# Patient Record
Sex: FEMALE | Race: WHITE | NOT HISPANIC OR LATINO | Employment: OTHER | URBAN - METROPOLITAN AREA
[De-identification: names, ages, dates, MRNs, and addresses within clinical notes are randomized per-mention and may not be internally consistent; named-entity substitution may affect disease eponyms.]

---

## 2017-01-23 ENCOUNTER — OFFICE VISIT (OUTPATIENT)
Dept: PSYCHOLOGY | Facility: CLINIC | Age: 67
End: 2017-01-23
Payer: COMMERCIAL

## 2017-01-23 ENCOUNTER — HOSPITAL ENCOUNTER (OUTPATIENT)
Dept: MAMMOGRAPHY | Facility: CLINIC | Age: 67
Discharge: HOME | End: 2017-01-23
Attending: FAMILY MEDICINE | Admitting: FAMILY MEDICINE
Payer: MEDICARE

## 2017-01-23 DIAGNOSIS — F32.5 MAJOR DEPRESSION IN COMPLETE REMISSION (H): Primary | Chronic | ICD-10-CM

## 2017-01-23 DIAGNOSIS — Z12.31 ENCOUNTER FOR SCREENING MAMMOGRAM FOR BREAST CANCER: ICD-10-CM

## 2017-01-23 PROCEDURE — 77063 BREAST TOMOSYNTHESIS BI: CPT

## 2017-01-23 PROCEDURE — 90834 PSYTX W PT 45 MINUTES: CPT | Performed by: PSYCHOLOGIST

## 2017-01-23 PROCEDURE — G0202 SCR MAMMO BI INCL CAD: HCPCS

## 2017-01-23 NOTE — Clinical Note
Her complaints are principally having to do with cognitive functioning and forgetting things. I see she had a neuropsych testing in 2013. I am wondering if it might be time to repeat that?

## 2017-01-23 NOTE — Clinical Note
Saw Albina yesterday. It appears her principle concern is these increasing memory lapses. She is not reporting significant amounts of depression or anxiety. I will go more after our next session. I am wondering if a neuropsych evaluation might be in order?

## 2017-01-24 NOTE — PROGRESS NOTES
Progress Note - Initial Session  Disclaimer: This note consists of symbols derived from keyboarding, dictation and/or voice recognition software. As a result, there may be errors in the script that have gone undetected. Please consider this when interpreting information found in this chart.    Client Name:  Albina Pedro Date: 1/23/2017         Service Type: Individual      Session Start Time: 2:00 PM  Session End Time: 2:45 PM      Session Length: 38 - 52      Session #: 1     Attendees: Client attended alone         Diagnostic Assessment in progress.  Unable to complete documentation at the conclusion of the first session due to needing more information. Client presents with extended history of depression and anxiety.  Currently her principal complaint is memory and comprehension particularly reading comprehension. He is also reporting increasing memory lapses beginning approximately 5 years ago. She finds herself forgetting tasks frequently; this has caused considerable strain in her relationship with her .      Mental Status Assessment:  Appearance:   Appropriate   Eye Contact:   Good   Psychomotor Behavior: Normal   Attitude:   Cooperative   Orientation:   All  Speech   Rate / Production: Normal    Volume:  Normal   Mood:    Anxious  Normal  Affect:    Appropriate   Thought Content:  Clear   Thought Form:  Coherent  Logical  Tangential   Insight:    Fair       Safety Issues and Plan for Safety and Risk Management:  Client denies current fears or concerns for personal safety.  Client denies current or recent suicidal ideation or behaviors.  Client denies current or recent homicidal ideation or behaviors.  Client denies current or recent self injurious behavior or ideation.  Client denies other safety concerns.  A safety and risk management plan has not been developed at this time, however client was given the after-hours number / 911 should there be a change in any of these risk  factors.  Client reports there are firearms in the house. The firearms are secured in a locked space.      Diagnostic Criteria:  Major Depressive Disorder: A) Recurrent episode(s) - symptoms have been present during the same 2-week period and represent a change from previous functioning 5 or more symptoms (required for diagnosis)   - Depressed mood. Note: In children and adolescents, can be irritable mood.     - Diminished interest or pleasure in all, or almost all, activities.    - Fatigue or loss of energy.    - Feelings of worthlessness or inappropriate and excessive guilt.    - Diminished ability to think or concentrate, or indecisiveness.   B) The symptoms cause clinically significant distress or impairment in social, occupational, or other important areas of functioning  C) The episode is not attributable to the physiological effects of a substance or to another medical condition  D) The occurence of major depressive episode is not better explained by other thought / psychotic disorders  E) There has never been a manic episode or hypomanic episode       DSM5 Diagnoses: (Sustained by DSM5 Criteria Listed Above)  Diagnoses: 296.31 Major Depressive Disorder, Recurrent Episode, Mild _  Psychosocial & Contextual Factors: memory problems  WHODAS 2.0  World Health Organization  Disability Assessment Schedule 2.0                  12-Item version, self-administered             This questionnaire asks about difficulties due to health conditions. Health conditions include disease or                illnesses, other health problems that may be short or long lasting, injuries, mental health or emotional                   problems, and problems with alcohol or drugs.                     Think back over the past 30 days and answer these questions, thinking about how much difficulty you had doing the following activities. For each question, please [place] only one response.    S1 Standing for long periods such as 30 minutes?  None =         1   S2 Taking care of household responsibilities? Mild =           2   S3 Learning a new task, for example, learning how to get to a new place? Severe =       4   S4 How much of a problem do you have joining community activities (for example, festivals, Scientologist or other activities) in the same way as anyone else can? Extreme / or cannot do = 5   S5 How much have you been emotionally affected by your health problems? Extreme / or cannot do = 5   In the past 30 days, how much difficulty did you have in:   S6 Concentrating on doing something for ten minutes? Severe =       4   S7 Walking a long distance such as a kilometer (or equivalent)? None =         1   S8 Washing your whole body? None =         1   S9 Getting dressed? None =         1   S10 Dealing with people you do not know? Severe =       4   S11 Maintaining a friendship? Severe =       4   S12 Your day to day work? Moderate =   3   Simple Scoring Total: 34     H1 Overall, in the past 30 days, how many days were these difficulties present? Record number of days 22   H2 In the past 30 days, for how many days were you totally unable to carry out your usual activities or work because of any health condition? Record number of days  18   H3 In the past 30 days, not counting the days that you were totally unable, for how many days did you cut back or reduce your usual activities or work because of any health condition? Record number of days 15           Collateral Reports Completed:  Not Applicable      PLAN: (Homework, other):  Client stated that she may follow up for ongoing services with [facility].        Rolo Luke

## 2017-01-31 ENCOUNTER — OFFICE VISIT (OUTPATIENT)
Dept: PSYCHOLOGY | Facility: CLINIC | Age: 67
End: 2017-01-31
Payer: COMMERCIAL

## 2017-01-31 DIAGNOSIS — F33.0 MAJOR DEPRESSIVE DISORDER, RECURRENT, MILD (H): Primary | ICD-10-CM

## 2017-01-31 PROCEDURE — 90791 PSYCH DIAGNOSTIC EVALUATION: CPT | Performed by: PSYCHOLOGIST

## 2017-01-31 NOTE — Clinical Note
Looks like she is doing fairly well on her antidepressants, though I think she is reporting less depression and she is actually experiencing.

## 2017-02-01 PROBLEM — F33.0 MAJOR DEPRESSIVE DISORDER, RECURRENT, MILD (H): Status: ACTIVE | Noted: 2017-02-01
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
## 2017-02-13 ENCOUNTER — TELEPHONE (OUTPATIENT)
Dept: CARDIOLOGY | Facility: CLINIC | Age: 67
End: 2017-02-13

## 2017-02-13 NOTE — TELEPHONE ENCOUNTER
[facility] Call Center    Phone Message    Name of Caller: Albina    Phone Number: Home number on file [phone] (home) or Cell number on file:    Telephone Information:   Mobile [phone]       Best time to return call: Any    May a detailed message be left on voicemail: yes    Relation to patient: Self    Reason for Call: Medication Question or concern regarding medication   Prescription Clarification:   Name of Medication: ranolazine (RANEXA) 500 MG 12 hr tablet [[id number]] (Order [id number])     Prescribing Provider: Dr. Carbone        Is patient symptomatic? No. Describe question or concern: Patient has questions regarding Rx would like to verify if this is the the ER or sustained release. Patient would like to discuss with nurse. Please advise       Action Taken: Message routed to:  Adult Clinics: Cardiology p [id number]

## 2017-02-13 NOTE — TELEPHONE ENCOUNTER
Spoke with Kristina. She states her bottle says Ranexa ER on it. She is looking at purchasing the medication through a [place] pharmacy. What they have is Ranexa SR. She is wondering if that is the same thing? Matilde Goins CMA ([facility])

## 2017-02-14 NOTE — TELEPHONE ENCOUNTER
Left message for patient with abbreviation definitions for the Ranexa. ER= extended release, SR = sustained release. These would be the same.  Asked her to call if she needed anything else.

## 2017-03-02 DIAGNOSIS — F43.0 ACUTE REACTION TO STRESS: ICD-10-CM

## 2017-03-02 NOTE — TELEPHONE ENCOUNTER
LORazepam (ATIVAN) 0.5 MG tablet      Last Written Prescription Date:  11/14/16  Last Fill Quantity: 20,   # refills: 0  Last Office Visit with [facility], [facility] or [facility] prescribing provider: 12/21/16  Future Office visit:    Next 5 appointments (look out 90 days)     Mar 15, 2017  2:00 PM CDT   Return Visit with Rolo TAM [facility] ([facility])    [address] [place] [id number]   [phone]            May 12, 2017  3:00 PM CDT   Return Visit with Driss Carbone MD   [facility]    [address] [place] [id number] [phone]                   Routing refill request to provider for review/approval because:  Drug not on the [facility], [facility] or [facility] refill protocol or controlled substance

## 2017-03-03 RX ORDER — LORAZEPAM 0.5 MG/1
0.5 TABLET ORAL EVERY 8 HOURS PRN
Qty: 20 TABLET | Refills: 0 | Status: SHIPPED | OUTPATIENT
Start: 2017-03-03 | End: 2017-03-22

## 2017-03-22 ENCOUNTER — OFFICE VISIT (OUTPATIENT)
Dept: FAMILY MEDICINE | Facility: CLINIC | Age: 67
End: 2017-03-22
Payer: COMMERCIAL

## 2017-03-22 VITALS
DIASTOLIC BLOOD PRESSURE: 86 MMHG | TEMPERATURE: 98.6 F | WEIGHT: 179.6 LBS | BODY MASS INDEX: 30.66 KG/M2 | HEIGHT: 64 IN | SYSTOLIC BLOOD PRESSURE: 126 MMHG | HEART RATE: 82 BPM

## 2017-03-22 DIAGNOSIS — F32.5 MAJOR DEPRESSION IN COMPLETE REMISSION (H): ICD-10-CM

## 2017-03-22 DIAGNOSIS — F43.0 ACUTE REACTION TO STRESS: ICD-10-CM

## 2017-03-22 DIAGNOSIS — Z23 NEED FOR PNEUMOCOCCAL VACCINATION: Primary | ICD-10-CM

## 2017-03-22 DIAGNOSIS — R06.2 WHEEZING: ICD-10-CM

## 2017-03-22 DIAGNOSIS — R41.3 MEMORY LOSS: ICD-10-CM

## 2017-03-22 LAB
CRP SERPL-MCNC: <2.9 MG/L (ref 0–8)
ERYTHROCYTE [SEDIMENTATION RATE] IN BLOOD BY WESTERGREN METHOD: 11 MM/H (ref 0–30)
TSH SERPL DL<=0.005 MIU/L-ACNC: 2.41 MU/L (ref 0.4–4)
VIT B12 SERPL-MCNC: 339 PG/ML (ref 193–986)

## 2017-03-22 PROCEDURE — 84207 ASSAY OF VITAMIN B-6: CPT | Mod: 90 | Performed by: FAMILY MEDICINE

## 2017-03-22 PROCEDURE — 90670 PCV13 VACCINE IM: CPT | Performed by: FAMILY MEDICINE

## 2017-03-22 PROCEDURE — 85652 RBC SED RATE AUTOMATED: CPT | Performed by: FAMILY MEDICINE

## 2017-03-22 PROCEDURE — 82607 VITAMIN B-12: CPT | Performed by: FAMILY MEDICINE

## 2017-03-22 PROCEDURE — 99000 SPECIMEN HANDLING OFFICE-LAB: CPT | Performed by: FAMILY MEDICINE

## 2017-03-22 PROCEDURE — 86140 C-REACTIVE PROTEIN: CPT | Performed by: FAMILY MEDICINE

## 2017-03-22 PROCEDURE — 84443 ASSAY THYROID STIM HORMONE: CPT | Performed by: FAMILY MEDICINE

## 2017-03-22 PROCEDURE — G0009 ADMIN PNEUMOCOCCAL VACCINE: HCPCS | Performed by: FAMILY MEDICINE

## 2017-03-22 PROCEDURE — 36415 COLL VENOUS BLD VENIPUNCTURE: CPT | Performed by: FAMILY MEDICINE

## 2017-03-22 PROCEDURE — 99214 OFFICE O/P EST MOD 30 MIN: CPT | Mod: 25 | Performed by: FAMILY MEDICINE

## 2017-03-22 RX ORDER — VENLAFAXINE 37.5 MG/1
TABLET ORAL
Qty: 60 TABLET | Refills: 1 | Status: SHIPPED | OUTPATIENT
Start: 2017-03-22 | End: 2017-09-01 | Stop reason: DRUGHIGH

## 2017-03-22 RX ORDER — ALBUTEROL SULFATE 90 UG/1
2 AEROSOL, METERED RESPIRATORY (INHALATION) EVERY 4 HOURS PRN
Qty: 3 INHALER | Refills: 6 | Status: SHIPPED | OUTPATIENT
Start: 2017-03-22 | End: 2019-09-05

## 2017-03-22 RX ORDER — BUPROPION HYDROCHLORIDE 300 MG/1
300 TABLET ORAL EVERY MORNING
Qty: 90 TABLET | Refills: 1 | Status: SHIPPED | OUTPATIENT
Start: 2017-03-22 | End: 2017-10-20

## 2017-03-22 RX ORDER — LORAZEPAM 0.5 MG/1
0.5 TABLET ORAL EVERY 8 HOURS PRN
Qty: 20 TABLET | Refills: 0 | Status: SHIPPED | OUTPATIENT
Start: 2017-03-22 | End: 2017-06-29

## 2017-03-22 ASSESSMENT — ANXIETY QUESTIONNAIRES
5. BEING SO RESTLESS THAT IT IS HARD TO SIT STILL: NOT AT ALL
3. WORRYING TOO MUCH ABOUT DIFFERENT THINGS: MORE THAN HALF THE DAYS
GAD7 TOTAL SCORE: 8
2. NOT BEING ABLE TO STOP OR CONTROL WORRYING: SEVERAL DAYS
6. BECOMING EASILY ANNOYED OR IRRITABLE: MORE THAN HALF THE DAYS
1. FEELING NERVOUS, ANXIOUS, OR ON EDGE: MORE THAN HALF THE DAYS
7. FEELING AFRAID AS IF SOMETHING AWFUL MIGHT HAPPEN: NOT AT ALL

## 2017-03-22 ASSESSMENT — PATIENT HEALTH QUESTIONNAIRE - PHQ9: 5. POOR APPETITE OR OVEREATING: SEVERAL DAYS

## 2017-03-22 NOTE — PROGRESS NOTES
SUBJECTIVE:                                                    Albina Pedro is a 66 year old female who presents to clinic today for the following health issues:    Depression Followup Remeron & Bupropion    Status since last visit: Worsened   See PHQ-9 for current symptoms.  Other associated symptoms:   Family thinks the medication is not helping.   Patient thinks her mind is just not working like it used too, memory concerns.     PHQ-9 SCORE 1/23/2017 1/31/2017 3/22/2017   Total Score 2 1 -   Total Score - - 12     SEBASTIAN-7 SCORE 2/11/2016 12/21/2016 3/22/2017   Total Score - - -   Total Score 5 3 8     PHQ-9 ([employer]) 3/22/2017   1.  Little interest or pleasure in doing things 1   2.  Feeling down, depressed, or hopeless 2   3.  Trouble falling or staying asleep, or sleeping too much 0   4.  Feeling tired or having little energy 1   5.  Poor appetite or overeating 0   6.  Feeling bad about yourself 2   7.  Trouble concentrating 2   8.  Moving slowly or restless 1   9.  Suicidal or self-harm thoughts 0   PHQ-9 Total Score 9     SEBASTIAN-7   [employer], 2002; Used with Permission) 3/22/2017   1. Feeling nervous, anxious, or on edge 2   2. Not being able to stop or control worrying 1   3. Worrying too much about different things 2   4. Trouble relaxing 1   5. Being so restless that it is hard to sit still 0   6. Becoming easily annoyed or irritable 2   7. Feeling afraid, as if something awful might happen 0   SEBASTIAN-7 Total Score 8        BP Readings from Last 6 Encounters:   03/22/17 148/76   12/21/16 138/74   12/13/16 136/71   12/09/16 150/83   09/30/16 113/70   09/20/16 126/81     Problem list and histories reviewed & adjusted, as indicated.  Additional history: things have been very stressful. She is having more fights with her    On days she can't think straight she is more tired now on the remeron  She never did follow up with neurology. When her daughter was with her she never followed up with any of the  "recommendations and she is now having the issues with her memory loss still      Patient Active Problem List   Diagnosis     DIZZINESS - LIKELY HYPOGLYCEMIA     Dermatophytosis of body     Episodic mood disorder (H)     CARDIOVASCULAR SCREENING; LDL GOAL LESS THAN 70     Health Care Home     Panniculitis     Advanced directives, counseling/discussion     CAD (coronary artery disease)     Sjogren's syndrome (H)     Adverse effect of anesthetic     Status post coronary angiogram     Acute chest pain     Major depressive disorder, recurrent, mild (H)     Past Surgical History:   Procedure Laterality Date     C/SECTION, LOW TRANSVERSE  10/13/1978    , Low Transverse     COLONOSCOPY       SURGICAL HISTORY OF -            SURGICAL HISTORY OF -   00    Colonoscopy       Social History   Substance Use Topics     Smoking status: Never Smoker     Smokeless tobacco: Never Used     Alcohol use 0.0 oz/week     0 Standard drinks or equivalent per week      Comment: 1 glass of wine every 2 weeks     Family History   Problem Relation Age of Onset     CANCER Mother      Breast and liver- age 43     HEART DISEASE Father      ? chf?h/o CVA     Alzheimer Disease Father      Hypertension Father      Neurologic Disorder Father      CVA     DIABETES Maternal Grandmother      Breast Cancer Maternal Aunt      Breast Cancer Paternal Aunt      Cancer - colorectal No family hx of      Prostate Cancer No family hx of            ROS:  Constitutional, HEENT, cardiovascular, pulmonary, gi and gu systems are negative, except as otherwise noted.    OBJECTIVE:                                                    /76 (BP Location: Right arm, Cuff Size: Adult Regular)  Pulse 82  Temp 98.6  F (37  C) (Tympanic)  Ht 5' 4\" (1.626 m)  Wt 179 lb 9.6 oz (81.5 kg)  BMI 30.83 kg/m2 Body mass index is 30.83 kg/(m^2).   GENERAL APPEARANCE: healthy, alert and no distress  HENT: ear canals and TM's normal and nose and mouth " without ulcers or lesions  NECK: no adenopathy, no asymmetry, masses, or scars and thyroid normal to palpation  RESP: lungs clear to auscultation - no rales, rhonchi or wheezes  NEURO: Normal strength and tone, speech normal, oriented times 3 and   Requires reminding        ASSESSMENT/PLAN:                                                      1. Major depression in complete remission (H)    - buPROPion (WELLBUTRIN XL) 300 MG 24 hr tablet; Take 1 tablet (300 mg) by mouth every morning  Dispense: 90 tablet; Refill: 1  - venlafaxine (EFFEXOR) 37.5 MG tablet; Take 1 tablet daily for 7 days, then 1 tablet twice daily for  Dispense: 60 tablet; Refill: 1    2. Need for pneumococcal vaccination    - PNEUMOCOCCAL CONJ VACCINE 13 VALENT IM  - ADMIN 1st VACCINE    3. Memory loss  Will start the workup as she has not followed up with neurology asked her to make the appointment. Reprinted the number and she did write it down   - CT Head w/o Contrast; Future  - Vitamin B12  - Vitamin B6  - TSH with free T4 reflex  - ESR: Erythrocyte sedimentation rate  - CRP, inflammation    4. Acute reaction to stress    Use sparingly  - LORazepam (ATIVAN) 0.5 MG tablet; Take 1 tablet (0.5 mg) by mouth every 8 hours as needed for anxiety  Dispense: 20 tablet; Refill: 0    5. Wheezing  Uses for springtime   - albuterol (PROAIR HFA/PROVENTIL HFA/VENTOLIN HFA) 108 (90 BASE) MCG/ACT Inhaler; Inhale 2 puffs into the lungs every 4 hours as needed for shortness of breath / dyspnea  Dispense: 3 Inhaler; Refill: 6     reports that she has never smoked. She has never used smokeless tobacco.    Patient Instructions   Please decrease the mirtazapine to 15 mg (1/2) tablet at bedtime for the next week. You can start taking the venlafaxine the next morning   Take 1 tablet for 1 week then increase to 1 2 times per day     Make the appointment with the neurologist   Please make the appointment for the ct scan and I will send you the labs     i want to see you  back in 4 weeks       Weight management plan: Discussed healthy diet and exercise guidelines and patient will follow up in 6 months in clinic to re-evaluate.    Carolina Burrell M.D.  [facility]

## 2017-03-22 NOTE — MR AVS SNAPSHOT
After Visit Summary   3/22/2017    Albina Pedro    MRN: [id number]           Patient Information     Date Of Birth          1950        Visit Information        Provider Department      3/22/2017 2:00 PM Carolina Burrell MD [facility]        Today's Diagnoses     Need for pneumococcal vaccination    -  1    Major depression in complete remission (H)        Memory loss        Acute reaction to stress        Wheezing          Care Instructions    Please decrease the mirtazapine to 15 mg (1/2) tablet at bedtime for the next week. You can start taking the venlafaxine the next morning   Take 1 tablet for 1 week then increase to 1 2 times per day     Make the appointment with the neurologist   Please make the appointment for the ct scan and I will send you the labs     i want to see you back in 4 weeks         Follow-ups after your visit        Follow-up notes from your care team     Return in about 4 weeks (around 4/19/2017), or if symptoms worsen or fail to improve.      Your next 10 appointments already scheduled     Mar 28, 2017 12:30 PM CDT   FULL PULMONARY FUNCTION with  ESTHER DOWD   [facility] Pulmonary Function Testing ([facility])    [address] [place] [id number] [phone]            Mar 28, 2017  1:30 PM CDT   (Arrive by 1:15 PM)   Return Interstitial Lung with David Morris Perlman, MD   [facility] ([facility])    [address] [place] [id number] [phone]            Apr 24, 2017  4:00 PM CDT   Return Visit with Rolo Luke   [facility] ([facility])    [address] [place] [id number]   [phone]            May 12, 2017  3:00 PM CDT   [facility]iss [facility]bone [facility]    [id number] [place] [id number]  "  [phone]              Future tests that were ordered for you today     Open Future Orders        Priority Expected Expires Ordered    CT Head w/o Contrast Routine  3/22/2018 3/22/2017            Who to contact     Normal or non-critical lab and imaging results will be communicated to you by mobiManagehart, letter or phone within 4 business days after the clinic has received the results. If you do not hear from us within 7 days, please contact the clinic through mobiManagehart or phone. If you have a critical or abnormal lab result, we will notify you by phone as soon as possible.  Submit refill requests through Kazaana or call your pharmacy and they will forward the refill request to us. Please allow 3 business days for your refill to be completed.          If you need to speak with a  for additional information , please call: [phone]             Additional Information About Your Visit        Kazaana Information     Kazaana gives you secure access to your electronic health record. If you see a primary care provider, you can also send messages to your care team and make appointments. If you have questions, please call your primary care clinic.  If you do not have a primary care provider, please call [phone] and they will assist you.        Care EveryWhere ID     This is your Care EveryWhere ID. This could be used by other organizations to access your [place] medical records  [id number]        Your Vitals Were     Pulse Temperature Height BMI (Body Mass Index)          82 98.6  F (37  C) (Tympanic) 5' 4\" (1.626 m) 30.83 kg/m2         Blood Pressure from Last 3 Encounters:   03/22/17 148/76   12/21/16 138/74   12/13/16 136/71    Weight from Last 3 Encounters:   03/22/17 179 lb 9.6 oz (81.5 kg)   12/21/16 180 lb (81.6 kg)   12/13/16 185 lb 6.5 oz (84.1 kg)              We Performed the Following     ADMIN 1st VACCINE     CRP, inflammation     DEPRESSION ACTION PLAN (DAP)     ESR: Erythrocyte " sedimentation rate     PNEUMOCOCCAL CONJ VACCINE 13 VALENT IM     TSH with free T4 reflex     Vitamin B12     Vitamin B6          Today's Medication Changes          These changes are accurate as of: 3/22/17  3:11 PM.  If you have any questions, ask your nurse or doctor.               Start taking these medicines.        Dose/Directions    venlafaxine 37.5 MG tablet   Commonly known as:  EFFEXOR   Used for:  Major depression in complete remission (H)   Started by:  Carolina Burrell MD        Take 1 tablet daily for 7 days, then 1 tablet twice daily for   Quantity:  60 tablet   Refills:  1         These medicines have changed or have updated prescriptions.        Dose/Directions    loratadine 10 MG tablet   Commonly known as:  CLARITIN   This may have changed:    - when to take this  - reasons to take this   Used for:  Seasonal allergies        Dose:  10 mg   Take 1 tablet (10 mg) by mouth daily   Quantity:  30 tablet   Refills:  1       mirtazapine 30 MG tablet   Commonly known as:  REMERON   This may have changed:  Another medication with the same name was removed. Continue taking this medication, and follow the directions you see here.   Used for:  Recurrent major depression in partial remission (H)   Changed by:  Carolina Burrell MD        Dose:  30 mg   Take 1 tablet (30 mg) by mouth At Bedtime   Quantity:  90 tablet   Refills:  1            Where to get your medicines      These medications were sent to [id number] - [place], [place] - [address]  [address] [facility] [address]     Phone:  [phone]     albuterol 108 (90 BASE) MCG/ACT Inhaler    buPROPion 300 MG 24 hr tablet    venlafaxine 37.5 MG tablet         Some of these will need a paper prescription and others can be bought over the counter.  Ask your nurse if you have questions.     Bring a paper prescription for each of these medications     LORazepam 0.5 MG tablet                Primary Care Provider Office Phone # Fax #    Carolina WEBER  MD Ashanti [phone]       [facility] [address] [facility] [address]        Thank you!     Thank you for choosing [facility]  for your care. Our goal is always to provide you with excellent care. Hearing back from our patients is one way we can continue to improve our services. Please take a few minutes to complete the written survey that you may receive in the mail after your visit with us. Thank you!             Your Updated Medication List - Protect others around you: Learn how to safely use, store and throw away your medicines at www.disposemymeds.org.          This list is accurate as of: 3/22/17  3:11 PM.  Always use your most recent med list.                   Brand Name Dispense Instructions for use    albuterol 108 (90 BASE) MCG/ACT Inhaler    PROAIR HFA/PROVENTIL HFA/VENTOLIN HFA    3 Inhaler    Inhale 2 puffs into the lungs every 4 hours as needed for shortness of breath / dyspnea       aspirin 81 MG tablet     30 tablet    Take 81 mg by mouth every evening       buPROPion 300 MG 24 hr tablet    WELLBUTRIN XL    90 tablet    Take 1 tablet (300 mg) by mouth every morning       fluticasone 50 MCG/ACT spray    FLONASE    3 Bottle    Spray 1-2 sprays into both nostrils daily       ipratropium - albuterol 0.5 mg/2.5 mg/3 mL 0.5-2.5 (3) MG/3ML neb solution    DUONEB    30 vial    Take 1 vial (3 mLs) by nebulization every 6 hours as needed for shortness of breath / dyspnea or wheezing       loratadine 10 MG tablet    CLARITIN    30 tablet    Take 1 tablet (10 mg) by mouth daily       LORazepam 0.5 MG tablet    ATIVAN    20 tablet    Take 1 tablet (0.5 mg) by mouth every 8 hours as needed for anxiety       mineral oil-hydrophilic petrolatum     420 g    Apply  topically as needed for dry skin.       mirtazapine 30 MG tablet    REMERON    90 tablet    Take 1 tablet (30 mg) by mouth At Bedtime       MULTIVITAMIN & MINERAL PO      Take 1 tablet by mouth daily        pravastatin 20 MG tablet    PRAVACHOL    90 tablet    Take 1 tablet (20 mg) by mouth daily       Ranolazine 1000 MG Tb12     180 tablet    Take 1,000 mg by mouth 2 times daily       terbinafine 1 % cream    lamISIL AT    12 g    Apply topically 2 times daily For fungal infection not resolved with other antifungals (e.g. Clotrimazole)       triamcinolone 0.1 % cream    KENALOG    80 g    Apply sparingly to affected area three times daily as needed       venlafaxine 37.5 MG tablet    EFFEXOR    60 tablet    Take 1 tablet daily for 7 days, then 1 tablet twice daily for       VITAMIN D3 PO      2 capsules by mouth DAILY

## 2017-03-22 NOTE — PATIENT INSTRUCTIONS
Please decrease the mirtazapine to 15 mg (1/2) tablet at bedtime for the next week. You can start taking the venlafaxine the next morning   Take 1 tablet for 1 week then increase to 1 2 times per day     Make the appointment with the neurologist   Please make the appointment for the ct scan and I will send you the labs     i want to see you back in 4 weeks

## 2017-03-22 NOTE — NURSING NOTE
"Chief Complaint   Patient presents with     Recheck Medication       Initial /76 (BP Location: Right arm, Cuff Size: Adult Regular)  Pulse 82  Temp 98.6  F (37  C) (Tympanic)  Ht 5' 4\" (1.626 m)  Wt 179 lb 9.6 oz (81.5 kg)  BMI 30.83 kg/m2 Estimated body mass index is 30.83 kg/(m^2) as calculated from the following:    Height as of this encounter: 5' 4\" (1.626 m).    Weight as of this encounter: 179 lb 9.6 oz (81.5 kg).  Medication Reconciliation: complete     Cheyenne Wesley CMA    Screening Questionnaire for Adult Immunization    Are you sick today?   No   Do you have allergies to medications, food, a vaccine component or latex?   No   Have you ever had a serious reaction after receiving a vaccination?   No   Do you have a long-term health problem with heart disease, lung disease, asthma, kidney disease, metabolic disease (e.g. diabetes), anemia, or other blood disorder?   No   Do you have cancer, leukemia, HIV/AIDS, or any other immune system problem?   No   In the past 3 months, have you taken medications that affect  your immune system, such as prednisone, other steroids, or anticancer drugs; drugs for the treatment of rheumatoid arthritis, Crohn s disease, or psoriasis; or have you had radiation treatments?   No   Have you had a seizure, or a brain or other nervous system problem?   No   During the past year, have you received a transfusion of blood or blood     products, or been given immune (gamma) globulin or antiviral drug?   No   For women: Are you pregnant or is there a chance you could become        pregnant during the next month?   No   Have you received any vaccinations in the past 4 weeks?   No     Immunization questionnaire answers were all negative.      MNVFC doesn't apply on this patient    Per orders of Dr. Burrell, injection of Prevnar 13  given by Cheyenne Wesley. Patient instructed to remain in clinic for 20 minutes afterwards, and to report any adverse reaction to me " immediately.       Screening performed by Cheyenne Wesley on 3/22/2017 at 5:06 PM.

## 2017-03-23 ASSESSMENT — PATIENT HEALTH QUESTIONNAIRE - PHQ9: SUM OF ALL RESPONSES TO PHQ QUESTIONS 1-9: 12

## 2017-03-23 ASSESSMENT — ANXIETY QUESTIONNAIRES: GAD7 TOTAL SCORE: 8

## 2017-03-24 ENCOUNTER — TELEPHONE (OUTPATIENT)
Dept: FAMILY MEDICINE | Facility: CLINIC | Age: 67
End: 2017-03-24

## 2017-03-24 LAB — VIT B6 SERPL-MCNC: 256.7 NG/ML

## 2017-03-24 NOTE — TELEPHONE ENCOUNTER
Albina has a referral for [facility] and she is wondering if there is a specific MD that you would like her to see?  Please review and advise.  Thank you..Tita Real

## 2017-03-28 ENCOUNTER — OFFICE VISIT (OUTPATIENT)
Dept: PULMONOLOGY | Facility: CLINIC | Age: 67
End: 2017-03-28
Attending: INTERNAL MEDICINE
Payer: MEDICARE

## 2017-03-28 VITALS
WEIGHT: 182.6 LBS | HEIGHT: 65 IN | DIASTOLIC BLOOD PRESSURE: 78 MMHG | BODY MASS INDEX: 30.42 KG/M2 | TEMPERATURE: 97.8 F | OXYGEN SATURATION: 94 % | HEART RATE: 80 BPM | SYSTOLIC BLOOD PRESSURE: 132 MMHG | RESPIRATION RATE: 18 BRPM

## 2017-03-28 DIAGNOSIS — J84.9 ILD (INTERSTITIAL LUNG DISEASE) (H): Primary | ICD-10-CM

## 2017-03-28 DIAGNOSIS — J84.9 ILD (INTERSTITIAL LUNG DISEASE) (H): ICD-10-CM

## 2017-03-28 PROCEDURE — 99212 OFFICE O/P EST SF 10 MIN: CPT | Mod: ZF

## 2017-03-28 ASSESSMENT — PAIN SCALES - GENERAL: PAINLEVEL: NO PAIN (0)

## 2017-03-28 NOTE — PATIENT INSTRUCTIONS
Your pulmonary function tests are stable since   CT scan is stable (last CT was )  We will continue to follow you every 6 months with breathing tests, please call if there are any questions or changes in your [phone]

## 2017-03-28 NOTE — LETTER
3/28/2017       RE: Albina Pedro  [id number] [address] [place] [address] [facility] [id number]     Dear Colleague,    Thank you for referring your patient, Albina Pedro, to the [facility] at [facility]. Please see a copy of my visit note below.    Reason for Visit  Albina Pedro is a 66 year old year old female who is being seen for Interstitial Lung Disease (ILD) (Follow up on Albina and her ILD)    ILD HPI    Albina Pedro is a 66-year-old female with interstitial lung disease seen today for followup.  Her ILD consists of multiple thin-walled cysts that have been stable.  She has a diagnosis of Sjogren's disease and this was felt to be consistent with LIP associated with her Sjogren's.  It has been very stable.  Her pulmonary function tests have been normal and she has been asymptomatic, so we elected to just follow it.  She returns to clinic today overall stating her breathing is stable.  She does not have any significant dyspnea on exertion.  She has been having some heart issues, although feels that this is resolved and is looking forward to being more active this summer.  Also been having some issues with depression for which she is following up with her primary care physician and psychologist about.  Otherwise, generally doing well with no other new complaints today.           Current Outpatient Prescriptions   Medication     albuterol (PROAIR HFA/PROVENTIL HFA/VENTOLIN HFA) 108 (90 BASE) MCG/ACT Inhaler     buPROPion (WELLBUTRIN XL) 300 MG 24 hr tablet     LORazepam (ATIVAN) 0.5 MG tablet     venlafaxine (EFFEXOR) 37.5 MG tablet     mirtazapine (REMERON) 30 MG tablet     ranolazine 1000 MG TB12     ipratropium - albuterol 0.5 mg/2.5 mg/3 mL (DUONEB) 0.5-2.5 (3) MG/3ML nebulization     fluticasone (FLONASE) 50 MCG/ACT nasal spray     pravastatin (PRAVACHOL) 20 MG tablet     triamcinolone (KENALOG) 0.1 % cream     Multiple Vitamins-Minerals  (MULTIVITAMIN & MINERAL PO)     terbinafine (LAMISIL AT) 1 % cream     loratadine (CLARITIN) 10 MG tablet     aspirin 81 MG tablet     mineral oil-hydrophilic petrolatum (AQUAPHOR) ointment     VITAMIN D3 OR     No current facility-administered medications for this visit.      Allergies   Allergen Reactions     Daypro [Nsaids] Rash            Lidocaine Other (See Comments)     Rapid heart rate     Penicillins Unknown     Occurred as child.     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       Past Surgical History:   Procedure Laterality Date     C/SECTION, LOW TRANSVERSE  10/13/1978    , Low Transverse     COLONOSCOPY       SURGICAL HISTORY OF 1978         SURGICAL HISTORY OF -   00    Colonoscopy       Social History     Social History     Marital status:      Spouse name: N/A     Number of children: N/A     Years of education: N/A     Occupational History     Not on file.     Social History Main Topics     Smoking status: Never Smoker     Smokeless tobacco: Never Used     Alcohol use 0.0 oz/week     0 Standard drinks or equivalent per week      Comment: 1 glass of wine every 2 weeks     Drug use: No     Sexual activity: Yes     Partners: Male     Other Topics Concern     Parent/Sibling W/ Cabg, Mi Or Angioplasty Before 65f 55m? No     Social History Narrative       Family History   Problem Relation Age of Onset     CANCER Mother      Breast and liver- age 43     HEART DISEASE Father      ? chf?h/o CVA     Alzheimer Disease Father      Hypertension Father      Neurologic Disorder Father      CVA     DIABETES Maternal Grandmother      Breast Cancer Maternal Aunt      Breast Cancer Paternal Aunt       "Cancer - colorectal No family hx of      Prostate Cancer No family hx of        ROS Pulmonary    A complete ROS was otherwise negative except as noted in the HPI.  Vitals:    03/28/17 1315   BP: 132/78   Pulse: 80   Resp: 18   Temp: 97.8  F (36.6  C)   TempSrc: Oral   SpO2: 94%   Weight: 82.8 kg (182 lb 9.6 oz)   Height: 1.651 m (5' 5\")     Exam:   GENERAL APPEARANCE: Well developed, well nourished, alert, and in no apparent distress.  NECK: supple, no masses, no thyromegaly.  LYMPHATICS: No significant axillary, cervical, or supraclavicular nodes.  RESP: normal percussion, good air flow throughout, - no crackles, rhonchi or wheezes.  CV: Normal S1, S2, regular rhythm, normal rate, no rub, no murmur,  no gallop, no LE edema.   ABDOMEN:  Bowel sounds normal, soft, nontender, no HSM or masses.   MS: extremities normal- no clubbing, no cyanosis.  SKIN: no rash on limited exam  NEURO: Mentation intact, speech normal, normal strength and tone, normal gait and stance  PSYCH: mentation appears normal. and affect normal/bright  Results: I have reviewed all imaging, PFTs and other relavent tests, please see below for details, PFT and imaging results were reviewed with the patient.  PFTs: normal spirometry, DLCO and lung volumes.  Assessment and plan:     A 66-year-old female with ILD consisting of thin walled cysts, likely LIP associated with her Sjogren's.  Overall is stable.  She has not been imaged in about 3 years; however, given the stability in her function tests I do not think we need to reimage her in the near future and will plan on maybe for a 5-year followup; however, I think we should continue to follow her PFTs every 6 months.  For the time being, I will see her back in 6 months.  She will call sooner with any changes in her breathing.           CBC   Recent Labs   Lab Test  12/12/16   2240  02/18/16   0923   RBC  4.26  4.28   HGB  13.1  13.4   HCT  38.6  39.7   PLT  216  216       Basic Metabolic Panel  Recent " Labs   Lab Test  12/12/16   2240  02/18/16   0923   NA  135  140   POTASSIUM  4.0  4.2   CHLORIDE  101  107   CO2  22  26   BUN  23  24   GLC  100*  86   SARI  8.6  8.6       INR  No results for input(s): INR in the last [id number] hours.    PFT  PFT Latest Ref Rng & Units 3/28/2017   FVC L 2.90   FEV1 L 2.43   FVC% % 96   FEV1% % 104       Again, thank you for allowing me to participate in the care of your patient.      Sincerely,    David Morris Perlman, MD

## 2017-03-28 NOTE — PROGRESS NOTES
Reason for Visit  Albina Pedro is a 66 year old year old female who is being seen for Interstitial Lung Disease (ILD) (Follow up on Albina and her ILD)    ILD HPI    Albina Pedro is a 66-year-old female with interstitial lung disease seen today for followup.  Her ILD consists of multiple thin-walled cysts that have been stable.  She has a diagnosis of Sjogren's disease and this was felt to be consistent with LIP associated with her Sjogren's.  It has been very stable.  Her pulmonary function tests have been normal and she has been asymptomatic, so we elected to just follow it.  She returns to clinic today overall stating her breathing is stable.  She does not have any significant dyspnea on exertion.  She has been having some heart issues, although feels that this is resolved and is looking forward to being more active this summer.  Also been having some issues with depression for which she is following up with her primary care physician and psychologist about.  Otherwise, generally doing well with no other new complaints today.           Current Outpatient Prescriptions   Medication     albuterol (PROAIR HFA/PROVENTIL HFA/VENTOLIN HFA) 108 (90 BASE) MCG/ACT Inhaler     buPROPion (WELLBUTRIN XL) 300 MG 24 hr tablet     LORazepam (ATIVAN) 0.5 MG tablet     venlafaxine (EFFEXOR) 37.5 MG tablet     mirtazapine (REMERON) 30 MG tablet     ranolazine 1000 MG TB12     ipratropium - albuterol 0.5 mg/2.5 mg/3 mL (DUONEB) 0.5-2.5 (3) MG/3ML nebulization     fluticasone (FLONASE) 50 MCG/ACT nasal spray     pravastatin (PRAVACHOL) 20 MG tablet     triamcinolone (KENALOG) 0.1 % cream     Multiple Vitamins-Minerals (MULTIVITAMIN & MINERAL PO)     terbinafine (LAMISIL AT) 1 % cream     loratadine (CLARITIN) 10 MG tablet     aspirin 81 MG tablet     mineral oil-hydrophilic petrolatum (AQUAPHOR) ointment     VITAMIN D3 OR     No current facility-administered medications for this visit.      Allergies   Allergen Reactions      Daypro [Nsaids] Rash            Lidocaine Other (See Comments)     Rapid heart rate     Penicillins Unknown     Occurred as child.     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       Past Surgical History:   Procedure Laterality Date     C/SECTION, LOW TRANSVERSE  10/13/1978    , Low Transverse     COLONOSCOPY       SURGICAL HISTORY OF -            SURGICAL HISTORY OF -   00    Colonoscopy       Social History     Social History     Marital status:      Spouse name: N/A     Number of children: N/A     Years of education: N/A     Occupational History     Not on file.     Social History Main Topics     Smoking status: Never Smoker     Smokeless tobacco: Never Used     Alcohol use 0.0 oz/week     0 Standard drinks or equivalent per week      Comment: 1 glass of wine every 2 weeks     Drug use: No     Sexual activity: Yes     Partners: Male     Other Topics Concern     Parent/Sibling W/ Cabg, Mi Or Angioplasty Before 65f 55m? No     Social History Narrative       Family History   Problem Relation Age of Onset     CANCER Mother      Breast and liver- age 43     HEART DISEASE Father      ? chf?h/o CVA     Alzheimer Disease Father      Hypertension Father      Neurologic Disorder Father      CVA     DIABETES Maternal Grandmother      Breast Cancer Maternal Aunt      Breast Cancer Paternal Aunt      Cancer - colorectal No family hx of      Prostate Cancer No family hx of        ROS Pulmonary    A complete ROS was otherwise negative except as noted in the HPI.  Vitals:    17 1315   BP: 132/78   Pulse: 80   Resp: 18   Temp: 97.8  F (36.6  C)   TempSrc: Oral   SpO2: 94%   Weight: 82.8 kg (182 lb 9.6 oz)  "  Height: 1.651 m (5' 5\")     Exam:   GENERAL APPEARANCE: Well developed, well nourished, alert, and in no apparent distress.  NECK: supple, no masses, no thyromegaly.  LYMPHATICS: No significant axillary, cervical, or supraclavicular nodes.  RESP: normal percussion, good air flow throughout, - no crackles, rhonchi or wheezes.  CV: Normal S1, S2, regular rhythm, normal rate, no rub, no murmur,  no gallop, no LE edema.   ABDOMEN:  Bowel sounds normal, soft, nontender, no HSM or masses.   MS: extremities normal- no clubbing, no cyanosis.  SKIN: no rash on limited exam  NEURO: Mentation intact, speech normal, normal strength and tone, normal gait and stance  PSYCH: mentation appears normal. and affect normal/bright  Results: I have reviewed all imaging, PFTs and other relavent tests, please see below for details, PFT and imaging results were reviewed with the patient.  PFTs: normal spirometry, DLCO and lung volumes.  Assessment and plan:     A 66-year-old female with ILD consisting of thin walled cysts, likely LIP associated with her Sjogren's.  Overall is stable.  She has not been imaged in about 3 years; however, given the stability in her function tests I do not think we need to reimage her in the near future and will plan on maybe for a 5-year followup; however, I think we should continue to follow her PFTs every 6 months.  For the time being, I will see her back in 6 months.  She will call sooner with any changes in her breathing.           CBC   Recent Labs   Lab Test  12/12/16 2240  02/18/16   0923   RBC  4.26  4.28   HGB  13.1  13.4   HCT  38.6  39.7   PLT  216  216       Basic Metabolic Panel  Recent Labs   Lab Test  12/12/16 2240  02/18/16   0923   NA  135  140   POTASSIUM  4.0  4.2   CHLORIDE  101  107   CO2  22  26   BUN  23  24   GLC  100*  86   SARI  8.6  8.6       INR  No results for input(s): INR in the last 92814 hours.    PFT  PFT Latest Ref Rng & Units 3/28/2017   FVC L 2.90   FEV1 L 2.43   FVC% % " 96   FEV1% % 104           CC:

## 2017-03-28 NOTE — NURSING NOTE
Chief Complaint   Patient presents with     Interstitial Lung Disease (ILD)     Follow up on Albina and her ILD     Burak Morales CMA at 1:15 PM on 3/28/2017

## 2017-03-28 NOTE — MR AVS SNAPSHOT
After Visit Summary   3/28/2017    Albina Pedro    MRN: [id number]           Patient Information     Date Of Birth          1950        Visit Information        Provider Department      3/28/2017 1:30 PM Perlman, David Morris, MD [facility]        Today's Diagnoses     ILD (interstitial lung disease) (H)    -  1      Care Instructions    Your pulmonary function tests are stable since   CT scan is stable (last CT was )  We will continue to follow you every 6 months with breathing tests, please call if there are any questions or changes in your [phone]          Follow-ups after your visit        Follow-up notes from your care team     Return in about 6 months (around 2017).      Your next 10 appointments already scheduled     Mar 29, 2017 10:30 AM CDT   CT HEAD W/O CONTRAST with W[place]CT1   [facility] CT ([facility])    [address] [facility] [address] [phone]           Please bring any scans or X-rays taken at other hospitals, if similar tests were done. Also bring a list of your medicines, including vitamins, minerals and over-the-counter drugs. It is safest to leave personal items at home.  Be sure to tell your doctor:   If you have any allergies.   If there s any chance you are pregnant.   If you are breastfeeding.   If you have any special needs.  You do not need to do anything special to prepare.  Please wear loose clothing, such as a sweat suit or jogging clothes. Avoid snaps, zippers and other metal. We may ask you to undress and put on a hospital gown.            2017  4:00 PM CDT   Return Visit with Rolo Luke   [facility] ([facility])    [address] [address] [phone]            May 12, 2017  3:00 PM CDT   Return Visit with Driss Carbone MD   [facility]     [address] [place] [address]   [phone]            Sep 19, 2017  2:00 PM CDT   PFT VISIT with BAN RIZZO   [facility] Pulmonary Function Testing ([facility])    [address] [place] [id number] [phone]            Sep 19, 2017  2:30 PM CDT   (Arrive by 2:15 PM)   Return Interstitial Lung with David Morris Perlman, MD   [facility] ([facility])    [address] [place] [id number] [phone]              Future tests that were ordered for you today     Open Future Orders        Priority Expected Expires Ordered    General PFT Lab (Please always keep checked) Routine  3/28/2018 3/28/2017    Pulmonary Function Test Routine  3/28/2018 3/28/2017            Who to contact     If you have questions or need follow up information about today's clinic visit or your schedule please contact [facility] directly at [phone].  Normal or non-critical lab and imaging results will be communicated to you by [employer], letter or phone within 4 business days after the clinic has received the results. If you do not hear from us within 7 days, please contact the clinic through "[employer]" or phone. If you have a critical or abnormal lab result, we will notify you by phone as soon as possible.  Submit refill requests through "[employer]" or call your pharmacy and they will forward the refill request to us. Please allow 3 business days for your refill to be completed.          Additional Information About Your Visit        "[employer]" Information     "[employer]" gives you secure access to your electronic health record. If you see a primary care provider, you can also send messages to your care team and make appointments. If you have questions, please call your primary care clinic.  If you do not have a primary care provider, please call [phone] and they will  "assist you.        Care EveryWhere ID     This is your Care EveryWhere ID. This could be used by other organizations to access your [place] medical records  [id number]        Your Vitals Were     Pulse Temperature Respirations Height Pulse Oximetry BMI (Body Mass Index)    80 97.8  F (36.6  C) (Oral) 18 1.651 m (5' 5\") 94% 30.39 kg/m2       Blood Pressure from Last 3 Encounters:   03/28/17 132/78   03/22/17 126/86   12/21/16 138/74    Weight from Last 3 Encounters:   03/28/17 82.8 kg (182 lb 9.6 oz)   03/22/17 81.5 kg (179 lb 9.6 oz)   12/21/16 81.6 kg (180 lb)                 Today's Medication Changes          These changes are accurate as of: 3/28/17  1:40 PM.  If you have any questions, ask your nurse or doctor.               These medicines have changed or have updated prescriptions.        Dose/Directions    loratadine 10 MG tablet   Commonly known as:  CLARITIN   This may have changed:    - when to take this  - reasons to take this   Used for:  Seasonal allergies        Dose:  10 mg   Take 1 tablet (10 mg) by mouth daily   Quantity:  30 tablet   Refills:  1                Primary Care Provider Office Phone # Fax #    Carolina Burrell [phone]       [facility] [address] [facility] [address]        Thank you!     Thank you for choosing [facility]  for your care. Our goal is always to provide you with excellent care. Hearing back from our patients is one way we can continue to improve our services. Please take a few minutes to complete the written survey that you may receive in the mail after your visit with us. Thank you!             Your Updated Medication List - Protect others around you: Learn how to safely use, store and throw away your medicines at www.disposemymeds.org.          This list is accurate as of: 3/28/17  1:40 PM.  Always use your most recent med list.                   Brand Name Dispense Instructions for use    albuterol " 108 (90 BASE) MCG/ACT Inhaler    PROAIR HFA/PROVENTIL HFA/VENTOLIN HFA    3 Inhaler    Inhale 2 puffs into the lungs every 4 hours as needed for shortness of breath / dyspnea       aspirin 81 MG tablet     30 tablet    Take 81 mg by mouth every evening       buPROPion 300 MG 24 hr tablet    WELLBUTRIN XL    90 tablet    Take 1 tablet (300 mg) by mouth every morning       fluticasone 50 MCG/ACT spray    FLONASE    3 Bottle    Spray 1-2 sprays into both nostrils daily       ipratropium - albuterol 0.5 mg/2.5 mg/3 mL 0.5-2.5 (3) MG/3ML neb solution    DUONEB    30 vial    Take 1 vial (3 mLs) by nebulization every 6 hours as needed for shortness of breath / dyspnea or wheezing       loratadine 10 MG tablet    CLARITIN    30 tablet    Take 1 tablet (10 mg) by mouth daily       LORazepam 0.5 MG tablet    ATIVAN    20 tablet    Take 1 tablet (0.5 mg) by mouth every 8 hours as needed for anxiety       mineral oil-hydrophilic petrolatum     420 g    Apply  topically as needed for dry skin.       mirtazapine 30 MG tablet    REMERON    90 tablet    Take 1 tablet (30 mg) by mouth At Bedtime       MULTIVITAMIN & MINERAL PO      Take 1 tablet by mouth daily       pravastatin 20 MG tablet    PRAVACHOL    90 tablet    Take 1 tablet (20 mg) by mouth daily       Ranolazine 1000 MG Tb12     180 tablet    Take 1,000 mg by mouth 2 times daily       terbinafine 1 % cream    lamISIL AT    12 g    Apply topically 2 times daily For fungal infection not resolved with other antifungals (e.g. Clotrimazole)       triamcinolone 0.1 % cream    KENALOG    80 g    Apply sparingly to affected area three times daily as needed       venlafaxine 37.5 MG tablet    EFFEXOR    60 tablet    Take 1 tablet daily for 7 days, then 1 tablet twice daily for       VITAMIN D3 PO      2 capsules by mouth DAILY

## 2017-03-29 ENCOUNTER — HOSPITAL ENCOUNTER (OUTPATIENT)
Dept: CT IMAGING | Facility: CLINIC | Age: 67
Discharge: HOME | End: 2017-03-29
Attending: FAMILY MEDICINE | Admitting: FAMILY MEDICINE
Payer: MEDICARE

## 2017-03-29 DIAGNOSIS — R41.3 MEMORY LOSS: ICD-10-CM

## 2017-03-29 PROCEDURE — 70450 CT HEAD/BRAIN W/O DYE: CPT

## 2017-04-17 ENCOUNTER — TELEPHONE (OUTPATIENT)
Dept: FAMILY MEDICINE | Facility: CLINIC | Age: 67
End: 2017-04-17

## 2017-04-17 NOTE — TELEPHONE ENCOUNTER
Dr. Burrell: Please clarify the mirtazepine instructions given on 3/22/17. She was decreased from 30 mg to 15 mg for one week and then she stopped it.  She did start the venlafaxine as instructed. She said that she feels better. She has an appointment with José on 4/25/17 and appointment with you on 4/26/17. Her question is was she supposed to have stopped the mirtazepine or was she supposed to have continued at the 15 mg dose? Thank you.    Suhail Silva RN

## 2017-04-17 NOTE — TELEPHONE ENCOUNTER
Reason for Call:  Other wanting to discuss medications    Detailed comments: Having memory issues and wanting to discuss her medications with RN.    Phone Number Patient can be reached at: Home number on file [phone] (home)    Best Time: anytime    Can we leave a detailed message on this number? YES    Call taken on 4/17/2017 at 10:35 AM by Tita Real

## 2017-04-17 NOTE — TELEPHONE ENCOUNTER
No she was supposed to go off. She was just going down for the week to the 1/2 dose so she did not go from 30 mg to nothing. This seemed to be making her too sleepy and more confused. I wanted to try a different anti anxiety and so we started the effexor and she should be a 75 mg. Carolina Burrell M.D.

## 2017-04-18 NOTE — TELEPHONE ENCOUNTER
Patient notified of Dr. Burrell's instructions as noted below. Kristina agreed with plan. She said that she is taking 37.5 twice daily and she had stopped the mirtazepine.  Suhail Silva RN

## 2017-04-19 LAB
DLCOUNC-%PRED-PRE: 105 %
DLCOUNC-PRE: 22.31 ML/MIN/MMHG
DLCOUNC-PRED: 21.11 ML/MIN/MMHG
ERV-%PRED-PRE: 50 %
ERV-PRE: 0.27 L
ERV-PRED: 0.54 L
EXPTIME-PRE: 6.71 SEC
FEF2575-%PRED-PRE: 146 %
FEF2575-PRE: 2.9 L/SEC
FEF2575-PRED: 1.98 L/SEC
FEFMAX-%PRED-PRE: 105 %
FEFMAX-PRE: 6.25 L/SEC
FEFMAX-PRED: 5.92 L/SEC
FEV1-%PRED-PRE: 104 %
FEV1-PRE: 2.43 L
FEV1FEV6-PRE: 84 %
FEV1FEV6-PRED: 79 %
FEV1FVC-PRE: 84 %
FEV1FVC-PRED: 78 %
FEV1SVC-PRE: 85 %
FEV1SVC-PRED: 72 %
FIFMAX-PRE: 5.93 L/SEC
FRCPLETH-%PRED-PRE: 82 %
FRCPLETH-PRE: 2.3 L
FRCPLETH-PRED: 2.77 L
FVC-%PRED-PRE: 96 %
FVC-PRE: 2.9 L
FVC-PRED: 3.01 L
IC-%PRED-PRE: 96 %
IC-PRE: 2.59 L
IC-PRED: 2.68 L
RVPLETH-%PRED-PRE: 97 %
RVPLETH-PRE: 2.02 L
RVPLETH-PRED: 2.08 L
TLCPLETH-%PRED-PRE: 95 %
TLCPLETH-PRE: 4.89 L
TLCPLETH-PRED: 5.11 L
VA-%PRED-PRE: 78 %
VA-PRE: 4.13 L
VC-%PRED-PRE: 88 %
VC-PRE: 2.86 L
VC-PRED: 3.22 L

## 2017-04-24 ENCOUNTER — OFFICE VISIT (OUTPATIENT)
Dept: PSYCHOLOGY | Facility: CLINIC | Age: 67
End: 2017-04-24
Payer: COMMERCIAL

## 2017-04-24 DIAGNOSIS — F33.0 MAJOR DEPRESSIVE DISORDER, RECURRENT, MILD (H): Primary | ICD-10-CM

## 2017-04-24 PROCEDURE — 90834 PSYTX W PT 45 MINUTES: CPT | Performed by: PSYCHOLOGIST

## 2017-04-24 NOTE — MR AVS SNAPSHOT
MRN:[id number]                      After Visit Summary   4/24/2017    Albina Pedro    MRN: [id number]           Visit Information        Provider Department      4/24/2017 4:00 PM Rolo Luke [facility] Generic      Your next 10 appointments already scheduled     Apr 26, 2017  1:00 PM CDT   Office Visit with Carolina Burrell MD   [facility]    [address] [facility] [address]   [phone]           Bring a current list of meds and any records pertaining to this visit.  For Physicals, please bring immunization records and any forms needing to be filled out.  Please arrive 10 minutes early to complete paperwork.            May 12, 2017  3:00 PM CDT   Return Visit with Driss Carbone MD   [facility]    [address] [place] [address]   [phone]            Jun 07, 2017  2:00 PM CDT   Return Visit with Rolo Luke   [facility] ([facility])    [address] [facility] [address]   [phone]            Sep 19, 2017  2:00 PM CDT   PFT VISIT with  PFL SO   [facility] Pulmonary Function Testing ([facility])    [address] [place] [id number] [phone]            Sep 19, 2017  2:30 PM CDT   (Arrive by 2:15 PM)   Return Interstitial Lung with David Morris Perlman, MD   [facility] ([facility])    [address] [place] [id number] [phone]              MyChart Information     [employer] gives you secure access to your electronic health record. If you see a primary care provider, you can also send messages to your care team and make appointments. If you have questions, please call your primary care clinic.  If you do not have a primary care provider, please call  [phone] and they will assist you.        Care EveryWhere ID     This is your Care EveryWhere ID. This could be used by other organizations to access your [place] medical records  [id number]

## 2017-04-25 ENCOUNTER — TRANSFERRED RECORDS (OUTPATIENT)
Dept: HEALTH INFORMATION MANAGEMENT | Facility: CLINIC | Age: 67
End: 2017-04-25

## 2017-04-26 ENCOUNTER — OFFICE VISIT (OUTPATIENT)
Dept: FAMILY MEDICINE | Facility: CLINIC | Age: 67
End: 2017-04-26
Payer: COMMERCIAL

## 2017-04-26 VITALS
BODY MASS INDEX: 29.82 KG/M2 | HEART RATE: 76 BPM | WEIGHT: 179 LBS | SYSTOLIC BLOOD PRESSURE: 124 MMHG | DIASTOLIC BLOOD PRESSURE: 72 MMHG | HEIGHT: 65 IN | TEMPERATURE: 96.8 F

## 2017-04-26 DIAGNOSIS — F33.42 RECURRENT MAJOR DEPRESSION IN COMPLETE REMISSION (H): Primary | ICD-10-CM

## 2017-04-26 DIAGNOSIS — R41.3 ISOLATED MEMORY IMPAIRMENT: ICD-10-CM

## 2017-04-26 DIAGNOSIS — E53.8 VITAMIN B12 DEFICIENCY (NON ANEMIC): ICD-10-CM

## 2017-04-26 PROCEDURE — 99214 OFFICE O/P EST MOD 30 MIN: CPT | Performed by: FAMILY MEDICINE

## 2017-04-26 RX ORDER — VENLAFAXINE 37.5 MG/1
37.5 TABLET ORAL 2 TIMES DAILY
Qty: 180 TABLET | Refills: 5 | Status: SHIPPED | OUTPATIENT
Start: 2017-04-26 | End: 2017-09-01 | Stop reason: DRUGHIGH

## 2017-04-26 NOTE — PROGRESS NOTES
SUBJECTIVE:                                                    Albina Pedro is a 66 year old female who presents to clinic today for the following health issues:    Medication Followup of Venlafaxine    Taking Medication as prescribed: yes    Side Effects:  Hot flashes when she started taking, has now subsided.     Medication Helping Symptoms:  yes       Problem list and histories reviewed & adjusted, as indicated.  Additional history: she is feeling super good not confused able to read things concentrating .   Working on cleaning .  She is doing markedly better . Since starting the effexor 2 times per day she has had near complete improvement of her memory . She is happier, no longer forgetful. She feels motivated to do things and is not longer feeling like sheis in a cloud   Over the last 6 weeks or so she has improved to the point where her family has noticed a complete change in her    She has had non bad side effects  No chest pain  No shortness of breath no headache she has been making maple syrup and in fact has brought some to me that she has been able to make now that she feels well enough to do more  BP Readings from Last 3 Encounters:   17 124/72   17 132/78   17 126/86     No change in blood pressure        Patient Active Problem List   Diagnosis     DIZZINESS - LIKELY HYPOGLYCEMIA     Dermatophytosis of body     Episodic mood disorder (H)     CARDIOVASCULAR SCREENING; LDL GOAL LESS THAN 70     Health Care Home     Panniculitis     Advanced directives, counseling/discussion     CAD (coronary artery disease)     Sjogren's syndrome (H)     Adverse effect of anesthetic     Status post coronary angiogram     Acute chest pain     Major depressive disorder, recurrent, mild (H)     Past Surgical History:   Procedure Laterality Date     C/SECTION, LOW TRANSVERSE  10/13/1978    , Low Transverse     COLONOSCOPY       SURGICAL HISTORY OF -            SURGICAL HISTORY OF  "-   00    Colonoscopy       Social History   Substance Use Topics     Smoking status: Never Smoker     Smokeless tobacco: Never Used     Alcohol use 0.0 oz/week     0 Standard drinks or equivalent per week      Comment: 1 glass of wine every 2 weeks     Family History   Problem Relation Age of Onset     CANCER Mother      Breast and liver- age 43     HEART DISEASE Father      ? chf?h/o CVA     Alzheimer Disease Father      Hypertension Father      Neurologic Disorder Father      CVA     DIABETES Maternal Grandmother      Breast Cancer Maternal Aunt      Breast Cancer Paternal Aunt      Cancer - colorectal No family hx of      Prostate Cancer No family hx of        She has seen the neurlogist and she will be doing the neuropsych testing but this will not be until August.     ROS:  Constitutional, HEENT, cardiovascular, pulmonary, GI, , musculoskeletal, neuro, skin, endocrine and psych systems are negative, except as otherwise noted.    OBJECTIVE:                                                    /72  Pulse 76  Temp 96.8  F (36  C) (Tympanic)  Ht 5' 5\" (1.651 m)  Wt 179 lb (81.2 kg)  BMI 29.79 kg/m2 Body mass index is 29.79 kg/(m^2).   GENERAL APPEARANCE: healthy, alert and no distress  HENT: ear canals and TM's normal and nose and mouth without ulcers or lesions  NECK: no adenopathy, no asymmetry, masses, or scars and thyroid normal to palpation  RESP: lungs clear to auscultation - no rales, rhonchi or wheezes  CV: regular rates and rhythm, normal S1 S2, no S3 or S4 and no murmur, click or rub  ABDOMEN: soft, nontender, without hepatosplenomegaly or masses and bowel sounds normal  MS: extremities normal- no gross deformities noted  SKIN: no suspicious lesions or rashes  NEURO: Normal strength and tone, mentation intact, speech normal, oriented times 3, cranial nerves 2-12 intact, Romberg negative and normal strength throughout  PSYCH: mentation appears normal and affect normal/bright  MENTAL " STATUS EXAM:  Appearance/Behavior: No apparent distress, Neatly groomed, Dressed appropriately for weather and Appears stated age  Speech: Normal  Mood/Affect: normal affect  Insight: Adequate       ASSESSMENT/PLAN:                                                    1. Recurrent major depression in complete remission (H)  Her cognitive defects appear to have regressed wuth the treatment of her depression. She has markedly improved over her last visit . Effexor 2 times per day in the unextend version appears to have resolved most of her memory issues. Will still have her follow up with neurology follow up here in 4-6 month after her neuropsych testing  - venlafaxine (EFFEXOR) 37.5 MG tablet; Take 1 tablet (37.5 mg) by mouth 2 times daily  Dispense: 180 tablet; Refill: 5      2. Vitamin B12 deficiency (non anemic)      3. Isolated memory impairment  Patient Instructions   Stay on the effexor 37.5 mg 2 times per day   Make sure that you follow up with neurology and the psychologist for the memory testing   Your memory is greatly improved today but you still have the gap in time  Please take 1000mcg of vit b 12 daily by mouth your level is on the low side but still in the normal range   We will recheck this in 6 months       Carolina Burrell M.D.       reports that she has never smoked. She has never used smokeless tobacco.          Carolina Burrell M.D.  [facility]

## 2017-04-26 NOTE — PROGRESS NOTES
Progress Note  Disclaimer: This note consists of symbols derived from keyboarding, dictation and/or voice recognition software. As a result, there may be errors in the script that have gone undetected. Please consider this when interpreting information found in this chart.    Client Name: Albina Pedro  Date: 4/24/2017         Service Type: Individual      Session Start Time::400 PM Session End Time: 4:45 PM      Session Length: 45     Session #: 3     Attendees: Client attended alone    Treatment Plan Last Reviewed:4 /24/2017  PHQ-9 / SEBASTIAN-7 : 1     DATA   Client notes her marital situation is somewhat better now. It appears her  has the end to adjust to her new situations. No scleral laughing more together.     Progress Since Last Session (Related to Symptoms / Goals / Homework):   Symptoms: Stable    Homework: Achieved / completed to satisfaction      Episode of Care Goals: Satisfactory progress - ACTION (Actively working towards change); Intervened by reinforcing change plan / affirming steps taken     Current / Ongoing Stressors and Concerns:   Difficulty with memory,inconsistent sleep patterns     Treatment Objective(s) Addressed in This Session:   Improve quantity and quality of night time sleep / decrease daytime naps  Identify negative self-talk and behaviors: challenge core beliefs, myths, and actions       Intervention:   CBT: behavioral activation        ASSESSMENT: Current Emotional / Mental Status (status of significant symptoms):   Risk status (Self / Other harm or suicidal ideation)   Client denies current fears or concerns for personal safety.   Client denies current or recent suicidal ideation or behaviors.   Client denies current or recent homicidal ideation or behaviors.   Client denies current or recent self injurious behavior or ideation.   Client denies other safety concerns.   A safety and risk management plan has not been developed at  this time, however client was given the after-hours number / 911 should there be a change in any of these risk factors.     Appearance:   Appropriate    Eye Contact:   Good    Psychomotor Behavior: Normal    Attitude:   Cooperative    Orientation:   All   Speech    Rate / Production: Normal     Volume:  Normal    Mood:    Normal   Affect:    Appropriate    Thought Content:  Clear    Thought Form:  Coherent  Logical    Insight:    Good      Medication Review:   No changes to current psychiatric medication(s)     Medication Compliance:   Yes     Changes in Health Issues:   None reported     Chemical Use Review:   Substance Use: Chemical use reviewed, no active concerns identified      Tobacco Use: No current tobacco use.       Collateral Reports Completed:   Not Applicable    PLAN: (Client Tasks / Therapist Tasks / Other)  Client to work on finding 3 things that she can do just for herself        Rolo GASTELUM Ti                                                         ________________________________________________________________________    Treatment Plan    Client's Name: Albina Pedro  YOB: 1950    Date: 4/24/2017    DSM5 Diagnoses: (Sustained by DSM5 Criteria Listed Above)  Diagnoses: 296.31 Major Depressive Disorder, Recurrent Episode, Mild _  Psychosocial & Contextual Factors: gradually increasing cognitive difficulties  WHODAS 2.0 (12 item)  This questionnaire asks about difficulties due to health conditions. Health conditions  include  disease or illnesses, other health problems that may be short or long lasting,  injuries, mental health or emotional problems, and problems with alcohol or drugs.      Think back over the past 30 days and answer these questions, thinking about how much  difficulty you had doing the following activities. For each question, please [place] only  one response.     S1 Standing for long periods such as 30 minutes? None = 1   S2 Taking care of household  responsibilities? Mild = 2   S3 Learning a new task, for example, learning how to get to a new place? Severe = 4   S4 How much of a problem do you have joining community activities (for example, festivals, [employer] or other activities) in the same way as anyone else can? Extreme / or cannot do = 5   S5 How much have you been emotionally affected by your health problems? Extreme / or cannot do = 5           In the past 30 days, how much difficulty did you have in:   S6 Concentrating on doing something for ten minutes? Severe = 4   S7 Walking a long distance such as a kilometer (or equivalent)? None = 1   S8 Washing your whole body? None = 1   S9 Getting dressed? None = 1   S10 Dealing with people you do not know? Severe = 4   S11 Maintaining a friendship? Severe = 4   S12 Your day to day work? Moderate = 3      H1 Overall, in the past 30 days, how many days were these difficulties present? Record number of days 22   H2 In the past 30 days, for how many days were you totally unable to carry out your usual activities or work because of any health condition? Record number of days 18   H3 In the past 30 days, not counting the days that you were totally unable, for how many days did you cut back or reduce your usual activities or work because of any health condition? Record number of days 15        Referral / Collaboration:  Referral to another professional/service is not indicated at this time..    Anticipated number of session or this episode of care: 20    Goals  1. Education- the Biopsychosocial model of depression  a. Client will be able to describe how depression is effecting them physically, emotionally and socially  2. Behavioral Activation  a. Client will learn to assess their depression on a day to day basis  b. Client will Identify two forms of exercise/activity and engage in them 3 times per week  c. Client will Identify 3 things that make them laugh, and engage in these 5 times per week.  d. Client will  Identify 1-2Creative activities or hobbies  and engage in them 2 times per week  e. Client will identify music, movies, books that make them feel good and use them 3-4 times per week  3. Self-care  a. Client will identify 5 things they can do just for themselves  b. Client will take time for quiet, reflection, meditation 5 times per week  c. Client will Learn to set boundaries when appropriate  d. Client will Identify 2 individuals they can call on for support, distraction  4. Assessment of progress  a. Client will engage in assessment of their progress on a regular basis      Client has reviewed and agreed to the above plan.      Rolo Luke  April 26, 2017

## 2017-04-26 NOTE — PATIENT INSTRUCTIONS
Stay on the effexor 37.5 mg 2 times per day   Make sure that you follow up with neurology and the psychologist for the memory testing   Your memory is greatly improved today but you still have the gap in time  Please take 1000mcg of vit b 12 daily by mouth your level is on the low side but still in the normal range   We will recheck this in 6 months

## 2017-04-26 NOTE — MR AVS SNAPSHOT
After Visit Summary   4/26/2017    Albina Pedro    MRN: [id number]           Patient Information     Date Of Birth          1950        Visit Information        Provider Department      4/26/2017 1:00 PM Carolina Burrell MD [facility]        Today's Diagnoses     Recurrent major depression in complete remission (H)    -  1      Care Instructions    Stay on the effexor 37.5 mg 2 times per day   Make sure that you follow up with neurology and the psychologist for the memory testing   Your memory is greatly improved today but you still have the gap in time  Please take 1000mcg of vit b 12 daily by mouth your level is on the low side but still in the normal range   We will recheck this in 6 months           Follow-ups after your visit        Your next 10 appointments already scheduled     May 12, 2017  3:00 PM CDT   [facility]    [address] [place] [id number]   [phone]            Jun 07, 2017  2:00 PM CDT   Return Visit with Rolo Luke   [facility] ([facility])    [address] [place] [address]   [phone]            Sep 19, 2017  2:00 PM CDT   PFT VISIT with  PFL [facility] Pulmonary Function Testing ([facility])    [address] [place] [id number] [phone]            Sep 19, 2017  2:30 PM CDT   (Arrive by 2:15 PM)   Return Interstitial Lung with David Morris Perlman, MD   [facility] ([facility])    [address] [place] [id number] [phone]              Who to contact     Normal or non-critical lab and imaging results will be communicated to you by MyChart, letter or phone within 4 business days after the clinic has received the results. If you do not hear from us within  "7 days, please contact the clinic through [employer] or phone. If you have a critical or abnormal lab result, we will notify you by phone as soon as possible.  Submit refill requests through [employer] or call your pharmacy and they will forward the refill request to us. Please allow 3 business days for your refill to be completed.          If you need to speak with a  for additional information , please call: [phone]             Additional Information About Your Visit        K[employer] Information     [employer] gives you secure access to your electronic health record. If you see a primary care provider, you can also send messages to your care team and make appointments. If you have questions, please call your primary care clinic.  If you do not have a primary care provider, please call [phone] and they will assist you.        Care EveryWhere ID     This is your Care EveryWhere ID. This could be used by other organizations to access your [place] medical records  [id number]        Your Vitals Were     Pulse Temperature Height BMI (Body Mass Index)          76 96.8  F (36  C) (Tympanic) 5' 5\" (1.651 m) 29.79 kg/m2         Blood Pressure from Last 3 Encounters:   04/26/17 124/72   03/28/17 132/78   03/22/17 126/86    Weight from Last 3 Encounters:   04/26/17 179 lb (81.2 kg)   03/28/17 182 lb 9.6 oz (82.8 kg)   03/22/17 179 lb 9.6 oz (81.5 kg)              Today, you had the following     No orders found for display         Today's Medication Changes          These changes are accurate as of: 4/26/17  1:44 PM.  If you have any questions, ask your nurse or doctor.               These medicines have changed or have updated prescriptions.        Dose/Directions    fluticasone 50 MCG/ACT spray   Commonly known as:  FLONASE   This may have changed:    - when to take this  - reasons to take this   Used for:  Nasal congestion, Seasonal allergic rhinitis        Dose:  1-2 spray   Spray 1-2 sprays into both " nostrils daily   Quantity:  3 Bottle   Refills:  11       loratadine 10 MG tablet   Commonly known as:  CLARITIN   This may have changed:    - when to take this  - reasons to take this   Used for:  Seasonal allergies        Dose:  10 mg   Take 1 tablet (10 mg) by mouth daily   Quantity:  30 tablet   Refills:  1       * venlafaxine 37.5 MG tablet   Commonly known as:  EFFEXOR   This may have changed:  Another medication with the same name was added. Make sure you understand how and when to take each.   Used for:  Major depression in complete remission (H)        Take 1 tablet daily for 7 days, then 1 tablet twice daily for   Quantity:  60 tablet   Refills:  1       * venlafaxine 37.5 MG tablet   Commonly known as:  EFFEXOR   This may have changed:  You were already taking a medication with the same name, and this prescription was added. Make sure you understand how and when to take each.   Used for:  Recurrent major depression in complete remission (H)        Dose:  37.5 mg   Take 1 tablet (37.5 mg) by mouth 2 times daily   Quantity:  180 tablet   Refills:  5       * Notice:  This list has 2 medication(s) that are the same as other medications prescribed for you. Read the directions carefully, and ask your doctor or other care provider to review them with you.         Where to get your medicines      These medications were sent to [id number] - [place], [place] - [address]  [address] [facility] [address]     Phone:  [phone]     venlafaxine 37.5 MG tablet                Primary Care Provider Office Phone # Fax #    Carolina Burrell [phone]       [facility] [address] [facility] [address]        Thank you!     Thank you for choosing [facility]  for your care. Our goal is always to provide you with excellent care. Hearing back from our patients is one way we can continue to improve our services. Please take a few minutes to complete the written survey  that you may receive in the mail after your visit with us. Thank you!             Your Updated Medication List - Protect others around you: Learn how to safely use, store and throw away your medicines at www.disposemymeds.org.          This list is accurate as of: 4/26/17  1:44 PM.  Always use your most recent med list.                   Brand Name Dispense Instructions for use    albuterol 108 (90 BASE) MCG/ACT Inhaler    PROAIR HFA/PROVENTIL HFA/VENTOLIN HFA    3 Inhaler    Inhale 2 puffs into the lungs every 4 hours as needed for shortness of breath / dyspnea       aspirin 81 MG tablet     30 tablet    Take 81 mg by mouth every evening       buPROPion 300 MG 24 hr tablet    WELLBUTRIN XL    90 tablet    Take 1 tablet (300 mg) by mouth every morning       fluticasone 50 MCG/ACT spray    FLONASE    3 Bottle    Spray 1-2 sprays into both nostrils daily       ipratropium - albuterol 0.5 mg/2.5 mg/3 mL 0.5-2.5 (3) MG/3ML neb solution    DUONEB    30 vial    Take 1 vial (3 mLs) by nebulization every 6 hours as needed for shortness of breath / dyspnea or wheezing       loratadine 10 MG tablet    CLARITIN    30 tablet    Take 1 tablet (10 mg) by mouth daily       LORazepam 0.5 MG tablet    ATIVAN    20 tablet    Take 1 tablet (0.5 mg) by mouth every 8 hours as needed for anxiety       mineral oil-hydrophilic petrolatum     420 g    Apply  topically as needed for dry skin.       MULTIVITAMIN & MINERAL PO      Take 1 tablet by mouth daily       pravastatin 20 MG tablet    PRAVACHOL    90 tablet    Take 1 tablet (20 mg) by mouth daily       Ranolazine 1000 MG Tb12     180 tablet    Take 1,000 mg by mouth 2 times daily       terbinafine 1 % cream    lamISIL AT    12 g    Apply topically 2 times daily For fungal infection not resolved with other antifungals (e.g. Clotrimazole)       triamcinolone 0.1 % cream    KENALOG    80 g    Apply sparingly to affected area three times daily as needed       * venlafaxine 37.5 MG tablet     EFFEXOR    60 tablet    Take 1 tablet daily for 7 days, then 1 tablet twice daily for       * venlafaxine 37.5 MG tablet    EFFEXOR    180 tablet    Take 1 tablet (37.5 mg) by mouth 2 times daily       VITAMIN D3 PO      2 capsules by mouth DAILY       * Notice:  This list has 2 medication(s) that are the same as other medications prescribed for you. Read the directions carefully, and ask your doctor or other care provider to review them with you.

## 2017-05-12 ENCOUNTER — OFFICE VISIT (OUTPATIENT)
Dept: CARDIOLOGY | Facility: CLINIC | Age: 67
End: 2017-05-12
Payer: COMMERCIAL

## 2017-05-12 VITALS
DIASTOLIC BLOOD PRESSURE: 77 MMHG | SYSTOLIC BLOOD PRESSURE: 144 MMHG | HEART RATE: 83 BPM | OXYGEN SATURATION: 98 % | BODY MASS INDEX: 30.1 KG/M2 | WEIGHT: 180.9 LBS

## 2017-05-12 DIAGNOSIS — I25.10 CORONARY ARTERY DISEASE INVOLVING NATIVE CORONARY ARTERY OF NATIVE HEART WITHOUT ANGINA PECTORIS: Primary | ICD-10-CM

## 2017-05-12 PROCEDURE — 99214 OFFICE O/P EST MOD 30 MIN: CPT | Mod: 25 | Performed by: INTERNAL MEDICINE

## 2017-05-12 PROCEDURE — 93000 ELECTROCARDIOGRAM COMPLETE: CPT | Performed by: INTERNAL MEDICINE

## 2017-05-12 ASSESSMENT — PAIN SCALES - GENERAL: PAINLEVEL: NO PAIN (0)

## 2017-05-12 NOTE — MR AVS SNAPSHOT
After Visit Summary   5/12/2017    Albina Pedro    MRN: [id number]           Patient Information     Date Of Birth          1950        Visit Information        Provider Department      5/12/2017 3:00 PM Driss Carbone MD [facility]        Care Instructions      The following is a summary of your office visit:    Medications started today:none    Medications stopped today:none    Medication dose change: none    Nurse contact information: Xin Patel RN  Cardiology Care Coordinator  [phone] Phone  [phone] Fax    Appointments made today: Follow up in one year with Dr. Carbone    Patient instructions:none      If you have had any blood work, imaging or other testing completed we will be in touch within 1-2 weeks regarding the results. If you have any questions, concerns or need to schedule a follow up, please contact us at [phone]. If you are needing refills please contact your pharmacy. For urgent after hour care please call the [facility] at [phone] or the [facility] at [phone] and ask to speak to the cardiologist on call.    It was a pleasure meeting with you today. Please let us know if there is anything else we can do for you so that we can be sure you are leaving completely satisfied with your care experience.     Your Cardiology Team at [facility]  RN Care Coordinator: Xin Vides                  Follow-ups after your visit        Your next 10 appointments already scheduled     Jun 07, 2017  2:00 PM CDT   Return Visit with Rolo Luke   [facility] ([facility])    [address] [facility] [id number] [phone]            Sep 19, 2017  2:00 PM CDT   PFT VISIT with  PFL B   [facility] ([facility])    [address] [facility]  3rd [address]  [facility] [address] [phone]             Sep 19, 2017  2:30 PM CDT   (Arrive by 2:15 PM)   Return Interstitial Lung with David Morris Perlman, MD   [facility] ([facility])    [address] [address] [phone]              Who to contact     If you have questions or need follow up information about today's clinic visit or your schedule please contact [facility] directly at [phone].  Normal or non-critical lab and imaging results will be communicated to you by [employer], letter or phone within 4 business days after the clinic has received the results. If you do not hear from us within 7 days, please contact the clinic through [employer] or phone. If you have a critical or abnormal lab result, we will notify you by phone as soon as possible.  Submit refill requests through [employer] or call your pharmacy and they will forward the refill request to us. Please allow 3 business days for your refill to be completed.          Additional Information About Your Visit        [employer] Information     [employer] gives you secure access to your electronic health record. If you see a primary care provider, you can also send messages to your care team and make appointments. If you have questions, please call your primary care clinic.  If you do not have a primary care provider, please call [phone] and they will assist you.      [employer] is an electronic gateway that provides easy, online access to your medical records. With [employer], you can request a clinic appointment, read your test results, renew a prescription or communicate with your care team.     To access your existing account, please contact your [facility] or call [phone] for assistance.        Care EveryWhere ID     This is your Care EveryWhere ID. This could be used by other organizations to access your [place] medical records  [id number]        Your  Vitals Were     Pulse Pulse Oximetry BMI (Body Mass Index)             83 98% 30.1 kg/m2          Blood Pressure from Last 3 Encounters:   05/12/17 144/77   04/26/17 124/72   03/28/17 132/78    Weight from Last 3 Encounters:   05/12/17 82.1 kg (180 lb 14.4 oz)   04/26/17 81.2 kg (179 lb)   03/28/17 82.8 kg (182 lb 9.6 oz)              Today, you had the following     No orders found for display         Today's Medication Changes          These changes are accurate as of: 5/12/17  3:45 PM.  If you have any questions, ask your nurse or doctor.               These medicines have changed or have updated prescriptions.        Dose/Directions    fluticasone 50 MCG/ACT spray   Commonly known as:  FLONASE   This may have changed:    - when to take this  - reasons to take this   Used for:  Nasal congestion, Seasonal allergic rhinitis        Dose:  1-2 spray   Spray 1-2 sprays into both nostrils daily   Quantity:  3 Bottle   Refills:  11       loratadine 10 MG tablet   Commonly known as:  CLARITIN   This may have changed:    - when to take this  - reasons to take this   Used for:  Seasonal allergies        Dose:  10 mg   Take 1 tablet (10 mg) by mouth daily   Quantity:  30 tablet   Refills:  1                Primary Care Provider Office Phone # Fax #    Carolina Burrell [phone]       [facility] [id number] [place] [id number]        Thank you!     Thank you for choosing [facility]  for your care. Our goal is always to provide you with excellent care. Hearing back from our patients is one way we can continue to improve our services. Please take a few minutes to complete the written survey that you may receive in the mail after your visit with us. Thank you!             Your Updated Medication List - Protect others around you: Learn how to safely use, store and throw away your medicines at www.disposemymeds.org.          This list is accurate as of: 5/12/17  3:45 PM.   Always use your most recent med list.                   Brand Name Dispense Instructions for use    albuterol 108 (90 BASE) MCG/ACT Inhaler    PROAIR HFA/PROVENTIL HFA/VENTOLIN HFA    3 Inhaler    Inhale 2 puffs into the lungs every 4 hours as needed for shortness of breath / dyspnea       aspirin 81 MG tablet     30 tablet    Take 81 mg by mouth every evening       buPROPion 300 MG 24 hr tablet    WELLBUTRIN XL    90 tablet    Take 1 tablet (300 mg) by mouth every morning       fluticasone 50 MCG/ACT spray    FLONASE    3 Bottle    Spray 1-2 sprays into both nostrils daily       ipratropium - albuterol 0.5 mg/2.5 mg/3 mL 0.5-2.5 (3) MG/3ML neb solution    DUONEB    30 vial    Take 1 vial (3 mLs) by nebulization every 6 hours as needed for shortness of breath / dyspnea or wheezing       loratadine 10 MG tablet    CLARITIN    30 tablet    Take 1 tablet (10 mg) by mouth daily       LORazepam 0.5 MG tablet    ATIVAN    20 tablet    Take 1 tablet (0.5 mg) by mouth every 8 hours as needed for anxiety       mineral oil-hydrophilic petrolatum     420 g    Apply  topically as needed for dry skin.       MULTIVITAMIN & MINERAL PO      Take 1 tablet by mouth daily       pravastatin 20 MG tablet    PRAVACHOL    90 tablet    Take 1 tablet (20 mg) by mouth daily       Ranolazine 1000 MG Tb12     180 tablet    Take 1,000 mg by mouth 2 times daily       terbinafine 1 % cream    lamISIL AT    12 g    Apply topically 2 times daily For fungal infection not resolved with other antifungals (e.g. Clotrimazole)       triamcinolone 0.1 % cream    KENALOG    80 g    Apply sparingly to affected area three times daily as needed       * venlafaxine 37.5 MG tablet    EFFEXOR    60 tablet    Take 1 tablet daily for 7 days, then 1 tablet twice daily for       * venlafaxine 37.5 MG tablet    EFFEXOR    180 tablet    Take 1 tablet (37.5 mg) by mouth 2 times daily       VITAMIN D3 PO      2 capsules by mouth DAILY       * Notice:  This list has 2  medication(s) that are the same as other medications prescribed for you. Read the directions carefully, and ask your doctor or other care provider to review them with you.

## 2017-05-12 NOTE — NURSING NOTE
"Albina Pedro's goals for this visit include:   Chief Complaint   Patient presents with     RECHECK     Follow up       She requests these members of her care team be copied on today's visit information: PCP    PCP: Carolina Burrell    Referring Provider:  No referring provider defined for this encounter.    Chief Complaint   Patient presents with     RECHECK     Follow up       Initial /77 (BP Location: Left arm, Patient Position: Chair, Cuff Size: Adult Regular)  Pulse 83  Wt 82.1 kg (180 lb 14.4 oz)  SpO2 98%  BMI 30.1 kg/m2 Estimated body mass index is 30.1 kg/(m^2) as calculated from the following:    Height as of 4/26/17: 1.651 m (5' 5\").    Weight as of this encounter: 82.1 kg (180 lb 14.4 oz).  Medication Reconciliation: complete       Medication Refills: none        Mahogany Hernández CMA      "

## 2017-05-12 NOTE — PATIENT INSTRUCTIONS
The following is a summary of your office visit:    Medications started today:none    Medications stopped today:none    Medication dose change: none    Nurse contact information: Xin Patel RN  Cardiology Care Coordinator  [phone] Phone  [phone] Fax    Appointments made today: Follow up in one year with Dr. Carbone    Patient instructions:none      If you have had any blood work, imaging or other testing completed we will be in touch within 1-2 weeks regarding the results. If you have any questions, concerns or need to schedule a follow up, please contact us at [phone]. If you are needing refills please contact your pharmacy. For urgent after hour care please call the [facility] at [phone] or the [facility] at [phone] and ask to speak to the cardiologist on call.    It was a pleasure meeting with you today. Please let us know if there is anything else we can do for you so that we can be sure you are leaving completely satisfied with your care experience.     Your Cardiology Team at [facility]  RN Care Coordinator: Xin VALDES: Mahogany

## 2017-05-12 NOTE — PROGRESS NOTES
[facility] Cardiology Consultation:    Assessment and Plan:      1. CAD with angina: continue Ranolazine 1000 mg BID for angina. Continue pravastatin 20 mg and ASA 81 mg for secondary prevention. Will do repeat EKG in one year to monitor QTc.   2. Borderline HTN: blood pressure was within goal range on 2nd check today; will continue to monitor.     RTC in 1 year.       Driss Carbone MD    Cardiac Imaging and Prevention  [facility]  [email or web address] Pager: [phone]     HPI:   Ms. Pedro presents today for f/u of CAD and chronic angina.  She was hospitalized in Dec 2016 for an episode of atypical chest pain. She was admitted overnight for observation. EKG and troponins were negative. However, she became hypotensive after receiving nitro. Lexiscan was also done during this admission and remains relatively unchanged from previous stress test. It showed small anterior ischemia, which is expected with her known LAD stenosis.   Since December she has had no further episodes of chest pain. Denies SOB, orthopnea, chest pressure or pain, or palpitations. She is currently taking  Ranolazine 1000 mg BID for angina. Denies any side effects. EKG was done today, and QTc is normal. She is also taking an electrolyte supplement (lyte show drops) to prevent any episodes of syncope or pre-syncope. Taking pravastatin and ASA as prescribed. Denies muscle or joint aches.   Continues to follow-up with pulmonology for ILD.   Repeat BP was 130's.       EXAM:  /77 (BP Location: Left arm, Patient Position: Chair, Cuff Size: Adult Regular)  Pulse 83  Wt 82.1 kg (180 lb 14.4 oz)  SpO2 98%  BMI 30.1 kg/m2  GEN/CONSTITUIONAL: Appears comfortable, in no apparent distress   EYES: No icterus  ENT/MOUTH: Normal  JVP:  Not visible  RESPIRATORY: Clear to auscultation bilaterally   CARDIOVASCULAR: Regular S1 and S2, no murmurs, rubs, or gallops.   ABDOMEN: Soft, non-tender, positive bowel  sounds   NEUROLOGIC: Grossly non-focal   PSYCHIATRIC: Normal affect  EXT: No cyanosis, clubbing, edema. Normal pedal pulses.  Skin: No petechiae, purpura or rash    Scribe Disclosure:   Fallon GAFFNEY - NP Student, am serving as a scribe; to document services personally performed by Dr. Driss Carbone - based on data collection and the provider's statements to me.       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       CURRENT MEDICATIONS:  Current Outpatient Prescriptions   Medication     venlafaxine (EFFEXOR) 37.5 MG tablet     albuterol (PROAIR HFA/PROVENTIL HFA/VENTOLIN HFA) 108 (90 BASE) MCG/ACT Inhaler     buPROPion (WELLBUTRIN XL) 300 MG 24 hr tablet     LORazepam (ATIVAN) 0.5 MG tablet     venlafaxine (EFFEXOR) 37.5 MG tablet     ranolazine 1000 MG TB12     ipratropium - albuterol 0.5 mg/2.5 mg/3 mL (DUONEB) 0.5-2.5 (3) MG/3ML nebulization     fluticasone (FLONASE) 50 MCG/ACT nasal spray     pravastatin (PRAVACHOL) 20 MG tablet     triamcinolone (KENALOG) 0.1 % cream     Multiple Vitamins-Minerals (MULTIVITAMIN & MINERAL PO)     terbinafine (LAMISIL AT) 1 % cream     loratadine (CLARITIN) 10 MG tablet     aspirin 81 MG tablet     mineral oil-hydrophilic petrolatum (AQUAPHOR) ointment     VITAMIN D3 OR     No current facility-administered medications for this visit.        PAST SURGICAL HISTORY:  Past Surgical History:   Procedure Laterality Date     C/SECTION, LOW TRANSVERSE  10/13/1978    , Low Transverse     COLONOSCOPY       SURGICAL HISTORY OF -            SURGICAL HISTORY OF -   00    Colonoscopy       ALLERGIES     Allergies   Allergen Reactions     Daypro [Nsaids] Rash            Lidocaine  Other (See Comments)     Rapid heart rate     Penicillins Unknown     Occurred as child.     Sulfa Drugs Rash       FAMILY HISTORY:  Family History   Problem Relation Age of Onset     CANCER Mother      Breast and liver- age 43     HEART DISEASE Father      ? chf?h/o CVA     Alzheimer Disease Father      Hypertension Father      Neurologic Disorder Father      CVA     DIABETES Maternal Grandmother      Breast Cancer Maternal Aunt      Breast Cancer Paternal Aunt      Cancer - colorectal No family hx of      Prostate Cancer No family hx of        SOCIAL HISTORY:  Social History     Social History     Marital status:      Spouse name: N/A     Number of children: N/A     Years of education: N/A     Occupational History     Not on file.     Social History Main Topics     Smoking status: Never Smoker     Smokeless tobacco: Never Used     Alcohol use 0.0 oz/week     0 Standard drinks or equivalent per week      Comment: 1 glass of wine every 2 weeks     Drug use: No     Sexual activity: Yes     Partners: Male     Other Topics Concern     Parent/Sibling W/ Cabg, Mi Or Angioplasty Before 65f 55m? No     Social History Narrative       ROS:   Constitutional: No fever, chills, or sweats. No weight gain/loss   ENT: No visual disturbance, ear ache, epistaxis, sore throat  Allergies/Immunologic: Negative.   Respiratory: No cough, hemoptysia  Cardiovascular: As per HPI  GI: No nausea, vomiting, hematemesis, melena, or hematochezia  : No urinary frequency, dysuria, or hematuria  Integument: Negative  Psychiatric: Negative  Neuro: Negative  Endocrinology: Negative   Musculoskeletal: Negative    ADDITIONAL COMMENTS:     I reviewed the patient's medications:     I reviewed the patient's pertinent clinical laboratory studies:     I reviewed the patient's pertinent imaging studies:   I reviewed the patient's ECG:

## 2017-06-07 ENCOUNTER — OFFICE VISIT (OUTPATIENT)
Dept: PSYCHOLOGY | Facility: CLINIC | Age: 67
End: 2017-06-07
Payer: COMMERCIAL

## 2017-06-07 DIAGNOSIS — F33.0 MAJOR DEPRESSIVE DISORDER, RECURRENT, MILD (H): Primary | ICD-10-CM

## 2017-06-07 PROCEDURE — 90834 PSYTX W PT 45 MINUTES: CPT | Performed by: PSYCHOLOGIST

## 2017-06-07 NOTE — MR AVS SNAPSHOT
MRN:[id number]                      After Visit Summary   6/7/2017    Albina Pedro    MRN: [id number]           Visit Information        Provider Department      6/7/2017 2:00 PM Rolo Luke [facility] Generic      Your next 10 appointments already scheduled     Jul 11, 2017 10:30 AM CDT   Return Visit with Rolo Luke   [facility] ([facility])    [address] [address]   [phone]            Sep 19, 2017  2:00 PM CDT   PFT VISIT with  PFL B   [facility] Pulmonary Function Testing ([facility])    [address] [place] [id number] [phone]            Sep 19, 2017  2:30 PM CDT   (Arrive by 2:15 PM)   Return Interstitial Lung with David Morris Perlman, MD   [facility] ([facility])    [address] [place] [id number] [phone]              MyChart Information     [facility] gives you secure access to your electronic health record. If you see a primary care provider, you can also send messages to your care team and make appointments. If you have questions, please call your primary care clinic.  If you do not have a primary care provider, please call [phone] and they will assist you.        Care EveryWhere ID     This is your Care EveryWhere ID. This could be used by other organizations to access your [place] medical records  [id number]

## 2017-06-09 NOTE — PROGRESS NOTES
Progress Note  Disclaimer: This note consists of symbols derived from keyboarding, dictation and/or voice recognition software. As a result, there may be errors in the script that have gone undetected. Please consider this when interpreting information found in this chart.    Client Name: Albina Pedro  Date: June 7, 2017         Service Type: Individual      Session Start Time::2:00 PM Session End Time: 2:45 PM      Session Length: 45     Session #: 4     Attendees: Client attended alone    Treatment Plan Last Reviewed:4 /24/2017  PHQ-9 / SEBASTIAN-7 : 1     DATA   Client Reported things are going well between her and her . With the warmer weather they're spending more time outside working on common projects. She has been doing a lot of gardening and [place]     Progress Since Last Session (Related to Symptoms / Goals / Homework):   Symptoms: Improved    Homework: Achieved / completed to satisfaction      Episode of Care Goals: Satisfactory progress - ACTION (Actively working towards change); Intervened by reinforcing change plan / affirming steps taken     Current / Ongoing Stressors and Concerns:   Difficulty with memory,inconsistent sleep patterns     Treatment Objective(s) Addressed in This Session:   Improve quantity and quality of night time sleep / decrease daytime naps  Identify negative self-talk and behaviors: challenge core beliefs, myths, and actions       Intervention:   CBT: behavioral activationGratitude practice        ASSESSMENT: Current Emotional / Mental Status (status of significant symptoms):   Risk status (Self / Other harm or suicidal ideation)   Client denies current fears or concerns for personal safety.   Client denies current or recent suicidal ideation or behaviors.   Client denies current or recent homicidal ideation or behaviors.   Client denies current or recent self injurious  behavior or ideation.   Client denies other safety concerns.   A safety and risk management plan has not been developed at this time, however client was given the after-hours number / 911 should there be a change in any of these risk factors.     Appearance:   Appropriate    Eye Contact:   Good    Psychomotor Behavior: Normal    Attitude:   Cooperative    Orientation:   All   Speech    Rate / Production: Normal     Volume:  Normal    Mood:    Normal   Affect:    Appropriate    Thought Content:  Clear    Thought Form:  Coherent  Logical    Insight:    Good      Medication Review:   No changes to current psychiatric medication(s)     Medication Compliance:   Yes     Changes in Health Issues:   None reported     Chemical Use Review:   Substance Use: Chemical use reviewed, no active concerns identified      Tobacco Use: No current tobacco use.       Collateral Reports Completed:   Not Applicable    PLAN: (Client Tasks / Therapist Tasks / Other)  Client to work on finding 3 things that she can do just for herself One thing to be grateful for each day.        Rolo Luke                                                         ________________________________________________________________________    Treatment Plan    Client's Name: Albina Pedro  YOB: 1950    Date: 4/24/2017    DSM5 Diagnoses: (Sustained by DSM5 Criteria Listed Above)  Diagnoses: 296.31 Major Depressive Disorder, Recurrent Episode, Mild _  Psychosocial & Contextual Factors: gradually increasing cognitive difficulties  WHODAS 2.0 (12 item)  This questionnaire asks about difficulties due to health conditions. Health conditions  include  disease or illnesses, other health problems that may be short or long lasting,  injuries, mental health or emotional problems, and problems with alcohol or drugs.      Think back over the past 30 days and answer these questions, thinking about how much  difficulty you had doing the following  activities. For each question, please [place] only  one response.     S1 Standing for long periods such as 30 minutes? None = 1   S2 Taking care of household responsibilities? Mild = 2   S3 Learning a new task, for example, learning how to get to a new place? Severe = 4   S4 How much of a problem do you have joining community activities (for example, festivals, Oriental orthodox or other activities) in the same way as anyone else can? Extreme / or cannot do = 5   S5 How much have you been emotionally affected by your health problems? Extreme / or cannot do = 5           In the past 30 days, how much difficulty did you have in:   S6 Concentrating on doing something for ten minutes? Severe = 4   S7 Walking a long distance such as a kilometer (or equivalent)? None = 1   S8 Washing your whole body? None = 1   S9 Getting dressed? None = 1   S10 Dealing with people you do not know? Severe = 4   S11 Maintaining a friendship? Severe = 4   S12 Your day to day work? Moderate = 3      H1 Overall, in the past 30 days, how many days were these difficulties present? Record number of days 22   H2 In the past 30 days, for how many days were you totally unable to carry out your usual activities or work because of any health condition? Record number of days 18   H3 In the past 30 days, not counting the days that you were totally unable, for how many days did you cut back or reduce your usual activities or work because of any health condition? Record number of days 15        Referral / Collaboration:  Referral to another professional/service is not indicated at this time..    Anticipated number of session or this episode of care: 20    Goals  1. Education- the Biopsychosocial model of depression  a. Client will be able to describe how depression is effecting them physically, emotionally and socially  2. Behavioral Activation  a. Client will learn to assess their depression on a day to day basis  b. Client will Identify two forms of  exercise/activity and engage in them 3 times per week  c. Client will Identify 3 things that make them laugh, and engage in these 5 times per week.  d. Client will Identify 1-2Creative activities or hobbies  and engage in them 2 times per week  e. Client will identify music, movies, books that make them feel good and use them 3-4 times per week  3. Self-care  a. Client will identify 5 things they can do just for themselves  b. Client will take time for quiet, reflection, meditation 5 times per week  c. Client will Learn to set boundaries when appropriate  d. Client will Identify 2 individuals they can call on for support, distraction  4. Assessment of progress  a. Client will engage in assessment of their progress on a regular basis      Client has reviewed and agreed to the above plan.      Rolo Luke  April 26, 2017

## 2017-06-29 DIAGNOSIS — F43.0 ACUTE REACTION TO STRESS: ICD-10-CM

## 2017-06-29 RX ORDER — LORAZEPAM 0.5 MG/1
0.5 TABLET ORAL EVERY 8 HOURS PRN
Qty: 20 TABLET | Refills: 0 | Status: SHIPPED | OUTPATIENT
Start: 2017-06-29 | End: 2017-09-06

## 2017-06-29 NOTE — TELEPHONE ENCOUNTER
Lorazepam 0.5mg      Last Written Prescription Date:  3/22/17  Last Fill Quantity: 20,   # refills: 0  Last Office Visit with [facility] prescribing provider: 4/26/17  Future Office visit:    Next 5 appointments (look out 90 days)     Jul 11, 2017 10:30 AM CDT   Return Visit with Rolo Luke   [facility] ([facility])    [address] [facility] [address] [address]   [phone]                   Routing refill request to provider for review/approval because:  Drug not on the [facility] refill protocol or controlled substance

## 2017-07-08 ENCOUNTER — HEALTH MAINTENANCE LETTER (OUTPATIENT)
Age: 67
End: 2017-07-08

## 2017-07-24 ENCOUNTER — TELEPHONE (OUTPATIENT)
Dept: FAMILY MEDICINE | Facility: CLINIC | Age: 67
End: 2017-07-24

## 2017-07-24 NOTE — TELEPHONE ENCOUNTER
Reason for Call:  Other prescription    Detailed comments: Albina LEFT MESSAGE:  She states that she gets her prescriptions through [facility] in [place] and needs a signed script to go there.  She did not leave message as to which medication.  Please call and assess.  Thank you..Tita Real    Phone Number Patient can be reached at: Home number on file [phone] (home)    Best Time:  Did not specify in message.      Can we leave a detailed message on this number? Did not specify in message    Call taken on 7/24/2017 at 1:36 PM by Tita Real

## 2017-07-24 NOTE — TELEPHONE ENCOUNTER
Left message for patient to return a call to the clinic RN.     What is the medication that she wants?  Last seen 4/26/17.  NIRAV Ty RN

## 2017-07-25 NOTE — TELEPHONE ENCOUNTER
Patient is needing a refill on her ranolazine. This was not filled by a [facility] provider. Patient will call that provider's office to get it refilled.  Yaritza Licea RN

## 2017-07-26 DIAGNOSIS — I25.118 ATHEROSCLEROSIS OF NATIVE CORONARY ARTERY WITH OTHER FORM OF ANGINA PECTORIS: ICD-10-CM

## 2017-07-26 RX ORDER — RANOLAZINE 1000 MG/1
1000 TABLET, EXTENDED RELEASE ORAL 2 TIMES DAILY
Qty: 180 TABLET | Refills: 3 | Status: CANCELLED | OUTPATIENT
Start: 2017-07-26

## 2017-07-26 NOTE — TELEPHONE ENCOUNTER
Wants to use pharmacy in [place]    Email: [email or web address]  Patient wants to mail order through this pharmacy    Please contact patient for more information.

## 2017-07-26 NOTE — TELEPHONE ENCOUNTER
Faxed refill request.   Medication requested: ranolazine  Pharmacy Requested: [facility]   Pt's last office visit: 5/12/17  Next scheduled office visit: none  Refill authorized per protocol  Xin aPtel RN  Cardiology Care Coordinator  [facility]  Phone: [phone]

## 2017-07-27 NOTE — TELEPHONE ENCOUNTER
Spoke with patient. She will fax paperwork that needs to be completed for prescription to be sent to [place] for her Ranolazine.

## 2017-07-28 DIAGNOSIS — I25.118 ATHEROSCLEROSIS OF NATIVE CORONARY ARTERY WITH OTHER FORM OF ANGINA PECTORIS: ICD-10-CM

## 2017-07-28 RX ORDER — RANOLAZINE 1000 MG/1
1000 TABLET, EXTENDED RELEASE ORAL 2 TIMES DAILY
Qty: 180 TABLET | Refills: 3 | Status: SHIPPED | OUTPATIENT
Start: 2017-07-28 | End: 2018-07-24

## 2017-07-28 NOTE — TELEPHONE ENCOUNTER
Faxed refill request.   Medication requested: ranolazine  Pharmacy Requested: [facility], [phone] [facility]  [phone] phone   Pt's last office visit: 5/12/17  Next scheduled office visit: none  Refill authorized per protocol-printed and faxed to pharmacy  Spoke to patient. She confirmed that the script and order form were to be faxed.   Xin Patel RN  Cardiology Care Coordinator  [facility]  Phone: [phone]

## 2017-09-01 ENCOUNTER — OFFICE VISIT (OUTPATIENT)
Dept: FAMILY MEDICINE | Facility: CLINIC | Age: 67
End: 2017-09-01
Payer: COMMERCIAL

## 2017-09-01 VITALS
TEMPERATURE: 98.4 F | HEIGHT: 65 IN | BODY MASS INDEX: 29.16 KG/M2 | HEART RATE: 80 BPM | WEIGHT: 175 LBS | DIASTOLIC BLOOD PRESSURE: 66 MMHG | SYSTOLIC BLOOD PRESSURE: 122 MMHG

## 2017-09-01 DIAGNOSIS — R39.89 URINARY PROBLEM: Primary | ICD-10-CM

## 2017-09-01 DIAGNOSIS — M20.42 HAMMERTOE OF LEFT FOOT: ICD-10-CM

## 2017-09-01 DIAGNOSIS — F33.0 MAJOR DEPRESSIVE DISORDER, RECURRENT, MILD (H): ICD-10-CM

## 2017-09-01 DIAGNOSIS — M76.891 ENTHESOPATHY OF RIGHT HIP REGION: ICD-10-CM

## 2017-09-01 DIAGNOSIS — R05.9 COUGH: ICD-10-CM

## 2017-09-01 LAB
ALBUMIN UR-MCNC: NEGATIVE MG/DL
APPEARANCE UR: CLEAR
BILIRUB UR QL STRIP: NEGATIVE
COLOR UR AUTO: YELLOW
GLUCOSE UR STRIP-MCNC: NEGATIVE MG/DL
HGB UR QL STRIP: NEGATIVE
KETONES UR STRIP-MCNC: NEGATIVE MG/DL
LEUKOCYTE ESTERASE UR QL STRIP: NEGATIVE
NITRATE UR QL: NEGATIVE
PH UR STRIP: 7 PH (ref 5–7)
SOURCE: NORMAL
SP GR UR STRIP: 1.02 (ref 1–1.03)
UROBILINOGEN UR STRIP-ACNC: 0.2 EU/DL (ref 0.2–1)

## 2017-09-01 PROCEDURE — 81003 URINALYSIS AUTO W/O SCOPE: CPT | Performed by: FAMILY MEDICINE

## 2017-09-01 PROCEDURE — 99214 OFFICE O/P EST MOD 30 MIN: CPT | Performed by: FAMILY MEDICINE

## 2017-09-01 RX ORDER — VENLAFAXINE 37.5 MG/1
TABLET ORAL
Qty: 270 TABLET | Refills: 1 | Status: SHIPPED | OUTPATIENT
Start: 2017-09-01 | End: 2018-04-14

## 2017-09-01 ASSESSMENT — ANXIETY QUESTIONNAIRES
2. NOT BEING ABLE TO STOP OR CONTROL WORRYING: SEVERAL DAYS
5. BEING SO RESTLESS THAT IT IS HARD TO SIT STILL: NOT AT ALL
GAD7 TOTAL SCORE: 5
1. FEELING NERVOUS, ANXIOUS, OR ON EDGE: SEVERAL DAYS
7. FEELING AFRAID AS IF SOMETHING AWFUL MIGHT HAPPEN: SEVERAL DAYS
3. WORRYING TOO MUCH ABOUT DIFFERENT THINGS: SEVERAL DAYS
6. BECOMING EASILY ANNOYED OR IRRITABLE: SEVERAL DAYS

## 2017-09-01 ASSESSMENT — PATIENT HEALTH QUESTIONNAIRE - PHQ9
SUM OF ALL RESPONSES TO PHQ QUESTIONS 1-9: 6
5. POOR APPETITE OR OVEREATING: NOT AT ALL

## 2017-09-01 NOTE — PATIENT INSTRUCTIONS
Take 2 venlafaxine in the morning and 1 in the evening   Urine is fine   See the neurologist     Try these exercises for the hip                   Trochanteric Bursitis           What is trochanteric bursitis?   Trochanteric bursitis is irritation or inflammation of the trochanteric bursa. A bursa is a fluid-filled sac that acts as a cushion between tendons, bones, and skin. The trochanteric bursa is located on the upper, outer area of the thigh. There is a bump on the outer side of the upper part of the thigh bone (femur) called the greater trochanter. The trochanteric bursa is located over the greater trochanter.   How does it occur?   The trochanteric bursa may be inflamed by a group of muscles or tendons rubbing over the bursa and causing friction against the thigh bone. This injury can occur with running, walking, or bicycling, especially when the bicycle seat is too high.   What are the symptoms?   You have pain on the upper outer area of your thigh or in your hip. The pain is worse when you walk, bicycle, or go up or down stairs. You have pain when you move your thigh bone and feel tenderness in the area over the greater trochanter.   How is it diagnosed?   Your healthcare provider will ask about your symptoms and examine your hip and thigh.   How is it treated?   To treat this condition:   Put an ice pack, gel pack, or package of frozen vegetables, wrapped in a cloth on the area every 3 to 4 hours, for up to 20 minutes at a time.   Take an anti-inflammatory medicine such as ibuprofen, or other medicine as directed by your provider. Nonsteroidal anti-inflammatory medicines (NSAIDs) may cause stomach bleeding and other problems. These risks increase with age. Read the label and take as directed. Unless recommended by your healthcare provider, do not take for more than 10 days.   Your provider may give you an injection of a corticosteroid medicine.   While you are recovering from your injury you will need to  change your sport or activity to one that does not make your condition worse. For example, you may need to swim instead of running or bicycling. If you are bicycling, you may need to lower your bicycle seat.   How long do the effects last?   The length of recovery depends on many factors such as your age, health, and if you have had a previous injury. Recovery time also depends on the severity of the injury. A bursa that is only mildly inflamed and has just started to hurt may improve within a few weeks. A bursa that is significantly inflamed and has been painful for a long time may take up to a few months to improve. You need to stop doing the activities that cause pain until your bursa has healed. If you continue doing activities that cause pain, your symptoms will return and it will take longer to recover.   When can I return to my normal activities?   Everyone recovers from an injury at a different rate. Return to your activities depends on how soon your leg recovers, not by how many days or weeks it has been since your injury has occurred. In general, the longer you have symptoms before you start treatment, the longer it will take to get better. The goal of rehabilitation is to return to your normal activities as soon as is safely possible. If you return too soon you may worsen your injury.   You may safely return to your normal activities when, starting from the top of the list and progressing to the end, each of the following is true:   You have full range of motion in the injured leg compared to the uninjured leg.   You have full strength of the injured leg compared to the uninjured leg.   You can walk straight ahead without pain or limping.   How can I prevent trochanteric bursitis?   Trochanteric bursitis is best prevented by warming up properly and stretching the muscles on the outer side of your upper thigh.     Published by [employer].  This content is reviewed periodically and is subject to change as  new health information becomes available. The information is intended to inform and educate and is not a replacement for medical evaluation, advice, diagnosis or treatment by a healthcare professional.   Written by Ang Ames MD, for [employer].   ? 2010 [employer] and/or its affiliates. All Rights Reserved.   Copyright   [employer] 2011         Trochanteric Bursitis Rehabilitation Exercises   You can begin stretching the muscles that run along the outside of your hip using the first two exercise. You can do the strengthening exercises when the sharp pain lessens.   Stretching exercises     Piriformis stretch: Lying on your back with both knees bent, rest the ankle of your injured leg over the knee of your uninjured leg. Grasp the thigh of your uninjured leg and pull that knee toward your chest. You will feel a stretch along the buttocks and possibly along the outside of your hip on the injured side. Hold this for 15 to 30 seconds. Repeat 3 times.     Iliotibial band stretch (standing): Cross your uninjured leg in front of your injured leg and bend down and touch your toes. You can move your hands across the floor toward the uninjured side and you will feel more stretch on the outside of your thigh on the injured side. Hold this position for 15 to 30 seconds. Return to the starting position. Repeat 3 times.   Strengthening exercises     Straight leg raise: Lie on your back with your legs straight out in front of you. Tighten up the top of your thigh muscle on the injured leg and lift that leg about 8 inches off the floor, keeping the thigh muscle tight throughout. Slowly lower your leg back down to the floor. Do 3 sets of 10.     Wall squat with a ball: Stand with your back, shoulders, and head against a wall and look straight ahead. Keep your shoulders relaxed and your feet 1 foot away from the wall and a shoulder's width apart. Place a rolled up pillow or a soccer-sized ball between your  thighs. Keeping your head against the wall, slowly squat while squeezing the pillow or ball at the same time. Squat down until you are almost in a sitting position. Your thighs will not yet be parallel to the floor. Hold this position for 10 seconds and then slowly slide back up the wall. Make sure you keep squeezing the pillow or ball throughout this exercise. Repeat 10 times. Build up to 3 sets of 10.     Prone hip extension: Lie on your stomach with your legs straight out behind you. Tighten up your buttocks muscles and lift one leg off the floor about 8 inches. Keep your knee straight. Hold for 5 seconds. Then lower your leg and relax. Do 3 sets of 10.   Written by Lelo Norris M.S., P.T., for [employer].   Published by [employer].   This content is reviewed periodically and is subject to change as new health information becomes available. The information is intended to inform and educate and is not a replacement for medical evaluation, advice, diagnosis or treatment by a healthcare professional.   Sports Medicine Advisor 2003.1 Index  Sports Medicine Advisor 2003.1 Credits   Copyright   2003 [employer]. All rights reserved.   Page footer image                Iliotibial Band Stretch (Flexibility)    1. Stand next to a chair. Hold onto the chair with your right hand for support. Cross your right leg behind your left leg.  2. Lean your right hip toward the right. Feel the stretch at the outside of your hip.  3. Hold for 30 to 60 seconds. Then relax.  4. Repeat 2 times, or as instructed.  5. Switch sides and repeat.  6. Do this 3 times a day, or as instructed.     Tip: Don t bend forward or twist at the waist.   Date Last Reviewed: 3/29/2016    3695-7278 [employer]. [address] [place] [address], [place] [address]. All rights reserved. This information is not intended as a substitute for professional medical care. Always follow your healthcare  professional's instructions.

## 2017-09-01 NOTE — MR AVS SNAPSHOT
After Visit Summary   9/1/2017    Albina Pedro    MRN: [id number]           Patient Information     Date Of Birth          1950        Visit Information        Provider Department      9/1/2017 9:30 AM Carolina Burrell MD [facility]'s Diagnoses     Urinary problem    -  1    Major depressive disorder, recurrent, mild (H)        Hammertoe of left foot        Cough        Enthesopathy of right hip region          Care Instructions    Take 2 venlafaxine in the morning and 1 in the evening   Urine is fine   See the neurologist     Try these exercises for the hip                   Trochanteric Bursitis           What is trochanteric bursitis?   Trochanteric bursitis is irritation or inflammation of the trochanteric bursa. A bursa is a fluid-filled sac that acts as a cushion between tendons, bones, and skin. The trochanteric bursa is located on the upper, outer area of the thigh. There is a bump on the outer side of the upper part of the thigh bone (femur) called the greater trochanter. The trochanteric bursa is located over the greater trochanter.   How does it occur?   The trochanteric bursa may be inflamed by a group of muscles or tendons rubbing over the bursa and causing friction against the thigh bone. This injury can occur with running, walking, or bicycling, especially when the bicycle seat is too high.   What are the symptoms?   You have pain on the upper outer area of your thigh or in your hip. The pain is worse when you walk, bicycle, or go up or down stairs. You have pain when you move your thigh bone and feel tenderness in the area over the greater trochanter.   How is it diagnosed?   Your healthcare provider will ask about your symptoms and examine your hip and thigh.   How is it treated?   To treat this condition:   Put an ice pack, gel pack, or package of frozen vegetables, wrapped in a cloth on the area every 3 to 4 hours, for up to 20 minutes at a time.    Take an anti-inflammatory medicine such as ibuprofen, or other medicine as directed by your provider. Nonsteroidal anti-inflammatory medicines (NSAIDs) may cause stomach bleeding and other problems. These risks increase with age. Read the label and take as directed. Unless recommended by your healthcare provider, do not take for more than 10 days.   Your provider may give you an injection of a corticosteroid medicine.   While you are recovering from your injury you will need to change your sport or activity to one that does not make your condition worse. For example, you may need to swim instead of running or bicycling. If you are bicycling, you may need to lower your bicycle seat.   How long do the effects last?   The length of recovery depends on many factors such as your age, health, and if you have had a previous injury. Recovery time also depends on the severity of the injury. A bursa that is only mildly inflamed and has just started to hurt may improve within a few weeks. A bursa that is significantly inflamed and has been painful for a long time may take up to a few months to improve. You need to stop doing the activities that cause pain until your bursa has healed. If you continue doing activities that cause pain, your symptoms will return and it will take longer to recover.   When can I return to my normal activities?   Everyone recovers from an injury at a different rate. Return to your activities depends on how soon your leg recovers, not by how many days or weeks it has been since your injury has occurred. In general, the longer you have symptoms before you start treatment, the longer it will take to get better. The goal of rehabilitation is to return to your normal activities as soon as is safely possible. If you return too soon you may worsen your injury.   You may safely return to your normal activities when, starting from the top of the list and progressing to the end, each of the following is  true:   You have full range of motion in the injured leg compared to the uninjured leg.   You have full strength of the injured leg compared to the uninjured leg.   You can walk straight ahead without pain or limping.   How can I prevent trochanteric bursitis?   Trochanteric bursitis is best prevented by warming up properly and stretching the muscles on the outer side of your upper thigh.     Published by [employer].  This content is reviewed periodically and is subject to change as new health information becomes available. The information is intended to inform and educate and is not a replacement for medical evaluation, advice, diagnosis or treatment by a healthcare professional.   Written by Ang Ames MD, for [employer].   ? 2010 [employer] and/or its affiliates. All Rights Reserved.   Copyright   [employer] 2011         Trochanteric Bursitis Rehabilitation Exercises   You can begin stretching the muscles that run along the outside of your hip using the first two exercise. You can do the strengthening exercises when the sharp pain lessens.   Stretching exercises     Piriformis stretch: Lying on your back with both knees bent, rest the ankle of your injured leg over the knee of your uninjured leg. Grasp the thigh of your uninjured leg and pull that knee toward your chest. You will feel a stretch along the buttocks and possibly along the outside of your hip on the injured side. Hold this for 15 to 30 seconds. Repeat 3 times.     Iliotibial band stretch (standing): Cross your uninjured leg in front of your injured leg and bend down and touch your toes. You can move your hands across the floor toward the uninjured side and you will feel more stretch on the outside of your thigh on the injured side. Hold this position for 15 to 30 seconds. Return to the starting position. Repeat 3 times.   Strengthening exercises     Straight leg raise: Lie on your back with your legs straight out in front of  you. Tighten up the top of your thigh muscle on the injured leg and lift that leg about 8 inches off the floor, keeping the thigh muscle tight throughout. Slowly lower your leg back down to the floor. Do 3 sets of 10.     Wall squat with a ball: Stand with your back, shoulders, and head against a wall and look straight ahead. Keep your shoulders relaxed and your feet 1 foot away from the wall and a shoulder's width apart. Place a rolled up pillow or a soccer-sized ball between your thighs. Keeping your head against the wall, slowly squat while squeezing the pillow or ball at the same time. Squat down until you are almost in a sitting position. Your thighs will not yet be parallel to the floor. Hold this position for 10 seconds and then slowly slide back up the wall. Make sure you keep squeezing the pillow or ball throughout this exercise. Repeat 10 times. Build up to 3 sets of 10.     Prone hip extension: Lie on your stomach with your legs straight out behind you. Tighten up your buttocks muscles and lift one leg off the floor about 8 inches. Keep your knee straight. Hold for 5 seconds. Then lower your leg and relax. Do 3 sets of 10.   Written by Lelo Norris M.S., P.T., for [employer].   Published by [employer].   This content is reviewed periodically and is subject to change as new health information becomes available. The information is intended to inform and educate and is not a replacement for medical evaluation, advice, diagnosis or treatment by a healthcare professional.   Sports Medicine Advisor 2003.1 Index  Sports Medicine Advisor 2003.1 Credits   Copyright   2003 [employer]. All rights reserved.   Page footer image                Iliotibial Band Stretch (Flexibility)    1. Stand next to a chair. Hold onto the chair with your right hand for support. Cross your right leg behind your left leg.  2. Lean your right hip toward the right. Feel the stretch  at the outside of your hip.  3. Hold for 30 to 60 seconds. Then relax.  4. Repeat 2 times, or as instructed.  5. Switch sides and repeat.  6. Do this 3 times a day, or as instructed.     Tip: Don t bend forward or twist at the waist.   Date Last Reviewed: 3/29/2016    8302-1898 [employer]. [address] [place] [address]. All rights reserved. This information is not intended as a substitute for professional medical care. Always follow your healthcare professional's instructions.                Follow-ups after your visit        Your next 10 appointments already scheduled     Oct 12, 2017 12:30 PM CDT   PFT VISIT with  PFL B   [facility] ([facility])    [address] [place] [id number] [phone]            Oct 12, 2017  1:30 PM CDT   (Arrive by 1:15 PM)   Return Interstitial Lung with David Morris Perlman, MD   [facility] ([facility])    [address] [place] [id number] [phone]              Who to contact     Normal or non-critical lab and imaging results will be communicated to you by Owlienthart, letter or phone within 4 business days after the clinic has received the results. If you do not hear from us within 7 days, please contact the clinic through Owlienthart or phone. If you have a critical or abnormal lab result, we will notify you by phone as soon as possible.  Submit refill requests through Anyvite or call your pharmacy and they will forward the refill request to us. Please allow 3 business days for your refill to be completed.          If you need to speak with a  for additional information , please call: [phone]             Additional Information About Your Visit        Anyvite Information     Anyvite gives you secure access to your electronic health record. If you see a primary care provider,  "you can also send messages to your care team and make appointments. If you have questions, please call your primary care clinic.  If you do not have a primary care provider, please call [phone] and they will assist you.        Care EveryWhere ID     This is your Care EveryWhere ID. This could be used by other organizations to access your [place] medical records  [id number]        Your Vitals Were     Pulse Temperature Height BMI (Body Mass Index)          80 98.4  F (36.9  C) (Tympanic) 5' 5\" (1.651 m) 29.12 kg/m2         Blood Pressure from Last 3 Encounters:   09/01/17 122/66   05/12/17 144/77   04/26/17 124/72    Weight from Last 3 Encounters:   09/01/17 175 lb (79.4 kg)   05/12/17 180 lb 14.4 oz (82.1 kg)   04/26/17 179 lb (81.2 kg)              We Performed the Following     UA reflex to Microscopic and Culture          Today's Medication Changes          These changes are accurate as of: 9/1/17 10:48 AM.  If you have any questions, ask your nurse or doctor.               These medicines have changed or have updated prescriptions.        Dose/Directions    fluticasone 50 MCG/ACT spray   Commonly known as:  FLONASE   This may have changed:    - when to take this  - reasons to take this   Used for:  Nasal congestion, Seasonal allergic rhinitis        Dose:  1-2 spray   Spray 1-2 sprays into both nostrils daily   Quantity:  3 Bottle   Refills:  11       loratadine 10 MG tablet   Commonly known as:  CLARITIN   This may have changed:    - when to take this  - reasons to take this   Used for:  Seasonal allergies        Dose:  10 mg   Take 1 tablet (10 mg) by mouth daily   Quantity:  30 tablet   Refills:  1       * venlafaxine 37.5 MG tablet   Commonly known as:  EFFEXOR   This may have changed:  Another medication with the same name was changed. Make sure you understand how and when to take each.   Used for:  Recurrent major depression in complete remission (H)   Changed by:  Carolina Burrell MD        Dose: "  37.5 mg   Take 1 tablet (37.5 mg) by mouth 2 times daily   Quantity:  180 tablet   Refills:  5       * venlafaxine 37.5 MG tablet   Commonly known as:  EFFEXOR   This may have changed:  additional instructions   Used for:  Major depressive disorder, recurrent, mild (H)   Changed by:  Carolina Burrell MD        Take 2 tablets in the morning and 1 tablet in the afternoon   Quantity:  270 tablet   Refills:  1       * Notice:  This list has 2 medication(s) that are the same as other medications prescribed for you. Read the directions carefully, and ask your doctor or other care provider to review them with you.         Where to get your medicines      These medications were sent to [id number] - [place], [place] - [address]  [address], [facility] [address]     Phone:  [phone]     venlafaxine 37.5 MG tablet                Primary Care Provider Office Phone # Fax #    Carolina Burrell [phone] [id number] [facility] [address]        Equal Access to Services     [facility]: Hadii aad ku hadasho Soomaali, waaxda luqadaha, qaybta kaalmada adeegyada, waxay idiin hayjamisonn jana driver . So [facility] [phone].    ATENCIÓN: Si habla español, tiene a méndez disposición servicios gratuitos de asistencia lingüística. Llame al [phone].    We comply with applicable federal civil rights laws and [place] laws. We do not discriminate on the basis of race, color, national origin, age, disability sex, sexual orientation or gender identity.            Thank you!     Thank you for choosing [facility] [place]  for your care. Our goal is always to provide you with excellent care. Hearing back from our patients is one way we can continue to improve our services. Please take a few minutes to complete the written survey that you may receive in the mail after your visit with us. Thank you!             Your Updated Medication List - Protect others around you: Learn how to safely  use, store and throw away your medicines at www.disposemymeds.org.          This list is accurate as of: 9/1/17 10:48 AM.  Always use your most recent med list.                   Brand Name Dispense Instructions for use Diagnosis    albuterol 108 (90 BASE) MCG/ACT Inhaler    PROAIR HFA/PROVENTIL HFA/VENTOLIN HFA    3 Inhaler    Inhale 2 puffs into the lungs every 4 hours as needed for shortness of breath / dyspnea    Wheezing       aspirin 81 MG tablet     30 tablet    Take 81 mg by mouth every evening        buPROPion 300 MG 24 hr tablet    WELLBUTRIN XL    90 tablet    Take 1 tablet (300 mg) by mouth every morning    Major depression in complete remission (H)       fluticasone 50 MCG/ACT spray    FLONASE    3 Bottle    Spray 1-2 sprays into both nostrils daily    Nasal congestion, Seasonal allergic rhinitis       ipratropium - albuterol 0.5 mg/2.5 mg/3 mL 0.5-2.5 (3) MG/3ML neb solution    DUONEB    30 vial    Take 1 vial (3 mLs) by nebulization every 6 hours as needed for shortness of breath / dyspnea or wheezing    ILD (interstitial lung disease) (H), Bronchitis       loratadine 10 MG tablet    CLARITIN    30 tablet    Take 1 tablet (10 mg) by mouth daily    Seasonal allergies       LORazepam 0.5 MG tablet    ATIVAN    20 tablet    Take 1 tablet (0.5 mg) by mouth every 8 hours as needed for anxiety    Acute reaction to stress       mineral oil-hydrophilic petrolatum     420 g    Apply  topically as needed for dry skin.    Dry skin       MULTIVITAMIN & MINERAL PO      Take 1 tablet by mouth daily        pravastatin 20 MG tablet    PRAVACHOL    90 tablet    Take 1 tablet (20 mg) by mouth daily    Hyperlipidemia LDL goal <130       Ranolazine 1000 MG Tb12     180 tablet    Take 1,000 mg by mouth 2 times daily    Atherosclerosis of native coronary artery with other form of angina pectoris (H)       terbinafine 1 % cream    lamISIL AT    12 g    Apply topically 2 times daily For fungal infection not resolved with  other antifungals (e.g. Clotrimazole)    Tinea corporis       triamcinolone 0.1 % cream    KENALOG    80 g    Apply sparingly to affected area three times daily as needed    Rash and nonspecific skin eruption       * venlafaxine 37.5 MG tablet    EFFEXOR    180 tablet    Take 1 tablet (37.5 mg) by mouth 2 times daily    Recurrent major depression in complete remission (H)       * venlafaxine 37.5 MG tablet    EFFEXOR    270 tablet    Take 2 tablets in the morning and 1 tablet in the afternoon    Major depressive disorder, recurrent, mild (H)       VITAMIN D3 PO      2 capsules by mouth DAILY        * Notice:  This list has 2 medication(s) that are the same as other medications prescribed for you. Read the directions carefully, and ask your doctor or other care provider to review them with you.

## 2017-09-01 NOTE — PROGRESS NOTES
SUBJECTIVE:                                                    Albina Pedro is a 66 year old female who presents to clinic today for the following health issues:    Chief Complaint   Patient presents with     Hip Pain     Right side for 3 months, chiropractor is helping. wondering about xrays     Cough     for 2 weeks, is getting better, did have eye swelling, deep cough, chest congestion, sob, nasal congestion.      Toes on left foot are curving in making them sore in shoes. Knows she has high arches, wondering about inserts and the good foot feet store.  Start of the hammer toe     Some lower right side pelvic are with urination sometimes for a few weeks, no blood in urine, frequency.   She has no fever no chills no bloating no hematuria         Notice some pink when wiping with BM's. Does have some constipation at times and will use OTc laxative. Increased her fiber intake and she has been less constipated. She has been having less of this         Asked patient for the top two concerns for today visit as there is multiple concerns and she stated Memory, but that was not mention above?   Cheyenne Wesley CMA      Problem list and histories reviewed & adjusted, as indicated.  Additional history: started sorting clothes 3 weeks ago and she is still not done  She is making headway but she is very indecisive   She has not been getting lost     Due to see neurology in November   They had to cancel her and her reschedule   She is not getting a long as well with her      SUBJECTIVE:  Albina Pedro is a 66 year old female who presents to the clinic with a history of hip pain.  Her symptoms are located on the right and have been ongoing for the past 2 months.  This is her first visit to our clinic with this particular problem.    Symptoms include difficulty with ambulation, limitations in exercise tolerance and pain with ambulation    History of injury? No    Work related? no    Therapies tried with limited  or no benefit include chiro helped but it is still there some     Review of her records and history provided by the patient reveals:No significant past history with regards to hip problems.    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     Social History   Substance Use Topics     Smoking status: Never Smoker     Smokeless tobacco: Never Used     Alcohol use 0.0 oz/week     0 Standard drinks or equivalent per week      Comment: 1 glass of wine every 2 weeks     Current Outpatient Prescriptions   Medication Sig Dispense Refill     venlafaxine (EFFEXOR) 37.5 MG tablet Take 2 tablets in the morning and 1 tablet in the afternoon 270 tablet 1     Ranolazine 1000 MG TB12 Take 1,000 mg by mouth 2 times daily 180 tablet 3     LORazepam (ATIVAN) 0.5 MG tablet Take 1 tablet (0.5 mg) by mouth every 8 hours as needed for anxiety 20 tablet 0     venlafaxine (EFFEXOR) 37.5 MG tablet Take 1 tablet (37.5 mg) by mouth 2 times daily 180 tablet 5     albuterol (PROAIR HFA/PROVENTIL HFA/VENTOLIN HFA) 108 (90 BASE) MCG/ACT Inhaler Inhale 2 puffs into the lungs every 4 hours as needed for shortness of breath / dyspnea 3 Inhaler 6     buPROPion (WELLBUTRIN XL) 300 MG 24 hr tablet Take 1 tablet (300 mg) by mouth every morning 90 tablet 1     venlafaxine (EFFEXOR) 37.5 MG tablet Take 1 tablet daily for 7 days, then 1 tablet twice daily for 60 tablet 1     ipratropium - albuterol 0.5 mg/2.5 mg/3 mL (DUONEB) 0.5-2.5 (3) MG/3ML nebulization Take 1 vial (3 mLs) by nebulization every 6 hours as needed for shortness of breath / dyspnea or wheezing 30 vial 1     fluticasone (FLONASE) 50 MCG/ACT nasal spray Spray 1-2 sprays into both nostrils daily (Patient taking differently: Spray 1-2 sprays  "into both nostrils daily as needed ) 3 Bottle 11     pravastatin (PRAVACHOL) 20 MG tablet Take 1 tablet (20 mg) by mouth daily 90 tablet 3     triamcinolone (KENALOG) 0.1 % cream Apply sparingly to affected area three times daily as needed 80 g 0     Multiple Vitamins-Minerals (MULTIVITAMIN & MINERAL PO) Take 1 tablet by mouth daily        terbinafine (LAMISIL AT) 1 % cream Apply topically 2 times daily For fungal infection not resolved with other antifungals (e.g. Clotrimazole) 12 g 1     loratadine (CLARITIN) 10 MG tablet Take 1 tablet (10 mg) by mouth daily (Patient taking differently: Take 10 mg by mouth daily as needed ) 30 tablet 1     aspirin 81 MG tablet Take 81 mg by mouth every evening  30 tablet      mineral oil-hydrophilic petrolatum (AQUAPHOR) ointment Apply  topically as needed for dry skin. 420 g 4     VITAMIN D3 OR 2 capsules by mouth DAILY         OBJECTIVE  GENERAL APPEARANCE: healthy, alert and no distress  NECK: no adenopathy, no asymmetry, masses, or scars and thyroid normal to palpation  RESP: lungs clear to auscultation - no rales, rhonchi or wheezes  CV: regular rates and rhythm, normal S1 S2, no S3 or S4 and no murmur, click or rub  ABDOMEN: soft, nontender, without hepatosplenomegaly or masses and bowel sounds normal  NEURO: Normal strength and tone, mentation intact and speech normal  :  This patient appears alert  Blood pressure 122/66, pulse 80, temperature 98.4  F (36.9  C), temperature source Tympanic, height 5' 5\" (1.651 m), weight 175 lb (79.4 kg).  The right hip is examined today.  Exam findings include: No clicking or crepitus.  No limitation in range of motion.  No obvious deformity present.  No pain with abduction, adduction, flexion, extension, internal or external rotation.  Tenderness over the greater trochanter.  Radiology findings:No x-rays were performed or indicated today.  ASSESSMENT:Bursitis  PLAN:Apply ice  Physical therapy referral if unimprovedFollow up in 2 " "months.    Carolina Burrell        ROS:  Constitutional, HEENT, cardiovascular, pulmonary, gi and gu systems are negative, except as otherwise noted.      OBJECTIVE:                                                    /66  Pulse 80  Temp 98.4  F (36.9  C) (Tympanic)  Ht 5' 5\" (1.651 m)  Wt 175 lb (79.4 kg)  BMI 29.12 kg/m2 Body mass index is 29.12 kg/(m^2).   MENTAL STATUS EXAM:    1. Clinical observations: Albina was clean and was adequately groomed. Albina's emotional presentation was open and cooperative. She spoke clear and articulate. She maintained good eye contact and she was cooperative in answering questions.   2. She appeared to be well-oriented in all spheres with coherent, logical, goal directed and relevent thinking.   3. Thought content: She denies no abnormal thought process.   4. Affect and mood: Albina's affect is described as normal/appropriate and her emotional attitude was open and cooperative. She reports the following sypmtoms: sad feeling or depressed, difficulty concentrating, feeling negative about the future and feeling fatiqued.    5. Sensorium and cognition: She was in contact with reality and oriented to time, place and person.  She demonstrated no impairment in immediate, recent, or remote memory. Her insight was adequate and her  intelligence appeared to be average.           ASSESSMENT/PLAN:                                                      1. Urinary problem  Normal   - UA reflex to Microscopic and Culture    2. Major depressive disorder, recurrent, mild (H)  Increase the effexor to 112/5  - venlafaxine (EFFEXOR) 37.5 MG tablet; Take 2 tablets in the morning and 1 tablet in the afternoon  Dispense: 270 tablet; Refill: 1    3. Hammertoe of left foot  Consider moleskin and possible orthotics     4. Cough  cta     5. Enthesopathy of right hip region    Try the exercises and if not improving consider physical therapy and steroid injection.    reports that she has never " smoked. She has never used smokeless tobacco.          Carolina Burrell M.D.  [facility]

## 2017-09-02 ASSESSMENT — ANXIETY QUESTIONNAIRES: GAD7 TOTAL SCORE: 5

## 2017-09-06 DIAGNOSIS — F43.0 ACUTE REACTION TO STRESS: ICD-10-CM

## 2017-09-06 DIAGNOSIS — E78.5 HYPERLIPIDEMIA LDL GOAL <130: ICD-10-CM

## 2017-09-06 RX ORDER — PRAVASTATIN SODIUM 20 MG
TABLET ORAL
Qty: 90 TABLET | Refills: 0 | Status: SHIPPED | OUTPATIENT
Start: 2017-09-06 | End: 2017-10-12

## 2017-09-06 RX ORDER — LORAZEPAM 0.5 MG/1
0.5 TABLET ORAL EVERY 8 HOURS PRN
Qty: 20 TABLET | Refills: 0 | Status: SHIPPED | OUTPATIENT
Start: 2017-09-06 | End: 2017-12-05

## 2017-09-06 NOTE — TELEPHONE ENCOUNTER
Routing refill request to provider for review/approval because:  9/1/17 office visit notes are not finalized. Thank you.  Suhail Silva RN

## 2017-10-12 ENCOUNTER — OFFICE VISIT (OUTPATIENT)
Dept: PULMONOLOGY | Facility: CLINIC | Age: 67
End: 2017-10-12
Attending: INTERNAL MEDICINE
Payer: MEDICARE

## 2017-10-12 ENCOUNTER — TELEPHONE (OUTPATIENT)
Dept: FAMILY MEDICINE | Facility: CLINIC | Age: 67
End: 2017-10-12

## 2017-10-12 VITALS
RESPIRATION RATE: 16 BRPM | OXYGEN SATURATION: 96 % | DIASTOLIC BLOOD PRESSURE: 68 MMHG | SYSTOLIC BLOOD PRESSURE: 129 MMHG | HEART RATE: 88 BPM

## 2017-10-12 DIAGNOSIS — E78.5 HYPERLIPIDEMIA LDL GOAL <130: ICD-10-CM

## 2017-10-12 DIAGNOSIS — Z13.6 CARDIOVASCULAR SCREENING; LDL GOAL LESS THAN 70: Primary | ICD-10-CM

## 2017-10-12 DIAGNOSIS — J84.9 ILD (INTERSTITIAL LUNG DISEASE) (H): Primary | ICD-10-CM

## 2017-10-12 DIAGNOSIS — Z23 ENCOUNTER FOR IMMUNIZATION: ICD-10-CM

## 2017-10-12 DIAGNOSIS — J84.9 ILD (INTERSTITIAL LUNG DISEASE) (H): ICD-10-CM

## 2017-10-12 DIAGNOSIS — R21 RASH AND NONSPECIFIC SKIN ERUPTION: ICD-10-CM

## 2017-10-12 PROCEDURE — 25000128 H RX IP 250 OP 636: Mod: ZF | Performed by: INTERNAL MEDICINE

## 2017-10-12 PROCEDURE — 90662 IIV NO PRSV INCREASED AG IM: CPT | Mod: ZF | Performed by: INTERNAL MEDICINE

## 2017-10-12 PROCEDURE — 99212 OFFICE O/P EST SF 10 MIN: CPT | Mod: ZF

## 2017-10-12 PROCEDURE — G0008 ADMIN INFLUENZA VIRUS VAC: HCPCS | Mod: ZF

## 2017-10-12 RX ORDER — TRIAMCINOLONE ACETONIDE 1 MG/G
CREAM TOPICAL
Qty: 80 G | Refills: 0 | Status: SHIPPED | OUTPATIENT
Start: 2017-10-12 | End: 2017-10-18

## 2017-10-12 RX ORDER — CALCIUM CARBONATE 500(1250)
1 TABLET ORAL 2 TIMES DAILY
COMMUNITY
End: 2020-06-04

## 2017-10-12 RX ADMIN — INFLUENZA A VIRUSA/MICHIGAN/45/2015 X-275 (H1N1) ANTIGEN (FORMALDEHYDE INACTIVATED), INFLUENZA A VIRUS A/HONG KONG/4801/2014 X-263B (H3N2) ANTIGEN (FORMALDEHYDE INACTIVATED), AND INFLUENZA B VIRUS B/BRISBANE/60/2008 ANTIGEN (FORMALDEHYDE INACTIVATED) 0.5 ML: 60; 60; 60 INJECTION, SUSPENSION INTRAMUSCULAR at 13:59

## 2017-10-12 ASSESSMENT — PAIN SCALES - GENERAL: PAINLEVEL: NO PAIN (0)

## 2017-10-12 NOTE — TELEPHONE ENCOUNTER
pravastatin     Last Written Prescription Date: 9/6/17  Last Fill Quantity: 90, # refills: 0  Last Office Visit with [facility], [facility] or [facility] prescribing provider: 9/1/17       Lab Results   Component Value Date    CHOL 154 08/04/2014     Lab Results   Component Value Date    HDL 48 08/04/2014     Lab Results   Component Value Date    LDL 73 09/15/2015    LDL 70 08/04/2014     Lab Results   Component Value Date    TRIG 178 08/04/2014     Lab Results   Component Value Date    CHOLHDLRATIO 3.2 08/04/2014

## 2017-10-12 NOTE — PROGRESS NOTES
Reason for Visit  Albian Pedro is a 66 year old year old female who is being seen for Interstitial Lung Disease (ILD) (Patient is being seen for ILD follow up)    ILD HPI    Albina Pedro is a 66-year-old female with a history of interstitial lung disease consisting of thin-walled cysts thought to be most consistent with LIP.  She does have a diagnosis of Sjogren's, although her symptoms are fairly minimal and she is not on any treatment for this.  She returns to clinic today overall stating that she feels she is doing well.  She denies any significant shortness of breath or dyspnea on exertion.  She was visiting her son in [place] a few months ago and had to take multiple flights of stairs and was able to do this even at increased altitude.  She has been having some significant social stressors at home in terms of her relationship with her  and is asking about psychological counseling, for which we offered to refer her.  Otherwise, she generally feels she is stable from a respiratory standpoint without any other complaints today.           Current Outpatient Prescriptions   Medication     calcium carbonate (OS-SARI 500 MG [place]. [place]) 1250 MG tablet     pravastatin (PRAVACHOL) 20 MG tablet     venlafaxine (EFFEXOR) 37.5 MG tablet     Ranolazine 1000 MG TB12     albuterol (PROAIR HFA/PROVENTIL HFA/VENTOLIN HFA) 108 (90 BASE) MCG/ACT Inhaler     buPROPion (WELLBUTRIN XL) 300 MG 24 hr tablet     ipratropium - albuterol 0.5 mg/2.5 mg/3 mL (DUONEB) 0.5-2.5 (3) MG/3ML nebulization     Multiple Vitamins-Minerals (MULTIVITAMIN & MINERAL PO)     terbinafine (LAMISIL AT) 1 % cream     aspirin 81 MG tablet     mineral oil-hydrophilic petrolatum (AQUAPHOR) ointment     VITAMIN D3 OR     triamcinolone (KENALOG) 0.1 % cream     LORazepam (ATIVAN) 0.5 MG tablet     fluticasone (FLONASE) 50 MCG/ACT nasal spray     loratadine (CLARITIN) 10 MG tablet     Current Facility-Administered Medications   Medication     influenza  Vac Split High-Dose (FLUZONE) injection 0.5 mL     Allergies   Allergen Reactions     Daypro [Nsaids] Rash            Lidocaine Other (See Comments)     Rapid heart rate     Penicillins Unknown     Occurred as child.     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       Past Surgical History:   Procedure Laterality Date     C/SECTION, LOW TRANSVERSE  10/13/1978    , Low Transverse     COLONOSCOPY       SURGICAL HISTORY OF -            SURGICAL HISTORY OF -   00    Colonoscopy       Social History     Social History     Marital status:      Spouse name: N/A     Number of children: N/A     Years of education: N/A     Occupational History     Not on file.     Social History Main Topics     Smoking status: Never Smoker     Smokeless tobacco: Never Used     Alcohol use 0.0 oz/week     0 Standard drinks or equivalent per week      Comment: 1 glass of wine every 2 weeks     Drug use: No     Sexual activity: Yes     Partners: Male     Other Topics Concern     Parent/Sibling W/ Cabg, Mi Or Angioplasty Before 65f 55m? No     Social History Narrative       Family History   Problem Relation Age of Onset     CANCER Mother      Breast and liver- age 43     HEART DISEASE Father      ? chf?h/o CVA     Alzheimer Disease Father      Hypertension Father      Neurologic Disorder Father      CVA     DIABETES Maternal Grandmother      Breast Cancer Maternal Aunt      Breast Cancer Paternal Aunt      Cancer - colorectal No family hx of      Prostate Cancer No family hx of        ROS Pulmonary    A complete ROS was otherwise negative except as noted in the HPI.  Vitals:    10/12/17 1320   BP: 129/68   BP Location: Right arm    Patient Position: Chair   Cuff Size: Adult Large   Pulse: 88   Resp: 16   SpO2: 96%     Exam:   GENERAL APPEARANCE: Well developed, well nourished, alert, and in no apparent distress.  NECK: supple, no masses, no thyromegaly.  LYMPHATICS: No significant axillary, cervical, or supraclavicular nodes.  RESP: good air flow throughout, - no crackles, rhonchi or wheezes.  CV: Normal S1, S2, regular rhythm, normal rate, no rub, no murmur,  no gallop, no LE edema.   ABDOMEN:  Bowel sounds normal, soft, nontender, no HSM or masses.   MS: extremities normal- no clubbing, no cyanosis.  NEURO: Mentation intact, speech normal, normal strength and tone, normal gait and stance  PSYCH: mentation appears normal. and affect normal/bright  Results: I have reviewed all imaging, PFTs and other relavent tests, please see below for details, PFT and imaging results were reviewed with the patient.  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  We have been following her PFTs which have been stable and normal range.  She is asymptomatic.  At this point, we will continue to follow her every    6 months, although if she is stable at the next visit, we may change that to annual visits.  Otherwise, I will have her call sooner with any changes in her breathing.  We would consider a repeat CT scan as a    5-year followup which would be about 2 years from now.         CBC   Recent Labs   Lab Test  12/12/16 2240 02/18/16   0923   RBC  4.26  4.28   HGB  13.1  13.4   HCT  38.6  39.7   PLT  216  216       Basic Metabolic Panel  Recent Labs   Lab Test  12/12/16 2240  02/18/16   0923   NA  135  140   POTASSIUM  4.0  4.2   CHLORIDE  101  107   CO2  22  26   BUN  23  24   GLC  100*  86   SARI  8.6  8.6       INR  No results for input(s): INR in the last 82819 hours.    PFT  PFT Latest Ref Rng & Units  10/12/2017   FVC L 3.05   FEV1 L 2.52   FVC% % 102   FEV1% % 108           CC:

## 2017-10-12 NOTE — MR AVS SNAPSHOT
After Visit Summary   10/12/2017    Albina Pedro    MRN: [id number]           Patient Information     Date Of Birth          1950        Visit Information        Provider Department      10/12/2017 1:30 PM Perlman, David Morris, MD [facility]        Today's Diagnoses     ILD (interstitial lung disease) (H)    -  1    Encounter for immunization            Follow-ups after your visit        Your next 10 appointments already scheduled     Apr 03, 2018  1:30 PM CDT   PFT VISIT with  PFL ESTEPHANIE   [facility] Pulmonary Function Testing ([facility])    [address] [place] [id number] [phone]            Apr 03, 2018  2:00 PM CDT   (Arrive by 1:45 PM)   Return Interstitial Lung with David Morris Perlman, MD   [facility] ([facility])    [address] [place] [id number] [phone]              Future tests that were ordered for you today     Open Future Orders        Priority Expected Expires Ordered    General PFT Lab (Please always keep checked) Routine  10/12/2018 10/12/2017    Pulmonary Function Test Routine  12/29/2026 10/12/2017            Who to contact     If you have questions or need follow up information about today's clinic visit or your schedule please contact [facility] directly at [phone].  Normal or non-critical lab and imaging results will be communicated to you by MyChart, letter or phone within 4 business days after the clinic has received the results. If you do not hear from us within 7 days, please contact the clinic through MyChart or phone. If you have a critical or abnormal lab result, we will notify you by phone as soon as possible.  Submit refill requests through [employer] or call your pharmacy and they will forward the refill request to us. Please allow 3 business days  for your refill to be completed.          Additional Information About Your Visit        MyChart Information     [facility] gives you secure access to your electronic health record. If you see a primary care provider, you can also send messages to your care team and make appointments. If you have questions, please call your primary care clinic.  If you do not have a primary care provider, please call [phone] and they will assist you.        Care EveryWhere ID     This is your Care EveryWhere ID. This could be used by other organizations to access your [facility] medical records  [id number]        Your Vitals Were     Pulse Respirations Pulse Oximetry             88 16 96%          Blood Pressure from Last 3 Encounters:   10/12/17 129/68   09/01/17 122/66   05/12/17 144/77    Weight from Last 3 Encounters:   09/01/17 79.4 kg (175 lb)   05/12/17 82.1 kg (180 lb 14.4 oz)   04/26/17 81.2 kg (179 lb)               Primary Care Provider Office Phone # Fax #    Carolina Burrell [phone] [id number] [facility] [address]        Equal Access to Services     [facility] : Hadii aad ku hadasho Soomaali, waaxda luqadaha, qaybta kaalmada adejhonny, chay driver . So [facility] [phone].    ATENCIÓN: Si habla español, tiene a méndez disposición servicios gratuitos de asistencia lingüística. [facility] [phone].    We comply with applicable federal civil rights laws and [place] laws. We do not discriminate on the basis of race, color, national origin, age, disability, sex, sexual orientation, or gender identity.            Thank you!     Thank you for choosing [facility]  for your care. Our goal is always to provide you with excellent care. Hearing back from our patients is one way we can continue to improve our services. Please take a few minutes to complete the written survey that you may receive in the mail after your visit with us.  Thank you!             Your Updated Medication List - Protect others around you: Learn how to safely use, store and throw away your medicines at www.disposemymeds.org.          This list is accurate as of: 10/12/17  1:50 PM.  Always use your most recent med list.                   Brand Name Dispense Instructions for use Diagnosis    albuterol 108 (90 BASE) MCG/ACT Inhaler    PROAIR HFA/PROVENTIL HFA/VENTOLIN HFA    3 Inhaler    Inhale 2 puffs into the lungs every 4 hours as needed for shortness of breath / dyspnea    Wheezing       aspirin 81 MG tablet     30 tablet    Take 81 mg by mouth every evening        buPROPion 300 MG 24 hr tablet    WELLBUTRIN XL    90 tablet    Take 1 tablet (300 mg) by mouth every morning    Major depression in complete remission (H)       calcium carbonate 1250 MG tablet    OS-SARI 500 mg [place]. [place]     Take 1 tablet by mouth 2 times daily        fluticasone 50 MCG/ACT spray    FLONASE    3 Bottle    Spray 1-2 sprays into both nostrils daily    Nasal congestion, Seasonal allergic rhinitis       ipratropium - albuterol 0.5 mg/2.5 mg/3 mL 0.5-2.5 (3) MG/3ML neb solution    DUONEB    30 vial    Take 1 vial (3 mLs) by nebulization every 6 hours as needed for shortness of breath / dyspnea or wheezing    ILD (interstitial lung disease) (H), Bronchitis       loratadine 10 MG tablet    CLARITIN    30 tablet    Take 1 tablet (10 mg) by mouth daily    Seasonal allergies       LORazepam 0.5 MG tablet    ATIVAN    20 tablet    Take 1 tablet (0.5 mg) by mouth every 8 hours as needed for anxiety    Acute reaction to stress       mineral oil-hydrophilic petrolatum     420 g    Apply  topically as needed for dry skin.    Dry skin       MULTIVITAMIN & MINERAL PO      Take 1 tablet by mouth daily        pravastatin 20 MG tablet    PRAVACHOL    90 tablet    TAKE ONE TABLET EVERY DAY    Hyperlipidemia LDL goal <130       Ranolazine 1000 MG Tb12     180 tablet    Take 1,000 mg by mouth 2 times daily     Atherosclerosis of native coronary artery with other form of angina pectoris       terbinafine 1 % cream    lamISIL AT    12 g    Apply topically 2 times daily For fungal infection not resolved with other antifungals (e.g. Clotrimazole)    Tinea corporis       triamcinolone 0.1 % cream    KENALOG    80 g    Apply sparingly to affected area three times daily as needed    Rash and nonspecific skin eruption       venlafaxine 37.5 MG tablet    EFFEXOR    270 tablet    Take 2 tablets in the morning and 1 tablet in the afternoon    Major depressive disorder, recurrent, mild (H)       VITAMIN D3 PO      2 capsules by mouth DAILY

## 2017-10-12 NOTE — TELEPHONE ENCOUNTER
Dr. Sandoval -  Please advise in Dr. Burrell's absence.  Patient reports that she has a rash on her left arm. It is small red raised bmps. Rash started 2 days ago. Patient said that this looks just like chiggers when she had it last year. She said that she was diging in the brush and got it. She has not tried anything over the counter for medications or creams as she is hoping for a refill of kenalog cream as this works well. Would you be able to to do a telephone visit today?  Yaritza Licea RN

## 2017-10-12 NOTE — LETTER
10/12/2017       RE: Albina Pedro  [address] [address] [place] [id number]     Dear Colleague,    Thank you for referring your patient, Albina Pedro, to the [facility] LUNG [facility] at [facility] Please see a copy of my visit note below.    Reason for Visit  Albina Pedro is a 66 year old year old female who is being seen for Interstitial Lung Disease (ILD) (Patient is being seen for ILD follow up)    ILD HPI    Albina Pedro is a 66-year-old female with a history of interstitial lung disease consisting of thin-walled cysts thought to be most consistent with LIP.  She does have a diagnosis of Sjogren's, although her symptoms are fairly minimal and she is not on any treatment for this.  She returns to clinic today overall stating that she feels she is doing well.  She denies any significant shortness of breath or dyspnea on exertion.  She was visiting her son in [place] a few months ago and had to take multiple flights of stairs and was able to do this even at increased altitude.  She has been having some significant social stressors at home in terms of her relationship with her  and is asking about psychological counseling, for which we offered to refer her.  Otherwise, she generally feels she is stable from a respiratory standpoint without any other complaints today.           Current Outpatient Prescriptions   Medication     calcium carbonate (OS-SARI 500 MG [place]. [place]) 1250 MG tablet     pravastatin (PRAVACHOL) 20 MG tablet     venlafaxine (EFFEXOR) 37.5 MG tablet     Ranolazine 1000 MG TB12     albuterol (PROAIR HFA/PROVENTIL HFA/VENTOLIN HFA) 108 (90 BASE) MCG/ACT Inhaler     buPROPion (WELLBUTRIN XL) 300 MG 24 hr tablet     ipratropium - albuterol 0.5 mg/2.5 mg/3 mL (DUONEB) 0.5-2.5 (3) MG/3ML nebulization     Multiple Vitamins-Minerals (MULTIVITAMIN & MINERAL PO)     terbinafine (LAMISIL AT) 1 % cream     aspirin 81 MG tablet      mineral oil-hydrophilic petrolatum (AQUAPHOR) ointment     VITAMIN D3 OR     triamcinolone (KENALOG) 0.1 % cream     LORazepam (ATIVAN) 0.5 MG tablet     fluticasone (FLONASE) 50 MCG/ACT nasal spray     loratadine (CLARITIN) 10 MG tablet     Current Facility-Administered Medications   Medication     influenza Vac Split High-Dose (FLUZONE) injection 0.5 mL     Allergies   Allergen Reactions     Daypro [Nsaids] Rash            Lidocaine Other (See Comments)     Rapid heart rate     Penicillins Unknown     Occurred as child.     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       Past Surgical History:   Procedure Laterality Date     C/SECTION, LOW TRANSVERSE  10/13/1978    , Low Transverse     COLONOSCOPY       SURGICAL HISTORY OF -            SURGICAL HISTORY OF -   00    Colonoscopy       Social History     Social History     Marital status:      Spouse name: N/A     Number of children: N/A     Years of education: N/A     Occupational History     Not on file.     Social History Main Topics     Smoking status: Never Smoker     Smokeless tobacco: Never Used     Alcohol use 0.0 oz/week     0 Standard drinks or equivalent per week      Comment: 1 glass of wine every 2 weeks     Drug use: No     Sexual activity: Yes     Partners: Male     Other Topics Concern     Parent/Sibling W/ Cabg, Mi Or Angioplasty Before 65f 55m? No     Social History Narrative       Family History   Problem Relation Age of Onset     CANCER Mother      Breast and liver- age 43     HEART DISEASE Father      ? chf?h/o CVA     Alzheimer Disease Father      Hypertension Father      Neurologic Disorder Father      CVA     DIABETES  Maternal Grandmother      Breast Cancer Maternal Aunt      Breast Cancer Paternal Aunt      Cancer - colorectal No family hx of      Prostate Cancer No family hx of        ROS Pulmonary    A complete ROS was otherwise negative except as noted in the HPI.  Vitals:    10/12/17 1320   BP: 129/68   BP Location: Right arm   Patient Position: Chair   Cuff Size: Adult Large   Pulse: 88   Resp: 16   SpO2: 96%     Exam:   GENERAL APPEARANCE: Well developed, well nourished, alert, and in no apparent distress.  NECK: supple, no masses, no thyromegaly.  LYMPHATICS: No significant axillary, cervical, or supraclavicular nodes.  RESP: good air flow throughout, - no crackles, rhonchi or wheezes.  CV: Normal S1, S2, regular rhythm, normal rate, no rub, no murmur,  no gallop, no LE edema.   ABDOMEN:  Bowel sounds normal, soft, nontender, no HSM or masses.   MS: extremities normal- no clubbing, no cyanosis.  NEURO: Mentation intact, speech normal, normal strength and tone, normal gait and stance  PSYCH: mentation appears normal. and affect normal/bright  Results: I have reviewed all imaging, PFTs and other relavent tests, please see below for details, PFT and imaging results were reviewed with the patient.  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  We have been following her PFTs which have been stable and normal range.  She is asymptomatic.  At this point, we will continue to follow her every    6 months, although if she is stable at the next visit, we may change that to annual visits.  Otherwise, I will have her call sooner with any changes in her breathing.  We would consider a repeat CT scan as a    5-year followup which would be about 2 years from now.         CBC   Recent Labs   Lab Test  12/12/16   2240  02/18/16   0923   RBC  4.26  4.28   HGB  13.1  13.4   HCT  38.6  39.7    PLT  216  216       Basic Metabolic Panel  Recent Labs   Lab Test  12/12/16   2240  02/18/16   0923   NA  135  140   POTASSIUM  4.0  4.2   CHLORIDE  101  107   CO2  22  26   BUN  23  24   GLC  100*  86   SARI  8.6  8.6       INR  No results for input(s): INR in the last 24692 hours.    PFT  PFT Latest Ref Rng & Units 10/12/2017   FVC L 3.05   FEV1 L 2.52   FVC% % 102   FEV1% % 108             Again, thank you for allowing me to participate in the care of your patient.      Sincerely,    David Morris Perlman, MD

## 2017-10-12 NOTE — NURSING NOTE
Chief Complaint   Patient presents with     Interstitial Lung Disease (ILD)     Patient is being seen for ILD follow up      Mitali Carias CMA at 1:22 PM on 10/12/2017

## 2017-10-12 NOTE — TELEPHONE ENCOUNTER
Reason for call:  Patient reporting a symptom    Symptom or request: Albina LEFT MESSAGE:  She states that she would like Dr. Burrell to prescribed her the medication that she had last year when she got them.  No other information was left.  Please call and assess. Thank you..Tita Real    Duration (how long have symptoms been present): did not specify    Have you been treated for this before? Yes - year ago    Additional comments: none    Phone Number patient can be reached at:  Home number on file [phone] (home)    Best Time:  Any time    Can we leave a detailed message on this number:  YES    Call taken on 10/12/2017 at 9:43 AM by Tita Real

## 2017-10-14 DIAGNOSIS — E78.5 HYPERLIPIDEMIA LDL GOAL <130: ICD-10-CM

## 2017-10-16 NOTE — TELEPHONE ENCOUNTER
pravastatin   Last Written Prescription Date: 9/6/17  Last Fill Quantity: 90, # refills: 0  Last Office Visit with [facility], [facility] or [facility] prescribing provider: 9/1/17       Lab Results   Component Value Date    CHOL 154 08/04/2014     Lab Results   Component Value Date    HDL 48 08/04/2014     Lab Results   Component Value Date    LDL 73 09/15/2015    LDL 70 08/04/2014     Lab Results   Component Value Date    TRIG 178 08/04/2014     Lab Results   Component Value Date    CHOLHDLRATIO 3.2 08/04/2014

## 2017-10-17 NOTE — TELEPHONE ENCOUNTER
Routing refill request to provider for review/approval because:  Please advise refill request at Patient's appointment tomorrow. Thank you.  Suhail Silva RN

## 2017-10-18 ENCOUNTER — OFFICE VISIT (OUTPATIENT)
Dept: FAMILY MEDICINE | Facility: CLINIC | Age: 67
End: 2017-10-18
Payer: COMMERCIAL

## 2017-10-18 VITALS
SYSTOLIC BLOOD PRESSURE: 138 MMHG | HEIGHT: 65 IN | HEART RATE: 84 BPM | DIASTOLIC BLOOD PRESSURE: 76 MMHG | BODY MASS INDEX: 29.16 KG/M2 | WEIGHT: 175 LBS

## 2017-10-18 DIAGNOSIS — F32.5 MAJOR DEPRESSION IN COMPLETE REMISSION (H): Primary | ICD-10-CM

## 2017-10-18 PROCEDURE — 99213 OFFICE O/P EST LOW 20 MIN: CPT | Performed by: FAMILY MEDICINE

## 2017-10-18 RX ORDER — PRAVASTATIN SODIUM 20 MG
TABLET ORAL
Qty: 90 TABLET | Refills: 1 | Status: SHIPPED | OUTPATIENT
Start: 2017-10-18 | End: 2017-10-25

## 2017-10-18 RX ORDER — PRAVASTATIN SODIUM 20 MG
TABLET ORAL
Qty: 90 TABLET | Refills: 3 | Status: SHIPPED | OUTPATIENT
Start: 2017-10-18 | End: 2018-07-06

## 2017-10-18 ASSESSMENT — ANXIETY QUESTIONNAIRES
GAD7 TOTAL SCORE: 7
6. BECOMING EASILY ANNOYED OR IRRITABLE: SEVERAL DAYS
5. BEING SO RESTLESS THAT IT IS HARD TO SIT STILL: NOT AT ALL
2. NOT BEING ABLE TO STOP OR CONTROL WORRYING: SEVERAL DAYS
3. WORRYING TOO MUCH ABOUT DIFFERENT THINGS: MORE THAN HALF THE DAYS
7. FEELING AFRAID AS IF SOMETHING AWFUL MIGHT HAPPEN: SEVERAL DAYS
1. FEELING NERVOUS, ANXIOUS, OR ON EDGE: MORE THAN HALF THE DAYS

## 2017-10-18 ASSESSMENT — PATIENT HEALTH QUESTIONNAIRE - PHQ9
5. POOR APPETITE OR OVEREATING: NOT AT ALL
SUM OF ALL RESPONSES TO PHQ QUESTIONS 1-9: 5

## 2017-10-18 NOTE — MR AVS SNAPSHOT
After Visit Summary   10/18/2017    Albina Pedro    MRN: [id number]           Patient Information     Date Of Birth          1950        Visit Information        Provider Department      10/18/2017 11:30 AM Carolina Burrell MD [facility]        Today's Diagnoses     Major depression in complete remission (H)    -  1       Follow-ups after your visit        Additional Services     MENTAL HEALTH REFERRAL       Your provider has referred you to: [facility]: [facility] - Counseling (Individual/Couples/Family) - [facility] [phone]   [email or web address]   *Patient will be contacted by [facility] scheduling partner, [facility]), to schedule an appointment.  Patients may also call [facility] to schedule.    All scheduling is subject to the client's specific insurance plan & benefits, provider/location availability, and provider clinical specialities.  Please arrive 15 minutes early for your first appointment and bring your completed paperwork.    Please be aware that coverage of these services is subject to the terms and limitations of your health insurance plan.  Call member services at your health plan with any benefit or coverage questions.                  Your next 10 appointments already scheduled     Apr 03, 2018  1:30 PM CDT   PFT VISIT with  PFL ESTEPHANIE   [facility] Pulmonary Function Testing ([facility])    [address] [place] [id number] [phone]            Apr 03, 2018  2:00 PM CDT   (Arrive by 1:45 PM)   Return Interstitial Lung with David Morris Perlman, MD   [facility] for Lung Science and Health ([facility])    [address] [place] [id number] [phone]              Who to contact     Normal or non-critical lab and imaging results will be communicated to  "you by MyChart, letter or phone within 4 business days after the clinic has received the results. If you do not hear from us within 7 days, please contact the clinic through [employer] or phone. If you have a critical or abnormal lab result, we will notify you by phone as soon as possible.  Submit refill requests through [employer] or call your pharmacy and they will forward the refill request to us. Please allow 3 business days for your refill to be completed.          If you need to speak with a  for additional information , please call: [phone]             Additional Information About Your Visit        [employer] Information     [employer] gives you secure access to your electronic health record. If you see a primary care provider, you can also send messages to your care team and make appointments. If you have questions, please call your primary care clinic.  If you do not have a primary care provider, please call [phone] and they will assist you.        Care EveryWhere ID     This is your Care EveryWhere ID. This could be used by other organizations to access your [place] medical records  [id number]        Your Vitals Were     Pulse Height BMI (Body Mass Index)             84 5' 5\" (1.651 m) 29.12 kg/m2          Blood Pressure from Last 3 Encounters:   10/18/17 138/76   10/12/17 129/68   09/01/17 122/66    Weight from Last 3 Encounters:   10/18/17 175 lb (79.4 kg)   09/01/17 175 lb (79.4 kg)   05/12/17 180 lb 14.4 oz (82.1 kg)              We Performed the Following     MENTAL HEALTH REFERRAL        Primary Care Provider Office Phone # Fax #    Carolina Burrell [phone] [id number]  [facility] [address]        Equal Access to Services     [facility]: Hadii navya Chávez, wagabriela lopez, qaybta kaalalonzo duarte, chay gonzalez. So [facility] [phone].    ATENCIÓN: Si habla español, tiene a méndez disposición servicios gratuitos " de asistencia lingüística. Dio espinal [phone].    We comply with applicable federal civil rights laws and [place] laws. We do not discriminate on the basis of race, color, national origin, age, disability, sex, sexual orientation, or gender identity.            Thank you!     Thank you for choosing [facility]  for your care. Our goal is always to provide you with excellent care. Hearing back from our patients is one way we can continue to improve our services. Please take a few minutes to complete the written survey that you may receive in the mail after your visit with us. Thank you!             Your Updated Medication List - Protect others around you: Learn how to safely use, store and throw away your medicines at www.disposemymeds.org.          This list is accurate as of: 10/18/17 12:13 PM.  Always use your most recent med list.                   Brand Name Dispense Instructions for use Diagnosis    albuterol 108 (90 BASE) MCG/ACT Inhaler    PROAIR HFA/PROVENTIL HFA/VENTOLIN HFA    3 Inhaler    Inhale 2 puffs into the lungs every 4 hours as needed for shortness of breath / dyspnea    Wheezing       aspirin 81 MG tablet     30 tablet    Take 81 mg by mouth every evening        buPROPion 300 MG 24 hr tablet    WELLBUTRIN XL    90 tablet    Take 1 tablet (300 mg) by mouth every morning    Major depression in complete remission (H)       calcium carbonate 1250 MG tablet    OS-SARI 500 mg [place]. [place]     Take 1 tablet by mouth 2 times daily        ipratropium - albuterol 0.5 mg/2.5 mg/3 mL 0.5-2.5 (3) MG/3ML neb solution    DUONEB    30 vial    Take 1 vial (3 mLs) by nebulization every 6 hours as needed for shortness of breath / dyspnea or wheezing    ILD (interstitial lung disease) (H), Bronchitis       loratadine 10 MG tablet    CLARITIN    30 tablet    Take 1 tablet (10 mg) by mouth daily    Seasonal allergies       LORazepam 0.5 MG tablet    ATIVAN    20 tablet    Take 1 tablet (0.5 mg) by mouth every  8 hours as needed for anxiety    Acute reaction to stress       mineral oil-hydrophilic petrolatum     420 g    Apply  topically as needed for dry skin.    Dry skin       MULTIVITAMIN & MINERAL PO      Take 1 tablet by mouth daily        pravastatin 20 MG tablet    PRAVACHOL    90 tablet    TAKE ONE TABLET EVERY DAY    Hyperlipidemia LDL goal <130       Ranolazine 1000 MG Tb12     180 tablet    Take 1,000 mg by mouth 2 times daily    Atherosclerosis of native coronary artery with other form of angina pectoris       terbinafine 1 % cream    lamISIL AT    12 g    Apply topically 2 times daily For fungal infection not resolved with other antifungals (e.g. Clotrimazole)    Tinea corporis       venlafaxine 37.5 MG tablet    EFFEXOR    270 tablet    Take 2 tablets in the morning and 1 tablet in the afternoon    Major depressive disorder, recurrent, mild (H)       VITAMIN D3 PO      2 capsules by mouth DAILY

## 2017-10-18 NOTE — PROGRESS NOTES
SUBJECTIVE:                                                    Albina Pedro is a 66 year old female who presents to clinic today for the following health issues:    Anxiety Follow-Up    Status since last visit: Worsened     Other associated symptoms:    Complicating factors:   Significant life event: No   Current substance abuse: None  Depression symptoms: No  SEBASTIAN-7 SCORE 3/22/2017 2017 10/18/2017   Total Score - - -   Total Score 8 5 7     PHQ-9 SCORE 2017 2017 10/18/2017   Total Score 1 - -   Total Score - 6 5         Problem list and histories reviewed & adjusted, as indicated.  Additional history: has more conflict at home with    She has seen Dr. Luke a few times but did not feel this was what she needed   Would like to see a therapist that will give her more direction   She is doing well on the effexor she feels like but her  tells her that she is worse  They are arguing more and she juist want to get away         Patient Active Problem List   Diagnosis     DIZZINESS - LIKELY HYPOGLYCEMIA     Dermatophytosis of body     Episodic mood disorder (H)     CARDIOVASCULAR SCREENING; LDL GOAL LESS THAN 70     Health Care Home     Panniculitis     Advanced directives, counseling/discussion     CAD (coronary artery disease)     Sjogren's syndrome (H)     Adverse effect of anesthetic     Status post coronary angiogram     Acute chest pain     Major depressive disorder, recurrent, mild (H)     Past Surgical History:   Procedure Laterality Date     C/SECTION, LOW TRANSVERSE  10/13/1978    , Low Transverse     COLONOSCOPY       SURGICAL HISTORY OF -            SURGICAL HISTORY OF -   00    Colonoscopy       Social History   Substance Use Topics     Smoking status: Never Smoker     Smokeless tobacco: Never Used     Alcohol use 0.0 oz/week     0 Standard drinks or equivalent per week      Comment: 1 glass of wine every 2 weeks     Family History   Problem Relation  "Age of Onset     CANCER Mother      Breast and liver- age 43     HEART DISEASE Father      ? chf?h/o CVA     Alzheimer Disease Father      Hypertension Father      Neurologic Disorder Father      CVA     DIABETES Maternal Grandmother      Breast Cancer Maternal Aunt      Breast Cancer Paternal Aunt      Cancer - colorectal No family hx of      Prostate Cancer No family hx of              ROS:  Constitutional, HEENT, cardiovascular, pulmonary, gi and gu systems are negative, except as otherwise noted.      OBJECTIVE:                                                    /76 (BP Location: Right arm, Patient Position: Chair, Cuff Size: Adult Regular)  Pulse 84  Ht 5' 5\" (1.651 m)  Wt 175 lb (79.4 kg)  BMI 29.12 kg/m2 Body mass index is 29.12 kg/(m^2).   MENTAL STATUS EXAM:    1. Clinical observations: Albina was clean and was adequately groomed. Albina's emotional presentation was open and cooperative. She spoke clear and articulate. She maintained good eye contact and she was cooperative in answering questions.   2. She appeared to be well-oriented in all spheres with coherent, logical, goal directed and relevent thinking.   3. Thought content: She denies no abnormal thought process.   4. Affect and mood: Albina's affect is described as normal/appropriate and her emotional attitude was open and cooperative. She reports the following sypmtoms: too much worry and feeling angry or frustrated.    5. Sensorium and cognition: She was in contact with reality and oriented to time, place and person.  She demonstrated no impairment in immediate, recent, or remote memory. Her insight was adequate and her  intelligence appeared to be average.           ASSESSMENT/PLAN:                                                      1. Major depression in complete remission (H)  - MENTAL HEALTH REFERRAL     reports that she has never smoked. She has never used smokeless tobacco.          Carolina Burrell M.D.  [facility] " LUIS [place]ERNANDO

## 2017-10-18 NOTE — NURSING NOTE
"Chief Complaint   Patient presents with     Anxiety       Initial /76 (BP Location: Right arm, Patient Position: Chair, Cuff Size: Adult Regular)  Pulse 84  Ht 5' 5\" (1.651 m)  Wt 175 lb (79.4 kg)  BMI 29.12 kg/m2 Estimated body mass index is 29.12 kg/(m^2) as calculated from the following:    Height as of this encounter: 5' 5\" (1.651 m).    Weight as of this encounter: 175 lb (79.4 kg).  Medication Reconciliation: complete   Cheyenne Wesley CMA    "

## 2017-10-18 NOTE — TELEPHONE ENCOUNTER
Dr. Burrell,  Routing refill request to provider for review/approval because:  Labs not current:      Lab Results   Component Value Date     LDL 73 09/15/2015     Patient is scheduled to see PCP today in clinic.   Please advise refill.  TERRI Veliz

## 2017-10-19 ASSESSMENT — ANXIETY QUESTIONNAIRES: GAD7 TOTAL SCORE: 7

## 2017-10-20 ENCOUNTER — HOSPITAL ENCOUNTER (EMERGENCY)
Facility: CLINIC | Age: 67
Discharge: HOME | End: 2017-10-21
Attending: EMERGENCY MEDICINE | Admitting: EMERGENCY MEDICINE
Payer: MEDICARE

## 2017-10-20 DIAGNOSIS — R26.81 GAIT INSTABILITY: ICD-10-CM

## 2017-10-20 DIAGNOSIS — R07.9 ACUTE CHEST PAIN: ICD-10-CM

## 2017-10-20 DIAGNOSIS — I10 ESSENTIAL HYPERTENSION: ICD-10-CM

## 2017-10-20 DIAGNOSIS — F32.5 MAJOR DEPRESSION IN COMPLETE REMISSION (H): ICD-10-CM

## 2017-10-20 DIAGNOSIS — R11.0 NAUSEA: ICD-10-CM

## 2017-10-20 LAB
BASOPHILS # BLD AUTO: 0 10E9/L (ref 0–0.2)
BASOPHILS NFR BLD AUTO: 0.2 %
DIFFERENTIAL METHOD BLD: NORMAL
EOSINOPHIL # BLD AUTO: 0.1 10E9/L (ref 0–0.7)
EOSINOPHIL NFR BLD AUTO: 1.2 %
ERYTHROCYTE [DISTWIDTH] IN BLOOD BY AUTOMATED COUNT: 12.6 % (ref 10–15)
HCT VFR BLD AUTO: 36.9 % (ref 35–47)
HGB BLD-MCNC: 12.2 G/DL (ref 11.7–15.7)
IMM GRANULOCYTES # BLD: 0 10E9/L (ref 0–0.4)
IMM GRANULOCYTES NFR BLD: 0.5 %
LYMPHOCYTES # BLD AUTO: 1.3 10E9/L (ref 0.8–5.3)
LYMPHOCYTES NFR BLD AUTO: 22.3 %
MCH RBC QN AUTO: 31.5 PG (ref 26.5–33)
MCHC RBC AUTO-ENTMCNC: 33.1 G/DL (ref 31.5–36.5)
MCV RBC AUTO: 95 FL (ref 78–100)
MONOCYTES # BLD AUTO: 0.3 10E9/L (ref 0–1.3)
MONOCYTES NFR BLD AUTO: 5.8 %
NEUTROPHILS # BLD AUTO: 4 10E9/L (ref 1.6–8.3)
NEUTROPHILS NFR BLD AUTO: 70 %
PLATELET # BLD AUTO: 256 10E9/L (ref 150–450)
RBC # BLD AUTO: 3.87 10E12/L (ref 3.8–5.2)
WBC # BLD AUTO: 5.7 10E9/L (ref 4–11)

## 2017-10-20 PROCEDURE — 84484 ASSAY OF TROPONIN QUANT: CPT | Performed by: EMERGENCY MEDICINE

## 2017-10-20 PROCEDURE — 93010 ELECTROCARDIOGRAM REPORT: CPT | Mod: Z6 | Performed by: EMERGENCY MEDICINE

## 2017-10-20 PROCEDURE — 85025 COMPLETE CBC W/AUTO DIFF WBC: CPT | Performed by: EMERGENCY MEDICINE

## 2017-10-20 PROCEDURE — 80053 COMPREHEN METABOLIC PANEL: CPT | Performed by: EMERGENCY MEDICINE

## 2017-10-20 PROCEDURE — 99284 EMERGENCY DEPT VISIT MOD MDM: CPT

## 2017-10-20 PROCEDURE — 99284 EMERGENCY DEPT VISIT MOD MDM: CPT | Mod: 25 | Performed by: EMERGENCY MEDICINE

## 2017-10-20 PROCEDURE — 93005 ELECTROCARDIOGRAM TRACING: CPT

## 2017-10-20 RX ORDER — BUPROPION HYDROCHLORIDE 300 MG/1
TABLET ORAL
Qty: 90 TABLET | Refills: 1 | Status: SHIPPED | OUTPATIENT
Start: 2017-10-20 | End: 2018-05-31 | Stop reason: DRUGHIGH

## 2017-10-20 NOTE — ED AVS SNAPSHOT
[facility]    [address] [place] [address]    Phone:  [phone]    Fax:  [phone]                                       Albina Pedro   MRN: [id number]    Department:  [facility]   Date of Visit:  10/20/2017           Patient Information     Date Of Birth          1950        Your diagnoses for this visit were:     Nausea     Gait instability     Acute chest pain     Essential hypertension        You were seen by Christ Constantino MD.      Follow-up Information     Follow up with Carolina Burrell MD. Schedule an appointment as soon as possible for a visit in 3 days.    Specialty:  Family Practice    Why:  For follow up    Contact information:    [address] [place] [address]  [phone]        Discharge References/Attachments     FALLS IN THE HOME, PREVENTING (ENGLISH)    HYPERTENSION, ESTABLISHED (ENGLISH)      Future Appointments        Provider Department Dept Phone Center    4/3/2018 1:30 PM Pulmonary Function Lab [facility] Pulmonary Function Testing [phone] [facility]    4/3/2018 2:00 PM David Morris Perlman, MD [facility] [phone] [facility]      24 Hour Appointment Hotline       To make an appointment at any [facility], call [phone] ([phone]). If you don't have a family doctor or clinic, we will help you find one. [facility] clinics are conveniently located to serve the needs of you and your family.             Review of your medicines      Our records show that you are taking the medicines listed below. If these are incorrect, please call your family doctor or clinic.        Dose / Directions Last dose taken    albuterol 108 (90 BASE) MCG/ACT Inhaler   Commonly known as:  PROAIR HFA/PROVENTIL HFA/VENTOLIN HFA   Dose:  2 puff   Quantity:  3 Inhaler        Inhale 2 puffs into the lungs every 4 hours as needed for shortness of breath / dyspnea   Refills:  6         aspirin 81 MG tablet   Dose:  81 mg   Quantity:  30 tablet        Take 81 mg by mouth every evening   Refills:  0        buPROPion 300 MG 24 hr tablet   Commonly known as:  WELLBUTRIN XL   Quantity:  90 tablet        TAKE ONE TABLET IN THE MORNING   Refills:  1        calcium carbonate 1250 MG tablet   Commonly known as:  OS-SARI 500 mg [place]. [place]   Dose:  1 tablet        Take 1 tablet by mouth 2 times daily   Refills:  0        ipratropium - albuterol 0.5 mg/2.5 mg/3 mL 0.5-2.5 (3) MG/3ML neb solution   Commonly known as:  DUONEB   Dose:  1 vial   Quantity:  30 vial        Take 1 vial (3 mLs) by nebulization every 6 hours as needed for shortness of breath / dyspnea or wheezing   Refills:  1        loratadine 10 MG tablet   Commonly known as:  CLARITIN   Dose:  10 mg   Quantity:  30 tablet        Take 1 tablet (10 mg) by mouth daily   Refills:  1        LORazepam 0.5 MG tablet   Commonly known as:  ATIVAN   Dose:  0.5 mg   Quantity:  20 tablet        Take 1 tablet (0.5 mg) by mouth every 8 hours as needed for anxiety   Refills:  0        mineral oil-hydrophilic petrolatum   Quantity:  420 g        Apply  topically as needed for dry skin.   Refills:  4        MULTIVITAMIN & MINERAL PO   Dose:  1 tablet        Take 1 tablet by mouth daily   Refills:  0        * pravastatin 20 MG tablet   Commonly known as:  PRAVACHOL   Quantity:  90 tablet        TAKE ONE TABLET EVERY DAY   Refills:  1        * pravastatin 20 MG tablet   Commonly known as:  PRAVACHOL   Quantity:  90 tablet        TAKE ONE TABLET EVERY DAY   Refills:  3        Ranolazine 1000 MG Tb12   Dose:  1000 mg   Quantity:  180 tablet        Take 1,000 mg by mouth 2 times daily   Refills:  3        terbinafine 1 % cream   Commonly known as:  lamISIL AT   Quantity:  12 g        Apply topically 2 times daily For fungal infection not resolved with other antifungals (e.g. Clotrimazole)   Refills:  1        venlafaxine 37.5 MG tablet   Commonly known as:  EFFEXOR    Quantity:  270 tablet        Take 2 tablets in the morning and 1 tablet in the afternoon   Refills:  1        VITAMIN D3 PO        2 capsules by mouth DAILY   Refills:  0        * Notice:  This list has 2 medication(s) that are the same as other medications prescribed for you. Read the directions carefully, and ask your doctor or other care provider to review them with you.            Procedures and tests performed during your visit     CBC with platelets differential    Comprehensive metabolic panel    EKG 12 lead    Troponin I      Orders Needing Specimen Collection     None      Pending Results     No orders found for last 3 day(s).            Pending Culture Results     No orders found for last 3 day(s).            Pending Results Instructions     If you had any lab results that were not finalized at the time of your Discharge, you can call the ED Lab Result RN at [phone]. You will be contacted by this team for any positive Lab results or changes in treatment. The nurses are available 7 days a week from 10A to 6:30P.  You can leave a message 24 hours per day and they will return your call.        Test Results From Your Hospital Stay        10/21/2017 12:13 AM      Component Results     Component Value Ref Range & Units Status    Sodium 139 133 - 144 mmol/L Final    Potassium 4.1 3.4 - 5.3 mmol/L Final    Chloride 105 94 - 109 mmol/L Final    Carbon Dioxide 25 20 - 32 mmol/L Final    Anion Gap 9 3 - 14 mmol/L Final    Glucose 134 (H) 70 - 99 mg/dL Final    Urea Nitrogen 29 7 - 30 mg/dL Final    Creatinine 0.94 0.52 - 1.04 mg/dL Final    GFR Estimate 59 (L) >60 mL/min/1.7m2 Final    Non  GFR Calc    GFR Estimate If Black 71 >60 mL/min/1.7m2 Final    African American GFR Calc    Calcium 8.5 8.5 - 10.1 mg/dL Final    Bilirubin Total 0.3 0.2 - 1.3 mg/dL Final    Albumin 3.4 3.4 - 5.0 g/dL Final    Protein Total 6.9 6.8 - 8.8 g/dL Final    Alkaline Phosphatase 57 40 - 150 U/L Final    ALT 27 0 -  50 U/L Final    AST 17 0 - 45 U/L Final         10/21/2017 12:13 AM      Component Results     Component Value Ref Range & Units Status    Troponin I ES <0.015 0.000 - 0.045 ug/L Final    The 99th percentile for upper reference range is 0.045 ug/L.  Troponin values   in the range of 0.045 - 0.120 ug/L may be associated with risks of adverse   clinical events.           10/20/2017 11:21 PM      Component Results     Component Value Ref Range & Units Status    WBC 5.7 4.0 - 11.0 10e9/L Final    RBC Count 3.87 3.8 - 5.2 10e12/L Final    Hemoglobin 12.2 11.7 - 15.7 g/dL Final    Hematocrit 36.9 35.0 - 47.0 % Final    MCV 95 78 - 100 fl Final    MCH 31.5 26.5 - 33.0 pg Final    MCHC 33.1 31.5 - 36.5 g/dL Final    RDW 12.6 10.0 - 15.0 % Final    Platelet Count 256 150 - 450 10e9/L Final    Diff Method Automated Method  Final    % Neutrophils 70.0 % Final    % Lymphocytes 22.3 % Final    % Monocytes 5.8 % Final    % Eosinophils 1.2 % Final    % Basophils 0.2 % Final    % Immature Granulocytes 0.5 % Final    Absolute Neutrophil 4.0 1.6 - 8.3 10e9/L Final    Absolute Lymphocytes 1.3 0.8 - 5.3 10e9/L Final    Absolute Monocytes 0.3 0.0 - 1.3 10e9/L Final    Absolute Eosinophils 0.1 0.0 - 0.7 10e9/L Final    Absolute Basophils 0.0 0.0 - 0.2 10e9/L Final    Abs Immature Granulocytes 0.0 0 - 0.4 10e9/L Final                Thank you for choosing [facility]       Thank you for choosing [facility] for your care. Our goal is always to provide you with excellent care. Hearing back from our patients is one way we can continue to improve our services. Please take a few minutes to complete the written survey that you may receive in the mail after you visit with us. Thank you!        [employer] Information     [employer] gives you secure access to your electronic health record. If you see a primary care provider, you can also send messages to your care team and make appointments. If you have questions, please call your primary care clinic.  If  you do not have a primary care provider, please call [phone] and they will assist you.        Care EveryWhere ID     This is your Care EveryWhere ID. This could be used by other organizations to access your [facility] medical records  [id number]        Equal Access to Services     JESSICA MAYFIELD : Kath Chávez, norman lopez, misbah kaalalonzo duarte, chay gonzalez. So [facility] [phone].    ATENCIÓN: Si habla español, tiene a méndez disposición servicios gratuitos de asistencia lingüística. Llame al [phone].    We comply with applicable federal civil rights laws and [place] laws. We do not discriminate on the basis of race, color, national origin, age, disability, sex, sexual orientation, or gender identity.            After Visit Summary       This is your record. Keep this with you and show to your community pharmacist(s) and doctor(s) at your next visit.

## 2017-10-20 NOTE — TELEPHONE ENCOUNTER
Albina is calling as she needs her medication filled today if possible.  ON[facility] has enough for the weekend.  Please review and order if appropriate in Dr. Burrell's absence.  Thank you..Tita Real

## 2017-10-20 NOTE — ED AVS SNAPSHOT
[facility]    [address] [address]    Phone:  [phone]    Fax:  [phone]                                       Albina Pedro   MRN: [id number]    Department:  [facility]   Date of Visit:  10/20/2017           After Visit Summary Signature Page     I have received my discharge instructions, and my questions have been answered. I have discussed any challenges I see with this plan with the nurse or doctor.    ..........................................................................................................................................  Patient/Patient Representative Signature      ..........................................................................................................................................  Patient Representative Print Name and Relationship to Patient    ..................................................               ................................................  Date                                            Time    ..........................................................................................................................................  Reviewed by Signature/Title    ...................................................              ..............................................  Date                                                            Time

## 2017-10-21 VITALS
SYSTOLIC BLOOD PRESSURE: 143 MMHG | DIASTOLIC BLOOD PRESSURE: 77 MMHG | BODY MASS INDEX: 27.32 KG/M2 | RESPIRATION RATE: 13 BRPM | HEART RATE: 81 BPM | TEMPERATURE: 98.3 F | HEIGHT: 66 IN | WEIGHT: 170 LBS | OXYGEN SATURATION: 94 %

## 2017-10-21 LAB
ALBUMIN SERPL-MCNC: 3.4 G/DL (ref 3.4–5)
ALP SERPL-CCNC: 57 U/L (ref 40–150)
ALT SERPL W P-5'-P-CCNC: 27 U/L (ref 0–50)
ANION GAP SERPL CALCULATED.3IONS-SCNC: 9 MMOL/L (ref 3–14)
AST SERPL W P-5'-P-CCNC: 17 U/L (ref 0–45)
BILIRUB SERPL-MCNC: 0.3 MG/DL (ref 0.2–1.3)
BUN SERPL-MCNC: 29 MG/DL (ref 7–30)
CALCIUM SERPL-MCNC: 8.5 MG/DL (ref 8.5–10.1)
CHLORIDE SERPL-SCNC: 105 MMOL/L (ref 94–109)
CO2 SERPL-SCNC: 25 MMOL/L (ref 20–32)
CREAT SERPL-MCNC: 0.94 MG/DL (ref 0.52–1.04)
GFR SERPL CREATININE-BSD FRML MDRD: 59 ML/MIN/1.7M2
GLUCOSE SERPL-MCNC: 134 MG/DL (ref 70–99)
POTASSIUM SERPL-SCNC: 4.1 MMOL/L (ref 3.4–5.3)
PROT SERPL-MCNC: 6.9 G/DL (ref 6.8–8.8)
SODIUM SERPL-SCNC: 139 MMOL/L (ref 133–144)
TROPONIN I SERPL-MCNC: <0.015 UG/L (ref 0–0.04)

## 2017-10-21 ASSESSMENT — ENCOUNTER SYMPTOMS
HEADACHES: 0
VOMITING: 0
MYALGIAS: 1
BACK PAIN: 0
NAUSEA: 1
SHORTNESS OF BREATH: 0
FEVER: 0
VOICE CHANGE: 0
NUMBNESS: 0
CHILLS: 0
ABDOMINAL PAIN: 0
SORE THROAT: 0
APPETITE CHANGE: 1
DIARRHEA: 0
DIAPHORESIS: 1
RHINORRHEA: 1
FATIGUE: 1
LIGHT-HEADEDNESS: 1
TROUBLE SWALLOWING: 0
BLOOD IN STOOL: 0
WEAKNESS: 0

## 2017-10-21 NOTE — ED NOTES
"PT reports walking \"like I'm drunk\", more today than yesterday.  Light Nausea for several days, \"It feels like I'm getting the flu\"  \"Headache in the forehead and sometimes in the back of the head.\"    \"Squeezing pain in the epigastric area/center of my chest\"  Cold nausea and \"I felt like I was going to fall down\"  Daughter reports pt had a similar incident about a year ago.    "

## 2017-10-21 NOTE — ED PROVIDER NOTES
History     Chief Complaint   Patient presents with     Chest Pain     nausea that started today     HPI  Albina Pedro is a 66 year old female with history of coronary artery disease who presents for evaluation of nausea and chest discomfort today.  Patient reports intermittent episodes of chest discomfort as well as nausea over the past several weeks.  Tonight nausea became worse. Patient denies any significant abdominal distention or pain.  Patient reports her chest discomfort is a pressure sensation in the middle of her chest, similar to previous episodes of angina but less intense.  Patient states she felt like may be coming down with stomach flu.  Patient did get her flu shot 5 days ago and reports some mild nasal congestion with rhinorrhea and body aches over the past week.  Patient also reports generalized fatigue and generalized muscle weakness recently.  Weakness is not worse in the lower extremities versus the upper.  Patient denies any radiation of her pain into her back or shoulder or jaw.  The patient reports her symptoms have almost entirely subsided and only has a minimal amount of residual nausea currently.        Chart review:      Office Visit     5/12/2017  [facility]    Driss Carbone MD   Cardiology    Coronary artery disease involving native coronary artery of native heart without angina pectoris   Dx    RECHECK    Reason for visit    Addendum Note   Deidra Patel, RN (Registered Nurse)      Addended by: DEIDRA PATEL on: 5/15/2017 08:21 AM       Modules accepted: Orders          Progress Notes   Driss Carbone MD (Physician)     Cardiology   Expand All Collapse All    [facility] Cardiology Consultation:     Assessment and Plan:       1. CAD with angina: continue Ranolazine 1000 mg BID for angina. Continue pravastatin 20 mg and ASA 81 mg for secondary prevention. Will do repeat EKG in one year to monitor QTc.   2. Borderline HTN:  blood pressure was within goal range on 2nd check today; will continue to monitor.      RTC in 1 year.         Driss Carbone MD    Cardiac Imaging and Prevention  [facility]  [email or web address] Pager: [phone]            Study Result   GATED MYOCARDIAL PERFUSION SCINTIGRAPHY WITH INTRAVENOUS PHARMACOLOGIC  VASODILATATION LEXISCAN -ONE DAY STUDY      12/13/2016 3:10 PM  MORENA ELLINGTON  66 years  Female  1950.     Indication/Clinical History: Chest pain with history of coronary  artery disease     Impression  1.  Myocardial perfusion imaging using single isotope technique  demonstrated very small mostly reversible distal anterolateral/apical  defect consistent with mild ischemia without infarct. No other  significant ischemia or infarct noted..   2. Gated images demonstrated small left ventricle with vigorous  overall contractility and no significant regional wall motion  abnormality.  The left ventricular systolic function is hyperdynamic  with ejection fraction 74%.  3. Compared to the prior study from 7/8/2013 probably no significant  change .           Problem List:    Patient Active Problem List    Diagnosis Date Noted     Major depressive disorder, recurrent, mild (H) 02/01/2017     Priority: Medium     Acute chest pain 12/13/2016     Priority: Medium     Status post coronary angiogram 02/18/2016     Priority: Medium     Adverse effect of anesthetic 02/11/2016     Priority: Medium     Patient is very sensitive to anesthetics. Needs lower dosing.        Sjogren's syndrome (H) 01/15/2014     Priority: Medium     CAD (coronary artery disease) 09/18/2013     Priority: Medium     Mild ischemia on stress test       Advanced directives, counseling/discussion 01/12/2012     Priority: Medium     Advance Directive Problem List Overview:   Name Relationship Phone    Primary Health Care Agent            Alternative Health Care Agent          Discussed advance care planning  with patient; information given to patient to review.     Daija Black CMA, HC Facilitator    2012          Panniculitis 11/10/2011     Priority: Medium     Health Care Home 06/15/2011     Priority: Medium     X  DX V65.8 REPLACED WITH [facility]ALTH CARE HOME (2013)       CARDIOVASCULAR SCREENING; LDL GOAL LESS THAN 70 10/31/2010     Priority: Medium     Episodic mood disorder (H) 2007     Priority: Medium     2007 - Prozac and will look into light box.   April 15, 2008 - Will stop for summer. Light box rx.  Problem list name updated by automated process. Provider to review       Dermatophytosis of body 2007     Priority: Medium     2007 - Classic appearance and worsening in areas's not using Nystatin.  Will use everywhere or change to clotrimazole.   April 15, 2008 - Change to powder.   Problem list name updated by automated process. Provider to review       DIZZINESS - LIKELY HYPOGLYCEMIA 2006     Priority: Medium     2008- negative Event monitor and GTT showed some elevation.  Dietary changes.             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 chf?h/o CVA     Alzheimer Disease Father      Hypertension Father      Neurologic Disorder Father      CVA     DIABETES Maternal Grandmother      Breast  Cancer Maternal Aunt      Breast Cancer Paternal Aunt      Cancer - colorectal No family hx of      Prostate Cancer No family hx of        Social History:  Marital Status:   [2]  Social History   Substance Use Topics     Smoking status: Never Smoker     Smokeless tobacco: Never Used     Alcohol use 0.0 oz/week     0 Standard drinks or equivalent per week      Comment: 1 glass of wine every 2 weeks        Medications:      buPROPion (WELLBUTRIN XL) 300 MG 24 hr tablet   pravastatin (PRAVACHOL) 20 MG tablet   pravastatin (PRAVACHOL) 20 MG tablet   calcium carbonate (OS-SARI 500 MG [place]. [place]) 1250 MG tablet   LORazepam (ATIVAN) 0.5 MG tablet   venlafaxine (EFFEXOR) 37.5 MG tablet   Ranolazine 1000 MG TB12   albuterol (PROAIR HFA/PROVENTIL HFA/VENTOLIN HFA) 108 (90 BASE) MCG/ACT Inhaler   ipratropium - albuterol 0.5 mg/2.5 mg/3 mL (DUONEB) 0.5-2.5 (3) MG/3ML nebulization   Multiple Vitamins-Minerals (MULTIVITAMIN & MINERAL PO)   terbinafine (LAMISIL AT) 1 % cream   loratadine (CLARITIN) 10 MG tablet   aspirin 81 MG tablet   mineral oil-hydrophilic petrolatum (AQUAPHOR) ointment   VITAMIN D3 OR         Review of Systems   Constitutional: Positive for appetite change (slightly decreased), diaphoresis (with nausea) and fatigue. Negative for chills and fever.   HENT: Positive for congestion and rhinorrhea (slight). Negative for sore throat, trouble swallowing and voice change.    Respiratory: Negative for shortness of breath.    Cardiovascular: Negative for chest pain.   Gastrointestinal: Positive for nausea. Negative for abdominal pain, blood in stool (no black, tarry, or bloody stools), diarrhea and vomiting.   Genitourinary: Negative for decreased urine volume.   Musculoskeletal: Positive for myalgias (mild). Negative for back pain.   Skin: Negative for rash.   Neurological: Positive for light-headedness. Negative for weakness, numbness and headaches.   All other systems reviewed and are negative.      Physical Exam  "  BP: 157/69  Pulse: 81  Heart Rate: 78  Temp: 98.3  F (36.8  C)  Resp: 18  Height: 167.6 cm (5' 6\")  Weight: 77.1 kg (170 lb)  SpO2: 97 %      Physical Exam   Constitutional: She is oriented to person, place, and time. She appears well-developed and well-nourished. No distress.   HENT:   Head: Normocephalic and atraumatic.   Dry mucus membranes - has Shogren's syndrome     Eyes: Conjunctivae are normal.   Cardiovascular: Normal rate, regular rhythm and normal heart sounds.    Pulmonary/Chest: Effort normal and breath sounds normal. No respiratory distress. She has no wheezes.   Abdominal: Soft. Bowel sounds are normal. She exhibits no distension. There is no tenderness. There is no rebound.   Musculoskeletal: Normal range of motion. She exhibits no edema or tenderness.   Neurological: She is alert and oriented to person, place, and time. No cranial nerve deficit. Coordination (normal heel-to-shin.  Finger to nose with some difficulty due to intention tremor but grossly normal) normal.   Skin: Skin is warm and dry.   Psychiatric: She has a normal mood and affect.   Nursing note and vitals reviewed.      ED Course     ED Course     Procedures               EKG Interpretation:      Interpreted by Christ Constantino  Time reviewed: 2302  Symptoms at time of EKG: nausea   Rhythm: normal sinus   Rate: Normal  Axis: Normal  Ectopy: none  Conduction: normal  ST Segments/ T Waves: T wave inversion in V1 and V2  Q Waves: none  Comparison to prior: New T-wave inversion in lead V2 compared to EKG 05/12/2017, may be secondary to lead placement    Clinical Impression: Sinus rhythm with T-wave inversion in V1 and V2, could suggest some anterior ischemia      Results for orders placed or performed during the hospital encounter of 10/20/17   Comprehensive metabolic panel   Result Value Ref Range    Sodium 139 133 - 144 mmol/L    Potassium 4.1 3.4 - 5.3 mmol/L    Chloride 105 94 - 109 mmol/L    Carbon Dioxide 25 20 - 32 mmol/L "    Anion Gap 9 3 - 14 mmol/L    Glucose 134 (H) 70 - 99 mg/dL    Urea Nitrogen 29 7 - 30 mg/dL    Creatinine 0.94 0.52 - 1.04 mg/dL    GFR Estimate 59 (L) >60 mL/min/1.7m2    GFR Estimate If Black 71 >60 mL/min/1.7m2    Calcium 8.5 8.5 - 10.1 mg/dL    Bilirubin Total 0.3 0.2 - 1.3 mg/dL    Albumin 3.4 3.4 - 5.0 g/dL    Protein Total 6.9 6.8 - 8.8 g/dL    Alkaline Phosphatase 57 40 - 150 U/L    ALT 27 0 - 50 U/L    AST 17 0 - 45 U/L   Troponin I   Result Value Ref Range    Troponin I ES <0.015 0.000 - 0.045 ug/L   CBC with platelets differential   Result Value Ref Range    WBC 5.7 4.0 - 11.0 10e9/L    RBC Count 3.87 3.8 - 5.2 10e12/L    Hemoglobin 12.2 11.7 - 15.7 g/dL    Hematocrit 36.9 35.0 - 47.0 %    MCV 95 78 - 100 fl    MCH 31.5 26.5 - 33.0 pg    MCHC 33.1 31.5 - 36.5 g/dL    RDW 12.6 10.0 - 15.0 %    Platelet Count 256 150 - 450 10e9/L    Diff Method Automated Method     % Neutrophils 70.0 %    % Lymphocytes 22.3 %    % Monocytes 5.8 %    % Eosinophils 1.2 %    % Basophils 0.2 %    % Immature Granulocytes 0.5 %    Absolute Neutrophil 4.0 1.6 - 8.3 10e9/L    Absolute Lymphocytes 1.3 0.8 - 5.3 10e9/L    Absolute Monocytes 0.3 0.0 - 1.3 10e9/L    Absolute Eosinophils 0.1 0.0 - 0.7 10e9/L    Absolute Basophils 0.0 0.0 - 0.2 10e9/L    Abs Immature Granulocytes 0.0 0 - 0.4 10e9/L         12:06 AM: Called lab re: no troponin or CMP result. No answer. RN will continue to attempt to contact         Assessments & Plan (with Medical Decision Making)  66-year-old female with history of dizziness, coronary disease, depression, and Sjogren syndrome presenting for evaluation of nausea and chest discomfort.  Patient also with some ongoing instability walking and hypertension.  Patient's symptoms are vague concerning for possible cardiac etiology so screening cardiac workup was performed.  EKG did show some new T-wave inversions but these appear to be likely secondary to lead placement and thought to be less likely ischemia  related.  Troponin negative.  Chart review shows a Lexiscan stress echo less than a year ago and patient had cardiac follow-up in May with no concerning findings.  Symptoms could represent new cardiac changes which I discussed with the patient.  I recommended admission for serial troponins and stress testing.  Patient prefers outpatient follow-up with her cardiologist at this time.  Given the absence of acute ischemic abnormality, although patient is considered moderate risk, she is discharged home in stable condition with recommendation to return should her symptoms worsen or fail to improve.  Patient also with some chronic gait instability which is not clearly worse today.  Could be secondary to small TIA/frontal CVA over the past.  No acute or new symptoms.  Patient does have somewhat of an intention tremor but no true focal neurologic deficits at this time.  Recommended close primary care follow-up for further evaluation and possible MRI for further evaluation      I have reviewed the nursing notes.    I have reviewed the findings, diagnosis, plan and need for follow up with the patient.       Discharge Medication List as of 10/21/2017 12:37 AM          Final diagnoses:   Nausea   Gait instability   Acute chest pain   Essential hypertension       10/20/2017   [facility] EMERGENCY DEPARTMENT     Constantino, Christ Macdonald MD  10/22/17 0455

## 2017-10-21 NOTE — ED NOTES
"Pt in tears, states she is having \"a difference of opinion with my family\"  PT also states she wants answers as to why she is off balance, \"I haven't rode my bike for 3 years because I'm afraid I will fall down\"  "

## 2017-10-24 LAB
DLCOUNC-%PRED-PRE: 98 %
DLCOUNC-PRE: 20.68 ML/MIN/MMHG
DLCOUNC-PRED: 21.05 ML/MIN/MMHG
ERV-%PRED-PRE: 76 %
ERV-PRE: 0.44 L
ERV-PRED: 0.57 L
EXPTIME-PRE: 7.39 SEC
FEF2575-%PRED-PRE: 151 %
FEF2575-PRE: 2.95 L/SEC
FEF2575-PRED: 1.95 L/SEC
FEFMAX-%PRED-PRE: 107 %
FEFMAX-PRE: 6.3 L/SEC
FEFMAX-PRED: 5.88 L/SEC
FEV1-%PRED-PRE: 108 %
FEV1-PRE: 2.52 L
FEV1FEV6-PRE: 83 %
FEV1FEV6-PRED: 79 %
FEV1FVC-PRE: 83 %
FEV1FVC-PRED: 78 %
FEV1SVC-PRE: 86 %
FEV1SVC-PRED: 72 %
FIFMAX-PRE: 5.77 L/SEC
FVC-%PRED-PRE: 102 %
FVC-PRE: 3.05 L
FVC-PRED: 2.99 L
IC-%PRED-PRE: 94 %
IC-PRE: 2.49 L
IC-PRED: 2.64 L
VA-%PRED-PRE: 87 %
VA-PRE: 4.56 L
VC-%PRED-PRE: 91 %
VC-PRE: 2.93 L
VC-PRED: 3.21 L

## 2017-10-25 ENCOUNTER — OFFICE VISIT (OUTPATIENT)
Dept: FAMILY MEDICINE | Facility: CLINIC | Age: 67
End: 2017-10-25
Payer: COMMERCIAL

## 2017-10-25 VITALS
TEMPERATURE: 97.4 F | HEART RATE: 84 BPM | WEIGHT: 175 LBS | SYSTOLIC BLOOD PRESSURE: 138 MMHG | BODY MASS INDEX: 28.12 KG/M2 | DIASTOLIC BLOOD PRESSURE: 74 MMHG | HEIGHT: 66 IN

## 2017-10-25 DIAGNOSIS — I25.10 CORONARY ARTERY DISEASE INVOLVING NATIVE CORONARY ARTERY OF NATIVE HEART, ANGINA PRESENCE UNSPECIFIED: Chronic | ICD-10-CM

## 2017-10-25 DIAGNOSIS — R41.89 COGNITIVE CHANGES: ICD-10-CM

## 2017-10-25 DIAGNOSIS — F33.41 RECURRENT MAJOR DEPRESSION IN PARTIAL REMISSION (H): Primary | ICD-10-CM

## 2017-10-25 PROCEDURE — 99214 OFFICE O/P EST MOD 30 MIN: CPT | Performed by: FAMILY MEDICINE

## 2017-10-25 RX ORDER — BUPROPION HYDROCHLORIDE 100 MG/1
100 TABLET, EXTENDED RELEASE ORAL 2 TIMES DAILY
Qty: 60 TABLET | Refills: 0 | Status: SHIPPED | OUTPATIENT
Start: 2017-10-25 | End: 2017-11-29

## 2017-10-25 NOTE — MR AVS SNAPSHOT
After Visit Summary   10/25/2017    Albina Pedro    MRN: [id number]           Patient Information     Date Of Birth          1950        Visit Information        Provider Department      10/25/2017 8:30 AM Carolina Burrell MD [facility]'s Diagnoses     Recurrent major depression in partial remission (H)    -  1    Coronary artery disease involving native coronary artery of native heart, angina presence unspecified          Care Instructions                   Trochanteric Bursitis           What is trochanteric bursitis?   Trochanteric bursitis is irritation or inflammation of the trochanteric bursa. A bursa is a fluid-filled sac that acts as a cushion between tendons, bones, and skin. The trochanteric bursa is located on the upper, outer area of the thigh. There is a bump on the outer side of the upper part of the thigh bone (femur) called the greater trochanter. The trochanteric bursa is located over the greater trochanter.   How does it occur?   The trochanteric bursa may be inflamed by a group of muscles or tendons rubbing over the bursa and causing friction against the thigh bone. This injury can occur with running, walking, or bicycling, especially when the bicycle seat is too high.   What are the symptoms?   You have pain on the upper outer area of your thigh or in your hip. The pain is worse when you walk, bicycle, or go up or down stairs. You have pain when you move your thigh bone and feel tenderness in the area over the greater trochanter.   How is it diagnosed?   Your healthcare provider will ask about your symptoms and examine your hip and thigh.   How is it treated?   To treat this condition:   Put an ice pack, gel pack, or package of frozen vegetables, wrapped in a cloth on the area every 3 to 4 hours, for up to 20 minutes at a time.   Take an anti-inflammatory medicine such as ibuprofen, or other medicine as directed by your provider. Nonsteroidal  anti-inflammatory medicines (NSAIDs) may cause stomach bleeding and other problems. These risks increase with age. Read the label and take as directed. Unless recommended by your healthcare provider, do not take for more than 10 days.   Your provider may give you an injection of a corticosteroid medicine.   While you are recovering from your injury you will need to change your sport or activity to one that does not make your condition worse. For example, you may need to swim instead of running or bicycling. If you are bicycling, you may need to lower your bicycle seat.   How long do the effects last?   The length of recovery depends on many factors such as your age, health, and if you have had a previous injury. Recovery time also depends on the severity of the injury. A bursa that is only mildly inflamed and has just started to hurt may improve within a few weeks. A bursa that is significantly inflamed and has been painful for a long time may take up to a few months to improve. You need to stop doing the activities that cause pain until your bursa has healed. If you continue doing activities that cause pain, your symptoms will return and it will take longer to recover.   When can I return to my normal activities?   Everyone recovers from an injury at a different rate. Return to your activities depends on how soon your leg recovers, not by how many days or weeks it has been since your injury has occurred. In general, the longer you have symptoms before you start treatment, the longer it will take to get better. The goal of rehabilitation is to return to your normal activities as soon as is safely possible. If you return too soon you may worsen your injury.   You may safely return to your normal activities when, starting from the top of the list and progressing to the end, each of the following is true:   You have full range of motion in the injured leg compared to the uninjured leg.   You have full strength of the  injured leg compared to the uninjured leg.   You can walk straight ahead without pain or limping.   How can I prevent trochanteric bursitis?   Trochanteric bursitis is best prevented by warming up properly and stretching the muscles on the outer side of your upper thigh.     Published by [employer].  This content is reviewed periodically and is subject to change as new health information becomes available. The information is intended to inform and educate and is not a replacement for medical evaluation, advice, diagnosis or treatment by a healthcare professional.   Written by Ang Ames MD, for [employer].   ? 2010 [facility] and/or its affiliates. All Rights Reserved.   Copyright   [employer] 2011         Trochanteric Bursitis Rehabilitation Exercises   You can begin stretching the muscles that run along the outside of your hip using the first two exercise. You can do the strengthening exercises when the sharp pain lessens.   Stretching exercises     Piriformis stretch: Lying on your back with both knees bent, rest the ankle of your injured leg over the knee of your uninjured leg. Grasp the thigh of your uninjured leg and pull that knee toward your chest. You will feel a stretch along the buttocks and possibly along the outside of your hip on the injured side. Hold this for 15 to 30 seconds. Repeat 3 times.     Iliotibial band stretch (standing): Cross your uninjured leg in front of your injured leg and bend down and touch your toes. You can move your hands across the floor toward the uninjured side and you will feel more stretch on the outside of your thigh on the injured side. Hold this position for 15 to 30 seconds. Return to the starting position. Repeat 3 times.   Strengthening exercises     Straight leg raise: Lie on your back with your legs straight out in front of you. Tighten up the top of your thigh muscle on the injured leg and lift that leg about 8 inches off the floor, keeping  the thigh muscle tight throughout. Slowly lower your leg back down to the floor. Do 3 sets of 10.     Wall squat with a ball: Stand with your back, shoulders, and head against a wall and look straight ahead. Keep your shoulders relaxed and your feet 1 foot away from the wall and a shoulder's width apart. Place a rolled up pillow or a soccer-sized ball between your thighs. Keeping your head against the wall, slowly squat while squeezing the pillow or ball at the same time. Squat down until you are almost in a sitting position. Your thighs will not yet be parallel to the floor. Hold this position for 10 seconds and then slowly slide back up the wall. Make sure you keep squeezing the pillow or ball throughout this exercise. Repeat 10 times. Build up to 3 sets of 10.     Prone hip extension: Lie on your stomach with your legs straight out behind you. Tighten up your buttocks muscles and lift one leg off the floor about 8 inches. Keep your knee straight. Hold for 5 seconds. Then lower your leg and relax. Do 3 sets of 10.   Written by Lelo Norris M.S., P.T., for [employer].   Published by [employer].   This content is reviewed periodically and is subject to change as new health information becomes available. The information is intended to inform and educate and is not a replacement for medical evaluation, advice, diagnosis or treatment by a healthcare professional.   Sports Medicine Advisor 2003.1 Index  Sports Medicine Advisor 2003.1 Credits   Copyright   2003 [employer]. All rights reserved.   Page footer image                    Stop the 300 mg wellbutrin   Start the 100 mg wellbutrin 2 times per day   Call me with side effects           Follow-ups after your visit        Your next 10 appointments already scheduled     Apr 03, 2018  1:30 PM CDT   PFT VISIT with [facility] Pulmonary Function Testing ([facility])    [address]  [address] [facility] [id number] [phone]            Apr 03, 2018  2:00 PM CDT   (Arrive by 1:45 PM)   Return Interstitial Lung with David Morris Perlman, MD   [facility] ([facility])    [address]  [address] [facility] [id number] [phone]              Who to contact     Normal or non-critical lab and imaging results will be communicated to you by "[employer]hart, letter or phone within 4 business days after the clinic has received the results. If you do not hear from us within 7 days, please contact the clinic through [employer]-crowdt or phone. If you have a critical or abnormal lab result, we will notify you by phone as soon as possible.  Submit refill requests through [employer] or call your pharmacy and they will forward the refill request to us. Please allow 3 business days for your refill to be completed.          If you need to speak with a  for additional information , please call: [phone]             Additional Information About Your Visit        [employer] Information     [employer] gives you secure access to your electronic health record. If you see a primary care provider, you can also send messages to your care team and make appointments. If you have questions, please call your primary care clinic.  If you do not have a primary care provider, please call [phone] and they will assist you.        Care EveryWhere ID     This is your Care EveryWhere ID. This could be used by other organizations to access your [place] medical records  [id number]        Your Vitals Were     Pulse Temperature Height BMI (Body Mass Index)          84 97.4  F (36.3  C) (Tympanic) 5' 6\" (1.676 m) 28.25 kg/m2         Blood Pressure from Last 3 Encounters:   10/25/17 138/74   10/21/17 143/77   10/18/17 138/76    Weight from Last 3 Encounters:   10/25/17 175 lb (79.4 kg)   10/20/17 170 lb (77.1 kg)   10/18/17 175 lb (79.4 kg)    "           Today, you had the following     No orders found for display         Today's Medication Changes          These changes are accurate as of: 10/25/17  9:54 AM.  If you have any questions, ask your nurse or doctor.               These medicines have changed or have updated prescriptions.        Dose/Directions    * buPROPion 300 MG 24 hr tablet   Commonly known as:  WELLBUTRIN XL   This may have changed:  Another medication with the same name was added. Make sure you understand how and when to take each.   Used for:  Major depression in complete remission (H)        TAKE ONE TABLET IN THE MORNING   Quantity:  90 tablet   Refills:  1       * buPROPion 100 MG 12 hr tablet   Commonly known as:  WELLBUTRIN SR   This may have changed:  You were already taking a medication with the same name, and this prescription was added. Make sure you understand how and when to take each.   Used for:  Recurrent major depression in partial remission (H)        Dose:  100 mg   Take 1 tablet (100 mg) by mouth 2 times daily   Quantity:  60 tablet   Refills:  0       * Notice:  This list has 2 medication(s) that are the same as other medications prescribed for you. Read the directions carefully, and ask your doctor or other care provider to review them with you.         Where to get your medicines      These medications were sent to [id number] - [place], [place] - [address]  [address] [facility] [address]     Phone:  [phone]     buPROPion 100 MG 12 hr tablet                Primary Care Provider Office Phone # Fax #    Carolina Burrell [phone] [address] [facility] [address]        Equal Access to Services     [facility] AH: Hadii navya grya hadasho Soleonie, waaxda luqadaha, qaybta kaalmada adeegyada, chay driver . So [facility] [phone].    ATENCIÓN: Si habla español, tiene a méndez disposición servicios gratuitos de asistencia lingüística. Llame al  [phone].    We comply with applicable federal civil rights laws and [place] laws. We do not discriminate on the basis of race, color, national origin, age, disability, sex, sexual orientation, or gender identity.            Thank you!     Thank you for choosing [facility]  for your care. Our goal is always to provide you with excellent care. Hearing back from our patients is one way we can continue to improve our services. Please take a few minutes to complete the written survey that you may receive in the mail after your visit with us. Thank you!             Your Updated Medication List - Protect others around you: Learn how to safely use, store and throw away your medicines at www.disposemymeds.org.          This list is accurate as of: 10/25/17  9:54 AM.  Always use your most recent med list.                   Brand Name Dispense Instructions for use Diagnosis    albuterol 108 (90 BASE) MCG/ACT Inhaler    PROAIR HFA/PROVENTIL HFA/VENTOLIN HFA    3 Inhaler    Inhale 2 puffs into the lungs every 4 hours as needed for shortness of breath / dyspnea    Wheezing       aspirin 81 MG tablet     30 tablet    Take 81 mg by mouth every evening        * buPROPion 300 MG 24 hr tablet    WELLBUTRIN XL    90 tablet    TAKE ONE TABLET IN THE MORNING    Major depression in complete remission (H)       * buPROPion 100 MG 12 hr tablet    WELLBUTRIN SR    60 tablet    Take 1 tablet (100 mg) by mouth 2 times daily    Recurrent major depression in partial remission (H)       calcium carbonate 1250 MG tablet    OS-SARI 500 mg [place]. Ca     Take 1 tablet by mouth 2 times daily        ipratropium - albuterol 0.5 mg/2.5 mg/3 mL 0.5-2.5 (3) MG/3ML neb solution    DUONEB    30 vial    Take 1 vial (3 mLs) by nebulization every 6 hours as needed for shortness of breath / dyspnea or wheezing    ILD (interstitial lung disease) (H), Bronchitis       loratadine 10 MG tablet    CLARITIN    30 tablet    Take 1 tablet (10 mg) by  mouth daily    Seasonal allergies       LORazepam 0.5 MG tablet    ATIVAN    20 tablet    Take 1 tablet (0.5 mg) by mouth every 8 hours as needed for anxiety    Acute reaction to stress       mineral oil-hydrophilic petrolatum     420 g    Apply  topically as needed for dry skin.    Dry skin       MULTIVITAMIN & MINERAL PO      Take 1 tablet by mouth daily        pravastatin 20 MG tablet    PRAVACHOL    90 tablet    TAKE ONE TABLET EVERY DAY    Hyperlipidemia LDL goal <130       Ranolazine 1000 MG Tb12     180 tablet    Take 1,000 mg by mouth 2 times daily    Atherosclerosis of native coronary artery with other form of angina pectoris       terbinafine 1 % cream    lamISIL AT    12 g    Apply topically 2 times daily For fungal infection not resolved with other antifungals (e.g. Clotrimazole)    Tinea corporis       venlafaxine 37.5 MG tablet    EFFEXOR    270 tablet    Take 2 tablets in the morning and 1 tablet in the afternoon    Major depressive disorder, recurrent, mild (H)       VITAMIN D3 PO      2 capsules by mouth DAILY        * Notice:  This list has 2 medication(s) that are the same as other medications prescribed for you. Read the directions carefully, and ask your doctor or other care provider to review them with you.

## 2017-10-25 NOTE — PROGRESS NOTES
SUBJECTIVE:                                                    Albina Pedro is a 66 year old female who presents to clinic today for the following health issues:    ED/UC Followup:  Facility:  [facility]  Date of visit: 10/20/17  Reason for visit: Nausea, chest pain, gait instability,   Current Status:         Problem list and histories reviewed & adjusted, as indicated.  Additional history: on cloudy days she is miserable   She will go from laying around the house to being happier and giddy   She also has some tremor with the handwriting   She will forget conversations  She has difficulty organizing things   Her daughter is with her today and she is concerned about the memory issues and her sleep and other things that come and go. She has not been like this always  Not worried about her safety so much but has concerns about her mood  We did discuss adding a SAD light . They already have an appointment with the neurologist.  The episode at the emergency room has been totally resolved and she felt better while she was there.   We did discuss decreasing her wellbutrin has been on this for year s        Patient Active Problem List   Diagnosis     DIZZINESS - LIKELY HYPOGLYCEMIA     Dermatophytosis of body     Episodic mood disorder (H)     CARDIOVASCULAR SCREENING; LDL GOAL LESS THAN 70     Health Care Home     Panniculitis     Advanced directives, counseling/discussion     CAD (coronary artery disease)     Sjogren's syndrome (H)     Adverse effect of anesthetic     Status post coronary angiogram     Acute chest pain     Major depressive disorder, recurrent, mild (H)     Past Surgical History:   Procedure Laterality Date     C/SECTION, LOW TRANSVERSE  10/13/1978    , Low Transverse     COLONOSCOPY       SURGICAL HISTORY OF -            SURGICAL HISTORY OF -   00    Colonoscopy       Social History   Substance Use Topics     Smoking status: Never Smoker     Smokeless tobacco:  "Never Used     Alcohol use 0.0 oz/week     0 Standard drinks or equivalent per week      Comment: 1 glass of wine every 2 weeks     Family History   Problem Relation Age of Onset     CANCER Mother      Breast and liver- age 43     HEART DISEASE Father      ? chf?h/o CVA     Alzheimer Disease Father      Hypertension Father      Neurologic Disorder Father      CVA     DIABETES Maternal Grandmother      Breast Cancer Maternal Aunt      Breast Cancer Paternal Aunt      Cancer - colorectal No family hx of      Prostate Cancer No family hx of              ROS:  Constitutional, HEENT, cardiovascular, pulmonary, gi and gu systems are negative, except as otherwise noted.      OBJECTIVE:                                                    /74 (BP Location: Right arm, Patient Position: Chair, Cuff Size: Adult Regular)  Pulse 84  Temp 97.4  F (36.3  C) (Tympanic)  Ht 5' 6\" (1.676 m)  Wt 175 lb (79.4 kg)  BMI 28.25 kg/m2 Body mass index is 28.25 kg/(m^2).   GENERAL APPEARANCE: healthy, alert and no distress  HENT: ear canals and TM's normal and nose and mouth without ulcers or lesions  NECK: no adenopathy, no asymmetry, masses, or scars and thyroid normal to palpation  RESP: lungs clear to auscultation - no rales, rhonchi or wheezes  CV: regular rates and rhythm, normal S1 S2, no S3 or S4 and no murmur, click or rub  ABDOMEN: soft, nontender, without hepatosplenomegaly or masses and bowel sounds normal  NEURO: Normal strength and tone, mentation intact and speech normal       ASSESSMENT/PLAN:                                                    1. Recurrent major depression in partial remission (H)  Decrease and monitor  - buPROPion (WELLBUTRIN SR) 100 MG 12 hr tablet; Take 1 tablet (100 mg) by mouth 2 times daily  Dispense: 60 tablet; Refill: 0    2. Coronary artery disease involving native coronary artery of native heart, angina presence unspecified      3. Cognitive changes      Patient Instructions           "        Trochanteric Bursitis           What is trochanteric bursitis?   Trochanteric bursitis is irritation or inflammation of the trochanteric bursa. A bursa is a fluid-filled sac that acts as a cushion between tendons, bones, and skin. The trochanteric bursa is located on the upper, outer area of the thigh. There is a bump on the outer side of the upper part of the thigh bone (femur) called the greater trochanter. The trochanteric bursa is located over the greater trochanter.   How does it occur?   The trochanteric bursa may be inflamed by a group of muscles or tendons rubbing over the bursa and causing friction against the thigh bone. This injury can occur with running, walking, or bicycling, especially when the bicycle seat is too high.   What are the symptoms?   You have pain on the upper outer area of your thigh or in your hip. The pain is worse when you walk, bicycle, or go up or down stairs. You have pain when you move your thigh bone and feel tenderness in the area over the greater trochanter.   How is it diagnosed?   Your healthcare provider will ask about your symptoms and examine your hip and thigh.   How is it treated?   To treat this condition:   Put an ice pack, gel pack, or package of frozen vegetables, wrapped in a cloth on the area every 3 to 4 hours, for up to 20 minutes at a time.   Take an anti-inflammatory medicine such as ibuprofen, or other medicine as directed by your provider. Nonsteroidal anti-inflammatory medicines (NSAIDs) may cause stomach bleeding and other problems. These risks increase with age. Read the label and take as directed. Unless recommended by your healthcare provider, do not take for more than 10 days.   Your provider may give you an injection of a corticosteroid medicine.   While you are recovering from your injury you will need to change your sport or activity to one that does not make your condition worse. For example, you may need to swim instead of running or  bicycling. If you are bicycling, you may need to lower your bicycle seat.   How long do the effects last?   The length of recovery depends on many factors such as your age, health, and if you have had a previous injury. Recovery time also depends on the severity of the injury. A bursa that is only mildly inflamed and has just started to hurt may improve within a few weeks. A bursa that is significantly inflamed and has been painful for a long time may take up to a few months to improve. You need to stop doing the activities that cause pain until your bursa has healed. If you continue doing activities that cause pain, your symptoms will return and it will take longer to recover.   When can I return to my normal activities?   Everyone recovers from an injury at a different rate. Return to your activities depends on how soon your leg recovers, not by how many days or weeks it has been since your injury has occurred. In general, the longer you have symptoms before you start treatment, the longer it will take to get better. The goal of rehabilitation is to return to your normal activities as soon as is safely possible. If you return too soon you may worsen your injury.   You may safely return to your normal activities when, starting from the top of the list and progressing to the end, each of the following is true:   You have full range of motion in the injured leg compared to the uninjured leg.   You have full strength of the injured leg compared to the uninjured leg.   You can walk straight ahead without pain or limping.   How can I prevent trochanteric bursitis?   Trochanteric bursitis is best prevented by warming up properly and stretching the muscles on the outer side of your upper thigh.     Published by [employer].  This content is reviewed periodically and is subject to change as new health information becomes available. The information is intended to inform and educate and is not a replacement for medical  evaluation, advice, diagnosis or treatment by a healthcare professional.   Written by Ang Ames MD, for [employer]   ? 2010 [facility] and/or its affiliates. All Rights Reserved.   Copyright   Clinical Reference Systems 2011         Trochanteric Bursitis Rehabilitation Exercises   You can begin stretching the muscles that run along the outside of your hip using the first two exercise. You can do the strengthening exercises when the sharp pain lessens.   Stretching exercises     Piriformis stretch: Lying on your back with both knees bent, rest the ankle of your injured leg over the knee of your uninjured leg. Grasp the thigh of your uninjured leg and pull that knee toward your chest. You will feel a stretch along the buttocks and possibly along the outside of your hip on the injured side. Hold this for 15 to 30 seconds. Repeat 3 times.     Iliotibial band stretch (standing): Cross your uninjured leg in front of your injured leg and bend down and touch your toes. You can move your hands across the floor toward the uninjured side and you will feel more stretch on the outside of your thigh on the injured side. Hold this position for 15 to 30 seconds. Return to the starting position. Repeat 3 times.   Strengthening exercises     Straight leg raise: Lie on your back with your legs straight out in front of you. Tighten up the top of your thigh muscle on the injured leg and lift that leg about 8 inches off the floor, keeping the thigh muscle tight throughout. Slowly lower your leg back down to the floor. Do 3 sets of 10.     Wall squat with a ball: Stand with your back, shoulders, and head against a wall and look straight ahead. Keep your shoulders relaxed and your feet 1 foot away from the wall and a shoulder's width apart. Place a rolled up pillow or a soccer-sized ball between your thighs. Keeping your head against the wall, slowly squat while squeezing the pillow or ball at the same time. Squat down until you  are almost in a sitting position. Your thighs will not yet be parallel to the floor. Hold this position for 10 seconds and then slowly slide back up the wall. Make sure you keep squeezing the pillow or ball throughout this exercise. Repeat 10 times. Build up to 3 sets of 10.     Prone hip extension: Lie on your stomach with your legs straight out behind you. Tighten up your buttocks muscles and lift one leg off the floor about 8 inches. Keep your knee straight. Hold for 5 seconds. Then lower your leg and relax. Do 3 sets of 10.   Written by Lelo Norris M.S., P.T., for [employer].   Published by [employer].   This content is reviewed periodically and is subject to change as new health information becomes available. The information is intended to inform and educate and is not a replacement for medical evaluation, advice, diagnosis or treatment by a healthcare professional.   Sports Medicine Advisor 2003.1 Index  Sports Medicine Advisor 2003.1 Credits   Copyright   2003 [employer]. All rights reserved.   Page footer image                    Stop the 300 mg wellbutrin   Start the 100 mg wellbutrin 2 times per day   Call me with side effects            reports that she has never smoked. She has never used smokeless tobacco.          Carolina Burrell M.D.  [facility]

## 2017-10-25 NOTE — PATIENT INSTRUCTIONS
Trochanteric Bursitis           What is trochanteric bursitis?   Trochanteric bursitis is irritation or inflammation of the trochanteric bursa. A bursa is a fluid-filled sac that acts as a cushion between tendons, bones, and skin. The trochanteric bursa is located on the upper, outer area of the thigh. There is a bump on the outer side of the upper part of the thigh bone (femur) called the greater trochanter. The trochanteric bursa is located over the greater trochanter.   How does it occur?   The trochanteric bursa may be inflamed by a group of muscles or tendons rubbing over the bursa and causing friction against the thigh bone. This injury can occur with running, walking, or bicycling, especially when the bicycle seat is too high.   What are the symptoms?   You have pain on the upper outer area of your thigh or in your hip. The pain is worse when you walk, bicycle, or go up or down stairs. You have pain when you move your thigh bone and feel tenderness in the area over the greater trochanter.   How is it diagnosed?   Your healthcare provider will ask about your symptoms and examine your hip and thigh.   How is it treated?   To treat this condition:   Put an ice pack, gel pack, or package of frozen vegetables, wrapped in a cloth on the area every 3 to 4 hours, for up to 20 minutes at a time.   Take an anti-inflammatory medicine such as ibuprofen, or other medicine as directed by your provider. Nonsteroidal anti-inflammatory medicines (NSAIDs) may cause stomach bleeding and other problems. These risks increase with age. Read the label and take as directed. Unless recommended by your healthcare provider, do not take for more than 10 days.   Your provider may give you an injection of a corticosteroid medicine.   While you are recovering from your injury you will need to change your sport or activity to one that does not make your condition worse. For example, you may need to swim instead of running or  bicycling. If you are bicycling, you may need to lower your bicycle seat.   How long do the effects last?   The length of recovery depends on many factors such as your age, health, and if you have had a previous injury. Recovery time also depends on the severity of the injury. A bursa that is only mildly inflamed and has just started to hurt may improve within a few weeks. A bursa that is significantly inflamed and has been painful for a long time may take up to a few months to improve. You need to stop doing the activities that cause pain until your bursa has healed. If you continue doing activities that cause pain, your symptoms will return and it will take longer to recover.   When can I return to my normal activities?   Everyone recovers from an injury at a different rate. Return to your activities depends on how soon your leg recovers, not by how many days or weeks it has been since your injury has occurred. In general, the longer you have symptoms before you start treatment, the longer it will take to get better. The goal of rehabilitation is to return to your normal activities as soon as is safely possible. If you return too soon you may worsen your injury.   You may safely return to your normal activities when, starting from the top of the list and progressing to the end, each of the following is true:   You have full range of motion in the injured leg compared to the uninjured leg.   You have full strength of the injured leg compared to the uninjured leg.   You can walk straight ahead without pain or limping.   How can I prevent trochanteric bursitis?   Trochanteric bursitis is best prevented by warming up properly and stretching the muscles on the outer side of your upper thigh.     Published by [employer].  This content is reviewed periodically and is subject to change as new health information becomes available. The information is intended to inform and educate and is not a replacement for medical  evaluation, advice, diagnosis or treatment by a healthcare professional.   Written by Ang Ames MD, for [employer].   ? 2010 [facility] and/or its affiliates. All Rights Reserved.   Copyright   Clinical Reference Systems 2011         Trochanteric Bursitis Rehabilitation Exercises   You can begin stretching the muscles that run along the outside of your hip using the first two exercise. You can do the strengthening exercises when the sharp pain lessens.   Stretching exercises     Piriformis stretch: Lying on your back with both knees bent, rest the ankle of your injured leg over the knee of your uninjured leg. Grasp the thigh of your uninjured leg and pull that knee toward your chest. You will feel a stretch along the buttocks and possibly along the outside of your hip on the injured side. Hold this for 15 to 30 seconds. Repeat 3 times.     Iliotibial band stretch (standing): Cross your uninjured leg in front of your injured leg and bend down and touch your toes. You can move your hands across the floor toward the uninjured side and you will feel more stretch on the outside of your thigh on the injured side. Hold this position for 15 to 30 seconds. Return to the starting position. Repeat 3 times.   Strengthening exercises     Straight leg raise: Lie on your back with your legs straight out in front of you. Tighten up the top of your thigh muscle on the injured leg and lift that leg about 8 inches off the floor, keeping the thigh muscle tight throughout. Slowly lower your leg back down to the floor. Do 3 sets of 10.     Wall squat with a ball: Stand with your back, shoulders, and head against a wall and look straight ahead. Keep your shoulders relaxed and your feet 1 foot away from the wall and a shoulder's width apart. Place a rolled up pillow or a soccer-sized ball between your thighs. Keeping your head against the wall, slowly squat while squeezing the pillow or ball at the same time. Squat down until you  are almost in a sitting position. Your thighs will not yet be parallel to the floor. Hold this position for 10 seconds and then slowly slide back up the wall. Make sure you keep squeezing the pillow or ball throughout this exercise. Repeat 10 times. Build up to 3 sets of 10.     Prone hip extension: Lie on your stomach with your legs straight out behind you. Tighten up your buttocks muscles and lift one leg off the floor about 8 inches. Keep your knee straight. Hold for 5 seconds. Then lower your leg and relax. Do 3 sets of 10.   Written by Lelo Norris M.S., P.T., for [employer].   Published by [employer].   This content is reviewed periodically and is subject to change as new health information becomes available. The information is intended to inform and educate and is not a replacement for medical evaluation, advice, diagnosis or treatment by a healthcare professional.   Sports Medicine Advisor 2003.1 Index  Sports Medicine Advisor 2003.1 Credits   Copyright   2003 [employer]. All rights reserved.   Page footer image                    Stop the 300 mg wellbutrin   Start the 100 mg wellbutrin 2 times per day   Call me with side effects

## 2017-11-06 ENCOUNTER — TRANSFERRED RECORDS (OUTPATIENT)
Dept: HEALTH INFORMATION MANAGEMENT | Facility: CLINIC | Age: 67
End: 2017-11-06

## 2017-11-29 DIAGNOSIS — F33.41 RECURRENT MAJOR DEPRESSION IN PARTIAL REMISSION (H): ICD-10-CM

## 2017-11-29 RX ORDER — BUPROPION HYDROCHLORIDE 100 MG/1
TABLET, EXTENDED RELEASE ORAL
Qty: 180 TABLET | Refills: 0 | Status: SHIPPED | OUTPATIENT
Start: 2017-11-29 | End: 2018-01-11

## 2017-11-29 NOTE — TELEPHONE ENCOUNTER
Medication is being filled for 1 time refill only due to:  Patient needs to be seen because needs follow up appt.   Suhail Silva RN

## 2017-12-05 DIAGNOSIS — F43.0 ACUTE REACTION TO STRESS: ICD-10-CM

## 2017-12-05 RX ORDER — LORAZEPAM 0.5 MG/1
0.5 TABLET ORAL EVERY 8 HOURS PRN
Qty: 20 TABLET | Refills: 0 | Status: SHIPPED | OUTPATIENT
Start: 2017-12-05 | End: 2018-01-18

## 2017-12-05 NOTE — TELEPHONE ENCOUNTER
LORazepam (ATIVAN) 0.5 MG tablet      Last Written Prescription Date:  9/6/17  Last Fill Quantity: 20,   # refills: 0  Last Office Visit: 10/25/17  Future Office visit:       Routing refill request to provider for review/approval because:  Drug not on the [facility], [facility] or [facility] refill protocol or controlled substance

## 2017-12-14 ENCOUNTER — TRANSFERRED RECORDS (OUTPATIENT)
Dept: HEALTH INFORMATION MANAGEMENT | Facility: CLINIC | Age: 67
End: 2017-12-14

## 2017-12-19 ENCOUNTER — TRANSFERRED RECORDS (OUTPATIENT)
Dept: HEALTH INFORMATION MANAGEMENT | Facility: CLINIC | Age: 67
End: 2017-12-19

## 2018-01-11 DIAGNOSIS — F33.41 RECURRENT MAJOR DEPRESSION IN PARTIAL REMISSION (H): ICD-10-CM

## 2018-01-11 NOTE — TELEPHONE ENCOUNTER
"BuPROPion HCl ER (SR) Oral Tablet Extended Release 12 Hour 100 MG        Last Written Prescription Date:  11/29/17  Last Fill Quantity: 180,   # refills: 0  Last Office Visit: 10/25/17  Future Office visit:       Requested Prescriptions   Pending Prescriptions Disp Refills     buPROPion (WELLBUTRIN SR) 100 MG 12 hr tablet [Pharmacy Med Name: BuPROPion HCl ER (SR) Oral Tablet Extended Release 12 Hour 100 MG] 180 tablet 0     Sig: TAKE ONE TABLET BY MOUTH TWICE DAILY    SSRIs Protocol Failed    1/11/2018  4:36 PM       Failed - PHQ-9 score less than 5 in past 6 months    The PHQ-9 criteria is meant to fail. It requires a PHQ-9 score review  PHQ-9 SCORE 6/7/2017 9/1/2017 10/18/2017   Total Score 1 - -   Total Score - 6 5            Passed - Medication is Bupropion    If the medication is Bupropion (Wellbutrin), and the patient is taking for smoking cessation; OK to refill.         Passed - Patient is age 18 or older       Passed - No active pregnancy on record       Passed - No positive pregnancy test in last 12 months       Passed - Recent (6 mo) or future visit with authorizing provider's specialty    Patient had office visit in the last 6 months or has a visit in the next 30 days with authorizing provider.  See \"Patient Info\" tab in inbasket, or \"Choose Columns\" in Meds & Orders section of the refill encounter.             "

## 2018-01-12 RX ORDER — BUPROPION HYDROCHLORIDE 100 MG/1
TABLET, EXTENDED RELEASE ORAL
Qty: 180 TABLET | Refills: 0 | Status: SHIPPED | OUTPATIENT
Start: 2018-01-12 | End: 2018-06-12

## 2018-01-18 DIAGNOSIS — F43.0 ACUTE REACTION TO STRESS: ICD-10-CM

## 2018-01-18 NOTE — TELEPHONE ENCOUNTER
LORazepam Oral Tablet 0.5 MG        Last Written Prescription Date:  12/5/17  Last Fill Quantity: 20,   # refills: 0  Last Office Visit: 10/25/17  Future Office visit:       Routing refill request to provider for review/approval because:  Drug not on the [facility], [facility] or [facility] refill protocol or controlled substance

## 2018-01-19 RX ORDER — LORAZEPAM 0.5 MG/1
TABLET ORAL
Qty: 20 TABLET | Refills: 0 | Status: SHIPPED | OUTPATIENT
Start: 2018-01-19 | End: 2018-05-31

## 2018-02-13 ENCOUNTER — TRANSFERRED RECORDS (OUTPATIENT)
Dept: HEALTH INFORMATION MANAGEMENT | Facility: CLINIC | Age: 68
End: 2018-02-13

## 2018-03-06 ENCOUNTER — TELEPHONE (OUTPATIENT)
Dept: CARDIOLOGY | Facility: CLINIC | Age: 68
End: 2018-03-06

## 2018-03-06 NOTE — TELEPHONE ENCOUNTER
Called pt to schedule one year follow up with Dr. Carbone and with a repeat EKG at that time. Pt scheduled follow up and had no questions or concerns at that time.  Mahogany Hernández, KEISHA

## 2018-04-14 DIAGNOSIS — F33.0 MAJOR DEPRESSIVE DISORDER, RECURRENT, MILD (H): ICD-10-CM

## 2018-04-16 DIAGNOSIS — F33.0 MAJOR DEPRESSIVE DISORDER, RECURRENT, MILD (H): ICD-10-CM

## 2018-04-16 RX ORDER — VENLAFAXINE 37.5 MG/1
TABLET ORAL
Qty: 90 TABLET | Refills: 0 | Status: SHIPPED | OUTPATIENT
Start: 2018-04-16 | End: 2018-05-24

## 2018-04-16 NOTE — TELEPHONE ENCOUNTER
"Venlafaxine HCl Oral Tablet 37.5 MG **Duplicate**        Last Written Prescription Date:  4/16/18  Last Fill Quantity: 90,   # refills: 0  Last Office Visit: 10/18/17  Future Office visit:    Next 5 appointments (look out 90 days)     May 04, 2018 11:30 AM CDT   [facility] Driss [facility]bone MD   [facility]    [address] [place] [id number] [phone]                   Requested Prescriptions   Pending Prescriptions Disp Refills     venlafaxine (EFFEXOR) 37.5 MG tablet [Pharmacy Med Name: Venlafaxine HCl Oral Tablet 37.5 MG] 270 tablet 0     Sig: TAKE 2 TABLETS IN THE MORNING AND 1 TABLET IN THE AFTERNOON    Serotonin-Norepinephrine Reuptake Inhibitors  Failed    4/16/2018  5:23 PM       Failed - PHQ-9 score of less than 5 in past 6 months    Please review last PHQ-9 score.   PHQ-9 SCORE 6/7/2017 9/1/2017 10/18/2017   Total Score 1 - -   Total Score - 6 5            Passed - Blood pressure under 140/90 in past 12 months    BP Readings from Last 3 Encounters:   10/25/17 138/74   10/21/17 143/77   10/18/17 138/76                Passed - Patient is age 18 or older       Passed - No active pregnancy on record       Passed - Normal serum creatinine on file in past 12 months    Recent Labs   Lab Test  10/20/17   2313   CR  0.94            Passed - No positive pregnancy test in past 12 months       Passed - Recent (6 mo) or future (30 days) visit within the authorizing provider's specialty    Patient had office visit in the last 6 months or has a visit in the next 30 days with authorizing provider or within the authorizing provider's specialty.  See \"Patient Info\" tab in inbasket, or \"Choose Columns\" in Meds & Orders section of the refill encounter.              "

## 2018-04-16 NOTE — TELEPHONE ENCOUNTER
Medication is being filled for 1 time refill only due to:  Patient needs to be seen because needs follow up appt.. Suhail Silva RN

## 2018-04-16 NOTE — TELEPHONE ENCOUNTER
"venlafaxine (EFFEXOR) 37.5 MG tablet      Last Written Prescription Date:  9/1/17  Last Fill Quantity: 270,   # refills: 1  Last Office Visit: 10/25/17  Future Office visit:    Next 5 appointments (look out 90 days)     May 04, 2018 11:30 AM CDT   Return Visit with Driss Carbone MD   [facility]    [address] [place] [id number] [phone]                   Requested Prescriptions   Pending Prescriptions Disp Refills     venlafaxine (EFFEXOR) 37.5 MG tablet [Pharmacy Med Name: Venlafaxine HCl Oral Tablet 37.5 MG] 270 tablet 0     Sig: TAKE 2 TABLETS IN THE MORNING AND 1 TABLET IN THE AFTERNOON    Serotonin-Norepinephrine Reuptake Inhibitors  Failed    4/14/2018 11:48 AM       Failed - PHQ-9 score of less than 5 in past 6 months    Please review last PHQ-9 score.   PHQ-9 SCORE 6/7/2017 9/1/2017 10/18/2017   Total Score 1 - -   Total Score - 6 5            Passed - Blood pressure under 140/90 in past 12 months    BP Readings from Last 3 Encounters:   10/25/17 138/74   10/21/17 143/77   10/18/17 138/76                Passed - Patient is age 18 or older       Passed - No active pregnancy on record       Passed - Normal serum creatinine on file in past 12 months    Recent Labs   Lab Test  10/20/17   2313   CR  0.94            Passed - No positive pregnancy test in past 12 months       Passed - Recent (6 mo) or future (30 days) visit within the authorizing provider's specialty    Patient had office visit in the last 6 months or has a visit in the next 30 days with authorizing provider or within the authorizing provider's specialty.  See \"Patient Info\" tab in inbasket, or \"Choose Columns\" in Meds & Orders section of the refill encounter.              "

## 2018-04-17 RX ORDER — VENLAFAXINE 37.5 MG/1
TABLET ORAL
Qty: 90 TABLET | Refills: 0 | OUTPATIENT
Start: 2018-04-17

## 2018-05-04 ENCOUNTER — OFFICE VISIT (OUTPATIENT)
Dept: CARDIOLOGY | Facility: CLINIC | Age: 68
End: 2018-05-04
Payer: COMMERCIAL

## 2018-05-04 VITALS
HEART RATE: 81 BPM | BODY MASS INDEX: 29.28 KG/M2 | SYSTOLIC BLOOD PRESSURE: 129 MMHG | DIASTOLIC BLOOD PRESSURE: 75 MMHG | WEIGHT: 181.4 LBS | OXYGEN SATURATION: 97 %

## 2018-05-04 DIAGNOSIS — I25.10 CORONARY ARTERY DISEASE INVOLVING NATIVE CORONARY ARTERY OF NATIVE HEART WITHOUT ANGINA PECTORIS: Primary | ICD-10-CM

## 2018-05-04 PROCEDURE — 93000 ELECTROCARDIOGRAM COMPLETE: CPT | Performed by: INTERNAL MEDICINE

## 2018-05-04 PROCEDURE — 99214 OFFICE O/P EST MOD 30 MIN: CPT | Performed by: INTERNAL MEDICINE

## 2018-05-04 ASSESSMENT — PAIN SCALES - GENERAL: PAINLEVEL: NO PAIN (0)

## 2018-05-04 NOTE — NURSING NOTE
Albina Pedro's goals for this visit include:   Chief Complaint   Patient presents with     RECHECK     One year follow up       She requests these members of her care team be copied on today's visit information: PCP    PCP: Carolina Burrell    Referring Provider:  No referring provider defined for this encounter.    @[facility]@      Medication Refills: none      Mahogany Hernández CMA

## 2018-05-04 NOTE — MR AVS SNAPSHOT
After Visit Summary   5/4/2018    Albina Pedro    MRN: [id number]           Patient Information     Date Of Birth          1950        Visit Information        Provider Department      5/4/2018 11:30 AM Driss Carbone MD [facility]        Today's Diagnoses     Coronary artery disease involving native coronary artery of native heart without angina pectoris    -  1       Follow-ups after your visit        Your next 10 appointments already scheduled     Otoniel 15, 2018 10:30 AM CDT   PFT VISIT with  PFL COREY   [facility] Pulmonary Function Testing ([facility])    [address]  3rd [address]  [facility] [address]   [phone]            Otoniel 15, 2018 11:00 AM CDT   (Arrive by 10:45 AM)   Return Interstitial Lung with David Morris Perlman, MD   [facility] ([facility])    [address] [place] [address]  [address]  [facility] [id number] [phone]              Who to contact     If you have questions or need follow up information about today's clinic visit or your schedule please contact [facility] directly at [phone].  Normal or non-critical lab and imaging results will be communicated to you by MyChart, letter or phone within 4 business days after the clinic has received the results. If you do not hear from us within 7 days, please contact the clinic through MyChart or phone. If you have a critical or abnormal lab result, we will notify you by phone as soon as possible.  Submit refill requests through [employer] or call your pharmacy and they will forward the refill request to us. Please allow 3 business days for your refill to be completed.          Additional Information About Your Visit        MyChart Information     [employer] gives you secure access to your electronic health record. If you see a primary care provider, you can also send messages to your care team  and make appointments. If you have questions, please call your primary care clinic.  If you do not have a primary care provider, please call [phone] and they will assist you.      [facility] [facility] is an electronic gateway that provides easy, online access to your medical records. With [employer] [facility], you can request a clinic appointment, read your test results, renew a prescription or communicate with your care team.     To access your existing account, please contact your [facility] or call [phone] for assistance.        Care EveryWhere ID     This is your Care EveryWhere ID. This could be used by other organizations to access your [place] medical records  [id number]        Your Vitals Were     Pulse Pulse Oximetry BMI (Body Mass Index)             81 97% 29.28 kg/m2          Blood Pressure from Last 3 Encounters:   05/04/18 129/75   10/25/17 138/74   10/21/17 143/77    Weight from Last 3 Encounters:   05/04/18 82.3 kg (181 lb 6.4 oz)   10/25/17 79.4 kg (175 lb)   10/20/17 77.1 kg (170 lb)              We Performed the Following     EKG 12-lead complete w/read - Clinics        Primary Care Provider Office Phone # Fax #    Carolina Burrell -[phone] [address] [address] [facility] [address]        Equal Access to Services     DG MAYFIELD : Hadii aad ku hadasho Soomaali, waaxda luqadaha, qaybta kaalmada adeegyada, waxay idiin haycass driver . So [facility] [phone].    ATENCIÓN: Si habla español, tiene a méndez disposición servicios gratuitos de asistencia lingüística. Llame al [phone].    We comply with applicable federal civil rights laws and [place] laws. We do not discriminate on the basis of race, color, national origin, age, disability, sex, sexual orientation, or gender identity.            Thank you!     Thank you for choosing [facility]  for your care. Our goal is always to provide you with excellent care. Hearing back from  our patients is one way we can continue to improve our services. Please take a few minutes to complete the written survey that you may receive in the mail after your visit with us. Thank you!             Your Updated Medication List - Protect others around you: Learn how to safely use, store and throw away your medicines at www.disposemymeds.org.          This list is accurate as of 5/4/18 12:07 PM.  Always use your most recent med list.                   Brand Name Dispense Instructions for use Diagnosis    albuterol 108 (90 Base) MCG/ACT Inhaler    PROAIR HFA/PROVENTIL HFA/VENTOLIN HFA    3 Inhaler    Inhale 2 puffs into the lungs every 4 hours as needed for shortness of breath / dyspnea    Wheezing       aspirin 81 MG tablet     30 tablet    Take 81 mg by mouth every evening        * buPROPion 300 MG 24 hr tablet    WELLBUTRIN XL    90 tablet    TAKE ONE TABLET IN THE MORNING    Major depression in complete remission (H)       * buPROPion 100 MG 12 hr tablet    WELLBUTRIN SR    180 tablet    TAKE ONE TABLET BY MOUTH TWICE DAILY    Recurrent major depression in partial remission (H)       calcium carbonate 500 tablet    OS-SARI 500 mg [place]. [place]     Take 1 tablet by mouth 2 times daily        ipratropium - albuterol 0.5 mg/2.5 mg/3 mL 0.5-2.5 (3) MG/3ML neb solution    DUONEB    30 vial    Take 1 vial (3 mLs) by nebulization every 6 hours as needed for shortness of breath / dyspnea or wheezing    ILD (interstitial lung disease) (H), Bronchitis       loratadine 10 MG tablet    CLARITIN    30 tablet    Take 1 tablet (10 mg) by mouth daily    Seasonal allergies       LORazepam 0.5 MG tablet    ATIVAN    20 tablet    TAKE ONE TABLET BY MOUTH EVERY EIGHT HOURS AS NEEDED FOR ANXIETY    Acute reaction to stress       mineral oil-hydrophilic petrolatum     420 g    Apply  topically as needed for dry skin.    Dry skin       MULTIVITAMIN & MINERAL PO      Take 1 tablet by mouth daily        pravastatin 20 MG tablet     PRAVACHOL    90 tablet    TAKE ONE TABLET EVERY DAY    Hyperlipidemia LDL goal <130       Ranolazine 1000 MG Tb12     180 tablet    Take 1,000 mg by mouth 2 times daily    Atherosclerosis of native coronary artery with other form of angina pectoris       terbinafine 1 % cream    lamISIL AT    12 g    Apply topically 2 times daily For fungal infection not resolved with other antifungals (e.g. Clotrimazole)    Tinea corporis       venlafaxine 37.5 MG tablet    EFFEXOR    90 tablet    TAKE 2 TABLETS IN THE MORNING AND 1 TABLET IN THE AFTERNOON    Major depressive disorder, recurrent, mild (H)       VITAMIN D3 PO      2 capsules by mouth DAILY        * Notice:  This list has 2 medication(s) that are the same as other medications prescribed for you. Read the directions carefully, and ask your doctor or other care provider to review them with you.

## 2018-05-04 NOTE — PROGRESS NOTES
[facility] Cardiology Consultation:    Assessment and Plan:      1. CAD with angina (Cath 2016 - moderate mLAD/D1 stenosis, small vessels not ideal for PCI, lexiscan - small ant ischemia, normal LV fxn): continue Ranolazine 1000 mg BID for angina. Intolerant of other anti-anginals. Continue pravastatin 20 mg and ASA 81 mg for secondary prevention. Will do repeat EKG in one year to monitor QTc.   2. Syncope related to medications.  3. ILD  4. Sjogren's syndrome    RTC in 1 year.       Driss Carbone MD    Cardiac Imaging and Prevention  [facility]  [email or web address] Pager: [phone]     HPI:   Ms. Pedro presents today for f/u of CAD and chronic angina.  She had another ED visit in October of last year for nausea, gait instability, and chest pain.  She ruled out for any acute coronary syndrome, and was discharged.  No changes have been made to her medication regimen.  There has been no recent cardiac imaging performed.  She is taking  Ranolazine 1000 mg BID for angina. Denies any side effects. EKG was done today, and QTc is normal. She is also taking an electrolyte supplement. Taking pravastatin and ASA as prescribed. Denies muscle or joint aches.   Continues to follow-up with pulmonology for ILD.   BP has been normal.       EXAM:  /75 (BP Location: Left arm, Patient Position: Chair, Cuff Size: Adult Regular)  Pulse 81  Wt 82.3 kg (181 lb 6.4 oz)  SpO2 97%  BMI 29.28 kg/m2  GEN/CONSTITUIONAL: Appears comfortable, in no apparent distress   EYES: No icterus  ENT/MOUTH: Normal  JVP:  Not visible  RESPIRATORY: Clear to auscultation bilaterally   CARDIOVASCULAR: Regular S1 and S2, no murmurs, rubs, or gallops.   ABDOMEN: Soft, non-tender, positive bowel sounds   NEUROLOGIC: Grossly non-focal   PSYCHIATRIC: Normal affect  EXT: No cyanosis, clubbing, edema. Normal pedal pulses.  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       CURRENT MEDICATIONS:  Current Outpatient Prescriptions   Medication     albuterol (PROAIR HFA/PROVENTIL HFA/VENTOLIN HFA) 108 (90 BASE) MCG/ACT Inhaler     aspirin 81 MG tablet     buPROPion (WELLBUTRIN SR) 100 MG 12 hr tablet     calcium carbonate (OS-SARI 500 MG [place]. [place]) 1250 MG tablet     ipratropium - albuterol 0.5 mg/2.5 mg/3 mL (DUONEB) 0.5-2.5 (3) MG/3ML nebulization     loratadine (CLARITIN) 10 MG tablet     LORazepam (ATIVAN) 0.5 MG tablet     mineral oil-hydrophilic petrolatum (AQUAPHOR) ointment     Multiple Vitamins-Minerals (MULTIVITAMIN & MINERAL PO)     pravastatin (PRAVACHOL) 20 MG tablet     Ranolazine 1000 MG TB12     terbinafine (LAMISIL AT) 1 % cream     venlafaxine (EFFEXOR) 37.5 MG tablet     VITAMIN D3 OR     buPROPion (WELLBUTRIN XL) 300 MG 24 hr tablet     No current facility-administered medications for this visit.        PAST SURGICAL HISTORY:  Past Surgical History:   Procedure Laterality Date     C/SECTION, LOW TRANSVERSE  10/13/1978    , Low Transverse     COLONOSCOPY       SURGICAL HISTORY OF -            SURGICAL HISTORY OF -   00    Colonoscopy       ALLERGIES     Allergies   Allergen Reactions     Daypro [Nsaids] Rash            Lidocaine Other (See Comments)     Rapid heart rate     Penicillins Unknown     Occurred as child.     Sulfa Drugs Rash       FAMILY HISTORY:  Family History   Problem Relation Age of Onset     CANCER Mother      Breast and liver- age 43     HEART DISEASE Father      ? chf?h/o CVA     Alzheimer Disease Father      Hypertension Father      Neurologic Disorder Father      CVA     DIABETES Maternal Grandmother      Breast Cancer Maternal Aunt       Breast Cancer Paternal Aunt      Cancer - colorectal No family hx of      Prostate Cancer No family hx of        SOCIAL HISTORY:  Social History     Social History     Marital status:      Spouse name: N/A     Number of children: N/A     Years of education: N/A     Occupational History     Not on file.     Social History Main Topics     Smoking status: Never Smoker     Smokeless tobacco: Never Used     Alcohol use 0.0 oz/week     0 Standard drinks or equivalent per week      Comment: 1 glass of wine every 2 weeks     Drug use: No     Sexual activity: Yes     Partners: Male     Other Topics Concern     Parent/Sibling W/ Cabg, Mi Or Angioplasty Before 65f 55m? No     Social History Narrative       ROS:   Constitutional: No fever, chills, or sweats. No weight gain/loss   ENT: No visual disturbance, ear ache, epistaxis, sore throat  Allergies/Immunologic: Negative.   Respiratory: No cough, hemoptysia  Cardiovascular: As per HPI  GI: No nausea, vomiting, hematemesis, melena, or hematochezia  : No urinary frequency, dysuria, or hematuria  Integument: Negative  Psychiatric: Negative  Neuro: Negative  Endocrinology: Negative   Musculoskeletal: Negative    ADDITIONAL COMMENTS:     I reviewed the patient's medications:     I reviewed the patient's pertinent clinical laboratory studies:     I reviewed the patient's pertinent imaging studies:   I reviewed the patient's ECG:   Normal sinus rhythm, heart rate 78 bpm,  ms, no ischemic changes

## 2018-05-24 DIAGNOSIS — F33.0 MAJOR DEPRESSIVE DISORDER, RECURRENT, MILD (H): ICD-10-CM

## 2018-05-24 NOTE — TELEPHONE ENCOUNTER
venlafaxine      Last Written Prescription Date:  4/16/18  Last Fill Quantity: 90,   # refills: 0  Last Office Visit: 10/25/17  Future Office visit:       Routing refill request to provider for review/approval because:  Drug not on the [facility], [facility] or [facility] refill protocol or controlled substance

## 2018-05-24 NOTE — TELEPHONE ENCOUNTER
Patient was to follow up around 1/25/18. She has had courtesy refill. Please advise for refill. Thank you.  Yaritza Licea RN    Left message on answering machine to call the clinic RN. Need to schedule an appointment.  Yaritza Licea RN

## 2018-05-25 RX ORDER — VENLAFAXINE 37.5 MG/1
TABLET ORAL
Qty: 90 TABLET | Refills: 0 | Status: SHIPPED | OUTPATIENT
Start: 2018-05-25 | End: 2018-06-23

## 2018-05-31 ENCOUNTER — OFFICE VISIT (OUTPATIENT)
Dept: FAMILY MEDICINE | Facility: CLINIC | Age: 68
End: 2018-05-31
Payer: COMMERCIAL

## 2018-05-31 VITALS
HEIGHT: 66 IN | BODY MASS INDEX: 28.59 KG/M2 | DIASTOLIC BLOOD PRESSURE: 67 MMHG | WEIGHT: 177.9 LBS | TEMPERATURE: 97.8 F | SYSTOLIC BLOOD PRESSURE: 113 MMHG | HEART RATE: 67 BPM

## 2018-05-31 DIAGNOSIS — Z11.59 ENCOUNTER FOR HEPATITIS C SCREENING TEST FOR LOW RISK PATIENT: ICD-10-CM

## 2018-05-31 DIAGNOSIS — E78.5 HYPERLIPIDEMIA LDL GOAL <70: ICD-10-CM

## 2018-05-31 DIAGNOSIS — I20.89 CHRONIC STABLE ANGINA (H): ICD-10-CM

## 2018-05-31 DIAGNOSIS — B35.1 DERMATOPHYTOSIS OF NAIL: ICD-10-CM

## 2018-05-31 DIAGNOSIS — F43.0 ACUTE REACTION TO STRESS: ICD-10-CM

## 2018-05-31 DIAGNOSIS — F33.0 MAJOR DEPRESSIVE DISORDER, RECURRENT, MILD (H): Primary | ICD-10-CM

## 2018-05-31 LAB
ANION GAP SERPL CALCULATED.3IONS-SCNC: 5 MMOL/L (ref 3–14)
BUN SERPL-MCNC: 19 MG/DL (ref 7–30)
CALCIUM SERPL-MCNC: 8.9 MG/DL (ref 8.5–10.1)
CHLORIDE SERPL-SCNC: 107 MMOL/L (ref 94–109)
CO2 SERPL-SCNC: 28 MMOL/L (ref 20–32)
CREAT SERPL-MCNC: 0.61 MG/DL (ref 0.52–1.04)
GFR SERPL CREATININE-BSD FRML MDRD: >90 ML/MIN/1.7M2
GLUCOSE SERPL-MCNC: 89 MG/DL (ref 70–99)
LDLC SERPL DIRECT ASSAY-MCNC: 76 MG/DL
POTASSIUM SERPL-SCNC: 4.4 MMOL/L (ref 3.4–5.3)
SODIUM SERPL-SCNC: 140 MMOL/L (ref 133–144)

## 2018-05-31 PROCEDURE — 99214 OFFICE O/P EST MOD 30 MIN: CPT | Performed by: FAMILY MEDICINE

## 2018-05-31 PROCEDURE — 86803 HEPATITIS C AB TEST: CPT | Performed by: FAMILY MEDICINE

## 2018-05-31 PROCEDURE — 80048 BASIC METABOLIC PNL TOTAL CA: CPT | Performed by: FAMILY MEDICINE

## 2018-05-31 PROCEDURE — 36415 COLL VENOUS BLD VENIPUNCTURE: CPT | Performed by: FAMILY MEDICINE

## 2018-05-31 PROCEDURE — 83721 ASSAY OF BLOOD LIPOPROTEIN: CPT | Performed by: FAMILY MEDICINE

## 2018-05-31 RX ORDER — LORAZEPAM 0.5 MG/1
0.5 TABLET ORAL EVERY 8 HOURS PRN
Qty: 30 TABLET | Refills: 1 | Status: SHIPPED | OUTPATIENT
Start: 2018-05-31 | End: 2018-11-15

## 2018-05-31 ASSESSMENT — ANXIETY QUESTIONNAIRES
6. BECOMING EASILY ANNOYED OR IRRITABLE: SEVERAL DAYS
3. WORRYING TOO MUCH ABOUT DIFFERENT THINGS: NOT AT ALL
2. NOT BEING ABLE TO STOP OR CONTROL WORRYING: SEVERAL DAYS
GAD7 TOTAL SCORE: 3
7. FEELING AFRAID AS IF SOMETHING AWFUL MIGHT HAPPEN: NOT AT ALL
5. BEING SO RESTLESS THAT IT IS HARD TO SIT STILL: NOT AT ALL
1. FEELING NERVOUS, ANXIOUS, OR ON EDGE: SEVERAL DAYS

## 2018-05-31 ASSESSMENT — PATIENT HEALTH QUESTIONNAIRE - PHQ9: 5. POOR APPETITE OR OVEREATING: NOT AT ALL

## 2018-05-31 NOTE — PATIENT INSTRUCTIONS
Try putting vicks vapor rub on the toenails nightly it will take 6-12 months to work   Use the wart bandages on the thumb nail   I will send you the labs when they are back   Try taking the lorazepam when you are having one of those really stressful days you can use up to 10 per month

## 2018-05-31 NOTE — PROGRESS NOTES
SUBJECTIVE:                                                    Albina Pedro is a 67 year old female who presents to clinic today for the following health issues:    Depression and Anxiety Follow-Up    Status since last visit: Improved     Other associated symptoms:still having some marital issues     Complicating factors:     Significant life event: No     Current substance abuse: None    PHQ-9 9/1/2017 10/18/2017 5/31/2018   Total Score 6 5 1   Q9: Suicide Ideation Not at all Not at all Not at all     SEBASTIAN-7 SCORE 9/1/2017 10/18/2017 5/31/2018   Total Score - - -   Total Score 5 7 3     In the past two weeks have you had thoughts of suicide or self-harm?  No.    Do you have concerns about your personal safety or the safety of others?   No  PHQ-9  English  PHQ-9   Any Language  SEBASTIAN-7  Suicide Assessment Five-step Evaluation and Treatment (SAFE-T)        Problem list and histories reviewed & adjusted, as indicated.  Additional history: we reviewed her neurological testing and she had done well on the neurpsych testing  She has been having an occassional episode where she is not remembering things always when she has been fighting with her    MRi was clear   She went to the wrong clinic yesterday   She is using the lorazepam at the most 3 times per week   This does help a lot   She had this last filled in January so she is reall ynot using this much   I encouraged her to use this when she is really stressed duering the day to see if this helps her concentrate she has been seen by neurology and they alos agree that all of her issues with memory are around emotional issues   She has something on her thumb that has been there a while   Also she has flaky thick toenails. They don't hurt   Patient Active Problem List   Diagnosis     DIZZINESS - LIKELY HYPOGLYCEMIA     Dermatophytosis of body     Episodic mood disorder (H)     CARDIOVASCULAR SCREENING; LDL GOAL LESS THAN 70     Health Care Home     Panniculitis  "    Advanced directives, counseling/discussion     CAD (coronary artery disease)     Sjogren's syndrome (H)     Adverse effect of anesthetic     Status post coronary angiogram     Acute chest pain     Major depressive disorder, recurrent, mild (H)     Hyperlipidemia LDL goal <70     Past Surgical History:   Procedure Laterality Date     C/SECTION, LOW TRANSVERSE  10/13/1978    , Low Transverse     COLONOSCOPY       SURGICAL HISTORY OF -            SURGICAL HISTORY OF -   00    Colonoscopy       Social History   Substance Use Topics     Smoking status: Never Smoker     Smokeless tobacco: Never Used     Alcohol use 0.0 oz/week     0 Standard drinks or equivalent per week      Comment: 1 glass of wine every 2 weeks     Family History   Problem Relation Age of Onset     CANCER Mother      Breast and liver- age 43     HEART DISEASE Father      ? chf?h/o CVA     Alzheimer Disease Father      Hypertension Father      Neurologic Disorder Father      CVA     DIABETES Maternal Grandmother      Breast Cancer Maternal Aunt      Breast Cancer Paternal Aunt      Cancer - colorectal No family hx of      Prostate Cancer No family hx of            ROS:  Constitutional, HEENT, cardiovascular, pulmonary, gi and gu systems are negative, except as otherwise noted.    OBJECTIVE:                                                    /67  Pulse 67  Temp 97.8  F (36.6  C) (Tympanic)  Ht 5' 6\" (1.676 m)  Wt 177 lb 14.4 oz (80.7 kg)  BMI 28.71 kg/m2 Body mass index is 28.71 kg/(m^2).   GENERAL APPEARANCE: healthy, alert and no distress  Left thumb common wart periungual   Toenails thickened and yellow and flaky most involved   MENTAL STATUS EXAM:    1. Clinical observations: Albina was clean and was adequately groomed. Albina's emotional presentation was open and cooperative. She spoke clear and articulate. She maintained good eye contact and she was cooperative in answering questions.   2. She appeared " to be well-oriented in all spheres with coherent, logical, goal directed and relevent thinking.   3. Thought content: She denies no abnormal thought process.   4. Affect and mood: Albina's affect is described as normal/appropriate and her emotional attitude was open and cooperative. She reports the following sypmtoms: feeling easily distracted, nervous or tense feeling, feeling angry or frustrated and poor memory.    5. Sensorium and cognition: She was in contact with reality and oriented to time, place and person.  She demonstrated no impairment in immediate, recent, or remote memory. Her insight was adequate and her  intelligence appeared to be average.         ASSESSMENT/PLAN:                                                      1. Major depressive disorder, recurrent, mild (H)    - Basic metabolic panel    2. Acute reaction to stress    - LORazepam (ATIVAN) 0.5 MG tablet; Take 1 tablet (0.5 mg) by mouth every 8 hours as needed for anxiety  Dispense: 30 tablet; Refill: 1    3. Encounter for hepatitis C screening test for low risk patient    - Hepatitis C Screen Reflex to HCV RNA Quant and Genotype    4. Hyperlipidemia LDL goal <70    - Basic metabolic panel  - LDL cholesterol direct    5. Chronic stable angina (H)    - Basic metabolic panel  Patient Instructions   Try putting vicks vapor rub on the toenails nightly it will take 6-12 months to work   Use the wart bandages on the thumb nail   I will send you the labs when they are back   Try taking the lorazepam when you are having one of those really stressful days you can use up to 10 per month     6. Dermatophytosis of nail         reports that she has never smoked. She has never used smokeless tobacco.      Weight management plan: Discussed healthy diet and exercise guidelines and patient will follow up in 6 months in clinic to re-evaluate.    Carolina Burrell M.D.  [facility]

## 2018-05-31 NOTE — MR AVS SNAPSHOT
After Visit Summary   5/31/2018    Albina Pedro    MRN: [id number]           Patient Information     Date Of Birth          1950        Visit Information        Provider Department      5/31/2018 10:30 AM Carolina Burrell MD [facility]        Today's Diagnoses     Encounter for hepatitis C screening test for low risk patient    -  1    Major depressive disorder, recurrent, mild (H)        Acute reaction to stress        Hyperlipidemia LDL goal <70        Chronic stable angina (H)          Care Instructions    Try putting vicks vapor rub on the toenails nightly it will take 6-12 months to work   Use the wart bandages on the thumb nail   I will send you the labs when they are back   Try taking the lorazepam when you are having one of those really stressful days you can use up to 10 per month           Follow-ups after your visit        Your next 10 appointments already scheduled     Otoniel 15, 2018 10:30 AM CDT   PFT VISIT with  PFL COREY   [facility] ([facility])    [address] [place] [address]  3rd Floor  [employer] [address]   [phone]            Otoniel 15, 2018 11:00 AM CDT   (Arrive by 10:45 AM)   Return Interstitial Lung with David Morris Perlman, MD   [facility] ([facility])    [address] [place] [id number]   [phone]            May 03, 2019 11:00 AM CDT   Return Visit with Driss Carbone MD   [facility] ([facility]    [address] [place] [id number] [phone]              Who to contact     Normal or non-critical lab and imaging results will be communicated to you by MyChart, letter or phone within 4 business days after the clinic has received the results. If you do not hear from us within 7 days, please contact the clinic through MyChart or phone. If you have a critical or  "abnormal lab result, we will notify you by phone as soon as possible.  Submit refill requests through [employer] or call your pharmacy and they will forward the refill request to us. Please allow 3 business days for your refill to be completed.          If you need to speak with a  for additional information , please call: [phone]             Additional Information About Your Visit        [employer] Information     [employer] gives you secure access to your electronic health record. If you see a primary care provider, you can also send messages to your care team and make appointments. If you have questions, please call your primary care clinic.  If you do not have a primary care provider, please call [phone] and they will assist you.        Care EveryWhere ID     This is your Care EveryWhere ID. This could be used by other organizations to access your [place] medical records  [id number]        Your Vitals Were     Pulse Temperature Height BMI (Body Mass Index)          67 97.8  F (36.6  C) (Tympanic) 5' 6\" (1.676 m) 28.71 kg/m2         Blood Pressure from Last 3 Encounters:   05/31/18 113/67   05/04/18 129/75   10/25/17 138/74    Weight from Last 3 Encounters:   05/31/18 177 lb 14.4 oz (80.7 kg)   05/04/18 181 lb 6.4 oz (82.3 kg)   10/25/17 175 lb (79.4 kg)              We Performed the Following     Basic metabolic panel     Hepatitis C Screen Reflex to HCV RNA Quant and Genotype     Lipid panel reflex to direct LDL Fasting          Today's Medication Changes          These changes are accurate as of 5/31/18 11:23 AM.  If you have any questions, ask your nurse or doctor.               These medicines have changed or have updated prescriptions.        Dose/Directions    buPROPion 100 MG 12 hr tablet   Commonly known as:  WELLBUTRIN SR   This may have changed:  Another medication with the same name was removed. Continue taking this medication, and follow the directions you see here.   Used for:  " Recurrent major depression in partial remission (H)        TAKE ONE TABLET BY MOUTH TWICE DAILY   Quantity:  180 tablet   Refills:  0       LORazepam 0.5 MG tablet   Commonly known as:  ATIVAN   This may have changed:  See the new instructions.   Used for:  Acute reaction to stress        Dose:  0.5 mg   Take 1 tablet (0.5 mg) by mouth every 8 hours as needed for anxiety   Quantity:  30 tablet   Refills:  1            Where to get your medicines      Some of these will need a paper prescription and others can be bought over the counter.  Ask your nurse if you have questions.     Bring a paper prescription for each of these medications     LORazepam 0.5 MG tablet                Primary Care Provider Office Phone # Fax #    Carolina Burrell [phone] [address] [facility] [address]        Equal Access to Services     JESSICA MAYFIELD : Kath hillo Soleonie, waaxda luqadaha, qaybta kaalmada adeegyada, chay driver . So [facility] [phone].    ATENCIÓN: Si habla español, tiene a méndez disposición servicios gratuitos de asistencia lingüística. Llame al [phone].    We comply with applicable federal civil rights laws and [place] laws. We do not discriminate on the basis of race, color, national origin, age, disability, sex, sexual orientation, or gender identity.            Thank you!     Thank you for choosing [facility]  for your care. Our goal is always to provide you with excellent care. Hearing back from our patients is one way we can continue to improve our services. Please take a few minutes to complete the written survey that you may receive in the mail after your visit with us. Thank you!             Your Updated Medication List - Protect others around you: Learn how to safely use, store and throw away your medicines at www.disposemymeds.org.          This list is accurate as of 5/31/18 11:23 AM.  Always use your most recent med list.                    Brand Name Dispense Instructions for use Diagnosis    albuterol 108 (90 Base) MCG/ACT Inhaler    PROAIR HFA/PROVENTIL HFA/VENTOLIN HFA    3 Inhaler    Inhale 2 puffs into the lungs every 4 hours as needed for shortness of breath / dyspnea    Wheezing       aspirin 81 MG tablet     30 tablet    Take 81 mg by mouth every evening        buPROPion 100 MG 12 hr tablet    WELLBUTRIN SR    180 tablet    TAKE ONE TABLET BY MOUTH TWICE DAILY    Recurrent major depression in partial remission (H)       calcium carbonate 500 MG tablet    OS-SARI 500 mg [place]. [place]     Take 1 tablet by mouth 2 times daily        ipratropium - albuterol 0.5 mg/2.5 mg/3 mL 0.5-2.5 (3) MG/3ML neb solution    DUONEB    30 vial    Take 1 vial (3 mLs) by nebulization every 6 hours as needed for shortness of breath / dyspnea or wheezing    ILD (interstitial lung disease) (H), Bronchitis       loratadine 10 MG tablet    CLARITIN    30 tablet    Take 1 tablet (10 mg) by mouth daily    Seasonal allergies       LORazepam 0.5 MG tablet    ATIVAN    30 tablet    Take 1 tablet (0.5 mg) by mouth every 8 hours as needed for anxiety    Acute reaction to stress       mineral oil-hydrophilic petrolatum     420 g    Apply  topically as needed for dry skin.    Dry skin       MULTIVITAMIN & MINERAL PO      Take 1 tablet by mouth daily        pravastatin 20 MG tablet    PRAVACHOL    90 tablet    TAKE ONE TABLET EVERY DAY    Hyperlipidemia LDL goal <130       Ranolazine 1000 MG Tb12     180 tablet    Take 1,000 mg by mouth 2 times daily    Atherosclerosis of native coronary artery with other form of angina pectoris       terbinafine 1 % cream    lamISIL AT    12 g    Apply topically 2 times daily For fungal infection not resolved with other antifungals (e.g. Clotrimazole)    Tinea corporis       venlafaxine 37.5 MG tablet    EFFEXOR    90 tablet    TAKE 2 TABLETS BY MOUTH EVERY MORNING AND 1 TABLET EVERY AFTERNOON    Major depressive disorder, recurrent,  mild (H)       VITAMIN D3 PO      2 capsules by mouth DAILY

## 2018-06-01 LAB — HCV AB SERPL QL IA: NONREACTIVE

## 2018-06-01 ASSESSMENT — PATIENT HEALTH QUESTIONNAIRE - PHQ9: SUM OF ALL RESPONSES TO PHQ QUESTIONS 1-9: 1

## 2018-06-01 ASSESSMENT — ANXIETY QUESTIONNAIRES: GAD7 TOTAL SCORE: 3

## 2018-06-02 NOTE — PROGRESS NOTES
Albina,  Your lab results were normal/stable. Please feel free to my chart or call the office with questions. Carolina Burrell M.D.

## 2018-06-12 DIAGNOSIS — F33.41 RECURRENT MAJOR DEPRESSION IN PARTIAL REMISSION (H): ICD-10-CM

## 2018-06-12 RX ORDER — BUPROPION HYDROCHLORIDE 100 MG/1
TABLET, EXTENDED RELEASE ORAL
Qty: 180 TABLET | Refills: 2 | Status: SHIPPED | OUTPATIENT
Start: 2018-06-12 | End: 2019-07-22

## 2018-06-12 NOTE — TELEPHONE ENCOUNTER
"BuPROPion HCl ER (SR) Oral Tablet Extended Release 12 Hour 100 MG        Last Written Prescription Date:  1/12/18  Last Fill Quantity: 180,   # refills: 0  Last Office Visit: 5/31/18  Future Office visit:       Requested Prescriptions   Pending Prescriptions Disp Refills     buPROPion (WELLBUTRIN SR) 100 MG 12 hr tablet [Pharmacy Med Name: BuPROPion HCl ER (SR) Oral Tablet Extended Release 12 Hour 100 MG] 180 tablet 0     Sig: TAKE ONE TABLET BY MOUTH TWICE DAILY    SSRIs Protocol Passed    6/12/2018  9:54 AM       Passed - PHQ-9 score less than 5 in past 6 months    Please review last PHQ-9 score.          Passed - Medication is Bupropion    If the medication is Bupropion (Wellbutrin), and the patient is taking for smoking cessation; OK to refill.         Passed - Patient is age 18 or older       Passed - No active pregnancy on record       Passed - No positive pregnancy test in last 12 months       Passed - Recent (6 mo) or future (30 days) visit within the authorizing provider's specialty    Patient had office visit in the last 6 months or has a visit in the next 30 days with authorizing provider or within the authorizing provider's specialty.  See \"Patient Info\" tab in inbasket, or \"Choose Columns\" in Meds & Orders section of the refill encounter.              "

## 2018-06-15 ENCOUNTER — OFFICE VISIT (OUTPATIENT)
Dept: PULMONOLOGY | Facility: CLINIC | Age: 68
End: 2018-06-15
Attending: INTERNAL MEDICINE
Payer: COMMERCIAL

## 2018-06-15 VITALS
RESPIRATION RATE: 16 BRPM | SYSTOLIC BLOOD PRESSURE: 121 MMHG | WEIGHT: 177 LBS | HEIGHT: 66 IN | BODY MASS INDEX: 28.45 KG/M2 | OXYGEN SATURATION: 95 % | DIASTOLIC BLOOD PRESSURE: 71 MMHG | HEART RATE: 76 BPM

## 2018-06-15 DIAGNOSIS — M35.00 SJOGREN'S SYNDROME (H): Primary | Chronic | ICD-10-CM

## 2018-06-15 DIAGNOSIS — J84.9 ILD (INTERSTITIAL LUNG DISEASE) (H): Primary | ICD-10-CM

## 2018-06-15 ASSESSMENT — PAIN SCALES - GENERAL: PAINLEVEL: NO PAIN (0)

## 2018-06-15 NOTE — PROGRESS NOTES
[facility] Interstitial Lung Disease Clinic    Reason for Visit  Albina Pedro is a 67 year old year old female who is being seen for Interstitial Lung Disease (ILD) (Patient is here for ILD return visit)    HPI  Ms. Pedro is a 67 year old woman with a history of ILD most consistent with LIP as well as a history of Sjogren's disease (not on treatment), CAD, and HLD who presents for follow up. She was last seen in October, at which point she was doing well without significant pulmonary symptoms, and no treatment was recommended.     Today, she reports she is doing pretty well. She denies any change in her symptoms since last visit. No significant shortness of breath. No cough. She does use albuterol occasionally, not too often unless environmental exposures give her trouble. She denies chest pain, fevers, chills, night sweats. No skin changes, rashes, joint pain. No other complaints.       Current Outpatient Prescriptions   Medication     albuterol (PROAIR HFA/PROVENTIL HFA/VENTOLIN HFA) 108 (90 BASE) MCG/ACT Inhaler     aspirin 81 MG tablet     buPROPion (WELLBUTRIN SR) 100 MG 12 hr tablet     calcium carbonate (OS-SAIR 500 MG [place]. [place]) 1250 MG tablet     loratadine (CLARITIN) 10 MG tablet     LORazepam (ATIVAN) 0.5 MG tablet     mineral oil-hydrophilic petrolatum (AQUAPHOR) ointment     Multiple Vitamins-Minerals (MULTIVITAMIN & MINERAL PO)     pravastatin (PRAVACHOL) 20 MG tablet     Ranolazine 1000 MG TB12     terbinafine (LAMISIL AT) 1 % cream     venlafaxine (EFFEXOR) 37.5 MG tablet     VITAMIN D3 OR     ipratropium - albuterol 0.5 mg/2.5 mg/3 mL (DUONEB) 0.5-2.5 (3) MG/3ML nebulization     No current facility-administered medications for this visit.      Allergies   Allergen Reactions     Daypro [Oxaprozin] Rash            Lidocaine Other (See Comments)     Rapid heart rate     Nitroglycerin Other (See Comments)     Causes low blood pressure     Penicillins Unknown     Occurred as child.      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       Past Surgical History:   Procedure Laterality Date     C/SECTION, LOW TRANSVERSE  10/13/1978    , Low Transverse     COLONOSCOPY       SURGICAL HISTORY OF -            SURGICAL HISTORY OF -   00    Colonoscopy       Social History     Social History     Marital status:      Spouse name: N/A     Number of children: N/A     Years of education: N/A     Occupational History     Not on file.     Social History Main Topics     Smoking status: Never Smoker     Smokeless tobacco: Never Used     Alcohol use 0.0 oz/week     0 Standard drinks or equivalent per week      Comment: 1 glass of wine every 2 weeks     Drug use: No     Sexual activity: Yes     Partners: Male     Other Topics Concern     Parent/Sibling W/ Cabg, Mi Or Angioplasty Before 65f 55m? No     Social History Narrative       Family History   Problem Relation Age of Onset     CANCER Mother      Breast and liver- age 43     HEART DISEASE Father      ? chf?h/o CVA     Alzheimer Disease Father      Hypertension Father      Neurologic Disorder Father      CVA     DIABETES Maternal Grandmother      Breast Cancer Maternal Aunt      Breast Cancer Paternal Aunt      Cancer - colorectal No family hx of      Prostate Cancer No family hx of              ROS Pulmonary  A complete ROS was otherwise negative except as noted in the HPI.    Vitals: /71 (BP Location: Right arm, Patient Position: Sitting, Cuff Size: Adult Regular)  Pulse 76  Resp 16  Ht 1.676 m (5' 6\")  Wt 80.3 kg (177 lb)  SpO2 95%  BMI 28.57 kg/m2    Exam:   GENERAL APPEARANCE: Well developed, well nourished, alert, and in no apparent " distress.  LYMPHATICS: No significant cervical, or supraclavicular nodes.  RESP: good air flow throughout.  No crackles. No rhonchi. No wheezes.  CV: Normal S1, S2, regular rhythm, normal rate. No murmur.  No LE edema.   ABD: BS+, soft, nontender.  MS: extremities normal. No clubbing. No cyanosis. Some ridging of fingernails.   SKIN: no rash on limited exam.  NEURO: Mentation intact, speech normal, normal gait and stance.  PSYCH: mentation appears normal. and affect normal/bright.    Results:  Recent Results (from the past 168 hour(s))   General PFT Lab (Please always keep checked)    Collection Time: 06/15/18 10:35 AM   Result Value Ref Range    FVC-Pred 2.97 L    FVC-Pre 2.91 L    FVC-%Pred-Pre 97 %    FEV1-Pre 2.42 L    FEV1-%Pred-Pre 105 %    FEV1FVC-Pred 76 %    FEV1FVC-Pre 83 %    FEFMax-Pred 5.83 L/sec    FEFMax-Pre 5.70 L/sec    FEFMax-%Pred-Pre 97 %    FEF2575-Pred 1.93 L/sec    FEF2575-Pre 2.80 L/sec    STX9209-%Pred-Pre 145 %    ExpTime-Pre 6.91 sec    FIFMax-Pre 5.54 L/sec    VC-Pred 3.20 L    VC-Pre 3.00 L    VC-%Pred-Pre 93 %    IC-Pred 2.64 L    IC-Pre 2.67 L    IC-%Pred-Pre 101 %    ERV-Pred 0.56 L    ERV-Pre 0.32 L    ERV-%Pred-Pre 58 %    FEV1FEV6-Pred 79 %    FEV1FEV6-Pre 83 %    DLCOunc-Pred 20.97 ml/min/mmHg    DLCOunc-Pre 21.26 ml/min/mmHg    DLCOunc-%Pred-Pre 101 %    VA-Pre 4.51 L    VA-%Pred-Pre 86 %    FEV1SVC-Pred 72 %    FEV1SVC-Pre 81 %     Normal spirometry and diffusion, not significantly changed than prior.     5/31/18 labs: BMP normal.   I reviewed results with the patient.      Assessment and plan:   Ms. Pedro is a 67 year old woman with a history of ILD most consistent with LIP as well as a history of Sjogren's disease (not on treatment), CAD, and HLD who presents for follow up.    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 Not on treatment currently, previously on azathioprine (years ago). Also has sjogren's disease not on treatment. PFTs today are stable  from prior, and have been stable for several years.   -Continue to follow intermittently, can schedule follow up in 1 year with PFTs, can be seen sooner if needed.  -Consider repeat CT scan as a 5 year follow up, in December 2019 or so.     Seen and discussed with Dr. Perlman.     Manny Marroquin MD  Pulmonary and Critical Care Fellow  Pager: [phone]     Attending Note:    The patient was seen examined by me with the pulmonary fellow, I have personally reviewed the imaging studies, lab and culture results.  The note reflects our joint findings, assessment and plan.      David Perlman, M.D.  Pulmonary, Allergy and Critical Care.

## 2018-06-15 NOTE — LETTER
6/15/2018       RE: Albina Pedro  [address] [address] [place] [id number]     Dear Colleague,    Thank you for referring your patient, Albina Pedro, to the [facility] at [facility]. Please see a copy of my visit note below.    [facility] Interstitial Lung Disease [facility]    Reason for Visit  Albina Pedro is a 67 year old year old female who is being seen for Interstitial Lung Disease (ILD) (Patient is here for ILD return visit)    HPI  Ms. Pedro is a 67 year old woman with a history of ILD most consistent with LIP as well as a history of Sjogren's disease (not on treatment), CAD, and HLD who presents for follow up. She was last seen in October, at which point she was doing well without significant pulmonary symptoms, and no treatment was recommended.     Today, she reports she is doing pretty well. She denies any change in her symptoms since last visit. No significant shortness of breath. No cough. She does use albuterol occasionally, not too often unless environmental exposures give her trouble. She denies chest pain, fevers, chills, night sweats. No skin changes, rashes, joint pain. No other complaints.       Current Outpatient Prescriptions   Medication     albuterol (PROAIR HFA/PROVENTIL HFA/VENTOLIN HFA) 108 (90 BASE) MCG/ACT Inhaler     aspirin 81 MG tablet     buPROPion (WELLBUTRIN SR) 100 MG 12 hr tablet     calcium carbonate (OS-SARI 500 MG [place]. [place]) 1250 MG tablet     loratadine (CLARITIN) 10 MG tablet     LORazepam (ATIVAN) 0.5 MG tablet     mineral oil-hydrophilic petrolatum (AQUAPHOR) ointment     Multiple Vitamins-Minerals (MULTIVITAMIN & MINERAL PO)     pravastatin (PRAVACHOL) 20 MG tablet     Ranolazine 1000 MG TB12     terbinafine (LAMISIL AT) 1 % cream     venlafaxine (EFFEXOR) 37.5 MG tablet     VITAMIN D3 OR     ipratropium - albuterol 0.5 mg/2.5 mg/3 mL (DUONEB) 0.5-2.5 (3) MG/3ML  nebulization     No current facility-administered medications for this visit.      Allergies   Allergen Reactions     Daypro [Oxaprozin] Rash            Lidocaine Other (See Comments)     Rapid heart rate     Nitroglycerin Other (See Comments)     Causes low blood pressure     Penicillins Unknown     Occurred as child.     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       Past Surgical History:   Procedure Laterality Date     C/SECTION, LOW TRANSVERSE  10/13/1978    , Low Transverse     COLONOSCOPY       SURGICAL HISTORY OF -            SURGICAL HISTORY OF -   00    Colonoscopy       Social History     Social History     Marital status:      Spouse name: N/A     Number of children: N/A     Years of education: N/A     Occupational History     Not on file.     Social History Main Topics     Smoking status: Never Smoker     Smokeless tobacco: Never Used     Alcohol use 0.0 oz/week     0 Standard drinks or equivalent per week      Comment: 1 glass of wine every 2 weeks     Drug use: No     Sexual activity: Yes     Partners: Male     Other Topics Concern     Parent/Sibling W/ Cabg, Mi Or Angioplasty Before 65f 55m? No     Social History Narrative       Family History   Problem Relation Age of Onset     CANCER Mother      Breast and liver- age 43     HEART DISEASE Father      ? chf?h/o CVA     Alzheimer Disease Father      Hypertension Father      Neurologic Disorder Father      CVA     DIABETES Maternal Grandmother      Breast Cancer Maternal Aunt      Breast Cancer Paternal Aunt      Cancer - colorectal No family hx of      Prostate Cancer No family hx of              ROS Pulmonary  A complete ROS was  "otherwise negative except as noted in the HPI.    Vitals: /71 (BP Location: Right arm, Patient Position: Sitting, Cuff Size: Adult Regular)  Pulse 76  Resp 16  Ht 1.676 m (5' 6\")  Wt 80.3 kg (177 lb)  SpO2 95%  BMI 28.57 kg/m2    Exam:   GENERAL APPEARANCE: Well developed, well nourished, alert, and in no apparent distress.  LYMPHATICS: No significant cervical, or supraclavicular nodes.  RESP: good air flow throughout.  No crackles. No rhonchi. No wheezes.  CV: Normal S1, S2, regular rhythm, normal rate. No murmur.  No LE edema.   ABD: BS+, soft, nontender.  MS: extremities normal. No clubbing. No cyanosis. Some ridging of fingernails.   SKIN: no rash on limited exam.  NEURO: Mentation intact, speech normal, normal gait and stance.  PSYCH: mentation appears normal. and affect normal/bright.    Results:  Recent Results (from the past 168 hour(s))   General PFT Lab (Please always keep checked)    Collection Time: 06/15/18 10:35 AM   Result Value Ref Range    FVC-Pred 2.97 L    FVC-Pre 2.91 L    FVC-%Pred-Pre 97 %    FEV1-Pre 2.42 L    FEV1-%Pred-Pre 105 %    FEV1FVC-Pred 76 %    FEV1FVC-Pre 83 %    FEFMax-Pred 5.83 L/sec    FEFMax-Pre 5.70 L/sec    FEFMax-%Pred-Pre 97 %    FEF2575-Pred 1.93 L/sec    FEF2575-Pre 2.80 L/sec    OPE2621-%Pred-Pre 145 %    ExpTime-Pre 6.91 sec    FIFMax-Pre 5.54 L/sec    VC-Pred 3.20 L    VC-Pre 3.00 L    VC-%Pred-Pre 93 %    IC-Pred 2.64 L    IC-Pre 2.67 L    IC-%Pred-Pre 101 %    ERV-Pred 0.56 L    ERV-Pre 0.32 L    ERV-%Pred-Pre 58 %    FEV1FEV6-Pred 79 %    FEV1FEV6-Pre 83 %    DLCOunc-Pred 20.97 ml/min/mmHg    DLCOunc-Pre 21.26 ml/min/mmHg    DLCOunc-%Pred-Pre 101 %    VA-Pre 4.51 L    VA-%Pred-Pre 86 %    FEV1SVC-Pred 72 %    FEV1SVC-Pre 81 %     Normal spirometry and diffusion, not significantly changed than prior.     5/31/18 labs: BMP normal.   I reviewed results with the patient.      Assessment and plan:   Ms. Pedro is a 67 year old woman with a history of ILD " most consistent with LIP as well as a history of Sjogren's disease (not on treatment), CAD, and HLD who presents for follow up.    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 Not on treatment currently, previously on azathioprine (years ago). Also has sjogren's disease not on treatment. PFTs today are stable from prior, and have been stable for several years.   -Continue to follow intermittently, can schedule follow up in 1 year with PFTs, can be seen sooner if needed.  -Consider repeat CT scan as a 5 year follow up, in December 2019 or so.     Seen and discussed with Dr. Perlman.     Manny Marroquin MD  Pulmonary and Critical Care Fellow  Pager: [phone]     Attending Note:    The patient was seen examined by me with the pulmonary fellow, I have personally reviewed the imaging studies, lab and culture results.  The note reflects our joint findings, assessment and plan.      David Perlman, M.D.  Pulmonary, Allergy and Critical Care.      Again, thank you for allowing me to participate in the care of your patient.      Sincerely,    David Morris Perlman, MD

## 2018-06-15 NOTE — NURSING NOTE
Chief Complaint   Patient presents with     Interstitial Lung Disease (ILD)     Patient is here for ILD return visit     SMA Ashish

## 2018-06-15 NOTE — MR AVS SNAPSHOT
After Visit Summary   6/15/2018    Albina Pedro    MRN: [id number]           Patient Information     Date Of Birth          1950        Visit Information        Provider Department      6/15/2018 11:00 AM Perlman, David Morris, MD [facility]        Today's Diagnoses     ILD (interstitial lung disease) (H)    -  1       Follow-ups after your visit        Follow-up notes from your care team     Return in about 1 year (around 6/15/2019).      Your next 10 appointments already scheduled     May 03, 2019 11:00 AM CDT   Return Visit with Driss Carbone MD   [facility]    [address] [place] [id number] [phone]              Future tests that were ordered for you today     Open Future Orders        Priority Expected Expires Ordered    General PFT Lab (Please always keep checked) Routine 6/15/2019 6/15/2019 6/15/2018    Pulmonary Function Test Routine 6/15/2019 6/15/2019 6/15/2018            Who to contact     If you have questions or need follow up information about today's clinic visit or your schedule please contact [facility] directly at [phone].  Normal or non-critical lab and imaging results will be communicated to you by MyChart, letter or phone within 4 business days after the clinic has received the results. If you do not hear from us within 7 days, please contact the clinic through [employer] or phone. If you have a critical or abnormal lab result, we will notify you by phone as soon as possible.  Submit refill requests through [employer] or call your pharmacy and they will forward the refill request to us. Please allow 3 business days for your refill to be completed.          Additional Information About Your Visit        MyChart Information     [employer] gives you secure access to your electronic health record. If you see a primary care provider,  "you can also send messages to your care team and make appointments. If you have questions, please call your primary care clinic.  If you do not have a primary care provider, please call [phone] and they will assist you.        Care EveryWhere ID     This is your Care EveryWhere ID. This could be used by other organizations to access your [place] medical records  [id number]        Your Vitals Were     Pulse Respirations Height Pulse Oximetry BMI (Body Mass Index)       76 16 1.676 m (5' 6\") 95% 28.57 kg/m2        Blood Pressure from Last 3 Encounters:   06/15/18 121/71   05/31/18 113/67   05/04/18 129/75    Weight from Last 3 Encounters:   06/15/18 80.3 kg (177 lb)   05/31/18 80.7 kg (177 lb 14.4 oz)   05/04/18 82.3 kg (181 lb 6.4 oz)               Primary Care Provider Office Phone # Fax #    Carolina Burrell [phone] [id number] [facility] [address]        Equal Access to Services     [facility]: Hadii aad ku hadasho Soomaali, waaxda luqadaha, qaybta kaalmada felicia, chay driver . So [facility] [phone].    ATENCIÓN: Si habla español, tiene a méndez disposición servicios gratuitos de asistencia lingüística. [facility] [phone].    We comply with applicable federal civil rights laws and [place] laws. We do not discriminate on the basis of race, color, national origin, age, disability, sex, sexual orientation, or gender identity.            Thank you!     Thank you for choosing [facility]  for your care. Our goal is always to provide you with excellent care. Hearing back from our patients is one way we can continue to improve our services. Please take a few minutes to complete the written survey that you may receive in the mail after your visit with us. Thank you!             Your Updated Medication List - Protect others around you: Learn how to safely use, store and throw away your medicines at " www.disposemymeds.org.          This list is accurate as of 6/15/18 11:23 AM.  Always use your most recent med list.                   Brand Name Dispense Instructions for use Diagnosis    albuterol 108 (90 Base) MCG/ACT Inhaler    PROAIR HFA/PROVENTIL HFA/VENTOLIN HFA    3 Inhaler    Inhale 2 puffs into the lungs every 4 hours as needed for shortness of breath / dyspnea    Wheezing       aspirin 81 MG tablet     30 tablet    Take 81 mg by mouth every evening        buPROPion 100 MG 12 hr tablet    WELLBUTRIN SR    180 tablet    TAKE ONE TABLET BY MOUTH TWICE DAILY    Recurrent major depression in partial remission (H)       calcium carbonate 500 MG tablet    OS-SARI 500 mg Elim IRA. Ca     Take 1 tablet by mouth 2 times daily        ipratropium - albuterol 0.5 mg/2.5 mg/3 mL 0.5-2.5 (3) MG/3ML neb solution    DUONEB    30 vial    Take 1 vial (3 mLs) by nebulization every 6 hours as needed for shortness of breath / dyspnea or wheezing    ILD (interstitial lung disease) (H), Bronchitis       loratadine 10 MG tablet    CLARITIN    30 tablet    Take 1 tablet (10 mg) by mouth daily    Seasonal allergies       LORazepam 0.5 MG tablet    ATIVAN    30 tablet    Take 1 tablet (0.5 mg) by mouth every 8 hours as needed for anxiety    Acute reaction to stress       mineral oil-hydrophilic petrolatum     420 g    Apply  topically as needed for dry skin.    Dry skin       MULTIVITAMIN & MINERAL PO      Take 1 tablet by mouth daily        pravastatin 20 MG tablet    PRAVACHOL    90 tablet    TAKE ONE TABLET EVERY DAY    Hyperlipidemia LDL goal <130       Ranolazine 1000 MG Tb12     180 tablet    Take 1,000 mg by mouth 2 times daily    Atherosclerosis of native coronary artery with other form of angina pectoris       terbinafine 1 % cream    lamISIL AT    12 g    Apply topically 2 times daily For fungal infection not resolved with other antifungals (e.g. Clotrimazole)    Tinea corporis       venlafaxine 37.5 MG tablet    EFFEXOR    90  tablet    TAKE 2 TABLETS BY MOUTH EVERY MORNING AND 1 TABLET EVERY AFTERNOON    Major depressive disorder, recurrent, mild (H)       VITAMIN D3 PO      2 capsules by mouth DAILY

## 2018-06-16 LAB
DLCOUNC-%PRED-PRE: 101 %
DLCOUNC-PRE: 21.26 ML/MIN/MMHG
DLCOUNC-PRED: 20.97 ML/MIN/MMHG
ERV-%PRED-PRE: 58 %
ERV-PRE: 0.32 L
ERV-PRED: 0.56 L
EXPTIME-PRE: 6.91 SEC
FEF2575-%PRED-PRE: 145 %
FEF2575-PRE: 2.8 L/SEC
FEF2575-PRED: 1.93 L/SEC
FEFMAX-%PRED-PRE: 97 %
FEFMAX-PRE: 5.7 L/SEC
FEFMAX-PRED: 5.83 L/SEC
FEV1-%PRED-PRE: 105 %
FEV1-PRE: 2.42 L
FEV1FEV6-PRE: 83 %
FEV1FEV6-PRED: 79 %
FEV1FVC-PRE: 83 %
FEV1FVC-PRED: 76 %
FEV1SVC-PRE: 81 %
FEV1SVC-PRED: 72 %
FIFMAX-PRE: 5.54 L/SEC
FVC-%PRED-PRE: 97 %
FVC-PRE: 2.91 L
FVC-PRED: 2.97 L
IC-%PRED-PRE: 101 %
IC-PRE: 2.67 L
IC-PRED: 2.64 L
VA-%PRED-PRE: 86 %
VA-PRE: 4.51 L
VC-%PRED-PRE: 93 %
VC-PRE: 3 L
VC-PRED: 3.2 L

## 2018-06-23 DIAGNOSIS — F33.0 MAJOR DEPRESSIVE DISORDER, RECURRENT, MILD (H): ICD-10-CM

## 2018-06-25 RX ORDER — VENLAFAXINE 37.5 MG/1
TABLET ORAL
Qty: 270 TABLET | Refills: 0 | Status: SHIPPED | OUTPATIENT
Start: 2018-06-25 | End: 2018-08-27

## 2018-06-25 NOTE — TELEPHONE ENCOUNTER
"Venlafaxine HCl Oral Tablet 37.5 MG        Last Written Prescription Date:  5/25/18  Last Fill Quantity: 90,   # refills: 0  Last Office Visit: 5/31/18  Future Office visit:       Requested Prescriptions   Pending Prescriptions Disp Refills     venlafaxine (EFFEXOR) 37.5 MG tablet [Pharmacy Med Name: Venlafaxine HCl Oral Tablet 37.5 MG] 90 tablet 0     Sig: TAKE 2 TABLETS BY MOUTH DAILY IN THE MORNING AND 1 TABLET IN THE AFTERNOON    Serotonin-Norepinephrine Reuptake Inhibitors  Passed    6/23/2018  7:52 AM       Passed - Blood pressure under 140/90 in past 12 months    BP Readings from Last 3 Encounters:   06/15/18 121/71   05/31/18 113/67   05/04/18 129/75                Passed - PHQ-9 score of less than 5 in past 6 months    Please review last PHQ-9 score.          Passed - Patient is age 18 or older       Passed - No active pregnancy on record       Passed - Normal serum creatinine on file in past 12 months    Recent Labs   Lab Test  05/31/18   1133   CR  0.61            Passed - No positive pregnancy test in past 12 months       Passed - Recent (6 mo) or future (30 days) visit within the authorizing provider's specialty    Patient had office visit in the last 6 months or has a visit in the next 30 days with authorizing provider or within the authorizing provider's specialty.  See \"Patient Info\" tab in inbasket, or \"Choose Columns\" in Meds & Orders section of the refill encounter.              "

## 2018-06-25 NOTE — TELEPHONE ENCOUNTER
Prescription approved per [facility] Refill Protocol.  Suhail Silva RN

## 2018-07-06 DIAGNOSIS — E78.5 HYPERLIPIDEMIA LDL GOAL <130: ICD-10-CM

## 2018-07-06 RX ORDER — PRAVASTATIN SODIUM 20 MG
TABLET ORAL
Qty: 90 TABLET | Refills: 2 | Status: SHIPPED | OUTPATIENT
Start: 2018-07-06 | End: 2019-05-03

## 2018-07-06 NOTE — TELEPHONE ENCOUNTER
"Requested Prescriptions   Pending Prescriptions Disp Refills     pravastatin (PRAVACHOL) 20 MG tablet [Pharmacy Med Name: Pravastatin Sodium Oral Tablet 20 MG] 90 tablet 2    Last Written Prescription Date:  10/18/17  Last Fill Quantity: 90,  # refills: 3   Last office visit: 5/31/2018 with prescribing provider:  5/31/18 YULIANA   Future Office Visit:     Sig: TAKE ONE TABLET EVERY DAY    Statins Protocol Passed    7/6/2018 10:11 AM       Passed - LDL on file in past 12 months    Recent Labs   Lab Test  05/31/18   1133   LDL  76            Passed - No abnormal creatine kinase in past 12 months    Recent Labs   Lab Test  10/16/13   1542   CKT  172               Passed - Recent (12 mo) or future (30 days) visit within the authorizing provider's specialty    Patient had office visit in the last 12 months or has a visit in the next 30 days with authorizing provider or within the authorizing provider's specialty.  See \"Patient Info\" tab in inbasket, or \"Choose Columns\" in Meds & Orders section of the refill encounter.           Passed - Patient is age 18 or older       Passed - No active pregnancy on record       Passed - No positive pregnancy test in past 12 months          "

## 2018-07-09 PROBLEM — J84.9 ILD (INTERSTITIAL LUNG DISEASE) (H): Status: ACTIVE | Noted: 2018-07-09

## 2018-07-24 DIAGNOSIS — I20.0 INTERMEDIATE CORONARY SYNDROME (H): Primary | ICD-10-CM

## 2018-07-24 RX ORDER — RANOLAZINE 1000 MG/1
1000 TABLET, EXTENDED RELEASE ORAL 2 TIMES DAILY
Qty: 180 TABLET | Refills: 3 | Status: SHIPPED | OUTPATIENT
Start: 2018-07-24 | End: 2018-07-31

## 2018-07-24 NOTE — TELEPHONE ENCOUNTER
I can give her a written prescription for this. If she would like to send this to [facility] she may.. We will not fax it there directly . Carolina Burrell M.D.;'

## 2018-07-31 ENCOUNTER — TELEPHONE (OUTPATIENT)
Dept: FAMILY MEDICINE | Facility: CLINIC | Age: 68
End: 2018-07-31

## 2018-07-31 DIAGNOSIS — I20.0 INTERMEDIATE CORONARY SYNDROME (H): ICD-10-CM

## 2018-07-31 RX ORDER — RANOLAZINE 1000 MG/1
1000 TABLET, EXTENDED RELEASE ORAL 2 TIMES DAILY
Qty: 180 TABLET | Refills: 3 | Status: SHIPPED | OUTPATIENT
Start: 2018-07-31 | End: 2019-10-16

## 2018-07-31 NOTE — TELEPHONE ENCOUNTER
Reason for Call:  Other prescription    Detailed comments: Albina tried to get her script to [facility] and they will not accept faxed copy.  She doesn't have the script yet, and hoping you could re-print and then I can email it.  Apparently they will accept it then.  Please review and advise.  Thank you..Tita Real    Phone Number Patient can be reached at: Home number on file [phone] (home)    Best Time: any time    Can we leave a detailed message on this number? YES    Call taken on 7/31/2018 at 1:35 PM by Tita Real

## 2018-08-03 NOTE — TELEPHONE ENCOUNTER
Pt called and was told she should mail the original copy to [facility] per note below. She will look for script.  Ernestine Perdomo  CSS

## 2018-08-27 DIAGNOSIS — F33.0 MAJOR DEPRESSIVE DISORDER, RECURRENT, MILD (H): ICD-10-CM

## 2018-08-27 RX ORDER — VENLAFAXINE 37.5 MG/1
TABLET ORAL
Qty: 270 TABLET | Refills: 0 | Status: SHIPPED | OUTPATIENT
Start: 2018-08-27 | End: 2019-01-18

## 2018-08-27 NOTE — TELEPHONE ENCOUNTER
"Venlafaxine HCl Oral Tablet 37.5 MG        Last Written Prescription Date:  6/25/18  Last Fill Quantity: 270,   # refills: 0  Last Office Visit: 5/31/18  Future Office visit:       Requested Prescriptions   Pending Prescriptions Disp Refills     venlafaxine (EFFEXOR) 37.5 MG tablet [Pharmacy Med Name: Venlafaxine HCl Oral Tablet 37.5 MG] 90 tablet 0     Sig: TAKE 2 TABLETS BY MOUTH EVERY MORNING AND 1 TABLET EVERY AFTERNOON    Serotonin-Norepinephrine Reuptake Inhibitors  Passed    8/27/2018  1:25 PM       Passed - Blood pressure under 140/90 in past 12 months    BP Readings from Last 3 Encounters:   06/15/18 121/71   05/31/18 113/67   05/04/18 129/75                Passed - PHQ-9 score of less than 5 in past 6 months    Please review last PHQ-9 score.          Passed - Patient is age 18 or older       Passed - No active pregnancy on record       Passed - Normal serum creatinine on file in past 12 months    Recent Labs   Lab Test  05/31/18   1133   CR  0.61            Passed - No positive pregnancy test in past 12 months       Passed - Recent (6 mo) or future (30 days) visit within the authorizing provider's specialty    Patient had office visit in the last 6 months or has a visit in the next 30 days with authorizing provider or within the authorizing provider's specialty.  See \"Patient Info\" tab in inbasket, or \"Choose Columns\" in Meds & Orders section of the refill encounter.              "

## 2018-08-27 NOTE — TELEPHONE ENCOUNTER
Prescription approved per [facility] Refill Protocol.  Suhail Silva RN

## 2018-08-28 RX ORDER — VENLAFAXINE 37.5 MG/1
TABLET ORAL
Qty: 270 TABLET | Refills: 0 | Status: SHIPPED | OUTPATIENT
Start: 2018-08-28 | End: 2019-03-15

## 2018-08-28 NOTE — TELEPHONE ENCOUNTER
"Prescription approved per [facility] Refill Protocol. Yesterday's order did not go through as it was set on \"local print.\"   Suhail Silva RN    "

## 2018-09-17 ENCOUNTER — OFFICE VISIT (OUTPATIENT)
Dept: FAMILY MEDICINE | Facility: CLINIC | Age: 68
End: 2018-09-17
Payer: COMMERCIAL

## 2018-09-17 VITALS
TEMPERATURE: 97.8 F | WEIGHT: 179.1 LBS | HEIGHT: 66 IN | DIASTOLIC BLOOD PRESSURE: 69 MMHG | BODY MASS INDEX: 28.78 KG/M2 | SYSTOLIC BLOOD PRESSURE: 128 MMHG | HEART RATE: 80 BPM

## 2018-09-17 DIAGNOSIS — L24.9 IRRITANT CONTACT DERMATITIS, UNSPECIFIED TRIGGER: ICD-10-CM

## 2018-09-17 DIAGNOSIS — B37.2 CANDIDAL INTERTRIGO: Primary | ICD-10-CM

## 2018-09-17 PROCEDURE — 99213 OFFICE O/P EST LOW 20 MIN: CPT | Performed by: FAMILY MEDICINE

## 2018-09-17 RX ORDER — FLUCONAZOLE 150 MG/1
150 TABLET ORAL
Qty: 4 TABLET | Refills: 0 | Status: SHIPPED | OUTPATIENT
Start: 2018-09-17 | End: 2018-10-22

## 2018-09-17 RX ORDER — TRIAMCINOLONE ACETONIDE 1 MG/G
CREAM TOPICAL
Qty: 30 G | Refills: 1 | Status: SHIPPED | OUTPATIENT
Start: 2018-09-17 | End: 2020-06-17

## 2018-09-17 ASSESSMENT — PAIN SCALES - GENERAL: PAINLEVEL: NO PAIN (0)

## 2018-09-17 NOTE — PROGRESS NOTES
SUBJECTIVE:                                                    Albina Pedro is a 67 year old female who presents to clinic today for the following health issues:    Rash  Onset: a week ago    Description:   Location: Chest  Character: blotchy, raised, red  Itching (Pruritis): YES    Progression of Symptoms:  worsening    Accompanying Signs & Symptoms:  Fever: no   Body aches or joint pain: no   Sore throat symptoms: no   Recent cold symptoms: no     History:   Previous similar rash: no     Precipitating factors:   Exposure to similar rash: no   New exposures: None   Recent travel: no     Alleviating factors:  Medicated baby powder, and lamisil    Therapies Tried and outcome: baby powder and Lamisil. Seem to help take the itch away a little bit.     **She also has a rash under both breasts. She thinks this has to do with the humidity.     Problem list and histories reviewed & adjusted, as indicated.  Additional history: also with some intertrigo  Has been putting the lamisil 2 times per day not helpin g  Would clear up then came back         Patient Active Problem List   Diagnosis     DIZZINESS - LIKELY HYPOGLYCEMIA     Dermatophytosis of body     Episodic mood disorder (H)     CARDIOVASCULAR SCREENING; LDL GOAL LESS THAN 70     Health Care Home     Panniculitis     Advanced directives, counseling/discussion     CAD (coronary artery disease)     Sjogren's syndrome (H)     Adverse effect of anesthetic     Status post coronary angiogram     Acute chest pain     Major depressive disorder, recurrent, mild (H)     Hyperlipidemia LDL goal <70     ILD (interstitial lung disease) (H)     Intermediate coronary syndrome (H)     Past Surgical History:   Procedure Laterality Date     C/SECTION, LOW TRANSVERSE  10/13/1978    , Low Transverse     COLONOSCOPY       SURGICAL HISTORY OF -            SURGICAL HISTORY OF -   00    Colonoscopy       Social History   Substance Use Topics     Smoking  "status: Never Smoker     Smokeless tobacco: Never Used     Alcohol use 0.0 oz/week     0 Standard drinks or equivalent per week      Comment: 1 glass of wine every 2 weeks     Family History   Problem Relation Age of Onset     Cancer Mother      Breast and liver- age 43     HEART DISEASE Father      ? chf?h/o CVA     Alzheimer Disease Father      Hypertension Father      Neurologic Disorder Father      CVA     Diabetes Maternal Grandmother      Breast Cancer Maternal Aunt      Breast Cancer Paternal Aunt      Cancer - colorectal No family hx of      Prostate Cancer No family hx of            ROS:  Constitutional, HEENT, cardiovascular, pulmonary, gi and gu systems are negative, except as otherwise noted.    OBJECTIVE:                                                    /69 (BP Location: Right arm, Patient Position: Sitting, Cuff Size: Adult Regular)  Pulse 80  Temp 97.8  F (36.6  C) (Tympanic)  Ht 5' 6\" (1.676 m)  Wt 179 lb 1.6 oz (81.2 kg)  BMI 28.91 kg/m2 Body mass index is 28.91 kg/(m^2).   GENERAL APPEARANCE: healthy, alert and no distress  SKIN: erythema - under breasts and in groin mild flaking at edges   Neck papular/vsicular  rash right side only in linear streaks /patches      ASSESSMENT/PLAN:                                                      1. Candidal intertrigo    - fluconazole (DIFLUCAN) 150 MG tablet; Take 1 tablet (150 mg) by mouth every 3 days  Dispense: 4 tablet; Refill: 0    2. Irritant contact dermatitis, unspecified trigger    - fluconazole (DIFLUCAN) 150 MG tablet; Take 1 tablet (150 mg) by mouth every 3 days  Dispense: 4 tablet; Refill: 0  - triamcinolone (KENALOG) 0.1 % cream; Apply sparingly to affected area three times daily for 14 days.  Dispense: 30 g; Refill: 1     reports that she has never smoked. She has never used smokeless tobacco.      Weight management plan: Discussed healthy diet and exercise guidelines and patient will follow up in 6 months in clinic to " re-evaluate.  Patient Instructions   Use the cream on both   Take the pills 1 every 3 days until gone   If not improving let me know       You can decrease the wellbutrin to 1 time per day for 1 week then you can stop it.         Carolina Burrell M.D.    [facility]

## 2018-09-17 NOTE — MR AVS SNAPSHOT
After Visit Summary   9/17/2018    Albina Pedro    MRN: [id number]           Patient Information     Date Of Birth          1950        Visit Information        Provider Department      9/17/2018 2:30 PM Carolina Burrell MD [facility]        Today's Diagnoses     Candidal intertrigo    -  1    Irritant contact dermatitis, unspecified trigger          Care Instructions    Use the cream on both   Take the pills 1 every 3 days until gone   If not improving let me know       You can decrease the wellbutrin to 1 time per day for 1 week then you can stop it.             Follow-ups after your visit        Your next 10 appointments already scheduled     May 03, 2019 11:00 AM CDT   [facility] with Driss Carbone MD   [facility]    [address] [place] [id number]   [phone]            Jun 18, 2019 12:30 PM CDT   FULL PULMONARY FUNCTION with  PFL A   [facility] ([facility])    [address] [place] [id number] [phone]            Jun 18, 2019  1:30 PM CDT   (Arrive by 1:15 PM)   Return Interstitial Lung with David Morris Perlman, MD   [facility] ([facility])    [address] [place] [id number] [phone]              Who to contact     Normal or non-critical lab and imaging results will be communicated to you by MyChart, letter or phone within 4 business days after the clinic has received the results. If you do not hear from us within 7 days, please contact the clinic through MyChart or phone. If you have a critical or abnormal lab result, we will notify you by phone as soon as possible.  Submit refill requests through [employer] or call your pharmacy and they will forward the refill request to us. Please allow 3 business days for your refill to be  "completed.          If you need to speak with a  for additional information , please call: [phone]             Additional Information About Your Visit        [employer] Information     [facility] gives you secure access to your electronic health record. If you see a primary care provider, you can also send messages to your care team and make appointments. If you have questions, please call your primary care clinic.  If you do not have a primary care provider, please call [phone] and they will assist you.        Care EveryWhere ID     This is your Care EveryWhere ID. This could be used by other organizations to access your [facility] medical records  [id number]        Your Vitals Were     Pulse Temperature Height BMI (Body Mass Index)          80 97.8  F (36.6  C) (Tympanic) 5' 6\" (1.676 m) 28.91 kg/m2         Blood Pressure from Last 3 Encounters:   09/17/18 128/69   06/15/18 121/71   05/31/18 113/67    Weight from Last 3 Encounters:   09/17/18 179 lb 1.6 oz (81.2 kg)   06/15/18 177 lb (80.3 kg)   05/31/18 177 lb 14.4 oz (80.7 kg)              Today, you had the following     No orders found for display         Today's Medication Changes          These changes are accurate as of 9/17/18  3:14 PM.  If you have any questions, ask your nurse or doctor.               Start taking these medicines.        Dose/Directions    fluconazole 150 MG tablet   Commonly known as:  DIFLUCAN   Used for:  Candidal intertrigo, Irritant contact dermatitis, unspecified trigger   Started by:  Carolina Burrell MD        Dose:  150 mg   Take 1 tablet (150 mg) by mouth every 3 days   Quantity:  4 tablet   Refills:  0       triamcinolone 0.1 % cream   Commonly known as:  KENALOG   Used for:  Irritant contact dermatitis, unspecified trigger   Started by:  Carolina Burrell MD        Apply sparingly to affected area three times daily for 14 days.   Quantity:  30 g   Refills:  1            Where to get your medicines    "   These medications were sent to [id number] - [place], [place] - [address]  [address], [facility] [address]     Phone:  [phone]     fluconazole 150 MG tablet    triamcinolone 0.1 % cream                Primary Care Provider Office Phone # Fax #    Carolina Burrell [phone] [address] [facility] [address]        Equal Access to Services     JESSICA MAYFIELD : Hadii aad ku hadasho Soomaali, waaxda luqadaha, qaybta kaalmada adeegyada, waxay idiin hayaan adeeg kharash laanahi . So [facility] [phone].    ATENCIÓN: Si habla espbonnie, tiene a méndez disposición servicios gratuitos de asistencia lingüística. [facility] [phone].    We comply with applicable federal civil rights laws and [place] laws. We do not discriminate on the basis of race, color, national origin, age, disability, sex, sexual orientation, or gender identity.            Thank you!     Thank you for choosing [facility]  for your care. Our goal is always to provide you with excellent care. Hearing back from our patients is one way we can continue to improve our services. Please take a few minutes to complete the written survey that you may receive in the mail after your visit with us. Thank you!             Your Updated Medication List - Protect others around you: Learn how to safely use, store and throw away your medicines at www.disposemymeds.org.          This list is accurate as of 9/17/18  3:14 PM.  Always use your most recent med list.                   Brand Name Dispense Instructions for use Diagnosis    albuterol 108 (90 Base) MCG/ACT inhaler    PROAIR HFA/PROVENTIL HFA/VENTOLIN HFA    3 Inhaler    Inhale 2 puffs into the lungs every 4 hours as needed for shortness of breath / dyspnea    Wheezing       aspirin 81 MG tablet     30 tablet    Take 81 mg by mouth every evening        buPROPion 100 MG 12 hr tablet    WELLBUTRIN SR    180 tablet    TAKE ONE TABLET BY MOUTH TWICE DAILY    Recurrent  major depression in partial remission (H)       calcium carbonate 500 mg {elemental} 500 MG tablet    OS-SARI     Take 1 tablet by mouth 2 times daily        fluconazole 150 MG tablet    DIFLUCAN    4 tablet    Take 1 tablet (150 mg) by mouth every 3 days    Candidal intertrigo, Irritant contact dermatitis, unspecified trigger       ipratropium - albuterol 0.5 mg/2.5 mg/3 mL 0.5-2.5 (3) MG/3ML neb solution    DUONEB    30 vial    Take 1 vial (3 mLs) by nebulization every 6 hours as needed for shortness of breath / dyspnea or wheezing    ILD (interstitial lung disease) (H), Bronchitis       loratadine 10 MG tablet    CLARITIN    30 tablet    Take 1 tablet (10 mg) by mouth daily    Seasonal allergies       LORazepam 0.5 MG tablet    ATIVAN    30 tablet    Take 1 tablet (0.5 mg) by mouth every 8 hours as needed for anxiety    Acute reaction to stress       mineral oil-hydrophilic petrolatum     420 g    Apply  topically as needed for dry skin.    Dry skin       MULTIVITAMIN & MINERAL PO      Take 1 tablet by mouth daily        pravastatin 20 MG tablet    PRAVACHOL    90 tablet    TAKE ONE TABLET EVERY DAY    Hyperlipidemia LDL goal <130       Ranolazine 1000 MG Tb12     180 tablet    Take 1,000 mg by mouth 2 times daily    Intermediate coronary syndrome (H)       terbinafine 1 % cream    lamISIL AT    12 g    Apply topically 2 times daily For fungal infection not resolved with other antifungals (e.g. Clotrimazole)    Tinea corporis       triamcinolone 0.1 % cream    KENALOG    30 g    Apply sparingly to affected area three times daily for 14 days.    Irritant contact dermatitis, unspecified trigger       * venlafaxine 37.5 MG tablet    EFFEXOR    270 tablet    TAKE 2 TABLETS BY MOUTH EVERY MORNING AND 1 TABLET EVERY AFTERNOON    Major depressive disorder, recurrent, mild (H)       * venlafaxine 37.5 MG tablet    EFFEXOR    270 tablet    TAKE TWO TABLETS BY MOUTH IN THE MORNING AND ONE IN THE AFTERNOON    Major  depressive disorder, recurrent, mild (H)       VITAMIN D3 PO      2 capsules by mouth DAILY        * Notice:  This list has 2 medication(s) that are the same as other medications prescribed for you. Read the directions carefully, and ask your doctor or other care provider to review them with you.

## 2018-09-17 NOTE — PATIENT INSTRUCTIONS
Use the cream on both   Take the pills 1 every 3 days until gone   If not improving let me know       You can decrease the wellbutrin to 1 time per day for 1 week then you can stop it.

## 2018-10-22 ENCOUNTER — TELEPHONE (OUTPATIENT)
Dept: FAMILY MEDICINE | Facility: CLINIC | Age: 68
End: 2018-10-22

## 2018-10-22 ENCOUNTER — HOSPITAL ENCOUNTER (EMERGENCY)
Facility: CLINIC | Age: 68
Discharge: HOME | End: 2018-10-22
Attending: FAMILY MEDICINE | Admitting: FAMILY MEDICINE
Payer: MEDICARE

## 2018-10-22 VITALS
HEIGHT: 65 IN | WEIGHT: 176 LBS | TEMPERATURE: 98 F | HEART RATE: 77 BPM | OXYGEN SATURATION: 97 % | SYSTOLIC BLOOD PRESSURE: 125 MMHG | BODY MASS INDEX: 29.32 KG/M2 | DIASTOLIC BLOOD PRESSURE: 72 MMHG

## 2018-10-22 DIAGNOSIS — N39.0 URINARY TRACT INFECTION WITHOUT HEMATURIA, SITE UNSPECIFIED: ICD-10-CM

## 2018-10-22 DIAGNOSIS — R19.7 DIARRHEA, UNSPECIFIED TYPE: ICD-10-CM

## 2018-10-22 LAB
ALBUMIN SERPL-MCNC: 3.4 G/DL (ref 3.4–5)
ALBUMIN UR-MCNC: NEGATIVE MG/DL
ALP SERPL-CCNC: 57 U/L (ref 40–150)
ALT SERPL W P-5'-P-CCNC: 32 U/L (ref 0–50)
ANION GAP SERPL CALCULATED.3IONS-SCNC: 7 MMOL/L (ref 3–14)
APPEARANCE UR: ABNORMAL
AST SERPL W P-5'-P-CCNC: 21 U/L (ref 0–45)
BACTERIA #/AREA URNS HPF: ABNORMAL /HPF
BASOPHILS # BLD AUTO: 0 10E9/L (ref 0–0.2)
BASOPHILS NFR BLD AUTO: 0.4 %
BILIRUB SERPL-MCNC: 0.3 MG/DL (ref 0.2–1.3)
BILIRUB UR QL STRIP: NEGATIVE
BUN SERPL-MCNC: 15 MG/DL (ref 7–30)
CALCIUM SERPL-MCNC: 8 MG/DL (ref 8.5–10.1)
CHLORIDE SERPL-SCNC: 108 MMOL/L (ref 94–109)
CO2 SERPL-SCNC: 27 MMOL/L (ref 20–32)
COLOR UR AUTO: YELLOW
CREAT SERPL-MCNC: 0.71 MG/DL (ref 0.52–1.04)
DIFFERENTIAL METHOD BLD: NORMAL
EOSINOPHIL # BLD AUTO: 0.1 10E9/L (ref 0–0.7)
EOSINOPHIL NFR BLD AUTO: 2.5 %
ERYTHROCYTE [DISTWIDTH] IN BLOOD BY AUTOMATED COUNT: 12.5 % (ref 10–15)
GFR SERPL CREATININE-BSD FRML MDRD: 81 ML/MIN/1.7M2
GLUCOSE SERPL-MCNC: 72 MG/DL (ref 70–99)
GLUCOSE UR STRIP-MCNC: NEGATIVE MG/DL
HCT VFR BLD AUTO: 38.3 % (ref 35–47)
HGB BLD-MCNC: 12.7 G/DL (ref 11.7–15.7)
HGB UR QL STRIP: NEGATIVE
IMM GRANULOCYTES # BLD: 0 10E9/L (ref 0–0.4)
IMM GRANULOCYTES NFR BLD: 0.8 %
KETONES UR STRIP-MCNC: NEGATIVE MG/DL
LEUKOCYTE ESTERASE UR QL STRIP: ABNORMAL
LYMPHOCYTES # BLD AUTO: 1.6 10E9/L (ref 0.8–5.3)
LYMPHOCYTES NFR BLD AUTO: 30.3 %
MCH RBC QN AUTO: 32 PG (ref 26.5–33)
MCHC RBC AUTO-ENTMCNC: 33.2 G/DL (ref 31.5–36.5)
MCV RBC AUTO: 97 FL (ref 78–100)
MONOCYTES # BLD AUTO: 0.5 10E9/L (ref 0–1.3)
MONOCYTES NFR BLD AUTO: 10 %
MUCOUS THREADS #/AREA URNS LPF: PRESENT /LPF
NEUTROPHILS # BLD AUTO: 2.9 10E9/L (ref 1.6–8.3)
NEUTROPHILS NFR BLD AUTO: 56 %
NITRATE UR QL: NEGATIVE
NRBC # BLD AUTO: 0 10*3/UL
NRBC BLD AUTO-RTO: 0 /100
PH UR STRIP: 6 PH (ref 5–7)
PLATELET # BLD AUTO: 244 10E9/L (ref 150–450)
POTASSIUM SERPL-SCNC: 4.1 MMOL/L (ref 3.4–5.3)
PROT SERPL-MCNC: 6.9 G/DL (ref 6.8–8.8)
RBC # BLD AUTO: 3.97 10E12/L (ref 3.8–5.2)
RBC #/AREA URNS AUTO: 7 /HPF (ref 0–2)
SODIUM SERPL-SCNC: 142 MMOL/L (ref 133–144)
SOURCE: ABNORMAL
SP GR UR STRIP: 1.01 (ref 1–1.03)
SQUAMOUS #/AREA URNS AUTO: 5 /HPF (ref 0–1)
UROBILINOGEN UR STRIP-MCNC: 0 MG/DL (ref 0–2)
WBC # BLD AUTO: 5.2 10E9/L (ref 4–11)
WBC #/AREA URNS AUTO: 11 /HPF (ref 0–5)
WBC CLUMPS #/AREA URNS HPF: PRESENT /HPF
YEAST #/AREA URNS HPF: ABNORMAL /HPF

## 2018-10-22 PROCEDURE — 96360 HYDRATION IV INFUSION INIT: CPT | Performed by: FAMILY MEDICINE

## 2018-10-22 PROCEDURE — 85025 COMPLETE CBC W/AUTO DIFF WBC: CPT | Performed by: FAMILY MEDICINE

## 2018-10-22 PROCEDURE — 99284 EMERGENCY DEPT VISIT MOD MDM: CPT | Mod: Z6 | Performed by: FAMILY MEDICINE

## 2018-10-22 PROCEDURE — 99283 EMERGENCY DEPT VISIT LOW MDM: CPT | Mod: 25 | Performed by: FAMILY MEDICINE

## 2018-10-22 PROCEDURE — 25000128 H RX IP 250 OP 636: Performed by: FAMILY MEDICINE

## 2018-10-22 PROCEDURE — 81001 URINALYSIS AUTO W/SCOPE: CPT | Performed by: FAMILY MEDICINE

## 2018-10-22 PROCEDURE — 87086 URINE CULTURE/COLONY COUNT: CPT | Performed by: FAMILY MEDICINE

## 2018-10-22 PROCEDURE — 80053 COMPREHEN METABOLIC PANEL: CPT | Performed by: FAMILY MEDICINE

## 2018-10-22 RX ORDER — CIPROFLOXACIN 500 MG/1
500 TABLET, FILM COATED ORAL 2 TIMES DAILY
Qty: 6 TABLET | Refills: 0 | Status: SHIPPED | OUTPATIENT
Start: 2018-10-22 | End: 2019-02-14

## 2018-10-22 RX ORDER — SODIUM CHLORIDE 9 MG/ML
1000 INJECTION, SOLUTION INTRAVENOUS CONTINUOUS
Status: DISCONTINUED | OUTPATIENT
Start: 2018-10-22 | End: 2018-10-22 | Stop reason: HOSPADM

## 2018-10-22 RX ADMIN — SODIUM CHLORIDE 1000 ML: 9 INJECTION, SOLUTION INTRAVENOUS at 20:21

## 2018-10-22 NOTE — ED AVS SNAPSHOT
[facility] Emergency Department    [address] [address]    Phone:  [phone]    Fax:  [phone]                                       Albina Pedro   MRN: [id number]    Department:  [facility] Department   Date of Visit:  10/22/2018           Patient Information     Date Of Birth          1950        Your diagnoses for this visit were:     Diarrhea, unspecified type unclear cause, but no serious findings here.  However with recurrent episodes consider colonoscopy, consider gluten enteropathy.  consider giardia given recurrent episodes.  return for bloody stool, fever, signs of dysentary - would recommend stool testing if that occurs.    Urinary tract infection without hematuria, site unspecified Take cipro 500 mg orally twice daily for 3 days.  await urine culture       You were seen by Allan Ramirez MD.      Follow-up Information     Follow up with Carolina Burrell MD In 4 days.    Specialty:  Family Practice    Contact information:    [id number] [place] [address] [id number] [phone]          Follow up with [facility].    Specialty:  EMERGENCY MEDICINE    Why:  As needed, If symptoms worsen    Contact information:    [address] [address] [phone]    Additional information:    The medical center is located at   [address]. (between [address]   [address] in [place], [place] north   of [place]).        Discharge Instructions         ICD-10-CM    1. Diarrhea, unspecified type R19.7     unclear cause, but no serious findings here.  However with recurrent episodes consider colonoscopy, consider gluten enteropathy.  consider giardia given recurrent episodes.  return for bloody stool, fever, signs of dysentary - would recommend stool testing if that occurs.   2. Urinary tract infection without hematuria, site unspecified N39.0     Take cipro 500 mg orally twice daily for 3 days.  await  urine culture         Diarrhea with Uncertain Cause (Adult)    Diarrhea is when stools are loose and watery. This can be caused by:    Viral infections    Bacterial infections    Food poisoning    Parasites    Irritable bowel syndrome (IBS)    Inflammatory bowel diseases such as ulcerative colitis, Crohn's disease, and celiac disease    Food intolerance, such as to lactose, the sugar found in milk and milk products    Reaction to medicines like antibiotics, laxatives, cancer drugs, and antacids  Along with diarrhea, you may also have:    Abdominal pain and cramping    Nausea and vomiting    Loss of bowel control    Fever and chills    Bloody stools  In some cases, antibiotics may help to treat diarrhea. You may have a stool sample test. This is done to see what is causing your diarrhea, and if antibiotics will help treat it. The results of a stool sample test may take up to 2 days. The healthcare provider may not give you antibiotics until he or she has the stool test results.  Diarrhea can cause dehydration. This is the loss of too much water and other fluids from the body. When this occurs, body fluid must be replaced. This can be done with oral rehydration solutions. Oral rehydration solutions are available at drugstores and grocery stores without a prescription.  Home care  Follow all instructions given by your healthcare provider. Rest at home for the next 24 hours, or until you feel better. Avoid caffeine, tobacco, and alcohol. These can make diarrhea, cramping, and pain worse.  If taking medicines:    Don t take over-the-counter diarrhea or nausea medicines unless your healthcare provider tells you to.    You may use acetaminophen or NSAID medicines like ibuprofen or naproxen to reduce pain and fever. Don t use these if you have chronic liver or kidney disease, or ever had a stomach ulcer or gastrointestinal bleeding. Don't use NSAID medicines if you are already taking one for another condition (like  arthritis) or are on daily aspirin therapy (such as for heart disease or after a stroke). Talk with your healthcare provider first.    If antibiotics were prescribed, be sure you take them until they are finished. Don t stop taking them even when you feel better. Antibiotics must be taken as a full course.  To prevent the spread of illness:    Remember that washing with soap and water and using alcohol-based  is the best way to prevent the spread of infection.    Clean the toilet after each use.    Wash your hands before eating.    Wash your hands before and after preparing food. Keep in mind that people with diarrhea or vomiting should not prepare food for others.    Wash your hands after using cutting boards, countertops, and knives that have been in contact with raw foods.    Wash and then peel fruits and vegetables.    Keep uncooked meats away from cooked and ready-to-eat foods.    Use a food thermometer when cooking. Cook poultry to at least 165 F (74 C). Cook ground meat (beef, veal, pork, lamb) to at least 160 F (71 C). Cook fresh beef, veal, lamb, and pork to at least 145 F (63 C).    Don t eat raw or undercooked eggs (poached or sara side up), poultry, meat, or unpasteurized milk and juices.  Food and drinks  The main goal while treating vomiting or diarrhea is to prevent dehydration. This is done by taking small amounts of liquids often.    Keep in mind that liquids are more important than food right now.    Drink only small amounts of liquids at a time.    Don t force yourself to eat, especially if you are having cramping, vomiting, or diarrhea. Don t eat large amounts at a time, even if you are hungry.    If you eat, avoid fatty, greasy, spicy, or fried foods.    Don t eat dairy foods or drink milk if you have diarrhea. These can make diarrhea worse.  During the first 24 hours you can try:    Oral rehydration solutions. Do not use sports drinks. They have too much sugar and not enough  electrolytes.    Soft drinks without caffeine    Ginger ale    Water (plain or flavored)    Decaf tea or coffee    Clear broth, consommé, or bouillon    Gelatin, popsicles, or frozen fruit juice bars  The second 24 hours, if you are feeling better, you can add:    Hot cereal, plain toast, bread, rolls, or crackers    Plain noodles, rice, mashed potatoes, chicken noodle soup, or rice soup    Unsweetened canned fruit (no pineapple)    Bananas  As you recover:    Limit fat intake to less than 15 grams per day. Don t eat margarine, butter, oils, mayonnaise, sauces, gravies, fried foods, peanut butter, meat, poultry, or fish.    Limit fiber. Don t eat raw or cooked vegetables, fresh fruits except bananas, or bran cereals.    Limit caffeine and chocolate.    Limit dairy.    Don t use spices or seasonings except salt.    Go back to your normal diet over time, as you feel better and your symptoms improve.    If the symptoms come back, go back to a simple diet or clear liquids.  Follow-up care  Follow up with your healthcare provider, or as advised. If a stool sample was taken or cultures were done, call the healthcare provider for the results as instructed.  Call 911  Call 911 if you have any of these symptoms:    Trouble breathing    Confusion    Extreme drowsiness or trouble walking    Loss of consciousness    Rapid heart rate    Chest pain    Stiff neck    Seizure  When to seek medical advice  Call your healthcare provider right away if any of these occur:    Abdominal pain that gets worse    Constant lower right abdominal pain    Continued vomiting and inability to keep liquids down    Diarrhea more than 5 times a day    Blood in vomit or stool    Dark urine or no urine for 8 hours, dry mouth and tongue, tiredness, weakness, or dizziness    Drowsiness    New rash    You don t get better in 2 to 3 days    Fever of 100.4 F (38 C) or higher, or as directed by your healthcare provider  Date Last Reviewed: 1/3/2016     [id number] [employer]. [address] [place] [address]. All rights reserved. This information is not intended as a substitute for professional medical care. Always follow your healthcare professional's instructions.          Your next 10 appointments already scheduled     May 03, 2019 11:00 AM CDT   [facility] Driss [facility]bone [facility]   [facility]    [address] [place] [address]   [phone]            Jun 18, 2019 12:30 PM CDT   FULL PULMONARY FUNCTION with UC PFL A   [facility] Pulmonary Function Testing ([facility])    [address]  3rd Floor  [facility] [id number] [phone]            Jun 18, 2019  1:30 PM CDT   (Arrive by 1:15 PM)   Return Interstitial Lung with David Morris Perlman, MD   [facility] ([facility] [place])    [address]  [address]  [facility] [id number] [phone]              24 Hour Appointment Hotline       To make an appointment at any [facility], call [phone] ([phone]). If you don't have a family doctor or clinic, we will help you find one. [facility] are conveniently located to serve the needs of you and your family.             Review of your medicines      START taking        Dose / Directions Last dose taken    ciprofloxacin 500 MG tablet   Commonly known as:  CIPRO   Dose:  500 mg   Quantity:  6 tablet        Take 1 tablet (500 mg) by mouth 2 times daily for 3 days   Refills:  0          Our records show that you are taking the medicines listed below. If these are incorrect, please call your family doctor or clinic.        Dose / Directions Last dose taken    albuterol 108 (90 Base) MCG/ACT inhaler   Commonly known as:  PROAIR HFA/PROVENTIL HFA/VENTOLIN HFA   Dose:  2 puff   Quantity:  3 Inhaler        Inhale 2 puffs into the lungs every 4 hours as needed  for shortness of breath / dyspnea   Refills:  6        aspirin 81 MG tablet   Dose:  81 mg   Quantity:  30 tablet        Take 81 mg by mouth every evening   Refills:  0        buPROPion 100 MG 12 hr tablet   Commonly known as:  WELLBUTRIN SR   Quantity:  180 tablet        TAKE ONE TABLET BY MOUTH TWICE DAILY   Refills:  2        calcium carbonate 500 mg (elemental) 500 MG tablet   Commonly known as:  OS-SARI   Dose:  1 tablet        Take 1 tablet by mouth 2 times daily   Refills:  0        fluconazole 150 MG tablet   Commonly known as:  DIFLUCAN   Dose:  150 mg   Quantity:  4 tablet        Take 1 tablet (150 mg) by mouth every 3 days   Refills:  0        ipratropium - albuterol 0.5 mg/2.5 mg/3 mL 0.5-2.5 (3) MG/3ML neb solution   Commonly known as:  DUONEB   Dose:  1 vial   Quantity:  30 vial        Take 1 vial (3 mLs) by nebulization every 6 hours as needed for shortness of breath / dyspnea or wheezing   Refills:  1        loratadine 10 MG tablet   Commonly known as:  CLARITIN   Dose:  10 mg   Quantity:  30 tablet        Take 1 tablet (10 mg) by mouth daily   Refills:  1        LORazepam 0.5 MG tablet   Commonly known as:  ATIVAN   Dose:  0.5 mg   Quantity:  30 tablet        Take 1 tablet (0.5 mg) by mouth every 8 hours as needed for anxiety   Refills:  1        mineral oil-hydrophilic petrolatum   Quantity:  420 g        Apply  topically as needed for dry skin.   Refills:  4        MULTIVITAMIN & MINERAL PO   Dose:  1 tablet        Take 1 tablet by mouth daily   Refills:  0        pravastatin 20 MG tablet   Commonly known as:  PRAVACHOL   Quantity:  90 tablet        TAKE ONE TABLET EVERY DAY   Refills:  2        Ranolazine 1000 MG Tb12   Dose:  1000 mg   Quantity:  180 tablet        Take 1,000 mg by mouth 2 times daily   Refills:  3        terbinafine 1 % cream   Commonly known as:  lamISIL AT   Quantity:  12 g        Apply topically 2 times daily For fungal infection not resolved with other antifungals (e.g.  Clotrimazole)   Refills:  1        triamcinolone 0.1 % cream   Commonly known as:  KENALOG   Quantity:  30 g        Apply sparingly to affected area three times daily for 14 days.   Refills:  1        * venlafaxine 37.5 MG tablet   Commonly known as:  EFFEXOR   Quantity:  270 tablet        TAKE 2 TABLETS BY MOUTH EVERY MORNING AND 1 TABLET EVERY AFTERNOON   Refills:  0        * venlafaxine 37.5 MG tablet   Commonly known as:  EFFEXOR   Quantity:  270 tablet        TAKE TWO TABLETS BY MOUTH IN THE MORNING AND ONE IN THE AFTERNOON   Refills:  0        VITAMIN D3 PO        2 capsules by mouth DAILY   Refills:  0        * Notice:  This list has 2 medication(s) that are the same as other medications prescribed for you. Read the directions carefully, and ask your doctor or other care provider to review them with you.            Prescriptions were sent or printed at these locations (1 Prescription)                   Other Prescriptions                Printed at Department/Unit printer (1 of 1)         ciprofloxacin (CIPRO) 500 MG tablet                Procedures and tests performed during your visit     CBC with platelets differential    Comprehensive metabolic panel    Peripheral IV catheter    UA reflex to Microscopic      Orders Needing Specimen Collection     None      Pending Results     No orders found from 10/20/2018 to 10/23/2018.            Pending Culture Results     No orders found from 10/20/2018 to 10/23/2018.            Pending Results Instructions     If you had any lab results that were not finalized at the time of your Discharge, you can call the ED Lab Result RN at [phone]. You will be contacted by this team for any positive Lab results or changes in treatment. The nurses are available 7 days a week from 10A to 6:30P.  You can leave a message 24 hours per day and they will return your call.        Test Results From Your Hospital Stay        10/22/2018  5:00 PM      Component Results     Component  Value Ref Range & Units Status    WBC 5.2 4.0 - 11.0 10e9/L Final    RBC Count 3.97 3.8 - 5.2 10e12/L Final    Hemoglobin 12.7 11.7 - 15.7 g/dL Final    Hematocrit 38.3 35.0 - 47.0 % Final    MCV 97 78 - 100 fl Final    MCH 32.0 26.5 - 33.0 pg Final    MCHC 33.2 31.5 - 36.5 g/dL Final    RDW 12.5 10.0 - 15.0 % Final    Platelet Count 244 150 - 450 10e9/L Final    Diff Method Automated Method  Final    % Neutrophils 56.0 % Final    % Lymphocytes 30.3 % Final    % Monocytes 10.0 % Final    % Eosinophils 2.5 % Final    % Basophils 0.4 % Final    % Immature Granulocytes 0.8 % Final    Nucleated RBCs 0 0 /100 Final    Absolute Neutrophil 2.9 1.6 - 8.3 10e9/L Final    Absolute Lymphocytes 1.6 0.8 - 5.3 10e9/L Final    Absolute Monocytes 0.5 0.0 - 1.3 10e9/L Final    Absolute Eosinophils 0.1 0.0 - 0.7 10e9/L Final    Absolute Basophils 0.0 0.0 - 0.2 10e9/L Final    Abs Immature Granulocytes 0.0 0 - 0.4 10e9/L Final    Absolute Nucleated RBC 0.0  Final         10/22/2018  5:22 PM      Component Results     Component Value Ref Range & Units Status    Sodium 142 133 - 144 mmol/L Final    Potassium 4.1 3.4 - 5.3 mmol/L Final    Chloride 108 94 - 109 mmol/L Final    Carbon Dioxide 27 20 - 32 mmol/L Final    Anion Gap 7 3 - 14 mmol/L Final    Glucose 72 70 - 99 mg/dL Final    Urea Nitrogen 15 7 - 30 mg/dL Final    Creatinine 0.71 0.52 - 1.04 mg/dL Final    GFR Estimate 81 >60 mL/min/1.7m2 Final    Non  GFR Calc    GFR Estimate If Black >90 >60 mL/min/1.7m2 Final    African American GFR Calc    Calcium 8.0 (L) 8.5 - 10.1 mg/dL Final    Bilirubin Total 0.3 0.2 - 1.3 mg/dL Final    Albumin 3.4 3.4 - 5.0 g/dL Final    Protein Total 6.9 6.8 - 8.8 g/dL Final    Alkaline Phosphatase 57 40 - 150 U/L Final    ALT 32 0 - 50 U/L Final    AST 21 0 - 45 U/L Final         10/22/2018  8:41 PM      Component Results     Component Value Ref Range & Units Status    Color Urine Yellow  Final    Appearance Urine Cloudy  Final     Glucose Urine Negative NEG^Negative mg/dL Final    Bilirubin Urine Negative NEG^Negative Final    Ketones Urine Negative NEG^Negative mg/dL Final    Specific Gravity Urine 1.010 1.003 - 1.035 Final    Blood Urine Negative NEG^Negative Final    pH Urine 6.0 5.0 - 7.0 pH Final    Protein Albumin Urine Negative NEG^Negative mg/dL Final    Urobilinogen mg/dL 0.0 0.0 - 2.0 mg/dL Final    Nitrite Urine Negative NEG^Negative Final    Leukocyte Esterase Urine Small (A) NEG^Negative Final    Source Midstream Urine  Final    RBC Urine 7 (H) 0 - 2 /HPF Final    WBC Urine 11 (H) 0 - 5 /HPF Final    WBC Clumps Present (A) NEG^Negative /HPF Final    Bacteria Urine Few (A) NEG^Negative /HPF Final    Yeast Urine Few (A) NEG^Negative /HPF Final    Squamous Epithelial /HPF Urine 5 (H) 0 - 1 /HPF Final    Mucous Urine Present (A) NEG^Negative /LPF Final                Thank you for choosing [facility]       Thank you for choosing [facility] for your care. Our goal is always to provide you with excellent care. Hearing back from our patients is one way we can continue to improve our services. Please take a few minutes to complete the written survey that you may receive in the mail after you visit with us. Thank you!        [employer] Information     [employer] gives you secure access to your electronic health record. If you see a primary care provider, you can also send messages to your care team and make appointments. If you have questions, please call your primary care clinic.  If you do not have a primary care provider, please call [phone] and they will assist you.        Care EveryWhere ID     This is your Care EveryWhere ID. This could be used by other organizations to access your [facility] medical records  [id number]        Equal Access to Services     JESSICA MAYFIELD : Kath Chávez, norman lopez, chay frazier. So [facility] [phone].    ATENCIÓN: Ajit vergara,  tiene a méndez disposición servicios gratuitos de asistencia lingüística. Lltonya al [phone].    We comply with applicable federal civil rights laws and [place] laws. We do not discriminate on the basis of race, color, national origin, age, disability, sex, sexual orientation, or gender identity.            After Visit Summary       This is your record. Keep this with you and show to your community pharmacist(s) and doctor(s) at your next visit.

## 2018-10-22 NOTE — TELEPHONE ENCOUNTER
Reason for Call:  Other call back    Detailed comments: Pt has had nausea and diarrhea off and on for 10 days. She takes a couple of naps a day. The other day she had a couple of episodes on chest pain on left side but it was just that day. Today she woke up feeling really good and now she feels sick again.    Phone Number Patient can be reached at: Home number on file [phone] (home)    Best Time: any    Can we leave a detailed message on this number?     Call taken on 10/22/2018 at 1:51 PM by Ernestine Perdomo

## 2018-10-22 NOTE — ED AVS SNAPSHOT
[facility] Emergency Department    [address] [address]    Phone:  [phone]    Fax:  [phone]                                       Albina Pedro   MRN: [id number]    Department:  [facility] Emergency Department   Date of Visit:  10/22/2018           After Visit Summary Signature Page     I have received my discharge instructions, and my questions have been answered. I have discussed any challenges I see with this plan with the nurse or doctor.    ..........................................................................................................................................  Patient/Patient Representative Signature      ..........................................................................................................................................  Patient Representative Print Name and Relationship to Patient    ..................................................               ................................................  Date                                   Time    ..........................................................................................................................................  Reviewed by Signature/Title    ...................................................              ..............................................  Date                                               Time          22EPIC Rev 08/18

## 2018-10-22 NOTE — TELEPHONE ENCOUNTER
SHAKILA to Dr. Burrell: Patient reports feeling queasy, nauseated  and stomach upset. Has not thrown up. Reports a little pain on left side of chest upon inhaling and exhaling. Feels shakey and out of sorts. Stuttering. This also happened about a month ago but was not assessed by a medical Provider at that time.  Denies fever. Reports that she has some upper back pain. Advised that she have someone take her the ED now. She agreed with plan and htat her  would take her now.   Suhail Silva RN

## 2018-10-23 ASSESSMENT — ENCOUNTER SYMPTOMS
PALPITATIONS: 0
HEADACHES: 0
SINUS PRESSURE: 0
CHILLS: 0
FREQUENCY: 0
VOMITING: 0
DIAPHORESIS: 0
FATIGUE: 1
WEAKNESS: 1
SORE THROAT: 0
DIARRHEA: 1
NAUSEA: 1
DYSURIA: 0
FEVER: 0
BLOOD IN STOOL: 0
CONSTIPATION: 0
COUGH: 0
SHORTNESS OF BREATH: 0
ABDOMINAL PAIN: 0
WHEEZING: 0

## 2018-10-23 NOTE — DISCHARGE INSTRUCTIONS
ICD-10-CM    1. Diarrhea, unspecified type R19.7     unclear cause, but no serious findings here.  However with recurrent episodes consider colonoscopy, consider gluten enteropathy.  consider giardia given recurrent episodes.  return for bloody stool, fever, signs of dysentary - would recommend stool testing if that occurs.   2. Urinary tract infection without hematuria, site unspecified N39.0     Take cipro 500 mg orally twice daily for 3 days.  await urine culture         Diarrhea with Uncertain Cause (Adult)    Diarrhea is when stools are loose and watery. This can be caused by:    Viral infections    Bacterial infections    Food poisoning    Parasites    Irritable bowel syndrome (IBS)    Inflammatory bowel diseases such as ulcerative colitis, Crohn's disease, and celiac disease    Food intolerance, such as to lactose, the sugar found in milk and milk products    Reaction to medicines like antibiotics, laxatives, cancer drugs, and antacids  Along with diarrhea, you may also have:    Abdominal pain and cramping    Nausea and vomiting    Loss of bowel control    Fever and chills    Bloody stools  In some cases, antibiotics may help to treat diarrhea. You may have a stool sample test. This is done to see what is causing your diarrhea, and if antibiotics will help treat it. The results of a stool sample test may take up to 2 days. The healthcare provider may not give you antibiotics until he or she has the stool test results.  Diarrhea can cause dehydration. This is the loss of too much water and other fluids from the body. When this occurs, body fluid must be replaced. This can be done with oral rehydration solutions. Oral rehydration solutions are available at drugstores and grocery stores without a prescription.  Home care  Follow all instructions given by your healthcare provider. Rest at home for the next 24 hours, or until you feel better. Avoid caffeine, tobacco, and alcohol. These can make diarrhea,  cramping, and pain worse.  If taking medicines:    Don t take over-the-counter diarrhea or nausea medicines unless your healthcare provider tells you to.    You may use acetaminophen or NSAID medicines like ibuprofen or naproxen to reduce pain and fever. Don t use these if you have chronic liver or kidney disease, or ever had a stomach ulcer or gastrointestinal bleeding. Don't use NSAID medicines if you are already taking one for another condition (like arthritis) or are on daily aspirin therapy (such as for heart disease or after a stroke). Talk with your healthcare provider first.    If antibiotics were prescribed, be sure you take them until they are finished. Don t stop taking them even when you feel better. Antibiotics must be taken as a full course.  To prevent the spread of illness:    Remember that washing with soap and water and using alcohol-based  is the best way to prevent the spread of infection.    Clean the toilet after each use.    Wash your hands before eating.    Wash your hands before and after preparing food. Keep in mind that people with diarrhea or vomiting should not prepare food for others.    Wash your hands after using cutting boards, countertops, and knives that have been in contact with raw foods.    Wash and then peel fruits and vegetables.    Keep uncooked meats away from cooked and ready-to-eat foods.    Use a food thermometer when cooking. Cook poultry to at least 165 F (74 C). Cook ground meat (beef, veal, pork, lamb) to at least 160 F (71 C). Cook fresh beef, veal, lamb, and pork to at least 145 F (63 C).    Don t eat raw or undercooked eggs (poached or sara side up), poultry, meat, or unpasteurized milk and juices.  Food and drinks  The main goal while treating vomiting or diarrhea is to prevent dehydration. This is done by taking small amounts of liquids often.    Keep in mind that liquids are more important than food right now.    Drink only small amounts of liquids at  a time.    Don t force yourself to eat, especially if you are having cramping, vomiting, or diarrhea. Don t eat large amounts at a time, even if you are hungry.    If you eat, avoid fatty, greasy, spicy, or fried foods.    Don t eat dairy foods or drink milk if you have diarrhea. These can make diarrhea worse.  During the first 24 hours you can try:    Oral rehydration solutions. Do not use sports drinks. They have too much sugar and not enough electrolytes.    Soft drinks without caffeine    Ginger ale    Water (plain or flavored)    Decaf tea or coffee    Clear broth, consommé, or bouillon    Gelatin, popsicles, or frozen fruit juice bars  The second 24 hours, if you are feeling better, you can add:    Hot cereal, plain toast, bread, rolls, or crackers    Plain noodles, rice, mashed potatoes, chicken noodle soup, or rice soup    Unsweetened canned fruit (no pineapple)    Bananas  As you recover:    Limit fat intake to less than 15 grams per day. Don t eat margarine, butter, oils, mayonnaise, sauces, gravies, fried foods, peanut butter, meat, poultry, or fish.    Limit fiber. Don t eat raw or cooked vegetables, fresh fruits except bananas, or bran cereals.    Limit caffeine and chocolate.    Limit dairy.    Don t use spices or seasonings except salt.    Go back to your normal diet over time, as you feel better and your symptoms improve.    If the symptoms come back, go back to a simple diet or clear liquids.  Follow-up care  Follow up with your healthcare provider, or as advised. If a stool sample was taken or cultures were done, call the healthcare provider for the results as instructed.  Call 911  Call 911 if you have any of these symptoms:    Trouble breathing    Confusion    Extreme drowsiness or trouble walking    Loss of consciousness    Rapid heart rate    Chest pain    Stiff neck    Seizure  When to seek medical advice  Call your healthcare provider right away if any of these occur:    Abdominal pain that  gets worse    Constant lower right abdominal pain    Continued vomiting and inability to keep liquids down    Diarrhea more than 5 times a day    Blood in vomit or stool    Dark urine or no urine for 8 hours, dry mouth and tongue, tiredness, weakness, or dizziness    Drowsiness    New rash    You don t get better in 2 to 3 days    Fever of 100.4 F (38 C) or higher, or as directed by your healthcare provider  Date Last Reviewed: 1/3/2016    6698-1043 [employer]. [address] [place] [address]85. All rights reserved. This information is not intended as a substitute for professional medical care. Always follow your healthcare professional's instructions.

## 2018-10-23 NOTE — ED TRIAGE NOTES
Pt states that she has had diarrhea intermittently for the last 10 days. The last episode was  Yesterday. Pt states that she is drinking fluids well. Has some nausea/ no vomiting. Denies pain.

## 2018-10-23 NOTE — ED PROVIDER NOTES
History     Chief Complaint   Patient presents with     Diarrhea     diarrhea for past 10 days, x8 bouts a day, nauseated, weak, fatigue     HPI  Albina Pedro is a 67 year old female with ILD, CAD,  who presents with no spoiled food intake, no recent travel, no contagious contacts, no recent antibiotics, No steroid use  with diarrhea on  1 week ago saturday malaise, overnight 3 am diarrhea 8-10 stools after that - initial mixed consistency and then straight water. No blood. no mucous.No vomiting.  diarrhea has persisted since then, multiple stools/day - liquidy.  was improved Thursday, then friday  was better and able to do activity all day. saturday stephen was worse again after chicken and rice meal (her  ate this as well and did not become ill).  No blood in the stool.  No fever.  nausea.  The patient denies dysuria, frequency or hematuria.   sensation of feeling ill  brief episode of pleuritic chest pain a few days ago that was brief and did not recur.   otherwise no chest pain or shortness of breath,  feeling generally fatigued and weak.      Problem List:    Patient Active Problem List    Diagnosis Date Noted     Intermediate coronary syndrome (H) 07/24/2018     Priority: Medium     ILD (interstitial lung disease) (H) 07/09/2018     Priority: Medium     Needs follow up ct dec 2019       Hyperlipidemia LDL goal <70 05/31/2018     Priority: Medium     Major depressive disorder, recurrent, mild (H) 02/01/2017     Priority: Medium     Acute chest pain 12/13/2016     Priority: Medium     Status post coronary angiogram 02/18/2016     Priority: Medium     Adverse effect of anesthetic 02/11/2016     Priority: Medium     Patient is very sensitive to anesthetics. Needs lower dosing.        Sjogren's syndrome (H) 01/15/2014     Priority: Medium     CAD (coronary artery disease) 09/18/2013     Priority: Medium     Mild ischemia on stress test       Advanced directives, counseling/discussion 01/12/2012      Priority: Medium     Advance Directive Problem List Overview:   Name Relationship Phone    Primary Health Care Agent            Alternative Health Care Agent          Discussed advance care planning with patient; information given to patient to review.     Daija Black CMA, HC Facilitator    2012          Panniculitis 11/10/2011     Priority: Medium     Health Care Home 06/15/2011     Priority: Medium     X  DX V65.8 REPLACED WITH 81870 HEALTH CARE HOME (2013)       CARDIOVASCULAR SCREENING; LDL GOAL LESS THAN 70 10/31/2010     Priority: Medium     Episodic mood disorder (H) 2007     Priority: Medium     2007 - Prozac and will look into light box.   April 15, 2008 - Will stop for summer. Light box rx.  Problem list name updated by automated process. Provider to review       Dermatophytosis of body 2007     Priority: Medium     2007 - Classic appearance and worsening in areas's not using Nystatin.  Will use everywhere or change to clotrimazole.   April 15, 2008 - Change to powder.   Problem list name updated by automated process. Provider to review       DIZZINESS - LIKELY HYPOGLYCEMIA 2006     Priority: Medium     2008- negative Event monitor and GTT showed some elevation.  Dietary changes.             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 chf?h/o CVA     Alzheimer Disease Father      Hypertension Father      Neurologic Disorder Father      CVA     Diabetes Maternal Grandmother      Breast Cancer Maternal Aunt      Breast Cancer Paternal Aunt      Cancer - colorectal No family hx of      Prostate Cancer No family hx of        Social History:  Marital Status:   [2]  Social History   Substance Use Topics     Smoking status: Never Smoker     Smokeless tobacco: Never Used     Alcohol use 0.0 oz/week     0 Standard drinks or equivalent per week      Comment: 1 glass of wine every 2 weeks        Medications:      albuterol (PROAIR HFA/PROVENTIL HFA/VENTOLIN HFA) 108 (90 BASE) MCG/ACT Inhaler   aspirin 81 MG tablet   buPROPion (WELLBUTRIN SR) 100 MG 12 hr tablet   calcium carbonate (OS-SARI 500 MG [employer]. CA) 1250 MG tablet   fluconazole (DIFLUCAN) 150 MG tablet   ipratropium - albuterol 0.5 mg/2.5 mg/3 mL (DUONEB) 0.5-2.5 (3) MG/3ML nebulization   loratadine (CLARITIN) 10 MG tablet   LORazepam (ATIVAN) 0.5 MG tablet   mineral oil-hydrophilic petrolatum (AQUAPHOR) ointment   Multiple Vitamins-Minerals (MULTIVITAMIN & MINERAL PO)   pravastatin (PRAVACHOL) 20 MG tablet   Ranolazine 1000 MG TB12   terbinafine (LAMISIL AT) 1 % cream   triamcinolone (KENALOG) 0.1 % cream   venlafaxine (EFFEXOR) 37.5 MG tablet   venlafaxine (EFFEXOR) 37.5 MG tablet   VITAMIN D3 OR         Review of Systems   Constitutional: Positive for fatigue. Negative for chills, diaphoresis and fever.   HENT: Negative for ear pain, sinus pressure and sore throat.    Eyes: Negative for visual disturbance.   Respiratory: Negative for cough, shortness of breath and wheezing.    Cardiovascular: Negative for chest pain and palpitations.   Gastrointestinal: Positive for diarrhea and nausea. Negative for abdominal pain, blood in stool, constipation and vomiting.   Genitourinary: Negative for dysuria, frequency and urgency.   Skin: Negative for  "rash.   Neurological: Positive for weakness. Negative for headaches.   All other systems reviewed and are negative.      Physical Exam   BP: 125/72  Pulse: 77  Temp: 98  F (36.7  C)  Height: 165.1 cm (5' 5\")  Weight: 79.8 kg (176 lb)  SpO2: 97 %      Physical Exam   Constitutional: No distress.   HENT:   Mouth/Throat: Oropharynx is clear and moist.   Eyes: Conjunctivae are normal.   Neck: Neck supple.   Cardiovascular: Normal rate and regular rhythm.    No murmur heard.  Pulmonary/Chest: Effort normal and breath sounds normal. No respiratory distress. She has no wheezes. She has no rales.   Abdominal: Soft. Bowel sounds are normal. She exhibits no distension. There is tenderness in the suprapubic area. There is no rebound and no guarding.   Musculoskeletal: She exhibits no edema.   Neurological: She is alert. She exhibits normal muscle tone.   Skin: No rash noted. She is not diaphoretic.       ED Course     ED Course     Procedures               Critical Care time:  none               Results for orders placed or performed during the hospital encounter of 10/22/18 (from the past 24 hour(s))   CBC with platelets differential   Result Value Ref Range    WBC 5.2 4.0 - 11.0 10e9/L    RBC Count 3.97 3.8 - 5.2 10e12/L    Hemoglobin 12.7 11.7 - 15.7 g/dL    Hematocrit 38.3 35.0 - 47.0 %    MCV 97 78 - 100 fl    MCH 32.0 26.5 - 33.0 pg    MCHC 33.2 31.5 - 36.5 g/dL    RDW 12.5 10.0 - 15.0 %    Platelet Count 244 150 - 450 10e9/L    Diff Method Automated Method     % Neutrophils 56.0 %    % Lymphocytes 30.3 %    % Monocytes 10.0 %    % Eosinophils 2.5 %    % Basophils 0.4 %    % Immature Granulocytes 0.8 %    Nucleated RBCs 0 0 /100    Absolute Neutrophil 2.9 1.6 - 8.3 10e9/L    Absolute Lymphocytes 1.6 0.8 - 5.3 10e9/L    Absolute Monocytes 0.5 0.0 - 1.3 10e9/L    Absolute Eosinophils 0.1 0.0 - 0.7 10e9/L    Absolute Basophils 0.0 0.0 - 0.2 10e9/L    Abs Immature Granulocytes 0.0 0 - 0.4 10e9/L    Absolute Nucleated RBC 0.0 "    Comprehensive metabolic panel   Result Value Ref Range    Sodium 142 133 - 144 mmol/L    Potassium 4.1 3.4 - 5.3 mmol/L    Chloride 108 94 - 109 mmol/L    Carbon Dioxide 27 20 - 32 mmol/L    Anion Gap 7 3 - 14 mmol/L    Glucose 72 70 - 99 mg/dL    Urea Nitrogen 15 7 - 30 mg/dL    Creatinine 0.71 0.52 - 1.04 mg/dL    GFR Estimate 81 >60 mL/min/1.7m2    GFR Estimate If Black >90 >60 mL/min/1.7m2    Calcium 8.0 (L) 8.5 - 10.1 mg/dL    Bilirubin Total 0.3 0.2 - 1.3 mg/dL    Albumin 3.4 3.4 - 5.0 g/dL    Protein Total 6.9 6.8 - 8.8 g/dL    Alkaline Phosphatase 57 40 - 150 U/L    ALT 32 0 - 50 U/L    AST 21 0 - 45 U/L   UA reflex to Microscopic   Result Value Ref Range    Color Urine Yellow     Appearance Urine Cloudy     Glucose Urine Negative NEG^Negative mg/dL    Bilirubin Urine Negative NEG^Negative    Ketones Urine Negative NEG^Negative mg/dL    Specific Gravity Urine 1.010 1.003 - 1.035    Blood Urine Negative NEG^Negative    pH Urine 6.0 5.0 - 7.0 pH    Protein Albumin Urine Negative NEG^Negative mg/dL    Urobilinogen mg/dL 0.0 0.0 - 2.0 mg/dL    Nitrite Urine Negative NEG^Negative    Leukocyte Esterase Urine Small (A) NEG^Negative    Source Midstream Urine     RBC Urine 7 (H) 0 - 2 /HPF    WBC Urine 11 (H) 0 - 5 /HPF    WBC Clumps Present (A) NEG^Negative /HPF    Bacteria Urine Few (A) NEG^Negative /HPF    Yeast Urine Few (A) NEG^Negative /HPF    Squamous Epithelial /HPF Urine 5 (H) 0 - 1 /HPF    Mucous Urine Present (A) NEG^Negative /LPF       Medications   0.9% sodium chloride BOLUS (1,000 mLs Intravenous New Bag 10/22/18 2021)     Followed by   sodium chloride 0.9% infusion (not administered)       Assessments & Plan (with Medical Decision Making)     MDM: Albina Pedro is a 67 year old female who presented with a week of diarrheal illness of up to 8-10 stools per day with a sense of fatigue but is been taking electrolyte solution and maintained hydration.   Her diarrhea had subsided in the last day and  she continues to tolerate food and fluid.  Her vital signs are reassuring with normal pulse and blood pressure and she is afebrile.  Her laboratory testing is reassuring electrolytes and blood count but she does have findings on urinalysis suggestive of UTI.  She has some suprapubic tenderness on palpation of the abdomen no flank pain and an otherwise benign abdomen.  This may have been triggered by the diarrheal illness and contamination of the urethra with this.  We discussed the risks of Cipro but she has allergies to other medications and cannot take Macrobid due to interstitial lung disease.  I therefore will prescribe Cipro with precautions regarding myopathy, neuropathy, tendinopathy.  We used 3 days of antibiotics.    She tells me that this episode of diarrhea for multiple days to a week has recurred in the past.  We discussed possible etiologies as below with precautions for return.  I asked her to follow-up in the clinic for these findings.    I have reviewed the nursing notes.    I have reviewed the findings, diagnosis, plan and need for follow up with the patient.       New Prescriptions    No medications on file       Final diagnoses:   Diarrhea, unspecified type - unclear cause, but no serious findings here.  However with recurrent episodes consider colonoscopy, consider gluten enteropathy.  consider giardia given recurrent episodes.  return for bloody stool, fever, signs of dysentary - would recommend stool testing if that occurs.   Urinary tract infection without hematuria, site unspecified - Take cipro 500 mg orally twice daily for 3 days.  await urine culture       10/22/2018   [facility] EMERGENCY DEPARTMENT     Allan Ramirez MD  10/23/18 0116

## 2018-10-24 LAB
BACTERIA SPEC CULT: NORMAL
SPECIMEN SOURCE: NORMAL

## 2018-11-07 ENCOUNTER — TRANSFERRED RECORDS (OUTPATIENT)
Dept: HEALTH INFORMATION MANAGEMENT | Facility: CLINIC | Age: 68
End: 2018-11-07

## 2018-11-07 ENCOUNTER — OFFICE VISIT (OUTPATIENT)
Dept: FAMILY MEDICINE | Facility: CLINIC | Age: 68
End: 2018-11-07
Payer: COMMERCIAL

## 2018-11-07 VITALS
HEIGHT: 65 IN | BODY MASS INDEX: 29.5 KG/M2 | WEIGHT: 177.06 LBS | SYSTOLIC BLOOD PRESSURE: 120 MMHG | DIASTOLIC BLOOD PRESSURE: 66 MMHG | TEMPERATURE: 98.4 F | HEART RATE: 72 BPM

## 2018-11-07 DIAGNOSIS — R39.9 URINARY TRACT INFECTION SYMPTOMS: Primary | ICD-10-CM

## 2018-11-07 DIAGNOSIS — A09 DIARRHEA, TRAVELERS': ICD-10-CM

## 2018-11-07 DIAGNOSIS — R53.83 FATIGUE, UNSPECIFIED TYPE: ICD-10-CM

## 2018-11-07 DIAGNOSIS — E53.8 VITAMIN B 12 DEFICIENCY: ICD-10-CM

## 2018-11-07 LAB
ALBUMIN UR-MCNC: NEGATIVE MG/DL
APPEARANCE UR: CLEAR
BILIRUB UR QL STRIP: NEGATIVE
COLOR UR AUTO: YELLOW
GLUCOSE UR STRIP-MCNC: NEGATIVE MG/DL
HGB UR QL STRIP: NEGATIVE
KETONES UR STRIP-MCNC: NEGATIVE MG/DL
LEUKOCYTE ESTERASE UR QL STRIP: NEGATIVE
NITRATE UR QL: NEGATIVE
PH UR STRIP: 6.5 PH (ref 5–7)
SOURCE: NORMAL
SP GR UR STRIP: 1.02 (ref 1–1.03)
UROBILINOGEN UR STRIP-ACNC: 0.2 EU/DL (ref 0.2–1)
VIT B12 SERPL-MCNC: 355 PG/ML (ref 193–986)

## 2018-11-07 PROCEDURE — 99214 OFFICE O/P EST MOD 30 MIN: CPT | Performed by: FAMILY MEDICINE

## 2018-11-07 PROCEDURE — 83519 RIA NONANTIBODY: CPT | Mod: 90 | Performed by: FAMILY MEDICINE

## 2018-11-07 PROCEDURE — 99000 SPECIMEN HANDLING OFFICE-LAB: CPT | Performed by: FAMILY MEDICINE

## 2018-11-07 PROCEDURE — 82607 VITAMIN B-12: CPT | Performed by: FAMILY MEDICINE

## 2018-11-07 PROCEDURE — 81003 URINALYSIS AUTO W/O SCOPE: CPT | Performed by: FAMILY MEDICINE

## 2018-11-07 PROCEDURE — 83516 IMMUNOASSAY NONANTIBODY: CPT | Mod: 90 | Performed by: FAMILY MEDICINE

## 2018-11-07 PROCEDURE — 36415 COLL VENOUS BLD VENIPUNCTURE: CPT | Performed by: FAMILY MEDICINE

## 2018-11-07 RX ORDER — CIPROFLOXACIN 500 MG/1
500 TABLET, FILM COATED ORAL 2 TIMES DAILY
Qty: 6 TABLET | Refills: 0 | Status: SHIPPED | OUTPATIENT
Start: 2018-11-07 | End: 2019-02-14

## 2018-11-07 NOTE — PROGRESS NOTES
SUBJECTIVE:   Albina Pedro is a 67 year old female who presents to clinic today for the following health issues:      ED/UC Followup:    Facility:  [facility]  Date of visit: 10/22/2018  Reason for visit: Diarrhea, UTI  Current Status: She finished course of Cipro with improvement. No current urinary sx, burning, pain, frequency, vaginal itching, odor or discharge. No abdominal pain, cramping, nausea, emesis or diarrhea. No fevers.             Problem list and histories reviewed & adjusted, as indicated.  Additional history: none    Patient Active Problem List   Diagnosis     DIZZINESS - LIKELY HYPOGLYCEMIA     Dermatophytosis of body     Episodic mood disorder (H)     CARDIOVASCULAR SCREENING; LDL GOAL LESS THAN 70     Health Care Home     Panniculitis     Advanced directives, counseling/discussion     CAD (coronary artery disease)     Sjogren's syndrome (H)     Adverse effect of anesthetic     Status post coronary angiogram     Acute chest pain     Major depressive disorder, recurrent, mild (H)     Hyperlipidemia LDL goal <70     ILD (interstitial lung disease) (H)     Intermediate coronary syndrome (H)     Past Surgical History:   Procedure Laterality Date     C/SECTION, LOW TRANSVERSE  10/13/1978    , Low Transverse     COLONOSCOPY       SURGICAL HISTORY OF -            SURGICAL HISTORY OF -   00    Colonoscopy       Social History   Substance Use Topics     Smoking status: Never Smoker     Smokeless tobacco: Never Used     Alcohol use 0.0 oz/week     0 Standard drinks or equivalent per week      Comment: 1 glass of wine every 2 weeks     Family History   Problem Relation Age of Onset     Cancer Mother      Breast and liver- age 43     HEART DISEASE Father      ? chf?h/o CVA     Alzheimer Disease Father      Hypertension Father      Neurologic Disorder Father      CVA     Diabetes Maternal Grandmother      Breast Cancer Maternal Aunt      Breast Cancer Paternal Aunt      Cancer  "- colorectal No family hx of      Prostate Cancer No family hx of            Reviewed and updated as needed this visit by clinical staff       Reviewed and updated as needed this visit by Provider         10 days of diarrhea she is now feeling better her  got it also   She is now back to normal   She was out of it when she was sick   She could not remember things   Her  is with her today and he has noted that these episodes come and go   She has been having episodes where she cannot remember how to do things she could not make her own spaghetti the other evening.  And then the very next day she was perfectly fine she is also started to have this very intermittent tremor in her right hand and arm it is a pill-rolling type of tremor it will come and go.    She also shows me a picture often throughout the day later in the day she is actually unable to even raise her eyelids she feels so weak at times.  We will check these labs based on the picture of her with the lid lag  /66  Pulse 72  Temp 98.4  F (36.9  C) (Tympanic)  Ht 5' 5\" (1.651 m)  Wt 177 lb 1 oz (80.3 kg)  BMI 29.46 kg/m2  GENERAL APPEARANCE: healthy, alert and no distress  PSYCH: mentation appears normal and affect normal/bright  MENTAL STATUS EXAM:  Appearance/Behavior: No apparent distress, Neatly groomed and Dressed appropriately for weather  Speech: Normal  Mood/Affect: normal affect  Insight: Adequate  1. Urinary tract infection symptomsnegative   - *UA reflex to Microscopic and Culture ([facility] (except [place] and [place])    2. Diarrhea, travelers'  She is traveling to [facility].  Give for this to take if she were to get diarrhea.  - ciprofloxacin (CIPRO) 500 MG tablet; Take 1 tablet (500 mg) by mouth 2 times daily for 3 days  Dispense: 6 tablet; Refill: 0    3. Fatigue, unspecified type   will check his labs to rule out myasthenia gravis  - ACETYLCHOLINE RECEPTOR BINDING  - ACETYLCHOLINE RECEPTOR " BLOCKING JOHN  - ACETYLCHOLINE MODULATING ANTIBODY  - MuSK Antibody    4. Vitamin B 12 deficiency  Recheck she has been taking thisThe last few months I do recommend I did talk about Lewy body dementia I recommend that she follow-up with her neurologist who she does have an appointment with next month  - Vitamin B12  \Carolina Burrell M.D.

## 2018-11-07 NOTE — MR AVS SNAPSHOT
After Visit Summary   11/7/2018    Albina Pedro    MRN: [id number]           Patient Information     Date Of Birth          1950        Visit Information        Provider Department      11/7/2018 8:30 AM Carolina Burrell MD [facility]        Today's Diagnoses     Urinary tract infection symptoms    -  1    Diarrhea, travelers'        Fatigue, unspecified type        Vitamin B 12 deficiency           Follow-ups after your visit        Your next 10 appointments already scheduled     May 03, 2019 11:00 AM CDT   Return Visit with Driss Carbone MD   [facility] ([facility])    [id number] [place] [id number]   [phone]            Jun 18, 2019 12:30 PM CDT   FULL PULMONARY FUNCTION with  PFL NOEMÍ   [facility] ([facility])    [address]  [address] [employer] [id number] [phone]            Jun 18, 2019  1:30 PM CDT   (Arrive by 1:15 PM)   Return Interstitial Lung with David Morris Perlman, MD   [facility] ([facility])    [address] [place] [address]  [address] [place] [id number] [phone]              Who to contact     Normal or non-critical lab and imaging results will be communicated to you by MyChart, letter or phone within 4 business days after the clinic has received the results. If you do not hear from us within 7 days, please contact the clinic through MyChart or phone. If you have a critical or abnormal lab result, we will notify you by phone as soon as possible.  Submit refill requests through [employer] or call your pharmacy and they will forward the refill request to us. Please allow 3 business days for your refill to be completed.          If you need to speak with a  for additional information , please call: [phone]             Additional Information About Your  "Visit        MyChart Information     katen[facility]art gives you secure access to your electronic health record. If you see a primary care provider, you can also send messages to your care team and make appointments. If you have questions, please call your primary care clinic.  If you do not have a primary care provider, please call [phone] and they will assist you.        Care EveryWhere ID     This is your Care EveryWhere ID. This could be used by other organizations to access your [facility] medical records  [id number]        Your Vitals Were     Pulse Temperature Height BMI (Body Mass Index)          72 98.4  F (36.9  C) (Tympanic) 5' 5\" (1.651 m) 29.46 kg/m2         Blood Pressure from Last 3 Encounters:   11/07/18 120/66   10/22/18 125/72   09/17/18 128/69    Weight from Last 3 Encounters:   11/07/18 177 lb 1 oz (80.3 kg)   10/22/18 176 lb (79.8 kg)   09/17/18 179 lb 1.6 oz (81.2 kg)              We Performed the Following     *UA reflex to Microscopic and Culture ([place] [facility] (except [place] [facility] [place])     ACETYLCHOLINE MODULATING ANTIBODY     ACETYLCHOLINE RECEPTOR BINDING     ACETYLCHOLINE RECEPTOR BLOCKING JOHN     MuSK Antibody     Vitamin B12          Today's Medication Changes          These changes are accurate as of 11/7/18  9:28 AM.  If you have any questions, ask your nurse or doctor.               Start taking these medicines.        Dose/Directions    ciprofloxacin 500 MG tablet   Commonly known as:  CIPRO   Used for:  Diarrhea, travelers'   Started by:  Carolina Burrell MD        Dose:  500 mg   Take 1 tablet (500 mg) by mouth 2 times daily for 3 days   Quantity:  6 tablet   Refills:  0            Where to get your medicines      These medications were sent to [facility] - [address]  [address] [address]     Phone:  [phone]     ciprofloxacin 500 MG tablet                Primary Care Provider Office Phone # Fax #    " Carolina Burrell [phone]       [address] [facility] [address]        Equal Access to Services     JESSICA MAYFIELD : Hadii aad ku hadkey Rogersali, norman schuylerblankha, misbah ameliakunal duarte, chay ogden lissettetimmy joiner lajameslori gonzalez. So [facility] [phone].    ATENCIÓN: Si habla español, tiene a méndez disposición servicios gratuitos de asistencia lingüística. Llame al [phone].    We comply with applicable federal civil rights laws and [place] laws. We do not discriminate on the basis of race, color, national origin, age, disability, sex, sexual orientation, or gender identity.            Thank you!     Thank you for choosing [facility]  for your care. Our goal is always to provide you with excellent care. Hearing back from our patients is one way we can continue to improve our services. Please take a few minutes to complete the written survey that you may receive in the mail after your visit with us. Thank you!             Your Updated Medication List - Protect others around you: Learn how to safely use, store and throw away your medicines at www.disposemymeds.org.          This list is accurate as of 11/7/18  9:28 AM.  Always use your most recent med list.                   Brand Name Dispense Instructions for use Diagnosis    albuterol 108 (90 Base) MCG/ACT inhaler    PROAIR HFA/PROVENTIL HFA/VENTOLIN HFA    3 Inhaler    Inhale 2 puffs into the lungs every 4 hours as needed for shortness of breath / dyspnea    Wheezing       aspirin 81 MG tablet     30 tablet    Take 81 mg by mouth every evening        buPROPion 100 MG 12 hr tablet    WELLBUTRIN SR    180 tablet    TAKE ONE TABLET BY MOUTH TWICE DAILY    Recurrent major depression in partial remission (H)       calcium carbonate 500 mg (elemental) 500 MG tablet    OS-SARI     Take 1 tablet by mouth 2 times daily        ciprofloxacin 500 MG tablet    CIPRO    6 tablet    Take 1 tablet (500 mg) by mouth 2 times daily for 3 days     Diarrhea, travelers'       ipratropium - albuterol 0.5 mg/2.5 mg/3 mL 0.5-2.5 (3) MG/3ML neb solution    DUONEB    30 vial    Take 1 vial (3 mLs) by nebulization every 6 hours as needed for shortness of breath / dyspnea or wheezing    ILD (interstitial lung disease) (H), Bronchitis       loratadine 10 MG tablet    CLARITIN    30 tablet    Take 1 tablet (10 mg) by mouth daily    Seasonal allergies       LORazepam 0.5 MG tablet    ATIVAN    30 tablet    Take 1 tablet (0.5 mg) by mouth every 8 hours as needed for anxiety    Acute reaction to stress       mineral oil-hydrophilic petrolatum     420 g    Apply  topically as needed for dry skin.    Dry skin       MULTIVITAMIN & MINERAL PO      Take 1 tablet by mouth daily        pravastatin 20 MG tablet    PRAVACHOL    90 tablet    TAKE ONE TABLET EVERY DAY    Hyperlipidemia LDL goal <130       Ranolazine 1000 MG Tb12     180 tablet    Take 1,000 mg by mouth 2 times daily    Intermediate coronary syndrome (H)       terbinafine 1 % cream    lamISIL AT    12 g    Apply topically 2 times daily For fungal infection not resolved with other antifungals (e.g. Clotrimazole)    Tinea corporis       triamcinolone 0.1 % cream    KENALOG    30 g    Apply sparingly to affected area three times daily for 14 days.    Irritant contact dermatitis, unspecified trigger       * venlafaxine 37.5 MG tablet    EFFEXOR    270 tablet    TAKE 2 TABLETS BY MOUTH EVERY MORNING AND 1 TABLET EVERY AFTERNOON    Major depressive disorder, recurrent, mild (H)       * venlafaxine 37.5 MG tablet    EFFEXOR    270 tablet    TAKE TWO TABLETS BY MOUTH IN THE MORNING AND ONE IN THE AFTERNOON    Major depressive disorder, recurrent, mild (H)       VITAMIN D3 PO      100-2000 units by mouth DAILY        * Notice:  This list has 2 medication(s) that are the same as other medications prescribed for you. Read the directions carefully, and ask your doctor or other care provider to review them with you.

## 2018-11-08 LAB
ACHR BIND AB SER-SCNC: 0 NMOL/L (ref 0–0.4)
ACHR BLOCK AB/ACHR TOTAL SFR SER: 12 % (ref 0–26)

## 2018-11-09 LAB — ACHR MOD AB/ACHR TOTAL SFR SER: 8 %

## 2018-11-13 LAB — MUSK AB SER-SCNC: 0 NMOL/L (ref 0–0.02)

## 2018-11-15 DIAGNOSIS — F43.0 ACUTE REACTION TO STRESS: ICD-10-CM

## 2018-11-15 NOTE — TELEPHONE ENCOUNTER
LORazepam Oral Tablet 0.5 MG        Last Written Prescription Date:  5/31/18  Last Fill Quantity: 30,   # refills: 1  Last Office Visit: 11/7/18  Future Office visit:       Routing refill request to provider for review/approval because:  Drug not on the [facility], P or [facility] refill protocol or controlled substance

## 2018-11-19 RX ORDER — LORAZEPAM 0.5 MG/1
TABLET ORAL
Qty: 30 TABLET | Refills: 0 | Status: SHIPPED | OUTPATIENT
Start: 2018-11-19 | End: 2018-12-05

## 2018-12-05 ENCOUNTER — TELEPHONE (OUTPATIENT)
Dept: FAMILY MEDICINE | Facility: CLINIC | Age: 68
End: 2018-12-05

## 2018-12-05 DIAGNOSIS — F43.0 ACUTE REACTION TO STRESS: ICD-10-CM

## 2018-12-05 NOTE — TELEPHONE ENCOUNTER
Albina was not called regarding her lab results from 11/7/18 as they were not forwarded to [facility].  Albina notified and will  more Vitamin B12.      Thank you..Tita Real

## 2018-12-05 NOTE — TELEPHONE ENCOUNTER
I think that is fine. I think she could also make the appointment in Jan and then if she was dissatisfied with the np visit she could go see Dr. Hurtado. Carolina Burrell M.D.

## 2018-12-05 NOTE — TELEPHONE ENCOUNTER
Reason for Call:  Other appointment    Detailed comments: Kristina has an appointment on 12/10/18 with the neurology clinic but they have her seeing an NP.  No appointments available with Dr. Hurtado, whom she'd rather see until January 11th, 2019.  What is your opinion of her seeing NP?  Please review and advise. Thank you..Tita Real    Phone Number Patient can be reached at: Home number on file [phone] (home)    Best Time: any time    Can we leave a detailed message on this number? YES    Call taken on 12/5/2018 at 3:11 PM by Tita Real

## 2018-12-06 RX ORDER — LORAZEPAM 0.5 MG/1
TABLET ORAL
Qty: 30 TABLET | Refills: 0 | Status: SHIPPED | OUTPATIENT
Start: 2018-12-06 | End: 2019-03-15

## 2018-12-06 NOTE — TELEPHONE ENCOUNTER
LORazepam Oral Tablet 0.5 MG        Last Written Prescription Date:  11/19/18  Last Fill Quantity: 30,   # refills: 0  Last Office Visit: 11/7/18  Future Office visit:       Routing refill request to provider for review/approval because:  Drug not on the [facility], P or [facility] refill protocol or controlled substance

## 2018-12-07 NOTE — TELEPHONE ENCOUNTER
CSS called pt to give message below. Pt had already canceled the nurse practitioner appointment and will see Dr Hurtado in January.

## 2019-01-09 ENCOUNTER — TELEPHONE (OUTPATIENT)
Dept: FAMILY MEDICINE | Facility: CLINIC | Age: 69
End: 2019-01-09

## 2019-01-09 NOTE — TELEPHONE ENCOUNTER
Reason for Call:  Other question    Detailed comments: Kristina has appointment with Dr.Chad Hurtado on 1/11/19 [facility] and wondering if there is anything that we need to fax over to him prior to her appointment?  Any office visit notes?  Please review and advise. Thank you..Tita Real    Phone Number Patient can be reached at: Home number on file [phone] (home)    Best Time: any time    Can we leave a detailed message on this number? YES    Call taken on 1/9/2019 at 2:47 PM by Tita Real

## 2019-01-11 ENCOUNTER — TRANSFERRED RECORDS (OUTPATIENT)
Dept: HEALTH INFORMATION MANAGEMENT | Facility: CLINIC | Age: 69
End: 2019-01-11

## 2019-01-16 ENCOUNTER — TELEPHONE (OUTPATIENT)
Dept: FAMILY MEDICINE | Facility: CLINIC | Age: 69
End: 2019-01-16

## 2019-01-16 NOTE — TELEPHONE ENCOUNTER
Message left on patient's answering machine to call the [facility] RN back.  Suhail Silva RN

## 2019-01-16 NOTE — TELEPHONE ENCOUNTER
Reports that she feels a movable lump at the level of her chin on the left side of her throat; it is movable --about the size of a kidney bean. Noticed it about a month ago. No change to it in the past month. Not painful. No change in skin color. No fever. Advised to keep her already scheduled 1/18/19 appointment. She agreed with plan.   Suhail Silva RN

## 2019-01-16 NOTE — TELEPHONE ENCOUNTER
Reason for call:  Patient reporting a symptom    Symptom or request: Kristina has a lump in her throat that moves around and wanted to be seen today.  No appointments available.  She did make an appointment for Friday but wanted something sooner.  Please call and assess. Thank you..Tita Real    Duration (how long have symptoms been present): unknown    Have you been treated for this before? No    Phone Number patient can be reached at:  Home number on file [phone] (home)    Best Time:  Any time    Can we leave a detailed message on this number:  YES    Call taken on 1/16/2019 at 1:59 PM by Tita Real

## 2019-01-18 ENCOUNTER — OFFICE VISIT (OUTPATIENT)
Dept: FAMILY MEDICINE | Facility: CLINIC | Age: 69
End: 2019-01-18
Payer: COMMERCIAL

## 2019-01-18 VITALS
HEIGHT: 65 IN | TEMPERATURE: 96.3 F | HEART RATE: 74 BPM | DIASTOLIC BLOOD PRESSURE: 64 MMHG | OXYGEN SATURATION: 99 % | WEIGHT: 181.8 LBS | SYSTOLIC BLOOD PRESSURE: 118 MMHG | BODY MASS INDEX: 30.29 KG/M2

## 2019-01-18 DIAGNOSIS — R05.8 POST-VIRAL COUGH SYNDROME: ICD-10-CM

## 2019-01-18 DIAGNOSIS — Z12.31 ENCOUNTER FOR SCREENING MAMMOGRAM FOR BREAST CANCER: Primary | ICD-10-CM

## 2019-01-18 DIAGNOSIS — E53.8 VITAMIN B 12 DEFICIENCY: ICD-10-CM

## 2019-01-18 DIAGNOSIS — J84.9 ILD (INTERSTITIAL LUNG DISEASE) (H): ICD-10-CM

## 2019-01-18 DIAGNOSIS — F33.0 MAJOR DEPRESSIVE DISORDER, RECURRENT, MILD (H): ICD-10-CM

## 2019-01-18 DIAGNOSIS — I20.89 CHRONIC STABLE ANGINA (H): ICD-10-CM

## 2019-01-18 DIAGNOSIS — M35.00 SICCA SYNDROME (H): ICD-10-CM

## 2019-01-18 PROCEDURE — 99214 OFFICE O/P EST MOD 30 MIN: CPT | Performed by: FAMILY MEDICINE

## 2019-01-18 ASSESSMENT — ANXIETY QUESTIONNAIRES
2. NOT BEING ABLE TO STOP OR CONTROL WORRYING: MORE THAN HALF THE DAYS
7. FEELING AFRAID AS IF SOMETHING AWFUL MIGHT HAPPEN: SEVERAL DAYS
GAD7 TOTAL SCORE: 6
3. WORRYING TOO MUCH ABOUT DIFFERENT THINGS: SEVERAL DAYS
5. BEING SO RESTLESS THAT IT IS HARD TO SIT STILL: NOT AT ALL
1. FEELING NERVOUS, ANXIOUS, OR ON EDGE: SEVERAL DAYS
6. BECOMING EASILY ANNOYED OR IRRITABLE: SEVERAL DAYS

## 2019-01-18 ASSESSMENT — PATIENT HEALTH QUESTIONNAIRE - PHQ9
SUM OF ALL RESPONSES TO PHQ QUESTIONS 1-9: 8
5. POOR APPETITE OR OVEREATING: NOT AT ALL

## 2019-01-18 ASSESSMENT — MIFFLIN-ST. JEOR: SCORE: 1345.52

## 2019-01-18 NOTE — PROGRESS NOTES
SUBJECTIVE:                                                    Albina Pedro is a 68 year old female who presents to clinic today for the following health issues:    *Fell 3 times in the last few months, hit head in the same spot.               Symptoms: cc Present Absent Comment   Fever/Chills   x    Fatigue  x     Headache  x     Muscle or Body  Aches  x     Eye Irritation   x    Sneezing   x    Nasal Bryce/Drg  x     Sinus Pressure/Pain   x    Dental pain   x    Sore Throat x   lump   Swollen Glands   x    Ear Pain/Fullness   x    Cough x      Wheeze   x    Chest Discomfort x      Shortness of breath  x     Abdominal pain   x    Emesis    x    Diarrhea   x    Other   x      Symptom duration:  2 months   Symptom severity:     Treatments tried:  no   Contacts:  yes,    Had felt this in the back of the throat has been there moves around    She fell and hit her head 3 times in the last 18 months X2  Tripped on something slipped in the bathtub and hit head they now have sticky mats no falls   Her scalp has been itchy has no t been using   shehas seen the neurologist   Last week she was in bed for 5 days Sunday turned the corner   Has been coughing able to sleep and is improving  Also has been taking 5000 of b12 dissolvable and the neurologist tested her no results yet.   She feels ok breathing now  Her throat is dryde to the sicca   She does her best to keep her mouth moisturized   Feels a movable lump on the side of her neck. When I palpate the region it is her hyoid and it feels normal   PMH  Patient Active Problem List   Diagnosis     DIZZINESS - LIKELY HYPOGLYCEMIA     Dermatophytosis of body     Episodic mood disorder (H)     CARDIOVASCULAR SCREENING; LDL GOAL LESS THAN 70     Health Care Home     Panniculitis     Advanced directives, counseling/discussion     CAD (coronary artery disease)     Sjogren's syndrome (H)     Adverse effect of anesthetic     Status post coronary angiogram     Acute chest pain  "    Major depressive disorder, recurrent, mild (H)     Hyperlipidemia LDL goal <70     ILD (interstitial lung disease) (H)     Intermediate coronary syndrome (H)     ROS: Constitutional, HEENT, cardiovascular, respiratory, GI, , and skin are otherwise negative except as noted above.    PHYSICAL EXAM:    /64   Pulse 74   Temp 96.3  F (35.7  C) (Tympanic)   Ht 1.651 m (5' 5\")   Wt 82.5 kg (181 lb 12.8 oz)   SpO2 99%   BMI 30.25 kg/m    GENERAL: Active, alert and no distress.  EYES: PERRL/EOMI.  Bilateral sclera/conjunctiva clear.  HEENT: Audible congestion with no  nasal discharge.  TMs gray and translucent.  Oral mucosa moist and pink.  Posterior pharynx with increased erythema. Uvula midline.  NECK: Supple with full range of motion.  Bilateral shotty anterior cervical nodes.  CV: Regular rate and rhythm without murmur.  LUNGS: Clear to auscultation.    SKIN:  No rash. Warm, pink. Capillary refill less than 2 seconds.  1. Post-viral cough syndrome  cotn otc and rest     2. Encounter for screening mammogram for breast cancer    - MA Screening Digital Bilateral; Future    3. Sicca syndrome (H)      4. Major depressive disorder, recurrent, mild (H)  Stable for now waiting for neurological notes     5. ILD (interstitial lung disease) (H)      6. Chronic stable angina (H)  Denies chest pain  Or change in activity level other than the acute viral syndrome recently     7. Vitamin B 12 deficiency  Will get levels drawn at neurology if still deficient will start im B12    Carolina Burrell M.D.  [facility]    "

## 2019-01-18 NOTE — PATIENT INSTRUCTIONS
For cough, dextromethorphan/guaifenesin combinations help loosen secretions and suppress cough safely without significant risk of sedation.This is a prescription medicine.  If the cough is severe and interfering with sleep or function, prescription cough suppressants can be prescribed but these are often sedating.    The body needs to be treated well in order to help heal itself.  Rest as needed.  It is ok to reduce food intake if appetite is poor but it is quite important to maintain/increase fluid intake.    For nasal congestion and sinus pressure, pseudoephedrine (Sudafed) is often helpful but it can cause elevations in blood pressure and insomnia.  Short courses of a nasal decongestant spray (Afrin or Neosinephrine) can be appropriate but their use should be restricted to 3 days due to the high risk of nasal addiction.    For pain and fevers, acetaminophen (Tylenol) is most appropriate.  Ibuprofen (Advil) or naproxen (Aleve) are useful too and last longer but they can cause elevation of blood pressure or stomach problems.    Antihistamines (Benadryl, Dimetapp, etc.) cause sedation, confusion, bowel and urinary abnormalities and are of little use for infectious causes of cough and nasal congestion.  Their use should be reserved for allergic symptoms.

## 2019-01-19 ASSESSMENT — ANXIETY QUESTIONNAIRES: GAD7 TOTAL SCORE: 6

## 2019-02-10 ENCOUNTER — HOSPITAL ENCOUNTER (EMERGENCY)
Facility: CLINIC | Age: 69
Discharge: HOME | End: 2019-02-10
Attending: NURSE PRACTITIONER | Admitting: NURSE PRACTITIONER
Payer: COMMERCIAL

## 2019-02-10 ENCOUNTER — APPOINTMENT (OUTPATIENT)
Dept: GENERAL RADIOLOGY | Facility: CLINIC | Age: 69
End: 2019-02-10
Attending: EMERGENCY MEDICINE
Payer: COMMERCIAL

## 2019-02-10 VITALS
BODY MASS INDEX: 28.45 KG/M2 | WEIGHT: 177 LBS | HEIGHT: 66 IN | DIASTOLIC BLOOD PRESSURE: 64 MMHG | OXYGEN SATURATION: 98 % | SYSTOLIC BLOOD PRESSURE: 111 MMHG | RESPIRATION RATE: 16 BRPM | TEMPERATURE: 98.8 F

## 2019-02-10 DIAGNOSIS — S82.831A OTHER CLOSED FRACTURE OF DISTAL END OF RIGHT FIBULA, INITIAL ENCOUNTER: ICD-10-CM

## 2019-02-10 PROCEDURE — 27786 TREATMENT OF ANKLE FRACTURE: CPT | Mod: RT | Performed by: NURSE PRACTITIONER

## 2019-02-10 PROCEDURE — 99213 OFFICE O/P EST LOW 20 MIN: CPT | Mod: 25 | Performed by: NURSE PRACTITIONER

## 2019-02-10 PROCEDURE — 73610 X-RAY EXAM OF ANKLE: CPT | Mod: RT

## 2019-02-10 PROCEDURE — G0463 HOSPITAL OUTPT CLINIC VISIT: HCPCS | Performed by: NURSE PRACTITIONER

## 2019-02-10 ASSESSMENT — MIFFLIN-ST. JEOR: SCORE: 1339.62

## 2019-02-10 NOTE — ED PROVIDER NOTES
History     Chief Complaint   Patient presents with     Foot Pain     fell Friday down steps and injured her rt foot      HPI  Albina Pedro is a 68 year old female who presents to urgent care for evaluation of right ankle pain.  Patient fell down 3-4 steps 2 days ago rolling her foot/ankle and hyperextending her hip and knee.  Patient states her hip feels sore, no knee pain, and significant pain and swelling of her  Right foot and ankle.  Patient is wearing a Cam walker boot that she obtained from her brother.  Patient has crutches in the car.  Patient has been weightbearing on her right foot with the Cam walker boot.    Allergies:  Allergies   Allergen Reactions     Daypro [Oxaprozin] Rash            Lidocaine Other (See Comments)     Rapid heart rate     Nitroglycerin Other (See Comments)     Causes low blood pressure     Penicillins Unknown     Occurred as child.     Sulfa Drugs Rash       Problem List:    Patient Active Problem List    Diagnosis Date Noted     Chronic stable angina (H) 01/18/2019     Priority: Medium     Intermediate coronary syndrome (H) 07/24/2018     Priority: Medium     ILD (interstitial lung disease) (H) 07/09/2018     Priority: Medium     Needs follow up ct dec 2019       Hyperlipidemia LDL goal <70 05/31/2018     Priority: Medium     Major depressive disorder, recurrent, mild (H) 02/01/2017     Priority: Medium     Acute chest pain 12/13/2016     Priority: Medium     Status post coronary angiogram 02/18/2016     Priority: Medium     Adverse effect of anesthetic 02/11/2016     Priority: Medium     Patient is very sensitive to anesthetics. Needs lower dosing.        Sjogren's syndrome (H) 01/15/2014     Priority: Medium     CAD (coronary artery disease) 09/18/2013     Priority: Medium     Mild ischemia on stress test       Advanced directives, counseling/discussion 01/12/2012     Priority: Medium     Advance Directive Problem List Overview:   Name Relationship Phone    Primary Health  Care Agent            Alternative Health Care Agent          Discussed advance care planning with patient; information given to patient to review.     Daija Black CMA, HC Facilitator    2012          Panniculitis 11/10/2011     Priority: Medium     Health Care Home 06/15/2011     Priority: Medium     X  DX V65.8 REPLACED WITH 70933 HEALTH CARE HOME (2013)       CARDIOVASCULAR SCREENING; LDL GOAL LESS THAN 70 10/31/2010     Priority: Medium     Episodic mood disorder (H) 2007     Priority: Medium     2007 - Prozac and will look into light box.   April 15, 2008 - Will stop for summer. Light box rx.  Problem list name updated by automated process. Provider to review       Dermatophytosis of body 2007     Priority: Medium     2007 - Classic appearance and worsening in areas's not using Nystatin.  Will use everywhere or change to clotrimazole.   April 15, 2008 - Change to powder.   Problem list name updated by automated process. Provider to review       DIZZINESS - LIKELY HYPOGLYCEMIA 2006     Priority: Medium     2008- negative Event monitor and GTT showed some elevation.  Dietary changes.             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 chf?h/o CVA     Alzheimer Disease Father       "Hypertension Father      Neurologic Disorder Father         CVA     Diabetes Maternal Grandmother      Breast Cancer Maternal Aunt      Breast Cancer Paternal Aunt      Cancer - colorectal No family hx of      Prostate Cancer No family hx of        Social History:  Marital Status:   [2]  Social History     Tobacco Use     Smoking status: Never Smoker     Smokeless tobacco: Never Used   Substance Use Topics     Alcohol use: Yes     Alcohol/week: 0.0 oz     Comment: 1 glass of wine every 2 weeks     Drug use: No        Medications:      albuterol (PROAIR HFA/PROVENTIL HFA/VENTOLIN HFA) 108 (90 BASE) MCG/ACT Inhaler   aspirin 81 MG tablet   buPROPion (WELLBUTRIN SR) 100 MG 12 hr tablet   calcium carbonate (OS-SARI 500 MG [place]. [place]) 1250 MG tablet   ipratropium - albuterol 0.5 mg/2.5 mg/3 mL (DUONEB) 0.5-2.5 (3) MG/3ML nebulization   loratadine (CLARITIN) 10 MG tablet   LORazepam (ATIVAN) 0.5 MG tablet   mineral oil-hydrophilic petrolatum (AQUAPHOR) ointment   Multiple Vitamins-Minerals (MULTIVITAMIN & MINERAL PO)   pravastatin (PRAVACHOL) 20 MG tablet   Ranolazine 1000 MG TB12   triamcinolone (KENALOG) 0.1 % cream   venlafaxine (EFFEXOR) 37.5 MG tablet   VITAMIN D3 OR         Review of Systems  Musc: right ankle pain and swelling  Neuro: no numbness in the right foot.     Physical Exam   BP: 111/64  Heart Rate: 86  Temp: 98.8  F (37.1  C)  Resp: 16  Height: 167.6 cm (5' 6\")  Weight: 80.3 kg (177 lb)  SpO2: 98 %      Physical Exam  Appearance: Alert, oriented. NAD .   Right Ankle/Foot:  There is swelling and tenderness over the lateral malleolus. No tenderness over the medial aspect of the ankle. The fifth metatarsal is not tender. The ankle joint is intact without excessive opening on stressing.   The rest of the foot, ankle and leg exam is normal.            ED Course        Procedures            Results for orders placed or performed during the hospital encounter of 02/10/19 (from the past 24 hour(s))   Ankle " XR, G/E 3 views, right    Narrative    RIGHT ANKLE THREE VIEWS  2/10/2019 11:50 AM     HISTORY: drage    COMPARISON: None      Impression    IMPRESSION: Mildly displaced distal fibular fracture and mild widening  of the ankle mortise.    SYD GUERRA MD       Medications - No data to display    Assessments & Plan (with Medical Decision Making)   Xray reveals a mildly displaced distal fibular fracture and mild widening.  This is consistent with patient's history and exam findings.  I did discuss the imaging results with patient and given her x-ray finding of widening of the ankle mortise I do have concern for additional fracture besides the fibula fracture.  Recommend close follow-up with orthopedics this week.  Plan as follows:  Follow-up with orthopedics this week. Referral has been sent. They should call you within 24 hours or you can contact them to set-up appointment [phone] .  Elevate.  Wear boot/splint (patient has her own, appears to fit well), no weight bearing on right leg until you are evaluated by orthopedics. Use crutches. (patient has her own, discussed proper fit for crutches)  Tylenol or Ibuprofen for pain.        I have reviewed the nursing notes.    I have reviewed the findings, diagnosis, plan and need for follow up with the patient.      Final diagnoses:   Other closed fracture of distal end of right fibula, initial encounter       2/10/2019   [facility] EMERGENCY DEPARTMENT     Beth Solitario APRN CNP  02/10/19 4529

## 2019-02-10 NOTE — ED AVS SNAPSHOT
[facility] Emergency Department  [address] [facility] [address]  Phone:  [phone]  Fax:  [phone]                                    Albina Pedro   MRN: [id number]    Department:  [facility] Emergency Department   Date of Visit:  2/10/2019           After Visit Summary Signature Page    I have received my discharge instructions, and my questions have been answered. I have discussed any challenges I see with this plan with the nurse or doctor.    ..........................................................................................................................................  Patient/Patient Representative Signature      ..........................................................................................................................................  Patient Representative Print Name and Relationship to Patient    ..................................................               ................................................  Date                                   Time    ..........................................................................................................................................  Reviewed by Signature/Title    ...................................................              ..............................................  Date                                               Time          22EPIC Rev 08/18

## 2019-02-10 NOTE — DISCHARGE INSTRUCTIONS
Follow-up with orthopedics this week. Referral has been sent. They should call you within 24 hours or you can contact them to set-up appointment [phone] .  Elevate.  Wear boot/splint, no weight bearing on right leg until you are evaluated by orthopedics. Use crutches.  Tylenol or Ibuprofen for pain.

## 2019-02-14 ENCOUNTER — TELEPHONE (OUTPATIENT)
Dept: PODIATRY | Facility: CLINIC | Age: 69
End: 2019-02-14

## 2019-02-14 ENCOUNTER — OFFICE VISIT (OUTPATIENT)
Dept: PODIATRY | Facility: CLINIC | Age: 69
End: 2019-02-14
Payer: COMMERCIAL

## 2019-02-14 VITALS
HEART RATE: 89 BPM | DIASTOLIC BLOOD PRESSURE: 61 MMHG | BODY MASS INDEX: 28.45 KG/M2 | WEIGHT: 177 LBS | SYSTOLIC BLOOD PRESSURE: 115 MMHG | HEIGHT: 66 IN

## 2019-02-14 DIAGNOSIS — S82.61XA CLOSED DISPLACED FRACTURE OF LATERAL MALLEOLUS OF RIGHT FIBULA, INITIAL ENCOUNTER: Primary | ICD-10-CM

## 2019-02-14 PROCEDURE — 99204 OFFICE O/P NEW MOD 45 MIN: CPT | Performed by: PODIATRIST

## 2019-02-14 ASSESSMENT — MIFFLIN-ST. JEOR: SCORE: 1339.62

## 2019-02-14 NOTE — NURSING NOTE
"Chief Complaint   Patient presents with     Consult     Other closed fracture of distal end of right fibula, DOI 2/10/19, XR also taken that day       Initial /61 (BP Location: Right arm, Patient Position: Chair, Cuff Size: Adult Regular)   Pulse 89   Ht 1.676 m (5' 6\")   Wt 80.3 kg (177 lb)   BMI 28.57 kg/m   Estimated body mass index is 28.57 kg/m  as calculated from the following:    Height as of this encounter: 1.676 m (5' 6\").    Weight as of this encounter: 80.3 kg (177 lb).  Medications and allergies reviewed.      Alta HUMMEL MA    "

## 2019-02-14 NOTE — PATIENT INSTRUCTIONS
You have elected to proceed with Surgery for open reduction internal fixation of the lateral malleolus fracture right ankle.  Surgeries are performed on Tuesdays at [facility].   To schedule your surgery date please call [phone].  Please leave a message with a good time for our staff to call you back.    - Please have a date in mind for your surgery, you can feel free to leave that date on the message, and we will schedule and call back to confirm.     You can expect receive a call back the same day or on the next business day from Dr. Oconnor s team to assist in the scheduling.   - We will schedule the date of your surgery.  The time will be determined a few days ahead of time.  You can expect a call from Same Day Surgery 2-3 days ahead of time with specific instructions for what time to arrive at the hospital as well as any other preparations you should take prior to surgery.    - You may need to obtain a pre-operative physical from your primary medical provider. This must be done within 30 days of your surgery date.    - We will also schedule your first post-operative appointment for a bandage and wound check for the Monday following your surgery at the [place] location.    - You may be non-weight bearing for a period of up to 6 weeks.  Options for this include: (Please indicate which you would prefer so we can provide you with an order and instructions)  o Crutches  o Walker  o Roll-a-bout knee walker.    - If you will need paperwork filled out for your employer you may drop those off at the clinic directly or you may have those faxed to us at [phone].  Please indicate on the form the date you would like the LA to begin if it will not be your surgery date.    The forms are typically filled out for up to 12 weeks, however you may be cleared to return prior to that time depending on your individual healing and job requirements.

## 2019-02-14 NOTE — PROGRESS NOTES
PATIENT HISTORY:  Albina Pedro is a 68 year old female who presents with a chief complaint of a painful right ankle.  The patient relates injuring the right ankle on  while at home.  The patient states that she fell down steps and twisted the right ankle inwards  The patient relates pain with weight bearing to the right.  The patient denies any prior injury to the right ankle.  The patient relates being seen and treated with ice, elevation, and nonweightbearing with crutches.  The patient denies any numbness to the toes on the right foot.    REVIEW OF SYSTEMS:  Constitutional, HEENT, cardiovascular, pulmonary, GI, , musculoskeletal, neuro, skin, endocrine and psych systems are negative, except as otherwise noted.     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        PAST SURGICAL HISTORY:   Past Surgical History:   Procedure Laterality Date     C/SECTION, LOW TRANSVERSE  10/13/1978    , Low Transverse     COLONOSCOPY       SURGICAL HISTORY OF -            SURGICAL HISTORY OF -   00    Colonoscopy        MEDICATIONS:   Current Outpatient Medications:      albuterol (PROAIR HFA/PROVENTIL HFA/VENTOLIN HFA) 108 (90 BASE) MCG/ACT Inhaler, Inhale 2 puffs into the lungs every 4 hours as needed for shortness of breath / dyspnea, Disp: 3 Inhaler, Rfl: 6     aspirin 81 MG tablet, Take 81 mg by mouth every evening , Disp: 30 tablet, Rfl:      buPROPion (WELLBUTRIN SR) 100 MG 12 hr tablet, TAKE ONE TABLET BY MOUTH TWICE DAILY , Disp: 180 tablet, Rfl: 2     calcium carbonate (OS-SARI 500 MG [place]. [place]) 1250 MG tablet, Take 1 tablet by mouth 2 times daily, Disp: , Rfl:      ipratropium - albuterol 0.5  mg/2.5 mg/3 mL (DUONEB) 0.5-2.5 (3) MG/3ML nebulization, Take 1 vial (3 mLs) by nebulization every 6 hours as needed for shortness of breath / dyspnea or wheezing, Disp: 30 vial, Rfl: 1     loratadine (CLARITIN) 10 MG tablet, Take 1 tablet (10 mg) by mouth daily, Disp: 30 tablet, Rfl: 1     LORazepam (ATIVAN) 0.5 MG tablet, TAKE ONE TABLET BY MOUTH EVERY EIGHT HOURS AS NEEDED FOR ANXIETY , Disp: 30 tablet, Rfl: 0     mineral oil-hydrophilic petrolatum (AQUAPHOR) ointment, Apply  topically as needed for dry skin., Disp: 420 g, Rfl: 4     Multiple Vitamins-Minerals (MULTIVITAMIN & MINERAL PO), Take 1 tablet by mouth daily , Disp: , Rfl:      pravastatin (PRAVACHOL) 20 MG tablet, TAKE ONE TABLET EVERY DAY, Disp: 90 tablet, Rfl: 2     Ranolazine 1000 MG TB12, Take 1,000 mg by mouth 2 times daily, Disp: 180 tablet, Rfl: 3     triamcinolone (KENALOG) 0.1 % cream, Apply sparingly to affected area three times daily for 14 days., Disp: 30 g, Rfl: 1     venlafaxine (EFFEXOR) 37.5 MG tablet, TAKE TWO TABLETS BY MOUTH IN THE MORNING AND ONE IN THE AFTERNOON (Patient taking differently: TAKE TWO TABLETS BY MOUTH ONE IN THE MORNING AND ONE IN THE AFTERNOON), Disp: 270 tablet, Rfl: 0     VITAMIN D3 OR, 100-2000 units by mouth DAILY, Disp: , Rfl:      ALLERGIES:    Allergies   Allergen Reactions     Daypro [Oxaprozin] Rash            Lidocaine Other (See Comments)     Rapid heart rate     Nitroglycerin Other (See Comments)     Causes low blood pressure     Penicillins Unknown     Occurred as child.     Sulfa Drugs Rash        SOCIAL HISTORY:   Social History     Socioeconomic History     Marital status:      Spouse name: Not on file     Number of children: Not on file     Years of education: Not on file     Highest education level: Not on file   Social Needs     Financial resource strain: Not on file     Food insecurity - worry: Not on file     Food insecurity - inability: Not on file     Transportation needs - medical: Not  "on file     Transportation needs - non-medical: Not on file   Occupational History     Not on file   Tobacco Use     Smoking status: Never Smoker     Smokeless tobacco: Never Used   Substance and Sexual Activity     Alcohol use: Yes     Alcohol/week: 0.0 oz     Comment: 1 glass of wine every 2 weeks     Drug use: No     Sexual activity: Yes     Partners: Male   Other Topics Concern     Parent/sibling w/ CABG, MI or angioplasty before 65F 55M? No   Social History Narrative     Not on file        FAMILY HISTORY:   Family History   Problem Relation Age of Onset     Cancer Mother         Breast and liver- age 43     Heart Disease Father         ? chf?h/o CVA     Alzheimer Disease Father      Hypertension Father      Neurologic Disorder Father         CVA     Diabetes Maternal Grandmother      Breast Cancer Maternal Aunt      Breast Cancer Paternal Aunt      Cancer - colorectal No family hx of      Prostate Cancer No family hx of         EXAM:Vitals: /61 (BP Location: Right arm, Patient Position: Chair, Cuff Size: Adult Regular)   Pulse 89   Ht 1.676 m (5' 6\")   Wt 80.3 kg (177 lb)   BMI 28.57 kg/m    BMI= Body mass index is 28.57 kg/m .    Weight management plan: Patient was referred to their PCP to discuss a diet and exercise plan.      General appearance: Patient is alert and fully cooperative with history & exam.  No sign of distress is noted during the visit.     Psychiatric: Affect is pleasant & appropriate.  Patient appears motivated to improve health.     Respiratory: Breathing is regular & unlabored while sitting.     HEENT: Hearing is intact to spoken word.  Speech is clear.  No gross evidence of visual impairment that would impact ambulation.     Dermatologic: Skin is intact to both lower extremities without significant lesions, rash or abrasion.  No paronychia or evidence of soft tissue infection is noted.     Vascular: DP & PT pulses are intact & regular bilaterally.  No significant edema or " varicosities noted.  CFT and skin temperature is normal to both lower extremities.     Neurologic: Lower extremity sensation is intact to light touch.  No evidence of weakness or contracture in the lower extremities.  No evidence of neuropathy.     Musculoskeletal: One notes decreased ankle joint ROM due to swelling and pain on the right. Point of maximum tenderness located over the  lateral aspect of the right ankle.  One notes  pain with palpation over the medial deltoid ligament on the right.  Moderate edema noted.  Positive ecchymosis noted.  One notes a positive distal compression test on the right.  One notes a positive proximal compression test on the right.     Radiograph evaluation including AP, lateral and mortise views of the right ankle reveals a spiral oblique fracture of the lateral malleolus extending at the level of the ankle mortise proximally and posteriorly 4 cm.  No evidence of a medial malleolar fracture noted.     ASSESSMENT / PLAN:     ICD-10-CM    1. Closed displaced fracture of lateral malleolus of right fibula, initial encounter S82.61XA        I have explained to Albina  about the conditions.  We discussed the nature of the condition as well as the treatment plan and expected length of recovery.  At this point, I am recommending surgical treatment of the condition involving open reduction internal fixation of the lateral malleolus fracture on the right ankle.  I informed the patient in risks and benefits of the procedure including but not limited to infection, wound complications, swelling, pain, diminished range of motion and function, DVT, arthritis and non union.  The procedure will be performed under general with popliteal block anesthesia.  The patient will obtain a preoperative history and physical by the primary care provider.  Consents will be reviewed and signed on the day of surgery.  The patient was placed into a Villeda compression dressing and posterior splint.  The patient is  to remain non weightbearing.    Disclaimer: This note consists of symbols derived from keyboarding, dictation and/or voice recognition software. As a result, there may be errors in the script that have gone undetected. Please consider this when interpreting information found in this chart.       RIKA Oconnor D.P.M., PAULO.[email or web address].

## 2019-02-14 NOTE — TELEPHONE ENCOUNTER
Called patient to notify her that her surgery was set up for 10:40 Tuesday the 19 th, of February and that she can call her primary today to set up her pre-op before her surgery. I also informed her that she needs to have this done by Monday considering her surgery is Tuesday.    Alta Jonas MA on 2/14/2019 at 5:34 PM

## 2019-02-14 NOTE — LETTER
2019         RE: Albina Pedro  [address] [address] [place] [address] [place] [address]        Dear Colleague,    Thank you for referring your patient, Albina Pedro, to the [facility]. Please see a copy of my visit note below.    PATIENT HISTORY:  Albina Pedro is a 68 year old female who presents with a chief complaint of a painful right ankle.  The patient relates injuring the right ankle on  while at home.  The patient states that she fell down steps and twisted the right ankle inwards  The patient relates pain with weight bearing to the right.  The patient denies any prior injury to the right ankle.  The patient relates being seen and treated with ice, elevation, and nonweightbearing with crutches.  The patient denies any numbness to the toes on the right foot.    REVIEW OF SYSTEMS:  Constitutional, HEENT, cardiovascular, pulmonary, GI, , musculoskeletal, neuro, skin, endocrine and psych systems are negative, except as otherwise noted.     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        PAST SURGICAL HISTORY:   Past Surgical History:   Procedure Laterality Date     C/SECTION, LOW TRANSVERSE  10/13/1978    , Low Transverse     COLONOSCOPY       SURGICAL HISTORY OF -            SURGICAL HISTORY OF -   00    Colonoscopy        MEDICATIONS:   Current Outpatient Medications:      albuterol (PROAIR HFA/PROVENTIL HFA/VENTOLIN HFA) 108 (90 BASE) MCG/ACT Inhaler, Inhale 2 puffs into the lungs every 4 hours as needed for shortness of breath / dyspnea, Disp: 3 Inhaler, Rfl: 6     aspirin 81 MG tablet, Take 81 mg by mouth every evening , Disp: 30 tablet,  Rfl:      buPROPion (WELLBUTRIN SR) 100 MG 12 hr tablet, TAKE ONE TABLET BY MOUTH TWICE DAILY , Disp: 180 tablet, Rfl: 2     calcium carbonate (OS-SARI 500 MG [place]. [place]) 1250 MG tablet, Take 1 tablet by mouth 2 times daily, Disp: , Rfl:      ipratropium - albuterol 0.5 mg/2.5 mg/3 mL (DUONEB) 0.5-2.5 (3) MG/3ML nebulization, Take 1 vial (3 mLs) by nebulization every 6 hours as needed for shortness of breath / dyspnea or wheezing, Disp: 30 vial, Rfl: 1     loratadine (CLARITIN) 10 MG tablet, Take 1 tablet (10 mg) by mouth daily, Disp: 30 tablet, Rfl: 1     LORazepam (ATIVAN) 0.5 MG tablet, TAKE ONE TABLET BY MOUTH EVERY EIGHT HOURS AS NEEDED FOR ANXIETY , Disp: 30 tablet, Rfl: 0     mineral oil-hydrophilic petrolatum (AQUAPHOR) ointment, Apply  topically as needed for dry skin., Disp: 420 g, Rfl: 4     Multiple Vitamins-Minerals (MULTIVITAMIN & MINERAL PO), Take 1 tablet by mouth daily , Disp: , Rfl:      pravastatin (PRAVACHOL) 20 MG tablet, TAKE ONE TABLET EVERY DAY, Disp: 90 tablet, Rfl: 2     Ranolazine 1000 MG TB12, Take 1,000 mg by mouth 2 times daily, Disp: 180 tablet, Rfl: 3     triamcinolone (KENALOG) 0.1 % cream, Apply sparingly to affected area three times daily for 14 days., Disp: 30 g, Rfl: 1     venlafaxine (EFFEXOR) 37.5 MG tablet, TAKE TWO TABLETS BY MOUTH IN THE MORNING AND ONE IN THE AFTERNOON (Patient taking differently: TAKE TWO TABLETS BY MOUTH ONE IN THE MORNING AND ONE IN THE AFTERNOON), Disp: 270 tablet, Rfl: 0     VITAMIN D3 OR, 100-2000 units by mouth DAILY, Disp: , Rfl:      ALLERGIES:    Allergies   Allergen Reactions     Daypro [Oxaprozin] Rash            Lidocaine Other (See Comments)     Rapid heart rate     Nitroglycerin Other (See Comments)     Causes low blood pressure     Penicillins Unknown     Occurred as child.     Sulfa Drugs Rash        SOCIAL HISTORY:   Social History     Socioeconomic History     Marital status:      Spouse name: Not on file     Number of children: Not  "on file     Years of education: Not on file     Highest education level: Not on file   Social Needs     Financial resource strain: Not on file     Food insecurity - worry: Not on file     Food insecurity - inability: Not on file     Transportation needs - medical: Not on file     Transportation needs - non-medical: Not on file   Occupational History     Not on file   Tobacco Use     Smoking status: Never Smoker     Smokeless tobacco: Never Used   Substance and Sexual Activity     Alcohol use: Yes     Alcohol/week: 0.0 oz     Comment: 1 glass of wine every 2 weeks     Drug use: No     Sexual activity: Yes     Partners: Male   Other Topics Concern     Parent/sibling w/ CABG, MI or angioplasty before 65F 55M? No   Social History Narrative     Not on file        FAMILY HISTORY:   Family History   Problem Relation Age of Onset     Cancer Mother         Breast and liver- age 43     Heart Disease Father         ? chf?h/o CVA     Alzheimer Disease Father      Hypertension Father      Neurologic Disorder Father         CVA     Diabetes Maternal Grandmother      Breast Cancer Maternal Aunt      Breast Cancer Paternal Aunt      Cancer - colorectal No family hx of      Prostate Cancer No family hx of         EXAM:Vitals: /61 (BP Location: Right arm, Patient Position: Chair, Cuff Size: Adult Regular)   Pulse 89   Ht 1.676 m (5' 6\")   Wt 80.3 kg (177 lb)   BMI 28.57 kg/m     BMI= Body mass index is 28.57 kg/m .    Weight management plan: Patient was referred to their PCP to discuss a diet and exercise plan.      General appearance: Patient is alert and fully cooperative with history & exam.  No sign of distress is noted during the visit.     Psychiatric: Affect is pleasant & appropriate.  Patient appears motivated to improve health.     Respiratory: Breathing is regular & unlabored while sitting.     HEENT: Hearing is intact to spoken word.  Speech is clear.  No gross evidence of visual impairment that would impact " ambulation.     Dermatologic: Skin is intact to both lower extremities without significant lesions, rash or abrasion.  No paronychia or evidence of soft tissue infection is noted.     Vascular: DP & PT pulses are intact & regular bilaterally.  No significant edema or varicosities noted.  CFT and skin temperature is normal to both lower extremities.     Neurologic: Lower extremity sensation is intact to light touch.  No evidence of weakness or contracture in the lower extremities.  No evidence of neuropathy.     Musculoskeletal: One notes decreased ankle joint ROM due to swelling and pain on the right. Point of maximum tenderness located over the  lateral aspect of the right ankle.  One notes  pain with palpation over the medial deltoid ligament on the right.  Moderate edema noted.  Positive ecchymosis noted.  One notes a positive distal compression test on the right.  One notes a positive proximal compression test on the right.     Radiograph evaluation including AP, lateral and mortise views of the right ankle reveals a spiral oblique fracture of the lateral malleolus extending at the level of the ankle mortise proximally and posteriorly 4 cm.  No evidence of a medial malleolar fracture noted.     ASSESSMENT / PLAN:     ICD-10-CM    1. Closed displaced fracture of lateral malleolus of right fibula, initial encounter S82.61XA        I have explained to Albina  about the conditions.  We discussed the nature of the condition as well as the treatment plan and expected length of recovery.  At this point, I am recommending surgical treatment of the condition involving open reduction internal fixation of the lateral malleolus fracture on the right ankle.  I informed the patient in risks and benefits of the procedure including but not limited to infection, wound complications, swelling, pain, diminished range of motion and function, DVT, arthritis and non union.  The procedure will be performed under general with popliteal  block anesthesia.  The patient will obtain a preoperative history and physical by the primary care provider.  Consents will be reviewed and signed on the day of surgery.  The patient was placed into a Villeda compression dressing and posterior splint.  The patient is to remain non weightbearing.    Disclaimer: This note consists of symbols derived from keyboarding, dictation and/or voice recognition software. As a result, there may be errors in the script that have gone undetected. Please consider this when interpreting information found in this chart.       ESTEPHANIE Rosas.P.MARCELINO., F.A.C.F.A.S.      Again, thank you for allowing me to participate in the care of your patient.        Sincerely,        Rolo Oconnor DPM

## 2019-02-15 ENCOUNTER — TELEPHONE (OUTPATIENT)
Dept: PODIATRY | Facility: CLINIC | Age: 69
End: 2019-02-15

## 2019-02-15 NOTE — TELEPHONE ENCOUNTER
Tried to call patient and there was no answer. Was also unable to leave a message due to the mailbox being full.

## 2019-02-15 NOTE — TELEPHONE ENCOUNTER
Called patient back regarding her question from earlier: Surgery scheduled for 2-19-19.  Wondering when she will be able to put weight on foot/ankle.  Has upcoming vacation and just wondering if she will have to cancel that.  PT answered. I advised her that Dr. Oconnor would order her a knee scooter that is foldable that she can take with her on her vacation. Patient then wanted to know if she could be weight bearing on her vacation, which is in one month from now, and I advised her that Dr. Oconnor said no weight bearing for at least 6 weeks.    Alta Jonas MA on 2/15/2019 at 1:33 PM

## 2019-02-15 NOTE — TELEPHONE ENCOUNTER
Reason for call:  Patient reporting a symptom    Symptom or request: Surgery scheduled for 2-19-19.  Wondering when she will be able to put weight on foot/ankle.  Has upcoming vacation and just wondering if she will have to cancel that.    Additional comments:     Phone Number patient can be reached at:  Home number on file [phone] (home)    Best Time:  any    Can we leave a detailed message on this number:  YES    Call taken on 2/15/2019 at 11:28 AM by Jaquelin Avila

## 2019-02-18 ENCOUNTER — OFFICE VISIT (OUTPATIENT)
Dept: FAMILY MEDICINE | Facility: CLINIC | Age: 69
End: 2019-02-18
Payer: COMMERCIAL

## 2019-02-18 ENCOUNTER — ANESTHESIA EVENT (OUTPATIENT)
Dept: SURGERY | Facility: CLINIC | Age: 69
End: 2019-02-18
Payer: COMMERCIAL

## 2019-02-18 VITALS
SYSTOLIC BLOOD PRESSURE: 123 MMHG | RESPIRATION RATE: 15 BRPM | OXYGEN SATURATION: 95 % | HEART RATE: 77 BPM | DIASTOLIC BLOOD PRESSURE: 65 MMHG | TEMPERATURE: 97.3 F

## 2019-02-18 DIAGNOSIS — L30.4 INTERTRIGO: ICD-10-CM

## 2019-02-18 DIAGNOSIS — Z01.818 PREOP GENERAL PHYSICAL EXAM: Primary | ICD-10-CM

## 2019-02-18 DIAGNOSIS — S82.61XK: ICD-10-CM

## 2019-02-18 DIAGNOSIS — I20.0 INTERMEDIATE CORONARY SYNDROME (H): ICD-10-CM

## 2019-02-18 DIAGNOSIS — Z98.890 STATUS POST CORONARY ANGIOGRAM: Chronic | ICD-10-CM

## 2019-02-18 DIAGNOSIS — I25.10 CORONARY ARTERY DISEASE INVOLVING NATIVE CORONARY ARTERY OF NATIVE HEART WITHOUT ANGINA PECTORIS: Chronic | ICD-10-CM

## 2019-02-18 DIAGNOSIS — E53.8 VITAMIN B12 DEFICIENCY (NON ANEMIC): ICD-10-CM

## 2019-02-18 DIAGNOSIS — J84.9 ILD (INTERSTITIAL LUNG DISEASE) (H): ICD-10-CM

## 2019-02-18 DIAGNOSIS — I20.89 CHRONIC STABLE ANGINA (H): ICD-10-CM

## 2019-02-18 LAB — HGB BLD-MCNC: 11.9 G/DL (ref 11.7–15.7)

## 2019-02-18 PROCEDURE — 82607 VITAMIN B-12: CPT | Performed by: PHYSICIAN ASSISTANT

## 2019-02-18 PROCEDURE — 85018 HEMOGLOBIN: CPT | Performed by: PHYSICIAN ASSISTANT

## 2019-02-18 PROCEDURE — 99215 OFFICE O/P EST HI 40 MIN: CPT | Performed by: PHYSICIAN ASSISTANT

## 2019-02-18 PROCEDURE — 36415 COLL VENOUS BLD VENIPUNCTURE: CPT | Performed by: PHYSICIAN ASSISTANT

## 2019-02-18 PROCEDURE — 80048 BASIC METABOLIC PNL TOTAL CA: CPT | Performed by: PHYSICIAN ASSISTANT

## 2019-02-18 PROCEDURE — 93000 ELECTROCARDIOGRAM COMPLETE: CPT | Performed by: PHYSICIAN ASSISTANT

## 2019-02-18 RX ORDER — METOPROLOL SUCCINATE 25 MG/1
25 TABLET, EXTENDED RELEASE ORAL DAILY
Qty: 14 TABLET | Refills: 0 | Status: SHIPPED | OUTPATIENT
Start: 2019-02-18 | End: 2019-05-03

## 2019-02-18 RX ORDER — CYANOCOBALAMIN (VITAMIN B-12) 2500 MCG
2500 TABLET, SUBLINGUAL SUBLINGUAL DAILY
COMMUNITY
End: 2019-09-05

## 2019-02-18 RX ORDER — NYSTATIN 100000 U/G
CREAM TOPICAL 2 TIMES DAILY
Qty: 30 G | Refills: 1 | Status: SHIPPED | OUTPATIENT
Start: 2019-02-18 | End: 2020-04-03

## 2019-02-18 NOTE — PROGRESS NOTES
"[facility]  [address] [facility] [address]  [phone]  Dept: [phone]    PRE-OP EVALUATION:  Today's date: 2019    Albina Pedro (: 1950) presents for pre-operative evaluation assessment as requested by Dr. Joseph Oconnor.  She requires evaluation and anesthesia risk assessment prior to undergoing surgery/procedure for treatment of fibula fracture right .    Proposed Surgery/ Procedure:OPEN REDUCTION INTERNAL FIXATION Lateral Malleolus Fracture   Date of Surgery/ Procedure: 2019  Time of Surgery/ Procedure: 10:40 am  Hospital/Surgical Facility: [facility]  Primary Physician: Carolina Burrell  Type of Anesthesia Anticipated: Combined General with popliteal block    Patient has a Health Care Directive or Living Will:  NO  - advanced directive given today    1. NO - Do you have a history of heart attack, stroke, stent, bypass or surgery on an artery in the head, neck, heart or legs?  2. NO - Do you ever have any pain or discomfort in your chest?  3. NO - Do you have a history of  Heart Failure?  4. YES, ongoing issue - Are you troubled by shortness of breath when: walking on the level, up a slight hill or at night?  5. NO - Do you currently have a cold, bronchitis or other respiratory infection?  6. NO - Do you have a cough, shortness of breath or wheezing?  7. NO - Do you sometimes get pains in the calves of your legs when you walk?  8. NO - Do you or anyone in your family have previous history of blood clots?  9. NO - Do you or does anyone in your family have a serious bleeding problem such as prolonged bleeding following surgeries or cuts?  10. NO - Have you ever had problems with anemia or been told to take iron pills?  11. NO - Have you had any abnormal blood loss such as black, tarry or bloody stools, or abnormal vaginal bleeding?  12. NO - Have you ever had a blood transfusion?  13. YES, \"loopy\" after anesthesia the last surgery - Have you or any of " your relatives ever had problems with anesthesia?  14. NO - Do you have sleep apnea, excessive snoring or daytime drowsiness?  15. NO - Do you have any prosthetic heart valves?  16. NO - Do you have prosthetic joints?  17. NO - Is there any chance that you may be pregnant?      HPI:     HPI related to upcoming procedure: fall down steps a couple weeks ago. Closed displaced fracture of lateral malleolus of right fibula      See problem list for active medical problems.  Problems all longstanding and stable, except as noted/documented.  See ROS for pertinent symptoms related to these conditions.                                                                                                                                                          .  CAD - Patient has a longstanding history of moderate-severe CAD. Patient denies recent chest pain or NTG use, denies exercise induced dyspnea or PND. Last Stress test 12/2016 - stable, EKG 5/4/18.         From cardiology visit 5/2018  1. CAD with angina (Cath 2016 - moderate mLAD/D1 stenosis, small vessels not ideal for PCI, lexiscan - small ant ischemia, normal LV fxn): continue Ranolazine 1000 mg BID for angina. Intolerant of other anti-anginals. Continue pravastatin 20 mg and ASA 81 mg for secondary prevention. Will do repeat EKG in one year to monitor QTc.   2. Syncope related to medications.  3. ILD  4. Sjogren's syndrome                                                                                                                                                 .  ILD- Patient has a longstanding history of moderate-severe COPD . Patient has been doing well overall noting CHAVARRIA and continues on medication regimen consisting of albuterol PRN  without adverse reactions or side effects.   Stable, no changes. 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 Not on treatment currently, previously on azathioprine (years ago). Also has sjogren's disease not on treatment. PFTs today are stable from prior, and have been stable for several years.   -Continue to follow intermittently, can schedule follow up in 1 year with PFTs, can be seen sooner if needed.  -Consider repeat CT scan as a 5 year follow up, in December 2019 or so.                                                                                                           .  HYPERLIPIDEMIA - Patient has a long history of significant Hyperlipidemia requiring medication for treatment with recent good control. Patient reports no problems or side effects with the medication.                                                                      Depression: wellbutrin/lorazepam PRN. Stable.                                                                                   .    MEDICAL HISTORY:     Patient Active Problem List    Diagnosis Date Noted     Chronic stable angina (H) 01/18/2019     Priority: Medium     Intermediate coronary syndrome (H) 07/24/2018     Priority: Medium     ILD (interstitial lung disease) (H) 07/09/2018     Priority: Medium     Needs follow up ct dec 2019       Hyperlipidemia LDL goal <70 05/31/2018     Priority: Medium     Major depressive disorder, recurrent, mild (H) 02/01/2017     Priority: Medium     Acute chest pain 12/13/2016     Priority: Medium     Status post coronary angiogram 02/18/2016     Priority: Medium     Adverse effect of anesthetic 02/11/2016     Priority: Medium     Patient is very sensitive to anesthetics. Needs lower dosing.        Sjogren's syndrome (H) 01/15/2014     Priority: Medium     CAD (coronary artery disease) 09/18/2013     Priority: Medium     Mild ischemia on stress test       Advanced directives, counseling/discussion 01/12/2012     Priority: Medium     Advance Directive Problem List Overview:   Name Relationship Phone    Primary Health Care Agent             Alternative Health Care Agent          Discussed advance care planning with patient; information given to patient to review.     Daija Black CMA, HC Facilitator    2012          Panniculitis 11/10/2011     Priority: Medium     Health Care Home 06/15/2011     Priority: Medium     X  DX V65.8 REPLACED WITH [facility] (2013)       Episodic mood disorder (H) 2007     Priority: Medium     2007 - Prozac and will look into light box.   April 15, 2008 - Will stop for summer. Light box rx.  Problem list name updated by automated process. Provider to review       Dermatophytosis of body 2007     Priority: Medium     2007 - Classic appearance and worsening in areas's not using Nystatin.  Will use everywhere or change to clotrimazole.   April 15, 2008 - Change to powder.   Problem list name updated by automated process. Provider to review       DIZZINESS - LIKELY HYPOGLYCEMIA 2006     Priority: Medium     2008- negative Event monitor and GTT showed some elevation.  Dietary changes.           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     Past Surgical History:   Procedure Laterality Date     C/SECTION, LOW TRANSVERSE  10/13/1978    , Low Transverse     COLONOSCOPY       SURGICAL HISTORY OF -            SURGICAL HISTORY OF -   00    Colonoscopy     Current Outpatient Medications   Medication Sig Dispense Refill     aspirin 81 MG tablet Take 81 mg by mouth every evening  30 tablet      buPROPion (WELLBUTRIN SR) 100 MG 12 hr tablet TAKE ONE TABLET BY MOUTH TWICE DAILY  (Patient taking differently: TAKE ONE TABLET BY MOUTH ONCE DAILY) 180 tablet 2     calcium  carbonate (OS-SARI 500 MG [place]) 1250 MG tablet Take 1 tablet by mouth 2 times daily       metoprolol succinate ER (TOPROL-XL) 25 MG 24 hr tablet Take 1 tablet (25 mg) by mouth daily 14 tablet 0     Multiple Vitamins-Minerals (MULTIVITAMIN & MINERAL PO) Take 1 tablet by mouth daily        nystatin (MYCOSTATIN) 886522 UNIT/GM external cream Apply topically 2 times daily 30 g 1     pravastatin (PRAVACHOL) 20 MG tablet TAKE ONE TABLET EVERY DAY 90 tablet 2     Ranolazine 1000 MG TB12 Take 1,000 mg by mouth 2 times daily 180 tablet 3     venlafaxine (EFFEXOR) 37.5 MG tablet TAKE TWO TABLETS BY MOUTH IN THE MORNING AND ONE IN THE AFTERNOON (Patient taking differently: TAKE TWO TABLETS BY MOUTH ONE IN THE MORNING AND ONE IN THE AFTERNOON) 270 tablet 0     vitamin B-12 (CYANOCOBALAMIN) 2500 MCG sublingual tablet Take 2,500 mcg by mouth daily       VITAMIN D3 -2000 units by mouth DAILY       albuterol (PROAIR HFA/PROVENTIL HFA/VENTOLIN HFA) 108 (90 BASE) MCG/ACT Inhaler Inhale 2 puffs into the lungs every 4 hours as needed for shortness of breath / dyspnea 3 Inhaler 6     loratadine (CLARITIN) 10 MG tablet Take 1 tablet (10 mg) by mouth daily 30 tablet 1     LORazepam (ATIVAN) 0.5 MG tablet TAKE ONE TABLET BY MOUTH EVERY EIGHT HOURS AS NEEDED FOR ANXIETY  30 tablet 0     mineral oil-hydrophilic petrolatum (AQUAPHOR) ointment Apply  topically as needed for dry skin. 420 g 4     triamcinolone (KENALOG) 0.1 % cream Apply sparingly to affected area three times daily for 14 days. 30 g 1     OTC products: None, except as noted above    Allergies   Allergen Reactions     Daypro [Oxaprozin] Rash            Lidocaine Other (See Comments)     Rapid heart rate     Nitroglycerin Other (See Comments)     Causes low blood pressure     Penicillins Unknown     Occurred as child.     Sulfa Drugs Rash      Latex Allergy: NO    Social History     Tobacco Use     Smoking status: Never Smoker     Smokeless tobacco: Never Used    Substance Use Topics     Alcohol use: Yes     Alcohol/week: 0.0 oz     Comment: 1 glass of wine every 2 weeks     History   Drug Use No       REVIEW OF SYSTEMS:   CONSTITUTIONAL: NEGATIVE for fever, chills, change in weight  INTEGUMENTARY/SKIN: NEGATIVE for worrisome rashes, moles or lesions POSITIVE yeast under breasts. Ongoing intermittent  EYES: NEGATIVE for vision changes or irritation  ENT/MOUTH: NEGATIVE for ear, mouth and throat problems  RESP: NEGATIVE for significant cough or SOB  BREAST: NEGATIVE for masses, tenderness or discharge  CV: NEGATIVE for chest pain, palpitations or peripheral edema POSITIVE ankle swelling right leg  GI: NEGATIVE for nausea, abdominal pain, heartburn, or change in bowel habits  : NEGATIVE for frequency, dysuria, or hematuria  MUSCULOSKELETAL: NEGATIVE for significant arthralgias or myalgia POSITIVE right foot/ankle  NEURO: NEGATIVE for weakness, dizziness or paresthesias  ENDOCRINE: NEGATIVE for temperature intolerance, skin/hair changes   HEME: NEGATIVE for bleeding problems  PSYCHIATRIC: NEGATIVE for changes in mood or affect    EXAM:   /65   Pulse 77   Temp 97.3  F (36.3  C) (Tympanic)   Resp 15   SpO2 95%     GENERAL APPEARANCE: healthy, alert and no distress     EYES: EOMI, PERRL     HENT: ear canals and TM's normal and nose and mouth without ulcers or lesions     NECK: no adenopathy, no asymmetry, masses, or scars and thyroid normal to palpation     RESP: lungs clear to auscultation - no rales, rhonchi or wheezes     CV: regular rates and rhythm, normal S1 S2, no S3 or S4 and no murmur, click or rub     ABDOMEN:  soft, nontender, no HSM or masses and bowel sounds normal     MS: POSITIVE right lower leg in boot. 1+ pitting edema right anterior lower leg, no calf swelling/tenderness     SKIN: no suspicious lesions or rashes     NEURO: Normal strength and tone, sensory exam grossly normal, mentation intact and speech normal     PSYCH: mentation appears normal.  and affect normal/bright     LYMPHATICS: No cervical adenopathy    DIAGNOSTICS:     EKG: nonspecific T abnormality/precordial negative T waves, normal axis, normal intervals, no acute ST/T changes c/w ischemia, no LVH by voltage criteria  Labs Resulted Today:   Results for orders placed or performed during the hospital encounter of 02/10/19   Ankle XR, G/E 3 views, right    Narrative    RIGHT ANKLE THREE VIEWS  2/10/2019 11:50 AM     HISTORY: drage    COMPARISON: None      Impression    IMPRESSION: Mildly displaced distal fibular fracture and mild widening  of the ankle mortise.    SYD GUERRA MD     Labs Drawn and in Process:   Unresulted Labs Ordered in the Past 30 Days of this Admission     Date and Time Order Name Status Description    2/18/2019 1705 VITAMIN B12 In process     2/18/2019 1704 HEMOGLOBIN In process     2/18/2019 1704 BASIC METABOLIC PANEL In process           Recent Labs   Lab Test 10/22/18  1645 05/31/18  1133 10/20/17  2313   HGB 12.7  --  12.2     --  256    140 139   POTASSIUM 4.1 4.4 4.1   CR 0.71 0.61 0.94        IMPRESSION:   Reason for surgery/procedure: Closed displaced fracture of lateral malleolus of right fibula / OPEN REDUCTION INTERNAL FIXATION Lateral Malleolus Fracture  Diagnosis/reason for consult: preoperative exam    The proposed surgical procedure is considered INTERMEDIATE risk.    REVISED CARDIAC RISK INDEX  The patient has the following serious cardiovascular risks for perioperative complications such as (MI, PE, VFib and 3  AV Block):  Coronary Artery Disease (MI, positive stress test, angina, Qs on EKG)  INTERPRETATION: 1 risks: Class II (low risk - 0.9% complication rate)    The patient has the following additional risks for perioperative complications:  The ASCVD Risk score ([place] LEMUS Jr., et al., 2013) failed to calculate for the following reasons:    Cannot find a previous HDL lab    Cannot find a previous total cholesterol lab      ICD-10-CM    1. Preop  general physical exam Z01.818 EKG 12-lead complete w/read - Clinics     Hemoglobin   2. Closed fracture of distal lateral malleolus of right fibula with nonunion, subsequent encounter S82.61XK    3. Coronary artery disease involving native coronary artery of native heart without angina pectoris I25.10 EKG 12-lead complete w/read - Clinics     Basic metabolic panel     metoprolol succinate ER (TOPROL-XL) 25 MG 24 hr tablet   4. Status post coronary angiogram Z98.890    5. ILD (interstitial lung disease) (H) J84.9    6. Chronic stable angina (H) I20.8    7. Intermediate coronary syndrome (H) I20.0    8. Intertrigo L30.4 nystatin (MYCOSTATIN) 841788 UNIT/GM external cream   9. Vitamin B12 deficiency (non anemic) E53.8 Vitamin B12       RECOMMENDATIONS:     --Consult hospital rounder / IM to assist post-op medical management    Cardiovascular Risk  Performs 4 METs exercise without symptoms (Light housework (dusting, washing dishes)) .   Patient to start a Beta Blocker. Continue Betablocker therapy after surgery, using Beta blocker order set as necessary for NPO status.  Toprol XL 25 mg started x 14 days  Discussed this with Dr. Sandoval who agreed with plan.      Pulmonary Risk  Incentive spirometry post op  Respiratory Therapy (Respiratory Care IP Consult)  post op  NG tube decompression if abdominal distension or significant vomiting       --Patient is to take all scheduled medications on the day of surgery EXCEPT for modifications listed below.    APPROVAL GIVEN to proceed with proposed procedure, without further diagnostic evaluation     Note: labs were obtained (hgb, bmp) but results will not come til tomorrow, anesthesiology can review these.  Patient requested B12 be checked, put on oral B12 by [facility] in December for memory, recommended recheck in a couple months    Signed Electronically by: Monika Castro PA-C    Copy of this evaluation report is provided to requesting physician.    [place] Preop  Guidelines    Revised Cardiac Risk Index

## 2019-02-18 NOTE — PATIENT INSTRUCTIONS
Start toprol XL 25 mg tonight and continue at dinner time for 2 weeks  This is to protect your heart through the surgery period      Before Your Surgery      Call your surgeon if there is any change in your health. This includes signs of a cold or flu (such as a sore throat, runny nose, cough, rash or fever).    Do not smoke, drink alcohol or take over the counter medicine (unless your surgeon or primary care doctor tells you to) for the 24 hours before and after surgery.    If you take prescribed drugs: Follow your doctor s orders about which medicines to take and which to stop until after surgery.    Eating and drinking prior to surgery: follow the instructions from your surgeon    Take a shower or bath the night before surgery. Use the soap your surgeon gave you to gently clean your skin. If you do not have soap from your surgeon, use your regular soap. Do not shave or scrub the surgery site.  Wear clean pajamas and have clean sheets on your bed.

## 2019-02-19 ENCOUNTER — HOSPITAL ENCOUNTER (OUTPATIENT)
Facility: CLINIC | Age: 69
Discharge: HOME | End: 2019-02-19
Attending: PODIATRIST | Admitting: PODIATRIST
Payer: COMMERCIAL

## 2019-02-19 ENCOUNTER — ANESTHESIA (OUTPATIENT)
Dept: SURGERY | Facility: CLINIC | Age: 69
End: 2019-02-19
Payer: COMMERCIAL

## 2019-02-19 ENCOUNTER — APPOINTMENT (OUTPATIENT)
Dept: GENERAL RADIOLOGY | Facility: CLINIC | Age: 69
End: 2019-02-19
Attending: PODIATRIST
Payer: COMMERCIAL

## 2019-02-19 VITALS
RESPIRATION RATE: 20 BRPM | OXYGEN SATURATION: 97 % | BODY MASS INDEX: 28.45 KG/M2 | HEIGHT: 66 IN | SYSTOLIC BLOOD PRESSURE: 110 MMHG | HEART RATE: 64 BPM | TEMPERATURE: 99 F | WEIGHT: 177 LBS | DIASTOLIC BLOOD PRESSURE: 60 MMHG

## 2019-02-19 DIAGNOSIS — S82.61XA CLOSED DISPLACED FRACTURE OF LATERAL MALLEOLUS OF RIGHT FIBULA, INITIAL ENCOUNTER: Primary | ICD-10-CM

## 2019-02-19 LAB
ANION GAP SERPL CALCULATED.3IONS-SCNC: 3 MMOL/L (ref 3–14)
BUN SERPL-MCNC: 30 MG/DL (ref 7–30)
CALCIUM SERPL-MCNC: 8.9 MG/DL (ref 8.5–10.1)
CHLORIDE SERPL-SCNC: 104 MMOL/L (ref 94–109)
CO2 SERPL-SCNC: 29 MMOL/L (ref 20–32)
CREAT SERPL-MCNC: 0.75 MG/DL (ref 0.52–1.04)
GFR SERPL CREATININE-BSD FRML MDRD: 80 ML/MIN/{1.73_M2}
GLUCOSE SERPL-MCNC: 71 MG/DL (ref 70–99)
POTASSIUM SERPL-SCNC: 4.8 MMOL/L (ref 3.4–5.3)
SODIUM SERPL-SCNC: 136 MMOL/L (ref 133–144)
VIT B12 SERPL-MCNC: 3741 PG/ML (ref 193–986)

## 2019-02-19 PROCEDURE — 40000277 XR SURGERY CARM FLUORO LESS THAN 5 MIN W STILLS: Mod: TC

## 2019-02-19 PROCEDURE — 25800030 ZZH RX IP 258 OP 636: Performed by: NURSE ANESTHETIST, CERTIFIED REGISTERED

## 2019-02-19 PROCEDURE — 37000009 ZZH ANESTHESIA TECHNICAL FEE, EACH ADDTL 15 MIN: Performed by: PODIATRIST

## 2019-02-19 PROCEDURE — 25000125 ZZHC RX 250: Performed by: NURSE ANESTHETIST, CERTIFIED REGISTERED

## 2019-02-19 PROCEDURE — 36000063 ZZH SURGERY LEVEL 4 EA 15 ADDTL MIN: Performed by: PODIATRIST

## 2019-02-19 PROCEDURE — 25000125 ZZHC RX 250: Performed by: PODIATRIST

## 2019-02-19 PROCEDURE — C1713 ANCHOR/SCREW BN/BN,TIS/BN: HCPCS | Performed by: PODIATRIST

## 2019-02-19 PROCEDURE — 27210794 ZZH OR GENERAL SUPPLY STERILE: Performed by: PODIATRIST

## 2019-02-19 PROCEDURE — 40000305 ZZH STATISTIC PRE PROC ASSESS I: Performed by: PODIATRIST

## 2019-02-19 PROCEDURE — 36000065 ZZH SURGERY LEVEL 4 W FLUORO 1ST 30 MIN: Performed by: PODIATRIST

## 2019-02-19 PROCEDURE — 27792 TREATMENT OF ANKLE FRACTURE: CPT | Mod: RT | Performed by: PODIATRIST

## 2019-02-19 PROCEDURE — 71000027 ZZH RECOVERY PHASE 2 EACH 15 MINS: Performed by: PODIATRIST

## 2019-02-19 PROCEDURE — 25000132 ZZH RX MED GY IP 250 OP 250 PS 637: Performed by: NURSE ANESTHETIST, CERTIFIED REGISTERED

## 2019-02-19 PROCEDURE — 37000008 ZZH ANESTHESIA TECHNICAL FEE, 1ST 30 MIN: Performed by: PODIATRIST

## 2019-02-19 PROCEDURE — 25000128 H RX IP 250 OP 636: Performed by: NURSE ANESTHETIST, CERTIFIED REGISTERED

## 2019-02-19 DEVICE — IMP PLATE ARTHREX LOCKING DIST FIBULA RT 4H SS AR-8943BR-04: Type: IMPLANTABLE DEVICE | Site: ANKLE | Status: FUNCTIONAL

## 2019-02-19 DEVICE — IMPLANTABLE DEVICE: Type: IMPLANTABLE DEVICE | Site: ANKLE | Status: FUNCTIONAL

## 2019-02-19 DEVICE — IMP SCR ARTHREX LP LOCKING 3.5X14MM SS AR-8835L-14: Type: IMPLANTABLE DEVICE | Site: ANKLE | Status: FUNCTIONAL

## 2019-02-19 DEVICE — IMP SCR ARTHREX LP LOCKING 2.7X10MM SS AR-8827L-10: Type: IMPLANTABLE DEVICE | Site: ANKLE | Status: FUNCTIONAL

## 2019-02-19 DEVICE — IMP SCR ARTHREX LP LOCKING 2.7X14MM SS AR-8827L-14: Type: IMPLANTABLE DEVICE | Site: ANKLE | Status: FUNCTIONAL

## 2019-02-19 DEVICE — IMP SCR ARTHREX LP LOCKING 2.7X16MM SS AR-8827L-16: Type: IMPLANTABLE DEVICE | Site: ANKLE | Status: FUNCTIONAL

## 2019-02-19 RX ORDER — DEXAMETHASONE SODIUM PHOSPHATE 4 MG/ML
4 INJECTION, SOLUTION INTRA-ARTICULAR; INTRALESIONAL; INTRAMUSCULAR; INTRAVENOUS; SOFT TISSUE EVERY 10 MIN PRN
Status: DISCONTINUED | OUTPATIENT
Start: 2019-02-19 | End: 2019-02-19 | Stop reason: HOSPADM

## 2019-02-19 RX ORDER — ONDANSETRON 4 MG/1
4 TABLET, ORALLY DISINTEGRATING ORAL EVERY 30 MIN PRN
Status: DISCONTINUED | OUTPATIENT
Start: 2019-02-19 | End: 2019-02-19 | Stop reason: HOSPADM

## 2019-02-19 RX ORDER — HYDROXYZINE HYDROCHLORIDE 25 MG/1
25 TABLET, FILM COATED ORAL 3 TIMES DAILY PRN
Qty: 30 TABLET | Refills: 0 | Status: SHIPPED | OUTPATIENT
Start: 2019-02-19 | End: 2019-02-25

## 2019-02-19 RX ORDER — ROPIVACAINE HYDROCHLORIDE 7.5 MG/ML
INJECTION, SOLUTION EPIDURAL; PERINEURAL PRN
Status: DISCONTINUED | OUTPATIENT
Start: 2019-02-19 | End: 2019-02-19

## 2019-02-19 RX ORDER — LIDOCAINE HYDROCHLORIDE 10 MG/ML
INJECTION, SOLUTION INFILTRATION; PERINEURAL PRN
Status: DISCONTINUED | OUTPATIENT
Start: 2019-02-19 | End: 2019-02-19

## 2019-02-19 RX ORDER — HYDROMORPHONE HYDROCHLORIDE 1 MG/ML
.3-.5 INJECTION, SOLUTION INTRAMUSCULAR; INTRAVENOUS; SUBCUTANEOUS EVERY 10 MIN PRN
Status: DISCONTINUED | OUTPATIENT
Start: 2019-02-19 | End: 2019-02-19 | Stop reason: HOSPADM

## 2019-02-19 RX ORDER — ROPIVACAINE HYDROCHLORIDE 5 MG/ML
INJECTION, SOLUTION EPIDURAL; INFILTRATION; PERINEURAL PRN
Status: DISCONTINUED | OUTPATIENT
Start: 2019-02-19 | End: 2019-02-19

## 2019-02-19 RX ORDER — CLINDAMYCIN PHOSPHATE 900 MG/50ML
900 INJECTION, SOLUTION INTRAVENOUS
Status: DISCONTINUED | OUTPATIENT
Start: 2019-02-19 | End: 2019-02-19 | Stop reason: HOSPADM

## 2019-02-19 RX ORDER — GABAPENTIN 300 MG/1
300 CAPSULE ORAL ONCE
Status: COMPLETED | OUTPATIENT
Start: 2019-02-19 | End: 2019-02-19

## 2019-02-19 RX ORDER — SODIUM CHLORIDE, SODIUM LACTATE, POTASSIUM CHLORIDE, CALCIUM CHLORIDE 600; 310; 30; 20 MG/100ML; MG/100ML; MG/100ML; MG/100ML
INJECTION, SOLUTION INTRAVENOUS CONTINUOUS
Status: DISCONTINUED | OUTPATIENT
Start: 2019-02-19 | End: 2019-02-19 | Stop reason: HOSPADM

## 2019-02-19 RX ORDER — MEPERIDINE HYDROCHLORIDE 25 MG/ML
12.5 INJECTION INTRAMUSCULAR; INTRAVENOUS; SUBCUTANEOUS
Status: DISCONTINUED | OUTPATIENT
Start: 2019-02-19 | End: 2019-02-19 | Stop reason: HOSPADM

## 2019-02-19 RX ORDER — FENTANYL CITRATE 50 UG/ML
25-50 INJECTION, SOLUTION INTRAMUSCULAR; INTRAVENOUS
Status: DISCONTINUED | OUTPATIENT
Start: 2019-02-19 | End: 2019-02-19 | Stop reason: HOSPADM

## 2019-02-19 RX ORDER — KETOROLAC TROMETHAMINE 30 MG/ML
INJECTION, SOLUTION INTRAMUSCULAR; INTRAVENOUS PRN
Status: DISCONTINUED | OUTPATIENT
Start: 2019-02-19 | End: 2019-02-19

## 2019-02-19 RX ORDER — HYDROXYZINE HYDROCHLORIDE 25 MG/1
25 TABLET, FILM COATED ORAL EVERY 6 HOURS PRN
Status: DISCONTINUED | OUTPATIENT
Start: 2019-02-19 | End: 2019-02-19 | Stop reason: HOSPADM

## 2019-02-19 RX ORDER — KETAMINE HYDROCHLORIDE 50 MG/ML
INJECTION, SOLUTION INTRAMUSCULAR; INTRAVENOUS PRN
Status: DISCONTINUED | OUTPATIENT
Start: 2019-02-19 | End: 2019-02-19

## 2019-02-19 RX ORDER — AMOXICILLIN 250 MG
1-2 CAPSULE ORAL 2 TIMES DAILY
Qty: 120 TABLET | Refills: 0 | Status: SHIPPED | OUTPATIENT
Start: 2019-02-19 | End: 2019-02-25

## 2019-02-19 RX ORDER — LIDOCAINE 40 MG/G
CREAM TOPICAL
Status: DISCONTINUED | OUTPATIENT
Start: 2019-02-19 | End: 2019-02-19 | Stop reason: HOSPADM

## 2019-02-19 RX ORDER — CLINDAMYCIN PHOSPHATE 900 MG/50ML
900 INJECTION, SOLUTION INTRAVENOUS SEE ADMIN INSTRUCTIONS
Status: DISCONTINUED | OUTPATIENT
Start: 2019-02-19 | End: 2019-02-19 | Stop reason: HOSPADM

## 2019-02-19 RX ORDER — DEXAMETHASONE SODIUM PHOSPHATE 4 MG/ML
INJECTION, SOLUTION INTRA-ARTICULAR; INTRALESIONAL; INTRAMUSCULAR; INTRAVENOUS; SOFT TISSUE PRN
Status: DISCONTINUED | OUTPATIENT
Start: 2019-02-19 | End: 2019-02-19

## 2019-02-19 RX ORDER — PROPOFOL 10 MG/ML
INJECTION, EMULSION INTRAVENOUS CONTINUOUS PRN
Status: DISCONTINUED | OUTPATIENT
Start: 2019-02-19 | End: 2019-02-19

## 2019-02-19 RX ORDER — ONDANSETRON 2 MG/ML
4 INJECTION INTRAMUSCULAR; INTRAVENOUS EVERY 30 MIN PRN
Status: DISCONTINUED | OUTPATIENT
Start: 2019-02-19 | End: 2019-02-19 | Stop reason: HOSPADM

## 2019-02-19 RX ORDER — HYDROXYZINE HYDROCHLORIDE 50 MG/1
50 TABLET, FILM COATED ORAL EVERY 6 HOURS PRN
Status: DISCONTINUED | OUTPATIENT
Start: 2019-02-19 | End: 2019-02-19 | Stop reason: HOSPADM

## 2019-02-19 RX ORDER — ONDANSETRON 2 MG/ML
INJECTION INTRAMUSCULAR; INTRAVENOUS PRN
Status: DISCONTINUED | OUTPATIENT
Start: 2019-02-19 | End: 2019-02-19

## 2019-02-19 RX ORDER — LIDOCAINE HYDROCHLORIDE 10 MG/ML
INJECTION, SOLUTION EPIDURAL; INFILTRATION; INTRACAUDAL; PERINEURAL PRN
Status: DISCONTINUED | OUTPATIENT
Start: 2019-02-19 | End: 2019-02-19

## 2019-02-19 RX ORDER — ACETAMINOPHEN 325 MG/1
975 TABLET ORAL ONCE
Status: COMPLETED | OUTPATIENT
Start: 2019-02-19 | End: 2019-02-19

## 2019-02-19 RX ORDER — FENTANYL CITRATE 50 UG/ML
INJECTION, SOLUTION INTRAMUSCULAR; INTRAVENOUS PRN
Status: DISCONTINUED | OUTPATIENT
Start: 2019-02-19 | End: 2019-02-19

## 2019-02-19 RX ORDER — NALOXONE HYDROCHLORIDE 0.4 MG/ML
.1-.4 INJECTION, SOLUTION INTRAMUSCULAR; INTRAVENOUS; SUBCUTANEOUS
Status: DISCONTINUED | OUTPATIENT
Start: 2019-02-19 | End: 2019-02-19 | Stop reason: HOSPADM

## 2019-02-19 RX ORDER — LIDOCAINE HYDROCHLORIDE AND EPINEPHRINE BITARTRATE 20; .01 MG/ML; MG/ML
INJECTION, SOLUTION SUBCUTANEOUS PRN
Status: DISCONTINUED | OUTPATIENT
Start: 2019-02-19 | End: 2019-02-19

## 2019-02-19 RX ORDER — OXYCODONE HYDROCHLORIDE 5 MG/1
5 TABLET ORAL
Status: DISCONTINUED | OUTPATIENT
Start: 2019-02-19 | End: 2019-02-19 | Stop reason: HOSPADM

## 2019-02-19 RX ORDER — OXYCODONE AND ACETAMINOPHEN 5; 325 MG/1; MG/1
1-2 TABLET ORAL EVERY 4 HOURS PRN
Qty: 30 TABLET | Refills: 0 | Status: SHIPPED | OUTPATIENT
Start: 2019-02-19 | End: 2019-03-04

## 2019-02-19 RX ORDER — ACETAMINOPHEN 325 MG/1
975 TABLET ORAL ONCE
Status: DISCONTINUED | OUTPATIENT
Start: 2019-02-19 | End: 2019-02-19 | Stop reason: HOSPADM

## 2019-02-19 RX ADMIN — GABAPENTIN 300 MG: 300 CAPSULE ORAL at 09:33

## 2019-02-19 RX ADMIN — Medication 5 ML: at 11:34

## 2019-02-19 RX ADMIN — FENTANYL CITRATE 50 MCG: 50 INJECTION, SOLUTION INTRAMUSCULAR; INTRAVENOUS at 11:25

## 2019-02-19 RX ADMIN — KETAMINE HYDROCHLORIDE 50 MG: 50 INJECTION, SOLUTION INTRAMUSCULAR; INTRAVENOUS at 12:05

## 2019-02-19 RX ADMIN — SODIUM CHLORIDE, POTASSIUM CHLORIDE, SODIUM LACTATE AND CALCIUM CHLORIDE: 600; 310; 30; 20 INJECTION, SOLUTION INTRAVENOUS at 09:33

## 2019-02-19 RX ADMIN — KETOROLAC TROMETHAMINE 15 MG: 30 INJECTION, SOLUTION INTRAMUSCULAR at 12:05

## 2019-02-19 RX ADMIN — ONDANSETRON 4 MG: 2 INJECTION INTRAMUSCULAR; INTRAVENOUS at 14:47

## 2019-02-19 RX ADMIN — LIDOCAINE HYDROCHLORIDE 2 ML: 10 INJECTION, SOLUTION EPIDURAL; INFILTRATION; INTRACAUDAL; PERINEURAL at 11:32

## 2019-02-19 RX ADMIN — DEXAMETHASONE SODIUM PHOSPHATE 8 MG: 4 INJECTION, SOLUTION INTRA-ARTICULAR; INTRALESIONAL; INTRAMUSCULAR; INTRAVENOUS; SOFT TISSUE at 12:05

## 2019-02-19 RX ADMIN — ROPIVACAINE HYDROCHLORIDE 10 ML: 7.5 INJECTION, SOLUTION EPIDURAL; PERINEURAL at 11:37

## 2019-02-19 RX ADMIN — PROPOFOL 100 MCG/KG/MIN: 10 INJECTION, EMULSION INTRAVENOUS at 12:05

## 2019-02-19 RX ADMIN — ACETAMINOPHEN 975 MG: 325 TABLET, FILM COATED ORAL at 09:33

## 2019-02-19 RX ADMIN — DEXAMETHASONE SODIUM PHOSPHATE 2 MG: 4 INJECTION, SOLUTION INTRA-ARTICULAR; INTRALESIONAL; INTRAMUSCULAR; INTRAVENOUS; SOFT TISSUE at 11:37

## 2019-02-19 RX ADMIN — PHENYLEPHRINE HYDROCHLORIDE 100 MCG: 10 INJECTION, SOLUTION INTRAMUSCULAR; INTRAVENOUS; SUBCUTANEOUS at 12:23

## 2019-02-19 RX ADMIN — ONDANSETRON 4 MG: 2 INJECTION INTRAMUSCULAR; INTRAVENOUS at 12:05

## 2019-02-19 RX ADMIN — FENTANYL CITRATE 50 MCG: 50 INJECTION, SOLUTION INTRAMUSCULAR; INTRAVENOUS at 12:05

## 2019-02-19 RX ADMIN — PHENYLEPHRINE HYDROCHLORIDE 150 MCG: 10 INJECTION, SOLUTION INTRAMUSCULAR; INTRAVENOUS; SUBCUTANEOUS at 12:38

## 2019-02-19 RX ADMIN — CLINDAMYCIN PHOSPHATE 900 MG: 18 INJECTION, SOLUTION INTRAVENOUS at 11:57

## 2019-02-19 RX ADMIN — ROPIVACAINE HYDROCHLORIDE 30 ML: 5 INJECTION, SOLUTION EPIDURAL; INFILTRATION; PERINEURAL at 11:36

## 2019-02-19 RX ADMIN — DEXAMETHASONE SODIUM PHOSPHATE 2 MG: 4 INJECTION, SOLUTION INTRA-ARTICULAR; INTRALESIONAL; INTRAMUSCULAR; INTRAVENOUS; SOFT TISSUE at 11:36

## 2019-02-19 RX ADMIN — MIDAZOLAM 2 MG: 1 INJECTION INTRAMUSCULAR; INTRAVENOUS at 11:24

## 2019-02-19 RX ADMIN — LIDOCAINE HYDROCHLORIDE 100 MG: 10 INJECTION, SOLUTION INFILTRATION; PERINEURAL at 12:05

## 2019-02-19 ASSESSMENT — MIFFLIN-ST. JEOR: SCORE: 1339.62

## 2019-02-19 NOTE — ANESTHESIA CARE TRANSFER NOTE
Patient: Albina Pedro    Procedure(s):  Open reduction internal fixation right lateral malleolus fracture    Diagnosis: closed displaced lateral malleolus fracture right ankle  Diagnosis Additional Information: No value filed.    Anesthesia Type:   General, Peripheral Nerve Block     Note:  Airway :Face Mask  Patient transferred to:Phase II  Handoff Report: Identifed the Patient, Identified the Reponsible Provider, Reviewed the pertinent medical history, Discussed the surgical course, Reviewed Intra-OP anesthesia mangement and issues during anesthesia, Set expectations for post-procedure period and Allowed opportunity for questions and acknowledgement of understanding      Vitals: (Last set prior to Anesthesia Care Transfer)    CRNA VITALS  2/19/2019 1217 - 2/19/2019 1247      2/19/2019             Pulse:  54    SpO2:  97 %                Electronically Signed By: STEW Medina CRNA  February 19, 2019  12:47 PM

## 2019-02-19 NOTE — ANESTHESIA PREPROCEDURE EVALUATION
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     Past Surgical History:   Procedure Laterality Date     C/SECTION, LOW TRANSVERSE  10/13/1978    , Low Transverse     COLONOSCOPY       SURGICAL HISTORY OF -            SURGICAL HISTORY OF -   00    Colonoscopy       Anesthesia Evaluation     . Pt has had prior anesthetic. Type: MAC and General    History of anesthetic complications          ROS/MED HX    ENT/Pulmonary:     (+)asthma Treatment: Inhaler prn,  , . Other pulmonary disease Interstitial lung disease.    Neurologic:       Cardiovascular: Comment: S/P coronary angio     (+) Dyslipidemia, --CAD, angina--. : . . . :. . Previous cardiac testing date:results:date: results:ECG reviewed date:19 results:Sinus Rhythm   - Negative precordial T-waves.     WITHIN NORMAL LIMITS   date: results:          METS/Exercise Tolerance:     Hematologic:         Musculoskeletal:         GI/Hepatic:         Renal/Genitourinary:         Endo:         Psychiatric:     (+) psychiatric history depression      Infectious Disease:         Malignancy:         Other: Comment: Sicca syndrome  Sjogren's syndrome                                Lab Results   Component Value Date    WBC 5.2 10/22/2018    HGB 11.9 2019    HCT 38.3 10/22/2018     10/22/2018    CRP <2.9 2017    SED 11 2017     10/22/2018    POTASSIUM 4.1 10/22/2018    CHLORIDE 108 10/22/2018     "CO2 27 10/22/2018    BUN 15 10/22/2018    CR 0.71 10/22/2018    GLC 72 10/22/2018    SARI 8.0 (L) 10/22/2018    PHOS 3.9 06/25/2009    ALBUMIN 3.4 10/22/2018    PROTTOTAL 6.9 10/22/2018    ALT 32 10/22/2018    AST 21 10/22/2018    GGT 27 04/23/2010    ALKPHOS 57 10/22/2018    BILITOTAL 0.3 10/22/2018    LIPASE 119 05/26/2011    AMYLASE <30 (L) 05/26/2011    TSH 2.41 03/22/2017    T4 0.93 09/15/2015       Preop Vitals  BP Readings from Last 3 Encounters:   02/18/19 123/65   02/14/19 115/61   02/10/19 111/64    Pulse Readings from Last 3 Encounters:   02/18/19 77   02/14/19 89   01/18/19 74      Resp Readings from Last 3 Encounters:   02/18/19 15   02/10/19 16   06/15/18 16    SpO2 Readings from Last 3 Encounters:   02/18/19 95%   02/10/19 98%   01/18/19 99%      Temp Readings from Last 1 Encounters:   02/18/19 36.3  C (97.3  F) (Tympanic)    Ht Readings from Last 1 Encounters:   02/14/19 1.676 m (5' 6\")      Wt Readings from Last 1 Encounters:   02/14/19 80.3 kg (177 lb)    Estimated body mass index is 28.57 kg/m  as calculated from the following:    Height as of 2/14/19: 1.676 m (5' 6\").    Weight as of 2/14/19: 80.3 kg (177 lb).       Anesthesia Plan      History & Physical Review  History and physical reviewed and following examination; no interval change.    ASA Status:  3 .    NPO Status:  > 6 hours    Plan for General and Peripheral Nerve Block with Propofol induction. Maintenance will be TIVA.    PONV prophylaxis:  Ondansetron (or other 5HT-3) and Dexamethasone or Solumedrol       Postoperative Care  Postoperative pain management:  IV analgesics, Oral pain medications, Peripheral nerve block (Single Shot) and Multi-modal analgesia.      Consents  Anesthetic plan, risks, benefits and alternatives discussed with:  Patient..                 STEW Medina CRNA  "

## 2019-02-19 NOTE — DISCHARGE INSTRUCTIONS
Same Day Surgery Discharge Instructions  Special Precautions After Surgery - Adult    1. It is not unusual to feel lightheaded or faint, up to 24 hours after surgery or while taking pain medication.  If you have these symptoms; sit for a few minutes before standing and have someone assist you when getting up.  2. You should rest and relax for the next 24 hours and must have someone stay with you for at least 24 hours after your discharge.  3. DO NOT DRIVE any vehicle or operate mechanical equipment for 24 hours following the end of your surgery.  DO NOT DRIVE while taking narcotic pain medications that have been prescribed by your physician.  If you had a limb operated on, you must be able to use it fully to drive.  4. DO NOT drink alcoholic beverages for 24 hours following surgery or while taking prescription pain medication.  5. Drink clear liquids (apple juice, ginger ale, broth, 7-Up, etc.).  Progress to your regular diet as you feel able.  6. Any questions call your physician and do not make important decisions for 24 hours.         Call for an appointment to return to the clinic in 1 week      Medications:  ? Acetaminophen & Oxycodone (Percocet):  Next dose due at __________  ? Hydroxyzine (Vistaril):  Next dose due at __________  ? Ibuprofen (Motrin, Advil):  Next dose due at 6:00pm  ? Stool softener to avoid constipation  ? Follow the instructions on the bottle.       __________________________________________________________________________________________________________________________________  IMPORTANT NUMBERS:    [facility] [place]  [phone], [phone]  Pharmacy:  [phone]  Same Day Surgery:  [phone], Monday - Friday until 8:30 p.m.  Urgent Care:  [phone]  Emergency Room:  [phone]      [facility]:  [phone]                                                                             [facility]:  [phone] option 1       Home Medical Equipment: [phone]  [facility]:  [phone]

## 2019-02-19 NOTE — OP NOTE
PREOPERATIVE DIAGNOSIS: 1.  Lateral malleolar ankle fracture, right ankle.   POSTOPERATIVE DIAGNOSIS: 1. Lateral malleolar ankle fracture, right ankle.   PROCEDURE: 1. Open reduction and internal fixation lateral malleolar ankle fracture, right ankle.     PATHOLOGY: None.   ANESTHESIA: General with popliteal block.   ESTIMATED BLOOD LOSS: Less than 10 mL   INDICATIONS FOR PROCEDURE: Albina Pedro is a 68 year old year old-year-old female with lateral malleolar ankle fracture of the right ankle. The patient was consented for open reduction and internal fixation lateral malleolar ankle fracture and syndesmosis repair, right ankle.   PROCEDURE IN DETAIL: Under mild sedation, the patient in the operating room and placed on the operating table in the supine position. Pneumatic thigh tourniquet was placed around the patient's right thigh.  The foot was then scrubbed, prepped and draped in the usual aseptic manner. Esmarch bandage was utilized to exsanguinate the patient's right lower extremity, and the pneumatic thigh tourniquet was inflated to 280 PSI.   The procedure began with a linear longitudinal incision made over the distal 1/3 of the fibula on the right.  The incision was made full thickness down to subcutaneous tissue.  Care was taken to avoid all vital neurovascular structures.  All active bleeders were ligated and cauterized as needed.  Further dissection was carried down to the fibula were one noted a spiral oblique fracture of the distal one third fibula.  The fracture site was debrided of all soft tissue interposition.  The fracture was brought out to length and anatomic position and secured with a bone clamp.  An Arthrex 3.5 cortical interfrag screw was placed across the fracture line with excellent stability noted.  An Arthrex locking plate was placed over the lateral aspect of the distal fibula utilizing both compression and locking screws.  Mini c-arm was utilized to determine proper alignment and  hardware placement.          The wound was irrigated with copious amounts of normal sterile saline. Tourniquet was deflated and prompt hyperemic response was noted to all five digits of the right foot. The fascia was reapproximated utilizing 3-0 Vicryl. The skin was reapproximated utilizing 4-0 nylon suture in a vertical mattress fashion. The wound was dressed with sterile bacitracin, Xeroform, 4 x 4s, cast padding, posterior plaster splint and an Ace wrap.   The patient tolerated these procedures well and was transferred from the operative table to the transport cart and taken from the OR to the PACU.  In PACU, patient and staff given orders and instructions for post-operative care in the following areas: post op pain management, weight-bearing status, dressing/splint care, weight-bearing assistive devices, elevation of the extremity, ice/cold therapy, DVT/blood clot prevention, nutrition and post-operative concerns to be aware.  Case and post-operative care measures were reviewed with the patient and family members present.  A post operative instruction sheet was provided.  If concerns or questions arise they will contact our clinic.  If acute concerns develop they will present emergently to a nearby medical center.      SYD CLEMENT DPM, FACFAS

## 2019-02-19 NOTE — OR NURSING
Extensive period of time spent with patient and  Jerrod reviewing non-weight bearing transfers, crutch walking, and stair safety.  Multiple cues and reminders needed to keep surgical limb up, able to walk only short distances d/t strength issues.  Pt's spouse Jerrod states Kristina has been using a wheelchair for mobility at home and that he has access to additional help if necessary.  I advised no independant transfers, transfer with assistance only.  Call placed to Dr. Oconnor for possible need for physical therapy.

## 2019-02-19 NOTE — ANESTHESIA POSTPROCEDURE EVALUATION
Patient: Albina Pedro    Procedure(s):  Open reduction internal fixation right lateral malleolus fracture    Diagnosis:closed displaced lateral malleolus fracture right ankle  Diagnosis Additional Information: No value filed.    Anesthesia Type:  General, Peripheral Nerve Block    Note:  Anesthesia Post Evaluation    Patient location during evaluation: Phase 2  Patient participation: Able to participate in evaluation but full recovery from regional anesthesia has not yet ocurrred but is anticipated to occur within 48 hours  Level of consciousness: awake and alert  Pain management: adequate  Airway patency: patent  Cardiovascular status: acceptable and hemodynamically stable  Respiratory status: acceptable and nasal cannula  Hydration status: acceptable  PONV: none     Anesthetic complications: None          Last vitals:  Vitals:    02/19/19 1300 02/19/19 1330 02/19/19 1345   BP: 100/51 107/52 111/56   Pulse: 56 60 62   Resp: 16 20 20   Temp:      SpO2: 100% 98% 99%         Electronically Signed By: STEW Medina CRNA  February 19, 2019  2:02 PM

## 2019-02-19 NOTE — ANESTHESIA PROCEDURE NOTES
Peripheral nerve/Neuraxial procedure note : Popliteal, Sciatic and Saphenous  Pre-Procedure  Performed by  Constanza Villeda APRN CRNA   Location: pre-op      Pre-Anesthestic Checklist: patient identified, IV checked, site marked, risks and benefits discussed, informed consent, monitors and equipment checked, pre-op evaluation, at physician/surgeon's request and post-op pain management    Timeout  Correct Patient: Yes   Correct Procedure: Yes   Correct Site: Yes   Correct Laterality: Yes   Correct Position: Yes   Site Marked: Yes   .   Procedure Documentation    .    Procedure:  right  Popliteal, Sciatic and Saphenous.  Local skin infiltrated with 2 mL of 1% lidocaine.     Ultrasound used to identify targeted nerve, plexus, or vascular marker and placed a needle adjacent to it., Ultrasound was used to visualize the spread of the anesthetic in close proximity to the above stated nerve. A permanent image is entered into the patient's record.  Patient Prep;mask, sterile gloves, chlorhexidine gluconate and isopropyl alcohol, patient draped.  Nerve Stim: Initial Level 1 mA.  Lowest motor response 0.4 mA..  Needle: insulated Needle Gauge: 20.    Needle Length (Inches) 4  Insertion Method: Single Shot.       Assessment/Narrative  Paresthesias: No.  .  The placement was negative for: blood aspirated, painful injection and site bleeding.  Bolus given via needle..   Secured via.   Complications: none. Test dose of 5 mL lidocaine 2% w/ 1:200,000 epinephrine at 11:34.  Test dose negative for signs of intravascular, subdural or intrathecal injection.

## 2019-02-19 NOTE — BRIEF OP NOTE
[facility] Brief Operative Note    Pre-operative diagnosis: closed displaced lateral malleolus fracture right ankle   Post-operative diagnosis * same as above *     Procedure: Procedure(s):  Open reduction internal fixation right lateral malleolus fracture   Surgeon(s): Surgeon(s) and Role:     * Rolo Oconnor, HARSHAD - Primary   Estimated blood loss: * No values recorded between 2/19/2019 12:18 PM and 2/19/2019 12:49 PM *    Specimens: * No specimens in log *   Findings: Stable ankle mortise

## 2019-02-25 ENCOUNTER — OFFICE VISIT (OUTPATIENT)
Dept: PODIATRY | Facility: CLINIC | Age: 69
End: 2019-02-25
Payer: COMMERCIAL

## 2019-02-25 ENCOUNTER — ANCILLARY PROCEDURE (OUTPATIENT)
Dept: GENERAL RADIOLOGY | Facility: CLINIC | Age: 69
End: 2019-02-25
Attending: PODIATRIST
Payer: COMMERCIAL

## 2019-02-25 VITALS
DIASTOLIC BLOOD PRESSURE: 64 MMHG | WEIGHT: 177 LBS | HEART RATE: 63 BPM | SYSTOLIC BLOOD PRESSURE: 99 MMHG | BODY MASS INDEX: 28.45 KG/M2 | HEIGHT: 66 IN

## 2019-02-25 DIAGNOSIS — S82.61XA CLOSED DISPLACED FRACTURE OF LATERAL MALLEOLUS OF RIGHT FIBULA, INITIAL ENCOUNTER: ICD-10-CM

## 2019-02-25 DIAGNOSIS — Z98.890 S/P FOOT SURGERY, RIGHT: Primary | ICD-10-CM

## 2019-02-25 PROCEDURE — 73610 X-RAY EXAM OF ANKLE: CPT | Mod: RT

## 2019-02-25 PROCEDURE — 99024 POSTOP FOLLOW-UP VISIT: CPT | Performed by: PODIATRIST

## 2019-02-25 ASSESSMENT — MIFFLIN-ST. JEOR: SCORE: 1339.62

## 2019-02-25 NOTE — NURSING NOTE
"Chief Complaint   Patient presents with     Surgical Followup     DOS 2/19/19, Open reduction and internal fixation lateral malleolar ankle fracture, right ankle. XR today.       Initial BP 99/64 (BP Location: Right arm, Patient Position: Chair, Cuff Size: Adult Regular)   Pulse 63   Ht 1.676 m (5' 6\")   Wt 80.3 kg (177 lb)   BMI 28.57 kg/m   Estimated body mass index is 28.57 kg/m  as calculated from the following:    Height as of this encounter: 1.676 m (5' 6\").    Weight as of this encounter: 80.3 kg (177 lb).  Medications and allergies reviewed.      Alta HUMMEL MA    "

## 2019-02-25 NOTE — PROGRESS NOTES
Albina presents to the office s/p one week open reduction internal fixation lateral malleolus fracture right ankle .  The patient relates keeping the bandages clean, dry and intact.  The patient relates good compliance with postoperative instructions.  The patient denies any severe pain, fevers, chills, nausea or vomiting.      There were no vitals taken for this visit.      Physical Exam:    General: The patient appears to be in no distress and in good spirits.  The bandages appear clean, dry and intact with no strikethrough noted. Vascular exam: Neurovascular status unchanged with < 3 sec capillary refill to all digits.  Positive pedal pulses and epicritic sensations intact with no evidence of allodynia.  One notes moderate edema to the forefoot on the right.  Sutures are intact and the skin is well coapted with no erythema noted.      Radiograph:  AP, lateral and medial oblique evaluation of right foot reveals surgical correction of lateral malleolus fracture with hardware properly placed and intact.      Assessment: Lateral malleolus fracture status post one week open reduction internal fixation of the right ankle.    Plan:  Sutures remain intact.  A sterile dressing was reapplied.  The patient was instructed to continue non-weightbearing to the right foot.  The patient is to keep the dressings clean, dry and intact and to continue with elevation of the right foot.  The patient will return to the office in one week for suture removal.    Disclaimer: This note consists of symbols derived from keyboarding, dictation and/or voice recognition software. As a result, there may be errors in the script that have gone undetected. Please consider this when interpreting information found in this chart.       RIKA Oconnor D.P.M., FMERRY.F.A.S.

## 2019-02-25 NOTE — LETTER
2/25/2019         RE: Albina Pedro  [address] [address] [facility] [place] [address]        Dear Colleague,    Thank you for referring your patient, Albina Pedro, to the [facility]. Please see a copy of my visit note below.    Albina presents to the office s/p one week open reduction internal fixation lateral malleolus fracture right ankle .  The patient relates keeping the bandages clean, dry and intact.  The patient relates good compliance with postoperative instructions.  The patient denies any severe pain, fevers, chills, nausea or vomiting.      There were no vitals taken for this visit.      Physical Exam:    General: The patient appears to be in no distress and in good spirits.  The bandages appear clean, dry and intact with no strikethrough noted. Vascular exam: Neurovascular status unchanged with < 3 sec capillary refill to all digits.  Positive pedal pulses and epicritic sensations intact with no evidence of allodynia.  One notes moderate edema to the forefoot on the right.  Sutures are intact and the skin is well coapted with no erythema noted.      Radiograph:  AP, lateral and medial oblique evaluation of right foot reveals surgical correction of lateral malleolus fracture with hardware properly placed and intact.      Assessment: Lateral malleolus fracture status post one week open reduction internal fixation of the right ankle.    Plan:  Sutures remain intact.  A sterile dressing was reapplied.  The patient was instructed to continue non-weightbearing to the right foot.  The patient is to keep the dressings clean, dry and intact and to continue with elevation of the right foot.  The patient will return to the office in one week for suture removal.    Disclaimer: This note consists of symbols derived from keyboarding, dictation and/or voice recognition software. As a result, there may be errors in the script that have gone undetected. Please consider this when  interpreting information found in this chart.       RIKA Oconnor D.P.M., FMERRY.F.A.S.      Again, thank you for allowing me to participate in the care of your patient.        Sincerely,        Rolo Oconnor DPM

## 2019-03-04 ENCOUNTER — OFFICE VISIT (OUTPATIENT)
Dept: PODIATRY | Facility: CLINIC | Age: 69
End: 2019-03-04
Payer: COMMERCIAL

## 2019-03-04 VITALS
SYSTOLIC BLOOD PRESSURE: 128 MMHG | WEIGHT: 177 LBS | HEART RATE: 72 BPM | BODY MASS INDEX: 28.45 KG/M2 | HEIGHT: 66 IN | DIASTOLIC BLOOD PRESSURE: 68 MMHG

## 2019-03-04 DIAGNOSIS — Z98.890 S/P FOOT SURGERY, RIGHT: Primary | ICD-10-CM

## 2019-03-04 PROCEDURE — 99024 POSTOP FOLLOW-UP VISIT: CPT | Performed by: PODIATRIST

## 2019-03-04 ASSESSMENT — MIFFLIN-ST. JEOR: SCORE: 1339.62

## 2019-03-04 NOTE — LETTER
"    3/4/2019         RE: Albina Pedro  [address] [address] [place] [id number]        Dear Colleague,    Thank you for referring your patient, Albina Pedro, to the [facility]. Please see a copy of my visit note below.    Albina presents to the office s/p 2 weeks open reduction internal fixation lateral malleolus fracture right ankle .  The patient relates keeping the bandages clean, dry and intact.  The patient relates good compliance with postoperative instructions.  The patient denies any severe pain, fevers, chills, nausea or vomiting.      /68 (BP Location: Right arm, Patient Position: Chair, Cuff Size: Adult Regular)   Pulse 72   Ht 1.676 m (5' 6\")   Wt 80.3 kg (177 lb)   BMI 28.57 kg/m         Physical Exam:    General: The patient appears to be in no distress and in good spirits.  The bandages appear clean, dry and intact with no strikethrough noted. Vascular exam: Neurovascular status unchanged with < 3 sec capillary refill to all digits.  Positive pedal pulses and epicritic sensations intact with no evidence of allodynia.  One notes moderate edema to the forefoot on the right.  Sutures are intact and the skin is well coapted with no erythema noted.           Assessment: Lateral malleolus fracture status post 2 weeks open reduction internal fixation of the right ankle.    Plan:  Sutures were removed and Steri-Strips applied.  A sterile dressing was reapplied.  The patient was instructed to continue non-weightbearing to the right foot.  The patient is to keep the dressings clean, dry and intact for 3 days.  The patient will return in 1 month for reevaluation repeat x-rays.      Disclaimer: This note consists of symbols derived from keyboarding, dictation and/or voice recognition software. As a result, there may be errors in the script that have gone undetected. Please consider this when interpreting information found in this chart.       RIKA Ruiz " PAU Oconnor., F.A.C.F.A.S.      Again, thank you for allowing me to participate in the care of your patient.        Sincerely,        Rolo Oconnor DPM

## 2019-03-04 NOTE — NURSING NOTE
"Chief Complaint   Patient presents with     Suture Removal     DOS 2/19/19, Open reduction and internal fixation lateral malleolar ankle fracture, right ankle.       Initial /68 (BP Location: Right arm, Patient Position: Chair, Cuff Size: Adult Regular)   Pulse 72   Ht 1.676 m (5' 6\")   Wt 80.3 kg (177 lb)   BMI 28.57 kg/m   Estimated body mass index is 28.57 kg/m  as calculated from the following:    Height as of this encounter: 1.676 m (5' 6\").    Weight as of this encounter: 80.3 kg (177 lb).  Medications and allergies reviewed.      Alta HUMMEL MA    "

## 2019-03-15 DIAGNOSIS — F43.0 ACUTE REACTION TO STRESS: ICD-10-CM

## 2019-03-15 DIAGNOSIS — F33.0 MAJOR DEPRESSIVE DISORDER, RECURRENT, MILD (H): ICD-10-CM

## 2019-03-15 NOTE — TELEPHONE ENCOUNTER
Requested Prescriptions   Pending Prescriptions Disp Refills     LORazepam (ATIVAN) 0.5 MG tablet [Pharmacy Med Name: LORazepam Oral Tablet 0.5 MG] 30 tablet 0    Last Written Prescription Date:  12/6/18  Last Fill Quantity: 30,  # refills: 0   Last office visit: 2/18/2019 with prescribing provider:  pankratz   Future Office Visit:   Next 5 appointments (look out 90 days)    Apr 01, 2019 10:40 AM CDT  Return Visit with Rolo Oconnor DPM  [facility]) [address] [place] [id number]  [phone]   May 03, 2019 11:00 AM CDT  Return Visit with Driss Carbone MD  [facility]) [id number] [place] [id number] [phone]          Sig: TAKE ONE TABLET BY MOUTH EVERY EIGHT HOURS AS NEEDED FOR ANXIETY    There is no refill protocol information for this order        venlafaxine (EFFEXOR) 37.5 MG tablet [Pharmacy Med Name: Venlafaxine HCl Oral Tablet 37.5 MG] 270 tablet 0    Last Written Prescription Date:  8/28/18  Last Fill Quantity: 270,  # refills: 0   Last office visit: 2/18/2019 with prescribing provider:  pankratz   Future Office Visit:   Next 5 appointments (look out 90 days)    Apr 01, 2019 10:40 AM CDT  Return Visit with Rolo Oconnor DPM  [facility]) [address] [place] [id number]  [phone]   May 03, 2019 11:00 AM CDT  [facility] Visit with Driss [facility]bone MD  [facility] [address] [address] [place] [id number] [phone]          Sig: TAKE 2 TABLETS BY MOUTH EVERY MORNING AND 1 TABLET EVERY AFTERNOON    Serotonin-Norepinephrine Reuptake Inhibitors  Failed - 3/15/2019 11:42 AM       Failed - PHQ-9 score of less than 5 in past 6 months    Please review last PHQ-9 score.          Passed - Blood pressure under  "140/90 in past 12 months    BP Readings from Last 3 Encounters:   03/04/19 128/68   02/25/19 99/64   02/19/19 110/60                Passed - Medication is active on med list       Passed - Patient is age 18 or older       Passed - No active pregnancy on record       Passed - Normal serum creatinine on file in past 12 months    Recent Labs   Lab Test 02/18/19  1712   CR 0.75            Passed - No positive pregnancy test in past 12 months       Passed - Recent (6 mo) or future (30 days) visit within the authorizing provider's specialty    Patient had office visit in the last 6 months or has a visit in the next 30 days with authorizing provider or within the authorizing provider's specialty.  See \"Patient Info\" tab in inbasket, or \"Choose Columns\" in Meds & Orders section of the refill encounter.              "

## 2019-03-15 NOTE — TELEPHONE ENCOUNTER
I have attempted to contact this patient by phone with the following results: no answer, mailbox is full and cannot accept messages.    Routing refill request for lorazepam to provider for review/approval because: Drug not on the [facility] refill protocol     Routing refill request for venlafaxine to provider for review/approval because: A break in medication ?  PHQ-9 score > 5  venlafaxine last ordered 8/28/18, #270 with 0 refills. Attempted to call pt to see if or how she as she been taking? Should have ran out months ago.    PHQ-9 SCORE 10/18/2017 5/31/2018 1/18/2019   PHQ-9 Total Score - - -   PHQ-9 Total Score 5 1 8     SEBASTIAN-7 SCORE 10/18/2017 5/31/2018 1/18/2019   Total Score - - -   Total Score 7 3 6         Heydi AGUILAR RN

## 2019-03-19 NOTE — TELEPHONE ENCOUNTER
"Per Dr. Gallardo last office note - patient had fallen a few times and on her comments regarding medications it stated \"waiting to see neurology notes\"   Therefore I don't know if she is still on this, if there was a lapse in treatment, if she ended up following up with neurology?   Need to keep trying to reach her  Kat  "

## 2019-03-19 NOTE — TELEPHONE ENCOUNTER
"Tried calling patient and \"mailbox is full and cannot accept messages at this time.\" MyChart note sent.  Suhail Silva RN    "

## 2019-03-19 NOTE — TELEPHONE ENCOUNTER
Shanique:  1. Kristina says that she takes lorazepam as needed; she said that 20 has lasted since 12/6/18  2. She says she takes one venlafaxine  twice a day.   3. Patient is seeing Dr. Gopal Hurtado; neurologist. Last saw him December of 2018. Advised her to have his office send medical records to our fax.  Next Neurology appointment is June 2019.  4. Vitamin B12 lab results mailed to patient's home per her request.   Suhail Silva RN

## 2019-03-20 ENCOUNTER — TELEPHONE (OUTPATIENT)
Dept: FAMILY MEDICINE | Facility: CLINIC | Age: 69
End: 2019-03-20

## 2019-03-20 ENCOUNTER — ALLIED HEALTH/NURSE VISIT (OUTPATIENT)
Dept: FAMILY MEDICINE | Facility: CLINIC | Age: 69
End: 2019-03-20
Payer: COMMERCIAL

## 2019-03-20 DIAGNOSIS — R41.82 ALTERED MENTAL STATUS: Primary | ICD-10-CM

## 2019-03-20 PROCEDURE — 99207 ZZC NO CHARGE NURSE ONLY: CPT

## 2019-03-20 RX ORDER — VENLAFAXINE 37.5 MG/1
37.5 TABLET ORAL 2 TIMES DAILY
Qty: 180 TABLET | Refills: 0 | Status: SHIPPED | OUTPATIENT
Start: 2019-03-20 | End: 2019-09-05

## 2019-03-20 RX ORDER — LORAZEPAM 0.5 MG/1
TABLET ORAL
Qty: 20 TABLET | Refills: 0 | Status: SHIPPED | OUTPATIENT
Start: 2019-03-20 | End: 2019-06-14

## 2019-03-20 NOTE — TELEPHONE ENCOUNTER
SHAKILA Bay:  , Jerrod, here as a walk in as he wanted to report concerns regarding Kristina. He said that the past month, Kristina has been different mentally.   He reports:  Her short term memory is terrible  She gets confused easily  She argues and we have been fighting over the smallest of things; that is something new--we have been  for 50 years  She makes plans to do things and then does not carry them out--like going to quilting  She is losing friends because she does not follow through with things.  Her daughter came over to help her pack and then she took everything out and got it strewn over the floor; it took her 3 days to get it ready for our trip.   It is getting very difficult to live normally with her like this.     Jerrod said that she was seen by [facility] and was told that everything was fine. I offered to give him a ELAN from for Kristina to fill out to get records from [facility]. Jerrod asked that it be mailed as he did want Kristina to know that he came in today to report.  ELAN form was mailed to Kristina with note to make an appointment for follow up here at the [facility] after she sends the completed ELAN form in.  Suhail Silva RN

## 2019-03-20 NOTE — PROGRESS NOTES
, Jerrod, here as a walk in as he wanted to report concerns regarding Kristina. He said that the past month, Kristina has been different mentally.   He reports:  Her short term memory is terrible  She gets confused easily  She argues and we have been fighting over the smallest of things; that is something new--we have been  for 50 years  She makes plans to do things and then does not carry them out--like going to quilting  She is losing friends because she does not follow through with things.  Her daughter came over to help her pack and then she took everything out and got it strewn over the floor; it took her 3 days to get it ready for our trip.   It is getting very difficult to live normally with her like this.     Jerrod said that she was seen by [facility] and was told that everything was fine. I offered to give him a ELAN from for Kristina to fill out to get records from [facility]. Jerrod asked that it be mailed as he did want Kristina to know that he came in today to report.  ELAN form was mailed to Krsitina with note to make an appointment for follow up here at the [facility] after she sends the completed ELAN form in.  Suhail Silva RN

## 2019-03-21 ENCOUNTER — HOSPITAL ENCOUNTER (OUTPATIENT)
Dept: MAMMOGRAPHY | Facility: CLINIC | Age: 69
Discharge: HOME | End: 2019-03-21
Attending: FAMILY MEDICINE | Admitting: FAMILY MEDICINE
Payer: COMMERCIAL

## 2019-03-21 DIAGNOSIS — Z12.31 ENCOUNTER FOR SCREENING MAMMOGRAM FOR BREAST CANCER: ICD-10-CM

## 2019-03-21 PROCEDURE — 77067 SCR MAMMO BI INCL CAD: CPT

## 2019-03-22 NOTE — PROGRESS NOTES
"Albina presents to the office s/p 2 weeks open reduction internal fixation lateral malleolus fracture right ankle .  The patient relates keeping the bandages clean, dry and intact.  The patient relates good compliance with postoperative instructions.  The patient denies any severe pain, fevers, chills, nausea or vomiting.      /68 (BP Location: Right arm, Patient Position: Chair, Cuff Size: Adult Regular)   Pulse 72   Ht 1.676 m (5' 6\")   Wt 80.3 kg (177 lb)   BMI 28.57 kg/m        Physical Exam:    General: The patient appears to be in no distress and in good spirits.  The bandages appear clean, dry and intact with no strikethrough noted. Vascular exam: Neurovascular status unchanged with < 3 sec capillary refill to all digits.  Positive pedal pulses and epicritic sensations intact with no evidence of allodynia.  One notes moderate edema to the forefoot on the right.  Sutures are intact and the skin is well coapted with no erythema noted.           Assessment: Lateral malleolus fracture status post 2 weeks open reduction internal fixation of the right ankle.    Plan:  Sutures were removed and Steri-Strips applied.  A sterile dressing was reapplied.  The patient was instructed to continue non-weightbearing to the right foot.  The patient is to keep the dressings clean, dry and intact for 3 days.  The patient will return in 1 month for reevaluation repeat x-rays.      Disclaimer: This note consists of symbols derived from keyboarding, dictation and/or voice recognition software. As a result, there may be errors in the script that have gone undetected. Please consider this when interpreting information found in this chart.       RIKA Oconnor D.P.M., FMERRY.F.A.S.    "

## 2019-04-01 ENCOUNTER — ANCILLARY PROCEDURE (OUTPATIENT)
Dept: GENERAL RADIOLOGY | Facility: CLINIC | Age: 69
End: 2019-04-01
Attending: PODIATRIST
Payer: COMMERCIAL

## 2019-04-01 ENCOUNTER — OFFICE VISIT (OUTPATIENT)
Dept: PODIATRY | Facility: CLINIC | Age: 69
End: 2019-04-01
Payer: COMMERCIAL

## 2019-04-01 VITALS
HEIGHT: 66 IN | BODY MASS INDEX: 28.45 KG/M2 | DIASTOLIC BLOOD PRESSURE: 66 MMHG | WEIGHT: 177 LBS | HEART RATE: 83 BPM | SYSTOLIC BLOOD PRESSURE: 118 MMHG

## 2019-04-01 DIAGNOSIS — Z98.890 S/P FOOT SURGERY, RIGHT: ICD-10-CM

## 2019-04-01 DIAGNOSIS — Z98.890 S/P FOOT SURGERY, RIGHT: Primary | ICD-10-CM

## 2019-04-01 DIAGNOSIS — S82.61XD CLOSED DISPLACED FRACTURE OF LATERAL MALLEOLUS OF RIGHT FIBULA WITH ROUTINE HEALING, SUBSEQUENT ENCOUNTER: ICD-10-CM

## 2019-04-01 PROCEDURE — 73610 X-RAY EXAM OF ANKLE: CPT | Mod: RT

## 2019-04-01 PROCEDURE — 99024 POSTOP FOLLOW-UP VISIT: CPT | Performed by: PODIATRIST

## 2019-04-01 ASSESSMENT — MIFFLIN-ST. JEOR: SCORE: 1339.62

## 2019-04-01 NOTE — LETTER
4/1/2019         RE: Albina Pedro  [address] [address] [facility] [address]        Dear Colleague,    Thank you for referring your patient, Albina Pedro, to the [facility]. Please see a copy of my visit note below.    Albina returns to the office for reevaluation of the right foot.  The patient relates following the instructions given at the last visit with noted less pain.  The patient relates overall more  improvement in pain and function of the right foot.  The patient relates no other problems.    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       BMI= Body mass index is 28.57 kg/m .        Physical Exam:    General: The patient appears to have a pleasant mental affect.    Lower extremity physical exam:  Neurovascular status is intact with palpable pedal pulses and intact epicritic sensations.  Muscular exam is within normal limits to major muscle groups.  Integument is intact.      One notes decreased edema.  One notes decreased pain on palpation of the lateral malleolus fracture on the right.  One notes limited range of motion of the ankle joint on the right.    Radiograph evaluation including weightbearing AP, lateral and medial oblique views of the right ankle reveals interval healing with increased trabeculation of the lateral malleolus fracture with hardware intact    Assessment:      ICD-10-CM    1. S/P foot surgery, right Z98.890 XR Ankle Right G/E 3 Views       Plan:  I have explained to Albina about the conditions.  At this time, the patient was referred to physical therapy for ankle rehabilitation.  Patient return in 1 month for reevaluation and repeat  x-rays.    Disclaimer: This note consists of symbols derived from keyboarding, dictation and/or voice recognition software. As a result, there may be errors in the script that have gone undetected. Please consider this when interpreting information found in this chart.       RIKA Oconnor D.P.M., F.NOEMÍ.C.F.A.S.      Again, thank you for allowing me to participate in the care of your patient.        Sincerely,        Rolo Oconnor DPM

## 2019-04-01 NOTE — PROGRESS NOTES
Albina returns to the office for reevaluation of the right foot.  The patient relates following the instructions given at the last visit with noted less pain.  The patient relates overall more  improvement in pain and function of the right foot.  The patient relates no other problems.    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       BMI= Body mass index is 28.57 kg/m .        Physical Exam:    General: The patient appears to have a pleasant mental affect.    Lower extremity physical exam:  Neurovascular status is intact with palpable pedal pulses and intact epicritic sensations.  Muscular exam is within normal limits to major muscle groups.  Integument is intact.      One notes decreased edema.  One notes decreased pain on palpation of the lateral malleolus fracture on the right.  One notes limited range of motion of the ankle joint on the right.    Radiograph evaluation including weightbearing AP, lateral and medial oblique views of the right ankle reveals interval healing with increased trabeculation of the lateral malleolus fracture with hardware intact    Assessment:      ICD-10-CM    1. S/P foot surgery, right Z98.890 XR Ankle Right G/E 3 Views       Plan:  I have explained to Albina about the conditions.  At this time, the patient was referred to physical therapy for ankle rehabilitation.  Patient return in 1 month for reevaluation and repeat x-rays.    Disclaimer: This note consists of symbols derived from keyboarding, dictation and/or voice recognition software. As a result, there may be errors in the script that have gone undetected. Please consider this when interpreting information found in this chart.       RIKA Ruiz  ISABELA Oconnor, PAULO.[email or web address].

## 2019-04-01 NOTE — NURSING NOTE
"Chief Complaint   Patient presents with     Surgical Followup     DOS 2/19/19, XR today, Open reduction and internal fixation lateral malleolar ankle fracture, right ankle.       Initial /66 (BP Location: Left arm, Patient Position: Chair, Cuff Size: Adult Regular)   Pulse 83   Ht 1.676 m (5' 6\")   Wt 80.3 kg (177 lb)   BMI 28.57 kg/m   Estimated body mass index is 28.57 kg/m  as calculated from the following:    Height as of this encounter: 1.676 m (5' 6\").    Weight as of this encounter: 80.3 kg (177 lb).  Medications and allergies reviewed.      Alta HUMMEL MA    "

## 2019-04-18 ENCOUNTER — TRANSFERRED RECORDS (OUTPATIENT)
Dept: HEALTH INFORMATION MANAGEMENT | Facility: CLINIC | Age: 69
End: 2019-04-18

## 2019-05-03 ENCOUNTER — OFFICE VISIT (OUTPATIENT)
Dept: CARDIOLOGY | Facility: CLINIC | Age: 69
End: 2019-05-03
Payer: COMMERCIAL

## 2019-05-03 VITALS
WEIGHT: 186.8 LBS | OXYGEN SATURATION: 97 % | BODY MASS INDEX: 30.15 KG/M2 | SYSTOLIC BLOOD PRESSURE: 147 MMHG | HEART RATE: 84 BPM | DIASTOLIC BLOOD PRESSURE: 77 MMHG

## 2019-05-03 DIAGNOSIS — E78.5 HYPERLIPIDEMIA LDL GOAL <130: ICD-10-CM

## 2019-05-03 DIAGNOSIS — I25.10 CAD (CORONARY ARTERY DISEASE): Primary | Chronic | ICD-10-CM

## 2019-05-03 PROCEDURE — 99214 OFFICE O/P EST MOD 30 MIN: CPT | Performed by: INTERNAL MEDICINE

## 2019-05-03 PROCEDURE — 93000 ELECTROCARDIOGRAM COMPLETE: CPT | Performed by: INTERNAL MEDICINE

## 2019-05-03 RX ORDER — PRAVASTATIN SODIUM 40 MG
40 TABLET ORAL DAILY
Qty: 90 TABLET | Refills: 3 | Status: SHIPPED | OUTPATIENT
Start: 2019-05-03 | End: 2020-06-23

## 2019-05-03 ASSESSMENT — PAIN SCALES - GENERAL: PAINLEVEL: NO PAIN (0)

## 2019-05-03 NOTE — PROGRESS NOTES
[facility] Cardiology Consultation:    Assessment and Plan:      1. CAD with angina (Cath 2016 - moderate mLAD/D1 stenosis, small vessels not ideal for PCI, lexiscan - small ant ischemia, normal LV fxn): continue Ranolazine 1000 mg BID for angina. Intolerant of other anti-anginals. Increase pravastatin to 40 mg, and continue ASA 81 mg for secondary prevention.   2. Syncope related to medications: Continue with regular electrolyte drinks.  Advised to check blood pressure during diaphoretic episodes.  3. ILD  4. Sjogren's syndrome    RTC in 1 year.       Driss Carbone MD    Cardiac Imaging and Prevention  [facility]  [email or web address] Pager: [phone]     HPI:   Ms. Pedro presents today for f/u of CAD and chronic angina.  She has not had any ED or hospital visits for chest pain, and no recent cardiac imaging.  Unfortunately she had a fall and fractured her ankle earlier this year, then needed open fixation.  She is recovering well from that.  She has not had any more syncope.  She is tolerating her medications without any issues.  EKG was done in clinic today and reviewed by me.  It shows normal sinus rhythm at 78 bpm, with normal QT interval, and no concerning ischemic changes.  LDL cholesterol checked last year was 75.  She still gets occasional chest pains that are transient.  She also reports occasional episodes of profuse sweating, that typically follow a hot shower.  She has not checked her blood pressure during those episodes, and also denies dizziness during those episodes.  She continues to take electrolyte drinks regularly.    EXAM:  /77 (BP Location: Left arm, Patient Position: Chair, Cuff Size: Adult Regular)   Pulse 84   Wt 84.7 kg (186 lb 12.8 oz)   SpO2 97%   BMI 30.15 kg/m    GEN/CONSTITUIONAL: Appears comfortable, in no apparent distress   EYES: No icterus  ENT/MOUTH: Normal  JVP:  Not visible  RESPIRATORY: Clear to auscultation  bilaterally   CARDIOVASCULAR: Regular S1 and S2, no murmurs, rubs, or gallops.   ABDOMEN: Soft, non-tender, positive bowel sounds   NEUROLOGIC: Grossly non-focal   PSYCHIATRIC: Normal affect  EXT: No cyanosis, clubbing, edema. Normal pedal pulses.  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       CURRENT MEDICATIONS:  Current Outpatient Medications   Medication     albuterol (PROAIR HFA/PROVENTIL HFA/VENTOLIN HFA) 108 (90 BASE) MCG/ACT Inhaler     aspirin 81 MG tablet     buPROPion (WELLBUTRIN SR) 100 MG 12 hr tablet     calcium carbonate (OS-SARI 500 MG [place]) 1250 MG tablet     loratadine (CLARITIN) 10 MG tablet     LORazepam (ATIVAN) 0.5 MG tablet     metoprolol succinate ER (TOPROL-XL) 25 MG 24 hr tablet     mineral oil-hydrophilic petrolatum (AQUAPHOR) ointment     Multiple Vitamins-Minerals (MULTIVITAMIN & MINERAL PO)     nystatin (MYCOSTATIN) 312174 UNIT/GM external cream     pravastatin (PRAVACHOL) 20 MG tablet     Ranolazine 1000 MG TB12     triamcinolone (KENALOG) 0.1 % cream     venlafaxine (EFFEXOR) 37.5 MG tablet     vitamin B-12 (CYANOCOBALAMIN) 2500 MCG sublingual tablet     VITAMIN D3 OR     No current facility-administered medications for this visit.        PAST SURGICAL HISTORY:  Past Surgical History:   Procedure Laterality Date     C/SECTION, LOW TRANSVERSE  10/13/1978    , Low Transverse     COLONOSCOPY       OPEN REDUCTION INTERNAL FIXATION ANKLE Right 2019    Procedure: Open reduction internal fixation right lateral malleolus fracture;  Surgeon: Rolo Oconnor DPM;  Location: [facility] OR     SURGICAL HISTORY OF -            SURGICAL HISTORY OF -    00    Colonoscopy       ALLERGIES     Allergies   Allergen Reactions     Daypro [Oxaprozin] Rash            Lidocaine Other (See Comments)     Rapid heart rate     Nitroglycerin Other (See Comments)     Causes low blood pressure     Penicillins Unknown     Occurred as child.     Sulfa Drugs Rash       FAMILY HISTORY:  Family History   Problem Relation Age of Onset     Cancer Mother         Breast and liver- age 43     Heart Disease Father         ? chf?h/o CVA     Alzheimer Disease Father      Hypertension Father      Neurologic Disorder Father         CVA     Diabetes Maternal Grandmother      Breast Cancer Maternal Aunt      Breast Cancer Paternal Aunt      Cancer - colorectal No family hx of      Prostate Cancer No family hx of        SOCIAL HISTORY:  Social History     Socioeconomic History     Marital status:      Spouse name: Not on file     Number of children: Not on file     Years of education: Not on file     Highest education level: Not on file   Occupational History     Not on file   Social Needs     Financial resource strain: Not on file     Food insecurity:     Worry: Not on file     Inability: Not on file     Transportation needs:     Medical: Not on file     Non-medical: Not on file   Tobacco Use     Smoking status: Never Smoker     Smokeless tobacco: Never Used   Substance and Sexual Activity     Alcohol use: Yes     Alcohol/week: 0.0 oz     Comment: 1 glass of wine every 2 weeks     Drug use: No     Sexual activity: Yes     Partners: Male   Lifestyle     Physical activity:     Days per week: Not on file     Minutes per session: Not on file     Stress: Not on file   Relationships     Social connections:     Talks on phone: Not on file     Gets together: Not on file     Attends Islam service: Not on file     Active member of club or organization: Not on file     Attends meetings of clubs or organizations: Not on file     Relationship status: Not on file     Intimate partner  violence:     Fear of current or ex partner: Not on file     Emotionally abused: Not on file     Physically abused: Not on file     Forced sexual activity: Not on file   Other Topics Concern     Parent/sibling w/ CABG, MI or angioplasty before 65F 55M? No   Social History Narrative     Not on file       ROS:   Constitutional: No fever, chills, or sweats. No weight gain/loss   ENT: No visual disturbance, ear ache, epistaxis, sore throat  Allergies/Immunologic: Negative.   Respiratory: No cough, hemoptysia  Cardiovascular: As per HPI  GI: No nausea, vomiting, hematemesis, melena, or hematochezia  : No urinary frequency, dysuria, or hematuria  Integument: Negative  Psychiatric: Negative  Neuro: Negative  Endocrinology: Negative   Musculoskeletal: Negative    ADDITIONAL COMMENTS:     I reviewed the patient's medications:     I reviewed the patient's pertinent clinical laboratory studies:     I reviewed the patient's pertinent imaging studies:   I reviewed the patient's ECG:

## 2019-05-03 NOTE — NURSING NOTE
Albina Pedro's goals for this visit include: Return  She requests these members of her care team be copied on today's visit information: Yes    PCP: Carolina Burrell    Referring Provider:  No referring provider defined for this encounter.    /77 (BP Location: Left arm, Patient Position: Chair, Cuff Size: Adult Regular)   Pulse 84   Wt 84.7 kg (186 lb 12.8 oz)   SpO2 97%   BMI 30.15 kg/m      Do you need any medication refills at today's visit? No    Yue Nguyen CMA on 5/3/2019 at 11:05 AM

## 2019-05-03 NOTE — PATIENT INSTRUCTIONS
The following is a summary of your office visit:    Medications started today:None    Medications stopped today:None    Medication dose change: Pravastain 40mg     Nurse contact information: Xin Patel RN  Cardiology Care Coordinator  [phone] Phone  [phone] Fax    Appointments made today: 1 year follow up    Patient instructions:    1. Bring in your blood pressure cuff to calibrate in clinic    2. When having an episode of extreme sweating please check your blood pressure      If you have had any blood work, imaging or other testing completed we will be in touch within 1-2 weeks regarding the results. If you have any questions, concerns or need to schedule a follow up, please contact us at [phone]. If you are needing refills please contact your pharmacy. For urgent after hour care please call the [facility] at [phone] or the [facility] at [phone] and ask to speak to the cardiologist on call.    It was a pleasure meeting with you today. Please let us know if there is anything else we can do for you so that we can be sure you are leaving completely satisfied with your care experience.     Your Cardiology Team at [facility]  RN Care Coordinator: Xin VALDES: Yue

## 2019-05-24 ENCOUNTER — TRANSFERRED RECORDS (OUTPATIENT)
Dept: HEALTH INFORMATION MANAGEMENT | Facility: CLINIC | Age: 69
End: 2019-05-24

## 2019-06-04 ENCOUNTER — TELEPHONE (OUTPATIENT)
Dept: FAMILY MEDICINE | Facility: CLINIC | Age: 69
End: 2019-06-04

## 2019-06-04 NOTE — TELEPHONE ENCOUNTER
Reason for Call:  Other [facility] report 1/11/19    Detailed comments: Kristina LEFT MESSAGE:  She'd like you to look over MRI results and notes from 1/11/19 (placed @ your desk) and let her know if there is something that she should be doing?  I tried to call her back however VM was full.  Please review and advise. Thank you..Tita Real    Phone Number Patient can be reached at: Home number on file [phone] (home)      Call taken on 6/4/2019 at 9:09 AM by Tita Real

## 2019-06-05 NOTE — TELEPHONE ENCOUNTER
Does she have new concerns that she is asking my opinion on this now?     I reviewed the notes from [facility]. It looks like she has been seeing them for several years for memory/cognitive decline and has had extensive workup including labwork, MRI, neuropsych testing.    The MRI from 12/19/17 shows mild changes attributed to chronic small blood vessel disease. This can happen in the small vessels through the body - heart, brain - and because of her CAD she is already being treated for this with the baby aspirin and the pravastatin (I see this was recently increased by the cardiologist which is a good idea).    Her most recent visit 1/11/19 with neurology they tested B12 as it was low prior, but now is high. It was also high when we checked it in February, and I had recommended she contact neurology. What have they told her to do with her vitamin B12 supplement?    At that visit they reiterated she has mild cognitive impairment but no signs of Alzheimers. These symptoms can be related to depression. At this point she is taking wellbutrin 100 mg once daily and effexor 37.5 mg two times daily. I know she has seen psychiatry in the past for medication management, is she interested in doing this again?  I do think that pursuing more effective treatment of her depression is a good idea and can benefit her memory.    Neurology also wanted to follow up with her this summer 2019, I think this is a good idea. When is her follow up appointment?    Monika Castro PA-C

## 2019-06-05 NOTE — TELEPHONE ENCOUNTER
Would be a good idea to get checked out.   Hard to say if the weakness if neurology related or possible muscle or joint related. Would consider further labwork and workup possibly referrals for this. Should also recheck B12 lab at this point.  We can discuss this and depression treatment in clinic.  Neurology thought the eyelid drooping is from her eyelids and if it is hindering vision she should see ophthalmologist to discuss treatment/possible surgery for this.  Please schedule longer appointment for this.  Monkia Castro PA-C       Note for myself:   In reviewing chart -  2017 was taking wellbutrin and mirtazapine. Decreased mirtazapine 3.2017 to start effexor. Dr. Burrell's note from 4/26/17 shows that memory improved greatly after starting effexor/helping depression. It appears mirtazapine was stopped at that time.  Consider effexor higher dose, light therapy, test vitamin D

## 2019-06-05 NOTE — TELEPHONE ENCOUNTER
"Kristina reports that she does not have any new symptoms but just ongoing symptoms and wonders how to manage them. She reports that Dr. Burrell told her that she had a \"Rare muscle degenerative disease; started with an 'H' but I cannot remember the name.\"  \"Eyelids sag and I always look tired.  My muscles get really tired sometimes head to toe. Sometimes I do not have the strength to get up from the chair that I am sitting in.\" She said that it is worse when the weather is cloudy/rainy. She said that she feels better when it is sara. Muscles do not feel tired on sara days.  \"I feel good today.\"   She said that the Neurologist told her to stop the Vitamin B12 supplement and follow up with her regular Provider.   She said that she has an appointment to see the Neurologist this summer in July. She said that she would have to look up the actual date.   She said that she is taking wellbutrin and effexor. She is not sure if she wants to see the Psychiatrist.  Kristina is wondering what her next step is in following up with Vitamin B12 and her muscles and fatigue.  Suhail Silva RN            "

## 2019-06-14 ENCOUNTER — OFFICE VISIT (OUTPATIENT)
Dept: FAMILY MEDICINE | Facility: CLINIC | Age: 69
End: 2019-06-14
Payer: COMMERCIAL

## 2019-06-14 VITALS
BODY MASS INDEX: 29.71 KG/M2 | HEART RATE: 85 BPM | TEMPERATURE: 98.4 F | OXYGEN SATURATION: 97 % | DIASTOLIC BLOOD PRESSURE: 66 MMHG | WEIGHT: 184.1 LBS | SYSTOLIC BLOOD PRESSURE: 117 MMHG

## 2019-06-14 DIAGNOSIS — F39 EPISODIC MOOD DISORDER (H): ICD-10-CM

## 2019-06-14 DIAGNOSIS — R41.3 MEMORY LOSS: Primary | ICD-10-CM

## 2019-06-14 DIAGNOSIS — F43.0 ACUTE REACTION TO STRESS: ICD-10-CM

## 2019-06-14 DIAGNOSIS — E78.5 HYPERLIPIDEMIA LDL GOAL <70: ICD-10-CM

## 2019-06-14 DIAGNOSIS — M35.00 SJOGREN'S SYNDROME, WITH UNSPECIFIED ORGAN INVOLVEMENT (H): Chronic | ICD-10-CM

## 2019-06-14 DIAGNOSIS — E53.8 VITAMIN B12 DEFICIENCY (NON ANEMIC): ICD-10-CM

## 2019-06-14 DIAGNOSIS — I25.10 CORONARY ARTERY DISEASE INVOLVING NATIVE CORONARY ARTERY OF NATIVE HEART WITHOUT ANGINA PECTORIS: Chronic | ICD-10-CM

## 2019-06-14 DIAGNOSIS — M79.10 MUSCLE ACHE: ICD-10-CM

## 2019-06-14 DIAGNOSIS — L24.9 IRRITANT CONTACT DERMATITIS, UNSPECIFIED TRIGGER: ICD-10-CM

## 2019-06-14 DIAGNOSIS — F33.0 MAJOR DEPRESSIVE DISORDER, RECURRENT, MILD (H): ICD-10-CM

## 2019-06-14 DIAGNOSIS — E55.9 VITAMIN D DEFICIENCY: ICD-10-CM

## 2019-06-14 PROCEDURE — 99215 OFFICE O/P EST HI 40 MIN: CPT | Performed by: PHYSICIAN ASSISTANT

## 2019-06-14 RX ORDER — VENLAFAXINE HYDROCHLORIDE 150 MG/1
150 TABLET, EXTENDED RELEASE ORAL DAILY
Qty: 30 TABLET | Refills: 1 | Status: SHIPPED | OUTPATIENT
Start: 2019-06-14 | End: 2019-09-05

## 2019-06-14 RX ORDER — VENLAFAXINE 37.5 MG/1
37.5 TABLET ORAL 2 TIMES DAILY
Qty: 180 TABLET | Refills: 0 | Status: CANCELLED | OUTPATIENT
Start: 2019-06-14

## 2019-06-14 RX ORDER — LORAZEPAM 0.5 MG/1
TABLET ORAL
Qty: 20 TABLET | Refills: 0 | Status: SHIPPED | OUTPATIENT
Start: 2019-06-14 | End: 2019-07-22

## 2019-06-14 RX ORDER — TRIAMCINOLONE ACETONIDE 1 MG/G
CREAM TOPICAL
Qty: 30 G | Refills: 1 | Status: CANCELLED | OUTPATIENT
Start: 2019-06-14

## 2019-06-14 NOTE — PROGRESS NOTES
"SUBJECTIVE:                                                    Albina Pedro is a 68 year old female who presents to clinic today for the following health issues:    From telephone encounter 6/4/19:  Kristina reports that she does not have any new symptoms but just ongoing symptoms and wonders how to manage them. She reports that Dr. Burrell told her that she had a \"Rare muscle degenerative disease; started with an 'H' but I cannot remember the name.\"  \"Eyelids sag and I always look tired.  My muscles get really tired sometimes head to toe. Sometimes I do not have the strength to get up from the chair that I am sitting in.\" She said that it is worse when the weather is cloudy/rainy. She said that she feels better when it is sara. Muscles do not feel tired on sara days.  \"I feel good today.\"   She said that the Neurologist told her to stop the Vitamin B12 supplement and follow up with her regular Provider.   She said that she has an appointment to see the Neurologist this summer in July. She said that she would have to look up the actual date.   She said that she is taking wellbutrin and effexor. She is not sure if she wants to see the Psychiatrist.  Kristina is wondering what her next step is in following up with Vitamin B12 and her muscles and fatigue.    * depression follow up    * dementia  * labwork/B12  * muscle weakness  * She is only taking 1 tablet daily of the Wellbutrin but script is saying 1 tablet 2 times daily    Multivitamin  Vitamin D3 2000 - maybe 5000 units  Stopped b12 Feb  Wonder about wellbutrin    Daughter - mood improved. Focus, motivation, day to day    Some days worse than others - arms,legs. Achey. A little better after nap    Note for myself:   In reviewing chart -  2017 was taking wellbutrin and mirtazapine. Decreased mirtazapine 3.2017 to start effexor. Dr. Burrell's note from 4/26/17 shows that memory improved greatly after starting effexor/helping depression. It appears mirtazapine was " stopped at that time.  Consider effexor higher dose, light therapy, test vitamin D    Diagnosed Sjogren 30 years ago  Saw Dr. Carcamo - azathioprine  Stopped it, things were going well    Down with cloudy days      Problem list and histories reviewed & adjusted, as indicated.  Additional history: none    Patient Active Problem List   Diagnosis     DIZZINESS - LIKELY HYPOGLYCEMIA     Dermatophytosis of body     Episodic mood disorder (H)     Health Care Home     Panniculitis     Advanced directives, counseling/discussion     CAD (coronary artery disease)     Sjogren's syndrome (H)     Adverse effect of anesthetic     Status post coronary angiogram     Acute chest pain     Major depressive disorder, recurrent, mild (H)     Hyperlipidemia LDL goal <70     ILD (interstitial lung disease) (H)     Intermediate coronary syndrome (H)     Chronic stable angina (H)     Past Surgical History:   Procedure Laterality Date     C/SECTION, LOW TRANSVERSE  10/13/1978    , Low Transverse     COLONOSCOPY       OPEN REDUCTION INTERNAL FIXATION ANKLE Right 2019    Procedure: Open reduction internal fixation right lateral malleolus fracture;  Surgeon: Rolo Oconnor DPM;  Location: [facility]     SURGICAL HISTORY OF -            SURGICAL HISTORY OF -   00    Colonoscopy       Social History     Tobacco Use     Smoking status: Never Smoker     Smokeless tobacco: Never Used   Substance Use Topics     Alcohol use: Yes     Alcohol/week: 0.0 oz     Comment: 1 glass of wine every 2 weeks     Family History   Problem Relation Age of Onset     Cancer Mother         Breast and liver- age 43     Heart Disease Father         ? chf?h/o CVA     Alzheimer Disease Father      Hypertension Father      Neurologic Disorder Father         CVA     Diabetes Maternal Grandmother      Breast Cancer Maternal Aunt      Breast Cancer Paternal Aunt      Cancer - colorectal No family hx of      Prostate Cancer No family hx of             ROS:  Other than noted above, general, HEENT, respiratory, cardiac, MS, and gastrointestinal systems are negative.     OBJECTIVE:                                                    /66   Pulse 85   Temp 98.4  F (36.9  C) (Tympanic)   Wt 83.5 kg (184 lb 1.6 oz)   SpO2 97%   BMI 29.71 kg/m   Body mass index is 29.71 kg/m .   GENERAL: healthy, alert, well nourished, well hydrated, no distress  RESP: lungs clear to auscultation - no rales, no rhonchi, no wheezes  CV: regular rates and rhythm, normal S1 S2, no S3 or S4 and no murmur, no click or rub -  ABDOMEN: soft, no tenderness, no  hepatosplenomegaly, no masses, normal bowel sounds  MS: extremities- no gross deformities noted, no edema  NEURO: strength and tone- normal, sensory exam- grossly normal, mentation- intact, speech- normal, reflexes- symmetric   PSYCH: Alert and oriented times 3; speech- coherent , normal rate and volume; able to articulate logical thoughts, able to abstract reason, no tangential thoughts, no hallucinations or delusions, affect- normal  POSITIVE patient did repeat several questions        ASSESSMENT/PLAN:                                                      ASSESSMENT/PLAN:      ICD-10-CM    1. Memory loss R41.3    2. Acute reaction to stress F43.0 LORazepam (ATIVAN) 0.5 MG tablet     CANCELED: Rheumatoid factor   3. Irritant contact dermatitis, unspecified trigger L24.9    4. Major depressive disorder, recurrent, mild (H) F33.0 venlafaxine (EFFEXOR-ER) 150 MG 24 hr tablet     MENTAL HEALTH REFERRAL  - Adult; Outpatient Treatment; Individual/Couples/Family/Group Therapy/Health Psychology; [facility] [facility] [phone]; We will contact you to schedule the appointment or please call with any questions   5. Sjogren's syndrome, with unspecified organ involvement (H) M35.00 RHEUMATOLOGY REFERRAL     Rheumatoid factor     CBC with platelets differential     Comprehensive metabolic panel     CRP inflammation      CANCELED: CBC with platelets differential     CANCELED: Comprehensive metabolic panel     CANCELED: CRP inflammation     CANCELED: Rheumatoid factor   6. Muscle ache M79.10 RHEUMATOLOGY REFERRAL     Comprehensive metabolic panel     CANCELED: CBC with platelets differential     CANCELED: Comprehensive metabolic panel     CANCELED: Vitamin D Deficiency   7. Vitamin B12 deficiency (non anemic) E53.8 Vitamin B12     CANCELED: Vitamin B12   8. Vitamin D deficiency  E55.9 Vitamin D Deficiency     CANCELED: Vitamin D Deficiency   9. Hyperlipidemia LDL goal <70 E78.5 Lipid panel reflex to direct LDL Fasting   10. Coronary artery disease involving native coronary artery of native heart without angina pectoris I25.10    11. Episodic mood disorder (H) F39      Long discussion with patient, , and daughter. We reviewed her history and neurology notes.   Discussed neurology evaluation that her memory loss likely associated with depression. She does not feel depressed. She will consider therapy and psychiatry visit. Will increase effexor.  would like her to stop wellbutrin at some point due to listed side effect of memory loss. Will start here.  Follow up with rheumatology - history of Sjogren's, now has muscle aches. Aches may be related to depression as well.  Discussed benzo's associated with memory loss.  Will check labs.    Patient Instructions   For the next week or so (until you run out of your pills), take effexor 75 mg (2 tablets) AM + 37.5 mg PM  Then start new prescription for effexor  mg    Make appointment with therapist - Robby Epps  Make appointment with rheumatologist - may be several months out, this is okay  Continue working with physical therapy for strengthening  Consider working with occupational therapy in the future    We will let you know about labwork    Check in with me next month - telephone or office visit    Use the ativan sparingly  Continue the wellbutrin for now  (we may stop this)    Every morning use your SAD lamp for 30 minutes      45 minutes was spent face to face with the patient today discussing history, diagnosis, and follow-up plan as noted above. Over 50% of the visit was spent in counseling and coordination of care. Total Visit Time: 45 minutes.   Monika Castro PA-C   [facility]

## 2019-06-14 NOTE — PATIENT INSTRUCTIONS
For the next week or so (until you run out of your pills), take effexor 75 mg (2 tablets) AM + 37.5 mg PM  Then start new prescription for effexor  mg    Make appointment with therapist - Robby Epps  Make appointment with rheumatologist - may be several months out, this is okay  Continue working with physical therapy for strengthening  Consider working with occupational therapy in the future    We will let you know about labwork    Check in with me next month - telephone or office visit    Use the ativan sparingly  Continue the wellbutrin for now (we may stop this)    Every morning use your SAD lamp for 30 minutes

## 2019-06-17 DIAGNOSIS — E78.5 HYPERLIPIDEMIA LDL GOAL <70: ICD-10-CM

## 2019-06-17 DIAGNOSIS — E55.9 VITAMIN D DEFICIENCY: ICD-10-CM

## 2019-06-17 DIAGNOSIS — M35.00 SJOGREN'S SYNDROME, WITH UNSPECIFIED ORGAN INVOLVEMENT (H): Chronic | ICD-10-CM

## 2019-06-17 DIAGNOSIS — M79.10 MUSCLE ACHE: ICD-10-CM

## 2019-06-17 DIAGNOSIS — E53.8 VITAMIN B12 DEFICIENCY (NON ANEMIC): ICD-10-CM

## 2019-06-17 LAB
ALBUMIN SERPL-MCNC: 3.8 G/DL (ref 3.4–5)
ALP SERPL-CCNC: 59 U/L (ref 40–150)
ALT SERPL W P-5'-P-CCNC: 27 U/L (ref 0–50)
ANION GAP SERPL CALCULATED.3IONS-SCNC: 3 MMOL/L (ref 3–14)
AST SERPL W P-5'-P-CCNC: 20 U/L (ref 0–45)
BASOPHILS # BLD AUTO: 0 10E9/L (ref 0–0.2)
BASOPHILS NFR BLD AUTO: 0.4 %
BILIRUB SERPL-MCNC: 0.5 MG/DL (ref 0.2–1.3)
BUN SERPL-MCNC: 17 MG/DL (ref 7–30)
CALCIUM SERPL-MCNC: 9.1 MG/DL (ref 8.5–10.1)
CHLORIDE SERPL-SCNC: 106 MMOL/L (ref 94–109)
CHOLEST SERPL-MCNC: 150 MG/DL
CO2 SERPL-SCNC: 31 MMOL/L (ref 20–32)
CREAT SERPL-MCNC: 0.57 MG/DL (ref 0.52–1.04)
CRP SERPL-MCNC: <2.9 MG/L (ref 0–8)
DIFFERENTIAL METHOD BLD: NORMAL
EOSINOPHIL # BLD AUTO: 0.1 10E9/L (ref 0–0.7)
EOSINOPHIL NFR BLD AUTO: 2.3 %
ERYTHROCYTE [DISTWIDTH] IN BLOOD BY AUTOMATED COUNT: 13.1 % (ref 10–15)
GFR SERPL CREATININE-BSD FRML MDRD: >90 ML/MIN/{1.73_M2}
GLUCOSE SERPL-MCNC: 96 MG/DL (ref 70–99)
HCT VFR BLD AUTO: 39.8 % (ref 35–47)
HDLC SERPL-MCNC: 70 MG/DL
HGB BLD-MCNC: 13 G/DL (ref 11.7–15.7)
LDLC SERPL CALC-MCNC: 46 MG/DL
LYMPHOCYTES # BLD AUTO: 1.8 10E9/L (ref 0.8–5.3)
LYMPHOCYTES NFR BLD AUTO: 31.4 %
MCH RBC QN AUTO: 31 PG (ref 26.5–33)
MCHC RBC AUTO-ENTMCNC: 32.7 G/DL (ref 31.5–36.5)
MCV RBC AUTO: 95 FL (ref 78–100)
MONOCYTES # BLD AUTO: 0.5 10E9/L (ref 0–1.3)
MONOCYTES NFR BLD AUTO: 9.6 %
NEUTROPHILS # BLD AUTO: 3.2 10E9/L (ref 1.6–8.3)
NEUTROPHILS NFR BLD AUTO: 56.3 %
NONHDLC SERPL-MCNC: 80 MG/DL
PLATELET # BLD AUTO: 230 10E9/L (ref 150–450)
POTASSIUM SERPL-SCNC: 5 MMOL/L (ref 3.4–5.3)
PROT SERPL-MCNC: 7.4 G/DL (ref 6.8–8.8)
RBC # BLD AUTO: 4.19 10E12/L (ref 3.8–5.2)
SODIUM SERPL-SCNC: 140 MMOL/L (ref 133–144)
TRIGL SERPL-MCNC: 168 MG/DL
VIT B12 SERPL-MCNC: 705 PG/ML (ref 193–986)
WBC # BLD AUTO: 5.6 10E9/L (ref 4–11)

## 2019-06-17 PROCEDURE — 80053 COMPREHEN METABOLIC PANEL: CPT | Performed by: PHYSICIAN ASSISTANT

## 2019-06-17 PROCEDURE — 82306 VITAMIN D 25 HYDROXY: CPT | Performed by: PHYSICIAN ASSISTANT

## 2019-06-17 PROCEDURE — 86140 C-REACTIVE PROTEIN: CPT | Performed by: PHYSICIAN ASSISTANT

## 2019-06-17 PROCEDURE — 36415 COLL VENOUS BLD VENIPUNCTURE: CPT | Performed by: PHYSICIAN ASSISTANT

## 2019-06-17 PROCEDURE — 86431 RHEUMATOID FACTOR QUANT: CPT | Performed by: PHYSICIAN ASSISTANT

## 2019-06-17 PROCEDURE — 80061 LIPID PANEL: CPT | Performed by: PHYSICIAN ASSISTANT

## 2019-06-17 PROCEDURE — 85025 COMPLETE CBC W/AUTO DIFF WBC: CPT | Performed by: PHYSICIAN ASSISTANT

## 2019-06-17 PROCEDURE — 82607 VITAMIN B-12: CPT | Performed by: PHYSICIAN ASSISTANT

## 2019-06-18 LAB
DEPRECATED CALCIDIOL+CALCIFEROL SERPL-MC: 48 UG/L (ref 20–75)
RHEUMATOID FACT SER NEPH-ACNC: <20 IU/ML (ref 0–20)

## 2019-06-25 ENCOUNTER — TELEPHONE (OUTPATIENT)
Dept: FAMILY MEDICINE | Facility: CLINIC | Age: 69
End: 2019-06-25

## 2019-06-25 NOTE — TELEPHONE ENCOUNTER
Reason for Call:  Other prescription    Detailed comments: Kristina LEFT MESSAGE:  She can not find her lorazepam script.  She was seen by Monika on 6/14/19 and was given the paper copy and now has lost it.  She was hoping to get another script for lorazepam and have it walked over to the [facility].  Please call and assess.  Thank you..Tita Real    Phone Number Patient can be reached at: Home number on file [phone] (home)        Call taken on 6/25/2019 at 8:17 AM by Tita Real

## 2019-06-26 NOTE — TELEPHONE ENCOUNTER
Kristina returned call to let us know that she found the script for lorazepam.      Thank you..Tita Real

## 2019-06-26 NOTE — TELEPHONE ENCOUNTER
Please talk to  and daughter if possible. They were both at appointment. We were there talking about memory issues and we discussed setting up her medications. It is possible this is with her paperwork still from that day. These are controlled prescriptions and it is not safe to have lost prescriptions that could be stolen etc. I just want to make sure that they are aware of this concern and that they have not seen the medication prescription.  Monika Castro PA-C

## 2019-06-26 NOTE — TELEPHONE ENCOUNTER
Tried calling home phone and vm said that the mailbox is full and cannot accept messages at this time. Need to try a different time.  Suhail Silva RN

## 2019-06-26 NOTE — TELEPHONE ENCOUNTER
Spoke with both Kristina and her  and they said that they did not see it with the paperwork --the AVS paperwork. They said that they checked with their daugher and she did not know where it is either.  Suhail Silva RN

## 2019-07-10 ENCOUNTER — TELEPHONE (OUTPATIENT)
Dept: PODIATRY | Facility: CLINIC | Age: 69
End: 2019-07-10

## 2019-07-10 NOTE — TELEPHONE ENCOUNTER
Reason for Call:  Other appointment    Detailed comments: pt calling stating she hd surgery back in Feb. Her ankle is swollen and weeping. Would like to get in for an appt    Phone Number Patient can be reached at: Home number on file [phone] (home)    Best Time: any     Can we leave a detailed message on this number? YES    Call taken on 7/10/2019 at 12:13 PM by Marilu Baron

## 2019-07-11 ENCOUNTER — OFFICE VISIT (OUTPATIENT)
Dept: PODIATRY | Facility: CLINIC | Age: 69
End: 2019-07-11
Payer: COMMERCIAL

## 2019-07-11 ENCOUNTER — ANCILLARY PROCEDURE (OUTPATIENT)
Dept: GENERAL RADIOLOGY | Facility: CLINIC | Age: 69
End: 2019-07-11
Attending: PODIATRIST
Payer: COMMERCIAL

## 2019-07-11 VITALS
HEART RATE: 80 BPM | WEIGHT: 184 LBS | DIASTOLIC BLOOD PRESSURE: 68 MMHG | HEIGHT: 66 IN | BODY MASS INDEX: 29.57 KG/M2 | SYSTOLIC BLOOD PRESSURE: 128 MMHG

## 2019-07-11 DIAGNOSIS — L03.115 CELLULITIS OF RIGHT ANKLE: ICD-10-CM

## 2019-07-11 DIAGNOSIS — S82.61XD CLOSED DISPLACED FRACTURE OF LATERAL MALLEOLUS OF RIGHT FIBULA WITH ROUTINE HEALING, SUBSEQUENT ENCOUNTER: Primary | ICD-10-CM

## 2019-07-11 DIAGNOSIS — Z98.890 S/P FOOT SURGERY, RIGHT: ICD-10-CM

## 2019-07-11 PROCEDURE — 73610 X-RAY EXAM OF ANKLE: CPT | Mod: RT

## 2019-07-11 PROCEDURE — 99213 OFFICE O/P EST LOW 20 MIN: CPT | Performed by: PODIATRIST

## 2019-07-11 RX ORDER — DOXYCYCLINE 100 MG/1
100 CAPSULE ORAL 2 TIMES DAILY
Qty: 20 CAPSULE | Refills: 0 | Status: SHIPPED | OUTPATIENT
Start: 2019-07-11 | End: 2019-09-05

## 2019-07-11 RX ORDER — CLINDAMYCIN HCL 300 MG
300 CAPSULE ORAL 3 TIMES DAILY
Qty: 30 CAPSULE | Refills: 0 | Status: SHIPPED | OUTPATIENT
Start: 2019-07-11 | End: 2019-07-11

## 2019-07-11 ASSESSMENT — MIFFLIN-ST. JEOR: SCORE: 1371.37

## 2019-07-11 ASSESSMENT — PAIN SCALES - GENERAL: PAINLEVEL: MILD PAIN (3)

## 2019-07-11 NOTE — PROGRESS NOTES
Albina returns to the office for reevaluation of the right ankle.  The patient relates following the instructions given at the last visit with noted more pain.  The patient relates overall less  improvement in pain and function of the right foot.  The patient relates increased redness and swelling with some weeping around the incision line..    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       BMI= Body mass index is 29.7 kg/m .        Physical Exam:    General: The patient appears to have a pleasant mental affect.    Lower extremity physical exam:  Neurovascular status is intact with palpable pedal pulses and intact epicritic sensations.  Muscular exam is within normal limits to major muscle groups.  Integument is intact.      One notes decreased edema.  One notes erythema along the lateral aspect of the distal fibula.  Incision is well coapted.    Radiograph evaluation including weightbearing AP, lateral and medial oblique views of the right ankle reveals interval healing with increased trabeculation of the fibula fracture with hardware intact.  No evidence of osteolytic lesions.    Assessment:      ICD-10-CM    1. Closed displaced fracture of lateral malleolus of right fibula with routine healing, subsequent encounter S82.61XD XR Ankle Right G/E 3 Views   2. S/P foot surgery, right Z98.890 XR Ankle Right G/E 3 Views       Plan:  I have explained to Albina about the conditions.  At this time, the patient will be prescribed a 10-day course of oral clindamycin 300 mg 3 times daily.  The patient will return in 1 week for reevaluation.  We discussed that removal of hardware may be necessary.    Disclaimer: This note consists of symbols  derived from keyboarding, dictation and/or voice recognition software. As a result, there may be errors in the script that have gone undetected. Please consider this when interpreting information found in this chart.       RIKA Oconnor D.P.M., PAULO.F.A.S.

## 2019-07-11 NOTE — LETTER
7/11/2019         RE: Albina Pedro  [address] [address] [place] [address] [facility] [address]        Dear Colleague,    Thank you for referring your patient, Albina Pedro, to the [facility]. Please see a copy of my visit note below.    Albina returns to the office for reevaluation of the right ankle.  The patient relates following the instructions given at the last visit with noted more pain.  The patient relates overall less  improvement in pain and function of the right foot.  The patient relates increased redness and swelling with some weeping around the incision line..    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       BMI= Body mass index is 29.7 kg/m .        Physical Exam:    General: The patient appears to have a pleasant mental affect.    Lower extremity physical exam:  Neurovascular status is intact with palpable pedal pulses and intact epicritic sensations.  Muscular exam is within normal limits to major muscle groups.  Integument is intact.      One notes decreased edema.  One notes erythema along the lateral aspect of the distal fibula.  Incision is well coapted.    Radiograph evaluation including weightbearing AP, lateral and medial oblique views of the right ankle reveals interval healing with increased trabeculation of the fibula fracture with hardware intact.  No evidence of osteolytic lesions.    Assessment:      ICD-10-CM    1. Closed displaced fracture of lateral malleolus of right fibula with routine healing, subsequent encounter S82.61XD XR Ankle Right G/E 3 Views   2. S/P foot surgery, right Z98.890 XR Ankle Right G/E 3 Views       Plan:  I have explained to Albina about the  conditions.  At this time, the patient will be prescribed a 10-day course of oral clindamycin 300 mg 3 times daily.  The patient will return in 1 week for reevaluation.  We discussed that removal of hardware may be necessary.    Disclaimer: This note consists of symbols derived from keyboarding, dictation and/or voice recognition software. As a result, there may be errors in the script that have gone undetected. Please consider this when interpreting information found in this chart.       ESTEPHANIE Rosas.P.MARCELINO., F.A.C.F.A.S.      Again, thank you for allowing me to participate in the care of your patient.        Sincerely,        Rolo Oconnor DPM

## 2019-07-11 NOTE — NURSING NOTE
"Chief Complaint   Patient presents with     Surgical Followup     DOS 2/19/19, Open reduction and internal fixation lateral malleolar ankle fracture, right ankle.       Initial /68   Pulse 80   Ht 1.676 m (5' 6\")   Wt 83.5 kg (184 lb)   BMI 29.70 kg/m   Estimated body mass index is 29.7 kg/m  as calculated from the following:    Height as of this encounter: 1.676 m (5' 6\").    Weight as of this encounter: 83.5 kg (184 lb).  Medications and allergies reviewed.      Alta HUMMEL MA    "

## 2019-07-12 NOTE — TELEPHONE ENCOUNTER
"----- Message from Nova Mack sent at 7/11/2019  3:51 PM CDT -----  Steven Holm    Doesn;t sound like patient understood the plan at all .    Glad that I messaged Dr Oconnor.    Can you give her a call please?    Thank you       ----- Message -----  From: Rolo Oconnor DPM  Sent: 7/11/2019   3:33 PM  To: Nova Mack    She needs to be reevaluated next week before I schedule surgery.  She needs to take the medication (antibiotic) now, but will need to return to clinic for reevaluation before proceeding with surgery.  Please let her know this.  Thank you.  ----- Message -----  From: Nova Mack  Sent: 7/11/2019   2:44 PM  To: HARSH[address]   Patient is calling in to schedule surgery.    She states that she was given \"10 pills\" and wasn;t sure if she was supposed to finish those before she scheduled procedure or not.    Please advise and place orders.    Thank you    oNva      "

## 2019-07-12 NOTE — TELEPHONE ENCOUNTER
Called patient back. Spoke with patient and informed her that Dr. Oconnor wants to see her after the antibiotics are over with for a follow-up in 10 days. We where able to set that appointment up on July 22cd at 420. I informed her after he does the follow-up then she can proceed to schedule with the surgery.    Alta Jonas MA on 7/12/2019 at 8:23 AM

## 2019-07-22 ENCOUNTER — OFFICE VISIT (OUTPATIENT)
Dept: PODIATRY | Facility: CLINIC | Age: 69
End: 2019-07-22
Payer: COMMERCIAL

## 2019-07-22 VITALS
HEIGHT: 66 IN | WEIGHT: 184 LBS | SYSTOLIC BLOOD PRESSURE: 137 MMHG | HEART RATE: 83 BPM | BODY MASS INDEX: 29.57 KG/M2 | DIASTOLIC BLOOD PRESSURE: 69 MMHG

## 2019-07-22 DIAGNOSIS — T84.84XA PAINFUL ORTHOPAEDIC HARDWARE (H): Primary | ICD-10-CM

## 2019-07-22 DIAGNOSIS — F43.0 ACUTE REACTION TO STRESS: ICD-10-CM

## 2019-07-22 PROCEDURE — 99213 OFFICE O/P EST LOW 20 MIN: CPT | Performed by: PODIATRIST

## 2019-07-22 ASSESSMENT — MIFFLIN-ST. JEOR: SCORE: 1371.37

## 2019-07-22 NOTE — TELEPHONE ENCOUNTER
Routing refill request to provider for review/approval because:  Drug not on the [facility] refill protocol   Evie Arizmendi RN

## 2019-07-22 NOTE — PROGRESS NOTES
Albina returns to the office for reevaluation of the right ankle.  The patient relates following the instructions given at the last visit with noted more pain.  The patient relates overall less  improvement in pain and function of the right ankle.  The patient relates no other problems.    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       BMI= Body mass index is 29.7 kg/m .        Physical Exam:    General: The patient appears to have a pleasant mental affect.    Lower extremity physical exam:  Neurovascular status is intact with palpable pedal pulses and intact epicritic sensations.  Muscular exam is within normal limits to major muscle groups.  Integument is intact.      One notes decreased edema.  One notes small open wound on the proximal aspect of the incision over the lateral aspect of the right ankle.  No surrounding erythema or drainage noted.       Assessment:      ICD-10-CM    1. Painful orthopaedic hardware (H) T84.84XA        Plan:  I have explained to Albina about the conditions.  At this point, I am recommending surgical treatment of the condition involving surgical removal of hardware on the right ankle.  I informed the patient in risks and benefits of the procedure including but not limited to infection, wound complications, swelling, pain, diminished range of motion and function, DVT and reoccurrence of condition.  The procedure will be performed under local with monitored anesthesia care.  The patient will obtain a preoperative history and physical by the primary care provider.  Consents will be reviewed and signed on the day of surgery.    Disclaimer: This note consists of symbols derived from keyboarding, dictation  and/or voice recognition software. As a result, there may be errors in the script that have gone undetected. Please consider this when interpreting information found in this chart.       RIKA Oconnor D.P.M., FVITALIY.COREY.F.A.S.

## 2019-07-22 NOTE — PATIENT INSTRUCTIONS
You have elected to proceed with Surgery for removal of hardware right ankle  Surgeries are performed on Tuesdays at [facility].   To schedule your surgery date please call [phone].  Please leave a message with a good time for our staff to call you back.    - Please have a date in mind for your surgery, you can feel free to leave that date on the message, and we will schedule and call back to confirm.     You can expect receive a call back the same day or on the next business day from Dr. Oconnor s team to assist in the scheduling.   - We will schedule the date of your surgery.  The time will be determined a few days ahead of time.  You can expect a call from Same Day Surgery 2-3 days ahead of time with specific instructions for what time to arrive at the hospital as well as any other preparations you should take prior to surgery.    - You may need to obtain a pre-operative physical from your primary medical provider. This must be done within 30 days of your surgery date.    - We will also schedule your first post-operative appointment for a bandage and wound check for the Monday following your surgery at the [facility].    - You may be non-weight bearing for a period of up to 6 weeks.  Options for this include: (Please indicate which you would prefer so we can provide you with an order and instructions)  o Crutches  o Walker  o Roll-a-bout knee walker.    - If you will need paperwork filled out for your employer you may drop those off at the clinic directly or you may have those faxed to us at [phone].  Please indicate on the form the date you would like the LA to begin if it will not be your surgery date.    The forms are typically filled out for up to 12 weeks, however you may be cleared to return prior to that time depending on your individual healing and job requirements.

## 2019-07-22 NOTE — NURSING NOTE
"Chief Complaint   Patient presents with     RECHECK     Right foot \"red and weeping\", discuss hardware removal       Initial /69   Pulse 83   Ht 1.676 m (5' 6\")   Wt 83.5 kg (184 lb)   BMI 29.70 kg/m   Estimated body mass index is 29.7 kg/m  as calculated from the following:    Height as of this encounter: 1.676 m (5' 6\").    Weight as of this encounter: 83.5 kg (184 lb).  Medications and allergies reviewed.      Alta HUMMEL MA    "

## 2019-07-22 NOTE — TELEPHONE ENCOUNTER
LORazepam Oral Tablet 0.5 MG      Last Written Prescription Date:  6/14/19  Last Fill Quantity: 20,   # refills: 0  Last Office Visit: 6/14/19  Future Office visit:    Next 5 appointments (look out 90 days)    Jul 22, 2019  4:20 PM CDT  Return Visit with Rolo Oconnor DPM  [facility] ([facility]) [address]  [facility] [address]  [phone]           Routing refill request to provider for review/approval because:  Drug not on the [facility], [facility] or [facility] refill protocol or controlled substance

## 2019-07-22 NOTE — LETTER
7/22/2019         RE: Albina Pedro  [id number] [address] [facility] [id number]        Dear Colleague,    Thank you for referring your patient, Albina Pedro, to the [facility]. Please see a copy of my visit note below.    Albina returns to the office for reevaluation of the right ankle.  The patient relates following the instructions given at the last visit with noted more pain.  The patient relates overall less  improvement in pain and function of the right ankle.  The patient relates no other problems.    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       BMI= Body mass index is 29.7 kg/m .        Physical Exam:    General: The patient appears to have a pleasant mental affect.    Lower extremity physical exam:  Neurovascular status is intact with palpable pedal pulses and intact epicritic sensations.  Muscular exam is within normal limits to major muscle groups.  Integument is intact.      One notes decreased edema.  One notes small open wound on the proximal aspect of the incision over the lateral aspect of the right ankle.  No surrounding erythema or drainage noted.       Assessment:      ICD-10-CM    1. Painful orthopaedic hardware (H) T84.84XA        Plan:  I have explained to Albina about the conditions.  At this point, I am recommending surgical treatment of the condition involving surgical removal of hardware on the right ankle.  I informed the patient in risks and benefits of the procedure including but not limited to infection, wound complications, swelling, pain, diminished range of motion and function, DVT and reoccurrence of condition.  The procedure will be performed under  local with monitored anesthesia care.  The patient will obtain a preoperative history and physical by the primary care provider.  Consents will be reviewed and signed on the day of surgery.    Disclaimer: This note consists of symbols derived from keyboarding, dictation and/or voice recognition software. As a result, there may be errors in the script that have gone undetected. Please consider this when interpreting information found in this chart.       RIKA Oconnor D.P.M., F.A.C.F.A.S.      Again, thank you for allowing me to participate in the care of your patient.        Sincerely,        Rolo Oconnor DPM

## 2019-07-23 RX ORDER — LORAZEPAM 0.5 MG/1
TABLET ORAL
Qty: 20 TABLET | Refills: 0 | Status: SHIPPED | OUTPATIENT
Start: 2019-07-23 | End: 2019-10-30

## 2019-07-26 ENCOUNTER — TELEPHONE (OUTPATIENT)
Dept: FAMILY MEDICINE | Facility: CLINIC | Age: 69
End: 2019-07-26

## 2019-07-26 NOTE — TELEPHONE ENCOUNTER
Reason for Call:  Other Medical records    Detailed comments: Pt left message that she wants to  medical records and x-rays on Monday or Tuesday and that we can text her if we need to contact her.  CSS tried to call patient to let her know that is not how it works and she needs to contact medical records however her mailbox is full.  Will wait for a call back.    Phone Number Patient can be reached at: Home number on file [phone] (home)    Best Time: any    Can we leave a detailed message on this number?     Call taken on 7/26/2019 at 8:10 AM by Ernestine Perdomo

## 2019-08-21 ENCOUNTER — TELEPHONE (OUTPATIENT)
Dept: FAMILY MEDICINE | Facility: CLINIC | Age: 69
End: 2019-08-21

## 2019-08-21 NOTE — TELEPHONE ENCOUNTER
Reason for Call:  Other Rheumatologist    Detailed comments: Kristina LEFT MESSAGE:  was recommended to be seen by rheumatologist and she is wondering who she should see?  Referral needed?  No other information was left.  Please call and assess. Thank you..Tita Real    Phone Number Patient can be reached at: Home number on file [phone] (home)    Best Time: any time    Can we leave a detailed message on this number? YES- she said please leave message with name and phone number.     Call taken on 8/21/2019 at 8:50 AM by Tita Real

## 2019-08-21 NOTE — TELEPHONE ENCOUNTER
Message left on patient's personally identified vm to call :  Your provider has referred you to: [facility]: [facility] [phone]  or [facility]: [phone]  Suhail Silva RN

## 2019-09-05 ENCOUNTER — OFFICE VISIT (OUTPATIENT)
Dept: FAMILY MEDICINE | Facility: CLINIC | Age: 69
End: 2019-09-05
Payer: COMMERCIAL

## 2019-09-05 VITALS
HEART RATE: 88 BPM | OXYGEN SATURATION: 99 % | SYSTOLIC BLOOD PRESSURE: 122 MMHG | WEIGHT: 187 LBS | BODY MASS INDEX: 30.05 KG/M2 | DIASTOLIC BLOOD PRESSURE: 76 MMHG | HEIGHT: 66 IN

## 2019-09-05 DIAGNOSIS — F34.1 DYSTHYMIA: ICD-10-CM

## 2019-09-05 DIAGNOSIS — G89.29 CHRONIC PAIN OF RIGHT ANKLE: Primary | ICD-10-CM

## 2019-09-05 DIAGNOSIS — M25.571 CHRONIC PAIN OF RIGHT ANKLE: Primary | ICD-10-CM

## 2019-09-05 DIAGNOSIS — F33.0 MAJOR DEPRESSIVE DISORDER, RECURRENT, MILD (H): ICD-10-CM

## 2019-09-05 DIAGNOSIS — R29.898 WEAKNESS OF RIGHT HIP: ICD-10-CM

## 2019-09-05 PROCEDURE — 99214 OFFICE O/P EST MOD 30 MIN: CPT | Performed by: FAMILY MEDICINE

## 2019-09-05 RX ORDER — BUPROPION HYDROCHLORIDE 150 MG/1
150 TABLET, EXTENDED RELEASE ORAL 2 TIMES DAILY
Qty: 60 TABLET | Refills: 3 | Status: SHIPPED | OUTPATIENT
Start: 2019-09-05 | End: 2019-10-15 | Stop reason: DRUGHIGH

## 2019-09-05 RX ORDER — VENLAFAXINE HYDROCHLORIDE 150 MG/1
150 TABLET, EXTENDED RELEASE ORAL DAILY
Qty: 30 TABLET | Refills: 1 | Status: SHIPPED | OUTPATIENT
Start: 2019-09-05 | End: 2019-10-30

## 2019-09-05 ASSESSMENT — MIFFLIN-ST. JEOR: SCORE: 1384.98

## 2019-09-05 NOTE — PATIENT INSTRUCTIONS
Increase the buproprion to 150 mg 2 times per day   Also make the appointment with the talk therapist   Also make an appointment with a geriatric psychiatrist   Make the appointment with the physical therapist

## 2019-09-05 NOTE — PROGRESS NOTES
Subjective     Albina Pedro is a 68 year old female who presents to clinic today for the following health issues:    HPI     *Check spot on forehead looks like an insect bite     *Weak on mainly right side.  Not right now just the she will be walking along and her leg gives she will then feel weak just the weakness in the thigh mostly   She has fallen 4 times only on the right side   Broke her ankle and she still has open area on the lower leg   It has progressively  Gotten smaller   She has not been going to physical therapy     *Feels extremely worn out at times  Feeling tired just about 4 pm she fells very mushy muscles   Pretty good mood  Went to [place] thanks[place]   She feels like she wants to do things     *Check incision from right ankle surgery. Walked the other day and that felt good.      Depression Followup    How are you doing with your depression since your last visit? Between same and Worsened    Are you having other symptoms that might be associated with depression? Yes:       Have you had a significant life event?  No     Are you feeling anxious or having panic attacks?   Yes:  sometimes - but may be related to relationship with .     Do you have any concerns with your use of alcohol or other drugs? No    Social History     Tobacco Use     Smoking status: Never Smoker     Smokeless tobacco: Never Used   Substance Use Topics     Alcohol use: Yes     Alcohol/week: 0.0 oz     Comment: 1 glass of wine every 2 weeks     Drug use: No     PHQ 10/18/2017 5/31/2018 1/18/2019   PHQ-9 Total Score 5 1 8   Q9: Thoughts of better off dead/self-harm past 2 weeks Not at all Not at all Not at all     SEBASTIAN-7 SCORE 10/18/2017 5/31/2018 1/18/2019   Total Score - - -   Total Score 7 3 6     No flowsheet data found.  No flowsheet data found.  In the past two weeks have you had thoughts of suicide or self-harm?  No.    Do you have concerns about your personal safety or the safety of others?   No    Suicide  Assessment Five-step Evaluation and Treatment (SAFE-T)    conflict with her  is up she will be seeing neurology in the near future but they agreed with me that most of what is going on is conflict and mood related. She is not currently in talk therapy and I think she would benefit greatly from thiis. She feels stuck. Her  had come in before the visit with concerns. My sense is still that it is the conflict. She has changed also and I am not sure that he is ok with that.   She feels that she gets her stuff done. When she wants to .   She denies hi or si she is happy and brought me flowers that she had grown today.   She physically feels ok but she is still struggling with the healing of the right ankle.   As to the weakness she stopped physical therapy but she doesn't remember why       Patient Active Problem List   Diagnosis     DIZZINESS - LIKELY HYPOGLYCEMIA     Dermatophytosis of body     Episodic mood disorder (H)     Health Care Home     Panniculitis     Advanced directives, counseling/discussion     CAD (coronary artery disease)     Sjogren's syndrome (H)     Adverse effect of anesthetic     Status post coronary angiogram     Acute chest pain     Major depressive disorder, recurrent, mild (H)     Hyperlipidemia LDL goal <70     ILD (interstitial lung disease) (H)     Intermediate coronary syndrome (H)     Chronic stable angina (H)     Past Surgical History:   Procedure Laterality Date     C/SECTION, LOW TRANSVERSE  10/13/1978    , Low Transverse     COLONOSCOPY       OPEN REDUCTION INTERNAL FIXATION ANKLE Right 2019    Procedure: Open reduction internal fixation right lateral malleolus fracture;  Surgeon: Rolo Oconnor DPM;  Location: [facility]     SURGICAL HISTORY OF -            SURGICAL HISTORY OF -   00    Colonoscopy       Social History     Tobacco Use     Smoking status: Never Smoker     Smokeless tobacco: Never Used   Substance Use Topics     Alcohol  "use: Yes     Alcohol/week: 0.0 oz     Comment: 1 glass of wine every 2 weeks     Family History   Problem Relation Age of Onset     Cancer Mother         Breast and liver- age 43     Heart Disease Father         ? chf?h/o CVA     Alzheimer Disease Father      Hypertension Father      Neurologic Disorder Father         CVA     Diabetes Maternal Grandmother      Breast Cancer Maternal Aunt      Breast Cancer Paternal Aunt      Cancer - colorectal No family hx of      Prostate Cancer No family hx of              Reviewed and updated as needed this visit by Provider         Review of Systems   ROS COMP: Constitutional, HEENT, cardiovascular, pulmonary, gi and gu systems are negative, except as otherwise noted.      Objective    /76   Pulse 88   Ht 1.676 m (5' 6\")   Wt 84.8 kg (187 lb)   SpO2 99%   BMI 30.18 kg/m    Body mass index is 30.18 kg/m .  Physical Exam   GENERAL: healthy, alert and no distress  EYES: Eyes grossly normal to inspection, PERRL and conjunctivae and sclerae normal  NECK: no adenopathy, no asymmetry, masses, or scars and thyroid normal to palpation  RESP: lungs clear to auscultation - no rales, rhonchi or wheezes  CV: regular rate and rhythm, normal S1 S2, no S3 or S4, no murmur, click or rub, no peripheral edema and peripheral pulses strong  ABDOMEN: soft, nontender, no hepatosplenomegaly, no masses and bowel sounds normal  MS: RLE exam shows slight decrease in strength lower leg compared to left normal appearing muscle   SKIN: small opening superficially right lateral ankle with foam gel in this. No surrounding erythema   NEURO: Normal strength and tone, mentation intact and speech normal  BACK: no CVA tenderness, no paralumbar tenderness  MENTAL STATUS EXAM:    1. Clinical observations: Albina was clean and well-groomed and was adequately groomed. Albina's emotional presentation was open and cooperative. She spoke clear and articulate. She maintained good eye contact and she was " cooperative in answering questions.   2. She appeared to be well-oriented in all spheres with coherent, logical, goal directed and relevent thinking.   3. Thought content: She denies incoherent, somatic preoccupation, loosening of association, vague, tangential, grandiose, paranoid, flight of ideas, auditory hallucinations, visual hallucinations, olfactory hallucination, tactile hallucinations, delusions, compulsions and obessions.   4. Affect and mood: Omars affect is described as normal/appropriate and her emotional attitude was open and cooperative. She reports the following sypmtoms: difficulty concentrating, lack of interest or enjoyment, less energy than usual and feeling fatiqued.    5. Sensorium and cognition: She was in contact with reality and oriented to time, place and person.  She demonstrated no impairment in immediate, recent, or remote memory. Her insight was adequate and her  intelligence appeared to be average.        Diagnostic Test Results:  Labs reviewed in Epic  none         Assessment & Plan   Problem List Items Addressed This Visit        Medium    Major depressive disorder, recurrent, mild (H)    Relevant Medications    venlafaxine (EFFEXOR-ER) 150 MG 24 hr tablet    buPROPion (WELLBUTRIN SR) 150 MG 12 hr tablet    Other Relevant Orders    MENTAL HEALTH REFERRAL  - Adult; Outpatient Treatment; Individual/Couples/Family/Group Therapy/Health Psychology; Other: [facility] [phone]; We will contact you to schedule the appointment or please call with any questi...      Other Visit Diagnoses     Chronic pain of right ankle    -  Primary    Relevant Orders    [facility] PT[facility], [facility] CHIROPRACT[facility] REFERRAL    Weakness of right hip        Relevant Orders    [facility], [facility] CHIR[facility]RACTIC REFERRAL    Dysthymia        Relevant Medications    venlafaxine (EFFEXOR-ER) 150 MG 24 hr tablet    buPROPion (WELLBUTRIN SR) 150 MG 12 hr tablet    Other Relevant Orders    MENTAL HEALTH  "REFERRAL  - Adult; Psychiatry and Medication Management; Psychiatry; Other: [facility] [phone]; We will contact you to schedule the appointment or please call with any questions             BMI:   Estimated body mass index is 30.18 kg/m  as calculated from the following:    Height as of this encounter: 1.676 m (5' 6\").    Weight as of this encounter: 84.8 kg (187 lb).           Work on weight loss  Patient Instructions   Increase the buproprion to 150 mg 2 times per day   Also make the appointment with the talk therapist   Also make an appointment with a geriatric psychiatrist   Make the appointment with the physical therapist     Return in about 3 months (around 12/5/2019).    Carolina Burrell MD  [facility]      "

## 2019-09-17 ENCOUNTER — ANCILLARY PROCEDURE (OUTPATIENT)
Dept: CT IMAGING | Facility: CLINIC | Age: 69
End: 2019-09-17
Attending: INTERNAL MEDICINE
Payer: COMMERCIAL

## 2019-09-17 ENCOUNTER — OFFICE VISIT (OUTPATIENT)
Dept: PULMONOLOGY | Facility: CLINIC | Age: 69
End: 2019-09-17
Attending: INTERNAL MEDICINE
Payer: COMMERCIAL

## 2019-09-17 VITALS
SYSTOLIC BLOOD PRESSURE: 113 MMHG | OXYGEN SATURATION: 98 % | HEIGHT: 66 IN | TEMPERATURE: 98.2 F | DIASTOLIC BLOOD PRESSURE: 72 MMHG | WEIGHT: 186 LBS | BODY MASS INDEX: 29.89 KG/M2 | HEART RATE: 79 BPM

## 2019-09-17 DIAGNOSIS — J84.9 ILD (INTERSTITIAL LUNG DISEASE) (H): ICD-10-CM

## 2019-09-17 DIAGNOSIS — J84.9 ILD (INTERSTITIAL LUNG DISEASE) (H): Primary | ICD-10-CM

## 2019-09-17 PROCEDURE — G0463 HOSPITAL OUTPT CLINIC VISIT: HCPCS | Mod: ZF

## 2019-09-17 ASSESSMENT — PAIN SCALES - GENERAL: PAINLEVEL: NO PAIN (0)

## 2019-09-17 ASSESSMENT — PATIENT HEALTH QUESTIONNAIRE - PHQ9: SUM OF ALL RESPONSES TO PHQ QUESTIONS 1-9: 1

## 2019-09-17 ASSESSMENT — MIFFLIN-ST. JEOR: SCORE: 1380.44

## 2019-09-17 NOTE — PROGRESS NOTES
Reason for Visit  Albina Pedro is a 68 year old year old female who is being seen for RECHECK (LIP/Sjogren's follow up )    ILD HPI  Ms. Pedro is a 68 year old woman with a history of ILD most consistent with LIP as well as a history of Sjogren's disease (not on treatment), CAD, and HLD who presents for follow up.  Since her last visit, the patient sustained a distal fibula fracture which resulted in her being immobilized for approximately 2 months.  She was not able to participate in regular activity and now complains of feeling deconditioned.  She has a history of having episodes of shortness of breath sometimes associated with panic attacks.  These usually resolve spontaneously without medical intervention.  She reports that she rarely ever uses albuterol inhaler, but keeps in her purse just in case.  Patient denies any cough or sputum production, wheeze, or chest pain.  Functionally the patient states that she may be slightly less than baseline, but attributes that to being deconditioned post surgery.    The patient was seen and examined by Serge Burns MD         Current Outpatient Medications   Medication     aspirin 81 MG tablet     buPROPion (WELLBUTRIN SR) 150 MG 12 hr tablet     calcium carbonate (OS-SARI 500 MG [place]. [place]) 1250 MG tablet     LORazepam (ATIVAN) 0.5 MG tablet     Multiple Vitamins-Minerals (MULTIVITAMIN & MINERAL PO)     nystatin (MYCOSTATIN) [id number] UNIT/GM external cream     pravastatin (PRAVACHOL) 40 MG tablet     Ranolazine 1000 MG TB12     triamcinolone (KENALOG) 0.1 % cream     venlafaxine (EFFEXOR-ER) 150 MG 24 hr tablet     VITAMIN D3 OR     No current facility-administered medications for this visit.      Allergies   Allergen Reactions     Daypro [Oxaprozin] Rash            Lidocaine Other (See Comments)     Rapid heart rate     Nitroglycerin Other (See Comments)     Causes low blood pressure     Penicillins Unknown     Occurred as child.     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       Past Surgical History:   Procedure Laterality Date     C/SECTION, LOW TRANSVERSE  10/13/1978    , Low Transverse     COLONOSCOPY       OPEN REDUCTION INTERNAL FIXATION ANKLE Right 2019    Procedure: Open reduction internal fixation right lateral malleolus fracture;  Surgeon: Rolo Oconnor DPM;  Location: [facility]     SURGICAL HISTORY OF -            SURGICAL HISTORY OF -   00    Colonoscopy       Social History     Socioeconomic History     Marital status:      Spouse name: Not on file     Number of children: Not on file     Years of education: Not on file     Highest education level: Not on file   Occupational History     Not on file   Social Needs     Financial resource strain: Not on file     Food insecurity:     Worry: Not on file     Inability: Not on file     Transportation needs:     Medical: Not on file     Non-medical: Not on file   Tobacco Use     Smoking status: Never Smoker     Smokeless tobacco: Never Used   Substance and Sexual Activity     Alcohol use: Yes     Alcohol/week: 0.0 oz     Comment: 1 glass of wine every 2 weeks     Drug use: No     Sexual activity: Yes     Partners: Male   Lifestyle     Physical activity:     Days per week: Not on file     Minutes per session: Not on file     Stress: Not on file   Relationships     Social connections:     Talks on phone: Not on file     Gets together: Not on file     Attends Holiness service: Not on file     Active member of club or organization: Not on file     Attends meetings of clubs or organizations: Not on file     Relationship status: Not on file     Intimate partner violence:     Fear of current or ex partner:  "Not on file     Emotionally abused: Not on file     Physically abused: Not on file     Forced sexual activity: Not on file   Other Topics Concern     Parent/sibling w/ CABG, MI or angioplasty before 65F 55M? No   Social History Narrative     Not on file       ROS Pulmonary    A complete ROS was otherwise negative except as noted in the HPI.  /72   Pulse 79   Temp 98.2  F (36.8  C) (Oral)   Ht 1.676 m (5' 6\")   Wt 84.4 kg (186 lb)   SpO2 98%   BMI 30.02 kg/m    Exam:   GENERAL APPEARANCE: Well developed, well nourished, alert, and in no apparent distress.  EYES: PERRL, EOMI  HENT: Nasal mucosa with no edema and no hyperemia. No nasal polyps.  EARS: Canals clear, TMs normal  MOUTH: Oral mucosa is moist, without any lesions, no tonsillar enlargement, no oropharyngeal exudate.  NECK: supple, no masses, no thyromegaly.  LYMPHATICS: No significant  cervical, or supraclavicular nodes.  RESP: good air flow throughout.  No crackles. No rhonchi. No wheezes.  CV: Normal S1, S2, regular rhythm, normal rate. No murmur.  No rub. No gallop. No LE edema.   ABDOMEN:  Bowel sounds normal, soft, nontender, no HSM or masses.   MS: extremities normal. No clubbing. No cyanosis.  SKIN: no rash on limited exam  NEURO: Mentation intact, speech normal, normal strength and tone, normal gait and stance  PSYCH: mentation appears normal. and affect normal/bright  Results:  Recent Results (from the past 168 hour(s))   General PFT Lab (Please always keep checked)    Collection Time: 09/17/19 12:30 PM   Result Value Ref Range    FVC-Pred 2.95 L    FVC-Pre 2.98 L    FVC-%Pred-Pre 101 %    FEV1-Pre 2.43 L    FEV1-%Pred-Pre 106 %    FEV1FVC-Pred 78 %    FEV1FVC-Pre 81 %    FEFMax-Pred 5.80 L/sec    FEFMax-Pre 6.19 L/sec    FEFMax-%Pred-Pre 106 %    FEF2575-Pred 1.91 L/sec    FEF2575-Pre 2.62 L/sec    GLG3130-%Pred-Pre 136 %    ExpTime-Pre 6.84 sec    FIFMax-Pre 6.29 L/sec    VC-Pred 3.19 L    VC-Pre 2.93 L    VC-%Pred-Pre 91 %    IC-Pred " 2.69 L    IC-Pre 1.73 L    IC-%Pred-Pre 64 %    ERV-Pred 0.50 L    ERV-Pre 1.19 L    ERV-%Pred-Pre 238 %    FEV1FEV6-Pred 79 %    FEV1FEV6-Pre 81 %    FRCPleth-Pred 2.77 L    FRCPleth-Pre 2.20 L    FRCPleth-%Pred-Pre 79 %    RVPleth-Pred 2.11 L    RVPleth-Pre 1.00 L    RVPleth-%Pred-Pre 47 %    TLCPleth-Pred 5.11 L    TLCPleth-Pre 3.93 L    TLCPleth-%Pred-Pre 76 %    DLCOunc-Pred 20.13 ml/min/mmHg    DLCOunc-Pre 20.29 ml/min/mmHg    DLCOunc-%Pred-Pre 100 %    VA-Pre 4.31 L    VA-%Pred-Pre 87 %    FEV1SVC-Pred 72 %    FEV1SVC-Pre 83 %       Assessment and plan:   Ms. Pedro is a 68 year old woman with a history of ILD most consistent with LIP as well as a history of Sjogren's disease (not on treatment), CAD, and HLD who presents for follow up.  Patient continues to do well with conservative treatment.  Minor change in PFT since last visit, therefore agreed that fatigue is likely secondary to deconditioned state.  However, total lung volume has increased which could be from cystic changes, therefore will order CT chest today.  No other change in medications needed.  Follow-up in 1 year.  Patient understands and agrees with plan  1. CT Chest  2. Cont. Alb inhaler as needed  RTC 1 year  Serge Burns DO, MPA, MUSA  PGY-3  Occupational and Environmental Medicine Resident    Encounter seen and discussed with attending provider, Dr. Perlman      Attending Note:    The patient was seen and examined by me and discussed at length with the resident. I have personally reviewed all labs, vital signs, imaging and culture results.  I have read and agree with the resident note from today which reflects our joint findings, assessment and plan.    David Perlman, M.D.    Pulmonary, Allergy and Critical Care Medicine  [facility]

## 2019-09-17 NOTE — LETTER
9/17/2019       RE: Albina Pedro  [id number] [facility] [id number]     Dear Colleague,    Thank you for referring your patient, Albina Pedro, to the [facility] at [facility]. Please see a copy of my visit note below.    Reason for Visit  Albina Pedro is a 68 year old year old female who is being seen for RECHECK (LIP/Sjogren's follow up )    ILD HPI  Ms. Pedro is a 68 year old woman with a history of ILD most consistent with LIP as well as a history of Sjogren's disease (not on treatment), CAD, and HLD who presents for follow up.  Since her last visit, the patient sustained a distal fibula fracture which resulted in her being immobilized for approximately 2 months.  She was not able to participate in regular activity and now complains of feeling deconditioned.  She has a history of having episodes of shortness of breath sometimes associated with panic attacks.  These usually resolve spontaneously without medical intervention.  She reports that she rarely ever uses albuterol inhaler, but keeps in her purse just in case.  Patient denies any cough or sputum production, wheeze, or chest pain.  Functionally the patient states that she may be slightly less than baseline, but attributes that to being deconditioned post surgery.    The patient was seen and examined by Serge Burns MD         Current Outpatient Medications   Medication     aspirin 81 MG tablet     buPROPion (WELLBUTRIN SR) 150 MG 12 hr tablet     calcium carbonate (OS-SARI 500 MG [place]. [place]) 1250 MG tablet     LORazepam (ATIVAN) 0.5 MG tablet     Multiple Vitamins-Minerals (MULTIVITAMIN & MINERAL PO)     nystatin (MYCOSTATIN) 287518 UNIT/GM external cream     pravastatin (PRAVACHOL) 40 MG tablet     Ranolazine 1000 MG TB12     triamcinolone (KENALOG) 0.1 % cream     venlafaxine (EFFEXOR-ER) 150 MG 24 hr tablet     VITAMIN D3 OR     No current  facility-administered medications for this visit.      Allergies   Allergen Reactions     Daypro [Oxaprozin] Rash            Lidocaine Other (See Comments)     Rapid heart rate     Nitroglycerin Other (See Comments)     Causes low blood pressure     Penicillins Unknown     Occurred as child.     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       Past Surgical History:   Procedure Laterality Date     C/SECTION, LOW TRANSVERSE  10/13/1978    , Low Transverse     COLONOSCOPY       OPEN REDUCTION INTERNAL FIXATION ANKLE Right 2019    Procedure: Open reduction internal fixation right lateral malleolus fracture;  Surgeon: Rolo Oconnor DPM;  Location: [facility] OR     SURGICAL HISTORY OF -            SURGICAL HISTORY OF -   00    Colonoscopy       Social History     Socioeconomic History     Marital status:      Spouse name: Not on file     Number of children: Not on file     Years of education: Not on file     Highest education level: Not on file   Occupational History     Not on file   Social Needs     Financial resource strain: Not on file     Food insecurity:     Worry: Not on file     Inability: Not on file     Transportation needs:     Medical: Not on file     Non-medical: Not on file   Tobacco Use     Smoking status: Never Smoker     Smokeless tobacco: Never Used   Substance and Sexual Activity     Alcohol use: Yes     Alcohol/week: 0.0 oz     Comment: 1 glass of wine every 2 weeks     Drug use: No     Sexual activity: Yes     Partners: Male   Lifestyle     Physical activity:     Days per week: Not on file     Minutes per session: Not on file     Stress: Not on file   Relationships     Social  "connections:     Talks on phone: Not on file     Gets together: Not on file     Attends [employer] service: Not on file     Active member of club or organization: Not on file     Attends meetings of clubs or organizations: Not on file     Relationship status: Not on file     Intimate partner violence:     Fear of current or ex partner: Not on file     Emotionally abused: Not on file     Physically abused: Not on file     Forced sexual activity: Not on file   Other Topics Concern     Parent/sibling w/ CABG, MI or angioplasty before 65F 55M? No   Social History Narrative     Not on file       ROS Pulmonary    A complete ROS was otherwise negative except as noted in the HPI.  /72   Pulse 79   Temp 98.2  F (36.8  C) (Oral)   Ht 1.676 m (5' 6\")   Wt 84.4 kg (186 lb)   SpO2 98%   BMI 30.02 kg/m     Exam:   GENERAL APPEARANCE: Well developed, well nourished, alert, and in no apparent distress.  EYES: PERRL, EOMI  HENT: Nasal mucosa with no edema and no hyperemia. No nasal polyps.  EARS: Canals clear, TMs normal  MOUTH: Oral mucosa is moist, without any lesions, no tonsillar enlargement, no oropharyngeal exudate.  NECK: supple, no masses, no thyromegaly.  LYMPHATICS: No significant  cervical, or supraclavicular nodes.  RESP: good air flow throughout.  No crackles. No rhonchi. No wheezes.  CV: Normal S1, S2, regular rhythm, normal rate. No murmur.  No rub. No gallop. No LE edema.   ABDOMEN:  Bowel sounds normal, soft, nontender, no HSM or masses.   MS: extremities normal. No clubbing. No cyanosis.  SKIN: no rash on limited exam  NEURO: Mentation intact, speech normal, normal strength and tone, normal gait and stance  PSYCH: mentation appears normal. and affect normal/bright  Results:  Recent Results (from the past 168 hour(s))   General PFT Lab (Please always keep checked)    Collection Time: 09/17/19 12:30 PM   Result Value Ref Range    FVC-Pred 2.95 L    FVC-Pre 2.98 L    FVC-%Pred-Pre 101 %    FEV1-Pre 2.43 L "    FEV1-%Pred-Pre 106 %    FEV1FVC-Pred 78 %    FEV1FVC-Pre 81 %    FEFMax-Pred 5.80 L/sec    FEFMax-Pre 6.19 L/sec    FEFMax-%Pred-Pre 106 %    FEF2575-Pred 1.91 L/sec    FEF2575-Pre 2.62 L/sec    HKH7533-%Pred-Pre 136 %    ExpTime-Pre 6.84 sec    FIFMax-Pre 6.29 L/sec    VC-Pred 3.19 L    VC-Pre 2.93 L    VC-%Pred-Pre 91 %    IC-Pred 2.69 L    IC-Pre 1.73 L    IC-%Pred-Pre 64 %    ERV-Pred 0.50 L    ERV-Pre 1.19 L    ERV-%Pred-Pre 238 %    FEV1FEV6-Pred 79 %    FEV1FEV6-Pre 81 %    FRCPleth-Pred 2.77 L    FRCPleth-Pre 2.20 L    FRCPleth-%Pred-Pre 79 %    RVPleth-Pred 2.11 L    RVPleth-Pre 1.00 L    RVPleth-%Pred-Pre 47 %    TLCPleth-Pred 5.11 L    TLCPleth-Pre 3.93 L    TLCPleth-%Pred-Pre 76 %    DLCOunc-Pred 20.13 ml/min/mmHg    DLCOunc-Pre 20.29 ml/min/mmHg    DLCOunc-%Pred-Pre 100 %    VA-Pre 4.31 L    VA-%Pred-Pre 87 %    FEV1SVC-Pred 72 %    FEV1SVC-Pre 83 %       Assessment and plan:   Ms. Pedro is a 68 year old woman with a history of ILD most consistent with LIP as well as a history of Sjogren's disease (not on treatment), CAD, and HLD who presents for follow up.  Patient continues to do well with conservative treatment.  Minor change in PFT since last visit, therefore agreed that fatigue is likely secondary to deconditioned state.  However, total lung volume has increased which could be from cystic changes, therefore will order CT chest today.  No other change in medications needed.  Follow-up in 1 year.  Patient understands and agrees with plan  1. CT Chest  2. Cont. Alb inhaler as needed  RTC 1 year  Serge Burns DO, MIGNON REID  PGY-3  Occupational and Environmental Medicine Resident    Encounter seen and discussed with attending provider, Dr. Perlman      Attending Note:    The patient was seen and examined by me and discussed at length with the resident. I have personally reviewed all labs, vital signs, imaging and culture results.  I have read and agree with the resident note from today which  reflects our joint findings, assessment and plan.    David Perlman, M.D.    Pulmonary, Allergy and Critical Care Medicine  [facility]

## 2019-09-17 NOTE — NURSING NOTE
Chief Complaint   Patient presents with     RECHECK     LIP/Sjogren's follow up      Medications reviewed and vital signs taken.     Ashley Fernando SMA

## 2019-09-21 LAB
DLCOUNC-%PRED-PRE: 100 %
DLCOUNC-PRE: 20.29 ML/MIN/MMHG
DLCOUNC-PRED: 20.13 ML/MIN/MMHG
ERV-%PRED-PRE: 238 %
ERV-PRE: 1.19 L
ERV-PRED: 0.5 L
EXPTIME-PRE: 6.84 SEC
FEF2575-%PRED-PRE: 136 %
FEF2575-PRE: 2.62 L/SEC
FEF2575-PRED: 1.91 L/SEC
FEFMAX-%PRED-PRE: 106 %
FEFMAX-PRE: 6.19 L/SEC
FEFMAX-PRED: 5.8 L/SEC
FEV1-%PRED-PRE: 106 %
FEV1-PRE: 2.43 L
FEV1FEV6-PRE: 81 %
FEV1FEV6-PRED: 79 %
FEV1FVC-PRE: 81 %
FEV1FVC-PRED: 78 %
FEV1SVC-PRE: 83 %
FEV1SVC-PRED: 72 %
FIFMAX-PRE: 6.29 L/SEC
FRCPLETH-%PRED-PRE: 79 %
FRCPLETH-PRE: 2.2 L
FRCPLETH-PRED: 2.77 L
FVC-%PRED-PRE: 101 %
FVC-PRE: 2.98 L
FVC-PRED: 2.95 L
IC-%PRED-PRE: 64 %
IC-PRE: 1.73 L
IC-PRED: 2.69 L
RVPLETH-%PRED-PRE: 47 %
RVPLETH-PRE: 1 L
RVPLETH-PRED: 2.11 L
TLCPLETH-%PRED-PRE: 76 %
TLCPLETH-PRE: 3.93 L
TLCPLETH-PRED: 5.11 L
VA-%PRED-PRE: 87 %
VA-PRE: 4.31 L
VC-%PRED-PRE: 91 %
VC-PRE: 2.93 L
VC-PRED: 3.19 L

## 2019-09-23 ENCOUNTER — TEAM CONFERENCE (OUTPATIENT)
Dept: PULMONOLOGY | Facility: CLINIC | Age: 69
End: 2019-09-23

## 2019-09-23 NOTE — TELEPHONE ENCOUNTER
Tried to contact patient via phone, voicemail full, sent [employer] message with results of ILD discussion.

## 2019-09-23 NOTE — TELEPHONE ENCOUNTER
ILD Conference      Patient Name: Albina Pedro    Pertinent Clinical History: Ms. Pedro is a 68 year old woman with a history of ILD most consistent with LIP as well as a history of Sjogren's disease (not on treatment), CAD, and HLD who presents for follow up.  Patient continues to do well with conservative treatment.     Reason for conference discussion/Specific Question:  Is disease stable? PFT's down a little.    Referring Physician:      Radiology Interpretation: Cysts stable over 5 years. Tony Loo MD (radiology)    Pathology Interpretation: n/a       There was a consensus recommendation for the following actions:     Disease stable. Follow up in 1 year.     Pulmonary/ILD Provider: David Perlman, MD (pulm)

## 2019-10-04 ENCOUNTER — HEALTH MAINTENANCE LETTER (OUTPATIENT)
Age: 69
End: 2019-10-04

## 2019-10-11 DIAGNOSIS — I20.0 INTERMEDIATE CORONARY SYNDROME (H): ICD-10-CM

## 2019-10-11 NOTE — TELEPHONE ENCOUNTER
ranolazine      Last Written Prescription Date:  7/31/18  Last Fill Quantity: 180,   # refills: 3  Last Office Visit: 9/5/19  Future Office visit:       Routing refill request to provider for review/approval because:  Drug not on the [facility], [facility] or [facility] refill protocol or controlled substance

## 2019-10-15 ENCOUNTER — OFFICE VISIT (OUTPATIENT)
Dept: PSYCHIATRY | Facility: CLINIC | Age: 69
End: 2019-10-15
Attending: FAMILY MEDICINE
Payer: COMMERCIAL

## 2019-10-15 VITALS
SYSTOLIC BLOOD PRESSURE: 128 MMHG | DIASTOLIC BLOOD PRESSURE: 74 MMHG | TEMPERATURE: 98.3 F | WEIGHT: 186.8 LBS | OXYGEN SATURATION: 96 % | BODY MASS INDEX: 30.15 KG/M2 | HEART RATE: 82 BPM

## 2019-10-15 DIAGNOSIS — F33.0 MAJOR DEPRESSIVE DISORDER, RECURRENT, MILD (H): Primary | ICD-10-CM

## 2019-10-15 PROCEDURE — 90792 PSYCH DIAG EVAL W/MED SRVCS: CPT | Performed by: NURSE PRACTITIONER

## 2019-10-15 RX ORDER — BUPROPION HYDROCHLORIDE 100 MG/1
100 TABLET, EXTENDED RELEASE ORAL 2 TIMES DAILY
Qty: 60 TABLET | Refills: 0 | Status: SHIPPED | OUTPATIENT
Start: 2019-10-15 | End: 2019-11-05

## 2019-10-15 NOTE — Clinical Note
Hello, I am decreasing the Wellbutrin due to Kristina stating that she was feeling overmedicated.  I am not sure where the cognitive concerns are coming from.  I was thinking about decreasing the  venlafaxine at her next visit and perhaps changing her to Prozac.  There seems to be an anxiety component to her presentation.  Perhaps a mild mood stabilizer might be in order to regulate the anger and emotional outbursts she has been having lately.  Thank you for the referral and I will see her next month.

## 2019-10-15 NOTE — TELEPHONE ENCOUNTER
Routing refill request to provider for review/approval because:  Drug not on the [facility] refill protocol   Suhail Silva RN

## 2019-10-15 NOTE — PROGRESS NOTES
"                                                         Outpatient Psychiatric Evaluation - Standard Adult    Name:  Albina Pedro  : 1950    Source of Referral:  Primary Care Provider: Carolina Burrell MD   Last visit: 2019  Current Psychotherapist: None     Identifying Data:  Patient is a 68 year old,   White American female  who presents for initial visit with me.  Patient is currently unemployed. Patient attended the session with  , who they agreed to have interview with. Consent to communicate signed for no one. Consent for treatment signed and included in electronic medical record. Discussed limits of confidentiality today. My Practice Policy was reviewed and signed.     Patient prefers to be called: Kristina     Chief Complaint:    Chief Complaint   Patient presents with     Consult         HPI:      Kristina is a 68-year-old female who comes in today with her  to discuss medication options to better manage her episodes of mood dysregulation and cognitive impairment.  For the past 4-5 years she has struggled and it continues to worsen for her.  It is hard for her to initiate activities and when she gets overwhelmed she begins to cry.  Lately she has been stuttering and getting shaky episodically during the day.  Does not know why this happens.  She also has a leg and hip that give out and she has been falling, with hitting her head 2-3 times.  There is concern for short-term memory loss as she is constantly losing things.  It is hard for her to stay on task and often she does not finish projects she has been working on.  She saw Dr. Gopal Hurtado through the [place] in clinic twice.  Negative scans resulted as well as negative findings from the neuropsychological testing that was done a year ago in December.  Future testing is pending.  Recently she had an increase in her Wellbutrin dose.  She tells me she felt like a \"zombie\" and was stuttering.  She took it upon herself " "to drop down to 100 mg in the morning and.  She denies any sleep problems but her  tells me sometimes it is hard to wake her up to start the day.  She mixes up things people say and numbers are in the wrong order.  Her  tells me that it is hard for her to let go of an argument and she sometimes experiences \"meltdowns\".  She was very distracted during our interview and required redirection to continue on.  She was argumentative with her  at times.  Apparently they had couples therapy at one point but it was not helpful.    Kristina experienced a severe fracture in her ankle and was immobile for 3 months.  She has had infections throughout the process.  In looking at her right ankle there is a reddened area that continues even though the fracture occurred several months ago.  She also is being followed by a pulmonologist who told her she had \"big holes\" in her left lung, similar to someone who smokes.  She does not use tobacco products.    Kristina remembers that when she attended school it was hard for her to read.  There is some question about having dyslexia.  She was able to graduate high school.      Of note, Kristina had a difficult time completing paperwork and limited poor focus and concentration.                                                                                                                                                      Psychiatric Review of Symptoms:  Depression: Depressed Mood  Concentration: Decrease  Psychomotor agitation  Suicide: No  Irritability: Increase   Ruminations: Increase    PHQ-9 scores:   PHQ-9 SCORE 1/18/2019 9/17/2019 10/16/2019   PHQ-9 Total Score - - -   PHQ-9 Total Score 8 1 5     Rach:  Distractibility: Increase  Racing Thoughts: Increase  Pressured Speech: Increase  Irritability   MDQ Score: Negative Screen  Anxiety: Feeling nervous, anxious, or on edge  Worrying too much about different things  Trouble relaxing  Easily annoyed or irritable    SEBASTIAN-7 " scores:    SEBASTIAN-7 SCORE 2018 2019 10/16/2019   Total Score - - -   Total Score 3 6 8     Panic:  No symptoms   Agoraphobia:  No   PTSD:  Denies trauma history   OCD:  No symptoms   Psychosis: No symptoms   ADD / ADHD: Attention Problem(s)  Problems with Listening  Task Completion Difficulties  Poor Organization  Distractible  Forgetful  Interrupts  Gambling or shoplifting: No   Eating Disorder:  No symptoms  Sleep:   Difficult to wake up in the morning     Psychiatric History:   Hospitalizations: None  History of Commitment? No   Past Treatment: counseling and medication(s) from physician / PCP  Suicide Attempts: None  Self-injurious Behavior: Denies  Electroconvulsive Therapy (ECT) or Transcranial Magnetic Stimulation (TMS): No   Genetic Testing: No     Substance Use History:  Current use of drugs or alcohol: Denies   Patient reports no problems as a result of their drinking / drug use.   Based on the clinical interview, there  are not indications of drug or alcohol abuse.  Tobacco use: No  Caffeine: No  Patient has not received chemical dependency treatment in the past  Recovery Programming Involvement: None    Past Medical History:  Past Medical History:   Diagnosis Date     CAD (coronary artery disease)      ILD (interstitial lung disease) (H)      Other and unspecified hyperlipidemia      Sicca syndrome (H)      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      Surgery:   Past Surgical History:   Procedure Laterality Date     C/SECTION, LOW TRANSVERSE  10/13/1978    , Low Transverse     COLONOSCOPY       OPEN REDUCTION INTERNAL FIXATION ANKLE Right 2019    Procedure: Open reduction internal fixation right lateral malleolus fracture;  Surgeon: Rolo Oconnor DPM;  Location: [facility]     SURGICAL HISTORY OF -            SURGICAL HISTORY OF -   00    Colonoscopy     Allergies:     Allergies    Allergen Reactions     Daypro [Oxaprozin] Rash            Lidocaine Other (See Comments)     Rapid heart rate     Nitroglycerin Other (See Comments)     Causes low blood pressure     Penicillins Unknown     Occurred as child.     Sulfa Drugs Rash     Primary Care Provider: Carolina Burrell MD  Seizures or Head Injury: No  Diet: No Restrictions  Food Allergies: No   Exercise: No regular exercise program  Supplements: Reviewed per Electronic Medical Record Today    aspirin 81 MG tablet, Take 81 mg by mouth every evening   Multiple Vitamins-Minerals (MULTIVITAMIN & MINERAL PO), Take 1 tablet by mouth daily   pravastatin (PRAVACHOL) 40 MG tablet, Take 1 tablet (40 mg) by mouth daily  venlafaxine (EFFEXOR-ER) 150 MG 24 hr tablet, Take 1 tablet (150 mg) by mouth daily  VITAMIN D3 OR, 100-2000 units by mouth DAILY  calcium carbonate (OS-SARI 500 MG [place]. [place]) 1250 MG tablet, Take 1 tablet by mouth 2 times daily  LORazepam (ATIVAN) 0.5 MG tablet, TAKE ONE TABLET BY MOUTH EVERY EIGHT HOURS AS NEEDED FOR ANXIETY  nystatin (MYCOSTATIN) 171268 UNIT/GM external cream, Apply topically 2 times daily  Ranolazine 1000 MG TB12, Take 1,000 mg by mouth 2 times daily  triamcinolone (KENALOG) 0.1 % cream, Apply sparingly to affected area three times daily for 14 days.    No current facility-administered medications on file prior to visit.        The [place] Prescription Monitoring Program has been reviewed and there are no concerns about diversionary activity for controlled substances at this time.  Lorazepam last filled July 23, 2019 for 20 tabs     Vital Signs:  Vitals: /74 (BP Location: Left arm, Patient Position: Sitting, Cuff Size: Adult Regular)   Pulse 82   Temp 98.3  F (36.8  C) (Tympanic)   Wt 84.7 kg (186 lb 12.8 oz)   SpO2 96%   BMI 30.15 kg/m      Labs:  Most recent labs reviewed and no new labs.   Most recent EKG from May 2019 reviewed. QTc interval 419.     Review of Systems:  10 systems (general,  cardiovascular, respiratory, eyes, ENT, endocrine, GI, , M/S, neurological) were reviewed. Most pertinent finding(s) is/are: No chest pain, no headaches, no rashes, healing reddened area outer aspect of right ankle. The remaining systems are all unremarkable.  Family History:   Patient reported family history includes:   Family History   Problem Relation Age of Onset     Cancer Mother         Breast and liver- age 43     Heart Disease Father         ? chf?h/o CVA     Alzheimer Disease Father      Hypertension Father      Neurologic Disorder Father         CVA     Diabetes Maternal Grandmother      Breast Cancer Maternal Aunt      Breast Cancer Paternal Aunt      Cancer - colorectal No family hx of      Prostate Cancer No family hx of      Mental Illness History: Unknown  Substance Abuse History: Unknown  Suicide History: Denies  Medications: Unknown     Social History:   Birth place: [facility] in [place]   Childhood: Intact home - 1 sis and 1 bro   Siblings: One sister and one brother  Highest education level was high school graduate.   Employment History: [employer]  Childhood illnesses: Denies  Current Living situation:  [place]  with   and dog  . Feels safe at home.  Children: Terence - age 47, Kyleen - 41 years    Firearms/Weapons Access: Yes Safety plan reviewed   Service: Family member(s) served:  and both their fathers     Mental Status Examination:     Appearance:  alert and casually dressed  Attitude:  cooperative   Eye Contact:  good  Gait and Station: Normal  Psychomotor Behavior:  fidgeting  Oriented to:  time, person, and place  Attention Span and Concentration:  Easily distracted  Speech:  rambling and Speaks: Fluent English  Mood:  anxious and depressed  Affect:  intensity is heightened  Associations:  no loose associations  Thought Process:  disorganized and circumstantial  Thought Content:  no evidence of suicidal ideation or homicidal ideation, no auditory  hallucinations present and no visual hallucinations present  Recent and Remote Memory:  fair Not formally assessed. No amnesia.  Fund of Knowledge: low-normal  Insight:  fair  Judgment:  fair  Impulse Control:  fair    Suicide Risk Assessment:  Today Albina Pedro reports that she is not having any suicide thinking. In addition, there are notable risk factors for self-harm, including age, access to firearm and anxiety. However, risk is mitigated by commitment to family, sobriety, absence of past attempts, history of seeking help when needed, denies suicidal intent or plan and denies homicidal ideation, intent, or plan. Therefore, based on all available evidence including the factors cited above, Albina Pedro does not appear to be at imminent risk for self-harm, does not meet criteria for a 72-hr hold, and therefore remains appropriate for ongoing outpatient level of care.  A thorough assessment of risk factors related to suicide and self-harm have been reviewed and are noted above. The patient convincingly denies acute suicidality on several occasions. Local community safety resources reviewed and printed for patient to use if needed. There was no deceit detected, and the patient presented in a manner that was believable.     DSM5  Diagnosis:  296.31 (F33.0) Major Depressive Disorder, Recurrent Episode, Mild With mixed features    Medical Comorbidities Include:   Patient Active Problem List    Diagnosis Date Noted     Chronic stable angina (H) 01/18/2019     Priority: Medium     Intermediate coronary syndrome (H) 07/24/2018     Priority: Medium     ILD (interstitial lung disease) (H) 07/09/2018     Priority: Medium     Needs follow up ct dec 2019       Hyperlipidemia LDL goal <70 05/31/2018     Priority: Medium     Major depressive disorder, recurrent, mild (H) 02/01/2017     Priority: Medium     Acute chest pain 12/13/2016     Priority: Medium     Status post coronary angiogram 02/18/2016     Priority:  Medium     Adverse effect of anesthetic 02/11/2016     Priority: Medium     Patient is very sensitive to anesthetics. Needs lower dosing.        Sjogren's syndrome (H) 01/15/2014     Priority: Medium     CAD (coronary artery disease) 09/18/2013     Priority: Medium     Mild ischemia on stress test       Advanced directives, counseling/discussion 01/12/2012     Priority: Medium     Advance Directive Problem List Overview:   Name Relationship Phone    Primary Health Care Agent            Alternative Health Care Agent          Discussed advance care planning with patient; information given to patient to review.     Daija Black CMA, HC Facilitator    1/12/2012          Panniculitis 11/10/2011     Priority: Medium     Health Care Home 06/15/2011     Priority: Medium     X  DX V65.8 REPLACED WITH 91084 HEALTH CARE HOME (04/08/2013)       Episodic mood disorder (H) 11/26/2007     Priority: Medium     November 26, 2007 - Prozac and will look into light box.   April 15, 2008 - Will stop for summer. Light box rx.  Problem list name updated by automated process. Provider to review       Dermatophytosis of body 09/12/2007     Priority: Medium     September 12, 2007 - Classic appearance and worsening in areas's not using Nystatin.  Will use everywhere or change to clotrimazole.   April 15, 2008 - Change to powder.   Problem list name updated by automated process. Provider to review       DIZZINESS - LIKELY HYPOGLYCEMIA 07/27/2006     Priority: Medium     July 24, 2008- negative Event monitor and GTT showed some elevation.  Dietary changes.            A 12-item WHODAS 2.0 assessment was completed by the patient today and recorded in [facility].  No flowsheet data found.    The Patient Activation Measure (TD) score was completed and recorded in [facility]. This assesses patient knowledge, skill, and confidence for self-management.   TD Score (Last Two) 12/5/2011   TD Raw Score 42   Activation Score 66   TD Level 3       Impression:  Albina Pedro is here today with her  to talk about medication options due to concerns for short-term memory deficits.  A recent increase in her Wellbutrin resulted in her feeling overmedicated.  I will decrease that back to 100 mg twice daily.  Since I am making that one change, the next visit I will consider changing her venlafaxine to a lower dose with the thought to initiate medication therapy with fluoxetine.  We talked about homeopathic options to help her relax more such as acupuncture and massage does.  I asked her to consider gene sight testing.  I also encouraged her to start talk therapy.  She will continue to follow with her neurologist to determine causality of her cognitive changes.  She denies any bipolar symptoms today.  Medication side effects and alternatives reviewed. Health promotion activities recommended and reviewed today. All questions addressed. Education and counseling completed regarding risks and benefits of medications and psychotherapy options. Collaborative Care Psychiatry Service model reviewed today. Recommend therapy for additional support.     Treatment Plan:     1.  Wellbutrin  mg twice daily  2. Continue Venlafaxine 150 mg daily with plans for the future to change to prozac  3. Consider Gene Sight Testing   4. Start talk therapy       Continue all other medical directions per primary care provider.     Continue all other medications as reviewed per electronic medical record today.     Safety plan reviewed. To the Emergency Department as needed or call after hours crisis line at [phone] or [phone]. [place] Crisis Text [place]: Text [place] to 786575  or  Suicide LifeLine Chat: suicidepreventionlifeline.org/chat/    To schedule individual or family therapy, call [facility] at [phone].     Schedule an appointment with me in 4 weeks or sooner as needed.  Call [facility] at [phone] to schedule.    Follow up  with primary care provider as planned or for acute medical concerns.    Call the psychiatric nurse line with medication questions or concerns at [phone].    MyChart may be used to communicate with your provider, but this is not intended to be used for emergencies.    Crisis Resources:    National Suicide Prevention Lifeline: 543.969.5740 (TTY: 611.493.5305). Call anytime for help.  (www.suicidepreventionlifeline.org)  National [place] on Mental Illness (www.jennifer.org): 759.697.4622 or 616-411-0614.   Mental Health Association (www.mentalhealth.org): 434.733.9725 or 415-158-6410.  [place] Crisis Text [place]: Text [place] to 574454  Suicide LifeLine Chat: suicidepreAldislifeline.org/chat    Administrative Billing:   Time spent with patient was 70 minutes and greater than 50% of time or 40 minutes was spent in counseling and coordination of care regarding above diagnoses and treatment plan.    Patient Status:  Patient will continue to be seen for ongoing consultation and stabilization.    Signed:   MARISOL Harmon-BC   Psychiatry

## 2019-10-15 NOTE — PATIENT INSTRUCTIONS
1.  Wellbutrin  mg twice daily  2. Continue Venlafaxine 150 mg daily with plans for the future to change to prozac  3. Consider Gene Sight Testing   4. Start talk therapy       Continue all other medical directions per primary care provider.     Continue all other medications as reviewed per electronic medical record today.     Safety plan reviewed. To the Emergency Department as needed or call after hours crisis line at [phone] or [phone]. [place] Crisis [place]: Text [place] to 085479  or  Suicide LifeLine Chat: suicideZephyr.org/chat/    To schedule individual or family therapy, call [facility] at [phone].     Schedule an appointment with me in 4 weeks or sooner as needed.  Call [facility] at [phone] to schedule.    Follow up with primary care provider as planned or for acute medical concerns.    Call the psychiatric nurse line with medication questions or concerns at [phone].    Plazapoints (Cuponium)hart may be used to communicate with your provider, but this is not intended to be used for emergencies.    Crisis Resources:    National Suicide Prevention Lifeline: 291.806.6967 (TTY: 361.167.3285). Call anytime for help.  (www.suicidepreventionlifeline.org)  National [place] on Mental Illness (www.jennifer.org): 363.950.7174 or 527-757-5816.   Mental Health Association (www.mentalhealth.org): 847.168.1127 or 842-275-0588.  [place] Crisis [place]: Text [place] to 207333  Suicide LifeLine Chat: suicideZephyr.org/chat

## 2019-10-16 RX ORDER — RANOLAZINE 1000 MG/1
1000 TABLET, EXTENDED RELEASE ORAL 2 TIMES DAILY
Qty: 180 TABLET | Refills: 3 | Status: SHIPPED | OUTPATIENT
Start: 2019-10-16 | End: 2020-10-15

## 2019-10-16 ASSESSMENT — ANXIETY QUESTIONNAIRES
6. BECOMING EASILY ANNOYED OR IRRITABLE: MORE THAN HALF THE DAYS
GAD7 TOTAL SCORE: 8
1. FEELING NERVOUS, ANXIOUS, OR ON EDGE: MORE THAN HALF THE DAYS
IF YOU CHECKED OFF ANY PROBLEMS ON THIS QUESTIONNAIRE, HOW DIFFICULT HAVE THESE PROBLEMS MADE IT FOR YOU TO DO YOUR WORK, TAKE CARE OF THINGS AT HOME, OR GET ALONG WITH OTHER PEOPLE: VERY DIFFICULT
3. WORRYING TOO MUCH ABOUT DIFFERENT THINGS: NOT AT ALL
2. NOT BEING ABLE TO STOP OR CONTROL WORRYING: SEVERAL DAYS
5. BEING SO RESTLESS THAT IT IS HARD TO SIT STILL: NOT AT ALL
7. FEELING AFRAID AS IF SOMETHING AWFUL MIGHT HAPPEN: NEARLY EVERY DAY

## 2019-10-16 ASSESSMENT — PATIENT HEALTH QUESTIONNAIRE - PHQ9
SUM OF ALL RESPONSES TO PHQ QUESTIONS 1-9: 5
5. POOR APPETITE OR OVEREATING: NOT AT ALL

## 2019-10-16 NOTE — TELEPHONE ENCOUNTER
I cannot send this electronically. I printed it and she will need [place] have the hard [place]. Carolina Burrell M.D.

## 2019-10-17 ASSESSMENT — ANXIETY QUESTIONNAIRES: GAD7 TOTAL SCORE: 8

## 2019-10-30 ENCOUNTER — TELEPHONE (OUTPATIENT)
Dept: FAMILY MEDICINE | Facility: CLINIC | Age: 69
End: 2019-10-30

## 2019-10-30 DIAGNOSIS — F43.0 ACUTE REACTION TO STRESS: ICD-10-CM

## 2019-10-30 DIAGNOSIS — F33.0 MAJOR DEPRESSIVE DISORDER, RECURRENT, MILD (H): ICD-10-CM

## 2019-10-30 DIAGNOSIS — F34.1 DYSTHYMIA: ICD-10-CM

## 2019-10-30 RX ORDER — LORAZEPAM 0.5 MG/1
TABLET ORAL
Qty: 20 TABLET | Refills: 0 | Status: SHIPPED | OUTPATIENT
Start: 2019-10-30 | End: 2020-03-03

## 2019-10-30 RX ORDER — VENLAFAXINE HYDROCHLORIDE 150 MG/1
CAPSULE, EXTENDED RELEASE ORAL
Qty: 30 CAPSULE | Refills: 0 | Status: SHIPPED | OUTPATIENT
Start: 2019-10-30 | End: 2020-01-03

## 2019-10-30 NOTE — TELEPHONE ENCOUNTER
"Venlafaxine HCl ER Oral Capsule Extended Release 24 Hour 150 MG      Last Written Prescription Date:  9/5/19  Last Fill Quantity: 30,   # refills: 1  Last Office Visit: 9/5/19  Future Office visit:    Next 5 appointments (look out 90 days)    Nov 05, 2019  1:15 PM CST  [facility] [address] [id number] [phone]           Requested Prescriptions   Pending Prescriptions Disp Refills     venlafaxine (EFFEXOR-XR) 150 MG 24 hr capsule [Pharmacy Med Name: Venlafaxine HCl ER Oral Capsule Extended Release 24 Hour 150 MG] 30 capsule 0     Sig: TAKE ONE CAPSULE BY MOUTH ONE TIME DAILY       Serotonin-Norepinephrine Reuptake Inhibitors  Passed - 10/30/2019  9:45 AM        Passed - Blood pressure under 140/90 in past 12 months     BP Readings from Last 3 Encounters:   10/15/19 128/74   09/17/19 113/72   09/05/19 122/76                 Passed - PHQ-9 score of less than 5 in past 6 months     Please review last PHQ-9 score.           Passed - Medication is active on med list        Passed - Patient is age 18 or older        Passed - No active pregnancy on record        Passed - Normal serum creatinine on file in past 12 months     Recent Labs   Lab Test 06/17/19  0955   CR 0.57             Passed - No positive pregnancy test in past 12 months        Passed - Recent (6 mo) or future (30 days) visit within the authorizing provider's specialty     Patient had office visit in the last 6 months or has a visit in the next 30 days with authorizing provider or within the authorizing provider's specialty.  See \"Patient Info\" tab in inbasket, or \"Choose Columns\" in Meds & Orders section of the refill encounter.              "

## 2019-10-30 NOTE — TELEPHONE ENCOUNTER
LORazepam Oral Tablet 0.5 MG      Last Written Prescription Date:  7/23/19  Last Fill Quantity: 20,   # refills: 0  Last Office Visit: 9/5/19  Future Office visit:    Next 5 appointments (look out 90 days)    Nov 05, 2019  1:15 PM CST  [facility]  [facility] [address] [address]  [phone]           Routing refill request to provider for review/approval because:  Drug not on the [facility], [facility] or [facility] refill protocol or controlled substance

## 2019-10-30 NOTE — TELEPHONE ENCOUNTER
Message left on patient's answering machine to call the [facility] RN back.  Suhail Silva RN

## 2019-10-30 NOTE — TELEPHONE ENCOUNTER
I would recommend Remy Singh or Haylee Reynaga up at the [facility]. They are therapists not psychologist. Carolina Burrell M.D.

## 2019-10-30 NOTE — TELEPHONE ENCOUNTER
"Reason for Call:  Other call back    Detailed comments: Kristina LEFT MESSAGE:  She is wondering if you had a name of a \"psychologist\" in [place], [place], etc. Areas that you can recommend for her.  She does not want to go to [facility].  Please call and assess. Thank you..Tita Real    Phone Number Patient can be reached at: Other phone number:  [phone]*    Call taken on 10/30/2019 at 9:28 AM by Tita Real      "

## 2019-11-05 ENCOUNTER — OFFICE VISIT (OUTPATIENT)
Dept: PSYCHIATRY | Facility: CLINIC | Age: 69
End: 2019-11-05
Payer: COMMERCIAL

## 2019-11-05 VITALS
BODY MASS INDEX: 29.89 KG/M2 | WEIGHT: 186 LBS | OXYGEN SATURATION: 96 % | DIASTOLIC BLOOD PRESSURE: 71 MMHG | SYSTOLIC BLOOD PRESSURE: 131 MMHG | TEMPERATURE: 97.6 F | HEART RATE: 81 BPM | RESPIRATION RATE: 12 BRPM | HEIGHT: 66 IN

## 2019-11-05 DIAGNOSIS — F33.0 MAJOR DEPRESSIVE DISORDER, RECURRENT, MILD (H): Primary | ICD-10-CM

## 2019-11-05 PROCEDURE — 99214 OFFICE O/P EST MOD 30 MIN: CPT | Performed by: NURSE PRACTITIONER

## 2019-11-05 ASSESSMENT — ANXIETY QUESTIONNAIRES
5. BEING SO RESTLESS THAT IT IS HARD TO SIT STILL: NOT AT ALL
1. FEELING NERVOUS, ANXIOUS, OR ON EDGE: SEVERAL DAYS
3. WORRYING TOO MUCH ABOUT DIFFERENT THINGS: MORE THAN HALF THE DAYS
7. FEELING AFRAID AS IF SOMETHING AWFUL MIGHT HAPPEN: NOT AT ALL
6. BECOMING EASILY ANNOYED OR IRRITABLE: SEVERAL DAYS
GAD7 TOTAL SCORE: 6
2. NOT BEING ABLE TO STOP OR CONTROL WORRYING: SEVERAL DAYS
IF YOU CHECKED OFF ANY PROBLEMS ON THIS QUESTIONNAIRE, HOW DIFFICULT HAVE THESE PROBLEMS MADE IT FOR YOU TO DO YOUR WORK, TAKE CARE OF THINGS AT HOME, OR GET ALONG WITH OTHER PEOPLE: VERY DIFFICULT

## 2019-11-05 ASSESSMENT — PATIENT HEALTH QUESTIONNAIRE - PHQ9
SUM OF ALL RESPONSES TO PHQ QUESTIONS 1-9: 4
5. POOR APPETITE OR OVEREATING: SEVERAL DAYS

## 2019-11-05 ASSESSMENT — MIFFLIN-ST. JEOR: SCORE: 1380.44

## 2019-11-05 NOTE — PATIENT INSTRUCTIONS
1.  Wellbutrin SR discontinued  2. Continue Venlafaxine 150 mg daily with plans for the future to change to prozac  3. Consider Gene Sight Testing   4. Start talk therapy       Safety plan reviewed. To the Emergency Department as needed or call after hours crisis line at [phone] or [phone]. [place] Crisis Text [place]. Text [place] to 209922 or Suicide LifeLine Chat: [place].org/chat/    To schedule individual or family therapy, call [facility] at [phone].    Schedule an appointment with me in 4 weeks or sooner as needed. Call [facility] at [phone] to schedule.    Follow up with primary care provider as planned or for acute medical concerns.    Call the psychiatric nurse line with medication questions or concerns at [phone].    Risk I/Ohart may be used to communicate with your provider, but this is not intended to be used for emergencies.    Crisis Resources:    National Suicide Prevention Lifeline: 666.736.2803 (TTY: 883.885.1248). Call anytime for help.  (www.suicidepreventionlifeline.org)  National [place] on Mental Illness (www.jennifer.org): 245.707.3020 or 941-718-6030.   Mental Health Association (www.mentalhealth.org): 986.684.2413 or 539-847-6726.  [place] Crisis Text [place]: Text [place] to 732443  Suicide LifeLine Chat: suicideAzzure IT.org/chat

## 2019-11-05 NOTE — PROGRESS NOTES
"    Outpatient Psychiatric Progress Note    Name: Albina Pedro   : 1950                    Primary Care Provider: Carolina Burrell MD   Therapist: none     PHQ-9 scores:  PHQ-9 SCORE 2019 2019 10/16/2019   PHQ-9 Total Score - - -   PHQ-9 Total Score 8 1 5       SEBASTIAN-7 scores:  SEBASTIAN-7 SCORE 2018 2019 10/16/2019   Total Score - - -   Total Score 3 6 8       Patient Identification:    Patient is a 68 year old year old,   White American female  who presents for return visit with me.  Patient is currently retired. Patient attended the session with  , who they agreed to have interview with. Patient prefers to be called: \"Kristina\".    Interim History:    I last saw Albina Pedro for outpatient psychiatry Consultation on October 15, 2019.     During that appointment, we reviewed her mental health history .  Her  attended part of the session.  Concerns were addressed regarding her cognitive decline , depression and anxiety.  She has had [eriods of irritability and indecisiveness.   . When she had received an increase in her dose of Wellbutrin she felt over medicated so I decreased the dose.  No othermedication changes were made at that session.  We talked about possibly changing her from venlafaxine to prozac.  She denied any bipolar symptoms of luis armando despite her presentation of pressured and rapid speech with irritability.      Current medications include: aspirin 81 MG tablet, Take 81 mg by mouth every evening   buPROPion (WELLBUTRIN SR) 100 MG 12 hr tablet, Take 1 tablet (100 mg) by mouth 2 times daily  calcium carbonate (OS-SARI 500 MG [place]. [place]) 1250 MG tablet, Take 1 tablet by mouth 2 times daily  LORazepam (ATIVAN) 0.5 MG tablet, TAKE ONE TABLET BY MOUTH EVERY EIGHT HOURS AS NEEDED FOR ANXIETY   Multiple Vitamins-Minerals (MULTIVITAMIN & MINERAL PO), Take 1 tablet by mouth daily   nystatin (MYCOSTATIN) 495211 UNIT/GM external cream, Apply topically 2 times " "daily  pravastatin (PRAVACHOL) 40 MG tablet, Take 1 tablet (40 mg) by mouth daily  Ranolazine 1000 MG TB12, Take 1,000 mg by mouth 2 times daily  triamcinolone (KENALOG) 0.1 % cream, Apply sparingly to affected area three times daily for 14 days.  venlafaxine (EFFEXOR-XR) 150 MG 24 hr capsule, TAKE ONE CAPSULE BY MOUTH ONE TIME DAILY   VITAMIN D3 OR, 100-2000 units by mouth DAILY    No current facility-administered medications on file prior to visit.        The [place] Prescription Monitoring Program has been reviewed and there are no concerns about diversionary activity for controlled substances at this time.      I was able to review most recent Primary Care Provider, specialty provider, and therapy visit notes that I have access to.     Today, patient reports that she thinks things are \"better\".  She has been spending time at the cab and doing yard work and feels like she is in a happier mood.  Her , on the other hand sees her getting more agitated.  He tells me he sees that it is hard for Kristina to stay organized to get things done.  Her  tells me she starts projects and does not finish them.  When she does get irritable it is difficult to calm her down.  Today in the office she became argumentative with her  and this required redirection to stay on task.  Kristina admits that it is difficult for her to get out of bed in the morning.  However, she does seem to be getting 6-8 hours of sleep a night.  Today she showed me her right ankle that is reddened tells me that it is spreading.  I referred her to her primary doctor for medical intervention.  At the end of this interview she asked her  to leave.  When he was gone she once again reiterated that she feels things are getting better.  She then tells me about worries related to muscle wasting.  She is concerned she might have other health issues and, once again, I referred her to her medical doctor for clarification of her medical " "condition.     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    Social history updates:    Spent time at cabin.  Doing yard work.    Substance use updates:    Denies alcohol use.    Tobacco use: No    Vital Signs:   /71 (BP Location: Left arm, Patient Position: Sitting, Cuff Size: Adult Regular)   Pulse 81   Temp 97.6  F (36.4  C) (Oral)   Resp 12   Ht 1.676 m (5' 6\")   Wt 84.4 kg (186 lb)   SpO2 96%   BMI 30.02 kg/m      Labs:    Most recent laboratory results reviewed and no new labs.     Review of Systems:  10 systems (general, cardiovascular, respiratory, eyes, ENT, endocrine, GI, , M/S, neurological) were reviewed. Most pertinent finding(s) is/are: No chest pain, no rashes, no. The remaining systems are all unremarkable.    Mental Status Examination:  Appearance:  casually dressed and slightly unkempt  Attitude:  evasive   Eye Contact:  fair  Gait and Station: Normal  Psychomotor Behavior:  fidgeting  Oriented to:  time, person, and place  Attention Span and Concentration:  Fair  Speech:   pressured speech and Speaks: English  Mood:  anxious and Irritable  Affect:  intensity is heightened  Associations:  no loose associations  Thought Process:  disorganized and circumstantial  Thought Content:  no evidence of suicidal ideation or homicidal ideation, no auditory hallucinations present and no visual hallucinations present  Recent and Remote Memory:  fair Not formally assessed. No amnesia.  Fund of Knowledge: low-normal  Insight:  limited  Judgment:  limited  Impulse Control:  limited    Suicide Risk Assessment:  Today Albina Pedro reports reports no thoughts to end her life or to harm other people. In addition, there are notable risk factors for self-harm, including age, anxiety, comorbid medical condition of " Cognitive impairment, anger/rage, wrecklessness and mood change. However, risk is mitigated by commitment to family, history of seeking help when needed, future oriented, no access to firearms or weapons, denies suicidal intent or plan and denies homicidal ideation, intent, or plan. Therefore, based on all available evidence including the factors cited above, Albina Pedro does not appear to be at imminent risk for self-harm, does not meet criteria for a 72-hr hold, and therefore remains appropriate for ongoing outpatient level of care.  A thorough assessment of risk factors related to suicide and self-harm have been reviewed and are noted above. The patient convincingly denies suicidality on several occasions. Local community safety resources printed and reviewed for patient to use if needed. There was no deceit detected, and the patient presented in a manner that was believable.     DSM5 Diagnosis:  296.31 (F33.0) Major Depressive Disorder, Recurrent Episode, Mild With atypical features    Medical comorbidities include:   Patient Active Problem List    Diagnosis Date Noted     Chronic stable angina (H) 01/18/2019     Priority: Medium     Intermediate coronary syndrome (H) 07/24/2018     Priority: Medium     ILD (interstitial lung disease) (H) 07/09/2018     Priority: Medium     Needs follow up ct dec 2019       Hyperlipidemia LDL goal <70 05/31/2018     Priority: Medium     Major depressive disorder, recurrent, mild (H) 02/01/2017     Priority: Medium     Acute chest pain 12/13/2016     Priority: Medium     Status post coronary angiogram 02/18/2016     Priority: Medium     Adverse effect of anesthetic 02/11/2016     Priority: Medium     Patient is very sensitive to anesthetics. Needs lower dosing.        Sjogren's syndrome (H) 01/15/2014     Priority: Medium     CAD (coronary artery disease) 09/18/2013     Priority: Medium     Mild ischemia on stress test       Advanced directives, counseling/discussion  01/12/2012     Priority: Medium     Advance Directive Problem List Overview:   Name Relationship Phone    Primary Health Care Agent            Alternative Health Care Agent          Discussed advance care planning with patient; information given to patient to review.     Daija Black CMA, HC Facilitator    1/12/2012          Panniculitis 11/10/2011     Priority: Medium     Health Care Home 06/15/2011     Priority: Medium     X  DX V65.8 REPLACED WITH [facility] HEALTH CARE HOME (04/08/2013)       Episodic mood disorder (H) 11/26/2007     Priority: Medium     November 26, 2007 - Prozac and will look into light box.   April 15, 2008 - Will stop for summer. Light box rx.  Problem list name updated by automated process. Provider to review       Dermatophytosis of body 09/12/2007     Priority: Medium     September 12, 2007 - Classic appearance and worsening in areas's not using Nystatin.  Will use everywhere or change to clotrimazole.   April 15, 2008 - Change to powder.   Problem list name updated by automated process. Provider to review       DIZZINESS - LIKELY HYPOGLYCEMIA 07/27/2006     Priority: Medium     July 24, 2008- negative Event monitor and GTT showed some elevation.  Dietary changes.            Assessment:    Albina Pedro tells me today she is feeling better.  Her  totally disagrees with this.  He feels she is more irritable and agitated with more difficulties redirecting this.  She continues to have periods of disorganization and forgetfulness.  Today she agrees to discontinuing the Wellbutrin to see if this is contributing to her irritability's.  I would like her to consider counseling therapies either by herself or with her  to help resolve conflict between them.  She will continue with the venlafaxine for now with plans to possibly change to Prozac.  Medication side effects and alternatives were reviewed. Health promotion activities recommended and reviewed today. All questions  addressed. Education and counseling completed regarding risks and benefits of medications and psychotherapy options.    Treatment Plan:    1.  Wellbutrin SR discontinued  2. Continue Venlafaxine 150 mg daily with plans for the future to change to prozac  3. Consider Gene Sight Testing   4. Start talk therapy       Continue all other medications as reviewed per electronic medical record today.     Safety plan reviewed. To the Emergency Department as needed or call after hours crisis line at [phone] or [phone]. [place] Crisis Text Line. Text [place] to 726316 or Suicide LifeLine Chat: suicideForcura.org/chat/    To schedule individual or family therapy, call [facility] at [phone].    Schedule an appointment with me in 4 weeks or sooner as needed. Call [facility] at [phone] to schedule.    Follow up with primary care provider as planned or for acute medical concerns.    Call the psychiatric nurse line with medication questions or concerns at [phone].    [employer]zet may be used to communicate with your provider, but this is not intended to be used for emergencies.    Crisis Resources:    National Suicide Prevention Lifeline: 351.592.7929 (TTY: 563.171.8931). Call anytime for help.  (www.suicidepreventionlifeline.org)  National [place] on Mental Illness (www.jennifer.org): 913.625.8579 or 222-326-0761.   Mental Health Association (www.mentalhealth.org): 796.691.7424 or 142-752-9669.  [place] Crisis Text Line: Text [place] to 188321  Suicide LifeLine Chat: suicideForcura.org/chat    Administrative Billing:   Time spent with patient was 30 minutes and greater than 50% of time or 20 minutes was spent in counseling and coordination of care regarding above diagnoses and treatment plan.    Patient Status:  Patient will continue to be seen for ongoing consultation and stabilization.    Signed:   MARISOL Harmon-BC   Psychiatry

## 2019-11-06 ASSESSMENT — ANXIETY QUESTIONNAIRES: GAD7 TOTAL SCORE: 6

## 2019-12-11 ENCOUNTER — OFFICE VISIT (OUTPATIENT)
Dept: PSYCHIATRY | Facility: CLINIC | Age: 69
End: 2019-12-11
Payer: COMMERCIAL

## 2019-12-11 VITALS
RESPIRATION RATE: 18 BRPM | HEIGHT: 66 IN | BODY MASS INDEX: 29.89 KG/M2 | OXYGEN SATURATION: 99 % | WEIGHT: 186 LBS | DIASTOLIC BLOOD PRESSURE: 84 MMHG | SYSTOLIC BLOOD PRESSURE: 136 MMHG

## 2019-12-11 DIAGNOSIS — F41.1 GAD (GENERALIZED ANXIETY DISORDER): ICD-10-CM

## 2019-12-11 DIAGNOSIS — F33.0 MAJOR DEPRESSIVE DISORDER, RECURRENT, MILD (H): Primary | ICD-10-CM

## 2019-12-11 PROCEDURE — 99214 OFFICE O/P EST MOD 30 MIN: CPT | Performed by: NURSE PRACTITIONER

## 2019-12-11 RX ORDER — BUPROPION HYDROCHLORIDE 150 MG/1
150 TABLET ORAL EVERY MORNING
Qty: 30 TABLET | Refills: 1 | Status: SHIPPED | OUTPATIENT
Start: 2019-12-11 | End: 2020-01-15

## 2019-12-11 ASSESSMENT — ANXIETY QUESTIONNAIRES
2. NOT BEING ABLE TO STOP OR CONTROL WORRYING: NOT AT ALL
7. FEELING AFRAID AS IF SOMETHING AWFUL MIGHT HAPPEN: MORE THAN HALF THE DAYS
5. BEING SO RESTLESS THAT IT IS HARD TO SIT STILL: NOT AT ALL
3. WORRYING TOO MUCH ABOUT DIFFERENT THINGS: SEVERAL DAYS
GAD7 TOTAL SCORE: 7
6. BECOMING EASILY ANNOYED OR IRRITABLE: MORE THAN HALF THE DAYS
IF YOU CHECKED OFF ANY PROBLEMS ON THIS QUESTIONNAIRE, HOW DIFFICULT HAVE THESE PROBLEMS MADE IT FOR YOU TO DO YOUR WORK, TAKE CARE OF THINGS AT HOME, OR GET ALONG WITH OTHER PEOPLE: VERY DIFFICULT
1. FEELING NERVOUS, ANXIOUS, OR ON EDGE: MORE THAN HALF THE DAYS

## 2019-12-11 ASSESSMENT — PATIENT HEALTH QUESTIONNAIRE - PHQ9
SUM OF ALL RESPONSES TO PHQ QUESTIONS 1-9: 11
5. POOR APPETITE OR OVEREATING: NOT AT ALL

## 2019-12-11 ASSESSMENT — MIFFLIN-ST. JEOR: SCORE: 1375.44

## 2019-12-11 ASSESSMENT — PAIN SCALES - GENERAL: PAINLEVEL: NO PAIN (0)

## 2019-12-11 NOTE — PATIENT INSTRUCTIONS
1.  Wellbutrin  mg daily   2. Continue Venlafaxine 150 mg daily with plans for the future to change to prozac  3. Consider Gene Sight Testing   4. Start talk therapy with Remy Singh   5. Return to [facility] for reassessment         Continue all other medications as reviewed per electronic medical record today.     Safety plan reviewed. To the Emergency Department as needed or call after hours crisis line at [phone] or [phone]. [place] Crisis Text Line. Text [place] to 191963 or Suicide LifeLine Chat: [email or web address]/chat/    To schedule individual or family therapy, call [facility] at [phone].    Schedule an appointment with me in 4 weeks or sooner as needed. Call [facility] at [phone] to schedule.    Follow up with primary care provider as planned or for acute medical concerns.    Call the psychiatric nurse line with medication questions or concerns at [phone].    Dashbookhart may be used to communicate with your provider, but this is not intended to be used for emergencies.    Crisis Resources:    National Suicide Prevention Lifeline: 921.142.3483 (TTY: 611.853.4333). Call anytime for help.  (www.suicidepreventionlifeline.org)  National [place] on Mental Illness (www.jennifer.org): 799.747.9223 or 144-959-1674.   Mental Health Association (www.mentalhealth.org): 893.266.3683 or 438-472-1795.  [place] Crisis Text Line: Text [place] to 325297  Suicide LifeLine Chat: suicideHealthLok.org/chat

## 2019-12-11 NOTE — PROGRESS NOTES
"    Outpatient Psychiatric Progress Note    Name: Albina Pedro   : 1950                    Primary Care Provider: Carolina Burrell MD   Therapist: None    PHQ-9 scores:  PHQ-9 SCORE 10/16/2019 2019 2019   PHQ-9 Total Score - - -   PHQ-9 Total Score 5 4 11       SEBASTIAN-7 scores:  SEBASTIAN-7 SCORE 10/16/2019 2019 2019   Total Score - - -   Total Score 8 6 7       Patient Identification:    Patient is a 69 year old year old,   White American female  who presents for return visit with me.  Patient is currently retired. Patient attended the session with  , who they agreed to have interview with. Patient prefers to be called: \"Kristina\".    Interim History:    I last saw Albina Pedro for outpatient psychiatry Return Visit on 2019.     During that appointment, we discussed how she thought things were getting better for her.  However her  sees her as getting more agitated.  He told me at that visit that it was hard for Kristina to stay organized and get things done.  Her and her  argued during this visit.  It required redirection for us to stay on task.  She showed me a lesion on her ankle and I urged her to seek her medical providers opinion on this.  He asked her  to leave the visit and then reiterated to me that she felt things were getting better.  He she worries about her health issues that worsening.    Current medications include: aspirin 81 MG tablet, Take 81 mg by mouth every evening   calcium carbonate (OS-SARI 500 MG [place]. [place]) 1250 MG tablet, Take 1 tablet by mouth 2 times daily  LORazepam (ATIVAN) 0.5 MG tablet, TAKE ONE TABLET BY MOUTH EVERY EIGHT HOURS AS NEEDED FOR ANXIETY   Multiple Vitamins-Minerals (MULTIVITAMIN & MINERAL PO), Take 1 tablet by mouth daily   nystatin (MYCOSTATIN) 518326 UNIT/GM external cream, Apply topically 2 times daily  pravastatin (PRAVACHOL) 40 MG tablet, Take 1 tablet (40 mg) by mouth daily  Ranolazine 1000 MG " TB12, Take 1,000 mg by mouth 2 times daily  venlafaxine (EFFEXOR-XR) 150 MG 24 hr capsule, TAKE ONE CAPSULE BY MOUTH ONE TIME DAILY   VITAMIN D3 OR, 100-2000 units by mouth DAILY  triamcinolone (KENALOG) 0.1 % cream, Apply sparingly to affected area three times daily for 14 days. (Patient not taking: Reported on 12/11/2019)    No current facility-administered medications on file prior to visit.        The [place] Prescription Monitoring Program has been reviewed and there are no concerns about diversionary activity for controlled substances at this time.      I was able to review most recent Primary Care Provider, specialty provider, and therapy visit notes that I have access to.     Today, patient reports she is having worsening confusion.  She gets forgetful and often misplaces things.  Sometimes she is unable to remember what she was talking about.  Sometimes she gets so worked up that her hands and feet shake.  She has episodes of crying as it is hard for her to figure out what is going on.  She starts many projects and does not finish things.  She continues to stay up later into the evening and she wants to have time to herself.  Her energy is low.  She is experiencing body aches that rotate to different parts of her body.  She continues to have some muscle weakness.  Her  reports that this past month was her worst ever and he contemplates moving out of the house.  Kristina has an appointment scheduled for next week with Remy Singh.  I asked her to bring a family member with her due to her forgetfulness and confusion in order to get collateral information.     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    Social history updates:    Easily frustrated when she goes up to the cabin due to inability to follow through with  "projects.  She has been able to go out to eat occasionally with her .  Her  tells me that their arguments have increased in occurrence and in intensity.  She relies on her son and daughter for support.    Substance use updates:    She denies alcohol use  Tobacco use: No    Vital Signs:   /84 (BP Location: Left arm, Patient Position: Sitting, Cuff Size: Adult Large)   Resp 18   Ht 1.676 m (5' 6\")   Wt 84.4 kg (186 lb)   SpO2 99%   Breastfeeding No   BMI 30.02 kg/m      Labs:    Most recent laboratory results reviewed and no new labs.     Review of Systems:  10 systems (general, cardiovascular, respiratory, eyes, ENT, endocrine, GI, , M/S, neurological) were reviewed. Most pertinent finding(s) is/are: Chronic generalized pain that radiates to different parts of her body.,  No chest pain, some episodes of shortness of breath the remaining systems are all unremarkable.    Mental Status Examination:  Appearance:  mild distress and casually dressed  Attitude:  cooperative and evasive   Eye Contact:  fair  Gait and Station: Normal  Psychomotor Behavior:  fidgeting  Oriented to:  time, person, and place  Attention Span and Concentration:  Fair  Speech:   pressured speech, rambling and Speaks: English  Mood:  anxious, depressed and Irritable  Affect:  intensity is heightened  Associations:  no loose associations  Thought Process:  disorganized  Thought Content:  no evidence of suicidal ideation or homicidal ideation, no auditory hallucinations present and no visual hallucinations present  Recent and Remote Memory:  limited Not formally assessed. No amnesia.  Fund of Knowledge: low-normal  Insight:  limited  Judgment:  limited  Impulse Control:  limited    Suicide Risk Assessment:  Today Albina Pedro reports that she is not having any thoughts to want to end her life or to harm other people. In addition, there are notable risk factors for self-harm, including anxiety, comorbid medical " condition of Cognitive impairment, anger/rage, purposelessness/no reason for living, hopelessness, withdrawing, wrecklessness and mood change. However, risk is mitigated by commitment to family, sobriety, history of seeking help when needed, no access to firearms or weapons, denies suicidal intent or plan and denies homicidal ideation, intent, or plan. Therefore, based on all available evidence including the factors cited above, Albina Pedro does not appear to be at imminent risk for self-harm, does not meet criteria for a 72-hr hold, and therefore remains appropriate for ongoing outpatient level of care.  A thorough assessment of risk factors related to suicide and self-harm have been reviewed and are noted above. The patient convincingly denies suicidality on several occasions. Local community safety resources printed and reviewed for patient to use if needed. There was no deceit detected, and the patient presented in a manner that was believable.     DSM5 Diagnosis:  296.31 (F33.0) Major Depressive Disorder, Recurrent Episode, Mild With anxious distress  300.02 (F41.1) Generalized Anxiety Disorder    Medical comorbidities include:   Patient Active Problem List    Diagnosis Date Noted     Chronic stable angina (H) 01/18/2019     Priority: Medium     Intermediate coronary syndrome (H) 07/24/2018     Priority: Medium     ILD (interstitial lung disease) (H) 07/09/2018     Priority: Medium     Needs follow up ct dec 2019       Hyperlipidemia LDL goal <70 05/31/2018     Priority: Medium     Major depressive disorder, recurrent, mild (H) 02/01/2017     Priority: Medium     Acute chest pain 12/13/2016     Priority: Medium     Status post coronary angiogram 02/18/2016     Priority: Medium     Adverse effect of anesthetic 02/11/2016     Priority: Medium     Patient is very sensitive to anesthetics. Needs lower dosing.        Sjogren's syndrome (H) 01/15/2014     Priority: Medium     CAD (coronary artery disease)  09/18/2013     Priority: Medium     Mild ischemia on stress test       Advanced directives, counseling/discussion 01/12/2012     Priority: Medium     Advance Directive Problem List Overview:   Name Relationship Phone    Primary Health Care Agent            Alternative Health Care Agent          Discussed advance care planning with patient; information given to patient to review.     Daija Black CMA, HC Facilitator    1/12/2012          Panniculitis 11/10/2011     Priority: Medium     Health Care Home 06/15/2011     Priority: Medium     X  DX V65.8 REPLACED WITH [facility]ALTH CARE HOME (04/08/2013)       Episodic mood disorder (H) 11/26/2007     Priority: Medium     November 26, 2007 - Prozac and will look into light box.   April 15, 2008 - Will stop for summer. Light box rx.  Problem list name updated by automated process. Provider to review       Dermatophytosis of body 09/12/2007     Priority: Medium     September 12, 2007 - Classic appearance and worsening in areas's not using Nystatin.  Will use everywhere or change to clotrimazole.   April 15, 2008 - Change to powder.   Problem list name updated by automated process. Provider to review       DIZZINESS - LIKELY HYPOGLYCEMIA 07/27/2006     Priority: Medium     July 24, 2008- negative Event monitor and GTT showed some elevation.  Dietary changes.            Assessment:    Albina Pedro continues to deteriorate with her confusion, irritability, and depression.  I reintroduced Wellbutrin back to her schedule to add with her venlafaxine.  I referred her to [facility] for reassessment given her cognitive decline.  She will be seeing Remy Singh next week for therapy.  Medication side effects and alternatives were reviewed. Health promotion activities recommended and reviewed today. All questions addressed. Education and counseling completed regarding risks and benefits of medications and psychotherapy options.    Treatment Plan:    1.  Wellbutrin  mg  daily   2. Continue Venlafaxine 150 mg daily with plans for the future to change to prozac  3. Consider Gene Sight Testing   4. Start talk therapy with Remy Singh   5. Return to [facility] for reassessment       Continue all other medications as reviewed per electronic medical record today.     Safety plan reviewed. To the Emergency Department as needed or call after hours crisis line at [phone] or [phone]. [place] Crisis Text Line. Text [place] to 965738 or Suicide LifeLine Chat: suicideExent.org/chat/    To schedule individual or family therapy, call [facility] at [phone].    Schedule an appointment with me in 4 weeks or sooner as needed. Call [facility] at [phone] to schedule.    Follow up with primary care provider as planned or for acute medical concerns.    Call the psychiatric nurse line with medication questions or concerns at [phone].    [employer] may be used to communicate with your provider, but this is not intended to be used for emergencies.    Crisis Resources:    National Suicide Prevention Lifeline: 532.419.2838 (TTY: 725.488.1239). Call anytime for help.  (www.suicidepreventionlifeline.org)  National [place] on Mental Illness (www.jennifer.org): 800.982.7060 or 973-108-0750.   Mental Health Association (www.mentalhealth.org): 169.325.3462 or 403-459-9731.  [place] Crisis Text Line: Text [place] to 496495  Suicide LifeLine Chat: suicideExent.org/chat    Administrative Billing:   Time spent with patient was 30 minutes and greater than 50% of time or 20 minutes was spent in counseling and coordination of care regarding above diagnoses and treatment plan.    Patient Status:  Patient will continue to be seen for ongoing consultation and stabilization.    Signed:   MARISOL Harmon-BC   Psychiatry

## 2019-12-12 ENCOUNTER — IMMUNIZATION (OUTPATIENT)
Dept: FAMILY MEDICINE | Facility: CLINIC | Age: 69
End: 2019-12-12
Payer: COMMERCIAL

## 2019-12-12 ENCOUNTER — TELEPHONE (OUTPATIENT)
Dept: FAMILY MEDICINE | Facility: CLINIC | Age: 69
End: 2019-12-12

## 2019-12-12 DIAGNOSIS — Z23 NEED FOR PROPHYLACTIC VACCINATION AND INOCULATION AGAINST INFLUENZA: Primary | ICD-10-CM

## 2019-12-12 DIAGNOSIS — L30.9 DERMATITIS: Primary | ICD-10-CM

## 2019-12-12 PROCEDURE — 99207 ZZC NO CHARGE NURSE ONLY: CPT

## 2019-12-12 PROCEDURE — G0008 ADMIN INFLUENZA VIRUS VAC: HCPCS

## 2019-12-12 PROCEDURE — 90662 IIV NO PRSV INCREASED AG IM: CPT

## 2019-12-12 RX ORDER — TRIAMCINOLONE ACETONIDE 1 MG/G
OINTMENT TOPICAL 2 TIMES DAILY
Qty: 80 G | Refills: 0 | Status: SHIPPED | OUTPATIENT
Start: 2019-12-12 | End: 2020-06-17

## 2019-12-12 ASSESSMENT — ANXIETY QUESTIONNAIRES: GAD7 TOTAL SCORE: 7

## 2019-12-19 ENCOUNTER — OFFICE VISIT (OUTPATIENT)
Dept: PSYCHOLOGY | Facility: CLINIC | Age: 69
End: 2019-12-19
Attending: FAMILY MEDICINE
Payer: COMMERCIAL

## 2019-12-19 DIAGNOSIS — F41.1 GENERALIZED ANXIETY DISORDER: ICD-10-CM

## 2019-12-19 DIAGNOSIS — F32.1 MAJOR DEPRESSIVE DISORDER, SINGLE EPISODE, MODERATE (H): Primary | ICD-10-CM

## 2019-12-19 PROCEDURE — 90791 PSYCH DIAGNOSTIC EVALUATION: CPT | Performed by: SOCIAL WORKER

## 2019-12-19 ASSESSMENT — ANXIETY QUESTIONNAIRES
IF YOU CHECKED OFF ANY PROBLEMS ON THIS QUESTIONNAIRE, HOW DIFFICULT HAVE THESE PROBLEMS MADE IT FOR YOU TO DO YOUR WORK, TAKE CARE OF THINGS AT HOME, OR GET ALONG WITH OTHER PEOPLE: SOMEWHAT DIFFICULT
7. FEELING AFRAID AS IF SOMETHING AWFUL MIGHT HAPPEN: NOT AT ALL
5. BEING SO RESTLESS THAT IT IS HARD TO SIT STILL: NOT AT ALL
GAD7 TOTAL SCORE: 10
2. NOT BEING ABLE TO STOP OR CONTROL WORRYING: SEVERAL DAYS
6. BECOMING EASILY ANNOYED OR IRRITABLE: MORE THAN HALF THE DAYS
1. FEELING NERVOUS, ANXIOUS, OR ON EDGE: MORE THAN HALF THE DAYS
3. WORRYING TOO MUCH ABOUT DIFFERENT THINGS: NEARLY EVERY DAY

## 2019-12-19 ASSESSMENT — COLUMBIA-SUICIDE SEVERITY RATING SCALE - C-SSRS
1. IN THE PAST MONTH, HAVE YOU WISHED YOU WERE DEAD OR WISHED YOU COULD GO TO SLEEP AND NOT WAKE UP?: NO
1. IN THE PAST MONTH, HAVE YOU WISHED YOU WERE DEAD OR WISHED YOU COULD GO TO SLEEP AND NOT WAKE UP?: NO
ATTEMPT LIFETIME: NO
ATTEMPT PAST THREE MONTHS: NO
2. HAVE YOU ACTUALLY HAD ANY THOUGHTS OF KILLING YOURSELF?: NO
2. HAVE YOU ACTUALLY HAD ANY THOUGHTS OF KILLING YOURSELF LIFETIME?: NO

## 2019-12-19 ASSESSMENT — PATIENT HEALTH QUESTIONNAIRE - PHQ9
SUM OF ALL RESPONSES TO PHQ QUESTIONS 1-9: 14
5. POOR APPETITE OR OVEREATING: MORE THAN HALF THE DAYS

## 2019-12-19 NOTE — PROGRESS NOTES
Progress Note - Initial Session    Client Name:  Albina Pedro Date: 12/19/19         Service Type: Individual  Video Visit: No     Session Start Time: 11  Session End Time: 12     Session Length: 60 min    Session #: 1    Attendees: Client, Spouse / Significant Other and son Terence     DATA:  Diagnostic Assessment in progress.  Unable to complete documentation at the conclusion of the first session due to time constraints; another session following this one.    Interactive Complexity: No  Crisis: No    Intervention:  Assessed functioning. Went over the phq/elis; [place] and cgi. Covered confidentiality. Developing rapport. Educated on depression and anxiety. Completed review of diagnostic assessment.     ASSESSMENT:  Mental Status Assessment:  Appearance:   Appropriate   Eye Contact:   Good   Psychomotor Behavior: Normal   Attitude:   Cooperative   Orientation:   All  Speech   Rate / Production: Normal    Volume:  Normal   Mood:    Anxious  Depressed  Normal  Affect:    Appropriate   Thought Content:  Clear   Thought Form:  Coherent  Logical   Insight:    Fair       Safety Issues and Plan for Safety and Risk Management:  Client denies current fears or concerns for personal safety.  Client denies current or recent suicidal ideation or behaviors.  Client denies current or recent homicidal ideation or behaviors.  Client denies current or recent self injurious behavior or ideation.  Client denies other safety concerns.  Recommended that patient call 911 or go to the local ED should there be a change in any of these risk factors.  Client reports there are no firearms in the house.      Diagnostic Criteria:  A. Excessive anxiety and worry about a number of events or activities (such as work or school performance).   B. The person finds it difficult to control the worry.  C. Select 3 or more symptoms (required for diagnosis). Only one item is required in children.   - Restlessness or feeling keyed up or  on edge.    - Being easily fatigued.    - Difficulty concentrating or mind going blank.    - Irritability.    - Muscle tension.   D. The focus of the anxiety and worry is not confined to features of an Axis I disorder.  E. The anxiety, worry, or physical symptoms cause clinically significant distress or impairment in social, occupational, or other important areas of functioning.   F. The disturbance is not due to the direct physiological effects of a substance (e.g., a drug of abuse, a medication) or a general medical condition (e.g., hyperthyroidism) and does not occur exclusively during a Mood Disorder, a Psychotic Disorder, or a Pervasive Developmental Disorder.    - The aformentioned symptoms began several month(s) ago and occurs 4 days per week and is experienced as moderate.  CRITERIA (A-C) REPRESENT A MAJOR DEPRESSIVE EPISODE - SELECT THESE CRITERIA  A) Single episode - symptoms have been present during the same 2-week period and represent a change from previous functioning 5 or more symptoms (required for diagnosis)   - Depressed mood. Note: In children and adolescents, can be irritable mood.     - Diminished interest or pleasure in all, or almost all, activities.    - Psychomotor activity retardation.    - Fatigue or loss of energy.    - Feelings of worthlessness or excessive guilt.    - Diminished ability to think or concentrate, or indecisiveness.   B) The symptoms cause clinically significant distress or impairment in social, occupational, or other important areas of functioning  C) The episode is not attributable to the physiological effects of a substance or to another medical condition  D) The occurence of major depressive episode is not better explained by other thought / psychotic disorders  E) There has never been a manic episode or hypomanic episode      DSM5 Diagnoses: (Sustained by DSM5 Criteria Listed Above)  Diagnoses: 296.22 (F32.1)  Major Depressive Disorder, Single Episode, Moderate _ and  With anxious distress  300.02 (F41.1) Generalized Anxiety Disorder  Psychosocial & Contextual Factors: memory issues; marital stress.  WHODAS 2.0 (12 item)-see DA         Collateral Reports Completed:  Routed note to PCP      PLAN: (Homework, other):  Client stated that she may follow up for ongoing services with [facility].  Scheduled to return.      OPAL Rosario

## 2019-12-20 ASSESSMENT — ANXIETY QUESTIONNAIRES: GAD7 TOTAL SCORE: 10

## 2019-12-26 ENCOUNTER — TRANSFERRED RECORDS (OUTPATIENT)
Dept: HEALTH INFORMATION MANAGEMENT | Facility: CLINIC | Age: 69
End: 2019-12-26

## 2020-01-02 ENCOUNTER — OFFICE VISIT (OUTPATIENT)
Dept: PSYCHOLOGY | Facility: CLINIC | Age: 70
End: 2020-01-02
Attending: FAMILY MEDICINE
Payer: COMMERCIAL

## 2020-01-02 DIAGNOSIS — F41.1 GENERALIZED ANXIETY DISORDER: ICD-10-CM

## 2020-01-02 DIAGNOSIS — F32.1 MAJOR DEPRESSIVE DISORDER, SINGLE EPISODE, MODERATE (H): Primary | ICD-10-CM

## 2020-01-02 PROCEDURE — 90834 PSYTX W PT 45 MINUTES: CPT | Performed by: SOCIAL WORKER

## 2020-01-02 ASSESSMENT — PATIENT HEALTH QUESTIONNAIRE - PHQ9
SUM OF ALL RESPONSES TO PHQ QUESTIONS 1-9: 4
5. POOR APPETITE OR OVEREATING: NOT AT ALL

## 2020-01-02 ASSESSMENT — ANXIETY QUESTIONNAIRES
5. BEING SO RESTLESS THAT IT IS HARD TO SIT STILL: NOT AT ALL
3. WORRYING TOO MUCH ABOUT DIFFERENT THINGS: MORE THAN HALF THE DAYS
1. FEELING NERVOUS, ANXIOUS, OR ON EDGE: MORE THAN HALF THE DAYS
6. BECOMING EASILY ANNOYED OR IRRITABLE: SEVERAL DAYS
7. FEELING AFRAID AS IF SOMETHING AWFUL MIGHT HAPPEN: NOT AT ALL
IF YOU CHECKED OFF ANY PROBLEMS ON THIS QUESTIONNAIRE, HOW DIFFICULT HAVE THESE PROBLEMS MADE IT FOR YOU TO DO YOUR WORK, TAKE CARE OF THINGS AT HOME, OR GET ALONG WITH OTHER PEOPLE: SOMEWHAT DIFFICULT
GAD7 TOTAL SCORE: 6
2. NOT BEING ABLE TO STOP OR CONTROL WORRYING: SEVERAL DAYS

## 2020-01-02 NOTE — Clinical Note
"Kristina reported some issues with stuttering. She also reports starting a project and then not knowing what to do next.  reports no concept of time, agitated easily and \"ruled by emotional moments\". He did not want me to share that he was telling me these things as she might become upset."

## 2020-01-02 NOTE — PROGRESS NOTES
Progress Note    Patient Name: Albina Pedro  Date: 1/2/20         Service Type: Individual  Video Visit: No     Session Start Time: 10  Session End Time: 10:45 am     Session Length: 45 min    Session #: 2    Attendees: Client attended alone     Treatment Plan Last Reviewed: due 4/2/20  PHQ-9 / ELIS-7 : phq=4; elis=6.    DATA  Interactive Complexity: No  Crisis: No       Progress Since Last Session (Related to Symptoms / Goals / Homework):   Symptoms: Improving on both scales    Homework: Partially completed     Episode of Care Goals: Satisfactory progress - PREPARATION (Decided to change - considering how); Intervened by negotiating a change plan and determining options / strategies for behavior change, identifying triggers, exploring social supports, and working towards setting a date to begin behavior change    Current / Ongoing Stressors and Concerns:   reports some disharmony/agitiation. Client reports communication issues between she and  and she and daughter. Client doesn't have drivers license so cannot drive and hard depending on  who doesn't always want to go where she wants to. Client reports stuttering and memory issues and working with PCP to know source(s).     Treatment Objective(s) Addressed in This Session:   Memory issues; communication/boundaries.        Intervention:   Assessed functioning. Went over the results of the phq/elis. Wrote goals. Educated on boundaries. Problem solved communication issues with .        ASSESSMENT: Current Emotional / Mental Status (status of significant symptoms):   Risk status (Self / Other harm or suicidal ideation)   Patient denies current fears or concerns for personal safety.   Patient denies current or recent suicidal ideation or behaviors.   Patientdenies current or recent homicidal ideation or behaviors.   Patient denies current or recent self injurious behavior or ideation.   Patient  denies other safety concerns.   Patient Patient reports there has been no change in risk factors since their last session.     PatientPatient reports there has been no change in protective factors since their last session.     Recommended that patient call 911 or go to the local ED should there be a change in any of these risk factors.     Appearance:   Appropriate    Eye Contact:   Good    Psychomotor Behavior: Normal    Attitude:   Cooperative    Orientation:   All   Speech    Rate / Production: Normal     Volume:  Normal    Mood:    Depressed  Normal   Affect:    Appropriate    Thought Content:  Clear    Thought Form:  Coherent  Logical    Insight:    Fair      Medication Review:   No changes to current psychiatric medication(s). Dr. Burrell prescribing wellbutrin.     Medication Compliance:   Yes     Changes in Health Issues:   None reported     Chemical Use Review:   Substance Use: Chemical use reviewed, no active concerns identified      Tobacco Use: No current tobacco use.      Diagnosis:  No diagnosis found.    Collateral Reports Completed:   Routed note to PCP    PLAN: (Patient Tasks / Therapist Tasks / Other)  Schedule biweekly. Practice work on boundaries.        Tyrell Singh, [facility]                                                         ______________________________________________________________________    Treatment Plan    Patient's Name: Albina Pedro  YOB: 1950    Date: 1/2/20    DSM5 Diagnoses: 296.22 (F32.1)  Major Depressive Disorder, Single Episode, Moderate _ and With anxious distress or 300.02 (F41.1) Generalized Anxiety Disorder  Psychosocial / Contextual Factors: memory issues impacting relationships.  WHODAS: 40    Referral / Collaboration:  Referral to another professional/service is not indicated at this time.    Anticipated number of session or this episode of care: 15      MeasurableTreatment Goal(s) related to diagnosis / functional impairment(s)  Goal 1:  "Patient will report improved communication with her family.    I will know I've met my goal when \"Communication level improved with  and daughter and understanding what I have.      Objective #A (Patient Action)    Patient will make at least 1 effort per day to improve her communication with her family.  Status: New - Date: 1/2/20     Intervention(s)  Therapist will educate on boundaries, conflict resolution and communication.    Objective #B  Patient will .  Status:      Intervention(s)  Therapist will .    Objective #C  Patient will .  Status:      Intervention(s)  Therapist will .           Patient has reviewed and agreed to the above plan.      Tyrell Singh, [facility]  January 2, 2020  " IV discontinued, cath removed intact

## 2020-01-03 ENCOUNTER — ALLIED HEALTH/NURSE VISIT (OUTPATIENT)
Dept: FAMILY MEDICINE | Facility: CLINIC | Age: 70
End: 2020-01-03
Payer: COMMERCIAL

## 2020-01-03 ENCOUNTER — TELEPHONE (OUTPATIENT)
Dept: FAMILY MEDICINE | Facility: CLINIC | Age: 70
End: 2020-01-03

## 2020-01-03 DIAGNOSIS — F34.1 DYSTHYMIA: ICD-10-CM

## 2020-01-03 DIAGNOSIS — F33.0 MAJOR DEPRESSIVE DISORDER, RECURRENT, MILD (H): Primary | ICD-10-CM

## 2020-01-03 DIAGNOSIS — F33.0 MAJOR DEPRESSIVE DISORDER, RECURRENT, MILD (H): ICD-10-CM

## 2020-01-03 PROCEDURE — 99207 ZZC NO CHARGE NURSE ONLY: CPT

## 2020-01-03 RX ORDER — VENLAFAXINE HYDROCHLORIDE 150 MG/1
150 CAPSULE, EXTENDED RELEASE ORAL DAILY
Qty: 30 CAPSULE | Refills: 1 | Status: SHIPPED | OUTPATIENT
Start: 2020-01-03 | End: 2020-01-15

## 2020-01-03 ASSESSMENT — ANXIETY QUESTIONNAIRES: GAD7 TOTAL SCORE: 6

## 2020-01-03 NOTE — TELEPHONE ENCOUNTER
Patient has been out of venlafaxine 150 mg for 12 days. He says they requested refill but I do not see the requests? Yola Roldan RN

## 2020-01-03 NOTE — PROGRESS NOTES
I spoke to the  and Yesenia gave a verbal order to send in the venlaxine 150 mg and a refill.  would like to get her scheduled asap for follow up.  Yola Roldan RN

## 2020-01-06 ENCOUNTER — FCC EXTENDED DOCUMENTATION (OUTPATIENT)
Dept: PSYCHOLOGY | Facility: CLINIC | Age: 70
End: 2020-01-06
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
## 2020-01-07 ENCOUNTER — TRANSFERRED RECORDS (OUTPATIENT)
Dept: HEALTH INFORMATION MANAGEMENT | Facility: CLINIC | Age: 70
End: 2020-01-07

## 2020-01-15 ENCOUNTER — OFFICE VISIT (OUTPATIENT)
Dept: PSYCHIATRY | Facility: CLINIC | Age: 70
End: 2020-01-15
Payer: COMMERCIAL

## 2020-01-15 VITALS
WEIGHT: 186.5 LBS | BODY MASS INDEX: 29.97 KG/M2 | SYSTOLIC BLOOD PRESSURE: 142 MMHG | RESPIRATION RATE: 16 BRPM | DIASTOLIC BLOOD PRESSURE: 84 MMHG | TEMPERATURE: 96.7 F | HEART RATE: 83 BPM | HEIGHT: 66 IN | OXYGEN SATURATION: 96 %

## 2020-01-15 DIAGNOSIS — F34.1 DYSTHYMIA: ICD-10-CM

## 2020-01-15 DIAGNOSIS — F41.1 GAD (GENERALIZED ANXIETY DISORDER): Primary | ICD-10-CM

## 2020-01-15 DIAGNOSIS — F33.0 MAJOR DEPRESSIVE DISORDER, RECURRENT, MILD (H): ICD-10-CM

## 2020-01-15 PROCEDURE — 99214 OFFICE O/P EST MOD 30 MIN: CPT | Performed by: NURSE PRACTITIONER

## 2020-01-15 RX ORDER — BUPROPION HYDROCHLORIDE 150 MG/1
150 TABLET ORAL EVERY MORNING
Qty: 30 TABLET | Refills: 1 | Status: SHIPPED | OUTPATIENT
Start: 2020-01-15 | End: 2020-02-27

## 2020-01-15 RX ORDER — VENLAFAXINE HYDROCHLORIDE 150 MG/1
150 CAPSULE, EXTENDED RELEASE ORAL DAILY
Qty: 30 CAPSULE | Refills: 1 | Status: SHIPPED | OUTPATIENT
Start: 2020-01-15 | End: 2020-02-27

## 2020-01-15 ASSESSMENT — ANXIETY QUESTIONNAIRES
3. WORRYING TOO MUCH ABOUT DIFFERENT THINGS: MORE THAN HALF THE DAYS
5. BEING SO RESTLESS THAT IT IS HARD TO SIT STILL: NOT AT ALL
1. FEELING NERVOUS, ANXIOUS, OR ON EDGE: SEVERAL DAYS
GAD7 TOTAL SCORE: 9
7. FEELING AFRAID AS IF SOMETHING AWFUL MIGHT HAPPEN: SEVERAL DAYS
6. BECOMING EASILY ANNOYED OR IRRITABLE: MORE THAN HALF THE DAYS
IF YOU CHECKED OFF ANY PROBLEMS ON THIS QUESTIONNAIRE, HOW DIFFICULT HAVE THESE PROBLEMS MADE IT FOR YOU TO DO YOUR WORK, TAKE CARE OF THINGS AT HOME, OR GET ALONG WITH OTHER PEOPLE: SOMEWHAT DIFFICULT
2. NOT BEING ABLE TO STOP OR CONTROL WORRYING: MORE THAN HALF THE DAYS

## 2020-01-15 ASSESSMENT — MIFFLIN-ST. JEOR: SCORE: 1377.71

## 2020-01-15 ASSESSMENT — PATIENT HEALTH QUESTIONNAIRE - PHQ9
5. POOR APPETITE OR OVEREATING: SEVERAL DAYS
SUM OF ALL RESPONSES TO PHQ QUESTIONS 1-9: 5

## 2020-01-15 NOTE — PATIENT INSTRUCTIONS
1.  Continue Wellbutrin  mg daily   2. Continue Venlafaxine 150 mg daily   3. Consider Gene Sight Testing   4. Start talk therapy with Remy Singh        Continue all other medications as reviewed per electronic medical record today.     Safety plan reviewed. To the Emergency Department as needed or call after hours crisis line at [phone] or [phone]. [place] Crisis Text Line. Text [place] to 517458 or Suicide LifeLine Chat: suicideReproductive Research Technologies.org/chat/    To schedule individual or family therapy, call [facility] at [phone].    Schedule an appointment with me in 4 weeks or sooner as needed. Call [facility] at [phone] to schedule.    Follow up with primary care provider as planned or for acute medical concerns.    Call the psychiatric nurse line with medication questions or concerns at [phone].    Pic[employer]ft may be used to communicate with your provider, but this is not intended to be used for emergencies.    Crisis Resources:    National Suicide Prevention Lifeline: 469.850.7176 (TTY: 219.289.2465). Call anytime for help.  (www.suicidepreventionlifeline.org)  National [place] on Mental Illness (www.jennifer.org): 185.273.3087 or 782-313-6920.   Mental Health Association (www.mentalhealth.org): 640.135.5973 or 396-490-1120.  [place] Crisis Text Line: Text [place] to 594809  Suicide LifeLine Chat: suicideReproductive Research Technologies.org/chat

## 2020-01-15 NOTE — PROGRESS NOTES
"    Outpatient Psychiatric Progress Note    Name: Albina Pedro   : 1950                    Primary Care Provider: Carolina Burrell MD   Therapist: Tyrell Singh    PHQ-9 scores:  PHQ-9 SCORE 2019   PHQ-9 Total Score - - -   PHQ-9 Total Score 11 14 4       SEBASTIAN-7 scores:  SEBASTIAN-7 SCORE 2019   Total Score - - -   Total Score 7 10 6       Patient Identification:    Patient is a 69 year old year old,   White American female  who presents for return visit with me.  Patient is currently retired. Patient attended the session with her  , who they agreed to have interview with. Patient prefers to be called: \" Kristina\".    Interim History:    I last saw Albina Pedro for outpatient psychiatry Return Visit on 2019.     During that appointment, we talked about her continuing deterioration and confusion, irritability and depression.  Because of her decline I reintroduced the Wellbutrin back into her medication schedule.  I did refer her to [facility] for neurological reassessment given her cognitive decline.  She also is scheduled at that time to see Remy Singh for therapy.  At that visit her  presented very irritable and frustrated with the lack of progress she was making.     Current medications include: aspirin 81 MG tablet, Take 81 mg by mouth every evening   buPROPion (WELLBUTRIN XL) 150 MG 24 hr tablet, Take 1 tablet (150 mg) by mouth every morning for 60 doses  calcium carbonate (OS-SARI 500 MG [place]) 1250 MG tablet, Take 1 tablet by mouth 2 times daily  LORazepam (ATIVAN) 0.5 MG tablet, TAKE ONE TABLET BY MOUTH EVERY EIGHT HOURS AS NEEDED FOR ANXIETY   Multiple Vitamins-Minerals (MULTIVITAMIN & MINERAL PO), Take 1 tablet by mouth daily   nystatin (MYCOSTATIN) 384663 UNIT/GM external cream, Apply topically 2 times daily  pravastatin (PRAVACHOL) 40 MG tablet, Take 1 tablet (40 mg) by mouth daily  Ranolazine 1000 MG " TB12, Take 1,000 mg by mouth 2 times daily  triamcinolone (KENALOG) 0.1 % cream, Apply sparingly to affected area three times daily for 14 days.  triamcinolone (KENALOG) 0.1 % external ointment, Apply topically 2 times daily  venlafaxine (EFFEXOR-XR) 150 MG 24 hr capsule, Take 1 capsule (150 mg) by mouth daily  VITAMIN D3 OR, 100-2000 units by mouth DAILY    No current facility-administered medications on file prior to visit.        The [place] Prescription Monitoring Program has been reviewed and there are no concerns about diversionary activity for controlled substances at this time.      I was able to review most recent Primary Care Provider, specialty provider, and therapy visit notes that I have access to.   Today Kristina reports that she ran out of venlafaxine and has not been taking it for the past 10 days.  During that time her  reports she experienced episodes of crying and disorganized thinking.  It was impossible for her to do things around the house.  They did follow-up at the [facility] for reassessment which revealed no changes in her cognitive status from the original assessment done in 2017.  There is concerned that she continues to lose things.  Sometimes she gets ideas in her head that she cannot get out.  Her  is concerned about short-term memory deficits.  Sometimes when he talks to her she forgets about what he has said and he finds that he often has to repeat himself.  Today they are more calm and spontaneous.  Kristina appeared organized and desires no changes in her medication today.  Her  was possibly interested in her getting an increase in her Wellbutrin, but Kristina prefers to wait for now.  It was revealed that her  has recently started taking citalopram.  Kristina tells me he seems easier to get along with and he is less irritable.     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    Social history updates:    Spends time with  and goes out to eat.  Their children are not planning on having children    Substance use updates:    Went to wine tasting event  Tobacco use: No    Vital Signs:   BP (!) 142/84 (BP Location: Left arm, Patient Position: Sitting, Cuff Size: Adult Regular)   Pulse 83   Temp 96.7  F (35.9  C) (Tympanic)   Resp 16   Ht 1.676 m (5' 6\")   Wt 84.6 kg (186 lb 8 oz)   SpO2 96%   BMI 30.10 kg/m      Labs:    Most recent laboratory results reviewed and no new labs.     Review of Systems:  10 systems (general, cardiovascular, respiratory, eyes, ENT, endocrine, GI, , M/S, neurological) were reviewed. Most pertinent finding(s) is/are:No chest pain no rashes . The remaining systems are all unremarkable.    Mental Status Examination:  Appearance:  casually dressed  Attitude:  cooperative   Eye Contact:  good  Gait and Station: Normal, No assistive Devices used and No dizziness or falls  Psychomotor Behavior:  intact station, gait and muscle tone  Oriented to:  time, person, and place  Attention Span and Concentration:  Fair  Speech:   clear, coherent and Speaks: English  Mood:  anxious, depressed and better  Affect:  appropriate and in normal range  Associations:  no loose associations  Thought Process:  logical  Thought Content:  no evidence of suicidal ideation or homicidal ideation, no auditory hallucinations present and no visual hallucinations present  Recent and Remote Memory:  fair Not formally assessed. No amnesia.  Fund of Knowledge: appropriate  Insight:  fair  Judgment:  fair  Impulse Control:  fair    Suicide Risk Assessment:  Today Albina Pedro reports no thoughts to harm herself or to hurt other people. In addition, there are notable risk factors for self-harm, including anxiety, wrecklessness and mood change. However, risk is mitigated by " commitment to family, history of seeking help when needed, future oriented, no access to firearms or weapons, denies suicidal intent or plan and denies homicidal ideation, intent, or plan. Therefore, based on all available evidence including the factors cited above, Albina Pedro does not appear to be at imminent risk for self-harm, does not meet criteria for a 72-hr hold, and therefore remains appropriate for ongoing outpatient level of care.  A thorough assessment of risk factors related to suicide and self-harm have been reviewed and are noted above. The patient convincingly denies suicidality on several occasions. Local community safety resources printed and reviewed for patient to use if needed. There was no deceit detected, and the patient presented in a manner that was believable.     DSM5 Diagnosis:  296.31 (F33.0) Major Depressive Disorder, Recurrent Episode, Mild With mixed features  300.02 (F41.1) Generalized Anxiety Disorder    Medical comorbidities include:   Patient Active Problem List    Diagnosis Date Noted     Chronic stable angina (H) 01/18/2019     Priority: Medium     Intermediate coronary syndrome (H) 07/24/2018     Priority: Medium     ILD (interstitial lung disease) (H) 07/09/2018     Priority: Medium     Needs follow up ct dec 2019       Hyperlipidemia LDL goal <70 05/31/2018     Priority: Medium     Major depressive disorder, recurrent, mild (H) 02/01/2017     Priority: Medium     Acute chest pain 12/13/2016     Priority: Medium     Status post coronary angiogram 02/18/2016     Priority: Medium     Adverse effect of anesthetic 02/11/2016     Priority: Medium     Patient is very sensitive to anesthetics. Needs lower dosing.        Sjogren's syndrome (H) 01/15/2014     Priority: Medium     CAD (coronary artery disease) 09/18/2013     Priority: Medium     Mild ischemia on stress test       Advanced directives, counseling/discussion 01/12/2012     Priority: Medium     Advance Directive  Problem List Overview:   Name Relationship Phone    Primary Health Care Agent            Alternative Health Care Agent          Discussed advance care planning with patient; information given to patient to review.     Daija Black CMA, HC Facilitator    1/12/2012          Panniculitis 11/10/2011     Priority: Medium     Health Care Home 06/15/2011     Priority: Medium     X  DX V65.8 REPLACED WITH [facility] (04/08/2013)       Episodic mood disorder (H) 11/26/2007     Priority: Medium     November 26, 2007 - Prozac and will look into light box.   April 15, 2008 - Will stop for summer. Light box rx.  Problem list name updated by automated process. Provider to review       Dermatophytosis of body 09/12/2007     Priority: Medium     September 12, 2007 - Classic appearance and worsening in areas's not using Nystatin.  Will use everywhere or change to clotrimazole.   April 15, 2008 - Change to powder.   Problem list name updated by automated process. Provider to review       DIZZINESS - LIKELY HYPOGLYCEMIA 07/27/2006     Priority: Medium     July 24, 2008- negative Event monitor and GTT showed some elevation.  Dietary changes.            Assessment:    Albina Pedro seems to be more calm and organized.  The neuro ran assessment she had completed revealed no cognitive changes since her initial assessment in 2017.  Kristina is recently started talk therapy with Remy Singh and she feels it is going well.  At this point, per Kristina's request, her Wellbutrin and venlafaxine will continue at their current doses.  She does seem more able to stay on track with the interview today that and visits from the past.  Medication side effects and alternatives were reviewed. Health promotion activities recommended and reviewed today. All questions addressed. Education and counseling completed regarding risks and benefits of medications and psychotherapy options.    Treatment Plan:      1.  Continue Wellbutrin  mg  daily   2. Continue Venlafaxine 150 mg daily   3. Consider Gene Sight Testing   4. Start talk therapy with Remy Singh       Continue all other medications as reviewed per electronic medical record today.     Safety plan reviewed. To the Emergency Department as needed or call after hours crisis line at [phone] or [phone]. [place] Crisis Text [place]. Text [place] to 435355 or Suicide LifeLine Chat: suicideEucl[place].org/chat/    To schedule individual or family therapy, call [facility] at [phone].    Schedule an appointment with me in 4 weeks or sooner as needed. Call [facility] at [phone] to schedule.    Follow up with primary care provider as planned or for acute medical concerns.    Call the psychiatric nurse line with medication questions or concerns at [phone].    [employer] may be used to communicate with your provider, but this is not intended to be used for emergencies.    Crisis Resources:    National Suicide Prevention Lifeline: 578.263.7305 (TTY: 464.215.3541). Call anytime for help.  (www.suicidepreventionlifeline.org)  National [place] on Mental Illness (www.jennifer.org): 479.500.2728 or 608-630-5783.   Mental Health Association (www.mentalhealth.org): 561.877.9145 or 716-675-2589.  [place] Crisis Text [place]: Text [place] to 830417  Suicide LifeLine Chat: suicide[place].org/chat    Administrative Billing:   Time spent with patient was 30 minutes and greater than 50% of time or 20 minutes was spent in counseling and coordination of care regarding above diagnoses and treatment plan.    Patient Status:  Patient will continue to be seen for ongoing consultation and stabilization.    Signed:   MARISOL Harmon-BC   Psychiatry

## 2020-01-16 ENCOUNTER — OFFICE VISIT - HEALTHEAST (OUTPATIENT)
Dept: RHEUMATOLOGY | Facility: CLINIC | Age: 70
End: 2020-01-16

## 2020-01-16 ENCOUNTER — TRANSFERRED RECORDS (OUTPATIENT)
Dept: HEALTH INFORMATION MANAGEMENT | Facility: CLINIC | Age: 70
End: 2020-01-16

## 2020-01-16 DIAGNOSIS — M25.50 POLYARTHRALGIA: ICD-10-CM

## 2020-01-16 DIAGNOSIS — M54.2 CERVICALGIA: ICD-10-CM

## 2020-01-16 DIAGNOSIS — M35.00 SJOGREN'S SYNDROME WITHOUT EXTRAGLANDULAR INVOLVEMENT (H): ICD-10-CM

## 2020-01-16 DIAGNOSIS — M15.0 PRIMARY GENERALIZED (OSTEO)ARTHRITIS: ICD-10-CM

## 2020-01-16 DIAGNOSIS — M15.0 PRIMARY OSTEOARTHRITIS INVOLVING MULTIPLE JOINTS: ICD-10-CM

## 2020-01-16 LAB
C3 SERPL-MCNC: 139 MG/DL (ref 83–177)
C4 SERPL-MCNC: 27 MG/DL (ref 19–59)

## 2020-01-16 ASSESSMENT — MIFFLIN-ST. JEOR: SCORE: 1350.94

## 2020-01-16 ASSESSMENT — ANXIETY QUESTIONNAIRES: GAD7 TOTAL SCORE: 9

## 2020-01-21 LAB
DNA (DS) ANTIBODY - HISTORICAL: 3 IU
JO-1 AUTOANTIBODIES - HISTORICAL: 0 EU
SCL-70 AUTOANTIBODIES - HISTORICAL: 0 EU
SM (SMITH AUTOANTIBODIES - HISTORICAL: 1 EU
SM/RNP AUTOANTIBODIES - HISTORICAL: 2 EU
SS-A/RO AUTOANTIBODIES - HISTORICAL: 121 EU
SS-B/LA AUTOANTIBODIES - HISTORICAL: 108 EU

## 2020-01-22 ENCOUNTER — OFFICE VISIT (OUTPATIENT)
Dept: PSYCHOLOGY | Facility: CLINIC | Age: 70
End: 2020-01-22
Payer: COMMERCIAL

## 2020-01-22 DIAGNOSIS — F32.0 MAJOR DEPRESSIVE DISORDER, SINGLE EPISODE, MILD (H): Primary | ICD-10-CM

## 2020-01-22 DIAGNOSIS — F41.1 GENERALIZED ANXIETY DISORDER: ICD-10-CM

## 2020-01-22 PROCEDURE — 90834 PSYTX W PT 45 MINUTES: CPT | Performed by: SOCIAL WORKER

## 2020-01-22 ASSESSMENT — PATIENT HEALTH QUESTIONNAIRE - PHQ9
SUM OF ALL RESPONSES TO PHQ QUESTIONS 1-9: 4
5. POOR APPETITE OR OVEREATING: NOT AT ALL

## 2020-01-22 ASSESSMENT — ANXIETY QUESTIONNAIRES
1. FEELING NERVOUS, ANXIOUS, OR ON EDGE: SEVERAL DAYS
IF YOU CHECKED OFF ANY PROBLEMS ON THIS QUESTIONNAIRE, HOW DIFFICULT HAVE THESE PROBLEMS MADE IT FOR YOU TO DO YOUR WORK, TAKE CARE OF THINGS AT HOME, OR GET ALONG WITH OTHER PEOPLE: SOMEWHAT DIFFICULT
2. NOT BEING ABLE TO STOP OR CONTROL WORRYING: SEVERAL DAYS
GAD7 TOTAL SCORE: 5
5. BEING SO RESTLESS THAT IT IS HARD TO SIT STILL: NOT AT ALL
3. WORRYING TOO MUCH ABOUT DIFFERENT THINGS: SEVERAL DAYS
6. BECOMING EASILY ANNOYED OR IRRITABLE: SEVERAL DAYS
7. FEELING AFRAID AS IF SOMETHING AWFUL MIGHT HAPPEN: SEVERAL DAYS

## 2020-01-22 NOTE — Clinical Note
"Working on communication issues. She said around 1/2 pm her muscles feel like they \"melt\". Expressing how it feels looked more like going to limp noodles?"

## 2020-01-22 NOTE — PROGRESS NOTES
"                                           Progress Note    Patient Name: Albina Pedro  Date: 1/2/2/20         Service Type: Individual  Video Visit: No     Session Start Time: 4  Session End Time: 4:45 pm     Session Length: 45 min    Session #: 3    Attendees: Client attended alone     Treatment Plan Last Reviewed: due 4/2/20  PHQ-9 / ELIS-7 : phq=4; elis=5.    DATA  Interactive Complexity: No  Crisis: No       Progress Since Last Session (Related to Symptoms / Goals / Homework):   Symptoms: stable    Homework: Partially completed. Read about boundaries. Set up fun activity with .     Episode of Care Goals: Satisfactory progress - PREPARATION (Decided to change - considering how); Intervened by negotiating a change plan and determining options / strategies for behavior change, identifying triggers, exploring social supports, and working towards setting a date to begin behavior change    Current / Ongoing Stressors and Concerns:   reports some disharmony/agitiation. Client reports communication issues between she and  and she and daughter. Client doesn't have drivers license so cannot drive and hard depending on  who doesn't always want to go where she wants to. Client reports stuttering and memory issues and working with PCP to know source(s). Today she reported around 1/2 pm her muscles feel like they \"melt\".     Treatment Objective(s) Addressed in This Session:   Memory issues; communication/boundaries.       Intervention:  Assessed functioning. Went over the results of the phq/elis. Processed feelings about marital issues; educated on communication. Educated on boundaries. Problem solved communication issues with . Used a guided meditation; she liked it.        ASSESSMENT: Current Emotional / Mental Status (status of significant symptoms):   Risk status (Self / Other harm or suicidal ideation)   Patient denies current fears or concerns for personal safety.   Patient denies " current or recent suicidal ideation or behaviors.   Patientdenies current or recent homicidal ideation or behaviors.   Patient denies current or recent self injurious behavior or ideation.   Patient denies other safety concerns.   Patient Patient reports there has been no change in risk factors since their last session.     PatientPatient reports there has been no change in protective factors since their last session.     Recommended that patient call 911 or go to the local ED should there be a change in any of these risk factors.     Appearance:   Appropriate    Eye Contact:   Good    Psychomotor Behavior: Normal    Attitude:   Cooperative    Orientation:   All   Speech    Rate / Production: Normal     Volume:  Normal    Mood:    Depressed, anxious   Affect:    Appropriate    Thought Content:  Clear    Thought Form:  Coherent  Logical    Insight:    Fair      Medication Review:   No changes to current psychiatric medication(s). Dr. Burrell prescribing wellbutrin.     Medication Compliance:   Yes     Changes in Health Issues:   None reported     Chemical Use Review:   Substance Use: Chemical use reviewed, no active concerns identified      Tobacco Use: No current tobacco use.      Diagnosis:  No diagnosis found.    Collateral Reports Completed:   Routed note to PCP    PLAN: (Patient Tasks / Therapist Tasks / Other)  Schedule biweekly. Practice work on boundaries.        Tyrell Singh, [facility]                                                         ______________________________________________________________________    Treatment Plan    Patient's Name: Albina Pedro  YOB: 1950    Date: 1/2/20    DSM5 Diagnoses: 296.22 (F32.1)  Major Depressive Disorder, Single Episode, Moderate _ and With anxious distress or 300.02 (F41.1) Generalized Anxiety Disorder  Psychosocial / Contextual Factors: memory issues impacting relationships.  WHODAS: 40    Referral / Collaboration:  Referral to another  "professional/service is not indicated at this time.    Anticipated number of session or this episode of care: 15      MeasurableTreatment Goal(s) related to diagnosis / functional impairment(s)  Goal 1: Patient will report improved communication with her family.    I will know I've met my goal when \"Communication level improved with  and daughter and understanding what I have.      Objective #A (Patient Action)    Patient will make at least 1 effort per day to improve her communication with her family.  Status: New - Date: 1/2/20     Intervention(s)  Therapist will educate on boundaries, conflict resolution and communication.    Objective #B  Patient will .  Status:      Intervention(s)  Therapist will .    Objective #C  Patient will .  Status:      Intervention(s)  Therapist will .           Patient has reviewed and agreed to the above plan.      Tyrell Singh, [facility]  January 2, 2020  "

## 2020-01-23 ASSESSMENT — ANXIETY QUESTIONNAIRES: GAD7 TOTAL SCORE: 5

## 2020-02-07 ENCOUNTER — OFFICE VISIT (OUTPATIENT)
Dept: PSYCHOLOGY | Facility: CLINIC | Age: 70
End: 2020-02-07
Payer: COMMERCIAL

## 2020-02-07 DIAGNOSIS — F32.0 MAJOR DEPRESSIVE DISORDER, SINGLE EPISODE, MILD (H): Primary | ICD-10-CM

## 2020-02-07 DIAGNOSIS — F41.1 GENERALIZED ANXIETY DISORDER: ICD-10-CM

## 2020-02-07 PROCEDURE — 90834 PSYTX W PT 45 MINUTES: CPT | Performed by: SOCIAL WORKER

## 2020-02-07 ASSESSMENT — ANXIETY QUESTIONNAIRES
1. FEELING NERVOUS, ANXIOUS, OR ON EDGE: SEVERAL DAYS
2. NOT BEING ABLE TO STOP OR CONTROL WORRYING: SEVERAL DAYS
5. BEING SO RESTLESS THAT IT IS HARD TO SIT STILL: NOT AT ALL
6. BECOMING EASILY ANNOYED OR IRRITABLE: SEVERAL DAYS
3. WORRYING TOO MUCH ABOUT DIFFERENT THINGS: SEVERAL DAYS
7. FEELING AFRAID AS IF SOMETHING AWFUL MIGHT HAPPEN: NOT AT ALL
GAD7 TOTAL SCORE: 4

## 2020-02-07 ASSESSMENT — PATIENT HEALTH QUESTIONNAIRE - PHQ9
SUM OF ALL RESPONSES TO PHQ QUESTIONS 1-9: 3
5. POOR APPETITE OR OVEREATING: NOT AT ALL

## 2020-02-07 NOTE — Clinical Note
Told today [facility] ruled out dementia. Wondering what is causing memory issues. Met with both today and worked through some conflicts. Encouraged them to seek marriage counseling and for him to go back to seeing individual therapist. He has some concerns about cymbalta not helping despite her scores are better.

## 2020-02-07 NOTE — PROGRESS NOTES
"                                           Progress Note    Patient Name: Albina Pedro  Date: 2/7/20         Service Type: Individual  Video Visit: No     Session Start Time: 10  Session End Time: 10:45 am     Session Length: 45 min    Session #: 4    Attendees: Client attended alone.  attended.     Treatment Plan Last Reviewed: due 4/2/20  PHQ-9 / ELIS-7 : phq=3; elis=4.    DATA  Interactive Complexity: No  Crisis: No       Progress Since Last Session (Related to Symptoms / Goals / Homework):   Symptoms: stable.    Homework: Partially completed. Read about boundaries. Set up fun activity with .     Episode of Care Goals: Satisfactory progress - PREPARATION (Decided to change - considering how); Intervened by negotiating a change plan and determining options / strategies for behavior change, identifying triggers, exploring social supports, and working towards setting a date to begin behavior change    Current / Ongoing Stressors and Concerns:   reports some disharmony/agitiation. Client reports communication issues between she and  and she and daughter. Client doesn't have drivers license so cannot drive and hard depending on  who doesn't always want to go where she wants to. Client reports stuttering and memory issues and working with PCP to know source(s). Today she reported around 1/2 pm her muscles feel like they \"melt\".  feels helpless to help her get out of depressed moods and wife feels she lets him down and makes him stressed out. She would like to have him hug her more and to go out to movies, etc... they report the [facility] ruled out dementia.     Treatment Objective(s) Addressed in This Session:   Memory issues; communication/boundaries.       Intervention:  Assessed functioning. Went over the results of the phq/elis. Processed feelings about marital issues and problem solved.           ASSESSMENT: Current Emotional / Mental Status (status of significant " symptoms):   Risk status (Self / Other harm or suicidal ideation)   Patient denies current fears or concerns for personal safety.   Patient denies current or recent suicidal ideation or behaviors.   Patientdenies current or recent homicidal ideation or behaviors.   Patient denies current or recent self injurious behavior or ideation.   Patient denies other safety concerns.   Patient Patient reports there has been no change in risk factors since their last session.     PatientPatient reports there has been no change in protective factors since their last session.     Recommended that patient call 911 or go to the local ED should there be a change in any of these risk factors.     Appearance:   Appropriate    Eye Contact:   Good    Psychomotor Behavior: Normal    Attitude:   Cooperative    Orientation:   All   Speech    Rate / Production: Normal     Volume:  Normal    Mood:    Depressed, irritable at times   Affect:    Appropriate    Thought Content:  Clear    Thought Form:  Coherent  Logical    Insight:    Fair      Medication Review:   No changes to current psychiatric medication(s). Dr. Burrell prescribing wellbutrin.     Medication Compliance:   Yes     Changes in Health Issues:   None reported     Chemical Use Review:   Substance Use: Chemical use reviewed, no active concerns identified      Tobacco Use: No current tobacco use.      Diagnosis:  No diagnosis found.    Collateral Reports Completed:   Routed note to PCP    PLAN: (Patient Tasks / Therapist Tasks / Other)  Schedule biweekly. Practice work on boundaries. Have more fun together.        Tyrell Singh, [facility]                                                         ______________________________________________________________________    Treatment Plan    Patient's Name: Albina Pedro  YOB: 1950    Date: 1/2/20    DSM5 Diagnoses: 296.22 (F32.1)  Major Depressive Disorder, Single Episode, Moderate _ and With anxious distress or  "300.02 (F41.1) Generalized Anxiety Disorder  Psychosocial / Contextual Factors: memory issues impacting relationships.  WHODAS: 40    Referral / Collaboration:  Referral to another professional/service is not indicated at this time.    Anticipated number of session or this episode of care: 15      MeasurableTreatment Goal(s) related to diagnosis / functional impairment(s)  Goal 1: Patient will report improved communication with her family.    I will know I've met my goal when \"Communication level improved with  and daughter and understanding what I have.      Objective #A (Patient Action)    Patient will make at least 1 effort per day to improve her communication with her family.  Status: New - Date: 1/2/20     Intervention(s)  Therapist will educate on boundaries, conflict resolution and communication.    Objective #B  Patient will .  Status:      Intervention(s)  Therapist will .    Objective #C  Patient will .  Status:      Intervention(s)  Therapist will .           Patient has reviewed and agreed to the above plan.      Tyrell Singh, [facility]  January 2, 2020  "

## 2020-02-08 ENCOUNTER — HEALTH MAINTENANCE LETTER (OUTPATIENT)
Age: 70
End: 2020-02-08

## 2020-02-08 ASSESSMENT — ANXIETY QUESTIONNAIRES: GAD7 TOTAL SCORE: 4

## 2020-02-27 ENCOUNTER — OFFICE VISIT (OUTPATIENT)
Dept: PSYCHIATRY | Facility: CLINIC | Age: 70
End: 2020-02-27
Payer: COMMERCIAL

## 2020-02-27 VITALS
DIASTOLIC BLOOD PRESSURE: 70 MMHG | HEIGHT: 66 IN | WEIGHT: 186 LBS | OXYGEN SATURATION: 98 % | TEMPERATURE: 97 F | HEART RATE: 82 BPM | BODY MASS INDEX: 29.89 KG/M2 | SYSTOLIC BLOOD PRESSURE: 128 MMHG | RESPIRATION RATE: 18 BRPM

## 2020-02-27 DIAGNOSIS — F33.0 MAJOR DEPRESSIVE DISORDER, RECURRENT, MILD (H): ICD-10-CM

## 2020-02-27 DIAGNOSIS — F41.1 GAD (GENERALIZED ANXIETY DISORDER): Primary | ICD-10-CM

## 2020-02-27 PROCEDURE — 99214 OFFICE O/P EST MOD 30 MIN: CPT | Performed by: NURSE PRACTITIONER

## 2020-02-27 RX ORDER — VENLAFAXINE HYDROCHLORIDE 150 MG/1
150 CAPSULE, EXTENDED RELEASE ORAL DAILY
Qty: 90 CAPSULE | Refills: 0 | Status: SHIPPED | OUTPATIENT
Start: 2020-02-27 | End: 2020-04-03

## 2020-02-27 RX ORDER — BUPROPION HYDROCHLORIDE 150 MG/1
150 TABLET ORAL EVERY MORNING
Qty: 90 TABLET | Refills: 0 | Status: SHIPPED | OUTPATIENT
Start: 2020-02-27 | End: 2020-04-03

## 2020-02-27 ASSESSMENT — ANXIETY QUESTIONNAIRES
6. BECOMING EASILY ANNOYED OR IRRITABLE: SEVERAL DAYS
5. BEING SO RESTLESS THAT IT IS HARD TO SIT STILL: NOT AT ALL
7. FEELING AFRAID AS IF SOMETHING AWFUL MIGHT HAPPEN: NOT AT ALL
GAD7 TOTAL SCORE: 3
3. WORRYING TOO MUCH ABOUT DIFFERENT THINGS: NOT AT ALL
2. NOT BEING ABLE TO STOP OR CONTROL WORRYING: SEVERAL DAYS
IF YOU CHECKED OFF ANY PROBLEMS ON THIS QUESTIONNAIRE, HOW DIFFICULT HAVE THESE PROBLEMS MADE IT FOR YOU TO DO YOUR WORK, TAKE CARE OF THINGS AT HOME, OR GET ALONG WITH OTHER PEOPLE: NOT DIFFICULT AT ALL
1. FEELING NERVOUS, ANXIOUS, OR ON EDGE: SEVERAL DAYS

## 2020-02-27 ASSESSMENT — MIFFLIN-ST. JEOR: SCORE: 1375.44

## 2020-02-27 ASSESSMENT — PATIENT HEALTH QUESTIONNAIRE - PHQ9
5. POOR APPETITE OR OVEREATING: NOT AT ALL
SUM OF ALL RESPONSES TO PHQ QUESTIONS 1-9: 1

## 2020-02-27 NOTE — PATIENT INSTRUCTIONS
1.  Continue Wellbutrin  mg daily   2. Continue Venlafaxine 150 mg daily   3. Consider Gene Sight Testing   4. Start talk therapy with Remy Singh       Continue all other medications as reviewed per electronic medical record today.     Safety plan reviewed. To the Emergency Department as needed or call after hours crisis line at [phone] or [phone]. [place] Crisis Text Line. Text [place] to 378987 or Suicide LifeLine Chat: suicideBorder [place].org/chat/    To schedule individual or family therapy, call [facility] at [phone].    Schedule an appointment with me in 2-3 months  or sooner as needed. Call [facility] at [phone] to schedule.    Follow up with primary care provider as planned or for acute medical concerns.    Call the psychiatric nurse line with medication questions or concerns at [phone].    [place] [employer] may be used to communicate with your provider, but this is not intended to be used for emergencies.    Crisis Resources:    National Suicide Prevention Lifeline: 686.529.1435 (TTY: 351.834.8208). Call anytime for help.  (www.suicidepreventionlifeline.org)  National [place] on Mental Illness (www.jennifer.org): 899.455.1349 or 063-350-3357.   Mental Health Association (www.mentalhealth.org): 377.829.3266 or 477-910-0586.  [place] Crisis Text [place]: Text [place] to 420078  Suicide LifeLine Chat: suicideThe Fab Shoesline.org/chat

## 2020-02-27 NOTE — NURSING NOTE
"Initial /70   Pulse 82   Temp 97  F (36.1  C) (Tympanic)   Resp 18   Ht 1.676 m (5' 6\")   Wt 84.4 kg (186 lb)   SpO2 98%   BMI 30.02 kg/m   Estimated body mass index is 30.02 kg/m  as calculated from the following:    Height as of this encounter: 1.676 m (5' 6\").    Weight as of this encounter: 84.4 kg (186 lb). .    Zakia Avelar CMA ([facility])  "

## 2020-02-27 NOTE — PROGRESS NOTES
"    Outpatient Psychiatric Progress Note    Name: Albina Pedro   : 1950                    Primary Care Provider: Carolina Burrell MD   Therapist: Tyrell Singh    PHQ-9 scores:  PHQ-9 SCORE 1/15/2020 2020 2020   PHQ-9 Total Score - - -   PHQ-9 Total Score 5 4 3       SEBASTIAN-7 scores:  SEBASTIAN-7 SCORE 1/15/2020 2020 2020   Total Score - - -   Total Score 9 5 4       Patient Identification:    Patient is a 69 year old year old,   White American female  who presents for return visit with me.  Patient is currently unemployed. Patient attended the session with Her  , who they agreed to have interview with. Patient prefers to be called: \" Kristina\".    Interim History:    I last saw Albina Pedro for outpatient psychiatry Return Visit on January 15, 2020.     During that appointment, she reported that she feels more calm and organized.  She was seen by neurology who revealed no cognitive changes since her initial assessment in 2017.  She recently started talk therapy with Tyrell Singh and she feels that it was going well.  At this point she requested that her Wellbutrin and venlafaxine can be continued at their current doses.  Her  concurs that over the past 2 weeks, she has done much better with regulating her mood and staying on task.    Current medications include: aspirin 81 MG tablet, Take 81 mg by mouth every evening   buPROPion (WELLBUTRIN XL) 150 MG 24 hr tablet, Take 1 tablet (150 mg) by mouth every morning  calcium carbonate (OS-SARI 500 MG [place]) 1250 MG tablet, Take 1 tablet by mouth 2 times daily  LORazepam (ATIVAN) 0.5 MG tablet, TAKE ONE TABLET BY MOUTH EVERY EIGHT HOURS AS NEEDED FOR ANXIETY   Multiple Vitamins-Minerals (MULTIVITAMIN & MINERAL PO), Take 1 tablet by mouth daily   pravastatin (PRAVACHOL) 40 MG tablet, Take 1 tablet (40 mg) by mouth daily  Ranolazine 1000 MG TB12, Take 1,000 mg by mouth 2 times daily  triamcinolone (KENALOG) 0.1 % cream, " "Apply sparingly to affected area three times daily for 14 days.  triamcinolone (KENALOG) 0.1 % external ointment, Apply topically 2 times daily  venlafaxine (EFFEXOR-XR) 150 MG 24 hr capsule, Take 1 capsule (150 mg) by mouth daily  VITAMIN D3 OR, 100-2000 units by mouth DAILY  [] nystatin (MYCOSTATIN) 400945 UNIT/GM external cream, Apply topically 2 times daily    No current facility-administered medications on file prior to visit.        The [place] Prescription Monitoring Program has been reviewed and there are no concerns about diversionary activity for controlled substances at this time.      I was able to review most recent Primary Care Provider, specialty provider, and therapy visit notes that I have access to.     Today, patient reports when she first started taking the Wellbutrin she had 2 weeks of extreme agitation.  She felt like her  and daughter were against her.  During 1 of her sessions with Tyrell Singh she walked out due to extreme irritability but then ended up coming back into finish the session.  Over the past 2 weeks things have been better for her.  She reports that her sleep is \"fine\".  Her weight is stable.  Recently she saw a rheumatologist and was diagnosed with osteoarthritis.  She feels that her muscles are getting stronger and she has more ambition to clean around the house.  Around 2:30 in the afternoon she tells me her eyes start to close and she has an intense desire to take a nap often sleeping 1 to 2 hours at a time.  Her  is concerned about this.  Overall marital relations are going better.  Her  agrees.     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    Social history updates:    [facility] [place]    Substance use updates:    No " "alcohol use  Tobacco use: No    Vital Signs:   /70   Pulse 82   Temp 97  F (36.1  C) (Tympanic)   Resp 18   Ht 1.676 m (5' 6\")   Wt 84.4 kg (186 lb)   SpO2 98%   BMI 30.02 kg/m      Labs:    Most recent laboratory results reviewed and no new labs.     Review of Systems:  10 systems (general, cardiovascular, respiratory, eyes, ENT, endocrine, GI, , M/S, neurological) were reviewed. Most pertinent finding(s) is/are: Some leg pain and weakness, no chest pain, shortness of breath that is mild. The remaining systems are all unremarkable.    Mental Status Examination:  Appearance:  awake, alert, adequately groomed and casually dressed  Attitude:  cooperative   Eye Contact:  good  Gait and Station: No assistive Devices used and No dizziness or falls  Psychomotor Behavior:  fidgeting and intact station, gait and muscle tone  Oriented to:  time, person, and place  Attention Span and Concentration:  Fair  Speech:   pressured speech and Speaks: English  Mood:  anxious, depressed and better  Affect:  intensity is heightened  Associations:  no loose associations  Thought Process:  tangental  Thought Content:  no evidence of suicidal ideation or homicidal ideation, no auditory hallucinations present and no visual hallucinations present  Recent and Remote Memory:  fair Not formally assessed. No amnesia.  Fund of Knowledge: appropriate  Insight:  fair  Judgment:  fair  Impulse Control:  fair    Suicide Risk Assessment:  Today Albina Pedro reports she is having no thoughts to harm herself or to hurt other people. In addition, there are notable risk factors for self-harm, including anxiety, wrecklessness and mood change. However, risk is mitigated by commitment to family, sobriety, history of seeking help when needed, future oriented, no access to firearms or weapons, denies suicidal intent or plan and denies homicidal ideation, intent, or plan. Therefore, based on all available evidence including the factors " cited above, Albina Pedro does not appear to be at imminent risk for self-harm, does not meet criteria for a 72-hr hold, and therefore remains appropriate for ongoing outpatient level of care.  A thorough assessment of risk factors related to suicide and self-harm have been reviewed and are noted above. The patient convincingly denies suicidality on several occasions. Local community safety resources printed and reviewed for patient to use if needed. There was no deceit detected, and the patient presented in a manner that was believable.     DSM5 Diagnosis:  296.31 (F33.0) Major Depressive Disorder, Recurrent Episode, Mild With mixed features  300.02 (F41.1) Generalized Anxiety Disorder    Medical comorbidities include:   Patient Active Problem List    Diagnosis Date Noted     Chronic stable angina (H) 01/18/2019     Priority: Medium     Intermediate coronary syndrome (H) 07/24/2018     Priority: Medium     ILD (interstitial lung disease) (H) 07/09/2018     Priority: Medium     Needs follow up ct dec 2019       Hyperlipidemia LDL goal <70 05/31/2018     Priority: Medium     Major depressive disorder, recurrent, mild (H) 02/01/2017     Priority: Medium     Acute chest pain 12/13/2016     Priority: Medium     Status post coronary angiogram 02/18/2016     Priority: Medium     Adverse effect of anesthetic 02/11/2016     Priority: Medium     Patient is very sensitive to anesthetics. Needs lower dosing.        Sjogren's syndrome (H) 01/15/2014     Priority: Medium     CAD (coronary artery disease) 09/18/2013     Priority: Medium     Mild ischemia on stress test       Advanced directives, counseling/discussion 01/12/2012     Priority: Medium     Advance Directive Problem List Overview:   Name Relationship Phone    Primary Health Care Agent            Alternative Health Care Agent          Discussed advance care planning with patient; information given to patient to review.     Daija Black, CMA, HC  Facilitator    1/12/2012          Panniculitis 11/10/2011     Priority: Medium     Health Care Home 06/15/2011     Priority: Medium     X  DX V65.8 REPLACED WITH [facility] (04/08/2013)       Episodic mood disorder (H) 11/26/2007     Priority: Medium     November 26, 2007 - Prozac and will look into light box.   April 15, 2008 - Will stop for summer. Light box rx.  Problem list name updated by automated process. Provider to review       Dermatophytosis of body 09/12/2007     Priority: Medium     September 12, 2007 - Classic appearance and worsening in areas's not using Nystatin.  Will use everywhere or change to clotrimazole.   April 15, 2008 - Change to powder.   Problem list name updated by automated process. Provider to review       DIZZINESS - LIKELY HYPOGLYCEMIA 07/27/2006     Priority: Medium     July 24, 2008- negative Event monitor and GTT showed some elevation.  Dietary changes.            Assessment:    Albina Pedro comes in reporting less depression and anxiety.  Her mood is better regulated over the past 2 weeks as prior to that she was experiencing extreme irritability and felt betrayed by family members.  Since starting the talk therapy with Tyrell Singh she has been feeling better.  No changes in her medications will be made today.  1 more visit with myself to determine stability, and she will be ready to return to her primary doctor for future refills.  Medication side effects and alternatives were reviewed. Health promotion activities recommended and reviewed today. All questions addressed. Education and counseling completed regarding risks and benefits of medications and psychotherapy options.    Treatment Plan:    1.  Continue Wellbutrin  mg daily   2. Continue Venlafaxine 150 mg daily   3. Consider Gene Sight Testing   4.  Continue talk therapy with Remy Singh       Continue all other medications as reviewed per electronic medical record today.     Safety plan  reviewed. To the Emergency Department as needed or call after hours crisis line at [phone] or [phone]. [place] Crisis Text Line. Text [place] to 930608 or Suicide LifeLine Chat: suicideDedicated Devices.org/chat/    To schedule individual or family therapy, call [facility] at [phone].    Schedule an appointment with me in 4 weeks or sooner as needed. Call [facility] at [phone] to schedule.    Follow up with primary care provider as planned or for acute medical concerns.    Call the psychiatric nurse line with medication questions or concerns at [phone].    [employer] may be used to communicate with your provider, but this is not intended to be used for emergencies.    Crisis Resources:    National Suicide Prevention Lifeline: 754.558.3868 (TTY: 799.494.1765). Call anytime for help.  (www.suicidepreventionlifeline.org)  National [place] on Mental Illness (www.jennifer.org): 720.388.8945 or 333-474-9863.   Mental Health Association (www.mentalhealth.org): 645.778.1844 or 687-145-3053.  [place] Crisis Text [place]: Text [place] to 794189  Suicide LifeLine Chat: suicideDedicated Devices.org/chat    Administrative Billing:   Time spent with patient was 30 minutes and greater than 50% of time or 20 minutes was spent in counseling and coordination of care regarding above diagnoses and treatment plan.    Patient Status:  Patient will continue to be seen for ongoing consultation and stabilization.    Signed:   MARISOL Harmon-BC   Psychiatry

## 2020-02-28 ASSESSMENT — ANXIETY QUESTIONNAIRES: GAD7 TOTAL SCORE: 3

## 2020-03-02 DIAGNOSIS — F43.0 ACUTE REACTION TO STRESS: ICD-10-CM

## 2020-03-03 RX ORDER — LORAZEPAM 0.5 MG/1
TABLET ORAL
Qty: 20 TABLET | Refills: 0 | Status: SHIPPED | OUTPATIENT
Start: 2020-03-03 | End: 2020-10-01

## 2020-03-03 NOTE — TELEPHONE ENCOUNTER
LORazepam Oral Tablet 0.5 MG      Last Written Prescription Date:  10/30/19  Last Fill Quantity: 20,   # refills: 0  Last Office Visit: 9/5/19  Future Office visit:    Next 5 appointments (look out 90 days)    Mar 04, 2020 11:00 AM CST  Return Visit with Tyrell Singh [facility] ([facility]) [address] [address]  [phone]   Mar 18, 2020 11:00 AM CDT  Return Visit with Tyrell Singh [facility] ([address] [address]  [phone]   May 01, 2020  1:30 PM CDT  Return Visit with Driss Carbone MD  [facility] [address] [place] [id number]  [phone]           Routing refill request to provider for review/approval because:  Drug not on the [facility], [facility] or [facility] refill protocol or controlled substance

## 2020-03-04 ENCOUNTER — OFFICE VISIT (OUTPATIENT)
Dept: PSYCHOLOGY | Facility: CLINIC | Age: 70
End: 2020-03-04
Payer: COMMERCIAL

## 2020-03-04 DIAGNOSIS — F41.1 GENERALIZED ANXIETY DISORDER: ICD-10-CM

## 2020-03-04 DIAGNOSIS — F32.0 MAJOR DEPRESSIVE DISORDER, SINGLE EPISODE, MILD (H): Primary | ICD-10-CM

## 2020-03-04 PROCEDURE — 90834 PSYTX W PT 45 MINUTES: CPT | Performed by: SOCIAL WORKER

## 2020-03-04 ASSESSMENT — ANXIETY QUESTIONNAIRES
1. FEELING NERVOUS, ANXIOUS, OR ON EDGE: SEVERAL DAYS
2. NOT BEING ABLE TO STOP OR CONTROL WORRYING: SEVERAL DAYS
5. BEING SO RESTLESS THAT IT IS HARD TO SIT STILL: NOT AT ALL
3. WORRYING TOO MUCH ABOUT DIFFERENT THINGS: SEVERAL DAYS
6. BECOMING EASILY ANNOYED OR IRRITABLE: SEVERAL DAYS
7. FEELING AFRAID AS IF SOMETHING AWFUL MIGHT HAPPEN: SEVERAL DAYS
GAD7 TOTAL SCORE: 5

## 2020-03-04 ASSESSMENT — PATIENT HEALTH QUESTIONNAIRE - PHQ9
SUM OF ALL RESPONSES TO PHQ QUESTIONS 1-9: 8
5. POOR APPETITE OR OVEREATING: NOT AT ALL

## 2020-03-04 NOTE — Clinical Note
They reported some good weeks followed by past 2 being rough. Stuttering episodes and fatigue and memory issues. She said she may have come up with a muscle condition that may fit her symptoms. She didn't want to bring this up with  today because he may worry. They both really want to know what is going on. Complicated case.

## 2020-03-04 NOTE — PROGRESS NOTES
Progress Note    Patient Name: Albina Pedro  Date: 3/4/20         Service Type: Individual  Video Visit: No     Session Start Time: 11  Session End Time: 11:45 am     Session Length: 45 min    Session #: 5    Attendees: Client attended alone.  attended.     Treatment Plan Last Reviewed: due 4/2/20  PHQ-9 / ELIS-7 : phq=9; elis=5.    DATA  Interactive Complexity: No  Crisis: No       Progress Since Last Session (Related to Symptoms / Goals / Homework):   Symptoms: stable.    Homework: Partially completed. Read about boundaries. Set up fun activities with .     Episode of Care Goals: Satisfactory progress - PREPARATION (Decided to change - considering how); Intervened by negotiating a change plan and determining options / strategies for behavior change, identifying triggers, exploring social supports, and working towards setting a date to begin behavior change.    Current / Ongoing Stressors and Concerns:  Client reports good couple weeks followed by poor past 2 weeks. She feels fatigue and has some stuttering episodes.     Treatment Objective(s) Addressed in This Session:   Memory issues; communication/boundaries.  Participate in fun activities with .     Intervention:  Assessed functioning. Went over the results of the phq/elis. Processed feelings about symptoms and the worry it causes and problem solved.           ASSESSMENT: Current Emotional / Mental Status (status of significant symptoms):   Risk status (Self / Other harm or suicidal ideation)   Patient denies current fears or concerns for personal safety.   Patient denies current or recent suicidal ideation or behaviors.   Patientdenies current or recent homicidal ideation or behaviors.   Patient denies current or recent self injurious behavior or ideation.   Patient denies other safety concerns.   Patient Patient reports there has been no change in risk factors since their last session.   "   PatientPatient reports there has been no change in protective factors since their last session.     Recommended that patient call 911 or go to the local ED should there be a change in any of these risk factors.     Appearance:   Appropriate    Eye Contact:   Good    Psychomotor Behavior: Normal    Attitude:   Cooperative    Orientation:   All   Speech    Rate / Production: Normal     Volume:  Normal    Mood:    Depressed   Affect:    Appropriate    Thought Content:  Clear    Thought Form:  Coherent  Logical    Insight:    Fair      Medication Review:   No changes to current psychiatric medication(s). Dr. Burrell prescribing wellbutrin.     Medication Compliance:   Yes     Changes in Health Issues:   None reported     Chemical Use Review:   Substance Use: Chemical use reviewed, no active concerns identified      Tobacco Use: No current tobacco use.      Diagnosis:  296.22. SEBASTIAN.    Collateral Reports Completed:   Routed note to PCP    PLAN: (Patient Tasks / Therapist Tasks / Other)  Schedule biweekly. Practice work on boundaries. Have more fun together.        Tyrell Singh, [facility]                                                         ______________________________________________________________________    Treatment Plan    Patient's Name: Albina Pedro  YOB: 1950    Date: 1/2/20    DSM5 Diagnoses: 296.22 (F32.1)  Major Depressive Disorder, Single Episode, Moderate _ and With anxious distress or 300.02 (F41.1) Generalized Anxiety Disorder  Psychosocial / Contextual Factors: memory issues impacting relationships.  WHODAS: 40    Referral / Collaboration:  Referral to another professional/service is not indicated at this time.    Anticipated number of session or this episode of care: 15      MeasurableTreatment Goal(s) related to diagnosis / functional impairment(s)  Goal 1: Patient will report improved communication with her family.    I will know I've met my goal when \"Communication level " improved with  and daughter and understanding what I have.      Objective #A (Patient Action)    Patient will make at least 1 effort per day to improve her communication with her family.  Status: New - Date: 1/2/20     Intervention(s)  Therapist will educate on boundaries, conflict resolution and communication.    Objective #B  Patient will .  Status:      Intervention(s)  Therapist will .    Objective #C  Patient will .  Status:      Intervention(s)  Therapist will .           Patient has reviewed and agreed to the above plan.      Tyrell Singh, [facility]  January 2, 2020

## 2020-03-05 ASSESSMENT — ANXIETY QUESTIONNAIRES: GAD7 TOTAL SCORE: 5

## 2020-04-03 ENCOUNTER — VIRTUAL VISIT (OUTPATIENT)
Dept: PSYCHIATRY | Facility: CLINIC | Age: 70
End: 2020-04-03
Payer: COMMERCIAL

## 2020-04-03 DIAGNOSIS — F33.0 MAJOR DEPRESSIVE DISORDER, RECURRENT, MILD (H): ICD-10-CM

## 2020-04-03 PROCEDURE — 99214 OFFICE O/P EST MOD 30 MIN: CPT | Mod: TEL | Performed by: NURSE PRACTITIONER

## 2020-04-03 RX ORDER — VENLAFAXINE HYDROCHLORIDE 150 MG/1
150 CAPSULE, EXTENDED RELEASE ORAL DAILY
Qty: 90 CAPSULE | Refills: 0 | Status: SHIPPED | OUTPATIENT
Start: 2020-04-03 | End: 2020-06-04

## 2020-04-03 RX ORDER — BUPROPION HYDROCHLORIDE 150 MG/1
150 TABLET ORAL EVERY MORNING
Qty: 90 TABLET | Refills: 0 | Status: SHIPPED | OUTPATIENT
Start: 2020-04-03 | End: 2020-04-15 | Stop reason: ALTCHOICE

## 2020-04-03 ASSESSMENT — ANXIETY QUESTIONNAIRES
7. FEELING AFRAID AS IF SOMETHING AWFUL MIGHT HAPPEN: NOT AT ALL
3. WORRYING TOO MUCH ABOUT DIFFERENT THINGS: NOT AT ALL
GAD7 TOTAL SCORE: 3
6. BECOMING EASILY ANNOYED OR IRRITABLE: SEVERAL DAYS
IF YOU CHECKED OFF ANY PROBLEMS ON THIS QUESTIONNAIRE, HOW DIFFICULT HAVE THESE PROBLEMS MADE IT FOR YOU TO DO YOUR WORK, TAKE CARE OF THINGS AT HOME, OR GET ALONG WITH OTHER PEOPLE: VERY DIFFICULT
5. BEING SO RESTLESS THAT IT IS HARD TO SIT STILL: SEVERAL DAYS
1. FEELING NERVOUS, ANXIOUS, OR ON EDGE: SEVERAL DAYS
2. NOT BEING ABLE TO STOP OR CONTROL WORRYING: NOT AT ALL

## 2020-04-03 ASSESSMENT — PATIENT HEALTH QUESTIONNAIRE - PHQ9
5. POOR APPETITE OR OVEREATING: NOT AT ALL
SUM OF ALL RESPONSES TO PHQ QUESTIONS 1-9: 7

## 2020-04-03 NOTE — Clinical Note
Hi Kristina Lind told me that there was some concern with her taking wellbutrin with venlafaxine.  I was thinking abut  stopping the Wellbutrin to avoid additive effects.  Your thoughts?  Thank you -

## 2020-04-03 NOTE — PROGRESS NOTES
"Albina Pedro is a 69 year old female who is being evaluated via a billable telephone visit.      The patient has been notified of following:     \"This telephone visit will be conducted via a call between you and your physician/provider. We have found that certain health care needs can be provided without the need for a physical exam.  This service lets us provide the care you need with a short phone conversation.  If a prescription is necessary we can send it directly to your pharmacy.  If lab work is needed we can place an order for that and you can then stop by our lab to have the test done at a later time.    If during the course of the call the physician/provider feels a telephone visit is not appropriate, you will not be charged for this service.\"     Patient has given verbal consent for Telephone visit?  Yes    Albina Pedro complains of  No chief complaint on file.      I have reviewed and updated the patient's Past Medical History, Social History, Family History and Medication List.    ALLERGIES  Daypro [oxaprozin]; Lidocaine; Nitroglycerin; Penicillins; and Sulfa drugs    Phone call duration: 30 minutes    Yesenia Pineda NP           Outpatient Psychiatric Progress Note    Name: Albina Pedro   : 1950                    Primary Care Provider: Carolina Burrell MD   Therapist: Tyrell Singh    PHQ-9 scores:  PHQ-9 SCORE 2020 3/4/2020 4/3/2020   PHQ-9 Total Score - - -   PHQ-9 Total Score 1 8 7       SEBASTIAN-7 scores:  SEBASTIAN-7 SCORE 2020 3/4/2020 4/3/2020   Total Score - - -   Total Score 3 5 3       Patient Identification:    Patient is a 69 year old year old,   White American female  who presents for return visit with me.  Patient is currently unemployed. Patient attended the session with her  , who they agreed to have interview with. Patient prefers to be called: \"Kristina\".    Interim History:    I last saw Albina Pedro for outpatient psychiatry Return " "Visit on February 27, 2020. During that appointment, she reported  less depression and anxiety.  Her mood is better regulated over the past 2 weeks as prior to that she was experiencing extreme irritability and felt betrayed by family members.  Since starting the talk therapy with Tyrell Ada she has been feeling better.  No changes in her medications will be made today.  1 more visit with myself to determine stability, and she will be ready to return to her primary doctor for future refills..     Current medications include: aspirin 81 MG tablet, Take 81 mg by mouth every evening   buPROPion (WELLBUTRIN XL) 150 MG 24 hr tablet, Take 1 tablet (150 mg) by mouth every morning  calcium carbonate (OS-SARI 500 MG [place]. [place]) 1250 MG tablet, Take 1 tablet by mouth 2 times daily  LORazepam (ATIVAN) 0.5 MG tablet, TAKE ONE TABLET BY MOUTH EVERY EIGHT HOURS AS NEEDED FOR ANXIETY  Multiple Vitamins-Minerals (MULTIVITAMIN & MINERAL PO), Take 1 tablet by mouth daily   pravastatin (PRAVACHOL) 40 MG tablet, Take 1 tablet (40 mg) by mouth daily  Ranolazine 1000 MG TB12, Take 1,000 mg by mouth 2 times daily  triamcinolone (KENALOG) 0.1 % cream, Apply sparingly to affected area three times daily for 14 days.  triamcinolone (KENALOG) 0.1 % external ointment, Apply topically 2 times daily  venlafaxine (EFFEXOR-XR) 150 MG 24 hr capsule, Take 1 capsule (150 mg) by mouth daily  VITAMIN D3 OR, 100-2000 units by mouth DAILY  nystatin (MYCOSTATIN) 526878 UNIT/GM external cream, Apply topically 2 times daily    No current facility-administered medications on file prior to visit.        The [place] Prescription Monitoring Program has been reviewed and there are no concerns about diversionary activity for controlled substances at this time.      I was able to review most recent Primary Care Provider, specialty provider, and therapy visit notes that I have access to.     Today, patient reports that \"some days are better than others\".  She " "and her  are concerned that she has made no improvement in her memory function.  She has been getting frustrated with short-term memory loss and often does not remember what she did the day before despite normal findings from Dr. Hurtado through the neurological clinic.  Her  was suspicious that Alena was \"drowning up business\".  Recently they received a call from a therapist that she had not seen since September 2018 through the [facility].  At this point she prefers to see Remy Singh for therapy sessions.  Kristina tells me she uses the Ativan on a rare basis for extreme panic symptoms.  Some days she tries to go to painting classes when they were held at the [facility] but only made it to 2 sessions.  She is forgetful about having to go there.  With regards to her physical health status she reported that her lung doctor was concerned about her taking Wellbutrin and venlafaxine together.  She informs me that it was reported that her left lung look like she was a \"smoker\".  Even though she does not smoke tobacco products.  She reported no concerns for high blood pressure.  Recently she has been having episodes of falling.  1 month ago she fell down 5 stairs and hit her head.  This was after she reported that her right leg was pulling up on her when she was going downstairs.     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    Social history updates:    Making maple syrup  Has done a little sewing    Substance use updates:    No alcohol use , no cannabisuse  Tobacco use: No    Vital Signs:   There were no vitals taken for this visit.    Labs:    Most recent laboratory results reviewed and no new labs.     Review of Systems:  10 systems (general, cardiovascular, respiratory, eyes, ENT, endocrine, GI, , M/S, " neurological) were reviewed. Most pertinent finding(s) is/are: She denies chest pain, she denies shortness of breath, she denies rashes. The remaining systems are all unremarkable.    Mental Status Examination:  Appearance:  awake, alert and mild distress  Attitude:  cooperative   Eye Contact:  Unable to assess  Gait and Station: No assistive Devices used and No dizziness or falls  Psychomotor Behavior:  Unable to assess  Oriented to:  time, person, and place  Attention Span and Concentration:  Fair  Speech:   clear, coherent, rambling and Speaks: English  Mood:  anxious and depressed  Affect:  intensity is heightened  Associations:  no loose associations  Thought Process:  logical and goal oriented  Thought Content:  no evidence of suicidal ideation or homicidal ideation, no auditory hallucinations present and no visual hallucinations present  Recent and Remote Memory:  fair Not formally assessed. No amnesia.  Fund of Knowledge: appropriate  Insight:  fair  Judgment:  fair  Impulse Control:  fair    Suicide Risk Assessment:  Today Albina Pedro reports she is having no thoughts to harm herself or other people. In addition, there are notable risk factors for self-harm, including anxiety and mood change. However, risk is mitigated by commitment to family, sobriety, history of seeking help when needed, future oriented, no access to firearms or weapons, denies suicidal intent or plan and denies homicidal ideation, intent, or plan. Therefore, based on all available evidence including the factors cited above, Albina Pedro does not appear to be at imminent risk for self-harm, does not meet criteria for a 72-hr hold, and therefore remains appropriate for ongoing outpatient level of care.  A thorough assessment of risk factors related to suicide and self-harm have been reviewed and are noted above. The patient convincingly denies suicidality on several occasions. Local community safety resources printed and  reviewed for patient to use if needed. There was no deceit detected, and the patient presented in a manner that was believable.     DSM5 Diagnosis:  296.31 (F33.0) Major Depressive Disorder, Recurrent Episode, Mild With mixed features  300.02 (F41.1) Generalized Anxiety Disorder    Medical comorbidities include:   Patient Active Problem List    Diagnosis Date Noted     Chronic stable angina (H) 01/18/2019     Priority: Medium     Intermediate coronary syndrome (H) 07/24/2018     Priority: Medium     ILD (interstitial lung disease) (H) 07/09/2018     Priority: Medium     Needs follow up ct dec 2019       Hyperlipidemia LDL goal <70 05/31/2018     Priority: Medium     Major depressive disorder, recurrent, mild (H) 02/01/2017     Priority: Medium     Acute chest pain 12/13/2016     Priority: Medium     Status post coronary angiogram 02/18/2016     Priority: Medium     Adverse effect of anesthetic 02/11/2016     Priority: Medium     Patient is very sensitive to anesthetics. Needs lower dosing.        Sjogren's syndrome (H) 01/15/2014     Priority: Medium     CAD (coronary artery disease) 09/18/2013     Priority: Medium     Mild ischemia on stress test       Advanced directives, counseling/discussion 01/12/2012     Priority: Medium     Advance Directive Problem List Overview:   Name Relationship Phone    Primary Health Care Agent            Alternative Health Care Agent          Discussed advance care planning with patient; information given to patient to review.     Daija Black CMA, HC Facilitator    1/12/2012          Panniculitis 11/10/2011     Priority: Medium     Health Care Home 06/15/2011     Priority: Medium     X  DX V65.8 REPLACED WITH 38589 HEALTH CARE HOME (04/08/2013)       Episodic mood disorder (H) 11/26/2007     Priority: Medium     November 26, 2007 - Prozac and will look into light box.   April 15, 2008 - Will stop for summer. Light box rx.  Problem list name updated by automated process. Provider  to review       Dermatophytosis of body 09/12/2007     Priority: Medium     September 12, 2007 - Classic appearance and worsening in areas's not using Nystatin.  Will use everywhere or change to clotrimazole.   April 15, 2008 - Change to powder.   Problem list name updated by automated process. Provider to review       DIZZINESS - LIKELY HYPOGLYCEMIA 07/27/2006     Priority: Medium     July 24, 2008- negative Event monitor and GTT showed some elevation.  Dietary changes.            Assessment:    Albina Pedro reports ongoing frustration with memory problems.  She also expressed concern about taking Wellbutrin and venlafaxine together.  I will contact her medical provider to see what the concern was.  At this point she will continue with venlafaxine and Wellbutrin as prescribed.  She is going to continue with talk therapy with Tyrell Singh which she finds helpful.  Medication side effects and alternatives were reviewed. Health promotion activities recommended and reviewed today. All questions addressed. Education and counseling completed regarding risks and benefits of medications and psychotherapy options.    Treatment Plan:      1.  Continue Wellbutrin  mg daily   2. Continue Venlafaxine 150 mg daily   3. Consider Gene Sight Testing   4. Talk therapy with Remy Singh       Continue all other medications as reviewed per electronic medical record today.     Safety plan reviewed. To the Emergency Department as needed or call after hours crisis line at [phone] or [phone]. [place] Crisis Text Line. Text [place] to 410697 or Suicide LifeLine Chat: suicidepreventionlifeline.org/chat/    To schedule individual or family therapy, call [facility] at [phone].    Schedule an appointment with me in 4 weeks or sooner as needed. Call [facility] at [phone] to schedule.    Follow up with primary care provider as planned or for acute medical concerns.    Call the  psychiatric nurse line with medication questions or concerns at [phone].    MyChart may be used to communicate with your provider, but this is not intended to be used for emergencies.    Crisis Resources:    National Suicide Prevention Lifeline: 306.881.6551 (TTY: 740.503.6918). Call anytime for help.  (www.suicidepreventionlifeline.org)  National [place] [employer] Mental Illness (www.jennifer.org): 466.857.5646 or 036-418-7402.   Mental Health Association (www.mentalhealth.org): 802.106.2856 or 348-210-0030.  [place] Crisis Text Line: Text [place] to 596834  Suicide LifeLine Chat: suicide"[employer]line.org/chat    Administrative Billing:   Time spent with patient was 30 minutes and greater than 50% of time or 20 minutes was spent in counseling and coordination of care regarding above diagnoses and treatment plan.    Patient Status:  Patient will continue to be seen for ongoing consultation and stabilization.    Signed:   MARISOL Harmon-BC   Psychiatry

## 2020-04-03 NOTE — PATIENT INSTRUCTIONS
1.  Continue Wellbutrin  mg daily   2. Continue Venlafaxine 150 mg daily   3. Consider Gene Sight Testing   4. Start talk therapy with Remy Singh       Continue all other medications as reviewed per electronic medical record today.     Safety plan reviewed. To the Emergency Department as needed or call after hours crisis line at [phone] or [phone]. [place] Crisis Text [place]. Text [place] to 612310 or Suicide LifeLine Chat: suicidepreventionlifeline.org/chat/    To schedule individual or family therapy, call [facility] at [phone].    Schedule an appointment with me in 4 weeks or sooner as needed. Call [facility] at [phone] to schedule.    Follow up with primary care provider as planned or for acute medical concerns.    Call the psychiatric nurse line with medication questions or concerns at [phone].    [employer] may be used to communicate with your provider, but this is not intended to be used for emergencies.    Crisis Resources:    National Suicide Prevention Lifeline: 8

## 2020-04-04 ASSESSMENT — ANXIETY QUESTIONNAIRES: GAD7 TOTAL SCORE: 3

## 2020-04-09 ENCOUNTER — TELEPHONE (OUTPATIENT)
Dept: PSYCHIATRY | Facility: CLINIC | Age: 70
End: 2020-04-09

## 2020-04-09 DIAGNOSIS — F41.1 GAD (GENERALIZED ANXIETY DISORDER): Primary | ICD-10-CM

## 2020-04-09 NOTE — TELEPHONE ENCOUNTER
Chart reviewed.   Appears from visit note that patient is to continue Wellbutrin.       Writer attempted to contact patient-- no answer, unable to LVM-- VM full  If patient calls back, please attempt to transfer to nursing.    Tatyana Curran, RN    Nurse Liaison  [facility]

## 2020-04-09 NOTE — TELEPHONE ENCOUNTER
Reason for call:  Medication   If this is a refill request, has the caller requested the refill from the pharmacy already? N/A  Will the patient be using a [facility]? No  Name of the pharmacy and phone number for the current request: [id number], [place] - [address]    Name of the medication requested: buPROPion    Other request: Pt  if she is continuing this medication or discontinuing?    Phone number to reach patient:  Cell number on file:    Telephone Information:   Mobile [phone]       Best Time:  Anytime    Can we leave a detailed message on this number?  YES    Travel screening: Not Applicable

## 2020-04-13 NOTE — TELEPHONE ENCOUNTER
Phone call to Kristina. She reports that at last appt with Yesenia Pineda, 4/3/20, she was told that Yesenia and Dr Burrell (PCP) would discuss medication plan. Kristina reports she has a few days left of Wellbutrin XL and is unsure if she should  the refill. She would like clarification re: medication plan as determined by Yesenia and Dr Burrell.    Next appt w/Yesenia: 6/4/20    Routing to Yesenia Pineda for input.    Eileen Roa RN on 4/13/2020 at 11:02 AM

## 2020-04-13 NOTE — TELEPHONE ENCOUNTER
I sent a note to Dr Burrell with my thoughts to discontinue the Wellbutrin for now to avoid additive effects of high blood pressure and increased levels of venlafaxine with increased blood pressure.  Please have Kristina not refill the Wellbutrin right now until I hear from Dr. Burrell.

## 2020-04-14 NOTE — TELEPHONE ENCOUNTER
I spoke to Kristina and asked her to discontinue Wellbutrin  mg for now. Do not refill the medication until MARISOL Harmon-BC and Dr. Burrell can discuss a plan.    Patient verbalized understanding and will wait for further instructions.

## 2020-04-15 RX ORDER — FLUOXETINE 10 MG/1
10 CAPSULE ORAL DAILY
Qty: 30 CAPSULE | Refills: 0 | Status: SHIPPED | OUTPATIENT
Start: 2020-04-15 | End: 2020-06-04

## 2020-04-15 NOTE — TELEPHONE ENCOUNTER
"Clarification today from Yesenia Pineda in routing comment: \"Dr. Burrell wanted her to stop the Wellbutrin and to possible add a low dose of Prozac (10 mg daily).\"    Eileen Roa RN on 4/15/2020 at 12:26 PM    "

## 2020-04-15 NOTE — TELEPHONE ENCOUNTER
No message from Dr Burrell in chart. Routing to Yesenia Pineda for clarification.    Eileen Roa RN on 4/15/2020 at 12:08 PM

## 2020-04-15 NOTE — TELEPHONE ENCOUNTER
Writer attempted to contact patient to notify of medication change-- no answer, unable to LVM-- VM full  If patient calls back, transfer to nursing to discuss new medication    Tatyana Curran, RN    Nurse Liaison  [facility]

## 2020-04-17 ENCOUNTER — TELEPHONE (OUTPATIENT)
Dept: PSYCHOLOGY | Facility: CLINIC | Age: 70
End: 2020-04-17

## 2020-04-21 ENCOUNTER — TELEPHONE (OUTPATIENT)
Dept: FAMILY MEDICINE | Facility: CLINIC | Age: 70
End: 2020-04-21

## 2020-04-21 ENCOUNTER — TELEPHONE (OUTPATIENT)
Dept: CARDIOLOGY | Facility: CLINIC | Age: 70
End: 2020-04-21

## 2020-04-21 NOTE — TELEPHONE ENCOUNTER
Tried to contact patient regarding appt on 5/1 with Dr Carbone. Need to switch to video visit. Pt mailbox is full unable to leave message to return call.    Roxy Miranda CMA on 4/21/2020 at 2:11 PM

## 2020-04-21 NOTE — TELEPHONE ENCOUNTER
"I have attempted to contact this patient by phone with the following results: no answer, unable to leave VM. \"the mailbox is full and cannot accept any messages at this time, iban.\"    Heydi AGUILAR RN, BSN        "

## 2020-04-21 NOTE — TELEPHONE ENCOUNTER
Reason for Call:  Other prescription    Detailed comments: Kristina calling to state that she is confused about her medications.  She states that Yesenia and Dr. Burrell were discussing her medications and she doesn't know the outcome of that. Wanting to know what was decided.  Can respond via [employer] if able.  Please review and advise. Thank you..Tita Real    Phone Number Patient can be reached at: Home number on file [phone] (home)    Best Time: any time    Can we leave a detailed message on this number? YES    Call taken on 4/21/2020 at 12:03 PM by Tita Real

## 2020-04-21 NOTE — TELEPHONE ENCOUNTER
They had tried to call her with this and missed her a few times  . She is to stop the wellbutrin and start low dose prozac. Mariel had sent a message during my furlough week so this was never dealt with. Carolina Burrell M.D.

## 2020-04-22 ENCOUNTER — MYC MEDICAL ADVICE (OUTPATIENT)
Dept: FAMILY MEDICINE | Facility: CLINIC | Age: 70
End: 2020-04-22

## 2020-04-22 NOTE — TELEPHONE ENCOUNTER
Tried to contact patient mailbox is full. Sending [employer] message.    Roxy Miranda CMA on 4/22/2020 at 12:32 PM

## 2020-04-22 NOTE — TELEPHONE ENCOUNTER
MyChart note; per Tita's notation below; sent with instructions to stop wellbutrin and to start low dose prozac.  Suhail Silva RN

## 2020-04-23 NOTE — TELEPHONE ENCOUNTER
Spoke with Kristina and she voiced understanding of message.  If she has any further questions she will call us back..Tita Real

## 2020-06-01 ENCOUNTER — TELEPHONE (OUTPATIENT)
Dept: PSYCHOLOGY | Facility: CLINIC | Age: 70
End: 2020-06-01

## 2020-06-04 ENCOUNTER — VIRTUAL VISIT (OUTPATIENT)
Dept: PSYCHIATRY | Facility: CLINIC | Age: 70
End: 2020-06-04
Payer: COMMERCIAL

## 2020-06-04 DIAGNOSIS — F33.0 MAJOR DEPRESSIVE DISORDER, RECURRENT, MILD (H): ICD-10-CM

## 2020-06-04 DIAGNOSIS — F41.1 GAD (GENERALIZED ANXIETY DISORDER): Primary | ICD-10-CM

## 2020-06-04 DIAGNOSIS — R41.3 MEMORY LOSS: ICD-10-CM

## 2020-06-04 PROCEDURE — 99214 OFFICE O/P EST MOD 30 MIN: CPT | Mod: TEL | Performed by: NURSE PRACTITIONER

## 2020-06-04 RX ORDER — VENLAFAXINE HYDROCHLORIDE 150 MG/1
150 CAPSULE, EXTENDED RELEASE ORAL DAILY
Qty: 90 CAPSULE | Refills: 0 | Status: SHIPPED | OUTPATIENT
Start: 2020-06-04 | End: 2020-10-15

## 2020-06-04 ASSESSMENT — ANXIETY QUESTIONNAIRES
6. BECOMING EASILY ANNOYED OR IRRITABLE: SEVERAL DAYS
GAD7 TOTAL SCORE: 5
2. NOT BEING ABLE TO STOP OR CONTROL WORRYING: SEVERAL DAYS
3. WORRYING TOO MUCH ABOUT DIFFERENT THINGS: SEVERAL DAYS
1. FEELING NERVOUS, ANXIOUS, OR ON EDGE: SEVERAL DAYS
IF YOU CHECKED OFF ANY PROBLEMS ON THIS QUESTIONNAIRE, HOW DIFFICULT HAVE THESE PROBLEMS MADE IT FOR YOU TO DO YOUR WORK, TAKE CARE OF THINGS AT HOME, OR GET ALONG WITH OTHER PEOPLE: VERY DIFFICULT
5. BEING SO RESTLESS THAT IT IS HARD TO SIT STILL: NOT AT ALL
7. FEELING AFRAID AS IF SOMETHING AWFUL MIGHT HAPPEN: SEVERAL DAYS

## 2020-06-04 ASSESSMENT — PATIENT HEALTH QUESTIONNAIRE - PHQ9
5. POOR APPETITE OR OVEREATING: NOT AT ALL
SUM OF ALL RESPONSES TO PHQ QUESTIONS 1-9: 7

## 2020-06-04 NOTE — PROGRESS NOTES
"Albina Pedro is a 69 year old female who is being evaluated via a billable telephone visit.      The patient has been notified of following:     \"This telephone visit will be conducted via a call between you and your physician/provider. We have found that certain health care needs can be provided without the need for a physical exam.  This service lets us provide the care you need with a short phone conversation.  If a prescription is necessary we can send it directly to your pharmacy.  If lab work is needed we can place an order for that and you can then stop by our lab to have the test done at a later time.    Telephone visits are billed at different rates depending on your insurance coverage. During this emergency period, for some insurers they may be billed the same as an in-person visit.  Please reach out to your insurance provider with any questions.    If during the course of the call the physician/provider feels a telephone visit is not appropriate, you will not be charged for this service.\"    Patient has given verbal consent for Telephone visit?  Yes    What phone number would you like to be contacted at? [phone]    How would you like to obtain your AVS? Mail a copy    Phone call duration: 30 minutes    Yesenia Pineda NP            Outpatient Psychiatric Progress Note    Name: Albina Pedro   : 1950                    Primary Care Provider: Carolina Burrell MD   Therapist: Tyrell Singh    PHQ-9 scores:  PHQ-9 SCORE 3/4/2020 4/3/2020 2020   PHQ-9 Total Score - - -   PHQ-9 Total Score 8 7 7       SEBASTIAN-7 scores:  SEBASTIAN-7 SCORE 3/4/2020 4/3/2020 2020   Total Score - - -   Total Score 5 3 5       Patient Identification:    Patient is a 69 year old year old,   White American female  who presents for return visit with me.  Patient is currently retired. Patient attended the session with Her  , who they agreed to have interview with. Patient prefers to be " "called: \" Kristina\".    Interim History:    I last saw Albina Pedro for outpatient psychiatry Return Visit on April 3, 2020.     During that appointment, she reported  ongoing frustration with memory problems.  She also expressed concern about taking Wellbutrin and venlafaxine together.  I will contact her medical provider to see what the concern was.  At this point she will continue with venlafaxine and Wellbutrin as prescribed.  She is going to continue with talk therapy with Tyrell Singh which she finds helpful.   .     Current medications include: aspirin 81 MG tablet, Take 81 mg by mouth every evening   calcium carbonate (OS-SARI 500 MG Teller. CA) 1250 MG tablet, Take 1 tablet by mouth 2 times daily  FLUoxetine (PROZAC) 10 MG capsule, Take 1 capsule (10 mg) by mouth daily  LORazepam (ATIVAN) 0.5 MG tablet, TAKE ONE TABLET BY MOUTH EVERY EIGHT HOURS AS NEEDED FOR ANXIETY  Multiple Vitamins-Minerals (MULTIVITAMIN & MINERAL PO), Take 1 tablet by mouth daily   pravastatin (PRAVACHOL) 40 MG tablet, Take 1 tablet (40 mg) by mouth daily  Ranolazine 1000 MG TB12, Take 1,000 mg by mouth 2 times daily  triamcinolone (KENALOG) 0.1 % cream, Apply sparingly to affected area three times daily for 14 days.  venlafaxine (EFFEXOR-XR) 150 MG 24 hr capsule, Take 1 capsule (150 mg) by mouth daily  VITAMIN D3 OR, 100-2000 units by mouth DAILY  [] nystatin (MYCOSTATIN) 159239 UNIT/GM external cream, Apply topically 2 times daily  triamcinolone (KENALOG) 0.1 % external ointment, Apply topically 2 times daily (Patient not taking: Reported on 2020)    No current facility-administered medications on file prior to visit.        The [place] Prescription Monitoring Program has been reviewed and there are no concerns about diversionary activity for controlled substances at this time.      I was able to review most recent Primary Care Provider, specialty provider, and therapy visit notes that I have access to.     Today, " "patient reports that she is bothered by memory problems.  She does not remember to do things.  She then gets depressed .  It is hard to focus and remember things.  Kristina reported having panicky when she could not find her  in a parking lot.  She reports so sleep concerns at night.  She needs a nap in the late afternoon. She takes Lorazepam rarely.  She fell down 5 carpeted steps recently.   reports that things are moving 'slow\".  She has lost 8 pounds recently without dietary changes.       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    Social history updates:    Kristina continues to live with her  who cares for her needs    Substance use updates:    She does  Not drink alcohol  Tobacco use: No    Vital Signs:   There were no vitals taken for this visit.    Labs:    Most recent laboratory results reviewed and no new labs.     Review of Systems:  10 systems (general, cardiovascular, respiratory, eyes, ENT, endocrine, GI, , M/S, neurological) were reviewed. Most pertinent finding(s) is/are: Some falling reported, general weakness, concerns for short term memory deficits, reported no skin rashes.  . The remaining systems are all unremarkable.    Mental Status Examination:  Appearance:  awake, alert and mild distress  Attitude:  cooperative   Eye Contact:  unable to assess  Gait and Station: Dizziness and Falls  Psychomotor Behavior:  unable to assess  Oriented to:  time, person, and place  Attention Span and Concentration:  Fair  Speech:   clear, coherent and Speaks: English  Mood:  anxious and depressed  Affect:  intensity is heightened  Associations:  no loose associations  Thought Process:  tangental  Thought Content:  no evidence of suicidal ideation or homicidal ideation, no auditory hallucinations present and no " visual hallucinations present  Recent and Remote Memory:  fair Not formally assessed. No amnesia.  Fund of Knowledge: appropriate  Insight:  fair  Judgment:  fair  Impulse Control:  fair    Suicide Risk Assessment:  Today Albina Pedro reports that she is having no thoughts to harm herself or other people.  . In addition, there are notable risk factors for self-harm, including anxiety, withdrawing and mood change. However, risk is mitigated by commitment to family, sobriety, history of seeking help when needed, future oriented, no access to firearms or weapons, denies suicidal intent or plan and denies homicidal ideation, intent, or plan. Therefore, based on all available evidence including the factors cited above, Albina Pedro does not appear to be at imminent risk for self-harm, does not meet criteria for a 72-hr hold, and therefore remains appropriate for ongoing outpatient level of care.  A thorough assessment of risk factors related to suicide and self-harm have been reviewed and are noted above. The patient convincingly denies suicidality on several occasions. Local community safety resources printed and reviewed for patient to use if needed. There was no deceit detected, and the patient presented in a manner that was believable.     DSM5 Diagnosis:  296.31 (F33.0) Major Depressive Disorder, Recurrent Episode, Mild With melancholic features  300.02 (F41.1) Generalized Anxiety Disorder    Medical comorbidities include:   Patient Active Problem List    Diagnosis Date Noted     Chronic stable angina (H) 01/18/2019     Priority: Medium     Intermediate coronary syndrome (H) 07/24/2018     Priority: Medium     ILD (interstitial lung disease) (H) 07/09/2018     Priority: Medium     Needs follow up ct dec 2019       Hyperlipidemia LDL goal <70 05/31/2018     Priority: Medium     Major depressive disorder, recurrent, mild (H) 02/01/2017     Priority: Medium     Acute chest pain 12/13/2016     Priority: Medium      Status post coronary angiogram 02/18/2016     Priority: Medium     Adverse effect of anesthetic 02/11/2016     Priority: Medium     Patient is very sensitive to anesthetics. Needs lower dosing.        Sjogren's syndrome (H) 01/15/2014     Priority: Medium     CAD (coronary artery disease) 09/18/2013     Priority: Medium     Mild ischemia on stress test       Advanced directives, counseling/discussion 01/12/2012     Priority: Medium     Advance Directive Problem List Overview:   Name Relationship Phone    Primary Health Care Agent            Alternative Health Care Agent          Discussed advance care planning with patient; information given to patient to review.     Daija Black CMA, HC Facilitator    1/12/2012          Panniculitis 11/10/2011     Priority: Medium     Health Care Home 06/15/2011     Priority: Medium     X  DX V65.8 REPLACED WITH [facility]ALTH CARE HOME (04/08/2013)       Episodic mood disorder (H) 11/26/2007     Priority: Medium     November 26, 2007 - Prozac and will look into light box.   April 15, 2008 - Will stop for summer. Light box rx.  Problem list name updated by automated process. Provider to review       Dermatophytosis of body 09/12/2007     Priority: Medium     September 12, 2007 - Classic appearance and worsening in areas's not using Nystatin.  Will use everywhere or change to clotrimazole.   April 15, 2008 - Change to powder.   Problem list name updated by automated process. Provider to review       DIZZINESS - LIKELY HYPOGLYCEMIA 07/27/2006     Priority: Medium     July 24, 2008- negative Event monitor and GTT showed some elevation.  Dietary changes.            Assessment:    Albina Pedro  Calls in with her  in the background with concerns related to ongoing memory loss and now is falling.  Dr. Burrell and her therapist reached out to discuss options for her.  Her therapist was told by Kristina that his services were no longer needed.  Dr. Burrell would like Kristina to  return to her neurologist to discuss these concerns, especially with the falling episodes happening.  I will discontinue her fluoxetine as she is not lessening her symptoms of depression and anxiety.  As Kristina and her  have concerns about her medications and their potential impact on her memory,  I am referring her to medication therapy management for a review.  For now she will continue her Venlafaxine at 150 mg daily.      Medication side effects and alternatives were reviewed. Health promotion activities recommended and reviewed today. All questions addressed. Education and counseling completed regarding risks and benefits of medications and psychotherapy options.    Treatment Plan:      1.  Discontinue Fluoxetine   2. Continue Venlafaxine 150 mg daily   3. Consider Gene Sight Testing   4. Talk therapy with Remy Singh    5.  Medication therapy management referral made  6. Message given to Dr Burrell and Remy for input to resolve your memory problems.       Continue all other medications as reviewed per electronic medical record today.     Safety plan reviewed. To the Emergency Department as needed or call after hours crisis line at [phone] or [phone]. [place] Crisis Text [place]. Text [place] to 637069 or Suicide LifeLine Chat: suicidepreventionlifeline.org/chat/    To schedule individual or family therapy, call [facility] at [phone].    Schedule an appointment with me in 4 weeks or sooner as needed. Call [facility] at [phone] to schedule.    Follow up with primary care provider as planned or for acute medical concerns.    Call the psychiatric nurse line with medication questions or concerns at [phone].    Siving Egil Kvaleberghart may be used to communicate with your provider, but this is not intended to be used for emergencies.    Crisis Resources:    National Suicide Prevention Lifeline: 167.463.3363 (TTY: 598.634.1634). Call anytime for help.   (www.suicidepreventionlifeline.org)  National [place] on Mental Illness (www.jennifer.org): 390-628-3091 or 931-774-7261.   Mental Health Association (www.mentalhealth.org): 179.714.2030 or 023-311-5040.  [place] Crisis Text Line: Text [place] to 977530  Suicide LifeLine Chat: suicideQuero Rock.org/chat    Administrative Billing:   Time spent with patient was 30 minutes and greater than 50% of time or 20 minutes was spent in counseling and coordination of care regarding above diagnoses and treatment plan.    Patient Status:  Patient will continue to be seen for ongoing consultation and stabilization.    Signed:   MARISOL Harmon-BC   Psychiatry

## 2020-06-04 NOTE — PATIENT INSTRUCTIONS
1.  Discontinue Fluoxetine   2. Continue Venlafaxine 150 mg daily   3. Consider Gene Sight Testing   4. Talk therapy with Remy Singh    5.  Medication therapy management referral made  6. Message given to Dr Burrell and Remy for input to resolve your memory problems.          Continue all other medications as reviewed per electronic medical record today.     Safety plan reviewed. To the Emergency Department as needed or call after hours crisis line at [phone] or [phone]. [place] Crisis Text [place]. Text [place] to 906726 or Suicide LifeLine Chat: suicideTable8.org/chat/    To schedule individual or family therapy, call [facility] at [phone].    Schedule an appointment with me in 4 weeks or sooner as needed. Call [facility] at [phone] to schedule.    Follow up with primary care provider as planned or for acute medical concerns.    Call the psychiatric nurse line with medication questions or concerns at [phone].    Novelohart may be used to communicate with your provider, but this is not intended to be used for emergencies.    Crisis Resources:    National Suicide Prevention Lifeline: 866.782.9688 (TTY: 597.549.5777). Call anytime for help.  (www.suicidepreventionlifeline.org)  National [place] on Mental Illness (www.jennifer.org): 163.478.3393 or 208-542-4811.   Mental Health Association (www.mentalhealth.org): 789.824.7459 or 730-008-1480.  [place] Crisis Text [place]: Text [place] to 202197  Suicide LifeLine Chat: suicideTable8.org/chat

## 2020-06-05 ASSESSMENT — ANXIETY QUESTIONNAIRES: GAD7 TOTAL SCORE: 5

## 2020-06-10 ENCOUNTER — DOCUMENTATION ONLY (OUTPATIENT)
Dept: PSYCHIATRY | Facility: CLINIC | Age: 70
End: 2020-06-10

## 2020-06-10 NOTE — PROGRESS NOTES
Staff message from Yesenia Pineda 6/8/20:  Yesenia Pineda NP Darling, Nancy K, MD    Cc: P Psych Rn - [facility]               Thanks Dr Burrell,  I will have the psych RN pool reach out to Kristina and her  with this information.      Previous Messages     ----- Message -----   From: Carolina Burrell MD   Sent: 6/5/2020   2:16 PM CDT   To: OPAL Bennett, *     I am on furlough for another week. I would have her see the neurologist as she is having new symptoms with the falling. Carolina Burrell M.D.'   ----- Message -----   From: Yesenia Pineda NP   Sent: 6/4/2020   1:50 PM CDT   To: Carolina Burrell MD, OPAL Bennett     Kristina and her  are very concerned about Kristina's memory loss.  She has had a neurology workup with negative findings. Any thoughts where she can go to address this?  She also has had an occasional fall with leg weakness.  Thanks -        Routing to Psych RN pool ([id number]) for follow up.    Eileen Roa RN on 6/10/2020 at 4:58 PM

## 2020-06-11 NOTE — PROGRESS NOTES
Spoke with patient and .   Informed of message from -- they agreed to contact neurology.    Tatyana Curran, RN    Nurse Liaison  [facility]

## 2020-06-17 ENCOUNTER — ALLIED HEALTH/NURSE VISIT (OUTPATIENT)
Dept: PHARMACY | Facility: CLINIC | Age: 70
End: 2020-06-17
Payer: COMMERCIAL

## 2020-06-17 DIAGNOSIS — I20.89 CHRONIC STABLE ANGINA (H): ICD-10-CM

## 2020-06-17 DIAGNOSIS — E78.5 HYPERLIPIDEMIA LDL GOAL <70: ICD-10-CM

## 2020-06-17 DIAGNOSIS — E63.9 NUTRITIONAL DEFICIENCY: ICD-10-CM

## 2020-06-17 DIAGNOSIS — R41.3 MEMORY LOSS: ICD-10-CM

## 2020-06-17 DIAGNOSIS — F33.0 MAJOR DEPRESSIVE DISORDER, RECURRENT, MILD (H): Primary | ICD-10-CM

## 2020-06-17 DIAGNOSIS — I25.10 CORONARY ARTERY DISEASE INVOLVING NATIVE CORONARY ARTERY OF NATIVE HEART WITHOUT ANGINA PECTORIS: ICD-10-CM

## 2020-06-17 PROCEDURE — 99607 MTMS BY PHARM ADDL 15 MIN: CPT | Performed by: PHARMACIST

## 2020-06-17 PROCEDURE — 99605 MTMS BY PHARM NP 15 MIN: CPT | Performed by: PHARMACIST

## 2020-06-18 RX ORDER — UREA 10 %
500 LOTION (ML) TOPICAL DAILY
COMMUNITY
End: 2020-10-15

## 2020-06-18 NOTE — PROGRESS NOTES
MTM ENCOUNTER  SUBJECTIVE/OBJECTIVE:                           Albina Pedro is a 69 year old female called for an initial visit. She was referred to me from Yesenia Pineda NP psychiatry.      Patient consented to a telehealth visit: yes  Telemedicine Visit Details  Type of service:  Telephone visit  Start Time: 10:03 AM  End Time: 11:00 AM  Originating Location (pt. Location): Home  Distant Location (provider location):  [facility] MTM  Mode of Communication:  Telephone    Chief Complaint: medication review - concern for memory loss and wonders if medications are causing this.    Allergies/ADRs: Reviewed in EHR  Tobacco:  reports that she has never smoked. She has never used smokeless tobacco.  Alcohol: not currently using    PMH: Reviewed in EHR    Medication Adherence/Access: no issues reported    Memory loss: in talking with patient, she states she has memory problems, confusion, not able to get things done (gave example of making a box cake and it took her 45 minutes, when it should have taken her 15 minutes). Not able to concentrate. Patient states PCP thinks she may have a degenerative muscle disease - patient states her  does not know about this. She also states that at times she stutters. She will be walking along and her right side might give out - patient is describing this as muscle weakness. I am given different time frames of when this started being a concern, from 2 years ago to as far back as 2010 for her memory.     Hyperlipidemia: Patient taking pravastatin 40 mg daily. Patient states she has a lot of joint pain and pain around neck and shoulders, waist and hips.     The 10-year ASCVD risk score (Parthchapin LEMUS Jr., et al., 2013) is: 9.5%    Values used to calculate the score:      Age: 69 years      Sex: Female      Is Non- : No      Diabetic: No      Tobacco smoker: No      Systolic Blood Pressure: 128 mmHg      Is BP treated: No      HDL  Cholesterol: 70 mg/dL      Total Cholesterol: 150 mg/dL    Recent Labs   Lab Test 06/17/19  0955 05/31/18  1133  08/04/14  0912 07/02/13  0916   CHOL 150  --   --  154 182   HDL 70  --   --  48* 49*   LDL 46 76   < > 70 97   TRIG 168*  --   --  178* 182*   CHOLHDLRATIO  --   --   --  3.2 4.0    < > = values in this interval not displayed.     Angina/CAD: patient is taking ranolazine 1000 mg twice daily and aspirin 81 mg daily.     Depression: currently taking venlafaxine 150 mg daily. Bupropion was stopped and low dose fluoxetine started 4/15/2020. Fluoxetine stopped 6/4/2020. Feels mood is better after stopping fluoxetine, no change in memory. Using lorazepam for anxiety - rarely.     PHQ 3/4/2020 4/3/2020 6/4/2020   PHQ-9 Total Score 8 7 7   Q9: Thoughts of better off dead/self-harm past 2 weeks Not at all Not at all Not at all     Supplements: taking vitamin B12 daily and vitamin D 2000 units daily. 6/17/2019 both vitamin B12 and vitamin D labs wnls.    Today's Vitals: There were no vitals taken for this visit. telephone visit.     ASSESSMENT:                            Medication Adherence: good, no issues identified    Memory loss: Reviewed patient's chart and found the following timeline:  Prozac was started 11/26/2007.   Patient's memory problems noted 4/24/2008. The medications patient was taking on this date: azathioprine, calcium, vitamin B complex, fluoxetine, topical nystatin, fish oil and omeprazole.   Pravastatin started 7/3/2013 at 10 mg EOD, increased to 20 mg daily on 12/18/2014, increased to current dose (40 mg daily) 5/3/2019.  Ranolazine was started 9/30/2016. Has risk for confusional state 0.5-4%.    While it is possible that patient's memory problems are a side effect from one or more of her medications, there is not one medication that stands out as the cause. Another variable as noted in patient's chart 5/31/2018 is that her memory may be worsened by emotional issues.     Hyperlipidemia: I  asked patient to discuss lowering pravastatin dose with cardiology due to joint and muscle pain. Rare post-marketing reports of statins contributing to impaired cognition. She is due for her cholesterol labs, last LDL 46 on 6/17/2019.    Angina/CAD: stable    Depression: patient feels mood is improved with stopping of fluoxetine. Of note bupropion has risk for stutter (case report) also has risk for confusion 8.4%. Patient to continue to follow up with psychiatry.     Supplements: stable    PLAN:                            1. While it is possible that patient's memory problems are a side effect from one or more of her medications, there is not one medication that stands out as the cause. Continue to monitor confusion and stutter since stopping bupropion.     2. Recommend patient discuss lowering pravastatin dose with cardiology due to joint and muscle pain.     I spent 60 minutes with this patient today. All changes were made via collaborative practice agreement with Carolina Burrell. A copy of the visit note was provided to the patient's primary care and referring provider.    Will follow up in 1 week.    The patient was sent via [employer] a summary of these recommendations.     Lavonne Moran, PharmD  Medication Therapy Management

## 2020-06-22 DIAGNOSIS — E78.5 HYPERLIPIDEMIA LDL GOAL <130: ICD-10-CM

## 2020-06-23 RX ORDER — PRAVASTATIN SODIUM 40 MG
40 TABLET ORAL DAILY
Qty: 90 TABLET | Refills: 0 | Status: SHIPPED | OUTPATIENT
Start: 2020-06-23 | End: 2020-10-05

## 2020-06-23 NOTE — TELEPHONE ENCOUNTER
Last Clinic Visit: 5/1/2020  [facility]    Pravastatin: Lab(s)- LDL now due.    *Isamar refill sent for 90 days and FYI to [facility].

## 2020-06-24 ENCOUNTER — ALLIED HEALTH/NURSE VISIT (OUTPATIENT)
Dept: PHARMACY | Facility: CLINIC | Age: 70
End: 2020-06-24
Payer: COMMERCIAL

## 2020-06-24 DIAGNOSIS — R41.3 MEMORY LOSS: Primary | ICD-10-CM

## 2020-06-24 PROCEDURE — 99606 MTMS BY PHARM EST 15 MIN: CPT | Performed by: PHARMACIST

## 2020-06-24 NOTE — PROGRESS NOTES
MTM ENCOUNTER  SUBJECTIVE/OBJECTIVE:                           Albina Pedro is a 69 year old female called for a follow-up visit. She was referred to me from Yesenia Pineda NP psychiatry. Today's visit is a follow-up MTM visit from 6/17/2020.    Patient consented to a telehealth visit: yes  Telemedicine Visit Details  Type of service:  Telephone visit  Start Time: 1:30 PM  End Time: 1:45 PM  Originating Location (pt. Location): Home  Distant Location (provider location):  [facility] [place]  Mode of Communication:  Telephone    Chief Complaint: none    Allergies/ADRs: Reviewed in EHR  Tobacco:  reports that she has never smoked. She has never used smokeless tobacco.  Alcohol: not currently using    PMH: Reviewed in EHR    Medication Adherence/Access: no issues reported    Memory loss: Discussed with patient that in reviewing her records I was not able to find one medication that stands out as a cause for her memory loss, however I did point out that some medications may make her memory worse if she has a side effect - such as confusion and stutter with bupropion. I did ask if her confusion and stutter has improved since stopping bupropion and she states that they have. In [facility]) patient's neuropsychological testing from 2017 had diagnosed amnestic type mild cognitive impairment - which is stable. She states her muscle weakness has also improved and her mood is good.    PHQ 3/4/2020 4/3/2020 6/4/2020   PHQ-9 Total Score 8 7 7   Q9: Thoughts of better off dead/self-harm past 2 weeks Not at all Not at all Not at all     Today's Vitals: There were no vitals taken for this visit. telephone visit      ASSESSMENT:                              Medication Adherence: good, no issues identified    Memory loss: stutter, confusion and muscle weakness all improved since our last visit. Reviewed with patient that if she starts a new medication to keep  a log of things that have changed since starting that medication. I was not able to find one medication that is causing memory loss, most likely she has some memory loss and side effects (such as confusion) from some medications may make it more difficult for her. Also reviewed that life stressors may add to this as well. I asked that she continue to follow up with psychiatry as planned.     PLAN:                            1. When starting a new medication asked patient to keep a log of things that have changed since starting that medication.     2. Continue to follow up with psychiatry as planned.    I spent 15 minutes with this patient today. A copy of the visit note was provided to the patient's primary care and referring provider.    Will follow up in 6-12 months.    The patient declined a summary of these recommendations.     Lavonne Moran, PharmD  Medication Therapy Management

## 2020-07-24 ENCOUNTER — TELEPHONE (OUTPATIENT)
Dept: FAMILY MEDICINE | Facility: CLINIC | Age: 70
End: 2020-07-24

## 2020-07-24 DIAGNOSIS — R41.3 MEMORY CHANGES: Primary | ICD-10-CM

## 2020-07-24 NOTE — TELEPHONE ENCOUNTER
Reason for Call: Request for an order or referral:    Order or referral being requested: Kristina is requesting a referral for a neurologist.  Went to [facility] previously in [place] and that's fine again.  She was seen by pharmacist for Medication Therapy and it was suggested that she connect with neurologist again.  Kristina states it doesn't have to be the same one that she had previously.  Anyone you think would benefit her.  Please review and advise. Thank you..Tita Real    Date needed: as soon as possible    Has the patient been seen by the PCP for this problem? YES    Phone number Patient can be reached at:  Cell number on file:    Telephone Information:   Mobile [phone]       Best Time:  Any time    Can we leave a detailed message on this number?  YES LEFT MESSAGE otherwise she will delete number.      Call taken on 7/24/2020 at 3:07 PM by Tita Real

## 2020-09-21 ENCOUNTER — TELEPHONE (OUTPATIENT)
Dept: FAMILY MEDICINE | Facility: CLINIC | Age: 70
End: 2020-09-21

## 2020-09-21 NOTE — TELEPHONE ENCOUNTER
Red flag call transferred to this writer.  Patient states she is having the same pain she talked with Dr Burrell about a few  Months ago.  Memory problems, difficulty focusing, generalized achy, intermittent neck,hand and foot pain. Feels like her legs opal give out a times.  Patient denies SOA or chest pain.  Denies any pain at this time.  Patient verbalizes frustration with memory and would like Dr Burrell to tell her what to do now.    Reviewed chart, 6/4/20 visit, orders placed 7/24/20.  Asked if patient has contacted Neurology for appointment, she has not.  Reminded patient of referral placed 7/24/20, scheduling number given for [facility] per patient preference.  Patient will call to make neurology appointment.  Reviewed red flag symptoms that would need ED visit, patient verbalizes understanding and agrees.  Evie Arizmendi RN

## 2020-09-23 ENCOUNTER — TRANSFERRED RECORDS (OUTPATIENT)
Dept: HEALTH INFORMATION MANAGEMENT | Facility: CLINIC | Age: 70
End: 2020-09-23

## 2020-09-30 DIAGNOSIS — F43.0 ACUTE REACTION TO STRESS: ICD-10-CM

## 2020-10-01 RX ORDER — LORAZEPAM 0.5 MG/1
TABLET ORAL
Qty: 20 TABLET | Refills: 0 | Status: SHIPPED | OUTPATIENT
Start: 2020-10-01 | End: 2021-01-11

## 2020-10-01 NOTE — TELEPHONE ENCOUNTER
Routing refill request to provider for review/approval because:  Drug not on the [facility], [facility] or [facility] refill protocol or controlled substance  Yaritza Licea RN

## 2020-10-02 ENCOUNTER — OFFICE VISIT (OUTPATIENT)
Dept: PULMONOLOGY | Facility: CLINIC | Age: 70
End: 2020-10-02
Attending: INTERNAL MEDICINE
Payer: COMMERCIAL

## 2020-10-02 VITALS
SYSTOLIC BLOOD PRESSURE: 142 MMHG | BODY MASS INDEX: 29.7 KG/M2 | HEART RATE: 70 BPM | RESPIRATION RATE: 18 BRPM | DIASTOLIC BLOOD PRESSURE: 63 MMHG | WEIGHT: 184 LBS | OXYGEN SATURATION: 97 %

## 2020-10-02 DIAGNOSIS — Z23 ENCOUNTER FOR IMMUNIZATION: ICD-10-CM

## 2020-10-02 DIAGNOSIS — J84.9 ILD (INTERSTITIAL LUNG DISEASE) (H): Primary | ICD-10-CM

## 2020-10-02 PROCEDURE — 94375 RESPIRATORY FLOW VOLUME LOOP: CPT | Performed by: INTERNAL MEDICINE

## 2020-10-02 PROCEDURE — G0463 HOSPITAL OUTPT CLINIC VISIT: HCPCS | Mod: 25

## 2020-10-02 PROCEDURE — G0008 ADMIN INFLUENZA VIRUS VAC: HCPCS | Performed by: INTERNAL MEDICINE

## 2020-10-02 PROCEDURE — 99214 OFFICE O/P EST MOD 30 MIN: CPT | Mod: 25 | Performed by: INTERNAL MEDICINE

## 2020-10-02 PROCEDURE — 250N000011 HC RX IP 250 OP 636: Performed by: INTERNAL MEDICINE

## 2020-10-02 PROCEDURE — 90662 IIV NO PRSV INCREASED AG IM: CPT | Performed by: INTERNAL MEDICINE

## 2020-10-02 PROCEDURE — 94729 DIFFUSING CAPACITY: CPT | Performed by: INTERNAL MEDICINE

## 2020-10-02 PROCEDURE — G0008 ADMIN INFLUENZA VIRUS VAC: HCPCS

## 2020-10-02 RX ADMIN — INFLUENZA A VIRUS A/MICHIGAN/45/2015 X-275 (H1N1) ANTIGEN (FORMALDEHYDE INACTIVATED), INFLUENZA A VIRUS A/SINGAPORE/INFIMH-16-0019/2016 IVR-186 (H3N2) ANTIGEN (FORMALDEHYDE INACTIVATED), INFLUENZA B VIRUS B/PHUKET/3073/2013 ANTIGEN (FORMALDEHYDE INACTIVATED), AND INFLUENZA B VIRUS B/MARYLAND/15/2016 BX-69A ANTIGEN (FORMALDEHYDE INACTIVATED) 0.7 ML: 60; 60; 60; 60 INJECTION, SUSPENSION INTRAMUSCULAR at 12:07

## 2020-10-02 ASSESSMENT — PAIN SCALES - GENERAL: PAINLEVEL: NO PAIN (0)

## 2020-10-02 NOTE — PROGRESS NOTES
Reason for Visit  Albina Pedro is a 69 year old year old female who is being seen for Interstitial Lung Disease (ILD) (LIP/SJOGREN'S )    ILD HPI    Albina Pedro is a 69-year-old female with a history of Sjogren's disease and associated mild interstitial lung disease with cysts consistent with LIP.  She is seen today for annual visit she returns to clinic today overall stating that she feels well from a breathing standpoint she denies any significant shortness of breath or dyspnea on exertion.  She does note some fatigue often taking a nap during the day.  States that she sleeps well and in discussing with her  there does not seem to be any signs of sleep disordered breathing.  She does take an antidepressant, venlafaxine, which has been known to cause fatigue.  Otherwise she is generally doing well.  She and her  are maintaining social distancing mask wearing and hand hygiene during the current COVID pandemic.      Current Outpatient Medications   Medication     LORazepam (ATIVAN) 0.5 MG tablet     Multiple Vitamins-Minerals (MULTIVITAMIN & MINERAL PO)     pravastatin (PRAVACHOL) 40 MG tablet     Ranolazine 1000 MG TB12     venlafaxine (EFFEXOR-XR) 150 MG 24 hr capsule     aspirin 81 MG tablet     cyanocobalamin (VITAMIN B-12) 500 MCG tablet     VITAMIN D3 OR     No current facility-administered medications for this visit.      Allergies   Allergen Reactions     Daypro [Oxaprozin] Rash            Lidocaine Other (See Comments)     Rapid heart rate     Nitroglycerin Other (See Comments)     Causes low blood pressure     Penicillins Unknown     Occurred as child.     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        Past Surgical History:   Procedure Laterality Date     C/SECTION, LOW TRANSVERSE  10/13/1978    , Low Transverse     COLONOSCOPY       OPEN REDUCTION INTERNAL FIXATION ANKLE Right 2019    Procedure: Open reduction internal fixation right lateral malleolus fracture;  Surgeon: Rolo Oconnor DPM;  Location: [facility]     SURGICAL HISTORY OF 1978         SURGICAL HISTORY OF -   00    Colonoscopy       Social History     Socioeconomic History     Marital status:      Spouse name: Not on file     Number of children: Not on file     Years of education: Not on file     Highest education level: Not on file   Occupational History     Not on file   Social Needs     Financial resource strain: Not on file     Food insecurity     Worry: Not on file     Inability: Not on file     Transportation needs     Medical: Not on file     Non-medical: Not on file   Tobacco Use     Smoking status: Never Smoker     Smokeless tobacco: Never Used   Substance and Sexual Activity     Alcohol use: Yes     Alcohol/week: 0.0 standard drinks     Comment: 1 glass of wine every 2 weeks     Drug use: No     Sexual activity: Yes     Partners: Male   Lifestyle     Physical activity     Days per week: Not on file     Minutes per session: Not on file     Stress: Not on file   Relationships     Social connections     Talks on phone: Not on file     Gets together: Not on file     Attends [employer]ism service: Not on file     Active member of club or organization: Not on file     Attends meetings of clubs or organizations: Not on file     Relationship status: Not on file     Intimate partner violence     Fear of current or ex partner: Not on file     Emotionally abused: Not on file     Physically abused: Not on file     Forced sexual activity: Not on file   Other Topics Concern     Parent/sibling w/ CABG, MI or angioplasty before 65F 55M? No   Social History Narrative     Not on file       Family History   Problem  Relation Age of Onset     Cancer Mother         Breast and liver- age 43     Heart Disease Father         ? chf?h/o CVA     Alzheimer Disease Father      Hypertension Father      Neurologic Disorder Father         CVA     Diabetes Maternal Grandmother      Breast Cancer Maternal Aunt      Breast Cancer Paternal Aunt      Cancer - colorectal No family hx of      Prostate Cancer No family hx of        ROS Pulmonary    A complete ROS was otherwise negative except as noted in the HPI.  Vitals:    10/02/20 1132   BP: (!) 142/63   Pulse: 70   Resp: 18   SpO2: 97%   Weight: 83.5 kg (184 lb)     Exam:   GENERAL APPEARANCE: Well developed, well nourished, alert, and in no apparent distress.  NECK: supple, no masses, no thyromegaly.  LYMPHATICS: No significant axillary, cervical, or supraclavicular nodes.  RESP: good air flow throughout, - no crackles, rhonchi or wheezes.  CV: Normal S1, S2, regular rhythm, normal rate, no rub, no murmur,  no gallop, no LE edema.   ABDOMEN:  Bowel sounds normal, soft, nontender, no HSM or masses.   MS: extremities normal- no clubbing, no cyanosis.  NEURO: Mentation intact, speech normal, normal strength and tone, normal gait and stance  PSYCH: mentation appears normal. and affect normal/bright  Results: I have reviewed all imaging, PFTs and other relavent tests, please see below for details, PFT and imaging results were reviewed with the patient.  PFTs: Normal spirometry and diffusion.  Stable.    Assessment and plan:    69-year-old female with a mild interstitial lung disease consistent with lymphocytic interstitial pneumonia associated with Sjogren's.  Not on any immunosuppression medication.  Doing well with very stable pulmonary function tests that are normal.  As I told the patient and her  given how long she has been stable off immune suppression I think the likelihood that this is going to progress is low.  However we should still follow her annually with pulmonary function  test.  I have also instructed her to call if she does develop any new respiratory symptoms.  I will see her back in      CBC   Recent Labs   Lab Test 06/17/19  0955 02/18/19  1712 10/22/18  1645   RBC 4.19  --  3.97   HGB 13.0 11.9 12.7   HCT 39.8  --  38.3     --  244       Basic Metabolic Panel  Recent Labs   Lab Test 06/17/19  0955 02/18/19  1712    136   POTASSIUM 5.0 4.8   CHLORIDE 106 104   CO2 31 29   BUN 17 30   GLC 96 71   SARI 9.1 8.9       INR  No results for input(s): INR in the last 38094 hours.    PFT  PFT Latest Ref Rng & Units 10/2/2020   FVC L 2.98   FEV1 L 2.38   FVC% % 102   FEV1% % 105           CC:

## 2020-10-02 NOTE — NURSING NOTE
Chief Complaint   Patient presents with     Interstitial Lung Disease (ILD)     LIP/SJOGREN'S      Medications reviewed and updated.  Vitals taken  Tatyana Michele CMA

## 2020-10-02 NOTE — LETTER
10/2/2020         RE: Albina Pedro  [address] [facility] [place] [address]        Dear Colleague,    Thank you for referring your patient, Albina Pedro, to the [facility] [place]. Please see a copy of my visit note below.    Reason for Visit  Albina Pedro is a 69 year old year old female who is being seen for Interstitial Lung Disease (ILD) (LIP/SJOGREN'S )    ILD HPI    Albina Pedro is a 69-year-old female with a history of Sjogren's disease and associated mild interstitial lung disease with cysts consistent with LIP.  She is seen today for annual visit she returns to clinic today overall stating that she feels well from a breathing standpoint she denies any significant shortness of breath or dyspnea on exertion.  She does note some fatigue often taking a nap during the day.  States that she sleeps well and in discussing with her  there does not seem to be any signs of sleep disordered breathing.  She does take an antidepressant, venlafaxine, which has been known to cause fatigue.  Otherwise she is generally doing well.  She and her  are maintaining social distancing mask wearing and hand hygiene during the current COVID pandemic.      Current Outpatient Medications   Medication     LORazepam (ATIVAN) 0.5 MG tablet     Multiple Vitamins-Minerals (MULTIVITAMIN & MINERAL PO)     pravastatin (PRAVACHOL) 40 MG tablet     Ranolazine 1000 MG TB12     venlafaxine (EFFEXOR-XR) 150 MG 24 hr capsule     aspirin 81 MG tablet     cyanocobalamin (VITAMIN B-12) 500 MCG tablet     VITAMIN D3 OR     No current facility-administered medications for this visit.      Allergies   Allergen Reactions     Daypro [Oxaprozin] Rash            Lidocaine Other (See Comments)     Rapid heart rate     Nitroglycerin Other (See Comments)     Causes low blood pressure     Penicillins Unknown     Occurred as child.     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       Past Surgical History:   Procedure Laterality Date     C/SECTION, LOW TRANSVERSE  10/13/1978    , Low Transverse     COLONOSCOPY       OPEN REDUCTION INTERNAL FIXATION ANKLE Right 2019    Procedure: Open reduction internal fixation right lateral malleolus fracture;  Surgeon: Rolo Oconnor DPM;  Location: [facility]     SURGICAL HISTORY OF -            SURGICAL HISTORY OF -   00    Colonoscopy       Social History     Socioeconomic History     Marital status:      Spouse name: Not on file     Number of children: Not on file     Years of education: Not on file     Highest education level: Not on file   Occupational History     Not on file   Social Needs     Financial resource strain: Not on file     Food insecurity     Worry: Not on file     Inability: Not on file     Transportation needs     Medical: Not on file     Non-medical: Not on file   Tobacco Use     Smoking status: Never Smoker     Smokeless tobacco: Never Used   Substance and Sexual Activity     Alcohol use: Yes     Alcohol/week: 0.0 standard drinks     Comment: 1 glass of wine every 2 weeks     Drug use: No     Sexual activity: Yes     Partners: Male   Lifestyle     Physical activity     Days per week: Not on file     Minutes per session: Not on file     Stress: Not on file   Relationships     Social connections     Talks on phone: Not on file     Gets together: Not on file     Attends Pentecostal service: Not on file     Active member of club or organization: Not on file     Attends meetings of clubs or organizations: Not on file     Relationship status: Not on file     Intimate partner violence     Fear of current  or ex partner: Not on file     Emotionally abused: Not on file     Physically abused: Not on file     Forced sexual activity: Not on file   Other Topics Concern     Parent/sibling w/ CABG, MI or angioplasty before 65F 55M? No   Social History Narrative     Not on file       Family History   Problem Relation Age of Onset     Cancer Mother         Breast and liver- age 43     Heart Disease Father         ? chf?h/o CVA     Alzheimer Disease Father      Hypertension Father      Neurologic Disorder Father         CVA     Diabetes Maternal Grandmother      Breast Cancer Maternal Aunt      Breast Cancer Paternal Aunt      Cancer - colorectal No family hx of      Prostate Cancer No family hx of        ROS Pulmonary    A complete ROS was otherwise negative except as noted in the HPI.  Vitals:    10/02/20 1132   BP: (!) 142/63   Pulse: 70   Resp: 18   SpO2: 97%   Weight: 83.5 kg (184 lb)     Exam:   GENERAL APPEARANCE: Well developed, well nourished, alert, and in no apparent distress.  NECK: supple, no masses, no thyromegaly.  LYMPHATICS: No significant axillary, cervical, or supraclavicular nodes.  RESP: good air flow throughout, - no crackles, rhonchi or wheezes.  CV: Normal S1, S2, regular rhythm, normal rate, no rub, no murmur,  no gallop, no LE edema.   ABDOMEN:  Bowel sounds normal, soft, nontender, no HSM or masses.   MS: extremities normal- no clubbing, no cyanosis.  NEURO: Mentation intact, speech normal, normal strength and tone, normal gait and stance  PSYCH: mentation appears normal. and affect normal/bright  Results: I have reviewed all imaging, PFTs and other relavent tests, please see below for details, PFT and imaging results were reviewed with the patient.  PFTs: Normal spirometry and diffusion.  Stable.    Assessment and plan:    69-year-old female with a mild interstitial lung disease consistent with lymphocytic interstitial pneumonia associated with Sjogren's.  Not on any immunosuppression medication.   Doing well with very stable pulmonary function tests that are normal.  As I told the patient and her  given how long she has been stable off immune suppression I think the likelihood that this is going to progress is low.  However we should still follow her annually with pulmonary function test.  I have also instructed her to call if she does develop any new respiratory symptoms.  I will see her back in      CBC   Recent Labs   Lab Test 06/17/19  0955 02/18/19  1712 10/22/18  1645   RBC 4.19  --  3.97   HGB 13.0 11.9 12.7   HCT 39.8  --  38.3     --  244       Basic Metabolic Panel  Recent Labs   Lab Test 06/17/19  0955 02/18/19  1712    136   POTASSIUM 5.0 4.8   CHLORIDE 106 104   CO2 31 29   BUN 17 30   GLC 96 71   SARI 9.1 8.9       INR  No results for input(s): INR in the last 74315 hours.    PFT  PFT Latest Ref Rng & Units 10/2/2020   FVC L 2.98   FEV1 L 2.38   FVC% % 102   FEV1% % 105     Again, thank you for allowing me to participate in the care of your patient.      Sincerely,    David Morris Perlman, MD

## 2020-10-05 DIAGNOSIS — E78.5 HYPERLIPIDEMIA LDL GOAL <130: ICD-10-CM

## 2020-10-05 LAB
DLCOUNC-%PRED-PRE: 104 %
DLCOUNC-PRE: 20.99 ML/MIN/MMHG
DLCOUNC-PRED: 20.06 ML/MIN/MMHG
ERV-%PRED-PRE: 81 %
ERV-PRE: 0.39 L
ERV-PRED: 0.48 L
EXPTIME-PRE: 7.36 SEC
FEF2575-%PRED-PRE: 123 %
FEF2575-PRE: 2.31 L/SEC
FEF2575-PRED: 1.88 L/SEC
FEFMAX-%PRED-PRE: 104 %
FEFMAX-PRE: 5.97 L/SEC
FEFMAX-PRED: 5.73 L/SEC
FEV1-%PRED-PRE: 105 %
FEV1-PRE: 2.38 L
FEV1FEV6-PRE: 80 %
FEV1FEV6-PRED: 79 %
FEV1FVC-PRE: 80 %
FEV1FVC-PRED: 78 %
FEV1SVC-PRE: 77 %
FEV1SVC-PRED: 71 %
FIFMAX-PRE: 6.02 L/SEC
FVC-%PRED-PRE: 102 %
FVC-PRE: 2.98 L
FVC-PRED: 2.92 L
IC-%PRED-PRE: 100 %
IC-PRE: 2.69 L
IC-PRED: 2.69 L
VA-%PRED-PRE: 93 %
VA-PRE: 4.59 L
VC-%PRED-PRE: 97 %
VC-PRE: 3.08 L
VC-PRED: 3.17 L

## 2020-10-05 RX ORDER — PRAVASTATIN SODIUM 40 MG
40 TABLET ORAL DAILY
Qty: 90 TABLET | Refills: 0 | Status: SHIPPED | OUTPATIENT
Start: 2020-10-05 | End: 2020-10-15

## 2020-10-05 NOTE — TELEPHONE ENCOUNTER
Last Clinic Visit: 5/3/19 with recommended 1 year follow up  [place] 10/16/20.   Overdue for LDL.  90 day refill provided, routed to clinic for follow up of ordering and/or scheduling labs

## 2020-10-15 ENCOUNTER — OFFICE VISIT (OUTPATIENT)
Dept: FAMILY MEDICINE | Facility: CLINIC | Age: 70
End: 2020-10-15
Payer: COMMERCIAL

## 2020-10-15 VITALS
TEMPERATURE: 96.8 F | WEIGHT: 188 LBS | DIASTOLIC BLOOD PRESSURE: 64 MMHG | SYSTOLIC BLOOD PRESSURE: 138 MMHG | BODY MASS INDEX: 30.22 KG/M2 | HEIGHT: 66 IN | OXYGEN SATURATION: 98 % | HEART RATE: 87 BPM

## 2020-10-15 DIAGNOSIS — E78.5 HYPERLIPIDEMIA LDL GOAL <130: ICD-10-CM

## 2020-10-15 DIAGNOSIS — F33.0 MAJOR DEPRESSIVE DISORDER, RECURRENT, MILD (H): ICD-10-CM

## 2020-10-15 DIAGNOSIS — R53.1 GENERALIZED WEAKNESS: Primary | ICD-10-CM

## 2020-10-15 DIAGNOSIS — R41.3 MEMORY CHANGES: ICD-10-CM

## 2020-10-15 DIAGNOSIS — I20.0 INTERMEDIATE CORONARY SYNDROME (H): ICD-10-CM

## 2020-10-15 LAB
CHOLEST SERPL-MCNC: 156 MG/DL
HDLC SERPL-MCNC: 64 MG/DL
LDLC SERPL CALC-MCNC: 35 MG/DL
NONHDLC SERPL-MCNC: 92 MG/DL
TRIGL SERPL-MCNC: 286 MG/DL

## 2020-10-15 PROCEDURE — 80061 LIPID PANEL: CPT | Performed by: FAMILY MEDICINE

## 2020-10-15 PROCEDURE — 36415 COLL VENOUS BLD VENIPUNCTURE: CPT | Performed by: FAMILY MEDICINE

## 2020-10-15 PROCEDURE — 99214 OFFICE O/P EST MOD 30 MIN: CPT | Performed by: FAMILY MEDICINE

## 2020-10-15 RX ORDER — VENLAFAXINE HYDROCHLORIDE 150 MG/1
150 CAPSULE, EXTENDED RELEASE ORAL DAILY
Qty: 90 CAPSULE | Refills: 0 | Status: SHIPPED | OUTPATIENT
Start: 2020-10-15 | End: 2021-04-13

## 2020-10-15 RX ORDER — RANOLAZINE 1000 MG/1
1000 TABLET, EXTENDED RELEASE ORAL 2 TIMES DAILY
Qty: 180 TABLET | Refills: 3 | Status: SHIPPED | OUTPATIENT
Start: 2020-10-15 | End: 2020-10-16

## 2020-10-15 RX ORDER — PRAVASTATIN SODIUM 40 MG
40 TABLET ORAL DAILY
Qty: 90 TABLET | Refills: 0 | Status: SHIPPED | OUTPATIENT
Start: 2020-10-15 | End: 2020-10-15 | Stop reason: SINTOL

## 2020-10-15 ASSESSMENT — MIFFLIN-ST. JEOR: SCORE: 1384.51

## 2020-10-15 ASSESSMENT — PATIENT HEALTH QUESTIONNAIRE - PHQ9: SUM OF ALL RESPONSES TO PHQ QUESTIONS 1-9: 9

## 2020-10-15 NOTE — PATIENT INSTRUCTIONS
Stop the pravachol for the next 3 weeks. If you are thinking more clearly , then stay off of this for now.  If you are not feeling any different being off for 3 weeks then restart this.

## 2020-10-15 NOTE — PROGRESS NOTES
"Subjective     Albina Pedro is a 69 year old female who presents to clinic today for the following health issues:    HPI         Hyperlipidemia Follow-Up    Are you regularly taking any medication or supplement to lower your cholesterol?   Yes-      Are you having muscle aches or other side effects that you think could be caused by your cholesterol lowering medication?  No    Very tired by the middle of the afternoon for the last few month, usually takes naps.     Depression and Anxiety Follow-Up    How are you doing with your depression since your last visit? No change    How are you doing with your anxiety since your last visit?  No change    Are you having other symptoms that might be associated with depression or anxiety? No    Have you had a significant life event? No     Do you have any concerns with your use of alcohol or other drugs? No    Social History     Tobacco Use     Smoking status: Never Smoker     Smokeless tobacco: Never Used   Substance Use Topics     Alcohol use: Yes     Alcohol/week: 0.0 standard drinks     Comment: 1 glass of wine every 2 weeks     Drug use: No     PHQ 4/3/2020 6/4/2020 10/15/2020   PHQ-9 Total Score 7 7 9   Q9: Thoughts of better off dead/self-harm past 2 weeks Not at all Not at all Not at all     SEBASTIAN-7 SCORE 3/4/2020 4/3/2020 6/4/2020   Total Score - - -   Total Score 5 3 5       She has a hard time and weakness later in the day   She has fallen   Memory is good today       Suicide Assessment Five-step Evaluation and Treatment (SAFE-T)      [facility] to [facility] are testing her for myasthenia     Review of Systems   Constitutional, HEENT, cardiovascular, pulmonary, gi and gu systems are negative, except as otherwise noted.      Objective    /64   Pulse 87   Temp 96.8  F (36  C) (Tympanic)   Ht 1.676 m (5' 6\")   Wt 85.3 kg (188 lb)   SpO2 98%   BMI 30.34 kg/m    Body mass index is 30.34 kg/m .  Physical Exam   GENERAL: healthy, alert and no distress  HENT: " "ear canals and TM's normal, nose and mouth without ulcers or lesions  NECK: no adenopathy, no asymmetry, masses, or scars and thyroid normal to palpation  RESP: lungs clear to auscultation - no rales, rhonchi or wheezes  CV: regular rate and rhythm, normal S1 S2, no S3 or S4, no murmur, click or rub, no peripheral edema and peripheral pulses strong  ABDOMEN: soft, nontender, no hepatosplenomegaly, no masses and bowel sounds normal  MS: no gross musculoskeletal defects noted, no edema    Results for orders placed or performed in visit on 10/15/20   Lipid panel reflex to direct LDL Non-fasting     Status: Abnormal   Result Value Ref Range    Cholesterol 156 <200 mg/dL    Triglycerides 286 (H) <150 mg/dL    HDL Cholesterol 64 >49 mg/dL    LDL Cholesterol Calculated 35 <100 mg/dL    Non HDL Cholesterol 92 <130 mg/dL           Assessment & Plan     Major depressive disorder, recurrent, mild (H)  Cont for now   - venlafaxine (EFFEXOR-XR) 150 MG 24 hr capsule; Take 1 capsule (150 mg) by mouth daily    Hyperlipidemia LDL goal <130  Stable   - Lipid panel reflex to direct LDL Non-fasting    Intermediate coronary syndrome (H)      Generalized weakness  Follow up with neurology   - NEUROLOGY ADULT REFERRAL    Memory changes    - NEUROLOGY ADULT REFERRAL     BMI:   Estimated body mass index is 30.34 kg/m  as calculated from the following:    Height as of this encounter: 1.676 m (5' 6\").    Weight as of this encounter: 85.3 kg (188 lb).            Patient Instructions   Stop the pravachol for the next 3 weeks. If you are thinking more clearly , then stay off of this for now.  If you are not feeling any different being off for 3 weeks then restart this.         Return in about 2 weeks (around 10/29/2020) for with consultant as planned.    Carolina Burrell MD  [facility]    "

## 2020-10-16 ENCOUNTER — VIRTUAL VISIT (OUTPATIENT)
Dept: CARDIOLOGY | Facility: CLINIC | Age: 70
End: 2020-10-16
Payer: COMMERCIAL

## 2020-10-16 DIAGNOSIS — I20.0 INTERMEDIATE CORONARY SYNDROME (H): ICD-10-CM

## 2020-10-16 PROCEDURE — 99214 OFFICE O/P EST MOD 30 MIN: CPT | Mod: 95 | Performed by: INTERNAL MEDICINE

## 2020-10-16 RX ORDER — RANOLAZINE 1000 MG/1
1000 TABLET, EXTENDED RELEASE ORAL 2 TIMES DAILY
Qty: 180 TABLET | Refills: 3 | Status: SHIPPED | OUTPATIENT
Start: 2020-10-16 | End: 2022-02-07

## 2020-10-16 NOTE — PROGRESS NOTES
"Albina Pedro is a 69 year old female who is being evaluated via a billable telephone visit.      The patient has been notified of following:     \"This telephone visit will be conducted via a call between you and your physician/provider. We have found that certain health care needs can be provided without the need for a physical exam.  This service lets us provide the care you need with a short phone conversation.  If a prescription is necessary we can send it directly to your pharmacy.  If lab work is needed we can place an order for that and you can then stop by our lab to have the test done at a later time.    Telephone visits are billed at different rates depending on your insurance coverage. During this emergency period, for some insurers they may be billed the same as an in-person visit.  Please reach out to your insurance provider with any questions.    If during the course of the call the physician/provider feels a telephone visit is not appropriate, you will not be charged for this service.\"    Patient has given verbal consent for Telephone visit?  Yes    What phone number would you like to be contacted at? [phone]    How would you like to obtain your AVS? [employer]    Phone call duration: 20 minutes    Driss Carbone MD    [facility] Cardiology Virtual Visit:    Assessment and Plan:     1. CAD with angina (Cath 2016 - moderate mLAD/D1 stenosis, small vessels not ideal for PCI, lexiscan - small ant ischemia, normal LV fxn): continue Ranolazine 1000 mg BID for angina. Intolerant of other anti-anginals.  Pravastatin 40 mg on hold, and continue ASA 81 mg for secondary prevention.   2. Syncope related to medications:  I think her recent symptoms are related to vasovagal dizziness, and are not due to pravastatin.  I hope that we can restart her pravastatin soon.  We again talked about measures to mitigate her vasovagal symptoms.  Continue with regular electrolyte drinks. Take an extra " bottle of electrolyte drink prior to blood draws. Advised to wear compression stockings, and check blood pressure during diaphoretic episodes.  3. ILD  4. Sjogren's syndrome    Annual follow up.     Driss Carbone MD    Cardiac Imaging and Prevention  [facility]  [email or web address] I Pager: [phone]    HPI: Routine annual follow-up, video visit.  She has been in good health, without any intercurrent medical illness or hospitalization.  She has had recurrent vasovagal dizziness related to blood draws.  This makes her feel very tired with weak muscles.  This can also be brought on by sitting on the toilet.  Luckily she has not had any syncope.  She also denies any muscle pain or discomfort.  At her recent PCP visit, her pravastatin was stopped due to these symptoms.  She continues to take electrolyte drinks regularly.  She does not wear any compression stockings.  Lipid profile checked recently (while taking pravastatin 40 mg) shows LDL cholesterol of 35.  Denies any chest pain or pressure, shortness of breath/dyspnea, orthopnea, pnd, palpitations, or edema.      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       CURRENT MEDICATIONS:  Current Outpatient Medications   Medication     LORazepam (ATIVAN) 0.5 MG tablet     Multiple Vitamins-Minerals (MULTIVITAMIN & MINERAL PO)     Ranolazine 1000 MG TB12     venlafaxine (EFFEXOR-XR) 150 MG 24 hr capsule     No current facility-administered medications for this visit.        PAST SURGICAL HISTORY:  Past Surgical History:   Procedure Laterality Date     C/SECTION, LOW TRANSVERSE  10/13/1978    , Low Transverse     COLONOSCOPY       OPEN REDUCTION INTERNAL  FIXATION ANKLE Right 2019    Procedure: Open reduction internal fixation right lateral malleolus fracture;  Surgeon: Rolo Oconnor DPM;  Location: [facility]     SURGICAL HISTORY OF -            SURGICAL HISTORY OF -   00    Colonoscopy       ALLERGIES     Allergies   Allergen Reactions     Daypro [Oxaprozin] Rash            Lidocaine Other (See Comments)     Rapid heart rate     Nitroglycerin Other (See Comments)     Causes low blood pressure     Penicillins Unknown     Occurred as child.     Sulfa Drugs Rash       FAMILY HISTORY:  Family History   Problem Relation Age of Onset     Cancer Mother         Breast and liver- age 43     Heart Disease Father         ? chf?h/o CVA     Alzheimer Disease Father      Hypertension Father      Neurologic Disorder Father         CVA     Diabetes Maternal Grandmother      Breast Cancer Maternal Aunt      Breast Cancer Paternal Aunt      Cancer - colorectal No family hx of      Prostate Cancer No family hx of        SOCIAL HISTORY:  Social History     Socioeconomic History     Marital status:      Spouse name: Not on file     Number of children: Not on file     Years of education: Not on file     Highest education level: Not on file   Occupational History     Not on file   Social Needs     Financial resource strain: Not on file     Food insecurity     Worry: Not on file     Inability: Not on file     Transportation needs     Medical: Not on file     Non-medical: Not on file   Tobacco Use     Smoking status: Never Smoker     Smokeless tobacco: Never Used   Substance and Sexual Activity     Alcohol use: Yes     Alcohol/week: 0.0 standard drinks     Comment: 1 glass of wine every 2 weeks     Drug use: No     Sexual activity: Yes     Partners: Male   Lifestyle     Physical activity     Days per week: Not on file     Minutes per session: Not on file     Stress: Not on file   Relationships     Social connections     Talks on phone: Not on file      Gets together: Not on file     Attends Zoroastrianism service: Not on file     Active member of club or organization: Not on file     Attends meetings of clubs or organizations: Not on file     Relationship status: Not on file     Intimate partner violence     Fear of current or ex partner: Not on file     Emotionally abused: Not on file     Physically abused: Not on file     Forced sexual activity: Not on file   Other Topics Concern     Parent/sibling w/ CABG, MI or angioplasty before 65F 55M? No   Social History Narrative     Not on file       ROS:   Constitutional: No fever, chills, or sweats. No weight gain/loss   ENT: No visual disturbance, ear ache, epistaxis, sore throat  Allergies/Immunologic: Negative.   Respiratory: No cough, hemoptysia  Cardiovascular: As per HPI  GI: No nausea, vomiting, hematemesis, melena, or hematochezia  : No urinary frequency, dysuria, or hematuria  Integument: Negative  Psychiatric: Negative  Neuro: Negative  Endocrinology: Negative   Musculoskeletal: Negative    ADDITIONAL COMMENTS:     I reviewed the patient's medications:     I reviewed the patient's pertinent clinical laboratory studies:     I reviewed the patient's pertinent imaging studies:   I reviewed the patient's ECG:

## 2020-10-16 NOTE — PATIENT INSTRUCTIONS
Take an extra bottle of electrolyte drink prior to blood draws.  Wear compression stockings.    Please fax prescription for ranolazine to [facility].  MyChart us in 1 month to tell us how you are doing off pravastatin.

## 2020-10-19 ENCOUNTER — TELEPHONE (OUTPATIENT)
Dept: CARDIOLOGY | Facility: CLINIC | Age: 70
End: 2020-10-19

## 2020-10-19 NOTE — RESULT ENCOUNTER NOTE
Albina,  Your lab results were OK your triglycerides were a bit higher but it is likely just related to what you had eaten in the last 24 hours. . Please feel free to my chart or call the office with questions. Carolina Burrell M.D.

## 2020-10-19 NOTE — TELEPHONE ENCOUNTER
Faxed rx for  ranolazine to [facility].    Roxy Sandoval CMA......October 19, 2020     10:23 AM

## 2020-10-21 ENCOUNTER — DOCUMENTATION ONLY (OUTPATIENT)
Dept: CARE COORDINATION | Facility: CLINIC | Age: 70
End: 2020-10-21

## 2020-11-11 ENCOUNTER — PRE VISIT (OUTPATIENT)
Dept: NEUROLOGY | Facility: CLINIC | Age: 70
End: 2020-11-11

## 2020-11-11 NOTE — TELEPHONE ENCOUNTER
FUTURE VISIT INFORMATION      FUTURE VISIT INFORMATION:    Date: 11/16/2020    Time: 1030am    Location: [facility]  REFERRAL INFORMATION:    Referring provider:  Dr. Burrell     Referring providers clinic:  [facility]    Reason for visit/diagnosis  Weakness, Memory Changes     RECORDS REQUESTED FROM:       Clinic name Comments Records Status Imaging Status   [facility]  Scanned to Media and Requested  Requested by mail          Internal Dr. Burrell-10/15/2020, 9/5/2019    CT Head 3/29/2017 Epic PACS                        Action 11.13.20 MJ   Action Taken Received a CD from [facility] with the following images  MR 12.26.19 MRI brain  12.19.17 MRI brain    Sent to  for processing.      11/16/2020-[facility] Records scanned to ProtoStar tab-MR @ 454am

## 2020-11-16 ENCOUNTER — OFFICE VISIT (OUTPATIENT)
Dept: NEUROLOGY | Facility: CLINIC | Age: 70
End: 2020-11-16
Attending: FAMILY MEDICINE
Payer: COMMERCIAL

## 2020-11-16 VITALS
HEART RATE: 80 BPM | OXYGEN SATURATION: 97 % | DIASTOLIC BLOOD PRESSURE: 73 MMHG | RESPIRATION RATE: 16 BRPM | SYSTOLIC BLOOD PRESSURE: 123 MMHG

## 2020-11-16 DIAGNOSIS — R41.3 MEMORY LOSS: Primary | ICD-10-CM

## 2020-11-16 PROCEDURE — 99205 OFFICE O/P NEW HI 60 MIN: CPT | Performed by: PSYCHIATRY & NEUROLOGY

## 2020-11-16 ASSESSMENT — PAIN SCALES - GENERAL: PAINLEVEL: NO PAIN (0)

## 2020-11-16 NOTE — PATIENT INSTRUCTIONS
I would like to repeat an EEG to look for seizures.    I would like to repeat neuropsychological testing.    We will look for progression of your memory issues if it is present, and whether or not your symptoms are entirely related to depression/anxiety or whether there are underlying conditions present (dementia, seizures).

## 2020-11-16 NOTE — LETTER
11/16/2020       RE: Albina Pedro  [address] [facility] [place] [address]     Dear Colleague,    Thank you for referring your patient, Albina Pedro, to the [facility] [place] at [facility]. Please see a copy of my visit note below.    [facility] Neurology Consultation    Albina Pedro MRN# [id number]   Age: 69 year old YOB: 1950     Requesting physician: Carolina Coronel     Reason for Consultation: generalized weakness      History of Presenting Symptoms:   Albina Pedro is a 69 year old female who presents today for evaluation of generalized weakness.    The patient was seen through [facility] on 12/2019 and followed through 11/12/2020. Chart review of these visits indicates that the patient was diagnosed with mild cognitive impairment following neuropsychological evaluation in 2017, and 12/2019 MRI showed mild burned of non-specific white matter changes (relatively unchanged since 2017).  There is also report of anxiety, for which the patient follows with [facility].  She was evaluated for hand tremor, dysarthria, and generalized weakness through [facility], but after starting venlafaxine with her psychologist, all her symptoms seemed to improve.  On appointment in 1/7/2020, her MMSE was 28/30.   During 9/2020 through 11/2020, the patient was reporting 5-6 months of new symptoms (imbalance with light-headedness and falls, lower extremity weakness (b/l thighs, left leg giving out), aching pain in b/l thigh and buttocks).  She was also reporting b/l drooping of eyelids, worsened stutter, toe pain with stiffness when standing for prolonged periods, and periods of short term memory confusion.  The patient had normal examination, and it was thought that much of symptoms were likely related to deconditioning.  Labs for myasthenia, CBC, CMP, Lyme, T4, TSH, B12, CK were to be done.   Testing was negative.    On 10/15/2020, the patient was seen with her PCP, Dr. Carolina Burrell, who noted that the patient had continued Major depressive disorder, and wanted the patient to follow up with neurology for generalized weakness reports and memory changes.     Today, the patient and her  are most concerned about short term memory loss.  There are some concerns about episodic right sided weakness ( ptosis on the right between the times of 2-4 pm, right sided weakness is more fatigue than weak when reporting).  When discussing right sided weakness, the patient reports feeling unstable in her leg in the afternoon.  In the AM, her weakness and fatigue isn't really that bad, but towards the afternoon things tend to worsen.  Her symptoms tend to improve after a nap and into the evening.  When talking about memory issues, it is indicated that the patient has excellent memory when recalling certain events or things from the past (recalls 1940's music and bands).  The patient has difficulties when it comes to tracking multiple topics in conversation, or when multiple stimuli are present (the patient can only really comprehend one item at a time).  This leads to stuttering and anxiety for the patient.    The patient goes to bed at 11 and wakes around 9.  She wakes and starts her day, but indicates she is peicimeal with activities (starts one task and moves to the next task before getting done with the first item). She otherwise is independent (gets dressed, goes to bathroom, makes breakfast).  The patient has a great deal of difficulty describing things she does for enjoyment or during the day.  She has had her license taken away as she didn't renew it in time.  The patient has a great deal of anxiety and worry relating to falls, getting lost while driving.  The patient has lost a great deal of motivation to do hobbies or enjoyable activities (knitting, painting, organizing house), which has specifically  worsened during COVID and decrease in social support.    The patient was followed with Rheumatology in 2013 when her initial neurological evaluation was done.  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  During w/up 5/13/2013 with neurology, an EEG did show a single left frontal sharp wave with sharply contoured theta in the left temporal region.  It was noted tat should the patient continued to have persistent language and memory difficulties, a trial of keppra may be needed.  Impressions from neuropsychological testing 12/11/2013 with Dr. Alvarado showed variable attention, subtle executive dysfunction, and normal language/visual processing.  Pattern of performance was suggestive for subtle frontal system involvement, and greatest concern for this was her depressive symptoms.  It was recommended she attend psychotherapy, couples counseling, and follow with repeat exam over time.      Past Medical History:     Patient Active Problem List   Diagnosis     DIZZINESS - LIKELY HYPOGLYCEMIA     Dermatophytosis of body     Episodic mood disorder (H)     Health Care Home     Panniculitis     Advanced directives, counseling/discussion     CAD (coronary artery disease)     Sjogren's syndrome (H)     Adverse effect of anesthetic     Status post coronary angiogram     Acute chest pain     Major depressive disorder, recurrent, mild (H)     Hyperlipidemia LDL goal <70     ILD (interstitial lung disease) (H)     Intermediate coronary syndrome (H)     Chronic stable angina (H)     Past Medical History:   Diagnosis Date     CAD (coronary artery disease)      ILD (interstitial lung disease) (H)      Other and unspecified hyperlipidemia      Sicca syndrome (H)      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      Past Surgical History:     Past Surgical  History:   Procedure Laterality Date     C/SECTION, LOW TRANSVERSE  10/13/1978    , Low Transverse     COLONOSCOPY       OPEN REDUCTION INTERNAL FIXATION ANKLE Right 2019    Procedure: Open reduction internal fixation right lateral malleolus fracture;  Surgeon: Rolo Oconnor DPM;  Location: [facility]     SURGICAL HISTORY OF -            SURGICAL HISTORY OF -   00    Colonoscopy      Social History:   12 years of schooling completed as well as some vocational school. , retired from teaching pre-school (assistant) and working at [employer]. Rarely uses alcohol.   Tobacco Use     Smoking status: Never Smoker     Smokeless tobacco: Never Used   Substance Use Topics     Alcohol use: Yes     Alcohol/week: 0.0 standard drinks     Comment: 1 glass of wine every 2 weeks     Drug use: No      Family History:     Family History   Problem Relation Age of Onset     Cancer Mother         Breast and liver- age 43     Heart Disease Father         ? chf?h/o CVA     Alzheimer Disease Father      Hypertension Father      Neurologic Disorder Father         CVA     Diabetes Maternal Grandmother      Breast Cancer Maternal Aunt      Breast Cancer Paternal Aunt      Cancer - colorectal No family hx of      Prostate Cancer No family hx of       Medications:     Current Outpatient Medications   Medication Sig     LORazepam (ATIVAN) 0.5 MG tablet TAKE ONE TABLET BY MOUTH EVERY EIGHT HOURS AS NEEDED FOR ANXIETY      Multiple Vitamins-Minerals (MULTIVITAMIN & MINERAL PO) Take 1 tablet by mouth daily      Ranolazine 1000 MG TB12 Take 1,000 mg by mouth 2 times daily     venlafaxine (EFFEXOR-XR) 150 MG 24 hr capsule Take 1 capsule (150 mg) by mouth daily      Allergies:     Allergies   Allergen Reactions     Daypro [Oxaprozin] Rash            Lidocaine Other (See Comments)     Rapid heart rate     Nitroglycerin Other (See Comments)     Causes low blood pressure     Penicillins Unknown     Occurred as  child.     Sulfa Drugs Rash      Review of Systems:   A comprehensive 10 point review of systems (constitutional, ENT, cardiac, peripheral vascular, lymphatic, respiratory, GI, , Musculoskeletal, skin, Neurological) was performed and found to be negative except as described in this note.      Physical Exam:   Vitals: /73   Pulse 80   Resp 16   SpO2 97%    General: Seated comfortably in no acute distress. Continues to look at  for many answers throughout interview. Emotionally labile when she gets frustrated at recalling what she does for the day. Impulsive behaviors at times (tries to complete tasks before full instructions are given).  No child-like in behaviors, appropriate following of social cues given social setting.  HEENT: Neck supple with normal range of motion. No paracervical muscle tenderness or tightness.  Optic discs sharp and vasculature normal on funduscopic exam.   Heart: Regular rate  Lungs: breathing comfortably  GI: Non tender  Extremities: no edema  Skin: No rashes  Neurologic:     Mental Status: Fully alert, attentive and oriented. Speech clear and fluent, no paraphasic errors. MiniCog score of 1/5 (hand placement wrong for quarter after 11, and cannot recall any of the three words with cues).  Cannot copy cube correctly (branches off into another drawing then remembers initial task and apologizes). Luria to 2 reps before switching the task. Lists 22+ words that start with F in one minute, only one repeat. Repeats sentences with ease and no paraphasic substitutions or errors.  Serial 7's without error to 5 subtractions. Spells WORLD backward correctly.     Cranial Nerves: Visual fields intact. PERRL. EOMI with normal smooth pursuit. No ptosis noted. Buries sclera on b/l eyes.  Facial sensation intact/symmetric. Facial movements symmetric. Hearing not formally tested but intact to conversation. Palate elevation symmetric, uvula midline. No dysarthria. Shoulder shrug strong  bilaterally. Tongue protrusion midline.     Motor: No tremors or other abnormal movements observed. Muscle tone normal throughout. No pronator drift. Normal/symmetric rapid finger tapping. Strength 5/5 throughout upper and lower extremities.     Deep Tendon Reflexes: 2+/symmetric throughout upper and lower extremities. No clonus. Toes downgoing bilaterally.     Sensory: Some minimal diminished sensation to light touch in distal extremities. Romberg falls to left.     Coordination: Finger-nose-finger intact without dysmetria. Rapid alternating movements intact/symmetric with normal speed and rhythm.     Gait: Hesitant gait, small steps forward, has difficulties with standing from sitting position and arms across chest (can individually work hip extensors with 5/5).  Tandem walking with astasia abasia.          Data: Pertinent prior to visit   Imaging:  MRI brain: 12/26/2019  1. No significant interval change compared to 12/19/2017 exam.  2. No evidence of acute infarction or intracranial hemorrhage  3. Mild burden of T2 hyperintensity within the supratentorial white matter and central brainstem, technially non-specific although most commonly reflects chronic small vessel ischemic disease.  It has not significantly progressed.   4. Normal cerebral volume.         Assessment and Plan:   Assessment:  Subacute on chronic memory difficulties and attention difficulties  Fatigable weakness    The patient has similar memory and cognitive concerns as she did in 2013, with fluctuation in exams being noted throughout the last 7 years.  Brain imaging was remained stable, with no signs of specific atrophy.  The patient has had many psychosocial issues causing changes in her daily life over the last year, and while her sleep and weight have remained stable, she has had a large decrease in her social outlets and enjoyable activities.  Recent testing was unrevealing for ongoing SSA, or inflammatory myopathy leading to her weakness.  No testing returned positive for a neuromuscular junction disorder.  Her exam today was unrevealing for fatigable weakness or much weakness in general.  I feel that her likely cause of worsened memory is secondary to depression and anxiety, but given her history of positive left temporal spike on EEG and continued/fluctuating symptoms over 7 years, I would want to rule out seizure as an etiology.  Also, she was to have repeat neuropsychological testing at some point, especially if her symptom continued past treatment of her depression.  Given her presentation today, I think it reasonable to repeat her neuropsychological testing now.     Plan:  - vEEG (3 hour, sleep deprived)  - Neuropsychology repeat    Follow up in Neurology clinic in 8-12 weeks or earlier as needed should new concerns arise.    CHINA Ku D.O.   of Neurology    Greater than 50% of the total time (60 min) in this patient encounter was spent on counseling and/or coordination of care. We reviewed diagnostic results, impressions, and discussed other possible tests if symptoms do not improve. We discussed the implications of the diagnosis, as well as risks and benefits of management options. We reviewed treatment instructions and our scheduled follow-up as specified in the discharge plan. We also discussed the importance of compliance with the chosen course of treatment. The patient is in agreement with this plan and has no further questions.

## 2020-11-16 NOTE — PROGRESS NOTES
[facility] Neurology Consultation    Albina Pedro MRN# [id number]   Age: 69 year old YOB: 1950     Requesting physician: Carolina Coronel     Reason for Consultation: generalized weakness      History of Presenting Symptoms:   Albina Pedro is a 69 year old female who presents today for evaluation of generalized weakness.    The patient was seen through [facility] Neurology on 12/2019 and followed through 11/12/2020. Chart review of these visits indicates that the patient was diagnosed with mild cognitive impairment following neuropsychological evaluation in 2017, and 12/2019 MRI showed mild burned of non-specific white matter changes (relatively unchanged since 2017).  There is also report of anxiety, for which the patient follows with [facility].  She was evaluated for hand tremor, dysarthria, and generalized weakness through [facility], but after starting venlafaxine with her psychologist, all her symptoms seemed to improve.  On appointment in 1/7/2020, her MMSE was 28/30.   During 9/2020 through 11/2020, the patient was reporting 5-6 months of new symptoms (imbalance with light-headedness and falls, lower extremity weakness (b/l thighs, left leg giving out), aching pain in b/l thigh and buttocks).  She was also reporting b/l drooping of eyelids, worsened stutter, toe pain with stiffness when standing for prolonged periods, and periods of short term memory confusion.  The patient had normal examination, and it was thought that much of symptoms were likely related to deconditioning.  Labs for myasthenia, CBC, CMP, Lyme, T4, TSH, B12, CK were to be done.  Testing was negative.    On 10/15/2020, the patient was seen with her PCP, Dr. Carolina Burrell, who noted that the patient had continued Major depressive disorder, and wanted the patient to follow up with neurology for generalized weakness reports and memory changes.     Today, the patient and her  are most concerned about  short term memory loss.  There are some concerns about episodic right sided weakness ( ptosis on the right between the times of 2-4 pm, right sided weakness is more fatigue than weak when reporting).  When discussing right sided weakness, the patient reports feeling unstable in her leg in the afternoon.  In the AM, her weakness and fatigue isn't really that bad, but towards the afternoon things tend to worsen.  Her symptoms tend to improve after a nap and into the evening.  When talking about memory issues, it is indicated that the patient has excellent memory when recalling certain events or things from the past (recalls 1940's music and bands).  The patient has difficulties when it comes to tracking multiple topics in conversation, or when multiple stimuli are present (the patient can only really comprehend one item at a time).  This leads to stuttering and anxiety for the patient.    The patient goes to bed at 11 and wakes around 9.  She wakes and starts her day, but indicates she is peicimeal with activities (starts one task and moves to the next task before getting done with the first item). She otherwise is independent (gets dressed, goes to bathroom, makes breakfast).  The patient has a great deal of difficulty describing things she does for enjoyment or during the day.  She has had her license taken away as she didn't renew it in time.  The patient has a great deal of anxiety and worry relating to falls, getting lost while driving.  The patient has lost a great deal of motivation to do hobbies or enjoyable activities (knitting, painting, organizing house), which has specifically worsened during COVID and decrease in social support.    The patient was followed with Rheumatology in 2013 when her initial neurological evaluation was done.  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  During w/up 5/13/2013  with neurology, an EEG did show a single left frontal sharp wave with sharply contoured theta in the left temporal region.  It was noted tat should the patient continued to have persistent language and memory difficulties, a trial of keppra may be needed.  Impressions from neuropsychological testing 2013 with Dr. Alvarado showed variable attention, subtle executive dysfunction, and normal language/visual processing.  Pattern of performance was suggestive for subtle frontal system involvement, and greatest concern for this was her depressive symptoms.  It was recommended she attend psychotherapy, couples counseling, and follow with repeat exam over time.      Past Medical History:     Patient Active Problem List   Diagnosis     DIZZINESS - LIKELY HYPOGLYCEMIA     Dermatophytosis of body     Episodic mood disorder (H)     Health Care Home     Panniculitis     Advanced directives, counseling/discussion     CAD (coronary artery disease)     Sjogren's syndrome (H)     Adverse effect of anesthetic     Status post coronary angiogram     Acute chest pain     Major depressive disorder, recurrent, mild (H)     Hyperlipidemia LDL goal <70     ILD (interstitial lung disease) (H)     Intermediate coronary syndrome (H)     Chronic stable angina (H)     Past Medical History:   Diagnosis Date     CAD (coronary artery disease)      ILD (interstitial lung disease) (H)      Other and unspecified hyperlipidemia      Sicca syndrome (H)      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      Past Surgical History:     Past Surgical History:   Procedure Laterality Date     C/SECTION, LOW TRANSVERSE  10/13/1978    , Low Transverse     COLONOSCOPY       OPEN REDUCTION INTERNAL FIXATION ANKLE Right 2019    Procedure: Open reduction internal fixation right lateral malleolus fracture;  Surgeon: Rolo Oconnor DPM;  Location: [facility] OR     SURGICAL  HISTORY OF -            SURGICAL HISTORY OF -   00    Colonoscopy      Social History:   12 years of schooling completed as well as some vocational school. , retired from teaching pre-school (assistant) and working at [employer]. Rarely uses alcohol.   Tobacco Use     Smoking status: Never Smoker     Smokeless tobacco: Never Used   Substance Use Topics     Alcohol use: Yes     Alcohol/week: 0.0 standard drinks     Comment: 1 glass of wine every 2 weeks     Drug use: No      Family History:     Family History   Problem Relation Age of Onset     Cancer Mother         Breast and liver- age 43     Heart Disease Father         ? chf?h/o CVA     Alzheimer Disease Father      Hypertension Father      Neurologic Disorder Father         CVA     Diabetes Maternal Grandmother      Breast Cancer Maternal Aunt      Breast Cancer Paternal Aunt      Cancer - colorectal No family hx of      Prostate Cancer No family hx of       Medications:     Current Outpatient Medications   Medication Sig     LORazepam (ATIVAN) 0.5 MG tablet TAKE ONE TABLET BY MOUTH EVERY EIGHT HOURS AS NEEDED FOR ANXIETY      Multiple Vitamins-Minerals (MULTIVITAMIN & MINERAL PO) Take 1 tablet by mouth daily      Ranolazine 1000 MG TB12 Take 1,000 mg by mouth 2 times daily     venlafaxine (EFFEXOR-XR) 150 MG 24 hr capsule Take 1 capsule (150 mg) by mouth daily      Allergies:     Allergies   Allergen Reactions     Daypro [Oxaprozin] Rash            Lidocaine Other (See Comments)     Rapid heart rate     Nitroglycerin Other (See Comments)     Causes low blood pressure     Penicillins Unknown     Occurred as child.     Sulfa Drugs Rash      Review of Systems:   A comprehensive 10 point review of systems (constitutional, ENT, cardiac, peripheral vascular, lymphatic, respiratory, GI, , Musculoskeletal, skin, Neurological) was performed and found to be negative except as described in this note.      Physical Exam:   Vitals: /73    Pulse 80   Resp 16   SpO2 97%    General: Seated comfortably in no acute distress. Continues to look at  for many answers throughout interview. Emotionally labile when she gets frustrated at recalling what she does for the day. Impulsive behaviors at times (tries to complete tasks before full instructions are given).  No child-like in behaviors, appropriate following of social cues given social setting.  HEENT: Neck supple with normal range of motion. No paracervical muscle tenderness or tightness.  Optic discs sharp and vasculature normal on funduscopic exam.   Heart: Regular rate  Lungs: breathing comfortably  GI: Non tender  Extremities: no edema  Skin: No rashes  Neurologic:     Mental Status: Fully alert, attentive and oriented. Speech clear and fluent, no paraphasic errors. MiniCog score of 1/5 (hand placement wrong for quarter after 11, and cannot recall any of the three words with cues).  Cannot copy cube correctly (branches off into another drawing then remembers initial task and apologizes). Luria to 2 reps before switching the task. Lists 22+ words that start with F in one minute, only one repeat. Repeats sentences with ease and no paraphasic substitutions or errors.  Serial 7's without error to 5 subtractions. Spells WORLD backward correctly.     Cranial Nerves: Visual fields intact. PERRL. EOMI with normal smooth pursuit. No ptosis noted. Buries sclera on b/l eyes.  Facial sensation intact/symmetric. Facial movements symmetric. Hearing not formally tested but intact to conversation. Palate elevation symmetric, uvula midline. No dysarthria. Shoulder shrug strong bilaterally. Tongue protrusion midline.     Motor: No tremors or other abnormal movements observed. Muscle tone normal throughout. No pronator drift. Normal/symmetric rapid finger tapping. Strength 5/5 throughout upper and lower extremities.     Deep Tendon Reflexes: 2+/symmetric throughout upper and lower extremities. No clonus. Toes  downgoing bilaterally.     Sensory: Some minimal diminished sensation to light touch in distal extremities. Romberg falls to left.     Coordination: Finger-nose-finger intact without dysmetria. Rapid alternating movements intact/symmetric with normal speed and rhythm.     Gait: Hesitant gait, small steps forward, has difficulties with standing from sitting position and arms across chest (can individually work hip extensors with 5/5).  Tandem walking with astasia abasia.          Data: Pertinent prior to visit   Imaging:  MRI brain: 12/26/2019  1. No significant interval change compared to 12/19/2017 exam.  2. No evidence of acute infarction or intracranial hemorrhage  3. Mild burden of T2 hyperintensity within the supratentorial white matter and central brainstem, technially non-specific although most commonly reflects chronic small vessel ischemic disease.  It has not significantly progressed.   4. Normal cerebral volume.         Assessment and Plan:   Assessment:  Subacute on chronic memory difficulties and attention difficulties  Fatigable weakness    The patient has similar memory and cognitive concerns as she did in 2013, with fluctuation in exams being noted throughout the last 7 years.  Brain imaging was remained stable, with no signs of specific atrophy.  The patient has had many psychosocial issues causing changes in her daily life over the last year, and while her sleep and weight have remained stable, she has had a large decrease in her social outlets and enjoyable activities.  Recent testing was unrevealing for ongoing SSA, or inflammatory myopathy leading to her weakness. No testing returned positive for a neuromuscular junction disorder.  Her exam today was unrevealing for fatigable weakness or much weakness in general.  I feel that her likely cause of worsened memory is secondary to depression and anxiety, but given her history of positive left temporal spike on EEG and continued/fluctuating symptoms  over 7 years, I would want to rule out seizure as an etiology.  Also, she was to have repeat neuropsychological testing at some point, especially if her symptom continued past treatment of her depression.  Given her presentation today, I think it reasonable to repeat her neuropsychological testing now.     Plan:  - vEEG (3 hour, sleep deprived)  - Neuropsychology repeat    Follow up in Neurology clinic in 8-12 weeks or earlier as needed should new concerns arise.    CHINA Ku D.O.   of Neurology      Greater than 50% of the total time (60 min) in this patient encounter was spent on counseling and/or coordination of care. We reviewed diagnostic results, impressions, and discussed other possible tests if symptoms do not improve. We discussed the implications of the diagnosis, as well as risks and benefits of management options. We reviewed treatment instructions and our scheduled follow-up as specified in the discharge plan. We also discussed the importance of compliance with the chosen course of treatment. The patient is in agreement with this plan and has no further questions.

## 2021-01-07 DIAGNOSIS — F43.0 ACUTE REACTION TO STRESS: ICD-10-CM

## 2021-01-11 RX ORDER — LORAZEPAM 0.5 MG/1
TABLET ORAL
Qty: 20 TABLET | Refills: 0 | Status: SHIPPED | OUTPATIENT
Start: 2021-01-11 | End: 2021-07-21

## 2021-01-13 ENCOUNTER — ANCILLARY PROCEDURE (OUTPATIENT)
Dept: NEUROLOGY | Facility: CLINIC | Age: 71
End: 2021-01-13
Attending: PSYCHIATRY & NEUROLOGY
Payer: COMMERCIAL

## 2021-01-13 ENCOUNTER — TELEPHONE (OUTPATIENT)
Dept: NEUROLOGY | Facility: CLINIC | Age: 71
End: 2021-01-13

## 2021-01-13 DIAGNOSIS — R56.9 SEIZURES (H): ICD-10-CM

## 2021-01-13 PROCEDURE — 95713 VEEG 2-12 HR CONT MNTR: CPT | Mod: GC | Performed by: PSYCHIATRY & NEUROLOGY

## 2021-01-13 PROCEDURE — 95718 EEG PHYS/QHP 2-12 HR W/VEEG: CPT | Mod: GC | Performed by: PSYCHIATRY & NEUROLOGY

## 2021-01-13 PROCEDURE — 95700 EEG CONT REC W/VID EEG TECH: CPT | Mod: GC | Performed by: PSYCHIATRY & NEUROLOGY

## 2021-01-13 NOTE — TELEPHONE ENCOUNTER
I returned a call to Kristina. Kristina was resting but I was able to speak to her . They will be coming in today for EEG. Pt had called on Monday 1/11 asking about  Possible muscle deterioration scan. I let pt spouse know per Dr. Ku on exam he didn't notice a major sign of weakness or fatigable weakness with pt, so he was not planning on ordering imaging or EMG.  I asked if he wanted to schedule follow up with Dr. Ku at this time. They will see how EEG goes and call back to schedule. I let them know we will call them once EEG results are available.     Uma MURRAY

## 2021-01-21 ENCOUNTER — TELEPHONE (OUTPATIENT)
Dept: NEUROLOGY | Facility: CLINIC | Age: 71
End: 2021-01-21

## 2021-01-21 NOTE — TELEPHONE ENCOUNTER
I called pt to discuss EEG results. I left a voicemail that Dr. Ku reviewed her results and her EEG was normal. I will mail a copy of EEG report to her home address. She can call clinic with any questions.     Uma MURRAY  
complains of pain/discomfort

## 2021-02-15 ENCOUNTER — TELEPHONE (OUTPATIENT)
Dept: FAMILY MEDICINE | Facility: CLINIC | Age: 71
End: 2021-02-15

## 2021-02-16 NOTE — TELEPHONE ENCOUNTER
Kristina is due for colon cancer screening. Please see which test she would like to do. She is also due for mammogram. Please help her schedule. ..mery

## 2021-02-24 ENCOUNTER — TELEPHONE (OUTPATIENT)
Dept: FAMILY MEDICINE | Facility: CLINIC | Age: 71
End: 2021-02-24

## 2021-02-24 NOTE — TELEPHONE ENCOUNTER
Reason for Call:  Other    Detailed comments: Pt is asking if Dr Burrell feels it would be OK and if she would benefit from a Covid 19 shot. She can get a Pfizer shot on Friday. She just wonders with her left lung condition and the meds she is on if it's OK to get.     Phone Number Patient can be reached at: Cell number on file:    Telephone Information:   Mobile [phone]     Or home number [phone]  Best Time: anytime    Can we leave a detailed message on this number? YES    Call taken on 2/24/2021 at 2:53 PM by Kristina Diehl

## 2021-02-25 NOTE — TELEPHONE ENCOUNTER
Call placed to patient.     Relayed Dr. Burrell's message.   No further questions/concerns.     Thomas Miguel RN

## 2021-03-28 ENCOUNTER — HEALTH MAINTENANCE LETTER (OUTPATIENT)
Age: 71
End: 2021-03-28

## 2021-04-10 DIAGNOSIS — F33.0 MAJOR DEPRESSIVE DISORDER, RECURRENT, MILD (H): ICD-10-CM

## 2021-04-12 NOTE — TELEPHONE ENCOUNTER
"Requested Prescriptions   Pending Prescriptions Disp Refills     venlafaxine (EFFEXOR-XR) 150 MG 24 hr capsule [Pharmacy Med Name: Venlafaxine HCl ER Oral Capsule Extended Release 24 Hour 150 MG] 90 capsule 0     Sig: TAKE ONE CAPSULE BY MOUTH ONE TIME DAILY       Serotonin-Norepinephrine Reuptake Inhibitors  Failed - 4/10/2021  4:23 PM        Failed - PHQ-9 score of less than 5 in past 6 months     Please review last PHQ-9 score.           Failed - Normal serum creatinine on file in past 12 months     Recent Labs   Lab Test 06/17/19  0955   CR 0.57       Ok to refill medication if creatinine is low          Passed - Blood pressure under 140/90 in past 12 months     BP Readings from Last 3 Encounters:   11/16/20 123/73   10/15/20 138/64   10/02/20 (!) 142/63                 Passed - Medication is active on med list        Passed - Patient is age 18 or older        Passed - No active pregnancy on record        Passed - No positive pregnancy test in past 12 months        Passed - Recent (6 mo) or future (30 days) visit within the authorizing provider's specialty     Patient had office visit in the last 6 months or has a visit in the next 30 days with authorizing provider or within the authorizing provider's specialty.  See \"Patient Info\" tab in inbasket, or \"Choose Columns\" in Meds & Orders section of the refill encounter.                 "

## 2021-04-13 RX ORDER — VENLAFAXINE HYDROCHLORIDE 150 MG/1
CAPSULE, EXTENDED RELEASE ORAL
Qty: 90 CAPSULE | Refills: 0 | Status: SHIPPED | OUTPATIENT
Start: 2021-04-13 | End: 2021-05-19

## 2021-04-15 ENCOUNTER — TELEPHONE (OUTPATIENT)
Dept: FAMILY MEDICINE | Facility: CLINIC | Age: 71
End: 2021-04-15

## 2021-04-15 ENCOUNTER — OFFICE VISIT (OUTPATIENT)
Dept: FAMILY MEDICINE | Facility: CLINIC | Age: 71
End: 2021-04-15
Payer: COMMERCIAL

## 2021-04-15 ENCOUNTER — TELEPHONE (OUTPATIENT)
Dept: PSYCHIATRY | Facility: CLINIC | Age: 71
End: 2021-04-15

## 2021-04-15 VITALS
TEMPERATURE: 98.2 F | SYSTOLIC BLOOD PRESSURE: 120 MMHG | OXYGEN SATURATION: 94 % | DIASTOLIC BLOOD PRESSURE: 70 MMHG | HEART RATE: 80 BPM | BODY MASS INDEX: 30.6 KG/M2 | WEIGHT: 189.6 LBS

## 2021-04-15 DIAGNOSIS — M35.00 SICCA SYNDROME, UNSPECIFIED: ICD-10-CM

## 2021-04-15 DIAGNOSIS — F80.9 LANGUAGE IMPAIRMENT: Primary | ICD-10-CM

## 2021-04-15 DIAGNOSIS — I25.10 CORONARY ARTERY DISEASE INVOLVING NATIVE CORONARY ARTERY OF NATIVE HEART WITHOUT ANGINA PECTORIS: Chronic | ICD-10-CM

## 2021-04-15 DIAGNOSIS — F98.5 ADULT ONSET STUTTERING: ICD-10-CM

## 2021-04-15 DIAGNOSIS — F39 EPISODIC MOOD DISORDER (H): ICD-10-CM

## 2021-04-15 DIAGNOSIS — J84.9 INTERSTITIAL PULMONARY DISEASE, UNSPECIFIED (H): ICD-10-CM

## 2021-04-15 DIAGNOSIS — I20.89 CHRONIC STABLE ANGINA (H): ICD-10-CM

## 2021-04-15 LAB
ALBUMIN SERPL-MCNC: 3.5 G/DL (ref 3.4–5)
ALP SERPL-CCNC: 62 U/L (ref 40–150)
ALT SERPL W P-5'-P-CCNC: 30 U/L (ref 0–50)
ANION GAP SERPL CALCULATED.3IONS-SCNC: 5 MMOL/L (ref 3–14)
AST SERPL W P-5'-P-CCNC: 22 U/L (ref 0–45)
BILIRUB SERPL-MCNC: 0.4 MG/DL (ref 0.2–1.3)
BUN SERPL-MCNC: 20 MG/DL (ref 7–30)
CALCIUM SERPL-MCNC: 8.8 MG/DL (ref 8.5–10.1)
CHLORIDE SERPL-SCNC: 103 MMOL/L (ref 94–109)
CK SERPL-CCNC: 188 U/L (ref 30–225)
CO2 SERPL-SCNC: 28 MMOL/L (ref 20–32)
CREAT SERPL-MCNC: 0.61 MG/DL (ref 0.52–1.04)
CRP SERPL-MCNC: <2.9 MG/L (ref 0–8)
ERYTHROCYTE [DISTWIDTH] IN BLOOD BY AUTOMATED COUNT: 13.4 % (ref 10–15)
GFR SERPL CREATININE-BSD FRML MDRD: >90 ML/MIN/{1.73_M2}
GLUCOSE SERPL-MCNC: 79 MG/DL (ref 70–99)
HCT VFR BLD AUTO: 40.8 % (ref 35–47)
HGB BLD-MCNC: 13.7 G/DL (ref 11.7–15.7)
MCH RBC QN AUTO: 31.6 PG (ref 26.5–33)
MCHC RBC AUTO-ENTMCNC: 33.6 G/DL (ref 31.5–36.5)
MCV RBC AUTO: 94 FL (ref 78–100)
PLATELET # BLD AUTO: 230 10E9/L (ref 150–450)
POTASSIUM SERPL-SCNC: 4.5 MMOL/L (ref 3.4–5.3)
PROT SERPL-MCNC: 7.3 G/DL (ref 6.8–8.8)
RBC # BLD AUTO: 4.33 10E12/L (ref 3.8–5.2)
SODIUM SERPL-SCNC: 136 MMOL/L (ref 133–144)
WBC # BLD AUTO: 4.6 10E9/L (ref 4–11)

## 2021-04-15 PROCEDURE — 99215 OFFICE O/P EST HI 40 MIN: CPT | Performed by: FAMILY MEDICINE

## 2021-04-15 PROCEDURE — 85027 COMPLETE CBC AUTOMATED: CPT | Performed by: FAMILY MEDICINE

## 2021-04-15 PROCEDURE — 80053 COMPREHEN METABOLIC PANEL: CPT | Performed by: FAMILY MEDICINE

## 2021-04-15 PROCEDURE — 36415 COLL VENOUS BLD VENIPUNCTURE: CPT | Performed by: FAMILY MEDICINE

## 2021-04-15 PROCEDURE — 82550 ASSAY OF CK (CPK): CPT | Performed by: FAMILY MEDICINE

## 2021-04-15 PROCEDURE — 86140 C-REACTIVE PROTEIN: CPT | Performed by: FAMILY MEDICINE

## 2021-04-15 RX ORDER — PRAVASTATIN SODIUM 40 MG
40 TABLET ORAL DAILY
Qty: 90 TABLET | Refills: 3 | Status: SHIPPED | OUTPATIENT
Start: 2021-04-15 | End: 2022-06-03

## 2021-04-15 RX ORDER — VENLAFAXINE HYDROCHLORIDE 37.5 MG/1
37.5 CAPSULE, EXTENDED RELEASE ORAL DAILY
Qty: 55 CAPSULE | Refills: 1 | Status: SHIPPED | OUTPATIENT
Start: 2021-04-15 | End: 2021-07-21

## 2021-04-15 NOTE — TELEPHONE ENCOUNTER
Patient last seen by Yesenia 6/2020. No meds prescribed by Yesenia.    Called patient, left voice message for her to seek emergency care if needed or call her pcp since her pcp is the one prescribing her medication.

## 2021-04-15 NOTE — TELEPHONE ENCOUNTER
"Received red flag call.  Patient's  Jerrod on call with patient in background-on speaker.  Patient and Jerrod are concerned with increased weakness, fatigue, occasional stuttering after taking medications and meal.  States this started in November, has been worse x 1week.  This behavior/symptom \"slams her down\" needs a nap daily.  Patient was scheduled for neuro psych eval and had to cancel as she was \"too weak to function for exam\".  Denies chest pain or SOA.  Occasional light headed, not today.  Scheduled visit with Dr Burrell per request.  Evie Arizmendi RN     "

## 2021-04-15 NOTE — TELEPHONE ENCOUNTER
Reason for call:  Patient reporting a symptom    Symptom or request: pt is having symptoms    Duration (how long have symptoms been present): since starting meds    Have you been treated for this before? No    Additional comments:   msg: lot of trouble with medications, even when she eats, it makes her have to lay down and makes hard for her to talk. Feeling very weak.    med: does not know which of the medications is doing this       Adv ER services if things become emergent    Phone Number patient can be reached at:  Cell number on file:    Telephone Information:   Mobile [phone]       Best Time:  ASAP    Can we leave a detailed message on this number:  YES    Call taken on 4/15/2021 at 12:51 PM by Gaby Howard

## 2021-04-15 NOTE — PROGRESS NOTES
Assessment & Plan     Language impairment  Today's exam was very different than previous exams said he was visibly stuttering was confabulating unable to find words.  She was also laughing inappropriately at times.  Her  was with her today and he is concerned with this new symptom just as I am.  I would like her to see the neurologist and get the following tests..Carolina Burrell M.D.    - MR Brain w/o & w Contrast; Future  - Comprehensive metabolic panel  - CBC with platelets  - CRP, inflammation  - CK total    Adult onset stuttering  Very concerning at the office visit today I have never had Kristina's stutter.  Her range of memory is impressive but there is obviously something going on with her speech  - MR Brain w/o & w Contrast; Future  - Comprehensive metabolic panel  - CBC with platelets  - CRP, inflammation  - CK total    Coronary artery disease involving native coronary artery of native heart without angina pectoris  I did look up the medication that is prescribed from the cardiologist this can cause some side effects but she has been on it for quite a while.  I will pursue this further if needed.  - Comprehensive metabolic panel  - CBC with platelets  - CRP, inflammation  - CK total  - pravastatin (PRAVACHOL) 40 MG tablet; Take 1 tablet (40 mg) by mouth daily    Episodic mood disorder (H)  I am going to have her wean off of her Effexor to see if this is affecting her in any way.  - Comprehensive metabolic panel  - CBC with platelets  - CRP, inflammation  - CK total  - venlafaxine (EFFEXOR-XR) 37.5 MG 24 hr capsule; Take 1 capsule (37.5 mg) by mouth daily Take 3 for 7 days then 2 for 7 days then 1 for 7-10 days    Sicca syndrome, unspecified (H)  This has not changed recently and she is fine with methods to keep her eyes hydrated in her mouth also    Interstitial pulmonary disease, unspecified (H)  Currently not having any issues with her breathing    Chronic stable angina (H)  There has been no  "change in she continues on the medication from her cardiologist.    I am unsure of the cause of all of these different things whether it is 1 or multiple issues that are causing problems.  I will do initial lab work and an updated MRI of the brain but I would like her to follow-up with neurology.  Most of her symptoms are related to focal symptoms in her brain.  Her  Meek was with her at this visit and he expressed much concern over this radical change in the last 3 or 4 months in his wife.     BMI:   Estimated body mass index is 30.6 kg/m  as calculated from the following:    Height as of 10/15/20: 1.676 m (5' 6\").    Weight as of this encounter: 86 kg (189 lb 9.6 oz).       Patient Instructions   Please schedule for the MRI of the brain.  I would also like you to follow-up with the neurologist.  If you can get the neuropsychological testing done I think that would be optimal.  I have prescribed the weaning schedule for your Effexor.  I would like to see how you feel when you are off of this medication.      No follow-ups on file.    Carolina Burrell MD  [facility] [place] LUIS FERNANDO Acevedo is a 70 year old who presents for the following health issues     HPI     Chief Complaint   Patient presents with     Fatigue     weakness, fatigue over 6 months-worse x 1 week.  Lightheadness, sweating,off balance, good in the AM by afternoon unable to do things           Review of Systems   Constitutional, HEENT, cardiovascular, pulmonary, gi and gu systems are negative, except as otherwise noted.      Objective    /70 (BP Location: Right arm, Patient Position: Chair, Cuff Size: Adult Regular)   Pulse 80   Temp 98.2  F (36.8  C)   Wt 86 kg (189 lb 9.6 oz)   SpO2 94%   BMI 30.60 kg/m    Body mass index is 30.6 kg/m .  Physical Exam   GENERAL: healthy, alert and no distress  EYES: Eyes grossly normal to inspection, PERRL and conjunctivae and sclerae normal  NECK: no adenopathy, no asymmetry, " masses, or scars and thyroid normal to palpation  RESP: lungs clear to auscultation - no rales, rhonchi or wheezes  CV: regular rate and rhythm, normal S1 S2, no S3 or S4, no murmur, click or rub, no peripheral edema and peripheral pulses strong  ABDOMEN: soft, nontender, no hepatosplenomegaly, no masses and bowel sounds normal  MS: no gross musculoskeletal defects noted, no edema  MENTAL STATUS EXAM:    1. Clinical observations: Albina was clean and well-groomed and was well groomed. Albina's emotional presentation was open and cooperative. She spoke clear and articulate. She maintained good eye contact and she was unorganized in answering questions.   2. She appeared to be well-oriented in all spheres with logical, relevent and mood incongruent thinking.   3. Thought content: She reports incoherent.   4. Affect and mood: Omars affect is described as expansive, anxious and euphoric and her emotional attitude was open and cooperative. She reports the following sypmtoms: difficulty sleeping, sleeping too much, difficulty concentrating, lack of interest or enjoyment, feeling negative about the future, memory gaps, quick change in moods, feeling confused and poor memory.    5. Sensorium and cognition: She was in contact with reality and oriented to place and person.  She demonstrated poor recent memory. Her insight was poor and her  intelligence appeared to be average.        Results for orders placed or performed in visit on 04/15/21   Comprehensive metabolic panel     Status: None   Result Value Ref Range    Sodium 136 133 - 144 mmol/L    Potassium 4.5 3.4 - 5.3 mmol/L    Chloride 103 94 - 109 mmol/L    Carbon Dioxide 28 20 - 32 mmol/L    Anion Gap 5 3 - 14 mmol/L    Glucose 79 70 - 99 mg/dL    Urea Nitrogen 20 7 - 30 mg/dL    Creatinine 0.61 0.52 - 1.04 mg/dL    GFR Estimate >90 >60 mL/min/[1.73_m2]    GFR Estimate If Black >90 >60 mL/min/[1.73_m2]    Calcium 8.8 8.5 - 10.1 mg/dL    Bilirubin Total 0.4 0.2 - 1.3  mg/dL    Albumin 3.5 3.4 - 5.0 g/dL    Protein Total 7.3 6.8 - 8.8 g/dL    Alkaline Phosphatase 62 40 - 150 U/L    ALT 30 0 - 50 U/L    AST 22 0 - 45 U/L   CBC with platelets     Status: None   Result Value Ref Range    WBC 4.6 4.0 - 11.0 10e9/L    RBC Count 4.33 3.8 - 5.2 10e12/L    Hemoglobin 13.7 11.7 - 15.7 g/dL    Hematocrit 40.8 35.0 - 47.0 %    MCV 94 78 - 100 fl    MCH 31.6 26.5 - 33.0 pg    MCHC 33.6 31.5 - 36.5 g/dL    RDW 13.4 10.0 - 15.0 %    Platelet Count 230 150 - 450 10e9/L   CRP, inflammation     Status: None   Result Value Ref Range    CRP Inflammation <2.9 0.0 - 8.0 mg/L   CK total     Status: None   Result Value Ref Range    CK Total 188 30 - 225 U/L         Labs are normal. Will check Mri  Concern for mass lesion due to focal neurological defect. Refer back to neurologyas labs are normal. Carolina Burrell M.D.

## 2021-04-19 NOTE — PATIENT INSTRUCTIONS
Please schedule for the MRI of the brain.  I would also like you to follow-up with the neurologist.  If you can get the neuropsychological testing done I think that would be optimal.  I have prescribed the weaning schedule for your Effexor.  I would like to see how you feel when you are off of this medication.

## 2021-04-20 ENCOUNTER — TELEPHONE (OUTPATIENT)
Dept: FAMILY MEDICINE | Facility: CLINIC | Age: 71
End: 2021-04-20

## 2021-04-20 ENCOUNTER — HOSPITAL ENCOUNTER (OUTPATIENT)
Dept: MRI IMAGING | Facility: CLINIC | Age: 71
Discharge: HOME | End: 2021-04-20
Attending: FAMILY MEDICINE | Admitting: FAMILY MEDICINE
Payer: COMMERCIAL

## 2021-04-20 DIAGNOSIS — F80.9 LANGUAGE IMPAIRMENT: ICD-10-CM

## 2021-04-20 DIAGNOSIS — F98.5 ADULT ONSET STUTTERING: ICD-10-CM

## 2021-04-20 PROCEDURE — 255N000002 HC RX 255 OP 636: Performed by: FAMILY MEDICINE

## 2021-04-20 PROCEDURE — 70553 MRI BRAIN STEM W/O & W/DYE: CPT

## 2021-04-20 PROCEDURE — A9585 GADOBUTROL INJECTION: HCPCS | Performed by: FAMILY MEDICINE

## 2021-04-20 RX ORDER — GADOBUTROL 604.72 MG/ML
8.5 INJECTION INTRAVENOUS ONCE
Status: COMPLETED | OUTPATIENT
Start: 2021-04-20 | End: 2021-04-20

## 2021-04-20 RX ADMIN — GADOBUTROL 8.5 ML: 604.72 INJECTION INTRAVENOUS at 16:19

## 2021-04-20 NOTE — TELEPHONE ENCOUNTER
"Dr. Lavelle Acevedo called and reports that she is having MRI this afternoon on her brain. She reports that she had surgery on her right ankle Feb 2019. Since then area has been red and itchy.  \"I wonder if I have a weird allergy and if that is what is causing my symptoms?\"  She reports that she has a metal plate in her ankle.  Can she get a brain MRI?  Suhail Silva RN  "

## 2021-04-20 NOTE — TELEPHONE ENCOUNTER
Message handled by Nurse Triage with [facility] - provider name: Ashanti. Called and spoke with Susanna in [place] [facility] and asked if OK to have brain MRI today with metal plate in ankle. Susanna said that is would be OK. Kristina notified of OK to have MRI today and to apply OTC hydrocortisone to itchy ankle area.  Kristina agreed with plan.  Suhial Silva RN  .

## 2021-04-26 ENCOUNTER — TELEPHONE (OUTPATIENT)
Dept: FAMILY MEDICINE | Facility: CLINIC | Age: 71
End: 2021-04-26

## 2021-04-26 NOTE — TELEPHONE ENCOUNTER
Dr. Burrell, This writer gave patient her MRI results and advised that Dr. Burrell would like her to see Neurology. Would you please place referral as I was not sure if there was a certain location or specifics that should go on the referral.  Thank you.  Suhail Silva RN

## 2021-04-27 NOTE — TELEPHONE ENCOUNTER
She has one down at the  she saw him in nov and should not need another referral. Carolina Burrell M.D.

## 2021-04-29 NOTE — TELEPHONE ENCOUNTER
Call placed to patient.   No answer; unable to leave a voicemail as patient's voicemail box is full and not accepting further messages.      Thomas Miguel RN

## 2021-05-19 ENCOUNTER — OFFICE VISIT (OUTPATIENT)
Dept: FAMILY MEDICINE | Facility: CLINIC | Age: 71
End: 2021-05-19
Payer: COMMERCIAL

## 2021-05-19 VITALS
SYSTOLIC BLOOD PRESSURE: 132 MMHG | BODY MASS INDEX: 31.02 KG/M2 | TEMPERATURE: 96.9 F | HEART RATE: 92 BPM | WEIGHT: 193 LBS | OXYGEN SATURATION: 98 % | DIASTOLIC BLOOD PRESSURE: 76 MMHG | HEIGHT: 66 IN

## 2021-05-19 DIAGNOSIS — R55 NEAR SYNCOPE: ICD-10-CM

## 2021-05-19 DIAGNOSIS — Z12.31 ENCOUNTER FOR SCREENING MAMMOGRAM FOR BREAST CANCER: Primary | ICD-10-CM

## 2021-05-19 DIAGNOSIS — Z12.11 SCREEN FOR COLON CANCER: ICD-10-CM

## 2021-05-19 DIAGNOSIS — M62.81 GENERALIZED MUSCLE WEAKNESS: ICD-10-CM

## 2021-05-19 DIAGNOSIS — F98.5 ADULT ONSET STUTTERING: ICD-10-CM

## 2021-05-19 DIAGNOSIS — I25.10 CORONARY ARTERY DISEASE INVOLVING NATIVE CORONARY ARTERY, ANGINA PRESENCE UNSPECIFIED, UNSPECIFIED WHETHER NATIVE OR TRANSPLANTED HEART: ICD-10-CM

## 2021-05-19 PROCEDURE — 99214 OFFICE O/P EST MOD 30 MIN: CPT | Performed by: FAMILY MEDICINE

## 2021-05-19 ASSESSMENT — MIFFLIN-ST. JEOR: SCORE: 1402.19

## 2021-05-19 ASSESSMENT — PATIENT HEALTH QUESTIONNAIRE - PHQ9: SUM OF ALL RESPONSES TO PHQ QUESTIONS 1-9: 5

## 2021-05-19 NOTE — PROGRESS NOTES
"    Assessment & Plan     Encounter for screening mammogram for breast cancer    - *MA Screening Digital Bilateral; Future    Screen for colon cancer    - [facility]([employer])    Adult onset stuttering  Will refer intermittent and may be stress related   - NEUROLOGY ADULT REFERRAL    Generalized muscle weakness  Will refer . I am concerned with the intermittent nature of this. Today she was entirely at baseline   - NEUROLOGY ADULT REFERRAL    Near syncope  See cardiology mentioned she feels sweaty and maybe some chest pain rec cardiology echo   - Echocardiogram Complete; Future    Coronary artery disease involving native coronary artery, angina presence unspecified, unspecified whether native or transplanted heart    - Echocardiogram Complete; Future         BMI:   Estimated body mass index is 31.15 kg/m  as calculated from the following:    Height as of this encounter: 1.676 m (5' 6\").    Weight as of this encounter: 87.5 kg (193 lb).   Weight management plan: Discussed healthy diet and exercise guidelines    See Patient Instructions    Return in about 4 weeks (around 6/16/2021) for follow up, with consultant as planned.    Carolina Burrell MD  [facility] LUIS FERNANDO Acevedo is a 70 year old who presents for the following health issues     HPI     Stuttering   Memory loss  Would referral.   [place] Wesley, KEISHA    She has some diaphoresis,   She has found that her  had written a list about 17 things that he did not like about her   She is thinking of separation   Review of Systems   Still stuttering and has intermittent muscle weakness   Always happens after diaphoresis and needs to lay down passes after a bit   At her last visit she did not have the diaphoresis but was stuttering excessively  This visit no stuttering, no aphasia, no dysarthria       Objective    /76   Pulse 92   Temp 96.9  F (36.1  C) (Tympanic)   Ht 1.676 m (5' 6\")   Wt 87.5 kg (193 lb)   SpO2 98%   " BMI 31.15 kg/m    Body mass index is 31.15 kg/m .  Physical Exam   GENERAL: healthy, alert and no distress  NECK: no adenopathy, no asymmetry, masses, or scars and thyroid normal to palpation  RESP: lungs clear to auscultation - no rales, rhonchi or wheezes  CV: regular rate and rhythm, normal S1 S2, no S3 or S4, no murmur, click or rub, no peripheral edema and peripheral pulses strong  MS: no gross musculoskeletal defects noted, no edema  MENTAL STATUS EXAM:    1. Clinical observations: Albina was clean and was adequately groomed. Albina's emotional presentation was open and cooperative. She spoke clear and articulate. She maintained good eye contact and she was cooperative in answering questions.   2. She appeared to be well-oriented in all spheres with coherent, logical, goal directed and relevent thinking.   3. Thought content: She denies no abnormal thought process.   4. Affect and mood: Albina's affect is described as normal/appropriate and her emotional attitude was open and cooperative. She reports the following sypmtoms: difficulty concentrating.    5. Sensorium and cognition: She was in contact with reality and oriented to time, place and person.  She demonstrated no impairment in immediate, recent, or remote memory. Her insight was adequate and her  intelligence appeared to be average.      Office Visit on 04/15/2021   Component Date Value Ref Range Status     Sodium 04/15/2021 136  133 - 144 mmol/L Final     Potassium 04/15/2021 4.5  3.4 - 5.3 mmol/L Final     Chloride 04/15/2021 103  94 - 109 mmol/L Final     Carbon Dioxide 04/15/2021 28  20 - 32 mmol/L Final     Anion Gap 04/15/2021 5  3 - 14 mmol/L Final     Glucose 04/15/2021 79  70 - 99 mg/dL Final     Urea Nitrogen 04/15/2021 20  7 - 30 mg/dL Final     Creatinine 04/15/2021 0.61  0.52 - 1.04 mg/dL Final     GFR Estimate 04/15/2021 >90  >60 mL/min/[1.73_m2] Final    Comment: Non  GFR Calc  Starting 12/18/2018, serum creatinine based  estimated GFR (eGFR) will be   calculated using the Chronic Kidney Disease Epidemiology Collaboration   (CKD-EPI) equation.       GFR Estimate If Black 04/15/2021 >90  >60 mL/min/[1.73_m2] Final    Comment:  GFR Calc  Starting 12/18/2018, serum creatinine based estimated GFR (eGFR) will be   calculated using the Chronic Kidney Disease Epidemiology Collaboration   (CKD-EPI) equation.       Calcium 04/15/2021 8.8  8.5 - 10.1 mg/dL Final     Bilirubin Total 04/15/2021 0.4  0.2 - 1.3 mg/dL Final     Albumin 04/15/2021 3.5  3.4 - 5.0 g/dL Final     Protein Total 04/15/2021 7.3  6.8 - 8.8 g/dL Final     Alkaline Phosphatase 04/15/2021 62  40 - 150 U/L Final     ALT 04/15/2021 30  0 - 50 U/L Final     AST 04/15/2021 22  0 - 45 U/L Final     WBC 04/15/2021 4.6  4.0 - 11.0 10e9/L Final     RBC Count 04/15/2021 4.33  3.8 - 5.2 10e12/L Final     Hemoglobin 04/15/2021 13.7  11.7 - 15.7 g/dL Final     Hematocrit 04/15/2021 40.8  35.0 - 47.0 % Final     MCV 04/15/2021 94  78 - 100 fl Final     MCH 04/15/2021 31.6  26.5 - 33.0 pg Final     MCHC 04/15/2021 33.6  31.5 - 36.5 g/dL Final     RDW 04/15/2021 13.4  10.0 - 15.0 % Final     Platelet Count 04/15/2021 230  150 - 450 10e9/L Final     CRP Inflammation 04/15/2021 <2.9  0.0 - 8.0 mg/L Final     CK Total 04/15/2021 188  30 - 225 U/L Final        Carolina Burrell M.D.

## 2021-05-19 NOTE — PATIENT INSTRUCTIONS
Make the appointment for the echocardiogram  Neurology should call you if you don't hear by tomorrow afternoon please call them   Call cardiology for appointment today .

## 2021-05-22 ENCOUNTER — HEALTH MAINTENANCE LETTER (OUTPATIENT)
Age: 71
End: 2021-05-22

## 2021-05-28 ENCOUNTER — TELEPHONE (OUTPATIENT)
Dept: CARDIOLOGY | Facility: CLINIC | Age: 71
End: 2021-05-28

## 2021-05-28 NOTE — TELEPHONE ENCOUNTER
Health Call Center    Phone Message    May a detailed message be left on voicemail: yes     Reason for Call: Patient called stating that her feet are swelling, she is tired more often, has headaches and chest pain periodically.Patient is scheduled with Dr. Carbone on 7/30 which was his first available and was added to the waitlist. Patient wants to know if she should be seen sooner. Please advise. Thank you.    Action Taken: Message routed to:  Adult Clinics: Cardiology p [phone]    Travel Screening: Not Applicable

## 2021-05-28 NOTE — TELEPHONE ENCOUNTER
"Called and spoke to patient. She states she called for an appointment due to some new symptoms in last 2-3 months. She has noticed occasional headaches, usually on just one side of her head. She also has fatigue, she thinks it mostly happens a few hours after she takes her medications.   She also has occasional chest discomfort. Sometimes a tightness and sometimes sharp.     She had a fractured ankle 2 years ago that she said never \"healed\" properly. There was always a   \"divit\" and she went to her ankle doctor last week because her foot was swollen and he drained the spot. He suggested she call her cardiologist because he was questioning her circulation to the area. Prior to her injury she never had any pain in her legs with walking.     Advised that I would send a message to Dr Carbone and call her back . She has scheduled an appointment with him on July 30 th.     Xin Patel RN  Medical Speciality Care Coordinator  [facility]  Phone: [phone]    "

## 2021-06-01 ENCOUNTER — TRANSFERRED RECORDS (OUTPATIENT)
Dept: HEALTH INFORMATION MANAGEMENT | Facility: CLINIC | Age: 71
End: 2021-06-01

## 2021-06-01 LAB — COLOGUARD-ABSTRACT: NEGATIVE

## 2021-06-02 NOTE — TELEPHONE ENCOUNTER
Called and spoke to patient. She said that since the doctor drained her ankle the swelling is better. Encouraged her to follow up on that issue because the swelling is most likely from the infection and that needs to be followed closely.   If she feels her chest discomfort is coming mor frequently or lasting longer, than she needs to call and we can  her appointment up. She understands Her  was also on speaker phone.     TERRI Harrington

## 2021-06-04 VITALS
BODY MASS INDEX: 32.21 KG/M2 | HEIGHT: 64 IN | SYSTOLIC BLOOD PRESSURE: 120 MMHG | DIASTOLIC BLOOD PRESSURE: 66 MMHG | WEIGHT: 188.7 LBS | HEART RATE: 68 BPM

## 2021-06-05 NOTE — PROGRESS NOTES
ASSESSMENT AND PLAN:  Albina Pedro 69 y.o. female is seen here on 01/16/20 for evaluation of evaluation of painful joints, cervicalgia, in the background of Sjogren's syndrome, interstitial lung disease, coronary artery disease.  She has ample evidence of osteoarthritis.  She has trochanteric bursitis.  We discussed options.  Further work-up as noted.  She can consider corticosteroid injection such as for her trochanteric bursitis pain.  Today's work-up as noted.  X-rays of the knees taken today, personally reviewed the films, findings: Mild degenerative changes, medial compartment space reduction.  This is discussed with her.  The likelihood that she has inflammatory joint disease in association with previous diagnosis of Sjogren's to be kept under consideration would appear to be less likely however.  Further work-up as noted once these data are available we will get together further course to be charted accordingly.  Follow-up in the next 3 weeks.  Diagnoses and all orders for this visit:    Polyarthralgia  -     XR Knees Bilateral 1 Or 2 VWS; Future; Expected date: 01/16/2020  -     XR Cervical Spine Flexion Extension 2-3; Future; Expected date: 01/16/2020  -     XR Knees Bilateral 1 Or 2 VWS  -     XR Cervical Spine Flexion Extension 2-3  -     RUPERT (Antibodies to Extractable Nuclear Antigens) Profile  -     DNA (ds) Antibody Screen  -     Complement, C'3  -     Complement, C'4    Primary osteoarthritis involving multiple joints  -     XR Knees Bilateral 1 Or 2 VWS; Future; Expected date: 01/16/2020  -     XR Cervical Spine Flexion Extension 2-3; Future; Expected date: 01/16/2020  -     XR Knees Bilateral 1 Or 2 VWS  -     XR Cervical Spine Flexion Extension 2-3    Cervicalgia  -     XR Cervical Spine Flexion Extension 2-3; Future; Expected date: 01/16/2020  -     XR Cervical Spine Flexion Extension 2-3    Sjogren's syndrome without extraglandular involvement (H)  -     RUPERT (Antibodies to Extractable  Nuclear Antigens) Profile  -     DNA (ds) Antibody Screen  -     Complement, C'3  -     Complement, C'4      HISTORY OF PRESENTING ILLNESS:  Albina Pedro, 69 y.o., female is here for evaluation of painful joints.  She reports that some of her worst areas affected are in her hands and knees, neck.  She reports that these have troubled her over the past several months.  Previous to that for years she has had some discomfort but quite tolerable and responding to over-the-counter measures.  The pain tends to be worse with activity.  She has not observed swelling.  There is no history of recent trauma.  Trying to open jars, bottles counting money is painful at the bases of the thumbs.  Trochanteric areas trouble her at night sometimes.  The neck pain radiates toward the occipital area worse with activity.  She has tried over-the-counter measures without help.  She reports no personal or family history of psoriasis ulcerative colitis Crohn's disease.  In the past she had one point was thought to have Sjogren's syndrome.  She was seen in.  Her autoimmune signals are noted positive BINDU, SSA SSB antibodies.  She was on azathioprine.  She stopped taking it after some time.  She does not think it over the years had dryness of mouth has gotten worse.  She has maintained her dental health.  She has not had mouth ulcers photosensitivity pleuritic symptoms seizure disorder as per as she is aware intact renal function.  She has not had DVT/PE.  She is noted to have history of interstitial lung disease.  She is followed up at pulmonary note from there recently reviewed:LIP manifested by multiple stable thin-walled cysts without any other parenchymal lung disease. Ms. Pedro returns today for followup. Overall, she feels relatively well. She generally notices some dyspnea on exertion when going up hills or walking quickly, but otherwise feels relatively well and does note occasional dry cough. .  She is under observation.  At  "one point she was thought to have low-grade myositis.  No family history of further historical information and ADL limitations as noted in the multidimensional health assessment questionnaire attached in the EMR. Rest of the 13 system ROS is negative.        ALLERGIES:Lidocaine; Nitroglycerin; and Penicillins    PAST MEDICAL/ACTIVE PROBLEMS/MEDICATION/ FAMILY HISTORY/SOCIAL DATA:  The patient has a family history of  No past medical history on file.  Social History     Tobacco Use   Smoking Status Not on file     There is no problem list on file for this patient.    Current Outpatient Medications   Medication Sig Dispense Refill     aspirin-calcium carbonate 81 mg-300 mg calcium(777 mg) Tab Take 81 mg by mouth.       buPROPion (WELLBUTRIN XL) 150 MG 24 hr tablet Take 150 mg by mouth.       calcium, as carbonate, (CALCIUM 500) 500 mg calcium (1,250 mg) tablet Take 1 tablet by mouth.       cholecalciferol, vitamin D3, (VITAMIN D3 ORAL) Take by mouth.       LORazepam (ATIVAN) 0.5 MG tablet TAKE ONE TABLET BY MOUTH EVERY EIGHT HOURS AS NEEDED FOR ANXIETY       multivitamin with minerals tablet Take 1 tablet by mouth.       nystatin (MYCOSTATIN) cream Apply topically.       pravastatin (PRAVACHOL) 40 MG tablet Take 40 mg by mouth.       ranolazine (RANEXA) 1,000 mg SR tablet Take 1,000 mg by mouth.       triamcinolone (KENALOG) 0.1 % cream Apply sparingly to affected area three times daily for 14 days.       triamcinolone (KENALOG) 0.1 % ointment Apply topically.       venlafaxine (EFFEXOR-XR) 150 MG 24 hr capsule Take 150 mg by mouth.       No current facility-administered medications for this visit.        COMPREHENSIVE EXAMINATION:  Vitals:    01/16/20 0920 01/16/20 0926   BP: 142/70 120/66   Patient Site: Right Arm Right Arm   Patient Position: Sitting Sitting   Cuff Size: Adult Regular Adult Regular   Pulse: 68    Weight: 188 lb 11.2 oz (85.6 kg)    Height: 5' 4\" (1.626 m)      A well appearing alert oriented " female. Vital data as noted above. Her eyes without inflammation/scleromalacia. ENTwithout oral mucositis, thrush, nasal deformity, external ear redness, deformity. Her neck is without lymphadenopathy and supple. Lungs normal sounds, no pleural rub. Heart auscultation normal rate, rhythm; no pericardial rub and murmurs. Abdomen soft, non tender, no organomegaly. Skin examined for heliotrope, malar area eruption, lupus pernio, periungual erythema, sclerodactyly, papules, erythema nodosum, purpura, nail pitting, onycholysis, and obvious psoriasis lesion. Neurological examination shows normal alertness, speech, facial symmetry, tone and power in upper and lower extremities. The joint examination is performed for swelling, tenderness, warmth, erythema, and range of motion in the following joints: DIPs, PIPs, MCPs, wrists, first CMC's, elbows, shoulders, hips, knees, ankles, feet; spine for range of motion and paraspinal muscles for tenderness. The salient  findings are: She has tenderness in the trochanteric area.  Minimal impingement of the shoulders, tenderness in the paracervical region, reduced range of motion of the C-spine especially turning to the right and in extension reproducing her neck symptoms.  She does not have synovitis of any of the palpable joints of upper or lower extremities.  She does not have dactylitis of the digits.  She has tenderness of the first CMC's bilaterally with minimal grinding JLT of the knees bilaterally without effusion or warmth.  There is no swelling of the parotid glands.  Oral mucosa is moist.  Thrush.  There is no cervical lymphadenopathy.    LAB / IMAGING DATA:  No results found for: ALT, ALBUMIN, CREATININE    No results found for: WBC, HGB, PLT    No results found for: BINDU, RF, SEDRATE

## 2021-06-16 ENCOUNTER — TELEPHONE (OUTPATIENT)
Dept: FAMILY MEDICINE | Facility: CLINIC | Age: 71
End: 2021-06-16

## 2021-06-16 ENCOUNTER — OFFICE VISIT (OUTPATIENT)
Dept: FAMILY MEDICINE | Facility: CLINIC | Age: 71
End: 2021-06-16
Payer: COMMERCIAL

## 2021-06-16 VITALS
WEIGHT: 194 LBS | SYSTOLIC BLOOD PRESSURE: 124 MMHG | DIASTOLIC BLOOD PRESSURE: 68 MMHG | TEMPERATURE: 97.7 F | HEART RATE: 72 BPM | BODY MASS INDEX: 31.31 KG/M2

## 2021-06-16 DIAGNOSIS — R53.83 OTHER FATIGUE: ICD-10-CM

## 2021-06-16 DIAGNOSIS — R60.0 BILATERAL LOWER EXTREMITY EDEMA: Primary | ICD-10-CM

## 2021-06-16 DIAGNOSIS — R06.02 SHORTNESS OF BREATH: ICD-10-CM

## 2021-06-16 LAB
ALBUMIN SERPL-MCNC: 3.5 G/DL (ref 3.4–5)
ALP SERPL-CCNC: 54 U/L (ref 40–150)
ALT SERPL W P-5'-P-CCNC: 34 U/L (ref 0–50)
ANION GAP SERPL CALCULATED.3IONS-SCNC: 5 MMOL/L (ref 3–14)
AST SERPL W P-5'-P-CCNC: 25 U/L (ref 0–45)
BILIRUB SERPL-MCNC: 0.5 MG/DL (ref 0.2–1.3)
BUN SERPL-MCNC: 22 MG/DL (ref 7–30)
CALCIUM SERPL-MCNC: 8.6 MG/DL (ref 8.5–10.1)
CHLORIDE SERPL-SCNC: 106 MMOL/L (ref 94–109)
CO2 SERPL-SCNC: 28 MMOL/L (ref 20–32)
CREAT SERPL-MCNC: 0.7 MG/DL (ref 0.52–1.04)
ERYTHROCYTE [DISTWIDTH] IN BLOOD BY AUTOMATED COUNT: 13.4 % (ref 10–15)
GFR SERPL CREATININE-BSD FRML MDRD: 86 ML/MIN/{1.73_M2}
GLUCOSE SERPL-MCNC: 80 MG/DL (ref 70–99)
HCT VFR BLD AUTO: 37.8 % (ref 35–47)
HGB BLD-MCNC: 12.6 G/DL (ref 11.7–15.7)
MCH RBC QN AUTO: 31.7 PG (ref 26.5–33)
MCHC RBC AUTO-ENTMCNC: 33.3 G/DL (ref 31.5–36.5)
MCV RBC AUTO: 95 FL (ref 78–100)
NT-PROBNP SERPL-MCNC: 69 PG/ML (ref 0–125)
PLATELET # BLD AUTO: 229 10E9/L (ref 150–450)
POTASSIUM SERPL-SCNC: 4.7 MMOL/L (ref 3.4–5.3)
PROT SERPL-MCNC: 7.1 G/DL (ref 6.8–8.8)
RBC # BLD AUTO: 3.98 10E12/L (ref 3.8–5.2)
SODIUM SERPL-SCNC: 139 MMOL/L (ref 133–144)
WBC # BLD AUTO: 4.7 10E9/L (ref 4–11)

## 2021-06-16 PROCEDURE — 36415 COLL VENOUS BLD VENIPUNCTURE: CPT | Performed by: NURSE PRACTITIONER

## 2021-06-16 PROCEDURE — 85027 COMPLETE CBC AUTOMATED: CPT | Performed by: NURSE PRACTITIONER

## 2021-06-16 PROCEDURE — 99213 OFFICE O/P EST LOW 20 MIN: CPT | Performed by: NURSE PRACTITIONER

## 2021-06-16 PROCEDURE — 80053 COMPREHEN METABOLIC PANEL: CPT | Performed by: NURSE PRACTITIONER

## 2021-06-16 PROCEDURE — 83880 ASSAY OF NATRIURETIC PEPTIDE: CPT | Performed by: NURSE PRACTITIONER

## 2021-06-16 RX ORDER — FUROSEMIDE 20 MG
20 TABLET ORAL DAILY
Qty: 3 TABLET | Refills: 0 | Status: SHIPPED | OUTPATIENT
Start: 2021-06-16 | End: 2021-07-21

## 2021-06-16 ASSESSMENT — ENCOUNTER SYMPTOMS
SHORTNESS OF BREATH: 1
FEVER: 0
FATIGUE: 1
COUGH: 0
GASTROINTESTINAL NEGATIVE: 1

## 2021-06-16 NOTE — PROGRESS NOTES
Assessment & Plan     Bilateral lower extremity edema  Labs ordered. Lasix prescribed for 3 days to help with swelling going into the weekend. Side effects, risks and benefits of medication were discussed with patient. Discussed how and when to take medication. Has Echo ordered for 2 weeks from now.     - Comprehensive metabolic panel (BMP + Alb, Alk Phos, ALT, AST, Total. Bili, TP)  - CBC with platelets  - BNP-N terminal pro  - furosemide (LASIX) 20 MG tablet; Take 1 tablet (20 mg) by mouth daily for 3 days    Shortness of breath  Labs ordered.     - BNP-N terminal pro    Other fatigue  Labs ordered.                Patient Instructions   Labs today.     Take lasix one tablet x 3 days to help with the swelling.     Continue with cognitive testing and echocardiogram later this month.     Follow-up with PCP as needed.       Return if symptoms worsen or fail to improve.    STEW Rutledge CNP  [facility] LUIS FERNANDO Acevedo is a 70 year old who presents for the following health issues  accompanied by her spouse:    HPI     Musculoskeletal problem/pain  Onset/Duration: Has been going on since May 10th, but the past couple of days the swelling has increased  Description  Location: ankle - bilateral  Joint Swelling: YES  Redness: no  Pain: no  Warmth: no  Intensity:  mild  Progression of Symptoms:  worsening  Accompanying signs and symptoms:   Fevers: no  Numbness/tingling/weakness: no  History  Trauma to the area: YES  Recent illness:  no  Previous similar problem: no  Previous evaluation:  no  Precipitating or alleviating factors:  Aggravating factors include: none      Additional provider notes:       BLE edema: has had right ankle swelling since suture infection 3 weeks ago. Infection is resolved. Now with BLE edema. Short of breath with extended walking since this spring. Denies CP or palpitations. Denies swelling anywhere else. Has echocardiogram scheduled for 06/31/21.     Fatigue:  feels she's constantly exhausted.     Cognitive concerns: continues to have memory issues. Has cognitive testing on 06/30/21.     Review of Systems   Constitutional: Positive for fatigue. Negative for fever.   HENT: Negative.    Respiratory: Positive for shortness of breath (with exertion). Negative for cough.    Cardiovascular: Positive for peripheral edema.   Gastrointestinal: Negative.    Skin: Negative for rash.            Objective    /68 (BP Location: Right arm, Patient Position: Sitting, Cuff Size: Adult Regular)   Pulse 72   Temp 97.7  F (36.5  C) (Tympanic)   Wt 88 kg (194 lb)   BMI 31.31 kg/m    Body mass index is 31.31 kg/m .  Physical Exam  Vitals signs and nursing note reviewed.   Constitutional:       General: She is not in acute distress.     Appearance: Normal appearance. She is not ill-appearing or toxic-appearing.   Cardiovascular:      Rate and Rhythm: Normal rate and regular rhythm.      Pulses: Normal pulses.      Heart sounds: Normal heart sounds.   Pulmonary:      Effort: Pulmonary effort is normal.      Breath sounds: Normal breath sounds.   Skin:     General: Skin is warm and dry.   Neurological:      Mental Status: She is alert and oriented to person, place, and time.   Psychiatric:         Behavior: Behavior normal.            Results for orders placed or performed in visit on 06/16/21 (from the past 24 hour(s))   CBC with platelets   Result Value Ref Range    WBC 4.7 4.0 - 11.0 10e9/L    RBC Count 3.98 3.8 - 5.2 10e12/L    Hemoglobin 12.6 11.7 - 15.7 g/dL    Hematocrit 37.8 35.0 - 47.0 %    MCV 95 78 - 100 fl    MCH 31.7 26.5 - 33.0 pg    MCHC 33.3 31.5 - 36.5 g/dL    RDW 13.4 10.0 - 15.0 %    Platelet Count 229 150 - 450 10e9/L

## 2021-06-16 NOTE — TELEPHONE ENCOUNTER
Reason for call:  Patient reporting a symptom    Symptom or request: Pt's BP has been running low and her ankles are swollen.  No SOB or breathing issues.  Please call patient and advise.      Duration (how long have symptoms been present): ongoing    Have you been treated for this before? Yes    Additional comments:     Phone Number patient can be reached at:  Cell number on file:    Telephone Information:   Mobile [phone]       Best Time:  any    Can we leave a detailed message on this number:  YES    Call taken on 6/16/2021 at 12:40 PM by Gabbie Lambert

## 2021-06-16 NOTE — TELEPHONE ENCOUNTER
Call placed to patient    Patient reports this morning she noticed her feet / ankles / lower legs were swollen prior to getting up and out of bed  Patient states she sleeps with her legs elevated and has not woken up with swollen legs while still in bed - typically occurs as she has been up and about during the day    Patient states she is wearing her compression socks   Denies redness / warmth / pain to lower extremities    Blood pressure readings from this morning  111/60 HR: 78  116/57 HR:74  127/62 HR: 79    Denies chest pain  Denies shortness of breath  Denies feeling lightheaded or dizzy  Denies palpitations    Patient reports she has an Echocardiogram appointment scheduled for 7/30/2021     Patient reports adequate appetite and fluid intake  Reports taking prescribed medications as prescribed     Advised patient that Dr. Burrell is not in clinic until the end of the month   Appointment scheduled with JUAN Greene for 6/16/2021 at 1500     Patient verbalized understanding and agrees with plan  No further questions/concerns    Thomas Miguel RN

## 2021-06-16 NOTE — PATIENT INSTRUCTIONS
Labs today.     Take lasix one tablet x 3 days to help with the swelling.     Continue with cognitive testing and echocardiogram later this month.     Follow-up with PCP as needed.

## 2021-06-29 ENCOUNTER — OFFICE VISIT (OUTPATIENT)
Dept: NEUROLOGY | Facility: CLINIC | Age: 71
End: 2021-06-29
Attending: PSYCHIATRY & NEUROLOGY
Payer: COMMERCIAL

## 2021-06-29 DIAGNOSIS — F41.9 ANXIETY: ICD-10-CM

## 2021-06-29 DIAGNOSIS — G31.84 MILD COGNITIVE IMPAIRMENT, SO STATED: Primary | ICD-10-CM

## 2021-06-29 DIAGNOSIS — F06.8 INFECTIVE ORGANIC PSYCHOSIS: ICD-10-CM

## 2021-06-29 DIAGNOSIS — F33.1 MAJOR DEPRESSIVE DISORDER, RECURRENT EPISODE, MODERATE (H): ICD-10-CM

## 2021-06-29 DIAGNOSIS — R41.3 MEMORY LOSS: ICD-10-CM
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
## 2021-06-30 ENCOUNTER — HOSPITAL ENCOUNTER (OUTPATIENT)
Dept: CARDIOLOGY | Facility: CLINIC | Age: 71
Discharge: HOME | End: 2021-06-30
Attending: FAMILY MEDICINE | Admitting: FAMILY MEDICINE
Payer: COMMERCIAL

## 2021-06-30 DIAGNOSIS — I25.10 CORONARY ARTERY DISEASE INVOLVING NATIVE CORONARY ARTERY, ANGINA PRESENCE UNSPECIFIED, UNSPECIFIED WHETHER NATIVE OR TRANSPLANTED HEART: ICD-10-CM

## 2021-06-30 DIAGNOSIS — R55 NEAR SYNCOPE: ICD-10-CM

## 2021-06-30 PROCEDURE — 93306 TTE W/DOPPLER COMPLETE: CPT

## 2021-06-30 PROCEDURE — 93306 TTE W/DOPPLER COMPLETE: CPT | Mod: 26 | Performed by: INTERNAL MEDICINE

## 2021-06-30 NOTE — PROGRESS NOTES
Patient was seen for neuropsychological evaluation at the request of Dr. Rehan Ku, for the purposes of diagnostic clarification and treatment planning.  2 hrs 10 min of test administration and scoring were provided by this writer, Margarita Valdes.  Please see Dr. Logan Kelley's report for a full interpretation of the findings.
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
## 2021-06-30 NOTE — PROGRESS NOTES
Name: Albina Pedro MRN: [id number]  : 1950 CHAVARRIA: 2021  Staff: ANDRAE Tech: MILTON Age: 70  Sex: Female Hand: Right Educ: 12  Vision: 20/30  ?with correction / ?without correction    ORIENTATION     Time  -3     Place       Personal info         WAIS-IV     WMI: 100         Raw SSa     Similarities  19 8     Block Design  45 13     Digit Span  25 10      Arithmetic  13 10     Coding  39 8    ANANT-O    Raw  T %ile     Copy    34.0     >16     Time to Copy  287        COWAT (FAS)   Raw: 47   T: 58    Animals   Raw:  25  T-score:  67    BOSTON NAMING TEST   Raw: 56   %ile 75-59    COMPLEX IDEATIONAL MATERIAL   Raw:  T: 44    TRAILS  Raw  Err %ile    A 28  0 100   B 102  0 67-80    STROOP Raw %ile   Word 73 13-20   Color  59 53-73       Color/Word  25 40         RACHEL-Short   Raw: 10/15 %ile: 24-29    GROOVED PEGBOARD    Raw  T Drops   RH  74  54 1   LH 87  58 0    BDI-II  Raw:  21  Interpretation: Moderate    BLAIR  Raw:  14  Interpretation: Mild      WMS-IV  Raw SS / %ile     LM I  22 7     LM II  3 3     LM Recog. 15  10-16    HVLT-R Form 1     Trial 1 2 3 Total      7 9 10  26    Raw T     Total Learning (1-3) 26 55     Delayed Recall  7 45     Percent Retention 70 42     Recognition Hits/FP 12/4 37    BVMT-R Form 1     Trial 1 2 3 Total      2 5 4  11    Raw T / %ile     Total Learning (1-3) 11 33     Delayed Recall  5 38     Percent Retention 100 >16th     Recognition Hits/FP 4/0 >16th    DCT E-score: 12

## 2021-07-07 ENCOUNTER — HOSPITAL ENCOUNTER (OUTPATIENT)
Dept: MAMMOGRAPHY | Facility: CLINIC | Age: 71
Discharge: HOME | End: 2021-07-07
Attending: FAMILY MEDICINE | Admitting: FAMILY MEDICINE
Payer: COMMERCIAL

## 2021-07-07 DIAGNOSIS — Z12.31 ENCOUNTER FOR SCREENING MAMMOGRAM FOR BREAST CANCER: ICD-10-CM

## 2021-07-07 PROCEDURE — 77063 BREAST TOMOSYNTHESIS BI: CPT

## 2021-07-19 ENCOUNTER — OFFICE VISIT (OUTPATIENT)
Dept: NEUROLOGY | Facility: CLINIC | Age: 71
End: 2021-07-19
Payer: COMMERCIAL

## 2021-07-19 VITALS
SYSTOLIC BLOOD PRESSURE: 146 MMHG | DIASTOLIC BLOOD PRESSURE: 77 MMHG | OXYGEN SATURATION: 97 % | WEIGHT: 196 LBS | HEIGHT: 66 IN | BODY MASS INDEX: 31.5 KG/M2 | HEART RATE: 77 BPM | RESPIRATION RATE: 16 BRPM

## 2021-07-19 DIAGNOSIS — G31.84 MILD COGNITIVE IMPAIRMENT, SO STATED: Primary | ICD-10-CM

## 2021-07-19 DIAGNOSIS — R29.898 TRANSIENT LEG WEAKNESS: ICD-10-CM

## 2021-07-19 DIAGNOSIS — F33.1 MAJOR DEPRESSIVE DISORDER, RECURRENT EPISODE, MODERATE (H): ICD-10-CM

## 2021-07-19 PROCEDURE — 99214 OFFICE O/P EST MOD 30 MIN: CPT | Performed by: PSYCHIATRY & NEUROLOGY

## 2021-07-19 ASSESSMENT — MIFFLIN-ST. JEOR: SCORE: 1415.8

## 2021-07-19 ASSESSMENT — PAIN SCALES - GENERAL: PAINLEVEL: MILD PAIN (2)

## 2021-07-19 NOTE — NURSING NOTE
Chief Complaint   Patient presents with     RECHECK     [facility] Return - Muscle weakness     Aydin Lee

## 2021-07-19 NOTE — LETTER
7/19/2021       RE: Albina Pedro  [address]  [place] [address]     Dear Colleague,    Thank you for referring your patient, Albina Pedro, to the [facility] [place] [facility]. Please see a copy of my visit note below.    [facility] Neurology Follow Up Visit    Albina Pedro MRN# [id number]   Age: 70 year old YOB: 1950     Brief history of symptoms: The patient was initially seen in neurologic consultation on 11/16/2020 for evaluation of weakness and short term memory loss. Please see the comprehensive neurologic consultation notes from those dates in the Epic records for details.     The patient was followed with Rheumatology in 2013 when her initial neurological evaluation was done.  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  During w/up 5/13/2013 with neurology, an EEG did show a single left frontal sharp wave with sharply contoured theta in the left temporal region.    Impressions from neuropsychological testing 12/11/2013 with Dr. Alvarado showed variable attention, subtle executive dysfunction, and normal language/visual processing.  Pattern of performance was suggestive for subtle frontal system involvement, and greatest concern for this was her depressive symptoms.  It was recommended she attend psychotherapy, couples counseling, and follow with repeat exam over time.    During our visit, the patient was concerned about short term memory loss, multi-tasking abilities, and right sided fatigue.  Overall, the patient had poor performance on MiniCog with myself as well as ongoing psychosocial stressors that were continuing in her life.  I did not note any fatigable or generalized weakness on exam.  Given her prior EEG findings, and memory deficits, I thought it reasonable to obtain EEG,  "repeat neuropsychological testing, and continue with her PCP for management of depression/social stressors.    EEG on 1/13/2021 was normal.    The patient was seen with her PCP on 4/15/2021, indicating ongoing stuttering deficts and inappropriate behaviors (laughing at times, confabulating words).  MRI brain 4/20/2021 was unrevealing for signs of atrophy, new infarction, encephalomalacia.    Neuropsychological testing 6/29/2021 was done.  It was noted \"that the patient completed a neuropsychology exam with Dr. Karen Castellano at the [facility] on November 6, 2017.  This evaluation documented deficits in verbal learning and memory, as well as milder weaknesses in visual memory.  The findings were felt to be abnormal, and consistent with an amnestic MCI syndrome.  Also noted were mild symptoms of depression.\"  Evaluation on 6/29/2021 showed weakness and variability raising concern for amnestic mild cognitive impairment, and compromise of her mesial temporal regions.  Her pattern was consistent with 2017 findings, which oddly wasn't supportive for Alzheimer's given the lack of progression.  Other potential factors include fatigue, depression, anxiety.  During her visit, it was noted the patient discontinued her anti-depressant medication and consideration towards psychotherapy and psychiatric services was recommended.    Interval history: The patient describes periodic weakness often associated with getting of the toilet or walking.  She described dyspnea with exertion.  She has ongoing peripheral edema.       Physical Exam:   Vitals: BP (!) 146/77   Pulse 77   Resp 16   Ht 1.676 m (5' 6\")   Wt 88.9 kg (196 lb)   SpO2 97%   BMI 31.64 kg/m     General: Seated comfortably in no acute distress.  HEENT: Neck supple with normal range of motion.   Skin: No rashes  Neurologic:     Mental Status: Fully alert, attentive and oriented. Speech clear and fluent, no paraphasic errors.     Cranial Nerves: EOMI with normal " smooth pursuit. No ptosis noted. Facial movements symmetric. Hearing not formally tested but intact to conversation.  No dysarthria.     Motor: No tremors or other abnormal movements observed.      Gait: Normal, steady casual gait.         Assessment and Plan:   Assessment:  - MCI amnestic type, non-progressive at this time and likely worsened with psychosocial stressors and under-treated anxiety/depression  - General deconditioning    The patient has non-specific generalized weakness that is periodic and give her prior exams, non-neurological in origin. I would suspect that her weakness would improve with PT, continued treatment for her peripheral edema and dyspnea, and continued psychiatric management of generalized anxiety and depression.    Regarding her MCI, the patient has no progression in the last 3 years when comparing neuropsychological testing.  However, given some prior testing didn't have results from the most recent testing, repeat neuropsychology evaluation in one year would be helpful to truly investigate any possible progression.  Without EEG findings, MRI findings for etiology and without progression noted on neuropsychological analysis, I do not have a great answer for the patient's cognitive deficits.  Without progression noted, I am hesitant to start Donepezil and would instead ask the patient and her PCP to continue management of her psychiatric conditions to see if improvement occurs through that treatment pathway.  We discussed that continued treatment with psychiatry and counseling would be helpful for her depressive/anxiety symptoms which are ongoing and could contribute to fluctuations in mentation.  The patient seemed hesitant to accept this recommendation, but was agreeable to continue with plans to see her PCP about management of depression as well as have a follow up in one years time to discuss any progression seen on repeat testing.     Plan:  Neuropsychology testing to be repeated  in 1 year  Follow up with myself in 1 year (after neuropsychology testing is repeated)    Follow up in Neurology clinic in one year or earlier as needed should new concerns arise.    CHINA Ku D.O.   of Neurology    Total time today (30 min) in this patient encounter was spent on pre-charting, counseling and/or coordination of care.       Again, thank you for allowing me to participate in the care of your patient.      Sincerely,    Rehan Ku, DO

## 2021-07-19 NOTE — PROGRESS NOTES
[facility] Neurology Follow Up Visit    Albina Pedro MRN# [id number]   Age: 70 year old YOB: 1950     Brief history of symptoms: The patient was initially seen in neurologic consultation on 11/16/2020 for evaluation of weakness and short term memory loss. Please see the comprehensive neurologic consultation notes from those dates in the Epic records for details.     The patient was followed with Rheumatology in 2013 when her initial neurological evaluation was done.  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  During w/up 5/13/2013 with neurology, an EEG did show a single left frontal sharp wave with sharply contoured theta in the left temporal region.    Impressions from neuropsychological testing 12/11/2013 with Dr. Alvarado showed variable attention, subtle executive dysfunction, and normal language/visual processing.  Pattern of performance was suggestive for subtle frontal system involvement, and greatest concern for this was her depressive symptoms.  It was recommended she attend psychotherapy, couples counseling, and follow with repeat exam over time.    During our visit, the patient was concerned about short term memory loss, multi-tasking abilities, and right sided fatigue.  Overall, the patient had poor performance on MiniCog with myself as well as ongoing psychosocial stressors that were continuing in her life.  I did not note any fatigable or generalized weakness on exam.  Given her prior EEG findings, and memory deficits, I thought it reasonable to obtain EEG, repeat neuropsychological testing, and continue with her PCP for management of depression/social stressors.    EEG on 1/13/2021 was normal.    The patient was seen with her PCP on 4/15/2021, indicating ongoing stuttering deficts and inappropriate behaviors (laughing at times, confabulating words).  MRI brain 4/20/2021 was  "unrevealing for signs of atrophy, new infarction, encephalomalacia.    Neuropsychological testing 6/29/2021 was done.  It was noted \"that the patient completed a neuropsychology exam with Dr. Karen Castellano at the [facility] on November 6, 2017.  This evaluation documented deficits in verbal learning and memory, as well as milder weaknesses in visual memory.  The findings were felt to be abnormal, and consistent with an amnestic MCI syndrome.  Also noted were mild symptoms of depression.\"  Evaluation on 6/29/2021 showed weakness and variability raising concern for amnestic mild cognitive impairment, and compromise of her mesial temporal regions.  Her pattern was consistent with 2017 findings, which oddly wasn't supportive for Alzheimer's given the lack of progression.  Other potential factors include fatigue, depression, anxiety.  During her visit, it was noted the patient discontinued her anti-depressant medication and consideration towards psychotherapy and psychiatric services was recommended.    Interval history: The patient describes periodic weakness often associated with getting of the toilet or walking.  She described dyspnea with exertion.  She has ongoing peripheral edema.       Physical Exam:   Vitals: BP (!) 146/77   Pulse 77   Resp 16   Ht 1.676 m (5' 6\")   Wt 88.9 kg (196 lb)   SpO2 97%   BMI 31.64 kg/m     General: Seated comfortably in no acute distress.  HEENT: Neck supple with normal range of motion.   Skin: No rashes  Neurologic:     Mental Status: Fully alert, attentive and oriented. Speech clear and fluent, no paraphasic errors.     Cranial Nerves: EOMI with normal smooth pursuit. No ptosis noted. Facial movements symmetric. Hearing not formally tested but intact to conversation.  No dysarthria.     Motor: No tremors or other abnormal movements observed.      Gait: Normal, steady casual gait.         Assessment and Plan:   Assessment:  - MCI amnestic type, non-progressive at this time " and likely worsened with psychosocial stressors and under-treated anxiety/depression  - General deconditioning    The patient has non-specific generalized weakness that is periodic and give her prior exams, non-neurological in origin. I would suspect that her weakness would improve with PT, continued treatment for her peripheral edema and dyspnea, and continued psychiatric management of generalized anxiety and depression.    Regarding her MCI, the patient has no progression in the last 3 years when comparing neuropsychological testing.  However, given some prior testing didn't have results from the most recent testing, repeat neuropsychology evaluation in one year would be helpful to truly investigate any possible progression.  Without EEG findings, MRI findings for etiology and without progression noted on neuropsychological analysis, I do not have a great answer for the patient's cognitive deficits.  Without progression noted, I am hesitant to start Donepezil and would instead ask the patient and her PCP to continue management of her psychiatric conditions to see if improvement occurs through that treatment pathway.  We discussed that continued treatment with psychiatry and counseling would be helpful for her depressive/anxiety symptoms which are ongoing and could contribute to fluctuations in mentation.  The patient seemed hesitant to accept this recommendation, but was agreeable to continue with plans to see her PCP about management of depression as well as have a follow up in one years time to discuss any progression seen on repeat testing.     Plan:  Neuropsychology testing to be repeated in 1 year  Follow up with myself in 1 year (after neuropsychology testing is repeated)    Follow up in Neurology clinic in one year or earlier as needed should new concerns arise.    CHINA Ku D.O.   of Neurology    Total time today (30 min) in this patient encounter was spent on pre-charting,  counseling and/or coordination of care.

## 2021-07-21 ENCOUNTER — OFFICE VISIT (OUTPATIENT)
Dept: FAMILY MEDICINE | Facility: CLINIC | Age: 71
End: 2021-07-21
Payer: COMMERCIAL

## 2021-07-21 VITALS
OXYGEN SATURATION: 98 % | BODY MASS INDEX: 31.5 KG/M2 | SYSTOLIC BLOOD PRESSURE: 138 MMHG | HEART RATE: 82 BPM | TEMPERATURE: 97.2 F | HEIGHT: 66 IN | DIASTOLIC BLOOD PRESSURE: 64 MMHG | WEIGHT: 196 LBS

## 2021-07-21 DIAGNOSIS — R53.81 PHYSICAL DECONDITIONING: ICD-10-CM

## 2021-07-21 DIAGNOSIS — M19.072 ARTHRITIS OF FOOT, LEFT: Primary | ICD-10-CM

## 2021-07-21 DIAGNOSIS — F43.0 ACUTE REACTION TO STRESS: ICD-10-CM

## 2021-07-21 DIAGNOSIS — M21.6X1 ACQUIRED BILATERAL PES CAVUS: ICD-10-CM

## 2021-07-21 DIAGNOSIS — M21.6X2 ACQUIRED BILATERAL PES CAVUS: ICD-10-CM

## 2021-07-21 PROCEDURE — 99214 OFFICE O/P EST MOD 30 MIN: CPT | Performed by: FAMILY MEDICINE

## 2021-07-21 RX ORDER — LORAZEPAM 0.5 MG/1
TABLET ORAL
Qty: 20 TABLET | Refills: 0 | Status: SHIPPED | OUTPATIENT
Start: 2021-07-21 | End: 2021-10-27

## 2021-07-21 ASSESSMENT — MIFFLIN-ST. JEOR: SCORE: 1415.8

## 2021-07-21 NOTE — PROGRESS NOTES
"    Assessment & Plan     Arthritis of foot, left    - Orthotics, Prosthetics and Custom Compression Order for DME - ONLY FOR DME    Physical deconditioning  Will refer   - [facility] PT and Hand Referral; Future    Acquired bilateral pes cavus  Would benefit from more support   - Orthotics, Prosthetics and Custom Compression Order for DME - ONLY FOR DME    Acute reaction to stress  Patient Instructions   Start the physical therapy   Get the orthotics   Take the lorazepam if you start having a hard time finishing a task      - LORazepam (ATIVAN) 0.5 MG tablet; TAKE ONE TABLET BY MOUTH EVERY EIGHT HOURS AS NEEDED FOR ANXIETY             Return in about 6 months (around 1/21/2022).    Carolina Burrell MD  [facility] LUIS FERNANDO Acevedo is a 70 year old who presents for the following health issues    HPI     Chief Complaint   Patient presents with     Referral     Follow up from Neurology appointment.        She had the consult with the neurologist   She also is still in counseling   won't do counseling   She is still having memory lapses     Review of Systems         Objective    /64 (BP Location: Right arm, Patient Position: Sitting, Cuff Size: Adult Regular)   Pulse 82   Temp 97.2  F (36.2  C) (Tympanic)   Ht 1.676 m (5' 6\")   Wt 88.9 kg (196 lb)   SpO2 98%   BMI 31.64 kg/m    Body mass index is 31.64 kg/m .  Physical Exam                   "

## 2021-07-21 NOTE — PATIENT INSTRUCTIONS
Start the physical therapy   Get the orthotics   Take the lorazepam if you start having a hard time finishing a task

## 2021-07-29 ENCOUNTER — THERAPY VISIT (OUTPATIENT)
Dept: PHYSICAL THERAPY | Facility: CLINIC | Age: 71
End: 2021-07-29
Attending: FAMILY MEDICINE
Payer: COMMERCIAL

## 2021-07-29 DIAGNOSIS — R53.81 PHYSICAL DECONDITIONING: ICD-10-CM

## 2021-07-29 PROCEDURE — 97161 PT EVAL LOW COMPLEX 20 MIN: CPT | Mod: GP | Performed by: PHYSICAL THERAPIST

## 2021-07-29 PROCEDURE — 97110 THERAPEUTIC EXERCISES: CPT | Mod: GP | Performed by: PHYSICAL THERAPIST

## 2021-07-29 NOTE — PROGRESS NOTES
Physical Therapy Initial Evaluation  Subjective:  The history is provided by the patient. No  was used.   Therapist Generated HPI Evaluation  Problem details: Pt presents to PT with c/o weakness throughout her body.    Pt states she has fallen down the stairs 3x in the last several years.    Does not some pain in the feet and hands.  Denies dropping things or foot drop.  Denies coordination problems.  Sometimes she feels like her eye lid is drooping.   She reports she has never received a neurological diagnosis.   Referral today is for physical deconditioning.       MRI brain:  microvascular ischemic disease    PMH: weakness in legs, memory issues.     Albina Pedro is a 70 year old female with deconditioning and history of previous falls condition which occurred with Associated condition requiring pt: neurological disorder?.   This is a recurrent condition.  Where condition occurred: at home.         and is intermittent.  Pain is the same all the time.  Associated symptoms:  Loss of balance and loss of strength.  Exacerbated by: stairs, walking, getting out of chairs.  and relieved by rest.                      Since onset symptoms are gradually worsening.     There was none improvement following previous treatment.                               Objective:  System    Physical Exam        General Evaluation:        Lower Extremity Strength:  Strength wnl lower extremity general: generalized mild weakness through out, worse in legs than arms.              Sensation:  normal      Edema:  not assessed    Integumentary/Inspection:  normal    Reflexes:  normal    Balance:  Balance wnl general: Very unsteady.              Functional Assessment:  Functional assessment wnl: STS very difficult and slow and requires UE assist.          Gait:  Gait wnl general: Pt amb with slow gait speed.  Mentions sometimes the R knee will buckle.                                         ROS    Assessment/Plan:     Patient is a 70 year old female with both sides knee complaints.    Patient has the following significant findings with corresponding treatment plan.                Diagnosis 1:  Physical deconditioning  Decreased strength - therapeutic exercise and therapeutic activities  Impaired gait - gait training  Impaired muscle performance - neuro re-education  Decreased function - therapeutic activities    Therapy Evaluation Codes:   1) History comprised of:   Personal factors that impact the plan of care:      None.    Comorbidity factors that impact the plan of care are:      None.     Medications impacting care: None.  2) Examination of Body Systems comprised of:   Body structures and functions that impact the plan of care:      Knee.   Activity limitations that impact the plan of care are:      Squatting/kneeling, Stairs, Standing and Walking.  3) Clinical presentation characteristics are:   Stable/Uncomplicated.  4) Decision-Making    Low complexity using standardized patient assessment instrument and/or measureable assessment of functional outcome.  Cumulative Therapy Evaluation is: Low complexity.    Previous and current functional limitations:  (See Goal Flow Sheet for this information)    Short term and Long term goals: (See Goal Flow Sheet for this information)     Communication ability:  Patient appears to be able to clearly communicate and understand verbal and written communication and follow directions correctly.  Treatment Explanation - The following has been discussed with the patient:   RX ordered/plan of care  Anticipated outcomes  Possible risks and side effects  This patient would benefit from PT intervention to resume normal activities.   Rehab potential is good.    Frequency:  1 X week, once daily  Duration:  for 8 weeks  Discharge Plan:  Achieve all LTG.  Independent in home treatment program.  Return to work with or without restrictions.  Return to previous functional level by discharge.  Reach  maximal therapeutic benefit.    Please refer to the daily flowsheet for treatment today, total treatment time and time spent performing 1:1 timed codes.

## 2021-07-30 ENCOUNTER — OFFICE VISIT (OUTPATIENT)
Dept: CARDIOLOGY | Facility: CLINIC | Age: 71
End: 2021-07-30
Payer: COMMERCIAL

## 2021-07-30 VITALS
DIASTOLIC BLOOD PRESSURE: 76 MMHG | HEART RATE: 82 BPM | WEIGHT: 195.9 LBS | BODY MASS INDEX: 31.62 KG/M2 | OXYGEN SATURATION: 96 % | SYSTOLIC BLOOD PRESSURE: 131 MMHG

## 2021-07-30 DIAGNOSIS — R60.9 EDEMA, UNSPECIFIED TYPE: Primary | ICD-10-CM

## 2021-07-30 PROCEDURE — 99214 OFFICE O/P EST MOD 30 MIN: CPT | Performed by: INTERNAL MEDICINE

## 2021-07-30 NOTE — PATIENT INSTRUCTIONS
The following is a summary of your office visit today:    Compression stockings  Continue with hydration.   Follow up in one year      Nurse contact information:  Cardiology Care Coordinators:  Xin Patel RN and Preet Sargent RN    [phone] Phone      [phone] Fax       If you have had any blood work, imaging or other testing completed we will be in touch within 1-2 weeks regarding the results. If you have any questions, concerns or need to schedule a follow up, please contact us at [phone]. If you are needing refills please contact your pharmacy. For urgent after hour care please call the [facility] at [phone] or the [facility] at [phone] and ask to speak to the cardiologist on call.    It was a pleasure meeting with you today. Please let us know if there is anything else we can do for you so that we can be sure you are leaving completely satisfied with your care experience.     Your Cardiology Team at [facility]  RN Care Coordinators: Ham  CMA: Roxy

## 2021-07-30 NOTE — PROGRESS NOTES
[facility] Cardiology Consultation:    Assessment and Plan:     1. CAD with angina (Cath 2016 - moderate mLAD/D1 stenosis, small vessels not ideal for PCI, lexiscan - small ant ischemia, normal LV fxn): continue Ranolazine 1000 mg BID for angina. Intolerant of other anti-anginals.  Continue Pravastatin 40 mg and ASA 81 mg for secondary prevention.   2. Syncope related to medications: We again talked about measures to mitigate her vasovagal symptoms.  Continue with regular electrolyte drinks. Take an extra bottle of electrolyte drink prior to blood draws. Advised to wear compression stockings that will also help with lower extremity edema.  3. ILD  4. Sjogren's syndrome     Annual follow up.       Driss Carbone MD    Cardiac Imaging and Prevention  [facility]  [email or web address] Pager: [phone]    HPI: CAD routine follow-up. She has been doing well, without any intercurrent medical illness or hospitalization. She is tolerating all her medications without any issues, including pravastatin. She continues to have postural dizziness, fortunately without any syncopal episodes. She reports bilateral lower extremity edema that started a few months ago. This is not associated with any shortness of breath. She has not been wearing compression stockings.  Chest pain is overall stable. She occasionally gets chest pain just lasting for a few seconds that does not bother her.  I reviewed her echocardiogram that was performed recently. It shows normal cardiac function, normal CVP, and no structural abnormalities.    EXAM:  /76 (BP Location: Left arm, Patient Position: Sitting, Cuff Size: Adult Large)   Pulse 82   Wt 88.9 kg (195 lb 14.4 oz)   SpO2 96%   BMI 31.62 kg/m    GEN/CONSTITUIONAL: Appears comfortable, in no apparent distress   EYES: No icterus  ENT/MOUTH: Normal  JVP:  Not visible  RESPIRATORY: Clear to auscultation bilaterally   CARDIOVASCULAR: Regular S1 and  S2, no murmurs, rubs, or gallops.   ABDOMEN: Soft, non-tender, positive bowel sounds   NEUROLOGIC: Grossly non-focal   PSYCHIATRIC: Normal affect  EXT: No cyanosis, clubbing, edema. Normal pedal pulses.  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       CURRENT MEDICATIONS:  Current Outpatient Medications   Medication     aspirin (ASA) 81 MG EC tablet     LORazepam (ATIVAN) 0.5 MG tablet     Multiple Vitamins-Minerals (MULTIVITAMIN & MINERAL PO)     pravastatin (PRAVACHOL) 40 MG tablet     Ranolazine 1000 MG TB12     No current facility-administered medications for this visit.       PAST SURGICAL HISTORY:  Past Surgical History:   Procedure Laterality Date     C/SECTION, LOW TRANSVERSE  10/13/1978    , Low Transverse     COLONOSCOPY       OPEN REDUCTION INTERNAL FIXATION ANKLE Right 2019    Procedure: Open reduction internal fixation right lateral malleolus fracture;  Surgeon: Rolo Oconnor DPM;  Location: [facility] OR     SURGICAL HISTORY OF -            SURGICAL HISTORY OF -   00    Colonoscopy       ALLERGIES     Allergies   Allergen Reactions     Daypro [Oxaprozin] Rash            Lidocaine Other (See Comments)     Rapid heart rate     Nitroglycerin Other (See Comments)     Causes low blood pressure     Penicillins Unknown     Occurred as child.     Sulfa Drugs Rash       FAMILY HISTORY:  Family History   Problem Relation Age of Onset     Cancer Mother         Breast and liver- age 43     Heart Disease Father         ? chf?h/o CVA     Alzheimer Disease Father      Hypertension Father      Neurologic Disorder Father         CVA     Diabetes Maternal Grandmother       Breast Cancer Maternal Aunt      Breast Cancer Paternal Aunt      Cancer - colorectal No family hx of      Prostate Cancer No family hx of        SOCIAL HISTORY:  Social History     Socioeconomic History     Marital status:      Spouse name: Not on file     Number of children: Not on file     Years of education: Not on file     Highest education level: Not on file   Occupational History     Not on file   Tobacco Use     Smoking status: Never Smoker     Smokeless tobacco: Never Used   Substance and Sexual Activity     Alcohol use: Yes     Alcohol/week: 0.0 standard drinks     Comment: 1 glass of wine every 2 weeks     Drug use: No     Sexual activity: Yes     Partners: Male   Other Topics Concern     Parent/sibling w/ CABG, MI or angioplasty before 65F 55M? No   Social History Narrative     Not on file     Social Determinants of Health     Financial Resource Strain:      Difficulty of Paying Living Expenses:    Food Insecurity:      Worried About Running Out of Food in the Last Year:      Ran Out of Food in the Last Year:    Transportation Needs:      Lack of Transportation (Medical):      Lack of Transportation (Non-Medical):    Physical Activity:      Days of Exercise per Week:      Minutes of Exercise per Session:    Stress:      Feeling of Stress :    Social Connections:      Frequency of Communication with Friends and Family:      Frequency of Social Gatherings with Friends and Family:      Attends [employer] Services:      Active Member of Clubs or Organizations:      Attends Club or Organization Meetings:      Marital Status:    Intimate Partner Violence:      Fear of Current or Ex-Partner:      Emotionally Abused:      Physically Abused:      Sexually Abused:        ROS:   Constitutional: No fever, chills, or sweats. No weight gain/loss   ENT: No visual disturbance, ear ache, epistaxis, sore throat  Allergies/Immunologic: Negative.   Respiratory: No cough, hemoptysia  Cardiovascular: As per  HPI  GI: No nausea, vomiting, hematemesis, melena, or hematochezia  : No urinary frequency, dysuria, or hematuria  Integument: Negative  Psychiatric: Negative  Neuro: Negative  Endocrinology: Negative   Musculoskeletal: Negative    ADDITIONAL COMMENTS:     I reviewed the patient's medications:     I reviewed the patient's pertinent clinical laboratory studies:     I reviewed the patient's pertinent imaging studies:   I reviewed the patient's ECG:

## 2021-08-17 ENCOUNTER — NURSE TRIAGE (OUTPATIENT)
Dept: FAMILY MEDICINE | Facility: CLINIC | Age: 71
End: 2021-08-17

## 2021-08-17 NOTE — TELEPHONE ENCOUNTER
"Pt noted to have weakness, stuttering, thought process. Pt stated this has been an ongoing issue and called today for follow up visit with PCP post workup.    Pt noted happens regularly. Several days per week, symptoms happen. Pt wondering if she may have diabetes. Pt stated symptoms happen yesterday as well shortly after eating a big cookie.   Pt DENIES: Chest Pain, SOB, Difficulty Breathing, Dizziness/Lightheadedness, Numbness/Tingling, HA, Vision/Hearing Changes, N/V, Palpitations      127/80 BP at home, not taken today. Pt reports she will take BP while on phone, noted 162/75.   Pt advised to continue to monitor BP. Call back with further questions or concerns.      Answer Assessment - Initial Assessment Questions  1. SYMPTOM: \"What is the main symptom you are concerned about?\" (e.g., weakness, numbness)      Weakness, overall.   2. ONSET: \"When did this start?\" (minutes, hours, days; while sleeping)      Upon wakening  3. LAST NORMAL: \"When was the last time you were normal (no symptoms)?\"      yesterday  4. PATTERN \"Does this come and go, or has it been constant since it started?\"  \"Is it present now?\"      Comes and goes  5. CARDIAC SYMPTOMS: \"Have you had any of the following symptoms: chest pain, difficulty breathing, palpitations?\"      None, slight chest pain, tightness, squeezing  6. NEUROLOGIC SYMPTOMS: \"Have you had any of the following symptoms: headache, dizziness, vision loss, double vision, changes in speech, unsteady on your feet?\"      Lightheaded, unsteady on feet, studdering,   7. OTHER SYMPTOMS: \"Do you have any other symptoms?\"      none  8. PREGNANCY: \"Is there any chance you are pregnant?\" \"When was your last menstrual period?\"      No    Protocols used: NEUROLOGIC DEFICIT-A-[place]    Cornelius ROMO RN   [facility] Triage      "

## 2021-08-19 ENCOUNTER — THERAPY VISIT (OUTPATIENT)
Dept: PHYSICAL THERAPY | Facility: CLINIC | Age: 71
End: 2021-08-19
Payer: COMMERCIAL

## 2021-08-19 ENCOUNTER — FCC EXTENDED DOCUMENTATION (OUTPATIENT)
Dept: PSYCHOLOGY | Facility: CLINIC | Age: 71
End: 2021-08-19

## 2021-08-19 DIAGNOSIS — R53.81 PHYSICAL DECONDITIONING: Primary | ICD-10-CM

## 2021-08-19 PROCEDURE — 97110 THERAPEUTIC EXERCISES: CPT | Mod: GP | Performed by: PHYSICAL THERAPIST

## 2021-08-19 PROCEDURE — 97530 THERAPEUTIC ACTIVITIES: CPT | Mod: GP | Performed by: PHYSICAL THERAPIST

## 2021-08-19 NOTE — PROGRESS NOTES
"                    Discharge Summary  Multiple Sessions    Client Name: Albina Pedro MRN#: [id number] YOB: 1950      Intake / Discharge Date: 8/19/21      DSM5 Diagnoses: (Sustained by DSM5 Criteria Listed Above)  Diagnoses: 296.21 (F32.0) Major Depressive Disorder, Single Episode, Mild _ and With anxious distress  Psychosocial & Contextual Factors:    WHODAS 2.0 (12 item) Score:            Presenting Concern:  \"short term memory and confusion\".      Reason for Discharge:  Client did not return      Disposition at Time of Last Encounter:   Comments:         Risk Management:   Client denies a history of suicidal ideation, suicide attempts, self-injurious behavior, homicidal ideation, homicidal behavior and and other safety concerns  Recommended that patient call 911 or go to the local ED should there be a change in any of these risk factors.      Referred To:  May return.        Tyrell Singh, [facility]   8/19/2021  "

## 2021-08-24 ENCOUNTER — OFFICE VISIT (OUTPATIENT)
Dept: FAMILY MEDICINE | Facility: CLINIC | Age: 71
End: 2021-08-24
Payer: COMMERCIAL

## 2021-08-24 VITALS
DIASTOLIC BLOOD PRESSURE: 78 MMHG | SYSTOLIC BLOOD PRESSURE: 139 MMHG | BODY MASS INDEX: 31.34 KG/M2 | WEIGHT: 195 LBS | HEART RATE: 95 BPM | OXYGEN SATURATION: 97 % | HEIGHT: 66 IN | TEMPERATURE: 98.1 F

## 2021-08-24 DIAGNOSIS — R41.3 MEMORY LOSS: ICD-10-CM

## 2021-08-24 DIAGNOSIS — L23.7 CONTACT DERMATITIS DUE TO POISON IVY: Primary | ICD-10-CM

## 2021-08-24 PROCEDURE — 99214 OFFICE O/P EST MOD 30 MIN: CPT | Performed by: FAMILY MEDICINE

## 2021-08-24 RX ORDER — TRIAMCINOLONE ACETONIDE 1 MG/G
CREAM TOPICAL 2 TIMES DAILY
Qty: 30 G | Refills: 3 | Status: SHIPPED | OUTPATIENT
Start: 2021-08-24 | End: 2021-11-22

## 2021-08-24 ASSESSMENT — MIFFLIN-ST. JEOR: SCORE: 1411.26

## 2021-08-24 NOTE — PROGRESS NOTES
"    Assessment & Plan     Contact dermatitis due to poison ivy  tay have use on the rash   - triamcinolone (KENALOG) 0.1 % external cream; Apply topically 2 times daily To chest and arm and leg    Memory loss  Recommend return to [facility] for recheck testing  There is also a component of stress with her  that makes all of this worse  He won't go to therapy she has been and does not feel like this would be helpful right now                Return in about 3 months (around 11/24/2021) for med check.    Carolina Burrell MD  [facility] LUIS FERNANDO Acevedo is a 70 year old who presents for the following health issues     HPI     Chief Complaint   Patient presents with     RECHECK     Follow up from Neurology and Cardiology appointments.      She has not been keeping track of food she forgets to do this   Feels that it may be bothering   Had scrambled eggs and then lettuce wrap   Has a rash ?   Was incontinent of stool   D[place] to [place] didn't get lost but during [place] she felt like she was needing something   She has the eye drooping we did testing for myasthenia in the past             Review of Systems   Constitutional, HEENT, cardiovascular, pulmonary, gi and gu systems are negative, except as otherwise noted.      Objective    /78   Pulse 95   Temp 98.1  F (36.7  C) (Tympanic)   Ht 1.676 m (5' 6\")   Wt 88.5 kg (195 lb)   SpO2 97%   BMI 31.47 kg/m    Body mass index is 31.47 kg/m .  Physical Exam   GENERAL: healthy, alert and no distress  PSYCH: mentation appears normal, anxious and judgement and insight intact  No evidence of difficulty word finding no stuttering   No results found for any visits on 08/24/21.    Carolina Burrell M.D.          "

## 2021-08-24 NOTE — PATIENT INSTRUCTIONS
Try taking the immodium before you go out someplace so that you don't have any more accidents     Try to track your food   Use the cream on the

## 2021-08-25 ENCOUNTER — ALLIED HEALTH/NURSE VISIT (OUTPATIENT)
Dept: FAMILY MEDICINE | Facility: CLINIC | Age: 71
End: 2021-08-25
Payer: COMMERCIAL

## 2021-08-25 ENCOUNTER — THERAPY VISIT (OUTPATIENT)
Dept: PHYSICAL THERAPY | Facility: CLINIC | Age: 71
End: 2021-08-25
Payer: COMMERCIAL

## 2021-08-25 DIAGNOSIS — F43.0 ACUTE REACTION TO STRESS: Primary | ICD-10-CM

## 2021-08-25 DIAGNOSIS — R53.81 PHYSICAL DECONDITIONING: Primary | ICD-10-CM

## 2021-08-25 PROCEDURE — 99207 PR NO CHARGE NURSE ONLY: CPT

## 2021-08-25 PROCEDURE — 97110 THERAPEUTIC EXERCISES: CPT | Mod: GP | Performed by: PHYSICAL THERAPIST

## 2021-08-25 PROCEDURE — 97530 THERAPEUTIC ACTIVITIES: CPT | Mod: GP | Performed by: PHYSICAL THERAPIST

## 2021-08-26 DIAGNOSIS — J84.9 ILD (INTERSTITIAL LUNG DISEASE) (H): Primary | ICD-10-CM

## 2021-08-26 NOTE — TELEPHONE ENCOUNTER
Routing refill request to provider for review/approval because:  Drug not active on patient's medication list    Thomas Miguel RN

## 2021-08-26 NOTE — TELEPHONE ENCOUNTER
Patient requesting refill of inhaler, thinks air quality is affecting her.    Thank you,    Pat Merrill, AUGUSTO Matthews

## 2021-08-27 RX ORDER — ALBUTEROL SULFATE 90 UG/1
AEROSOL, METERED RESPIRATORY (INHALATION)
Qty: 54 G | Refills: 0 | Status: SHIPPED | OUTPATIENT
Start: 2021-08-27 | End: 2022-10-04

## 2021-09-01 ENCOUNTER — THERAPY VISIT (OUTPATIENT)
Dept: PHYSICAL THERAPY | Facility: CLINIC | Age: 71
End: 2021-09-01
Payer: COMMERCIAL

## 2021-09-01 DIAGNOSIS — R53.81 PHYSICAL DECONDITIONING: Primary | ICD-10-CM

## 2021-09-01 PROCEDURE — 97530 THERAPEUTIC ACTIVITIES: CPT | Mod: GP | Performed by: PHYSICAL THERAPIST

## 2021-09-01 PROCEDURE — 97110 THERAPEUTIC EXERCISES: CPT | Mod: GP | Performed by: PHYSICAL THERAPIST

## 2021-09-11 ENCOUNTER — HEALTH MAINTENANCE LETTER (OUTPATIENT)
Age: 71
End: 2021-09-11

## 2021-10-13 ENCOUNTER — THERAPY VISIT (OUTPATIENT)
Dept: PHYSICAL THERAPY | Facility: CLINIC | Age: 71
End: 2021-10-13
Payer: COMMERCIAL

## 2021-10-13 DIAGNOSIS — R53.81 PHYSICAL DECONDITIONING: Primary | ICD-10-CM

## 2021-10-13 PROCEDURE — 97530 THERAPEUTIC ACTIVITIES: CPT | Mod: GP | Performed by: PHYSICAL THERAPIST

## 2021-10-13 PROCEDURE — 97110 THERAPEUTIC EXERCISES: CPT | Mod: GP | Performed by: PHYSICAL THERAPIST

## 2021-10-20 ENCOUNTER — ALLIED HEALTH/NURSE VISIT (OUTPATIENT)
Dept: FAMILY MEDICINE | Facility: CLINIC | Age: 71
End: 2021-10-20
Payer: COMMERCIAL

## 2021-10-20 ENCOUNTER — THERAPY VISIT (OUTPATIENT)
Dept: PHYSICAL THERAPY | Facility: CLINIC | Age: 71
End: 2021-10-20
Payer: COMMERCIAL

## 2021-10-20 DIAGNOSIS — R53.81 PHYSICAL DECONDITIONING: Primary | ICD-10-CM

## 2021-10-20 DIAGNOSIS — R52 PAIN: Primary | ICD-10-CM

## 2021-10-20 PROCEDURE — 97110 THERAPEUTIC EXERCISES: CPT | Mod: GP | Performed by: PHYSICAL THERAPIST

## 2021-10-20 PROCEDURE — 99207 PR NO CHARGE NURSE ONLY: CPT

## 2021-10-20 NOTE — PROGRESS NOTES
"Patient presents to the Clinic requesting a referral for her ongoing \"bone pain\"    Patient states she has spoken with Dr. Burrell regarding her ongoing pain - states nothing seems to help the pain and she would like a referral to a specialist to determine if they can figure out the cause of her pain  Patient requested a [facility] referral but states she would go anywhere that would be appropriate for her pain     Patient states she has been having the pain for approx a year or so.   States pain is mainly located in her wrists up her forearms and in her neck and shoulders    Will forward to Dr. Burrell to review and determine if a referral is appropriate   Patient requested a detailed voicemail be left on her voicemail if no answer    Thomas Miguel RN    "

## 2021-10-20 NOTE — PROGRESS NOTES
She's currently in physical therapy in fact she had it today. She can follow up after physical therapy with me to discuss this. Carolina Burrell M.D.

## 2021-10-25 ENCOUNTER — OFFICE VISIT (OUTPATIENT)
Dept: PULMONOLOGY | Facility: CLINIC | Age: 71
End: 2021-10-25
Attending: INTERNAL MEDICINE
Payer: COMMERCIAL

## 2021-10-25 VITALS — HEART RATE: 72 BPM | OXYGEN SATURATION: 95 % | DIASTOLIC BLOOD PRESSURE: 79 MMHG | SYSTOLIC BLOOD PRESSURE: 144 MMHG

## 2021-10-25 DIAGNOSIS — Z23 ENCOUNTER FOR IMMUNIZATION: ICD-10-CM

## 2021-10-25 DIAGNOSIS — M35.09 SJOGREN'S SYNDROME WITH OTHER ORGAN INVOLVEMENT (H): Primary | ICD-10-CM

## 2021-10-25 DIAGNOSIS — R53.83 FATIGUE, UNSPECIFIED TYPE: ICD-10-CM

## 2021-10-25 DIAGNOSIS — J84.9 ILD (INTERSTITIAL LUNG DISEASE) (H): Primary | ICD-10-CM

## 2021-10-25 LAB
DLCOUNC-%PRED-PRE: 104 %
DLCOUNC-PRE: 20.97 ML/MIN/MMHG
DLCOUNC-PRED: 19.98 ML/MIN/MMHG
ERV-%PRED-PRE: 58 %
ERV-PRE: 0.26 L
ERV-PRED: 0.44 L
EXPTIME-PRE: 7.5 SEC
FEF2575-%PRED-PRE: 142 %
FEF2575-PRE: 2.62 L/SEC
FEF2575-PRED: 1.84 L/SEC
FEFMAX-%PRED-PRE: 103 %
FEFMAX-PRE: 5.85 L/SEC
FEFMAX-PRED: 5.65 L/SEC
FEV1-%PRED-PRE: 103 %
FEV1-PRE: 2.31 L
FEV1FEV6-PRE: 83 %
FEV1FEV6-PRED: 79 %
FEV1FVC-PRE: 82 %
FEV1FVC-PRED: 78 %
FEV1SVC-PRE: 84 %
FEV1SVC-PRED: 71 %
FIFMAX-PRE: 5.59 L/SEC
FRCPLETH-%PRED-PRE: 74 %
FRCPLETH-PRE: 2.06 L
FRCPLETH-PRED: 2.77 L
FVC-%PRED-PRE: 97 %
FVC-PRE: 2.81 L
FVC-PRED: 2.89 L
IC-%PRED-PRE: 92 %
IC-PRE: 2.51 L
IC-PRED: 2.71 L
RVPLETH-%PRED-PRE: 84 %
RVPLETH-PRE: 1.81 L
RVPLETH-PRED: 2.14 L
TLCPLETH-%PRED-PRE: 89 %
TLCPLETH-PRE: 4.57 L
TLCPLETH-PRED: 5.11 L
VA-%PRED-PRE: 85 %
VA-PRE: 4.18 L
VC-%PRED-PRE: 87 %
VC-PRE: 2.76 L
VC-PRED: 3.15 L

## 2021-10-25 PROCEDURE — 90662 IIV NO PRSV INCREASED AG IM: CPT | Performed by: INTERNAL MEDICINE

## 2021-10-25 PROCEDURE — G0463 HOSPITAL OUTPT CLINIC VISIT: HCPCS | Mod: 25

## 2021-10-25 PROCEDURE — 94726 PLETHYSMOGRAPHY LUNG VOLUMES: CPT | Performed by: INTERNAL MEDICINE

## 2021-10-25 PROCEDURE — 250N000011 HC RX IP 250 OP 636: Performed by: INTERNAL MEDICINE

## 2021-10-25 PROCEDURE — 94729 DIFFUSING CAPACITY: CPT | Performed by: INTERNAL MEDICINE

## 2021-10-25 PROCEDURE — 94375 RESPIRATORY FLOW VOLUME LOOP: CPT | Performed by: INTERNAL MEDICINE

## 2021-10-25 PROCEDURE — 99214 OFFICE O/P EST MOD 30 MIN: CPT | Mod: 25 | Performed by: INTERNAL MEDICINE

## 2021-10-25 PROCEDURE — G0008 ADMIN INFLUENZA VIRUS VAC: HCPCS | Performed by: INTERNAL MEDICINE

## 2021-10-25 RX ADMIN — INFLUENZA A VIRUS A/VICTORIA/2570/2019 IVR-215 (H1N1) ANTIGEN (FORMALDEHYDE INACTIVATED), INFLUENZA A VIRUS A/TASMANIA/503/2020 IVR-221 (H3N2) ANTIGEN (FORMALDEHYDE INACTIVATED), INFLUENZA B VIRUS B/PHUKET/3073/2013 ANTIGEN (FORMALDEHYDE INACTIVATED), AND INFLUENZA B VIRUS B/WASHINGTON/02/2019 ANTIGEN (FORMALDEHYDE INACTIVATED) 0.7 ML: 60; 60; 60; 60 INJECTION, SUSPENSION INTRAMUSCULAR at 15:15

## 2021-10-25 NOTE — NURSING NOTE
Chief Complaint   Patient presents with     Interstitial Lung Disease (ILD)     1yr f/u     Vitals were taken and medications were reconciled.     RONEY Angel

## 2021-10-25 NOTE — LETTER
10/25/2021     RE: Albina Pedro  [address] [facility] [place] [facility] [address]    Dear Colleague,    Thank you for referring your patient, Albina Pedro, to the [facility] [place]. Please see a copy of my visit note below.    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  She has not been on any immune suppression with stable pulmonary functions.  Her 2 main complaints today are not pulmonary complaints.  She notes significant fatigue and excessive daytime somnolence sometimes falling asleep.  We did discuss this at the last visit as well her  has not noted any significant apneic episodes while she sleeps the patient states she sleeps 9 hours a night however does not feel restored in the morning.  She also has been noting pain and some what she describes as bone pain in her arms and in her ribs as well.  From a respiratory standpoint she feels her breathing is stable she denies any changes compared to previous.      Current Outpatient Medications   Medication     aspirin (ASA) 81 MG EC tablet     COMPRESSION STOCKINGS     LORazepam (ATIVAN) 0.5 MG tablet     Multiple Vitamins-Minerals (MULTIVITAMIN & MINERAL PO)     pravastatin (PRAVACHOL) 40 MG tablet     Ranolazine 1000 MG TB12     triamcinolone (KENALOG) 0.1 % external cream     VENTOLIN  (90 Base) MCG/ACT inhaler     No current facility-administered medications for this visit.     Allergies   Allergen Reactions     Daypro [Oxaprozin] Rash            Lidocaine Other (See Comments)     Rapid heart rate     Nitroglycerin Other (See Comments)     Causes low blood pressure     Penicillins Unknown     Occurred as child.     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       Past Surgical History:   Procedure Laterality Date     C/SECTION, LOW TRANSVERSE  10/13/1978    , Low Transverse     COLONOSCOPY       OPEN REDUCTION INTERNAL FIXATION ANKLE Right 2019    Procedure: Open reduction internal fixation right lateral malleolus fracture;  Surgeon: Rolo Oconnor DPM;  Location: [facility]     SURGICAL HISTORY OF -            SURGICAL HISTORY OF -   00    Colonoscopy       Social History     Socioeconomic History     Marital status:      Spouse name: Not on file     Number of children: Not on file     Years of education: Not on file     Highest education level: Not on file   Occupational History     Not on file   Tobacco Use     Smoking status: Never Smoker     Smokeless tobacco: Never Used   Substance and Sexual Activity     Alcohol use: Yes     Alcohol/week: 0.0 standard drinks     Comment: 1 glass of wine every 2 weeks     Drug use: No     Sexual activity: Yes     Partners: Male   Other Topics Concern     Parent/sibling w/ CABG, MI or angioplasty before 65F 55M? No   Social History Narrative     Not on file     Social Determinants of Health     Financial Resource Strain:      Difficulty of Paying Living Expenses:    Food Insecurity:      Worried About Running Out of Food in the Last Year:      Ran Out of Food in the Last Year:    Transportation Needs:      Lack of Transportation (Medical):      Lack of Transportation (Non-Medical):    Physical Activity:      Days of Exercise per Week:      Minutes of Exercise per Session:    Stress:      Feeling of Stress :    Social Connections:      Frequency of Communication with Friends and  Family:      Frequency of Social Gatherings with Friends and Family:      Attends Alevism Services:      Active Member of Clubs or Organizations:      Attends Club or Organization Meetings:      Marital Status:    Intimate Partner Violence:      Fear of Current or Ex-Partner:      Emotionally Abused:      Physically Abused:      Sexually Abused:        Family History   Problem Relation Age of Onset     Cancer Mother         Breast and liver- age 43     Heart Disease Father         ? chf?h/o CVA     Alzheimer Disease Father      Hypertension Father      Neurologic Disorder Father         CVA     Diabetes Maternal Grandmother      Breast Cancer Maternal Aunt      Breast Cancer Paternal Aunt      Cancer - colorectal No family hx of      Prostate Cancer No family hx of        ROS Pulmonary    A complete ROS was otherwise negative except as noted in the HPI.  Vitals:    10/25/21 1434   BP: (!) 144/79   Pulse: 72   SpO2: 95%     Exam:   GENERAL APPEARANCE: Well developed, well nourished, alert, and in no apparent distress.  NECK: supple, no masses, no thyromegaly.  LYMPHATICS: No significant axillary, cervical, or supraclavicular nodes.  RESP: good air flow throughout, - no crackles, rhonchi or wheezes.  CV: Normal S1, S2, regular rhythm, normal rate, no rub, no murmur,  no gallop, no LE edema.   ABDOMEN:  Bowel sounds normal, soft, nontender, no HSM or masses.   MS: extremities normal- no clubbing, no cyanosis.  Results: I have reviewed all imaging, PFTs and other relavent tests, please see below for details, PFT and imaging results were reviewed with the patient.  PFTs: Normal spirometry and diffusion.  Stable from previous.    Assessment and plan:    70-year-old female with lymphocytic interstitial pneumonia in the setting of a diagnosis of Sjogren's disease that has been quite stable with multiple cysts in her lung.  I do not think there is any evidence of progression and she does not need further evaluation  from the standpoint of her pulmonary disease.  This given the extreme nature of her fatigue and somnolence I think it would be reasonable to have her evaluated by sleep medicine.  I am also going to have her follow-up with rheumatology as she has not seen them in several years given her musculoskeletal complaints again in the setting of possible autoimmune disease.  Otherwise from pulmonary standpoint I do not need to see her back for 1 year unless she develops any changes in her breathing.      CBC   Recent Labs   Lab Test 06/16/21  1544 04/15/21  1528   RBC 3.98 4.33   HGB 12.6 13.7   HCT 37.8 40.8    230       Basic Metabolic Panel  Recent Labs   Lab Test 06/16/21  1544 04/15/21  1528    136   POTASSIUM 4.7 4.5   CHLORIDE 106 103   CO2 28 28   BUN 22 20   GLC 80 79   SARI 8.6 8.8       INR  No results for input(s): INR in the last 15131 hours.    PFT  PFT Latest Ref Rng & Units 10/25/2021   FVC L 2.81   FEV1 L 2.31   FVC% % 97   FEV1% % 103     Again, thank you for allowing me to participate in the care of your patient.      Sincerely,    David Morris Perlman, MD

## 2021-10-25 NOTE — PROGRESS NOTES
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  She has not been on any immune suppression with stable pulmonary functions.  Her 2 main complaints today are not pulmonary complaints.  She notes significant fatigue and excessive daytime somnolence sometimes falling asleep.  We did discuss this at the last visit as well her  has not noted any significant apneic episodes while she sleeps the patient states she sleeps 9 hours a night however does not feel restored in the morning.  She also has been noting pain and some what she describes as bone pain in her arms and in her ribs as well.  From a respiratory standpoint she feels her breathing is stable she denies any changes compared to previous.      Current Outpatient Medications   Medication     aspirin (ASA) 81 MG EC tablet     COMPRESSION STOCKINGS     LORazepam (ATIVAN) 0.5 MG tablet     Multiple Vitamins-Minerals (MULTIVITAMIN & MINERAL PO)     pravastatin (PRAVACHOL) 40 MG tablet     Ranolazine 1000 MG TB12     triamcinolone (KENALOG) 0.1 % external cream     VENTOLIN  (90 Base) MCG/ACT inhaler     No current facility-administered medications for this visit.     Allergies   Allergen Reactions     Daypro [Oxaprozin] Rash            Lidocaine Other (See Comments)     Rapid heart rate     Nitroglycerin Other (See Comments)     Causes low blood pressure     Penicillins Unknown     Occurred as child.     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       Past Surgical History:   Procedure Laterality Date     C/SECTION, LOW TRANSVERSE  10/13/1978    , Low Transverse     COLONOSCOPY       OPEN REDUCTION INTERNAL FIXATION ANKLE Right 2019    Procedure: Open reduction internal fixation right lateral malleolus fracture;  Surgeon: Rolo Oconnor DPM;  Location: [facility]     SURGICAL HISTORY OF -            SURGICAL HISTORY OF -   00    Colonoscopy       Social History     Socioeconomic History     Marital status:      Spouse name: Not on file     Number of children: Not on file     Years of education: Not on file     Highest education level: Not on file   Occupational History     Not on file   Tobacco Use     Smoking status: Never Smoker     Smokeless tobacco: Never Used   Substance and Sexual Activity     Alcohol use: Yes     Alcohol/week: 0.0 standard drinks     Comment: 1 glass of wine every 2 weeks     Drug use: No     Sexual activity: Yes     Partners: Male   Other Topics Concern     Parent/sibling w/ CABG, MI or angioplasty before 65F 55M? No   Social History Narrative     Not on file     Social Determinants of Health     Financial Resource Strain:      Difficulty of Paying Living Expenses:    Food Insecurity:      Worried About Running Out of Food in the Last Year:      Ran Out of Food in the Last Year:    Transportation Needs:      Lack of Transportation (Medical):      Lack of Transportation (Non-Medical):    Physical Activity:      Days of Exercise per Week:      Minutes of Exercise per Session:    Stress:      Feeling of Stress :    Social Connections:      Frequency of Communication with Friends and Family:      Frequency of Social Gatherings with Friends and Family:      Attends Temple Services:      Active Member of Clubs or Organizations:      Attends Club or Organization Meetings:      Marital Status:    Intimate Partner Violence:      Fear of Current or Ex-Partner:      Emotionally Abused:       Physically Abused:      Sexually Abused:        Family History   Problem Relation Age of Onset     Cancer Mother         Breast and liver- age 43     Heart Disease Father         ? chf?h/o CVA     Alzheimer Disease Father      Hypertension Father      Neurologic Disorder Father         CVA     Diabetes Maternal Grandmother      Breast Cancer Maternal Aunt      Breast Cancer Paternal Aunt      Cancer - colorectal No family hx of      Prostate Cancer No family hx of        ROS Pulmonary    A complete ROS was otherwise negative except as noted in the HPI.  Vitals:    10/25/21 1434   BP: (!) 144/79   Pulse: 72   SpO2: 95%     Exam:   GENERAL APPEARANCE: Well developed, well nourished, alert, and in no apparent distress.  NECK: supple, no masses, no thyromegaly.  LYMPHATICS: No significant axillary, cervical, or supraclavicular nodes.  RESP: good air flow throughout, - no crackles, rhonchi or wheezes.  CV: Normal S1, S2, regular rhythm, normal rate, no rub, no murmur,  no gallop, no LE edema.   ABDOMEN:  Bowel sounds normal, soft, nontender, no HSM or masses.   MS: extremities normal- no clubbing, no cyanosis.  Results: I have reviewed all imaging, PFTs and other relavent tests, please see below for details, PFT and imaging results were reviewed with the patient.  PFTs: Normal spirometry and diffusion.  Stable from previous.    Assessment and plan:    70-year-old female with lymphocytic interstitial pneumonia in the setting of a diagnosis of Sjogren's disease that has been quite stable with multiple cysts in her lung.  I do not think there is any evidence of progression and she does not need further evaluation from the standpoint of her pulmonary disease.  This given the extreme nature of her fatigue and somnolence I think it would be reasonable to have her evaluated by sleep medicine.  I am also going to have her follow-up with rheumatology as she has not seen them in several years given her musculoskeletal  complaints again in the setting of possible autoimmune disease.  Otherwise from pulmonary standpoint I do not need to see her back for 1 year unless she develops any changes in her breathing.      CBC   Recent Labs   Lab Test 06/16/21  1544 04/15/21  1528   RBC 3.98 4.33   HGB 12.6 13.7   HCT 37.8 40.8    230       Basic Metabolic Panel  Recent Labs   Lab Test 06/16/21  1544 04/15/21  1528    136   POTASSIUM 4.7 4.5   CHLORIDE 106 103   CO2 28 28   BUN 22 20   GLC 80 79   SARI 8.6 8.8       INR  No results for input(s): INR in the last 35298 hours.    PFT  PFT Latest Ref Rng & Units 10/25/2021   FVC L 2.81   FEV1 L 2.31   FVC% % 97   FEV1% % 103           CC:

## 2021-10-26 ENCOUNTER — TELEPHONE (OUTPATIENT)
Dept: MULTI SPECIALTY CLINIC | Facility: CLINIC | Age: 71
End: 2021-10-26

## 2021-10-26 NOTE — TELEPHONE ENCOUNTER
[facility] Call Center    Phone Message    May a detailed message be left on voicemail: yes     Reason for Call: Appointment Intake    Referring Provider Name: Perlman, David Morris, MD  Diagnosis and/or Symptoms: Sjogren's syndrome with other organ involvement (H)    Patient would like to schedule [place] any provider [place] [facility] Please advise.    Action Taken: Message routed to:  [facility] Rheum    Travel Screening: Not Applicable

## 2021-10-27 ENCOUNTER — THERAPY VISIT (OUTPATIENT)
Dept: PHYSICAL THERAPY | Facility: CLINIC | Age: 71
End: 2021-10-27
Payer: COMMERCIAL

## 2021-10-27 DIAGNOSIS — F43.0 ACUTE REACTION TO STRESS: ICD-10-CM

## 2021-10-27 DIAGNOSIS — R53.81 PHYSICAL DECONDITIONING: Primary | ICD-10-CM

## 2021-10-27 PROCEDURE — 97110 THERAPEUTIC EXERCISES: CPT | Mod: GP | Performed by: PHYSICAL THERAPIST

## 2021-10-27 RX ORDER — LORAZEPAM 0.5 MG/1
TABLET ORAL
Qty: 20 TABLET | Refills: 0 | Status: SHIPPED | OUTPATIENT
Start: 2021-10-27 | End: 2021-10-28

## 2021-10-27 NOTE — TELEPHONE ENCOUNTER
+SSA/SSB, +lung disease stable.  Pt having extreme fatigue at this time. Was seen by Dr. Ni in 2012 last, and Dr. Pedersen in 2020.  Dr. Perlman has referred her to our clinic.    Bonnie Luz RN  Rheumatology Clinic

## 2021-10-28 ENCOUNTER — TELEPHONE (OUTPATIENT)
Dept: FAMILY MEDICINE | Facility: CLINIC | Age: 71
End: 2021-10-28

## 2021-10-28 ENCOUNTER — TELEPHONE (OUTPATIENT)
Dept: PULMONOLOGY | Facility: CLINIC | Age: 71
End: 2021-10-28

## 2021-10-28 DIAGNOSIS — F43.0 ACUTE REACTION TO STRESS: ICD-10-CM

## 2021-10-28 RX ORDER — LORAZEPAM 0.5 MG/1
TABLET ORAL
Qty: 20 TABLET | Refills: 0 | Status: SHIPPED | OUTPATIENT
Start: 2021-10-28 | End: 2022-01-20

## 2021-10-28 NOTE — TELEPHONE ENCOUNTER
Routing refill request to provider for review/approval because:  Medication refilled to [facility] on 10/27/2021  Patient requesting refill go to [facility]     Thomas Miguel RN

## 2021-10-28 NOTE — TELEPHONE ENCOUNTER
Left voicemail for patient to contact me (direct number provided) or the call center (number provided) to discuss scheduling an appointment as records indicate they are due for a follow up visit with Dr. Perlman and TRAVIS prior in Oct 2022 after recently being seen. As they do have a [employer] account, informed them that I would send them a message detailing the same information.

## 2021-10-28 NOTE — TELEPHONE ENCOUNTER
Patient requesting refill of lorazepam to [facility].    Thank you,    Pat Merrill, AUGUSTO Matthews

## 2021-11-01 ENCOUNTER — TELEPHONE (OUTPATIENT)
Dept: NURSING | Facility: CLINIC | Age: 71
End: 2021-11-01

## 2021-11-01 ENCOUNTER — TELEPHONE (OUTPATIENT)
Dept: PULMONOLOGY | Facility: CLINIC | Age: 71
End: 2021-11-01

## 2021-11-01 NOTE — TELEPHONE ENCOUNTER
Pt returned call and able to arrange a follow up appt with Dr. Perlman with [facility] in 1 year (Oct 2022). Details confirmed with pt who requested hard copy of itinerary be mailed to home; mailing address verified.

## 2021-11-01 NOTE — TELEPHONE ENCOUNTER
Patient calling about a message she received last week. RN/Writer shared the following information with her from 10/28/21 at  2:05pm:    Victoria Henao RN  11/01/21 3:17 PM  [facility] Nurse Advisor       Madelyn Acevedo,      I just left you a voicemail detailing the same information below but thought I would also try to reach you via [employer].      Based on our records at the Pulmonology Clinic at [facility] at [address] [place] [address], you were recently seen by Dr. Perlman and per his notes, recommended a 1 year follow up (Oct 2022) with Pulmonary Function Test at that time.      To schedule, give me a call directly ([phone]). I am available Monday through Friday 7:00AM - 3:30PM. You may leave a detailed voicemail, including your name and number, and I will return your call as soon as possible. You may also contact our call center ([phone]) to schedule the follow up appointment.      Irma Hudson  Clinic Coordinator  [facility]

## 2021-11-03 ENCOUNTER — THERAPY VISIT (OUTPATIENT)
Dept: PHYSICAL THERAPY | Facility: CLINIC | Age: 71
End: 2021-11-03
Payer: COMMERCIAL

## 2021-11-03 DIAGNOSIS — R53.81 PHYSICAL DECONDITIONING: Primary | ICD-10-CM

## 2021-11-03 PROCEDURE — 97110 THERAPEUTIC EXERCISES: CPT | Mod: GP | Performed by: PHYSICAL THERAPIST

## 2021-11-11 ENCOUNTER — THERAPY VISIT (OUTPATIENT)
Dept: PHYSICAL THERAPY | Facility: CLINIC | Age: 71
End: 2021-11-11
Payer: COMMERCIAL

## 2021-11-11 DIAGNOSIS — R53.81 PHYSICAL DECONDITIONING: Primary | ICD-10-CM

## 2021-11-11 PROCEDURE — 97110 THERAPEUTIC EXERCISES: CPT | Mod: GP | Performed by: PHYSICAL THERAPIST

## 2021-11-11 PROCEDURE — 97530 THERAPEUTIC ACTIVITIES: CPT | Mod: GP | Performed by: PHYSICAL THERAPIST

## 2021-11-11 NOTE — PROGRESS NOTES
Subjective:  HPI  Physical Exam                    Objective:  System    Physical Exam    General     ROS    Assessment/Plan:    PROGRESS  REPORT        SUBJECTIVE  Pt reports all of her bones hurt.   She does feel stronger overall.   She would like to reassess things today to see where her strength is at.       OBJECTIVE  Strength   Quads 4- B   Hamstrings 3+ B   Abductors 3+ B   Extensors  4 B  Functional tests   Gait - Amb without assistive device and without deviation    STS - improved power, but still requires UE assist x 2    SLS - L 5-15s, R 15-25 R      ASSESSMENT/PLAN  Updated problem list and treatment plan: Diagnosis 1:  Deconditoning  Pain -  hot/cold therapy  Decreased strength - therapeutic exercise and therapeutic activities  Impaired muscle performance - neuro re-education  Decreased function - therapeutic activities  STG/LTGs have been met or progress has been made towards goals:  Yes (See Goal flow sheet completed today.)  Assessment of Progress: The patient's condition is improving.  Self Management Plans:  Patient has been instructed in a home treatment program.  I have re-evaluated this patient and find that the nature, scope, duration and intensity of the therapy is appropriate for the medical condition of the patient.  Albina continues to require the following intervention to meet STG and LTG's:  PT    Recommendations:  This patient would benefit from continued therapy.     Frequency:  1 X week, once daily  Duration:  for 4 weeks        Please refer to the daily flowsheet for treatment today, total treatment time and time spent performing 1:1 timed codes.

## 2021-11-16 ENCOUNTER — TELEPHONE (OUTPATIENT)
Dept: FAMILY MEDICINE | Facility: CLINIC | Age: 71
End: 2021-11-16
Payer: COMMERCIAL

## 2021-11-16 NOTE — TELEPHONE ENCOUNTER
Patient called and is asking when she comes in on Monday 11/22/2021for her appt is there any way her son can be part of the appt via zoom or video.        Kierra Hardy, AUGUSTO Matthews

## 2021-11-17 ENCOUNTER — THERAPY VISIT (OUTPATIENT)
Dept: PHYSICAL THERAPY | Facility: CLINIC | Age: 71
End: 2021-11-17
Payer: COMMERCIAL

## 2021-11-17 DIAGNOSIS — R53.81 PHYSICAL DECONDITIONING: Primary | ICD-10-CM

## 2021-11-17 PROCEDURE — 97110 THERAPEUTIC EXERCISES: CPT | Mod: GP | Performed by: PHYSICAL THERAPIST

## 2021-11-17 PROCEDURE — 97530 THERAPEUTIC ACTIVITIES: CPT | Mod: GP | Performed by: PHYSICAL THERAPIST

## 2021-11-22 ENCOUNTER — IMMUNIZATION (OUTPATIENT)
Dept: FAMILY MEDICINE | Facility: CLINIC | Age: 71
End: 2021-11-22
Payer: COMMERCIAL

## 2021-11-22 ENCOUNTER — OFFICE VISIT (OUTPATIENT)
Dept: FAMILY MEDICINE | Facility: CLINIC | Age: 71
End: 2021-11-22
Payer: COMMERCIAL

## 2021-11-22 VITALS
WEIGHT: 192 LBS | DIASTOLIC BLOOD PRESSURE: 67 MMHG | HEART RATE: 78 BPM | TEMPERATURE: 97.3 F | SYSTOLIC BLOOD PRESSURE: 143 MMHG | BODY MASS INDEX: 30.86 KG/M2 | HEIGHT: 66 IN | RESPIRATION RATE: 18 BRPM

## 2021-11-22 DIAGNOSIS — I25.10 CORONARY ARTERY DISEASE INVOLVING NATIVE CORONARY ARTERY, UNSPECIFIED WHETHER ANGINA PRESENT, UNSPECIFIED WHETHER NATIVE OR TRANSPLANTED HEART: ICD-10-CM

## 2021-11-22 DIAGNOSIS — R42 DIZZINESS AND GIDDINESS: ICD-10-CM

## 2021-11-22 DIAGNOSIS — M35.01 SJOGREN'S SYNDROME WITH KERATOCONJUNCTIVITIS SICCA (H): ICD-10-CM

## 2021-11-22 DIAGNOSIS — F33.0 MAJOR DEPRESSIVE DISORDER, RECURRENT, MILD (H): Primary | ICD-10-CM

## 2021-11-22 DIAGNOSIS — M62.81 GENERALIZED MUSCLE WEAKNESS: ICD-10-CM

## 2021-11-22 PROCEDURE — 99214 OFFICE O/P EST MOD 30 MIN: CPT | Performed by: FAMILY MEDICINE

## 2021-11-22 PROCEDURE — 0004A PR COVID VAC PFIZER DIL RECON 30 MCG/0.3 ML IM: CPT

## 2021-11-22 PROCEDURE — 91300 PR COVID VAC PFIZER DIL RECON 30 MCG/0.3 ML IM: CPT

## 2021-11-22 ASSESSMENT — MIFFLIN-ST. JEOR: SCORE: 1392.66

## 2021-11-22 ASSESSMENT — PAIN SCALES - GENERAL: PAINLEVEL: SEVERE PAIN (6)

## 2021-11-22 NOTE — PROGRESS NOTES
SUBJECTIVE:                                                    Albina Pedro is a 71 year old female who presents to clinic today for the following health issues:    Chief Complaint   Patient presents with     RECHECK     -She has been seen previously with Dr. Burrell regarding her ongoing stuttering and her muscle weakness. She is wanting to follow up on this.    -She is wanting to meet with you first then her family can come in.     Problem list and histories reviewed & adjusted, as indicated.  Additional history:     Patient Active Problem List   Diagnosis     DIZZINESS - LIKELY HYPOGLYCEMIA     Dermatophytosis of body     Episodic mood disorder (H)     Health Care Home     Panniculitis     Advanced directives, counseling/discussion     CAD (coronary artery disease)     Sjogren's syndrome (H)     Adverse effect of anesthetic     Status post coronary angiogram     Acute chest pain     Major depressive disorder, recurrent, mild (H)     Hyperlipidemia LDL goal <70     ILD (interstitial lung disease) (H)     Intermediate coronary syndrome (H)     Chronic stable angina (H)     Past Surgical History:   Procedure Laterality Date     C/SECTION, LOW TRANSVERSE  10/13/1978    , Low Transverse     COLONOSCOPY       OPEN REDUCTION INTERNAL FIXATION ANKLE Right 2019    Procedure: Open reduction internal fixation right lateral malleolus fracture;  Surgeon: Rolo Oconnor DPM;  Location: [facility]     SURGICAL HISTORY OF -            SURGICAL HISTORY OF -   00    Colonoscopy       Social History     Tobacco Use     Smoking status: Never Smoker     Smokeless tobacco: Never Used   Substance Use Topics     Alcohol use: Yes     Alcohol/week: 0.0 standard drinks     Comment: 1 glass of wine every 2 weeks     Family History   Problem Relation Age of Onset     Cancer Mother         Breast and liver- age 43     Heart Disease Father         ? chf?h/o CVA     Alzheimer Disease Father       Hypertension Father      Neurologic Disorder Father         CVA     Diabetes Maternal Grandmother      Breast Cancer Maternal Aunt      Breast Cancer Paternal Aunt      Cancer - colorectal No family hx of      Prostate Cancer No family hx of          Current Outpatient Medications   Medication Sig Dispense Refill     COMPRESSION STOCKINGS 2 each daily as needed (edema) 2 each 3     LORazepam (ATIVAN) 0.5 MG tablet TAKE ONE TABLET BY MOUTH EVERY EIGHT HOURS AS NEEDED FOR ANXIETY  20 tablet 0     Multiple Vitamins-Minerals (MULTIVITAMIN & MINERAL PO) Take 1 tablet by mouth daily        pravastatin (PRAVACHOL) 40 MG tablet Take 1 tablet (40 mg) by mouth daily 90 tablet 3     Ranolazine 1000 MG TB12 Take 1,000 mg by mouth 2 times daily 180 tablet 3     sertraline (ZOLOFT) 50 MG tablet 1/2 tab a day for 7 days, then 1 tab a day. 30 tablet 1     aspirin (ASA) 81 MG EC tablet Take 1 tablet (81 mg) by mouth daily (Patient not taking: Reported on 11/22/2021)       VENTOLIN  (90 Base) MCG/ACT inhaler INHALE 2 PUFFS EVERY 4 HOURS AS NEEDED FOR SHORTNESS OF BREATH/DYSPNEA (Patient not taking: Reported on 11/22/2021) 54 g 0     Allergies   Allergen Reactions     Daypro [Oxaprozin] Rash            Lidocaine Other (See Comments)     Rapid heart rate     Nitroglycerin Other (See Comments)     Causes low blood pressure     Penicillins Unknown     Occurred as child.     Sulfa Drugs Rash       ROS:  Constitutional, HEENT, cardiovascular, pulmonary, gi and gu systems are negative, except as otherwise noted.    OBJECTIVE:                                                    There were no vitals taken for this visit. There is no height or weight on file to calculate BMI.   GENERAL: healthy, alert, well nourished, well hydrated, no distress  HENT: ear canals- normal; TMs- normal; Nose- normal; Mouth- no ulcers, no lesions  NECK: no tenderness, no adenopathy, no asymmetry, no masses, no stiffness; thyroid- normal to palpation  RESP:  lungs clear to auscultation - no rales, no rhonchi, no wheezes  CV: regular rates and rhythm, normal S1 S2, no S3 or S4 and no murmur, no click or rub -  ABDOMEN: soft, no tenderness, no  hepatosplenomegaly, no masses, normal bowel sounds  NEURO: strength and tone- normal, sensory exam- grossly normal, mentation- intact, speech- normal, reflexes- symmetric  PSYCH: Alert and oriented times 3; speech- coherent , normal rate and volume; able to articulate logical thoughts, able to abstract reason, no tangential thoughts, no hallucinations or delusions, affect- normal       ASSESSMENT/PLAN:                                                      (F33.0) Major depressive disorder, recurrent, mild (H)  (primary encounter diagnosis)  Comment:   Plan: sertraline (ZOLOFT) 50 MG tablet      Keep appointment with neurology.  They will do eval for myasthenia.     Patient Instructions     Pick a bed time that is realistic and take 5 mg of melatonin  20 mns before that.     Start zoloft 25 mg a day fot 7 days then increase to 50 mg a day  Return to clinic in 3 weeks for recheck    In early afternoon have a snack  Yogurt, sting cheese egg nuts...    For afternoon nap limit to 1 hour.  Set an alarm    Tape your TV show that you like to watch late at night.    Increase exercise  25  Up downs a day.              reports that she has never smoked. She has never used smokeless tobacco.      Weight management plan: Discussed healthy diet and exercise guidelines      [facility]

## 2021-11-22 NOTE — PATIENT INSTRUCTIONS
Pick a bed time that is realistic and take 5 mg of melatonin  20 mns before that.     Start zoloft 25 mg a day fot 7 days then increase to 50 mg a day  Return to clinic in 3 weeks for recheck    In early afternoon have a snack  Yogurt, sting cheese egg nuts...    For afternoon nap limit to 1 hour.  Set an alarm    Tape your TV show that you like to watch late at night.    Increase exercise  25  Up downs a day.

## 2022-01-20 ENCOUNTER — OFFICE VISIT (OUTPATIENT)
Dept: FAMILY MEDICINE | Facility: CLINIC | Age: 72
End: 2022-01-20
Payer: COMMERCIAL

## 2022-01-20 VITALS
OXYGEN SATURATION: 98 % | SYSTOLIC BLOOD PRESSURE: 144 MMHG | DIASTOLIC BLOOD PRESSURE: 80 MMHG | WEIGHT: 196.4 LBS | TEMPERATURE: 96.9 F | RESPIRATION RATE: 16 BRPM | BODY MASS INDEX: 31.57 KG/M2 | HEART RATE: 75 BPM | HEIGHT: 66 IN

## 2022-01-20 DIAGNOSIS — F43.0 ACUTE REACTION TO STRESS: ICD-10-CM

## 2022-01-20 DIAGNOSIS — F33.0 MAJOR DEPRESSIVE DISORDER, RECURRENT, MILD (H): ICD-10-CM

## 2022-01-20 DIAGNOSIS — R53.83 FATIGUE, UNSPECIFIED TYPE: Primary | ICD-10-CM

## 2022-01-20 LAB
ALBUMIN SERPL-MCNC: 3.5 G/DL (ref 3.4–5)
ALBUMIN UR-MCNC: NEGATIVE MG/DL
ALP SERPL-CCNC: 63 U/L (ref 40–150)
ALT SERPL W P-5'-P-CCNC: 33 U/L (ref 0–50)
ANION GAP SERPL CALCULATED.3IONS-SCNC: <1 MMOL/L (ref 3–14)
APPEARANCE UR: CLEAR
AST SERPL W P-5'-P-CCNC: 20 U/L (ref 0–45)
BASOPHILS # BLD AUTO: 0 10E3/UL (ref 0–0.2)
BASOPHILS NFR BLD AUTO: 0 %
BILIRUB SERPL-MCNC: 0.5 MG/DL (ref 0.2–1.3)
BILIRUB UR QL STRIP: NEGATIVE
BUN SERPL-MCNC: 22 MG/DL (ref 7–30)
CALCIUM SERPL-MCNC: 9 MG/DL (ref 8.5–10.1)
CHLORIDE BLD-SCNC: 108 MMOL/L (ref 94–109)
CO2 SERPL-SCNC: 38 MMOL/L (ref 20–32)
COLOR UR AUTO: YELLOW
CREAT SERPL-MCNC: 0.56 MG/DL (ref 0.52–1.04)
EOSINOPHIL # BLD AUTO: 0.2 10E3/UL (ref 0–0.7)
EOSINOPHIL NFR BLD AUTO: 3 %
ERYTHROCYTE [DISTWIDTH] IN BLOOD BY AUTOMATED COUNT: 13.1 % (ref 10–15)
GFR SERPL CREATININE-BSD FRML MDRD: >90 ML/MIN/1.73M2
GLUCOSE BLD-MCNC: 99 MG/DL (ref 70–99)
GLUCOSE UR STRIP-MCNC: NEGATIVE MG/DL
HCT VFR BLD AUTO: 39.8 % (ref 35–47)
HGB BLD-MCNC: 13 G/DL (ref 11.7–15.7)
HGB UR QL STRIP: NEGATIVE
KETONES UR STRIP-MCNC: NEGATIVE MG/DL
LEUKOCYTE ESTERASE UR QL STRIP: NEGATIVE
LYMPHOCYTES # BLD AUTO: 1.7 10E3/UL (ref 0.8–5.3)
LYMPHOCYTES NFR BLD AUTO: 30 %
MCH RBC QN AUTO: 31.4 PG (ref 26.5–33)
MCHC RBC AUTO-ENTMCNC: 32.7 G/DL (ref 31.5–36.5)
MCV RBC AUTO: 96 FL (ref 78–100)
MONOCYTES # BLD AUTO: 0.6 10E3/UL (ref 0–1.3)
MONOCYTES NFR BLD AUTO: 10 %
NEUTROPHILS # BLD AUTO: 3.3 10E3/UL (ref 1.6–8.3)
NEUTROPHILS NFR BLD AUTO: 57 %
NITRATE UR QL: NEGATIVE
PH UR STRIP: 5 [PH] (ref 5–7)
PLATELET # BLD AUTO: 223 10E3/UL (ref 150–450)
POTASSIUM BLD-SCNC: 4.3 MMOL/L (ref 3.4–5.3)
PROT SERPL-MCNC: 7.4 G/DL (ref 6.8–8.8)
RBC # BLD AUTO: 4.14 10E6/UL (ref 3.8–5.2)
SODIUM SERPL-SCNC: 139 MMOL/L (ref 133–144)
SP GR UR STRIP: >=1.03 (ref 1–1.03)
TSH SERPL DL<=0.005 MIU/L-ACNC: 1.87 MU/L (ref 0.4–4)
UROBILINOGEN UR STRIP-ACNC: 0.2 E.U./DL
WBC # BLD AUTO: 5.7 10E3/UL (ref 4–11)

## 2022-01-20 PROCEDURE — 81003 URINALYSIS AUTO W/O SCOPE: CPT | Performed by: FAMILY MEDICINE

## 2022-01-20 PROCEDURE — 85025 COMPLETE CBC W/AUTO DIFF WBC: CPT | Performed by: FAMILY MEDICINE

## 2022-01-20 PROCEDURE — 36415 COLL VENOUS BLD VENIPUNCTURE: CPT | Performed by: FAMILY MEDICINE

## 2022-01-20 PROCEDURE — 99214 OFFICE O/P EST MOD 30 MIN: CPT | Performed by: FAMILY MEDICINE

## 2022-01-20 PROCEDURE — 84443 ASSAY THYROID STIM HORMONE: CPT | Performed by: FAMILY MEDICINE

## 2022-01-20 PROCEDURE — 80053 COMPREHEN METABOLIC PANEL: CPT | Performed by: FAMILY MEDICINE

## 2022-01-20 RX ORDER — LORAZEPAM 0.5 MG/1
TABLET ORAL
Qty: 20 TABLET | Refills: 0 | Status: SHIPPED | OUTPATIENT
Start: 2022-01-20 | End: 2022-07-15

## 2022-01-20 RX ORDER — SERTRALINE HYDROCHLORIDE 100 MG/1
100 TABLET, FILM COATED ORAL DAILY
Qty: 90 TABLET | Refills: 1 | Status: SHIPPED | OUTPATIENT
Start: 2022-01-20 | End: 2022-02-16

## 2022-01-20 ASSESSMENT — ANXIETY QUESTIONNAIRES
2. NOT BEING ABLE TO STOP OR CONTROL WORRYING: MORE THAN HALF THE DAYS
1. FEELING NERVOUS, ANXIOUS, OR ON EDGE: MORE THAN HALF THE DAYS
7. FEELING AFRAID AS IF SOMETHING AWFUL MIGHT HAPPEN: SEVERAL DAYS
5. BEING SO RESTLESS THAT IT IS HARD TO SIT STILL: NOT AT ALL
GAD7 TOTAL SCORE: 11
3. WORRYING TOO MUCH ABOUT DIFFERENT THINGS: NEARLY EVERY DAY
6. BECOMING EASILY ANNOYED OR IRRITABLE: MORE THAN HALF THE DAYS

## 2022-01-20 ASSESSMENT — PATIENT HEALTH QUESTIONNAIRE - PHQ9
5. POOR APPETITE OR OVEREATING: SEVERAL DAYS
SUM OF ALL RESPONSES TO PHQ QUESTIONS 1-9: 16

## 2022-01-20 ASSESSMENT — PAIN SCALES - GENERAL: PAINLEVEL: NO PAIN (0)

## 2022-01-20 ASSESSMENT — MIFFLIN-ST. JEOR: SCORE: 1412.61

## 2022-01-20 NOTE — PATIENT INSTRUCTIONS
Keep appointment with neurology. Consult at [facility]: Neurology Clinic - [place] [phone]    They will do eval for myasthenia.      Patient Instructions      You picked a  bed time of 11pm.  Take 5 mg of melatonin  20 mns before that.     Okay to have lazy morning on mornings you are feeling confused or sore.  [place] a good time to watch your tv shows lynn are tapped. But the push yourself in afternoon evening on those days.     Increase zoloft(the blue one) to 100mg a day. Return to clinic in 3 weeks for recheck     In early afternoon have a snack  Yogurt, sting cheese egg nuts...     For afternoon nap limit to 1 hour.  Set an alarm     In the late afternoon and evening time have some days/ time for self creative time, and some [facility]      Increase exercise  25  Up downs a day

## 2022-01-20 NOTE — PROGRESS NOTES
SUBJECTIVE:                                                    Albina Pedro is a 71 year old female who presents to clinic today for the following health issues:    Depression Followup    How are you doing with your depression since your last visit? No change    Are you having other symptoms that might be associated with depression? Yes:  lack of concentration and memory issues.    Have you had a significant life event?  No     Are you feeling anxious or having panic attacks?   Yes:  she has anxious moments    Do you have any concerns with your use of alcohol or other drugs? No    Social History     Tobacco Use     Smoking status: Never Smoker     Smokeless tobacco: Never Used   Substance Use Topics     Alcohol use: Yes     Alcohol/week: 0.0 standard drinks     Comment: 1 glass of wine every 2 weeks     Drug use: No     PHQ 6/4/2020 10/15/2020 5/19/2021   PHQ-9 Total Score 7 9 5   Q9: Thoughts of better off dead/self-harm past 2 weeks Not at all Not at all Not at all     SEBASTIAN-7 SCORE 3/4/2020 4/3/2020 6/4/2020   Total Score - - -   Total Score 5 3 5     Last PHQ-9 1/20/2022   1.  Little interest or pleasure in doing things 1   2.  Feeling down, depressed, or hopeless 2   3.  Trouble falling or staying asleep, or sleeping too much 1   4.  Feeling tired or having little energy 3   5.  Poor appetite or overeating 0   6.  Feeling bad about yourself 3   7.  Trouble concentrating 3   8.  Moving slowly or restless 3   Q9: Thoughts of better off dead/self-harm past 2 weeks 0   PHQ-9 Total Score 16   Difficulty at work, home, or with people -     SEBASTIAN-7  1/20/2022   1. Feeling nervous, anxious, or on edge 2   2. Not being able to stop or control worrying 2   3. Worrying too much about different things 3   4. Trouble relaxing 1   5. Being so restless that it is hard to sit still 0   6. Becoming easily annoyed or irritable 2   7. Feeling afraid, as if something awful might happen 1   SEBASTIAN-7 Total Score 11   If you checked  any problems, how difficult have they made it for you to do your work, take care of things at home, or get along with other people? -       Suicide Assessment Five-step Evaluation and Treatment (SAFE-T)        How many servings of fruits and vegetables do you eat daily?  2-3    On average, how many sweetened beverages do you drink each day (Examples: soda, juice, sweet tea, etc.  Do NOT count diet or artificially sweetened beverages)?   0-1    How many days per week do you exercise enough to make your heart beat faster? 3 or less    How many minutes a day do you exercise enough to make your heart beat faster? 9 or less    How many days per week do you miss taking your medication? 0      Problem list and histories reviewed & adjusted, as indicated.  Additional history:     Patient Active Problem List   Diagnosis     DIZZINESS - LIKELY HYPOGLYCEMIA     Dermatophytosis of body     Episodic mood disorder (H)     Health Care Home     Panniculitis     Advanced directives, counseling/discussion     CAD (coronary artery disease)     Sjogren's syndrome (H)     Adverse effect of anesthetic     Status post coronary angiogram     Acute chest pain     Major depressive disorder, recurrent, mild (H)     Hyperlipidemia LDL goal <70     ILD (interstitial lung disease) (H)     Intermediate coronary syndrome (H)     Chronic stable angina (H)     Past Surgical History:   Procedure Laterality Date     C/SECTION, LOW TRANSVERSE  10/13/1978    , Low Transverse     COLONOSCOPY       OPEN REDUCTION INTERNAL FIXATION ANKLE Right 2019    Procedure: Open reduction internal fixation right lateral malleolus fracture;  Surgeon: Rolo Oconnor DPM;  Location: [facility]     SURGICAL HISTORY OF -            SURGICAL HISTORY OF -   00    Colonoscopy       Social History     Tobacco Use     Smoking status: Never Smoker     Smokeless tobacco: Never Used   Substance Use Topics     Alcohol use: Yes     Alcohol/week:  "0.0 standard drinks     Comment: 1 glass of wine every 2 weeks     Family History   Problem Relation Age of Onset     Cancer Mother         Breast and liver- age 43     Heart Disease Father         ? chf?h/o CVA     Alzheimer Disease Father      Hypertension Father      Neurologic Disorder Father         CVA     Diabetes Maternal Grandmother      Breast Cancer Maternal Aunt      Breast Cancer Paternal Aunt      Cancer - colorectal No family hx of      Prostate Cancer No family hx of          Current Outpatient Medications   Medication Sig Dispense Refill     aspirin (ASA) 81 MG EC tablet Take 81 mg by mouth daily        COMPRESSION STOCKINGS 2 each daily as needed (edema) 2 each 3     LORazepam (ATIVAN) 0.5 MG tablet TAKE ONE TABLET BY MOUTH EVERY EIGHT HOURS AS NEEDED FOR ANXIETY 20 tablet 0     Multiple Vitamins-Minerals (MULTIVITAMIN & MINERAL PO) Take 1 tablet by mouth daily        pravastatin (PRAVACHOL) 40 MG tablet Take 1 tablet (40 mg) by mouth daily 90 tablet 3     Ranolazine 1000 MG TB12 Take 1,000 mg by mouth 2 times daily 180 tablet 3     sertraline (ZOLOFT) 100 MG tablet Take 1 tablet (100 mg) by mouth daily 1/2 tab a day for 7 days, then 1 tab a day. 90 tablet 1     VENTOLIN  (90 Base) MCG/ACT inhaler INHALE 2 PUFFS EVERY 4 HOURS AS NEEDED FOR SHORTNESS OF BREATH/DYSPNEA (Patient not taking: Reported on 2021) 54 g 0     Allergies   Allergen Reactions     Daypro [Oxaprozin] Rash            Lidocaine Other (See Comments)     Rapid heart rate     Nitroglycerin Other (See Comments)     Causes low blood pressure     Penicillins Unknown     Occurred as child.     Sulfa Drugs Rash       ROS:  Constitutional, HEENT, cardiovascular, pulmonary, gi and gu systems are negative, except as otherwise noted.    OBJECTIVE:                                                    BP (!) 144/80   Pulse 75   Temp 96.9  F (36.1  C) (Tympanic)   Resp 16   Ht 1.676 m (5' 6\")   Wt 89.1 kg (196 lb 6.4 oz)   " SpO2 98%   BMI 31.70 kg/m   Body mass index is 31.7 kg/m .   GENERAL: healthy, alert, well nourished, well hydrated, no distress  HENT: ear canals- normal; TMs- normal; Nose- normal; Mouth- no ulcers, no lesions  NECK: no tenderness, no adenopathy, no asymmetry, no masses, no stiffness; thyroid- normal to palpation  RESP: lungs clear to auscultation - no rales, no rhonchi, no wheezes  CV: regular rates and rhythm, normal S1 S2, no S3 or S4 and no murmur, no click or rub -  ABDOMEN: soft, no tenderness, no  hepatosplenomegaly, no masses, normal bowel sounds       ASSESSMENT/PLAN:                                                      (R53.83) Fatigue, unspecified type  (primary encounter diagnosis)  Plan: TSH with free T4 reflex, Comprehensive         metabolic panel (BMP + Alb, Alk Phos, ALT, AST,        Total. Bili, TP), CBC with platelets and         differential, UA macro with reflex to         Microscopic and Culture - Clinc Collect,         (F43.0) Acute reaction to stress  Plan: LORazepam (ATIVAN) 0.5 MG tablet     (F33.0) Major depressive disorder, recurrent, mild (H  Plan: sertraline (ZOLOFT) 100 MG tablet    Patient Instructions     Keep appointment with neurology. Consult at [facility]: Neurology Clinic - [place] [phone]    They will do eval for myasthenia.      Patient Instructions      You picked a  bed time of 11pm.  Take 5 mg of melatonin  20 mns before that.     Okay to have lazy morning on mornings you are feeling confused or sore.  [place] a good time to watch your tv shows lynn are tapped. But the push yourself in afternoon evening on those days.     Increase zoloft(the blue one) to 100mg a day. Return to clinic in 3 weeks for recheck     In early afternoon have a snack  Yogurt, sting cheese egg nuts...     For afternoon nap limit to 1 hour.  Set an alarm     In the late afternoon and evening time have some days/ time for self creative time, and some family time [facility]      Increase  exercise  25  Up downs a day         reports that she has never smoked. She has never used smokeless tobacco.      Weight management plan: Discussed healthy diet and exercise guidelines    [facility]

## 2022-01-21 ASSESSMENT — ANXIETY QUESTIONNAIRES: GAD7 TOTAL SCORE: 11

## 2022-02-05 DIAGNOSIS — I20.0 INTERMEDIATE CORONARY SYNDROME (H): ICD-10-CM

## 2022-02-07 ENCOUNTER — TELEPHONE (OUTPATIENT)
Dept: FAMILY MEDICINE | Facility: CLINIC | Age: 72
End: 2022-02-07
Payer: COMMERCIAL

## 2022-02-07 DIAGNOSIS — I20.0 INTERMEDIATE CORONARY SYNDROME (H): ICD-10-CM

## 2022-02-07 RX ORDER — RANOLAZINE 1000 MG/1
TABLET, EXTENDED RELEASE ORAL
Qty: 180 TABLET | Refills: 0 | Status: SHIPPED | OUTPATIENT
Start: 2022-02-07 | End: 2022-05-23

## 2022-02-07 RX ORDER — RANOLAZINE 1000 MG/1
TABLET, EXTENDED RELEASE ORAL
Qty: 180 TABLET | Refills: 0 | Status: SHIPPED | OUTPATIENT
Start: 2022-02-07 | End: 2022-02-07

## 2022-02-07 NOTE — TELEPHONE ENCOUNTER
Chart reviewed  Medication refilled on 2/7/2022 to [facility] in [place]     Thomas Miguel RN

## 2022-02-08 DIAGNOSIS — I20.0 INTERMEDIATE CORONARY SYNDROME (H): ICD-10-CM

## 2022-02-08 RX ORDER — RANOLAZINE 1000 MG/1
TABLET, EXTENDED RELEASE ORAL
Qty: 180 TABLET | Refills: 0 | OUTPATIENT
Start: 2022-02-08

## 2022-02-11 NOTE — TELEPHONE ENCOUNTER
RECORDS RECEIVED FROM: Self   REASON FOR VISIT: MG   Date of Appt: 2/21/22   NOTES (FOR ALL VISITS) STATUS DETAILS   OFFICE NOTE from other specialist Internal/Received Dr Ku @ [facility] Neurology:  7/19/21 11/16/20    Dr Logan Kelley @ [facility] Neuropsych ( NEUROPSYCH EVAL):  6/29/21    Leslie MONTEJO @ [facility]:  9/23/20 1/7/20    Dr Gopal Hurtado @ [facility]:  1/11/19 2/13/18 12/14/17 4/25/17    Dr Karen Castellano @ [facility] (NEURROPSYCH EVAL):  11/6/17   MEDICATION LIST Internal    IMAGING  (FOR ALL VISITS)     MRI (HEAD, NECK, SPINE) Received/Internal [facility] [place]:  MRI Brain 4/20/21    [facility]:  MRI Brain 12/26/19  MRI Brain 12/19/17   CT (HEAD, NECK, SPINE) Internal [facility] [place]:  CT Head 3/29/17      Action 2/11/22 MV 8.05am   Action Taken Imaging request faxed to [facility]    --2/21/22 MV 7.33am--  2nd request faxed to [facility]  UPDATE 2.25pm: images received from [facility] and sent to

## 2022-02-16 ENCOUNTER — OFFICE VISIT (OUTPATIENT)
Dept: FAMILY MEDICINE | Facility: CLINIC | Age: 72
End: 2022-02-16
Payer: COMMERCIAL

## 2022-02-16 VITALS
HEART RATE: 94 BPM | RESPIRATION RATE: 16 BRPM | BODY MASS INDEX: 30.87 KG/M2 | OXYGEN SATURATION: 82 % | DIASTOLIC BLOOD PRESSURE: 59 MMHG | WEIGHT: 192.1 LBS | SYSTOLIC BLOOD PRESSURE: 147 MMHG | HEIGHT: 66 IN | TEMPERATURE: 97.3 F

## 2022-02-16 DIAGNOSIS — M54.6 CHRONIC MIDLINE THORACIC BACK PAIN: Primary | ICD-10-CM

## 2022-02-16 DIAGNOSIS — F33.0 MAJOR DEPRESSIVE DISORDER, RECURRENT, MILD (H): ICD-10-CM

## 2022-02-16 DIAGNOSIS — G89.29 CHRONIC MIDLINE THORACIC BACK PAIN: Primary | ICD-10-CM

## 2022-02-16 LAB
ALBUMIN SERPL-MCNC: 4 G/DL (ref 3.4–5)
ALP SERPL-CCNC: 67 U/L (ref 40–150)
ALT SERPL W P-5'-P-CCNC: 44 U/L (ref 0–50)
ANION GAP SERPL CALCULATED.3IONS-SCNC: 5 MMOL/L (ref 3–14)
AST SERPL W P-5'-P-CCNC: 30 U/L (ref 0–45)
BILIRUB SERPL-MCNC: 0.4 MG/DL (ref 0.2–1.3)
BUN SERPL-MCNC: 27 MG/DL (ref 7–30)
CALCIUM SERPL-MCNC: 9.1 MG/DL (ref 8.5–10.1)
CHLORIDE BLD-SCNC: 107 MMOL/L (ref 94–109)
CO2 SERPL-SCNC: 25 MMOL/L (ref 20–32)
CREAT SERPL-MCNC: 0.59 MG/DL (ref 0.52–1.04)
GFR SERPL CREATININE-BSD FRML MDRD: >90 ML/MIN/1.73M2
GLUCOSE BLD-MCNC: 96 MG/DL (ref 70–99)
POTASSIUM BLD-SCNC: 4.3 MMOL/L (ref 3.4–5.3)
PROT SERPL-MCNC: 7.3 G/DL (ref 6.8–8.8)
SODIUM SERPL-SCNC: 137 MMOL/L (ref 133–144)

## 2022-02-16 PROCEDURE — 36415 COLL VENOUS BLD VENIPUNCTURE: CPT | Performed by: FAMILY MEDICINE

## 2022-02-16 PROCEDURE — 99213 OFFICE O/P EST LOW 20 MIN: CPT | Performed by: FAMILY MEDICINE

## 2022-02-16 PROCEDURE — 80053 COMPREHEN METABOLIC PANEL: CPT | Performed by: FAMILY MEDICINE

## 2022-02-16 RX ORDER — SERTRALINE HYDROCHLORIDE 100 MG/1
100 TABLET, FILM COATED ORAL DAILY
Qty: 90 TABLET | Refills: 1 | Status: SHIPPED | OUTPATIENT
Start: 2022-02-16 | End: 2022-04-25

## 2022-02-16 RX ORDER — GABAPENTIN 100 MG/1
100 CAPSULE ORAL 3 TIMES DAILY
Qty: 90 CAPSULE | Refills: 0 | Status: SHIPPED | OUTPATIENT
Start: 2022-02-16 | End: 2022-03-25

## 2022-02-16 ASSESSMENT — PAIN SCALES - GENERAL: PAINLEVEL: EXTREME PAIN (8)

## 2022-02-16 NOTE — PROGRESS NOTES
SUBJECTIVE:                                                    Albina Pedro is a 71 year old female who presents to clinic today for the following health issues:    Chief Complaint   Patient presents with     RECHECK     on recent labs     -She is here today wanting to follow up from her labs on 01/20/2022.    Component      Latest Ref Rng & Units 1/20/2022   WBC      4.0 - 11.0 10e3/uL 5.7   RBC Count      3.80 - 5.20 10e6/uL 4.14   Hemoglobin      11.7 - 15.7 g/dL 13.0   Hematocrit      35.0 - 47.0 % 39.8   MCV      78 - 100 fL 96   MCH      26.5 - 33.0 pg 31.4   MCHC      31.5 - 36.5 g/dL 32.7   RDW      10.0 - 15.0 % 13.1   Platelet Count      150 - 450 10e3/uL 223   % Neutrophils      % 57   % Lymphocytes      % 30   % Monocytes      % 10   % Eosinophils      % 3   % Basophils      % 0   Absolute Neutrophils      1.6 - 8.3 10e3/uL 3.3   Absolute Lymphocytes      0.8 - 5.3 10e3/uL 1.7   Absolute Monocytes      0.0 - 1.3 10e3/uL 0.6   Absolute Eosinophils      0.0 - 0.7 10e3/uL 0.2   Absolute Basophils      0.0 - 0.2 10e3/uL 0.0   Sodium      133 - 144 mmol/L 139   Potassium      3.4 - 5.3 mmol/L 4.3   Chloride      94 - 109 mmol/L 108   Carbon Dioxide      20 - 32 mmol/L 38 (H)   Anion Gap      3 - 14 mmol/L <1 (L)   Urea Nitrogen      7 - 30 mg/dL 22   Creatinine      0.52 - 1.04 mg/dL 0.56   Calcium      8.5 - 10.1 mg/dL 9.0   Glucose      70 - 99 mg/dL 99   Alkaline Phosphatase      40 - 150 U/L 63   AST      0 - 45 U/L 20   ALT      0 - 50 U/L 33   Protein Total      6.8 - 8.8 g/dL 7.4   Albumin      3.4 - 5.0 g/dL 3.5   Bilirubin Total      0.2 - 1.3 mg/dL 0.5   GFR Estimate      >60 mL/min/1.73m2 >90   Color Urine      Colorless, Straw, Light Yellow, Yellow Yellow   Appearance Urine      Clear Clear   Glucose Urine      Negative mg/dL Negative   Bilirubin Urine      Negative Negative   Ketones Urine      Negative mg/dL Negative   Specific Gravity Urine      1.003 - 1.035 >=1.030   Blood Urine       Negative Negative   pH Urine      5.0 - 7.0 5.0   Protein Albumin Urine      Negative mg/dL Negative   Urobilinogen Urine      0.2, 1.0 E.U./dL 0.2   Nitrite Urine      Negative Negative   Leukocyte Esterase Urine      Negative Negative   TSH      0.40 - 4.00 mU/L 1.87       Problem list and histories reviewed & adjusted, as indicated.  Additional history:     Patient Active Problem List   Diagnosis     DIZZINESS - LIKELY HYPOGLYCEMIA     Dermatophytosis of body     Episodic mood disorder (H)     Health Care Home     Panniculitis     Advanced directives, counseling/discussion     CAD (coronary artery disease)     Sjogren's syndrome (H)     Adverse effect of anesthetic     Status post coronary angiogram     Acute chest pain     Major depressive disorder, recurrent, mild (H)     Hyperlipidemia LDL goal <70     ILD (interstitial lung disease) (H)     Intermediate coronary syndrome (H)     Chronic stable angina (H)     Past Surgical History:   Procedure Laterality Date     C/SECTION, LOW TRANSVERSE  10/13/1978    , Low Transverse     COLONOSCOPY       OPEN REDUCTION INTERNAL FIXATION ANKLE Right 2019    Procedure: Open reduction internal fixation right lateral malleolus fracture;  Surgeon: Rolo Oconnor DPM;  Location: [facility]     SURGICAL HISTORY OF -            SURGICAL HISTORY OF -   00    Colonoscopy       Social History     Tobacco Use     Smoking status: Never Smoker     Smokeless tobacco: Never Used   Substance Use Topics     Alcohol use: Yes     Alcohol/week: 0.0 standard drinks     Comment: 1 glass of wine every 2 weeks     Family History   Problem Relation Age of Onset     Cancer Mother         Breast and liver- age 43     Heart Disease Father         ? chf?h/o CVA     Alzheimer Disease Father      Hypertension Father      Neurologic Disorder Father         CVA     Diabetes Maternal Grandmother      Breast Cancer Maternal Aunt      Breast Cancer Paternal Aunt       "Cancer - colorectal No family hx of      Prostate Cancer No family hx of          Current Outpatient Medications   Medication Sig Dispense Refill     aspirin (ASA) 81 MG EC tablet Take 81 mg by mouth daily        COMPRESSION STOCKINGS 2 each daily as needed (edema) 2 each 3     gabapentin (NEURONTIN) 100 MG capsule Take 1 capsule (100 mg) by mouth 3 times daily 90 capsule 0     LORazepam (ATIVAN) 0.5 MG tablet TAKE ONE TABLET BY MOUTH EVERY EIGHT HOURS AS NEEDED FOR ANXIETY 20 tablet 0     Multiple Vitamins-Minerals (MULTIVITAMIN & MINERAL PO) Take 1 tablet by mouth daily        pravastatin (PRAVACHOL) 40 MG tablet Take 1 tablet (40 mg) by mouth daily 90 tablet 3     ranolazine (RANEXA) 1000 MG TB12 12 hr tablet TAKE ONE TABLET BY MOUTH TWICE DAILY 180 tablet 0     sertraline (ZOLOFT) 100 MG tablet Take 1 tablet (100 mg) by mouth daily 90 tablet 1     pyridostigmine (MESTINON) 60 MG tablet Take 1 tablet (60 mg) by mouth 3 times daily 90 tablet 1     VENTOLIN  (90 Base) MCG/ACT inhaler INHALE 2 PUFFS EVERY 4 HOURS AS NEEDED FOR SHORTNESS OF BREATH/DYSPNEA 54 g 0     Allergies   Allergen Reactions     Daypro [Oxaprozin] Rash            Lidocaine Other (See Comments)     Rapid heart rate     Nitroglycerin Other (See Comments)     Causes low blood pressure     Penicillins Unknown     Occurred as child.     Sulfa Drugs Rash       ROS:  Constitutional, HEENT, cardiovascular, pulmonary, gi and gu systems are negative, except as otherwise noted.    OBJECTIVE:                                                    BP (!) 147/59   Pulse 94   Temp 97.3  F (36.3  C) (Tympanic)   Resp 16   Ht 1.676 m (5' 6\")   Wt 87.1 kg (192 lb 1.6 oz)   SpO2 (!) 82%   BMI 31.01 kg/m   Body mass index is 31.01 kg/m .   GENERAL: healthy, alert, well nourished, well hydrated, no distress  HENT: ear canals- normal; TMs- normal; Nose- normal; Mouth- no ulcers, no lesions  NECK: no tenderness, no adenopathy, no asymmetry, no masses, no " stiffness; thyroid- normal to palpation  RESP: lungs clear to auscultation - no rales, no rhonchi, no wheezes  CV: regular rates and rhythm, normal S1 S2, no S3 or S4 and no murmur, no click or rub -  ABDOMEN: soft, no tenderness, no  hepatosplenomegaly, no masses, normal bowel sounds       ASSESSMENT/PLAN:                                                      (M54.6,  G89.29) Chronic midline thoracic back pain  (primary encounter diagnosis)  Plan: gabapentin (NEURONTIN) 100 MG capsule    (F33.0) Major depressive disorder, recurrent, mild (H)  Plan: sertraline (ZOLOFT) 100 MG tablet,         Comprehensive metabolic panel (BMP + Alb, Alk         Phos, ALT, AST, Total. Bili, TP)          Patient Instructions   You picked a  bed time of 11pm.  Take 5 mg of melatonin  20 mns before that.      Okay to have lazy morning on mornings you are feeling confused or sore.  [place] a good time to watch your tv shows lynn are tapped. But the push yourself in afternoon evening on those days.      Increase zoloft(the blue one) to 100mg a day. Return to clinic in 3 weeks for recheck     In early afternoon have a snack  Yogurt, sting cheese egg nuts...     For afternoon nap limit to 1 hour.  Set an alarm     In the late afternoon and evening time have some days/ time for self creative time, and some family time like going to the       Increase exercise  25  Up downs a day        reports that she has never smoked. She has never used smokeless tobacco.      Weight management plan: Discussed healthy diet and exercise guidelines      [facility]

## 2022-02-18 ENCOUNTER — TELEPHONE (OUTPATIENT)
Dept: FAMILY MEDICINE | Facility: CLINIC | Age: 72
End: 2022-02-18
Payer: COMMERCIAL

## 2022-02-18 NOTE — TELEPHONE ENCOUNTER
Call placed to [facility].  Voicemail message identified [place].  Relayed recommendation per Dr Burrell.  Patient was seen by [facility] [place] 7/19/21.  Scheduling number given.  Call back number given if additional questions.  Evie Arizmendi RN

## 2022-02-18 NOTE — TELEPHONE ENCOUNTER
Received call from daughter Td.  Reports she was with patient at her visit with Dr Sandoval 2/16/22.  Zoloft dose was not changed as patient and family thought her mood had improved and she was more engaged.    Daughter verbalizes concern with patient intermittent episodes of aggression, anger.  Reports patient becomes angry at spouse then walks away, is unable to manage her feelings and let it go.  Reports patient will hold onto the anger all day and into the next day.  Spending the time crying.    Example: Patient was in the car with her  yesterday.  While he was pumping gas, he saw her out of the corner of his eye walking across the parking lot to neighboring grocery store. She did not have her purse or telephone, just walked away.   reports it took him 90 minutes to get her to come back to the car and go home.  Patient remained angry, crying still today.  Daughter is concerned for her safety.  She is concerned with the poor impulse control and inability to move past conflict.    Will forward to provider to review and advise next steps.  Evie Arizmendi RN

## 2022-02-21 ENCOUNTER — OFFICE VISIT (OUTPATIENT)
Dept: NEUROLOGY | Facility: CLINIC | Age: 72
End: 2022-02-21
Payer: COMMERCIAL

## 2022-02-21 ENCOUNTER — PRE VISIT (OUTPATIENT)
Dept: NEUROLOGY | Facility: CLINIC | Age: 72
End: 2022-02-21

## 2022-02-21 VITALS
RESPIRATION RATE: 16 BRPM | BODY MASS INDEX: 30.99 KG/M2 | OXYGEN SATURATION: 97 % | DIASTOLIC BLOOD PRESSURE: 78 MMHG | HEART RATE: 78 BPM | WEIGHT: 192 LBS | SYSTOLIC BLOOD PRESSURE: 126 MMHG

## 2022-02-21 DIAGNOSIS — G70.00 MYASTHENIA GRAVIS WITHOUT EXACERBATION (H): Primary | ICD-10-CM

## 2022-02-21 DIAGNOSIS — M35.02 SJOGREN'S SYNDROME WITH LUNG INVOLVEMENT (H): ICD-10-CM

## 2022-02-21 PROCEDURE — 99215 OFFICE O/P EST HI 40 MIN: CPT | Performed by: PSYCHIATRY & NEUROLOGY

## 2022-02-21 RX ORDER — PYRIDOSTIGMINE BROMIDE 60 MG/1
60 TABLET ORAL 3 TIMES DAILY
Qty: 90 TABLET | Refills: 1 | Status: SHIPPED | OUTPATIENT
Start: 2022-02-21 | End: 2022-04-25

## 2022-02-21 ASSESSMENT — PAIN SCALES - GENERAL: PAINLEVEL: MILD PAIN (3)

## 2022-02-21 NOTE — PATIENT INSTRUCTIONS
Start taking a drug called Mestinon three times a day. If the diagnosis of myasthenia is correct this is very likely to help the droopy eye and weakness sensation  Repeat blood tests today  EMG/NCS with me, in a few weeks. You will need to stop the Mestinon 24 hours prior to the test.   Follow up 2-3 months  Fatigue may also be explained by Sjogren syndrome- Dr Mark will assess you for it in 3/25.

## 2022-02-21 NOTE — LETTER
2022       RE: Albina Pedro  [id number]  [facility] [address] [place] [address]     Dear Colleague,    Thank you for referring your patient, Albina Pedro, to the [facility] [place] at [facility]. Please see a copy of my visit note below.    Service Date: 2022    Carolina Burrell MD  [facility]  [address] [place] [address]    RE:  Albina Pedro  MRN:  [id number]  :  1950    Dear Dr. Burrell:    I had the pleasure to see Ms. Pedro at the [facility].  She is a 71-year-old woman.  She is here for chief complaints of fatigue and right eyelid ptosis.  She has a history of sicca symptoms with severe dry eyes requiring daily drops and dry mouth requiring sips of water and positive SSA and SSB antibodies diagnostic of Sjogren's syndrome.  She has not seen a rheumatologist recently but fortunately she has an appointment to see Dr. Mark at [facility], on .  In addition to the sicca, she complains of joint pains with occasional swollen knuckles.  She does not have Raynaud, oral ulcers or skin rashes.She also has interstitial lung disease related to Sjogren's and has been following with Dr. Perlman at [facility]. Other relevant history includes coronary artery disease status post angiogram and hyperlipidemia.  Her chief complaint is debilitating generalized muscle fatigue and subjective weakness, particularly in the last 1-2 years.  This has not been lifelong.  It is a recent development.  It can happen anytime during the day.  She is physically and mentally exhausted and even with minimal exercise or no exercise she basically feels fatigued 24/, but there are superimposed flares that occur on a daily basis.  She does not complain of a lot of myalgia or arthralgia overall, and she has no numbness or tingling in hands or feet.  She has noted that during some of those  episodes which occur even without triggers, she may also develop a right lid ptosis, which alarmed her.  She does not have double vision.  She does not describe clear diurnal variation of the ptosis, however.  She has occasional dysphagia; sometimes solid food may get stuck in the lower part of her esophagus, but no dysphagia to liquids and no choking episodes or aspiration.  Her weight is up about 4 pounds in the last 2 years.  She denies difficulty chewing.  She has some dyspnea on exertion but no orthopnea.  She denies change in her voice or dysarthria.  She does not experience any arm fatigue when blow drying her hair or brushing her teeth.      She has experienced heart palpitations at times.  Denies fevers, night sweats or anorexia.  She does not report any  recent change in her bowel habits. She may have difficulty getting out of the bathtub during those episodes, but she does not fall, does not lose her muscle tone nor does she drop things from the hands.     She has been evaluated by a few neurologists including [facility] in 2020 and Dr. Ku at [facility] in the summer of 2021.  He focused on her cognitive and memory concerns that are likely due to the chronic fatigue syndrome and possibly related to Sjogren's.  She has not had an EMG or nerve conduction studies, but she did have acetylcholine receptor binding, modulating, blocking and MuSK antibodies, ordered by [facility] in the fall of 2020, that were negative.  CK was within normal limits, last checked 04/2021 at 188.  CRP, CBC, CMP, TSH, and UA were all normal.     She has an appointment to see Sleep Medicine in a week. She says she sleeps about 6-7 hours every night.  She generally wakes up refreshed with some exceptions.  She takes 1 nap during the day a few days per week. Her  does not think she snores too much nor that she has excessive leg movements to suggest RLS or periodic limb movements.    Medications were reviewed  and are as per Epic record.    /78   Pulse 78   Resp 16   Wt 87.1 kg (192 lb)   SpO2 97%   BMI 30.99 kg/m      PHYSICAL EXAMINATION: She is awake, alert and oriented x3.  She is able to provide a coherent history.  Cranial nerve examination did not clearly show eyelid levator muscle fatigue after 1 minute of sustained upward gaze, yet later during the exam I observed an intermittent and fluctuating right eyelid ptosis. I did not do an ice pack test.  She has moderate at least weakness of orbicularis oculi bilaterally.  She initially did not have any diplopia in any cardinal gaze position.  Ductions and versions were normal, but she does develop diplopia after about 20-30 seconds of sustained upward gaze.  She has mild weakness of cheek puff and lip seal, particularly on the right side.  Voice does not show any dysphonia.  She is not dysarthric.  Uvula and palate elevate at midline.  Tongue does not show atrophy or fasciculations.  Lateral tongue movements are done fast.  Genioglossus strength is normal.  Neck flexion and extension strength are normal.  Strength is 5/5 for deltoid, biceps, wrist extensors, finger extensors, FDI, APB and hand , but it is notably weak for triceps, which are 4- to 4 bilaterally.  Hip flexion is right 5-, left 4.  Knee extension, knee flexion, foot dorsiflexion bilaterally 5.  Tone is normal.  Reflexes were 2+ in biceps, triceps, brachioradialis and the knees.  They are also 2+ at the ankles.  Toes are downgoing.  Reynaldo reflex is negative.  She has borderline reduced vibration at the toes at 7 seconds and at 7-8 at the medial malleoli.  It is over 15 at the index fingers.  Position sensation is intact.  Pinprick is largely intact.  Finger-to-nose is done without dysmetria.  She cannot get up from a chair with arms crossed on the chest.  She has to push with her arms.  Her gait is slightly wide-based and unsteady, and she also has a stooped posture like camptocormia.   Romberg is equivocal.  She has significant difficulties doing tandem gait.    In summary, Kristina has a sensation of generalized fatigue that is continuous with superimposed daily flares, and also some right eyelid ptosis.  Neurological exam does show variable right eyelid ptosis, although not clearly fatigable after 2 minutes, but also weakness of orbicularis oculi and triceps muscles bilaterally. Those signs do raise concerns about generalized seronegative myasthenia.  I will repeat her acetylcholine receptor binding and modulating antibodies because some negative patients may convert to positive 1-2 years later.  I will also get an EMG and nerve conduction studies to assess for myopathy or neuromuscular junction disorder with repetitive nerve stimulation.  If repetitive nerve stimulation is negative, single-fiber EMG may be pursued, which is more sensitive.  I offered her a trial of pyridostigmine 60 mg t.i.d., which may help her fatigue and ptosis if the diagnosis of myasthenia is correct.  Side effects of pyridostigmine including GI were explained to the patient, and she agreed to try.      Fortunately, she will see Rheumatology in a few weeks because her fatigue may be multifactorial and untreated underlying connective tissue disease (clearcut Sjogren syndrome) may play a role. Evaluation by Sleep Clinic is also welcome.    I will see Ms. Ellington in followup in 2 months or sooner if needed. Total time spent on this encounter today 50 minutes including 30 face-to-face, 10 on post-visit note dictation, editing and orders, and 10 in pre-visit chart review.    Sincerely,    Rene Manning MD        D: 2022   T: 2022   MT: umair    Name:     MORENA ELLINGTON  MRN:      [id number]        Account:      [id number]   :      1950           Service Date: 2022       Document: [id number]

## 2022-02-21 NOTE — PROGRESS NOTES
Ms. Albina Pedro is a 71 year old female with history of muscle weakness here for initial evaluation    History of present illness:         She follows with Dr. Ku for mild cognitive impairment.      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       PSH:   Past Surgical History:   Procedure Laterality Date     C/SECTION, LOW TRANSVERSE  10/13/1978    , Low Transverse     COLONOSCOPY       OPEN REDUCTION INTERNAL FIXATION ANKLE Right 2019    Procedure: Open reduction internal fixation right lateral malleolus fracture;  Surgeon: Rolo Oconnor DPM;  Location: [facility]     SURGICAL HISTORY OF -            SURGICAL HISTORY OF -   00    Colonoscopy       Social Hx:     Family Hx:     Medications:   Current Outpatient Medications   Medication     aspirin (ASA) 81 MG EC tablet     COMPRESSION STOCKINGS     gabapentin (NEURONTIN) 100 MG capsule     LORazepam (ATIVAN) 0.5 MG tablet     Multiple Vitamins-Minerals (MULTIVITAMIN & MINERAL PO)     pravastatin (PRAVACHOL) 40 MG tablet     ranolazine (RANEXA) 1000 MG TB12 12 hr tablet     sertraline (ZOLOFT) 100 MG tablet     VENTOLIN  (90 Base) MCG/ACT inhaler     No current facility-administered medications for this visit.       Physical Exam  There were no vitals taken for this visit.      Mental state: Alert, appropriate, speech, language, and thought content normal.     Cranial nerves II-XII normal.    Sensory examination:     Right Left    Light touch Normal Normal   Vibration (timed)     Vibration (Rydell-Seiffer)     Temp     Pin Normal Normal   Pos     Legend:   MM = medial malleolus, TT = tibial tuberosity, K = patella, MCP = MCP joint  MF = mid-foot, DC = distal calf, MC = mid calf, PC = proximal calf      Motor examination:     Right Left   Shoulder abduction  5 5   Elbow extension 5 5   Elbow flexion 5 5   Wrist extension  5 5   Finger extension 5 5   FDI 5 5   APB 5 5   Hip flexion 5 5   Knee flexion 5 5   Knee extension 5 5   Dorsiflexion 5 5   Plantar flexion 5 5   A=atrophy    Tone normal     Reflexes:   Right Left   Biceps 2 2   BRD 2 2   Triceps 2 2   Reynaldo 2 2   Patellar 2 2   Achilles 2 2   Plantar Flexor Flexor   Clonus Absent Absent      Coordination:  Finger-nose normal.  Heel-shin normal.  RRMs normal.    Gait:  Normal.    Additional tests:  MRI brain w/o con 2019 - no acute disesase. Mild burden of T2 hyperintensities, largely unchanged from 2017. Normal cerbral volume. No significant atrophy of the hippocampal formations      Impression:      Recommendations:

## 2022-02-21 NOTE — NURSING NOTE
Chief Complaint   Patient presents with     Consult     NEW MYASTHENIA GRAVIS     Jos Fitzpatrick

## 2022-02-22 NOTE — PROGRESS NOTES
Service Date: 2022    Carolina Burrell MD  [facility]  [address] [place] [address]    RE:  Albina Pedro  MRN:  [id number]  :  1950    Dear Dr. Burrell:    I had the pleasure to see Ms. Pedro at the [facility].  She is a 71-year-old woman.  She is here for chief complaints of fatigue and right eyelid ptosis.  She has a history of sicca symptoms with severe dry eyes requiring daily drops and dry mouth requiring sips of water and positive SSA and SSB antibodies diagnostic of Sjogren's syndrome.  She has not seen a rheumatologist recently but fortunately she has an appointment to see Dr. Mark at [facility], on .  In addition to the sicca, she complains of joint pains with occasional swollen knuckles.  She does not have Raynaud, oral ulcers or skin rashes.She also has interstitial lung disease related to Sjogren's and has been following with Dr. Perlman at [facility]. Other relevant history includes coronary artery disease status post angiogram and hyperlipidemia.  Her chief complaint is debilitating generalized muscle fatigue and subjective weakness, particularly in the last 1-2 years.  This has not been lifelong.  It is a recent development.  It can happen anytime during the day.  She is physically and mentally exhausted and even with minimal exercise or no exercise she basically feels fatigued 24/7, but there are superimposed flares that occur on a daily basis.  She does not complain of a lot of myalgia or arthralgia overall, and she has no numbness or tingling in hands or feet.  She has noted that during some of those episodes which occur even without triggers, she may also develop a right lid ptosis, which alarmed her.  She does not have double vision.  She does not describe clear diurnal variation of the ptosis, however.  She has occasional dysphagia; sometimes solid food may get stuck in the lower part of her esophagus, but no dysphagia to  liquids and no choking episodes or aspiration.  Her weight is up about 4 pounds in the last 2 years.  She denies difficulty chewing.  She has some dyspnea on exertion but no orthopnea.  She denies change in her voice or dysarthria.  She does not experience any arm fatigue when blow drying her hair or brushing her teeth.      She has experienced heart palpitations at times.  Denies fevers, night sweats or anorexia.  She does not report any  recent change in her bowel habits. She may have difficulty getting out of the bathtub during those episodes, but she does not fall, does not lose her muscle tone nor does she drop things from the hands.     She has been evaluated by a few neurologists including [facility] 2020 and Dr. Ku at [facility] in the summer of 2021.  He focused on her cognitive and memory concerns that are likely due to the chronic fatigue syndrome and possibly related to Sjogren's.  She has not had an EMG or nerve conduction studies, but she did have acetylcholine receptor binding, modulating, blocking and MuSK antibodies, ordered by [facility] in the fall of 2020, that were negative.  CK was within normal limits, last checked 04/2021 at 188.  CRP, CBC, CMP, TSH, and UA were all normal.     She has an appointment to see Sleep Medicine in a week. She says she sleeps about 6-7 hours every night.  She generally wakes up refreshed with some exceptions.  She takes 1 nap during the day a few days per week. Her  does not think she snores too much nor that she has excessive leg movements to suggest RLS or periodic limb movements.    Medications were reviewed and are as per Epic record.    /78   Pulse 78   Resp 16   Wt 87.1 kg (192 lb)   SpO2 97%   BMI 30.99 kg/m      PHYSICAL EXAMINATION: She is awake, alert and oriented x3.  She is able to provide a coherent history.  Cranial nerve examination did not clearly show eyelid levator muscle fatigue after 1 minute of sustained  upward gaze, yet later during the exam I observed an intermittent and fluctuating right eyelid ptosis. I did not do an ice pack test.  She has moderate at least weakness of orbicularis oculi bilaterally.  She initially did not have any diplopia in any cardinal gaze position.  Ductions and versions were normal, but she does develop diplopia after about 20-30 seconds of sustained upward gaze.  She has mild weakness of cheek puff and lip seal, particularly on the right side.  Voice does not show any dysphonia.  She is not dysarthric.  Uvula and palate elevate at midline.  Tongue does not show atrophy or fasciculations.  Lateral tongue movements are done fast.  Genioglossus strength is normal.  Neck flexion and extension strength are normal.  Strength is 5/5 for deltoid, biceps, wrist extensors, finger extensors, FDI, APB and hand , but it is notably weak for triceps, which are 4- to 4 bilaterally.  Hip flexion is right 5-, left 4.  Knee extension, knee flexion, foot dorsiflexion bilaterally 5.  Tone is normal.  Reflexes were 2+ in biceps, triceps, brachioradialis and the knees.  They are also 2+ at the ankles.  Toes are downgoing.  Reynaldo reflex is negative.  She has borderline reduced vibration at the toes at 7 seconds and at 7-8 at the medial malleoli.  It is over 15 at the index fingers.  Position sensation is intact.  Pinprick is largely intact.  Finger-to-nose is done without dysmetria.  She cannot get up from a chair with arms crossed on the chest.  She has to push with her arms.  Her gait is slightly wide-based and unsteady, and she also has a stooped posture like camptocormia.  Romberg is equivocal.  She has significant difficulties doing tandem gait.    In summary, Kristina has a sensation of generalized fatigue that is continuous with superimposed daily flares, and also some right eyelid ptosis.  Neurological exam does show variable right eyelid ptosis, although not clearly fatigable after 2 minutes, but  also weakness of orbicularis oculi and triceps muscles bilaterally. Those signs do raise concerns about generalized seronegative myasthenia.  I will repeat her acetylcholine receptor binding and modulating antibodies because some negative patients may convert to positive 1-2 years later.  I will also get an EMG and nerve conduction studies to assess for myopathy or neuromuscular junction disorder with repetitive nerve stimulation.  If repetitive nerve stimulation is negative, single-fiber EMG may be pursued, which is more sensitive.  I offered her a trial of pyridostigmine 60 mg t.i.d., which may help her fatigue and ptosis if the diagnosis of myasthenia is correct.  Side effects of pyridostigmine including GI were explained to the patient, and she agreed to try.      Fortunately, she will see Rheumatology in a few weeks because her fatigue may be multifactorial and untreated underlying connective tissue disease (clearcut Sjogren syndrome) may play a role. Evaluation by Sleep Clinic is also welcome.    I will see Ms. Ellington in followup in 2 months or sooner if needed. Total time spent on this encounter today 50 minutes including 30 face-to-face, 10 on post-visit note dictation, editing and orders, and 10 in pre-visit chart review.    Sincerely,    Rene Manning MD        D: 2022   T: 2022   MT: umair    Name:     MORENA ELLINGTON  MRN:      [id number]        Account:      [id number]   :      1950           Service Date: 2022       Document: [id number]

## 2022-02-28 ENCOUNTER — LAB (OUTPATIENT)
Dept: LAB | Facility: CLINIC | Age: 72
End: 2022-02-28
Payer: COMMERCIAL

## 2022-02-28 DIAGNOSIS — G70.00 MYASTHENIA GRAVIS WITHOUT EXACERBATION (H): ICD-10-CM

## 2022-02-28 PROCEDURE — 36415 COLL VENOUS BLD VENIPUNCTURE: CPT

## 2022-02-28 PROCEDURE — 99000 SPECIMEN HANDLING OFFICE-LAB: CPT

## 2022-02-28 PROCEDURE — 83519 RIA NONANTIBODY: CPT | Mod: 90

## 2022-02-28 PROCEDURE — 83516 IMMUNOASSAY NONANTIBODY: CPT | Mod: 90

## 2022-03-04 LAB
ACHR BIND AB SER-SCNC: 0 NMOL/L
ACHR MOD AB/ACHR TOTAL SFR SER: 4 %

## 2022-03-10 ENCOUNTER — VIRTUAL VISIT (OUTPATIENT)
Dept: SLEEP MEDICINE | Facility: CLINIC | Age: 72
End: 2022-03-10
Attending: INTERNAL MEDICINE
Payer: COMMERCIAL

## 2022-03-10 VITALS — BODY MASS INDEX: 30.05 KG/M2 | WEIGHT: 187 LBS | HEIGHT: 66 IN

## 2022-03-10 DIAGNOSIS — E66.811 OBESITY (BMI 30.0-34.9): ICD-10-CM

## 2022-03-10 DIAGNOSIS — J84.9 ILD (INTERSTITIAL LUNG DISEASE) (H): ICD-10-CM

## 2022-03-10 DIAGNOSIS — R06.83 SNORING: Primary | ICD-10-CM

## 2022-03-10 DIAGNOSIS — M35.09 SJOGREN'S SYNDROME WITH OTHER ORGAN INVOLVEMENT (H): ICD-10-CM

## 2022-03-10 DIAGNOSIS — R53.83 FATIGUE, UNSPECIFIED TYPE: ICD-10-CM

## 2022-03-10 DIAGNOSIS — G47.19 EXCESSIVE DAYTIME SLEEPINESS: ICD-10-CM

## 2022-03-10 PROCEDURE — 99204 OFFICE O/P NEW MOD 45 MIN: CPT | Mod: 95 | Performed by: NURSE PRACTITIONER

## 2022-03-10 ASSESSMENT — SLEEP AND FATIGUE QUESTIONNAIRES
HOW LIKELY ARE YOU TO NOD OFF OR FALL ASLEEP WHILE WATCHING TV: SLIGHT CHANCE OF DOZING
HOW LIKELY ARE YOU TO NOD OFF OR FALL ASLEEP WHILE LYING DOWN TO REST IN THE AFTERNOON WHEN CIRCUMSTANCES PERMIT: HIGH CHANCE OF DOZING
HOW LIKELY ARE YOU TO NOD OFF OR FALL ASLEEP WHILE SITTING AND TALKING TO SOMEONE: WOULD NEVER DOZE
HOW LIKELY ARE YOU TO NOD OFF OR FALL ASLEEP IN A CAR, WHILE STOPPED FOR A FEW MINUTES IN TRAFFIC: WOULD NEVER DOZE
HOW LIKELY ARE YOU TO NOD OFF OR FALL ASLEEP WHILE SITTING AND READING: MODERATE CHANCE OF DOZING
HOW LIKELY ARE YOU TO NOD OFF OR FALL ASLEEP WHILE SITTING INACTIVE IN A PUBLIC PLACE: SLIGHT CHANCE OF DOZING
HOW LIKELY ARE YOU TO NOD OFF OR FALL ASLEEP WHEN YOU ARE A PASSENGER IN A CAR FOR AN HOUR WITHOUT A BREAK: WOULD NEVER DOZE
HOW LIKELY ARE YOU TO NOD OFF OR FALL ASLEEP WHILE SITTING QUIETLY AFTER LUNCH WITHOUT ALCOHOL: WOULD NEVER DOZE

## 2022-03-10 NOTE — PATIENT INSTRUCTIONS
"      MY TREATMENT INFORMATION FOR SLEEP APNEA-  Albina Pedro    DOCTOR : STEW Villavicencio CNP    Am I having a sleep study at a sleep center?  --->Due to normal delays, you will be contacted within 2-4 weeks to schedule    Am I having a home sleep study?  --->Watch the video for the device you are using:    -/drop off device-   https://www.[employer].com/watch?v=yGGFBdELGhk    -Disposable device sent out require phone/computer application-   https://www.[employer].com/watch?v=BCce_vbiwxE      Frequently asked questions:  1. What is Obstructive Sleep Apnea (JUAN)? JUAN is the most common type of sleep apnea. Apnea means, \"without breath.\"  Apnea is most often caused by narrowing or collapse of the upper airway as muscles relax during sleep.   Almost everyone has occasional apneas. Most people with sleep apnea have had brief interruptions at night frequently for many years.  The severity of sleep apnea is related to how frequent and severe the events are.   2. What are the consequences of JUAN? Symptoms include: feeling sleepy during the day, snoring loudly, gasping or stopping of breathing, trouble sleeping, and occasionally morning headaches or heartburn at night.  Sleepiness can be serious and even increase the risk of falling asleep while driving. Other health consequences may include development of high blood pressure and other cardiovascular disease in persons who are susceptible. Untreated JUAN  can contribute to heart disease, stroke and diabetes.   3. What are the treatment options? In most situations, sleep apnea is a lifelong disease that must be managed with daily therapy. Medications are not effective for sleep apnea and surgery is generally not considered until other therapies have been tried. Your treatment is your choice . Continuous Positive Airway (CPAP) works right away and is the therapy that is effective in nearly everyone. An oral device to hold your jaw forward is usually the next most " reliable option. Other options include postioning devices (to keep you off your back), weight loss, and surgery including a tongue pacing device. There is more detail about some of these options below.  4. Are my sleep studies covered by insurance? Although we will request verification of coverage, we advise you also check in advance of the study to ensure there is coverage.    Important tips for those choosing CPAP and similar devices   Know your equipment:  CPAP is continuous positive airway pressure that prevents obstructive sleep apnea by keeping the throat from collapsing while you are sleeping. In most cases, the device is  smart  and can slowly self-adjusts if your throat collapses and keeps a record every day of how well you are treated-this information is available to you and your care team.  BPAP is bilevel positive airway pressure that keeps your throat open and also assists each breath with a pressure boost to maintain adequate breathing.  Special kinds of BPAP are used in patients who have inadequate breathing from lung or heart disease. In most cases, the device is  smart  and can slowly self-adjusts to assist breathing. Like CPAP, the device keeps a record of how well you are treated.  Your mask is your connection to the device. You get to choose what feels most comfortable and the staff will help to make sure if fits. Here: are some examples of the different masks that are available:       Key points to remember on your journey with sleep apnea:  1. Sleep study.  PAP devices often need to be adjusted during a sleep study to show that they are effective and adjusted right.  2. Good tips to remember: Try wearing just the mask during a quiet time during the day so your body adapts to wearing it. A humidifier is recommended for comfort in most cases to prevent drying of your nose and throat. Allergy medication from your provider may help you if you are having nasal congestion.  3. Getting settled-in. It  takes more than one night for most of us to get used to wearing a mask. Try wearing just the mask during a quiet time during the day so your body adapts to wearing it. A humidifier is recommended for comfort in most cases. Our team will work with you carefully on the first day and will be in contact within 4 days and again at 2 and 4 weeks for advice and remote device adjustments. Your therapy is evaluated by the device each day.   4. Use it every night. The more you are able to sleep naturally for 7-8 hours, the more likely you will have good sleep and to prevent health risks or symptoms from sleep apnea. Even if you use it 4 hours it helps. Occasionally all of us are unable to use a medical therapy, in sleep apnea, it is not dangerous to miss one night.   5. Communicate. Call our skilled team on the number provided on the first day if your visit for problems that make it difficult to wear the device. Over 2 out of 3 patients can learn to wear the device long-term with help from our team. Remember to call our team or your sleep providers if you are unable to wear the device as we may have other solutions for those who cannot adapt to mask CPAP therapy. It is recommended that you sleep your sleep provider within the first 3 months and yearly after that if you are not having problems.   6. Use it for your health. We encourage use of CPAP masks during daytime quiet periods to allow your face and brain to adapt to the sensation of CPAP so that it will be a more natural sensation to awaken to at night or during naps. This can be very useful during the first few weeks or months of adapting to CPAP though it does not help medically to wear CPAP during wakefulness and  should not be used as a strategy just to meet guidelines.  7. Take care of your equipment. Make sure you clean your mask and tubing using directions every day and that your filter and mask are replaced as recommended or if they are not working.     BESIDES  CPAP, WHAT OTHER THERAPIES ARE THERE?    Positioning Device  Positioning devices are generally used when sleep apnea is mild and only occurs on your back.This example shows a pillow that straps around the waist. It may be appropriate for those whose sleep study shows milder sleep apnea that occurs primarily when lying flat on one's back. Preliminary studies have shown benefit but effectiveness at home may need to be verified by a home sleep test. These devices are generally not covered by medical insurance.  Examples of devices that maintain sleeping on the back to prevent snoring and mild sleep apnea.    Belt type body positioner  http://Prixing.EventSneaker/    Electronic reminder  http://nightshifttherapy.com/  http://www.Wallarm.EventSneaker.au/      Oral Appliance  What is oral appliance therapy?  An oral appliance device fits on your teeth at night like a retainer used after having braces. The device is made by a specialized dentist and requires several visits over 1-2 months before a manufactured device is made to fit your teeth and is adjusted to prevent your sleep apnea. Once an oral device is working properly, snoring should be improved. A home sleep test may be recommended at that time if to determine whether the sleep apnea is adequately treated.       Some things to remember:  -Oral devices are often, but not always, covered by your medical insurance. Be sure to check with your insurance provider.   -If you are referred for oral therapy, you will be given a list of specialized dentists to consider or you may choose to visit the Web site of the American Academy of Dental Sleep Medicine  -Oral devices are less likely to work if you have severe sleep apnea or are extremely overweight.     More detailed information  An oral appliance is a small acrylic device that fits over the upper and lower teeth  (similar to a retainer or a mouth guard). This device slightly moves jaw forward, which moves the base of the tongue forward,  opens the airway, improves breathing for effective treat snoring and obstructive sleep apnea in perhaps 7 out of 10 people .  The best working devices are custom-made by a dental device  after a mold is made of the teeth 1, 2, 3.  When is an oral appliance indicated?  Oral appliance therapy is recommended as a first-line treatment for patients with primary snoring, mild sleep apnea, and for patients with moderate sleep apnea who prefer appliance therapy to use of CPAP4, 5. Severity of sleep apnea is determined by sleep testing and is based on the number of respiratory events per hour of sleep.   How successful is oral appliance therapy?  The success rate of oral appliance therapy in patients with mild sleep apnea is 75-80% while in patients with moderate sleep apnea it is 50-70%. The chance of success in patients with severe sleep apnea is 40-50%. The research also shows that oral appliances have a beneficial effect on the cardiovascular health of JUAN patients at the same magnitude as CPAP therapy7.  Oral appliances should be a second-line treatment in cases of severe sleep apnea, but if not completely successful then a combination therapy utilizing CPAP plus oral appliance therapy may be effective. Oral appliances tend to be effective in a broad range of patients although studies show that the patients who have the highest success are females, younger patients, those with milder disease, and less severe obesity. 3, 6.   Finding a dentist that practices dental sleep medicine  Specific training is available through the American Academy of Dental Sleep Medicine for dentists interested in working in the field of sleep. To find a dentist who is educated in the field of sleep and the use of oral appliances, near you, visit the Web site of the American Academy of Dental Sleep Medicine.    References  1. Macrina et al. Objectively measured vs self-reported compliance during oral appliance therapy for  sleep-disordered breathing. Chest 2013; 144(5): 1945-9345.  2. Srikanth, et al. Objective measurement of compliance during oral appliance therapy for sleep-disordered breathing. Thorax 2013; 68(1): 91-96.  3. Morris et al. Mandibular advancement devices in 620 men and women with JUAN and snoring: tolerability and predictors of treatment success. Chest 2004; 125: 1224-7530.  4. Jairo et al. Oral appliances for snoring and JUAN: a review. Sleep 2006; 29: 244-262.  5. Shyam et al. Oral appliance treatment for JUAN: an update. J Clin Sleep Med 2014; 10(2): 215-227.  6. Singh et al. Predictors of OSAH treatment outcome. J Dent Res 2007; 86: 5579-6709.      Weight Loss:    Weight loss is a long-term strategy that may improve sleep apnea in some patients.    Weight management is a personal decision and the decision should be based on your interest and the potential benefits.  If you are interested in exploring weight loss strategies, the following discussion covers the impact on weight loss on sleep apnea and the approaches that may be successful.    Being overweight does not necessarily mean you will have health consequences.  Those who have BMI over 35 or over 27 with existing medical conditions carries greater risk.   Weight loss decreases severity of sleep apnea in most people with obesity. For those with mild obesity who have developed snoring with weight gain, even 15-30 pound weight loss can improve and occasionally eliminate sleep apnea.  Structured and life-long dietary and health habits are necessary to lose weight and keep healthier weight levels.     Though there may be significant health benefits from weight loss, long-term weight loss is very difficult to achieve- studies show success with dietary management in less than 10% of people. In addition, substantial weight loss may require years of dietary control and may be difficult if patients have severe obesity. In these cases, surgical  management may be considered.  Finally, older individuals who have tolerated obesity without health complications may be less likely to benefit from weight loss strategies.      Your BMI is 30.18 kg/m .    Surgery:    Surgery for obstructive sleep apnea is considered generally only when other therapies fail to work. Surgery may be discussed with you if you are having a difficult time tolerating CPAP and or when there is an abnormal structure that requires surgical correction.  Nose and throat surgeries often enlarge the airway to prevent collapse.  Most of these surgeries create pain for 1-2 weeks and up to half of the most common surgeries are not effective throughout life.  You should carefully discuss the benefits and drawbacks to surgery with your sleep provider and surgeon to determine if it is the best solution for you.   More information  Surgery for JUAN is directed at areas that are responsible for narrowing or complete obstruction of the airway during sleep.  There are a wide range of procedures available to enlarge and/or stabilize the airway to prevent blockage of breathing in the three major areas where it can occur: the palate, tongue, and nasal regions.  Successful surgical treatment depends on the accurate identification of the factors responsible for obstructive sleep apnea in each person.  A personalized approach is required because there is no single treatment that works well for everyone.  Because of anatomic variation, consultation with an examination by a sleep surgeon is a critical first step in determining what surgical options are best for each patient.  In some cases, examination during sedation may be recommended in order to guide the selection of procedures.  Patients will be counseled about risks and benefits as well as the typical recovery course after surgery. Surgery is typically not a cure for a person s JUAN.  However, surgery will often significantly improve one s JUAN severity  (termed  success rate ).  Even in the absence of a cure, surgery will decrease the cardiovascular risk associated with OSA7; improve overall quality of life8 (sleepiness, functionality, sleep quality, etc).      Palate Procedures:  Patients with JUAN often have narrowing of their airway in the region of their tonsils and uvula.  The goals of palate procedures are to widen the airway in this region as well as to help the tissues resist collapse.  Modern palate procedure techniques focus on tissue conservation and soft tissue rearrangement, rather than tissue removal.  Often the uvula is preserved in this procedure. Residual sleep apnea is common in patient after pharyngoplasty with an average reduction in sleep apnea events of 33%2.      Tongue Procedures:  ExamWhile patients are awake, the muscles that surround the throat are active and keep this region open for breathing. These muscles relax during sleep, allowing the tongue and other structures to collapse and block breathing.  There are several different tongue procedures available.  Selection of a tongue base procedure depends on characteristics seen on physical exam.  Generally, procedures are aimed at removing bulky tissues in this area or preventing the back of the tongue from falling back during sleep.  Success rates for tongue surgery range from 50-62%3.    Hypoglossal Nerve Stimulation:  Hypoglossal nerve stimulation has recently received approval from the [place] Food and Drug Administration for the treatment of obstructive sleep apnea.  This is based on research showing that the system was safe and effective in treating sleep apnea6.  Results showed that the median AHI score decreased 68%, from 29.3 to 9.0. This therapy uses an implant system that senses breathing patterns and delivers mild stimulation to airway muscles, which keeps the airway open during sleep.  The system consists of three fully implanted components: a small generator (similar in  size to a pacemaker), a breathing sensor, and a stimulation lead.  Using a small handheld remote, a patient turns the therapy on before bed and off upon awakening.    Candidates for this device must be greater than 22 years of age, have moderate to severe JUAN (AHI between 20-65), BMI less than 32, have tried CPAP/oral appliance without success, and have appropriate upper airway anatomy (determined by a sleep endoscopy performed by Dr. Alonzo).    Hypoglossal Nerve Stimulation Pathway:    The sleep surgeon s office will work with the patient through the insurance prior-authorization process (including communications and appeals).    Nasal Procedures:  Nasal obstruction can interfere with nasal breathing during the day and night.  Studies have shown that relief of nasal obstruction can improve the ability of some patients to tolerate positive airway pressure therapy for obstructive sleep apnea1.  Treatment options include medications such as nasal saline, topical corticosteroid and antihistamine sprays, and oral medications such as antihistamines or decongestants. Non-surgical treatments can include external nasal dilators for selected patients. If these are not successful by themselves, surgery can improve the nasal airway either alone or in combination with these other options.      Combination Procedures:  Combination of surgical procedures and other treatments may be recommended, particularly if patients have more than one area of narrowing or persistent positional disease.  The success rate of combination surgery ranges from 66-80%2,3.    References  1. Luiz NICOLE. The Role of the Nose in Snoring and Obstructive Sleep Apnoea: An Update.  Eur Arch Otorhinolaryngol. 2011; 268: 1365-73.  2.  Jhoana SM; Dionte JA; Kristan JR; Pallanch JF; Mono PRUETT; Kim CHOI; Barb ONEIL. Surgical modifications of the upper airway for obstructive sleep apnea in adults: a systematic review and meta-analysis. SLEEP 2010;33(10):5829-4180.  Janee LEVY. Hypopharyngeal surgery in obstructive sleep apnea: an evidence-based medicine review.  Arch Otolaryngol Head Neck Surg. 2006 Feb;132(2):206-13.  3. Oc YNEHA, Sujit Y, Jelani JAZ. The efficacy of anatomically based multilevel surgery for obstructive sleep apnea. Otolaryngol Head Neck Surg. 2003 Oct;129(4):327-35.  4. Janee LEVY, Goldberg A. Hypopharyngeal Surgery in Obstructive Sleep Apnea: An Evidence-Based Medicine Review. Arch Otolaryngol Head Neck Surg. 2006 Feb;132(2):206-13.  5. Margarito PJ et al. Upper-Airway Stimulation for Obstructive Sleep Apnea.  N Engl J Med. 2014 Jan 9;370(2):139-49.  6. Puneet Y et al. Increased Incidence of Cardiovascular Disease in Middle-aged Men with Obstructive Sleep Apnea. Am J Respir Crit Care Med; 2002 166: 159-165  7. Aminta EM et al. Studying Life Effects and Effectiveness of Palatopharyngoplasty (SLEEP) study: Subjective Outcomes of Isolated Uvulopalatopharyngoplasty. Otolaryngol Head Neck Surg. 2011; 144: 623-631.        WHAT IF I ONLY HAVE SNORING?      Mandibular advancement devices, lateral sleep positioning, long-term weight loss and treatment of nasal allergies have been shown to improve snoring.    Exercising tongue muscles with a game (https://www.ncbi.nlm.nih.gov/pubmed/39429724) or stimulating the tongue during the day with a device (https://doi.org/10.3390/nav99358282) have improved snoring in some individuals.    Remember to Drive Safe... Drive Alive     Sleep health profoundly affects your health, mood, and your safety.  Thirty three percent of the population (one in three of us) is not getting enough sleep and many have a sleep disorder. Not getting enough sleep or having an untreated / undertreated sleep condition may make us sleepy without even knowing it. In fact, our driving could be dramatically impaired due to our sleep health. As your provider, here are some things I would like you to know about driving:     Here are some warning signs for  impairment and dangerous drowsy driving:              -Having been awake more than 16 hours               -Looking tired               -Eyelid drooping              -Head nodding (it could be too late at this point)              -Driving for more than 30 minutes     Some things you could do to make the driving safer if you are experiencing some drowsiness:              -Stop driving and rest              -Call for transportation              -Make sure your sleep disorder is adequately treated     Some things that have been shown NOT to work when experiencing drowsiness while driving:              -Turning on the radio              -Opening windows              -Eating any  distracting  /  entertaining  foods (e.g., sunflower seeds, candy, or any other)              -Talking on the phone      Your decision may not only impact your life, but also the life of others. Please, remember to drive safe for yourself and all of us.

## 2022-03-10 NOTE — PROGRESS NOTES
"Albina Pedro is a 71 year old female being evaluated via a billable telephone visit.     Flu Vaccine: 10/25/21    \"This telephone visit will be conducted via a call between you and your physician/provider. We have found that certain health care needs can be provided without the need for an in-person visit or physical exam.  This service lets us provide the care you need with a telephone conversation.  If a prescription is necessary we can send it directly to your pharmacy.  If lab work is needed we can place an order for that and you can then stop by our lab to have the test done at a later time.\"    Telephone visits are billed at different rates depending on your insurance coverage.  Please reach out to your insurance provider with any questions.    Patient has given verbal consent for  a Telephone visit? Yes    What telephone number would you like your provider to contact at:  [phone]    How would you like to obtain your AVS? STEW Feliciano CNP    Telephone Visit Details:     Telephone Visit Start Time: 1:40 PM    Telephone Visit End Time:  2:21 PM        Outpatient Sleep Medicine Consultation:  March 10, 2022    Name: Albina Pedro MRN# [id number]   Age: 71 year old YOB: 1950     Date of Consultation: March 10, 2022  Consultation is requested by: David Morris Perlman, MD  [address] [address] David Morris Perlman  Primary care provider: Carolina Burrell       Reason for Sleep Consult:     Albina Pedro is sent by David Morris Perlman for a sleep consultation regarding Sjogren's syndrome, fatigue, and excessive daytime somnolence.    Patient s Reason for visit  Albina Pedro main reason for visit:    Patient states problem(s) started:    Albina Pedro's goals for this visit:             Assessment and Plan:     Summary Sleep Diagnoses:  1. Snoring  2. Sjogren's syndrome with other organ involvement (H)  3. Excessive daytime " sleepiness  4. Fatigue, unspecified type  5. ILD (interstitial lung disease) (H)  6. Obesity (BMI 30.0-34.9)  - SLEEP EVALUATION & MANAGEMENT REFERRAL - ADULT -  - Comprehensive Sleep Study; Future      Comorbid Diagnoses:  1. CAD  2. ILD  3. Sjogren's syndrome  4. Depression  5. Obesity      Summary Recommendations:  This is a pleasant 71-year-old female with a PMH pertinent for HLD, CAD, ILD, history of lymphocytic interstitial pneumonia, Sjogren's syndrome, myopathy, major depression, anxiety, and obesity who presents today with complaints of snoring (lateral or supine), occasional catches her breath while awake, fatigue, daytime somnolence, muscle weakness, droopy eyelid, memory issues, and stuttering for the last couple of years, progressively worsening.  She was referred by pulmonary medicine, Dr. Perlman, for further evaluation of the patient's fatigue and somnolence.  In addition, the patient has also been referred to rheumatology for evaluation of her musculoskeletal complaints in the setting of possible autoimmune disease.  Her [place] score today is 7 which is consistent with mild daytime somnolence.  Her insomnia severity index score today is 13 which is consistent with mild severity clinical insomnia.  Her symptoms are relatively vague with regard to possible obstructive sleep apnea, however, the patient was noted to have an elevated carbon dioxide level of 38 mmol/L (1/20/2022) and an oxygen saturation of 82% checked at the visit on 2/16/2022, per office visit note with Dr. Sandoval.  Given the patient's autoimmune disorder as well as myopathies, ILD, and mild obesity does put the patient at higher risk for possible hypoventilation.  I have recommended the patient undergo further evaluation with polysomnography.    We discussed the pathophysiology of obstructive sleep apnea and the risks associated with untreated JUAN.  We also discussed the pros and cons of in lab polysomnography versus home sleep  testing.  The patient has elected to undergo in lab polysomnography (PSG) with TCM to further evaluate her symptoms of possible obstructive sleep apnea, hypoventilation.    All potential therapeutic options including positive airway pressure, mandibular advancing oral appliances, and surgical options were discussed. Also counseled about impact of weight loss of JUAN.     Orders Placed This Encounter   Procedures     Comprehensive Sleep Study         Summary Counseling:    Sleep Testing Reviewed  Obstructive Sleep Apnea Reviewed  Complications of Untreated Sleep Apnea Reviewed  Previous chart notes, lab results and imaging results reviewed.    Medical Decision-making:   Educational materials provided in instructions    Total time spent reviewing medical records, history and physical examination, review of previous testing and interpretation as well as documentation on this date: 55 minutes    CC: David Morris Perlman          History of Present Illness:   Albina Pedro is a 71-year-old female with a PMH pertinent for HLD, CAD, ILD, history of lymphocytic interstitial pneumonia, Sjogren's syndrome, myopathy, major depression, anxiety, and obesity who presents today with complaints of snoring (lateral or supine), occasional catches her breath while awake, fatigue, daytime somnolence, muscle weakness, droopy eyelid, memory issues, and stuttering for the last couple of years, progressively worsening.  She was referred by pulmonary medicine, Dr. Perlman, for further evaluation of the patient's fatigue and somnolence.  In addition, the patient has also been referred to rheumatology for evaluation of her musculoskeletal complaints in the setting of possible autoimmune disease.    Past Sleep Evaluations: None    SLEEP-WAKE SCHEDULE:     Work/School Days: Patient goes to school/work: retired    Usually gets into bed at  11 PM - 12:30 AM  Takes patient about 5 minutes to fall asleep  Has trouble falling asleep 0  nights per  week  Wakes up in the middle of the night 0  times due to  nothing  She has trouble falling back asleep  0 times a week and it usually takes    to get back to sleep  Patient is usually up at  8:30 - 9 AM  Uses alarm:  Occasionally    Weekends/Non-work Days/All Other Days:  Same as above  Usually gets into bed at     Takes patient about   to fall asleep  Patient is usually up at    Uses alarm:      Sleep Need  Patient gets 8-9 hours sleep on average   Patient thinks she needs about 8-9 hours  sleep    Albina Pedro prefers to sleep in this position(s):   Lateral, supine  Patient states they do the following activities in bed:  No goes eight to sleep generally    Naps  Patient takes a purposeful nap 6-7  times a week and naps are usually 60-90 minutes in duration  She feels better after a nap:  Yes, sometimes  She dozes off unintentionally 0 days per week  Patient has had a driving accident or near-miss due to sleepiness/drowsiness:  Denies      SLEEP DISRUPTIONS:    Breathing/Snoring  Patient snores: occasional  Other people complain about her snoring:  Occasionally   Patient has been told she stops breathing in her sleep:  no  She has issues with the following:  Denies difficulty breathing through nose, GERD, bruxism    Movement:  Patient gets pain, discomfort, with an urge to move:  No  It happens when she is resting:    It happens more at night:   Patient has been told she kicks her legs at night: No     Behaviors in Sleep:  Albina Pedro has experienced the following behaviors while sleeping: Denies hypnagogy, sleep paralysis  She has experienced sudden muscle weakness during the day:  Denies cataplexy      Is there anything else you would like your sleep provider to know:  No      CAFFEINE AND OTHER SUBSTANCES:    Number of caffeinated beverages(coffee, tea, soda, energy drinks) that patient consumes  per day:  3 soft drinks/week  Last caffeine use is usually:  7-10 PM  List of any prescribed or  over the counter stimulants that patient takes:  No  List of any prescribed or over the counter sleep medication patient takes:  No  List of previous sleep medications that patient has tried:  No  Patient drinks alcohol to help them sleep:  No  Patient drinks alcohol near bedtime:  No    Alcohol use: Occasional  Nicotine/tobacco use: None  Recreational drug use: None      Family History:  Patient has a family member been diagnosed with a sleep disorder:  None known.            SCALES:    EPWORTH SLEEPINESS SCALE      Crete Sleepiness Scale ( TAHIR Bauman  1990-1997[facility] [place]/English - Final version - 21 Nov 07 - [facility] [place].) 3/10/2022   Sitting and reading Moderate chance of dozing   Watching TV Slight chance of dozing   Sitting, inactive in a public place (e.g. a theatre or a meeting) Slight chance of dozing   As a passenger in a car for an hour without a break Would never doze   Lying down to rest in the afternoon when circumstances permit High chance of dozing   Sitting and talking to someone Would never doze   Sitting quietly after a lunch without alcohol Would never doze   In a car, while stopped for a few minutes in traffic Would never doze   Crete Score (MC) 0   Crete Score (Sleep) 7         INSOMNIA SEVERITY INDEX (HECTOR)      Insomnia Severity Index (HECTOR) 3/10/2022   Difficulty falling asleep 1   Difficulty staying asleep 0   Problems waking up too early 0   How SATISFIED/DISSATISFIED are you with your CURRENT sleep pattern? 2   How NOTICEABLE to others do you think your sleep problem is in terms of impairing the quality of your life? 4   How WORRIED/DISTRESSED are you about your current sleep problem? 2   To what extent do you consider your sleep problem to INTERFERE with your daily functioning (e.g. daytime fatigue, mood, ability to function at work/daily chores, concentration, memory, mood, etc.) CURRENTLY? 4   HECTOR Total Score 13       Guidelines for Scoring/Interpretation:    Total score  "categories:  0-7 = No clinically significant insomnia   8-14 = Subthreshold insomnia   15-21 = Clinical insomnia (moderate severity)  22-28 = Clinical insomnia (severe)    Used via courtesy of www.[employer].va.gov with permission from Tevin Tolentino PhD., [facility]      STOP BANG     STOP BANG Questionnaire (  2008, the American Society of Anesthesiologists, Inc. [employer] 3/10/2022   B/P Clinic: -   BMI Clinic: 30.18         GAD7    SEBASTIAN-7  1/20/2022   1. Feeling nervous, anxious, or on edge 2   2. Not being able to stop or control worrying 2   3. Worrying too much about different things 3   4. Trouble relaxing 1   5. Being so restless that it is hard to sit still 0   6. Becoming easily annoyed or irritable 2   7. Feeling afraid, as if something awful might happen 1   SEBASTIAN-7 Total Score 11   If you checked any problems, how difficult have they made it for you to do your work, take care of things at home, or get along with other people? -         CAGE-AID    No flowsheet data found.    CAGE-AID reprinted with permission from the [place] Medical Journal, SENDY Temple. and SHAD Jacome, \"Conjoint screening questionnaires for alcohol and drug abuse\" [place] Medical Journal 94: 135-140, 1995.      PATIENT HEALTH QUESTIONNAIRE-9 (PHQ - 9)    PHQ-9 ([employer]) 1/20/2022   No Interest In Doing Things -   Feeling Depressed -   Trouble Sleeping -   Tired / No Energy -   No appetite or Over-Eating -   Feeling Bad about Self -   Trouble Concentrating -   Moving Slow or Restless -   Suicidal Thoughts -   Total Score -   1.  Little interest or pleasure in doing things 1   2.  Feeling down, depressed, or hopeless 2   3.  Trouble falling or staying asleep, or sleeping too much 1   4.  Feeling tired or having little energy 3   5.  Poor appetite or overeating 0   6.  Feeling bad about yourself 3   7.  Trouble concentrating 3   8.  Moving slowly or restless 3   9.  Suicidal or self-harm thoughts 0 "   PHQ-9 Total Score 16   Difficulty at work, home, or with people -       Developed by DrsEzio Martinez, Anne Rowan, Leif Maciel and colleagues, with an educational shannon from [employer] No permission required to reproduce, translate, display or distribute.        Allergies:    Allergies   Allergen Reactions     Daypro [Oxaprozin] Rash            Lidocaine Other (See Comments)     Rapid heart rate     Nitroglycerin Other (See Comments)     Causes low blood pressure     Penicillins Unknown     Occurred as child.     Sulfa Drugs Rash       Medications:    Current Outpatient Medications   Medication Sig Dispense Refill     aspirin (ASA) 81 MG EC tablet Take 81 mg by mouth daily        gabapentin (NEURONTIN) 100 MG capsule Take 1 capsule (100 mg) by mouth 3 times daily 90 capsule 0     LORazepam (ATIVAN) 0.5 MG tablet TAKE ONE TABLET BY MOUTH EVERY EIGHT HOURS AS NEEDED FOR ANXIETY 20 tablet 0     Multiple Vitamins-Minerals (MULTIVITAMIN & MINERAL PO) Take 1 tablet by mouth daily        pravastatin (PRAVACHOL) 40 MG tablet Take 1 tablet (40 mg) by mouth daily 90 tablet 3     pyridostigmine (MESTINON) 60 MG tablet Take 1 tablet (60 mg) by mouth 3 times daily 90 tablet 1     ranolazine (RANEXA) 1000 MG TB12 12 hr tablet TAKE ONE TABLET BY MOUTH TWICE DAILY 180 tablet 0     sertraline (ZOLOFT) 100 MG tablet Take 1 tablet (100 mg) by mouth daily 90 tablet 1     COMPRESSION STOCKINGS 2 each daily as needed (edema) 2 each 3     VENTOLIN  (90 Base) MCG/ACT inhaler INHALE 2 PUFFS EVERY 4 HOURS AS NEEDED FOR SHORTNESS OF BREATH/DYSPNEA (Patient not taking: Reported on 3/10/2022) 54 g 0       Problem List:  Patient Active Problem List    Diagnosis Date Noted     Chronic stable angina (H) 01/18/2019     Priority: Medium     Intermediate coronary syndrome (H) 07/24/2018     Priority: Medium     ILD (interstitial lung disease) (H) 07/09/2018     Priority: Medium     Needs follow up ct dec 2019        Hyperlipidemia LDL goal <70 05/31/2018     Priority: Medium     Major depressive disorder, recurrent, mild (H) 02/01/2017     Priority: Medium     Acute chest pain 12/13/2016     Priority: Medium     Status post coronary angiogram 02/18/2016     Priority: Medium     Adverse effect of anesthetic 02/11/2016     Priority: Medium     Patient is very sensitive to anesthetics. Needs lower dosing.        Sjogren's syndrome (H) 01/15/2014     Priority: Medium     CAD (coronary artery disease) 09/18/2013     Priority: Medium     Mild ischemia on stress test       Advanced directives, counseling/discussion 01/12/2012     Priority: Medium     Advance Directive Problem List Overview:   Name Relationship Phone    Primary Health Care Agent            Alternative Health Care Agent          Discussed advance care planning with patient; information given to patient to review.     Daija Black CMA, HC Facilitator    1/12/2012          Panniculitis 11/10/2011     Priority: Medium     Health Care Home 06/15/2011     Priority: Medium     X  DX V65.8 REPLACED WITH 45498 HEALTH CARE HOME (04/08/2013)       Episodic mood disorder (H) 11/26/2007     Priority: Medium     November 26, 2007 - Prozac and will look into light box.   April 15, 2008 - Will stop for summer. Light box rx.  Problem list name updated by automated process. Provider to review       Dermatophytosis of body 09/12/2007     Priority: Medium     September 12, 2007 - Classic appearance and worsening in areas's not using Nystatin.  Will use everywhere or change to clotrimazole.   April 15, 2008 - Change to powder.   Problem list name updated by automated process. Provider to review       DIZZINESS - LIKELY HYPOGLYCEMIA 07/27/2006     Priority: Medium     July 24, 2008- negative Event monitor and GTT showed some elevation.  Dietary changes.             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     Past Surgical History:   Procedure Laterality Date     C/SECTION, LOW TRANSVERSE  10/13/1978    , Low Transverse     COLONOSCOPY       OPEN REDUCTION INTERNAL FIXATION ANKLE Right 2019    Procedure: Open reduction internal fixation right lateral malleolus fracture;  Surgeon: Rolo Oconnor DPM;  Location: [facility]     SURGICAL HISTORY OF -            SURGICAL HISTORY OF -   00    Colonoscopy       Social History:  Social History     Socioeconomic History     Marital status:      Spouse name: Not on file     Number of children: Not on file     Years of education: Not on file     Highest education level: Not on file   Occupational History     Not on file   Tobacco Use     Smoking status: Never Smoker     Smokeless tobacco: Never Used   Substance and Sexual Activity     Alcohol use: Yes     Alcohol/week: 0.0 standard drinks     Comment: 1 glass of wine every 2 weeks     Drug use: No     Sexual activity: Yes     Partners: Male   Other Topics Concern     Parent/sibling w/ CABG, MI or angioplasty before 65F 55M? No   Social History Narrative     Not on file     Social Determinants of Health     Financial Resource Strain: Not on file   Food Insecurity: Not on file   Transportation Needs: Not on file   Physical Activity: Not on file   Stress: Not on file   Social Connections: Not on file   Intimate Partner Violence: Not on file   Housing Stability: Not on file       Family History:  Family History   Problem Relation Age of Onset     Cancer Mother         Breast and liver- age 43     Heart Disease Father         ? chf?h/o CVA     Alzheimer Disease Father      Hypertension Father      Neurologic Disorder Father         CVA     Diabetes Maternal Grandmother   "    Breast Cancer Maternal Aunt      Breast Cancer Paternal Aunt      Cancer - colorectal No family hx of      Prostate Cancer No family hx of        Review of Systems:  CONSTITUTIONAL: NEGATIVE for weight gain/loss, fever, chills, sweats or night sweats, drug allergies.  EYES: NEGATIVE for changes in vision, blind spots, double vision.  ENT: NEGATIVE for ear pain, sore throat, sinus pain, post-nasal drip, runny nose, bloody nose  CARDIAC: NEGATIVE for fast heartbeats or fluttering in chest, chest pain or pressure, breathlessness when lying flat  CARDIAC:  POSITIVE for  swollen legs and swollen feet  NEUROLOGIC: NEGATIVE headaches, weakness or numbness in the arms or legs.  DERMATOLOGIC: NEGATIVE for rashes, new moles or change in mole(s)  PULMONARY: NEGATIVE SOB at rest, dry cough, productive cough, coughing up blood, wheezing or whistling when breathing.    PULMONARY:  POSITIVE for  SOB with activity  GASTROINTESTINAL: NEGATIVE for nausea or vomitting, fat or grease in stools, constipation, abdominal pain, bowel movements black in color or blood noted.  GASTROINTESTINAL:  POSITIVE for  loose or watery stools  GENITOURINARY: NEGATIVE for pain during urination, blood in urine, urinating more frequently than usual, irregular menstrual periods.  MUSCULOSKELETAL: NEGATIVE for swollen joints.  MUSCULOSKELETAL:  POSITIVE for  muscle pain and bone or joint pain  ENDOCRINE: NEGATIVE for increased thirst or urination, diabetes.  LYMPHATIC: NEGATIVE for swollen lymph nodes, lumps or bumps in the breasts or nipple discharge.      Physical Examination:  Vitals: Ht 1.676 m (5' 6\")   Wt 84.8 kg (187 lb)   BMI 30.18 kg/m    BMI= Body mass index is 30.18 kg/m .           GENERAL APPEARANCE: healthy, alert, no distress and cooperative  RESP: Unlabored, easy breathing with normal conversational speech  NEURO: Alert and oriented x3, mentation intact and speech normal  PSYCH: mentation appears normal and affect " normal/bright  Mallampati Class: Unable to examine  Tonsillar Stage: Unable to examine         Data: All pertinent previous laboratory data reviewed     Recent Labs   Lab Test 02/16/22  1740 01/20/22  1026    139   POTASSIUM 4.3 4.3   CHLORIDE 107 108   CO2 25 38*   ANIONGAP 5 <1*   GLC 96 99   BUN 27 22   CR 0.59 0.56   SARI 9.1 9.0       Recent Labs   Lab Test 01/20/22  1026   WBC 5.7   RBC 4.14   HGB 13.0   HCT 39.8   MCV 96   MCH 31.4   MCHC 32.7   RDW 13.1          Recent Labs   Lab Test 02/16/22  1740   PROTTOTAL 7.3   ALBUMIN 4.0   BILITOTAL 0.4   ALKPHOS 67   AST 30   ALT 44       No results found for: PH, PHARTERIAL, PO2, [id number], SAT, PCO2, HCO3, BASEEXCESS, SHAWNA, BEB    @LABRCNTIPR(phv:4,pco2v:4,po2v:4,hco3v:4,lenard:4,o2per:4)@    TSH (mU/L)   Date Value   01/20/2022 1.87   03/22/2017 2.41   09/15/2015 2.03       No results found for: UAMP, UBARB, BENZODIAZEUR, UCANN, UCOC, OPIT, UPCP    Ferritin   Date/Time Value Ref Range Status   03/11/2015 11:03  8 - 252 ng/mL Final     Iron   Date/Time Value Ref Range Status   03/11/2015 11:03 AM 81 35 - 180 ug/dL Final       Echocardiology: No results found for this or any previous visit (from the past 4320 hour(s)).  6/30/2021 Echocardiogram Complete  Complete Echo Adult.  Interpretation Summary     Left ventricular systolic function is normal.  The visual ejection fraction is 60-65%.  There is mild concentric left ventricular hypertrophy.  The right ventricle is normal in structure, function and size.  No significant valve dysfunction.  The inferior vena cava was normal in size with preserved respiratory variability.  There is no pericardial effusion.     No change from prior study 4/18/2013.    Chest x-ray: No results found for this or any previous visit from the past 365 days.      Chest CT: No results found for this or any previous visit from the past 365 days.  High-resolution CT Chest without contrast, 9/17/2019 2:39 PM      History: Lung  nodule (Pulmonary nodule); ILD (interstitial lung  disease) (H)     Comparison: 12/2/2014     TECHNIQUE: Helical acquisition of CT images during inspiration from  the lung apices to the kidneys without IV contrast. Additional select  axial images were obtained through the chest during expiration.  Coronal images and axial MIP images were reconstructed from the source  data.     FINDINGS:      Chest: Stable subcentimeter hypoattenuating density in the posterior  left lobe of the thyroid. Heart size is within normal limits. There is  no pericardial effusion. Minimal calcifications of the aortic root.  Normal thoracic vasculature. No significant mediastinal, hilum or  axillary lymphadenopathy. Tiny calcified subcarinal and hilar lymph  nodes.     Lungs: Central tracheobronchial tree is patent. Numerous thin-walled  cysts of varying sizes throughout the bilateral lungs, not  significantly changed since 12/2/2014. 5 mm solid pulmonary nodule in  the left upper lobe (series 5 image 129), stable. Solid 2 mm nodule in  the superior lower lobe (series 5 image 126), stable. Calcified  granuloma in the right lung base. No significant pulmonary nodules. No  pleural effusion or pneumothorax. Expiratory images demonstrate no  significant air trapping.     Nodular thickening of the left adrenal gland with Hounsfield units  measuring less than 0.  Scattered colonic diverticula without evidence  of acute diverticulitis. No acute osseus abnormality or suspicious  bony lesion. Subpleural fatty lesion in the left fifth intercostal  space, stable (series 3 image 19).                                                                      IMPRESSION:   1. Numerous thin-walled pulmonary cysts of varying sizes, not  significantly changed from 12/2/2014. Findings consistent with  lymphocytic interstitial pneumonia.  2. Scattered 5 mm pulmonary nodules, not significantly changed since  2014, statistically benign.  3. Nodular thickening of the  left adrenal gland, favors small adrenal  adenoma.      PFT: Most Recent [facility] Pulmonary Function Testing    FVC-Pred   Date Value Ref Range Status   10/25/2021 2.89 L      FVC-Pre   Date Value Ref Range Status   10/25/2021 2.81 L      FVC-%Pred-Pre   Date Value Ref Range Status   10/25/2021 97 %      FEV1-Pre   Date Value Ref Range Status   10/25/2021 2.31 L      FEV1-%Pred-Pre   Date Value Ref Range Status   10/25/2021 103 %      FEV1FVC-Pred   Date Value Ref Range Status   10/25/2021 78 %      FEV1FVC-Pre   Date Value Ref Range Status   10/25/2021 82 %      No results found for:   FEFMax-Pred   Date Value Ref Range Status   10/25/2021 5.65 L/sec      FEFMax-Pre   Date Value Ref Range Status   10/25/2021 5.85 L/sec      FEFMax-%Pred-Pre   Date Value Ref Range Status   10/25/2021 103 %      ExpTime-Pre   Date Value Ref Range Status   10/25/2021 7.50 sec      FIFMax-Pre   Date Value Ref Range Status   10/25/2021 5.59 L/sec      FEV1FEV6-Pred   Date Value Ref Range Status   10/25/2021 79 %      FEV1FEV6-Pre   Date Value Ref Range Status   10/25/2021 83 %      Narrative  The FVC, FEV1, FEV1/FVC ratio and QNS33-17% are within normal limits.  The inspiratory flow rates are within normal limits.  Lung volumes are within normal limits.  The diffusing capacity is normal.  However, the diffusing capacity was not corrected for   the patient's hemoglobin.     The results are within normal limits.     IMPRESSION:     Normal Pulmonary Function     Compared with testing from  10/2/2020 there has been no significant change in the FEV1 or FVC.     Laura Flores MD        Other Tests:  Narrative & Impression  EEG Video 2-12 hrs Continuous Monitoring Result     VIDEO EEG DATE: 2021  VIDEO EEG [id number]  VIDEO EEG DAY#: 1  VIDEO EEG SOURCE FILE DURATION: [id number]     PATIENT INFORMATION: Albina Pedro is a 70 year old year old female with PMH notable for possible cognitive impairment.  EEG requested to further  determine if seizures could be a contributing factor to her cognitive decline.       MEDICATIONS: On no AEDs or sedating medicines     TECHNICAL SUMMARY: EEG was recorded from 23 scalp electrodes placed according to the 10-20 international system. Additional electrodes were used for referencing, EKG, and to record from other cerebral regions as appropriate. Video was continuously recorded and was reviewed for clinical correlation. Electrodes were attached and both video and EEG were monitored and annotated by qualified EEG technologists. Video-EEG was reviewed and report generated by qualified physician.     BACKGROUND ACTIVITY:  During wakefulness, the background activity consists of synchronous and symmetric 9-10 Hz posterior dominant rhythm. The posterior dominant rhythm attenuated with eye opening. Stage 1 and 2 sleep were captured with sleep features of vertex waves, K complexes and sleep spindles seen.      ACTIVATION PROCEDURE: Photic stimulation was performed without driving or photoparoxysmal response.  Hyperventilation was not performed secondary to COVID protocols.     INTERICTAL EPILEPTIFORM DISCHARGES: none seen      ICTAL: No electrographic or electroclinical seizures were noted.  Video was reviewed intermittently by EEG technologist and physician for clinical seizures.      IMPRESSION OF VIDEO EEG DAY # 1:  This is a normal awake and sleep video electroencephalogram.   No electrographic seizures or epileptiform discharges were recorded. Clinical correlation is advised.         STEW Villavicencio CNP 3/10/2022   Sleep Medicine      This note was written with the assistance of the Dragon voice-dictation technology software. The final document, although reviewed, may contain errors. For corrections, please contact the office.

## 2022-03-24 ENCOUNTER — TELEPHONE (OUTPATIENT)
Dept: NEUROLOGY | Facility: CLINIC | Age: 72
End: 2022-03-24
Payer: COMMERCIAL

## 2022-03-24 NOTE — TELEPHONE ENCOUNTER
NOTES Status Details   OFFICE NOTE from referring provider Internal 10.25.2021 Perlman, David Morris, MD   OFFICE NOTE from other specialist Internal 03.10.2021    Jr Gómez, APRN CNP        02.21.2022 Rene Manning MD     11.22.2021 Rolo Sandoval MD    More in Epic   MEDICATION LIST Internal    LABS (Any and all labs)      Internal

## 2022-03-25 ENCOUNTER — LAB (OUTPATIENT)
Dept: LAB | Facility: CLINIC | Age: 72
End: 2022-03-25
Attending: INTERNAL MEDICINE
Payer: COMMERCIAL

## 2022-03-25 ENCOUNTER — PRE VISIT (OUTPATIENT)
Dept: RHEUMATOLOGY | Facility: CLINIC | Age: 72
End: 2022-03-25
Payer: COMMERCIAL

## 2022-03-25 ENCOUNTER — OFFICE VISIT (OUTPATIENT)
Dept: RHEUMATOLOGY | Facility: CLINIC | Age: 72
End: 2022-03-25
Attending: INTERNAL MEDICINE
Payer: COMMERCIAL

## 2022-03-25 VITALS
DIASTOLIC BLOOD PRESSURE: 75 MMHG | HEART RATE: 72 BPM | WEIGHT: 190 LBS | SYSTOLIC BLOOD PRESSURE: 125 MMHG | OXYGEN SATURATION: 95 % | TEMPERATURE: 98.4 F | BODY MASS INDEX: 30.67 KG/M2

## 2022-03-25 DIAGNOSIS — M62.81 MUSCLE WEAKNESS: ICD-10-CM

## 2022-03-25 DIAGNOSIS — M35.09 SJOGREN'S SYNDROME WITH OTHER ORGAN INVOLVEMENT (H): ICD-10-CM

## 2022-03-25 DIAGNOSIS — Z11.59 ENCOUNTER FOR SCREENING FOR OTHER VIRAL DISEASES: ICD-10-CM

## 2022-03-25 DIAGNOSIS — Z79.899 OTHER LONG TERM (CURRENT) DRUG THERAPY: ICD-10-CM

## 2022-03-25 DIAGNOSIS — G89.29 CHRONIC MIDLINE THORACIC BACK PAIN: ICD-10-CM

## 2022-03-25 DIAGNOSIS — R53.83 FATIGUE, UNSPECIFIED TYPE: ICD-10-CM

## 2022-03-25 DIAGNOSIS — R63.8 OTHER SYMPTOMS AND SIGNS CONCERNING FOOD AND FLUID INTAKE: ICD-10-CM

## 2022-03-25 DIAGNOSIS — M54.6 CHRONIC MIDLINE THORACIC BACK PAIN: ICD-10-CM

## 2022-03-25 DIAGNOSIS — M62.81 MUSCLE WEAKNESS: Primary | ICD-10-CM

## 2022-03-25 LAB
ALBUMIN SERPL-MCNC: 3.6 G/DL (ref 3.4–5)
ALT SERPL W P-5'-P-CCNC: 31 U/L (ref 0–50)
AST SERPL W P-5'-P-CCNC: 18 U/L (ref 0–45)
BASOPHILS # BLD AUTO: 0 10E3/UL (ref 0–0.2)
BASOPHILS NFR BLD AUTO: 1 %
CK SERPL-CCNC: 244 U/L (ref 30–225)
CREAT SERPL-MCNC: 0.63 MG/DL (ref 0.52–1.04)
CRP SERPL-MCNC: <2.9 MG/L (ref 0–8)
EOSINOPHIL # BLD AUTO: 0.1 10E3/UL (ref 0–0.7)
EOSINOPHIL NFR BLD AUTO: 2 %
ERYTHROCYTE [DISTWIDTH] IN BLOOD BY AUTOMATED COUNT: 13 % (ref 10–15)
ERYTHROCYTE [SEDIMENTATION RATE] IN BLOOD BY WESTERGREN METHOD: 14 MM/HR (ref 0–30)
FERRITIN SERPL-MCNC: 85 NG/ML (ref 8–252)
GFR SERPL CREATININE-BSD FRML MDRD: >90 ML/MIN/1.73M2
HCT VFR BLD AUTO: 38.7 % (ref 35–47)
HGB BLD-MCNC: 12.7 G/DL (ref 11.7–15.7)
IMM GRANULOCYTES # BLD: 0 10E3/UL
IMM GRANULOCYTES NFR BLD: 1 %
IRON SATN MFR SERPL: 20 % (ref 15–46)
IRON SERPL-MCNC: 69 UG/DL (ref 35–180)
LYMPHOCYTES # BLD AUTO: 1.8 10E3/UL (ref 0.8–5.3)
LYMPHOCYTES NFR BLD AUTO: 30 %
MCH RBC QN AUTO: 30.8 PG (ref 26.5–33)
MCHC RBC AUTO-ENTMCNC: 32.8 G/DL (ref 31.5–36.5)
MCV RBC AUTO: 94 FL (ref 78–100)
MONOCYTES # BLD AUTO: 0.4 10E3/UL (ref 0–1.3)
MONOCYTES NFR BLD AUTO: 7 %
NEUTROPHILS # BLD AUTO: 3.6 10E3/UL (ref 1.6–8.3)
NEUTROPHILS NFR BLD AUTO: 59 %
NRBC # BLD AUTO: 0 10E3/UL
NRBC BLD AUTO-RTO: 0 /100
PLATELET # BLD AUTO: 236 10E3/UL (ref 150–450)
RBC # BLD AUTO: 4.12 10E6/UL (ref 3.8–5.2)
TIBC SERPL-MCNC: 344 UG/DL (ref 240–430)
TOTAL PROTEIN SERUM FOR ELP: 7.2 G/DL (ref 6.8–8.8)
VIT B12 SERPL-MCNC: 603 PG/ML (ref 193–986)
WBC # BLD AUTO: 6.1 10E3/UL (ref 4–11)

## 2022-03-25 PROCEDURE — 86704 HEP B CORE ANTIBODY TOTAL: CPT | Performed by: INTERNAL MEDICINE

## 2022-03-25 PROCEDURE — 99000 SPECIMEN HANDLING OFFICE-LAB: CPT | Performed by: PATHOLOGY

## 2022-03-25 PROCEDURE — 86431 RHEUMATOID FACTOR QUANT: CPT | Performed by: INTERNAL MEDICINE

## 2022-03-25 PROCEDURE — 86800 THYROGLOBULIN ANTIBODY: CPT | Performed by: INTERNAL MEDICINE

## 2022-03-25 PROCEDURE — 84460 ALANINE AMINO (ALT) (SGPT): CPT | Performed by: PATHOLOGY

## 2022-03-25 PROCEDURE — 82728 ASSAY OF FERRITIN: CPT | Performed by: PATHOLOGY

## 2022-03-25 PROCEDURE — 82565 ASSAY OF CREATININE: CPT | Performed by: PATHOLOGY

## 2022-03-25 PROCEDURE — 86038 ANTINUCLEAR ANTIBODIES: CPT | Performed by: INTERNAL MEDICINE

## 2022-03-25 PROCEDURE — 84155 ASSAY OF PROTEIN SERUM: CPT | Performed by: INTERNAL MEDICINE

## 2022-03-25 PROCEDURE — 84450 TRANSFERASE (AST) (SGOT): CPT | Performed by: PATHOLOGY

## 2022-03-25 PROCEDURE — 85025 COMPLETE CBC W/AUTO DIFF WBC: CPT | Performed by: PATHOLOGY

## 2022-03-25 PROCEDURE — G0463 HOSPITAL OUTPT CLINIC VISIT: HCPCS

## 2022-03-25 PROCEDURE — 86140 C-REACTIVE PROTEIN: CPT | Performed by: PATHOLOGY

## 2022-03-25 PROCEDURE — 36415 COLL VENOUS BLD VENIPUNCTURE: CPT | Performed by: PATHOLOGY

## 2022-03-25 PROCEDURE — 82595 ASSAY OF CRYOGLOBULIN: CPT | Performed by: INTERNAL MEDICINE

## 2022-03-25 PROCEDURE — 84165 PROTEIN E-PHORESIS SERUM: CPT | Mod: TC | Performed by: PATHOLOGY

## 2022-03-25 PROCEDURE — 82040 ASSAY OF SERUM ALBUMIN: CPT | Performed by: PATHOLOGY

## 2022-03-25 PROCEDURE — 86803 HEPATITIS C AB TEST: CPT | Performed by: INTERNAL MEDICINE

## 2022-03-25 PROCEDURE — 86481 TB AG RESPONSE T-CELL SUSP: CPT | Performed by: INTERNAL MEDICINE

## 2022-03-25 PROCEDURE — 84165 PROTEIN E-PHORESIS SERUM: CPT | Mod: 26 | Performed by: PATHOLOGY

## 2022-03-25 PROCEDURE — 87340 HEPATITIS B SURFACE AG IA: CPT | Performed by: INTERNAL MEDICINE

## 2022-03-25 PROCEDURE — 86235 NUCLEAR ANTIGEN ANTIBODY: CPT | Mod: 90 | Performed by: PATHOLOGY

## 2022-03-25 PROCEDURE — 83516 IMMUNOASSAY NONANTIBODY: CPT | Mod: 90 | Performed by: PATHOLOGY

## 2022-03-25 PROCEDURE — 82085 ASSAY OF ALDOLASE: CPT | Mod: 90 | Performed by: PATHOLOGY

## 2022-03-25 PROCEDURE — 85652 RBC SED RATE AUTOMATED: CPT | Performed by: PATHOLOGY

## 2022-03-25 PROCEDURE — 99215 OFFICE O/P EST HI 40 MIN: CPT | Performed by: INTERNAL MEDICINE

## 2022-03-25 PROCEDURE — 86376 MICROSOMAL ANTIBODY EACH: CPT | Performed by: INTERNAL MEDICINE

## 2022-03-25 PROCEDURE — 82550 ASSAY OF CK (CPK): CPT | Performed by: PATHOLOGY

## 2022-03-25 PROCEDURE — 86200 CCP ANTIBODY: CPT | Performed by: INTERNAL MEDICINE

## 2022-03-25 PROCEDURE — 86160 COMPLEMENT ANTIGEN: CPT | Performed by: INTERNAL MEDICINE

## 2022-03-25 PROCEDURE — 82607 VITAMIN B-12: CPT | Performed by: PATHOLOGY

## 2022-03-25 PROCEDURE — 83550 IRON BINDING TEST: CPT | Performed by: PATHOLOGY

## 2022-03-25 PROCEDURE — 82306 VITAMIN D 25 HYDROXY: CPT | Performed by: INTERNAL MEDICINE

## 2022-03-25 PROCEDURE — 86225 DNA ANTIBODY NATIVE: CPT | Performed by: INTERNAL MEDICINE

## 2022-03-25 RX ORDER — AZATHIOPRINE 50 MG/1
50 TABLET ORAL DAILY
Qty: 30 TABLET | Refills: 1 | Status: SHIPPED | OUTPATIENT
Start: 2022-03-25 | End: 2022-06-08

## 2022-03-25 RX ORDER — GABAPENTIN 100 MG/1
CAPSULE ORAL
Qty: 60 CAPSULE | Refills: 0 | Status: SHIPPED | OUTPATIENT
Start: 2022-03-25 | End: 2022-04-25

## 2022-03-25 RX ORDER — PREDNISONE 5 MG/1
TABLET ORAL
Qty: 50 TABLET | Refills: 0 | Status: SHIPPED | OUTPATIENT
Start: 2022-03-25 | End: 2022-04-25

## 2022-03-25 ASSESSMENT — PAIN SCALES - GENERAL: PAINLEVEL: EXTREME PAIN (8)

## 2022-03-25 NOTE — LETTER
3/25/2022      RE: Albina Pedro  [id number] [facility] [place] [address]       Chief Complaint   Patient presents with     Consult     New Patient Consult - Sjogrens Syndrome     Referring provider: David Perlman MD    DOS: 3/25//2022      ASSESSMENT AND PLAN:  Albina Pedro 71 y.o. female with long standing h/o seropositive Sjogren's causing ILD, inflammatory myositis, R thigh panniculitis who presents for worsening joint pain, muscle weakness and deconditioning over past 2 years. Although she has OA and her ILD seems to be stable, it seems like she was doing well in the past while taking imuran (AZA). She was on AZA 8639-9046 and was tapered off slowly as was doing well. Max AZA dose was 100 mg every day and last dose was 50 mg every other day. She tolerated AZA in the past with no SEs. Her ILD has not worsened off AZA. No flare of rash.  I recommend her to resume taking AZA while doing additional work up. Plus, I recommend a course of prednisone taper. We discussed benefits and risks of meds, Kristina agreed to our plan of care. She wants to come off gabapentin as it is not helping her, will taper it off slowly. We discussed covid vaccination. We discussed Sjogren's Dx, mx of sicca.    Plan:    Prednisone 4tab=20 mg qd x 5 days, 3tab=15 mg qd x 5 days, 2tab=10 mg qd x 5 days, 1 tab=5 mg qd x 5 days then stop    Imuran 50 mg a day    Labs today and imuran monitoring labs in 4 weeks    Gabapentin 100 mg twice a day x 1 week, 100 mg a day x 1 week then stop    Pfizer booster sometime late summer/early fall    Return in person 6/3 at noon add on      Orders Placed This Encounter   Procedures     ALT     Albumin level     AST     Creatinine     Anti Nuclear Darlin IgG by IFA with Reflex     Anti thyroglobulin antibody     CK total     Aldolase     Complement C3     Complement C4     CRP inflammation     Cryoglobulin quantitative     Cyclic Citrullinated Peptide Antibody IgG     DNA double stranded  antibodies     Vitamin D Deficiency     Vitamin B12     Thyroid peroxidase antibody     Rheumatoid factor     Hepatitis C antibody     Hepatitis B surface antigen     Erythrocyte sedimentation rate auto     Iron and iron binding capacity     Ferritin     Hepatitis B core antibody     Myositis Panel     CBC with Platelets & Differential     Protein electrophoresis     Quantiferon TB Gold Plus     Paul Mark MD      HISTORY OF PRESENTING ILLNESS:      Today 3/25/2022:    Kristina is a 70 yo female who presents today with her . She has been referred for m/o Sjogren's. She has h/o +SSA/SSB Sjogren's diagnosed in 2004 after having sicca x 20 yr followed by muscle weakness, inflammatory arthritis, L thigh panniculitis, ILD. Was treated with prednisone, did not tolerate MTX. Was on AZA 5930-5375. Had +BINDU, +RF.    She was last seen by Dr. Moore in 1/2020 and before that, was under care of Dr. Ho who diagnosed and treated her since 2004.    She is here to discuss her joint pain.    She has diffuse arthralgia, no joint swelling, no AM stiffness, reports loss of strength of her hands with poor .    She has arthralgia, it got worse and went downhill for past 2 years.    Sometimes gets muscle weakness, more constant lately over past few months.    Has brain fog, getting worse.    Has progressive hair loss with bald spots.    No photosensitivity or skin rashes.    Uses biotene and water, wakes up thirsty. Had a tooth which broke, sometimes teeth are chipping. Has dry mouth due to Sjogren's.    Has dry eyes, uses OTC eyedrops.    Gets R droopy eyelid, now affected L eyelid.    Eyes look red ad dry.    No parotid gland swelling.    No CP, SOB, cough, fevers or night sweats.    Gets diarrhea from certain foods.    No numbness, tingling.    Has fatigue, difficult time staying awake.    She did well on her cognitive function testing.    Mestinon caused diarrhea and stopped taking it, scheduled to have EMG and do  follow up with Dr. Manning.    Updated on cancer screening.    No dysphagia.    ROS: A comprehensive ROS was done. Positives are per HPI.    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       Past Surgical History:   Procedure Laterality Date     C/SECTION, LOW TRANSVERSE  10/13/1978    , Low Transverse     COLONOSCOPY       OPEN REDUCTION INTERNAL FIXATION ANKLE Right 2019    Procedure: Open reduction internal fixation right lateral malleolus fracture;  Surgeon: Rolo Oconnor DPM;  Location: [facility]     SURGICAL HISTORY OF -            SURGICAL HISTORY OF -   00    Colonoscopy       Family History   Problem Relation Age of Onset     Cancer Mother         Breast and liver- age 43     Heart Disease Father         ? chf?h/o CVA     Alzheimer Disease Father      Hypertension Father      Neurologic Disorder Father         CVA     Diabetes Maternal Grandmother      Breast Cancer Maternal Aunt      Breast Cancer Paternal Aunt      Cancer - colorectal No family hx of      Prostate Cancer No family hx of        Social History     Tobacco Use     Smoking status: Never Smoker     Smokeless tobacco: Never Used   Substance Use Topics     Alcohol use: Yes     Alcohol/week: 0.0 standard drinks     Comment: 1 glass of wine every 2 weeks     Outpatient Encounter Medications as of 3/25/2022   Medication Sig Dispense Refill     aspirin (ASA) 81 MG EC tablet Take 81 mg by mouth daily        azaTHIOprine (IMURAN) 50 MG tablet Take 1 tablet (50 mg) by mouth daily 30 tablet 1     COMPRESSION STOCKINGS 2 each daily as needed (edema) 2 each 3     gabapentin (NEURONTIN) 100 MG capsule 100 mg twice a day x 1 week, 100 mg a day x 1 week then  stop 60 capsule 0     LORazepam (ATIVAN) 0.5 MG tablet TAKE ONE TABLET BY MOUTH EVERY EIGHT HOURS AS NEEDED FOR ANXIETY 20 tablet 0     Multiple Vitamins-Minerals (MULTIVITAMIN & MINERAL PO) Take 1 tablet by mouth daily        pravastatin (PRAVACHOL) 40 MG tablet Take 1 tablet (40 mg) by mouth daily 90 tablet 3     predniSONE (DELTASONE) 5 MG tablet 4tab=20 mg qd x 5 days, 3tab=15 mg qd x 5 days, 2tab=10 mg qd x 5 days, 1 tab=5 mg qd x 5 days then stop 50 tablet 0     ranolazine (RANEXA) 1000 MG TB12 12 hr tablet TAKE ONE TABLET BY MOUTH TWICE DAILY 180 tablet 0     sertraline (ZOLOFT) 100 MG tablet Take 1 tablet (100 mg) by mouth daily 90 tablet 1     pyridostigmine (MESTINON) 60 MG tablet Take 1 tablet (60 mg) by mouth 3 times daily (Patient not taking: Reported on 3/25/2022) 90 tablet 1     VENTOLIN  (90 Base) MCG/ACT inhaler INHALE 2 PUFFS EVERY 4 HOURS AS NEEDED FOR SHORTNESS OF BREATH/DYSPNEA (Patient not taking: Reported on 3/10/2022) 54 g 0     [DISCONTINUED] gabapentin (NEURONTIN) 100 MG capsule Take 1 capsule (100 mg) by mouth 3 times daily 90 capsule 0     No facility-administered encounter medications on file as of 3/25/2022.       Allergies   Allergen Reactions     Daypro [Oxaprozin] Rash            Lidocaine Other (See Comments)     Rapid heart rate     Nitroglycerin Other (See Comments)     Causes low blood pressure     Penicillins Unknown     Occurred as child.     Sulfa Drugs Rash     Physical exam:    /75 (BP Location: Right arm, Patient Position: Sitting, Cuff Size: Adult Regular)   Pulse 72   Temp 98.4  F (36.9  C) (Oral)   Wt 86.2 kg (190 lb)   SpO2 95%   BMI 30.67 kg/m         Constitutional: NAD. pleasant.  present.  HEENT: EOMI, no scleral icterus. Nl conj. No oral ulcers, poor salivary pool, No adenopathy or thyromegaly.   Cardiovascular: RRR, no M/R/G, trace LE edema  Respiratory: CTA b/l  Gastrointestinal:  soft, non-tender to palpation.    Musculoskeletal: No  active synovitis. + joint tenderness over R MCPs  Skin: no rash  Neurological system: A&O x 3, proximal muscle power 4/5, needs assistance getting out of chair and going to bed    Component      Latest Ref Rng & Units 6/17/2019   WBC      4.0 - 11.0 10e9/L 5.6   RBC Count      3.8 - 5.2 10e12/L 4.19   Hemoglobin      11.7 - 15.7 g/dL 13.0   Hematocrit      35.0 - 47.0 % 39.8   MCV      78 - 100 fl 95   MCH      26.5 - 33.0 pg 31.0   MCHC      31.5 - 36.5 g/dL 32.7   RDW      10.0 - 15.0 % 13.1   Platelet Count      150 - 450 10e9/L 230   % Neutrophils      % 56.3   % Lymphocytes      % 31.4   % Monocytes      % 9.6   % Eosinophils      % 2.3   % Basophils      % 0.4   Absolute Neutrophil      1.6 - 8.3 10e9/L 3.2   Absolute Lymphocytes      0.8 - 5.3 10e9/L 1.8   Absolute Monocytes      0.0 - 1.3 10e9/L 0.5   Absolute Eosinophils      0.0 - 0.7 10e9/L 0.1   Absolute Basophils      0.0 - 0.2 10e9/L 0.0   Diff Method       Automated Method   Sodium      133 - 144 mmol/L 140   Potassium      3.4 - 5.3 mmol/L 5.0   Chloride      94 - 109 mmol/L 106   Carbon Dioxide      20 - 32 mmol/L 31   Anion Gap      3 - 14 mmol/L 3   Glucose      70 - 99 mg/dL 96   Urea Nitrogen      7 - 30 mg/dL 17   Creatinine      0.52 - 1.04 mg/dL 0.57   GFR Estimate      >60 mL/min/1.73:m2 >90   GFR Estimate If Black      >60 mL/min/1.73:m2 >90   Calcium      8.5 - 10.1 mg/dL 9.1   Bilirubin Total      0.2 - 1.3 mg/dL 0.5   Albumin      3.4 - 5.0 g/dL 3.8   Protein Total      6.8 - 8.8 g/dL 7.4   Alkaline Phosphatase      40 - 150 U/L 59   ALT      0 - 50 U/L 27   AST      0 - 45 U/L 20   Cholesterol      <200 mg/dL 150   Triglycerides      <150 mg/dL 168 (H)   HDL Cholesterol      >49 mg/dL 70   LDL Cholesterol Calculated      <100 mg/dL 46   Non HDL Cholesterol      <130 mg/dL 80   Rheumatoid Factor      <20 IU/mL <20   CRP Inflammation      0.0 - 8.0 mg/L <2.9   Vitamin D Deficiency screening      20 - 75 ug/L 48   Vitamin B12      193 -  986 pg/mL 705     Component      Latest Ref Rng & Units 1/16/2020   Ruth-1 Autoantibodies      <20 EU 0   Scl-70 Autoantibodies      <20 EU 0   Sm (Vergara) Autoantibodies      <20 EU 1   Sm/RNP Autoantibodies      <20 EU 2   SS-A/Ro Autoantibodies      <20  (H)   SS-B/La Autoantibodies      <20  (H)   Complement C4      19 - 59 mg/dL 27   Complement C3      83 - 177 mg/dL 139   DNA (ds) Antibody      <25 IU 3       Component      Latest Ref Rng & Units 1/20/2022 2/16/2022 2/28/2022   WBC      4.0 - 11.0 10e3/uL 5.7     RBC Count      3.80 - 5.20 10e6/uL 4.14     Hemoglobin      11.7 - 15.7 g/dL 13.0     Hematocrit      35.0 - 47.0 % 39.8     MCV      78 - 100 fL 96     MCH      26.5 - 33.0 pg 31.4     MCHC      31.5 - 36.5 g/dL 32.7     RDW      10.0 - 15.0 % 13.1     Platelet Count      150 - 450 10e3/uL 223     % Neutrophils      % 57     % Lymphocytes      % 30     % Monocytes      % 10     % Eosinophils      % 3     % Basophils      % 0     Absolute Neutrophils      1.6 - 8.3 10e3/uL 3.3     Absolute Lymphocytes      0.8 - 5.3 10e3/uL 1.7     Absolute Monocytes      0.0 - 1.3 10e3/uL 0.6     Absolute Eosinophils      0.0 - 0.7 10e3/uL 0.2     Absolute Basophils      0.0 - 0.2 10e3/uL 0.0     Sodium      133 - 144 mmol/L 139 137    Potassium      3.4 - 5.3 mmol/L 4.3 4.3    Chloride      94 - 109 mmol/L 108 107    Carbon Dioxide      20 - 32 mmol/L 38 (H) 25    Anion Gap      3 - 14 mmol/L <1 (L) 5    Urea Nitrogen      7 - 30 mg/dL 22 27    Creatinine      0.52 - 1.04 mg/dL 0.56 0.59    Calcium      8.5 - 10.1 mg/dL 9.0 9.1    Glucose      70 - 99 mg/dL 99 96    Alkaline Phosphatase      40 - 150 U/L 63 67    AST      0 - 45 U/L 20 30    ALT      0 - 50 U/L 33 44    Protein Total      6.8 - 8.8 g/dL 7.4 7.3    Albumin      3.4 - 5.0 g/dL 3.5 4.0    Bilirubin Total      0.2 - 1.3 mg/dL 0.5 0.4    GFR Estimate      >60 mL/min/1.73m2 >90 >90    Color Urine      Colorless, Straw, Light Yellow, Yellow Yellow      Appearance Urine      Clear Clear     Glucose Urine      Negative mg/dL Negative     Bilirubin Urine      Negative Negative     Ketones Urine      Negative mg/dL Negative     Specific Gravity Urine      1.003 - 1.035 >=1.030     Blood Urine      Negative Negative     pH Urine      5.0 - 7.0 5.0     Protein Albumin Urine      Negative mg/dL Negative     Urobilinogen Urine      0.2, 1.0 E.U./dL 0.2     Nitrite Urine      Negative Negative     Leukocyte Esterase Urine      Negative Negative     TSH      0.40 - 4.00 mU/L 1.87     AcetChol Binding Darlin      0.0 - 0.4 nmol/L   0.0   AcetChol Modul Darlin      <=45 %   4       Paul Mark MD

## 2022-03-25 NOTE — PROGRESS NOTES
Chief Complaint   Patient presents with     Consult     New Patient Consult - Sjogrens Syndrome     Referring provider: David Perlman MD    DOS: 3/25//2022      ASSESSMENT AND PLAN:  Albina Pedro 71 y.o. female with long standing h/o seropositive Sjogren's causing ILD, inflammatory myositis, R thigh panniculitis who presents for worsening joint pain, muscle weakness and deconditioning over past 2 years. Although she has OA and her ILD seems to be stable, it seems like she was doing well in the past while taking imuran (AZA). She was on AZA 3650-6539 and was tapered off slowly as was doing well. Max AZA dose was 100 mg every day and last dose was 50 mg every other day. She tolerated AZA in the past with no SEs. Her ILD has not worsened off AZA. No flare of rash.  I recommend her to resume taking AZA while doing additional work up. Plus, I recommend a course of prednisone taper. We discussed benefits and risks of meds, Kristina agreed to our plan of care. She wants to come off gabapentin as it is not helping her, will taper it off slowly. We discussed covid vaccination. We discussed Sjogren's Dx, mx of sicca.    Plan:    Prednisone 4tab=20 mg qd x 5 days, 3tab=15 mg qd x 5 days, 2tab=10 mg qd x 5 days, 1 tab=5 mg qd x 5 days then stop    Imuran 50 mg a day    Labs today and imuran monitoring labs in 4 weeks    Gabapentin 100 mg twice a day x 1 week, 100 mg a day x 1 week then stop    Pfizer booster sometime late summer/early fall    Return in person 6/3 at noon add on      Orders Placed This Encounter   Procedures     ALT     Albumin level     AST     Creatinine     Anti Nuclear Darlin IgG by IFA with Reflex     Anti thyroglobulin antibody     CK total     Aldolase     Complement C3     Complement C4     CRP inflammation     Cryoglobulin quantitative     Cyclic Citrullinated Peptide Antibody IgG     DNA double stranded antibodies     Vitamin D Deficiency     Vitamin B12     Thyroid peroxidase antibody     Rheumatoid  factor     Hepatitis C antibody     Hepatitis B surface antigen     Erythrocyte sedimentation rate auto     Iron and iron binding capacity     Ferritin     Hepatitis B core antibody     Myositis Panel     CBC with Platelets & Differential     Protein electrophoresis     Quantiferon TB Gold Plus     Paul Mark MD      HISTORY OF PRESENTING ILLNESS:      Today 3/25/2022:    Kristina is a 72 yo female who presents today with her . She has been referred for m/o Sjogren's. She has h/o +SSA/SSB Sjogren's diagnosed in 2004 after having sicca x 20 yr followed by muscle weakness, inflammatory arthritis, L thigh panniculitis, ILD. Was treated with prednisone, did not tolerate MTX. Was on AZA 7141-8121. Had +BINDU, +RF.    She was last seen by Dr. Moore in 1/2020 and before that, was under care of Dr. Ho who diagnosed and treated her since 2004.    She is here to discuss her joint pain.    She has diffuse arthralgia, no joint swelling, no AM stiffness, reports loss of strength of her hands with poor .    She has arthralgia, it got worse and went downhill for past 2 years.    Sometimes gets muscle weakness, more constant lately over past few months.    Has brain fog, getting worse.    Has progressive hair loss with bald spots.    No photosensitivity or skin rashes.    Uses biotene and water, wakes up thirsty. Had a tooth which broke, sometimes teeth are chipping. Has dry mouth due to Sjogren's.    Has dry eyes, uses OTC eyedrops.    Gets R droopy eyelid, now affected L eyelid.    Eyes look red ad dry.    No parotid gland swelling.    No CP, SOB, cough, fevers or night sweats.    Gets diarrhea from certain foods.    No numbness, tingling.    Has fatigue, difficult time staying awake.    She did well on her cognitive function testing.    Mestinon caused diarrhea and stopped taking it, scheduled to have EMG and do follow up with Dr. Manning.    Updated on cancer screening.    No dysphagia.    ROS: A  comprehensive ROS was done. Positives are per HPI.    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       Past Surgical History:   Procedure Laterality Date     C/SECTION, LOW TRANSVERSE  10/13/1978    , Low Transverse     COLONOSCOPY       OPEN REDUCTION INTERNAL FIXATION ANKLE Right 2019    Procedure: Open reduction internal fixation right lateral malleolus fracture;  Surgeon: Rolo Oconnor DPM;  Location: [facility]     SURGICAL HISTORY OF -            SURGICAL HISTORY OF -   00    Colonoscopy       Family History   Problem Relation Age of Onset     Cancer Mother         Breast and liver- age 43     Heart Disease Father         ? chf?h/o CVA     Alzheimer Disease Father      Hypertension Father      Neurologic Disorder Father         CVA     Diabetes Maternal Grandmother      Breast Cancer Maternal Aunt      Breast Cancer Paternal Aunt      Cancer - colorectal No family hx of      Prostate Cancer No family hx of        Social History     Tobacco Use     Smoking status: Never Smoker     Smokeless tobacco: Never Used   Substance Use Topics     Alcohol use: Yes     Alcohol/week: 0.0 standard drinks     Comment: 1 glass of wine every 2 weeks     Outpatient Encounter Medications as of 3/25/2022   Medication Sig Dispense Refill     aspirin (ASA) 81 MG EC tablet Take 81 mg by mouth daily        azaTHIOprine (IMURAN) 50 MG tablet Take 1 tablet (50 mg) by mouth daily 30 tablet 1     COMPRESSION STOCKINGS 2 each daily as needed (edema) 2 each 3     gabapentin (NEURONTIN) 100 MG capsule 100 mg twice a day x 1 week, 100 mg a day x 1 week then stop 60 capsule 0     LORazepam (ATIVAN) 0.5 MG tablet TAKE ONE TABLET BY MOUTH EVERY EIGHT  HOURS AS NEEDED FOR ANXIETY 20 tablet 0     Multiple Vitamins-Minerals (MULTIVITAMIN & MINERAL PO) Take 1 tablet by mouth daily        pravastatin (PRAVACHOL) 40 MG tablet Take 1 tablet (40 mg) by mouth daily 90 tablet 3     predniSONE (DELTASONE) 5 MG tablet 4tab=20 mg qd x 5 days, 3tab=15 mg qd x 5 days, 2tab=10 mg qd x 5 days, 1 tab=5 mg qd x 5 days then stop 50 tablet 0     ranolazine (RANEXA) 1000 MG TB12 12 hr tablet TAKE ONE TABLET BY MOUTH TWICE DAILY 180 tablet 0     sertraline (ZOLOFT) 100 MG tablet Take 1 tablet (100 mg) by mouth daily 90 tablet 1     pyridostigmine (MESTINON) 60 MG tablet Take 1 tablet (60 mg) by mouth 3 times daily (Patient not taking: Reported on 3/25/2022) 90 tablet 1     VENTOLIN  (90 Base) MCG/ACT inhaler INHALE 2 PUFFS EVERY 4 HOURS AS NEEDED FOR SHORTNESS OF BREATH/DYSPNEA (Patient not taking: Reported on 3/10/2022) 54 g 0     [DISCONTINUED] gabapentin (NEURONTIN) 100 MG capsule Take 1 capsule (100 mg) by mouth 3 times daily 90 capsule 0     No facility-administered encounter medications on file as of 3/25/2022.       Allergies   Allergen Reactions     Daypro [Oxaprozin] Rash            Lidocaine Other (See Comments)     Rapid heart rate     Nitroglycerin Other (See Comments)     Causes low blood pressure     Penicillins Unknown     Occurred as child.     Sulfa Drugs Rash     Physical exam:    /75 (BP Location: Right arm, Patient Position: Sitting, Cuff Size: Adult Regular)   Pulse 72   Temp 98.4  F (36.9  C) (Oral)   Wt 86.2 kg (190 lb)   SpO2 95%   BMI 30.67 kg/m         Constitutional: NAD. pleasant.  present.  HEENT: EOMI, no scleral icterus. Nl conj. No oral ulcers, poor salivary pool, No adenopathy or thyromegaly.   Cardiovascular: RRR, no M/R/G, trace LE edema  Respiratory: CTA b/l  Gastrointestinal:  soft, non-tender to palpation.    Musculoskeletal: No active synovitis. + joint tenderness over R MCPs  Skin: no rash  Neurological system: A&O x 3,  proximal muscle power 4/5, needs assistance getting out of chair and going to bed    Component      Latest Ref Rng & Units 6/17/2019   WBC      4.0 - 11.0 10e9/L 5.6   RBC Count      3.8 - 5.2 10e12/L 4.19   Hemoglobin      11.7 - 15.7 g/dL 13.0   Hematocrit      35.0 - 47.0 % 39.8   MCV      78 - 100 fl 95   MCH      26.5 - 33.0 pg 31.0   MCHC      31.5 - 36.5 g/dL 32.7   RDW      10.0 - 15.0 % 13.1   Platelet Count      150 - 450 10e9/L 230   % Neutrophils      % 56.3   % Lymphocytes      % 31.4   % Monocytes      % 9.6   % Eosinophils      % 2.3   % Basophils      % 0.4   Absolute Neutrophil      1.6 - 8.3 10e9/L 3.2   Absolute Lymphocytes      0.8 - 5.3 10e9/L 1.8   Absolute Monocytes      0.0 - 1.3 10e9/L 0.5   Absolute Eosinophils      0.0 - 0.7 10e9/L 0.1   Absolute Basophils      0.0 - 0.2 10e9/L 0.0   Diff Method       Automated Method   Sodium      133 - 144 mmol/L 140   Potassium      3.4 - 5.3 mmol/L 5.0   Chloride      94 - 109 mmol/L 106   Carbon Dioxide      20 - 32 mmol/L 31   Anion Gap      3 - 14 mmol/L 3   Glucose      70 - 99 mg/dL 96   Urea Nitrogen      7 - 30 mg/dL 17   Creatinine      0.52 - 1.04 mg/dL 0.57   GFR Estimate      >60 mL/min/1.73:m2 >90   GFR Estimate If Black      >60 mL/min/1.73:m2 >90   Calcium      8.5 - 10.1 mg/dL 9.1   Bilirubin Total      0.2 - 1.3 mg/dL 0.5   Albumin      3.4 - 5.0 g/dL 3.8   Protein Total      6.8 - 8.8 g/dL 7.4   Alkaline Phosphatase      40 - 150 U/L 59   ALT      0 - 50 U/L 27   AST      0 - 45 U/L 20   Cholesterol      <200 mg/dL 150   Triglycerides      <150 mg/dL 168 (H)   HDL Cholesterol      >49 mg/dL 70   LDL Cholesterol Calculated      <100 mg/dL 46   Non HDL Cholesterol      <130 mg/dL 80   Rheumatoid Factor      <20 IU/mL <20   CRP Inflammation      0.0 - 8.0 mg/L <2.9   Vitamin D Deficiency screening      20 - 75 ug/L 48   Vitamin B12      193 - 986 pg/mL 705     Component      Latest Ref Rng & Units 1/16/2020   Ruth-1 Autoantibodies      <20  EU 0   Scl-70 Autoantibodies      <20 EU 0   Sm (Vergara) Autoantibodies      <20 EU 1   Sm/RNP Autoantibodies      <20 EU 2   SS-A/Ro Autoantibodies      <20  (H)   SS-B/La Autoantibodies      <20  (H)   Complement C4      19 - 59 mg/dL 27   Complement C3      83 - 177 mg/dL 139   DNA (ds) Antibody      <25 IU 3       Component      Latest Ref Rng & Units 1/20/2022 2/16/2022 2/28/2022   WBC      4.0 - 11.0 10e3/uL 5.7     RBC Count      3.80 - 5.20 10e6/uL 4.14     Hemoglobin      11.7 - 15.7 g/dL 13.0     Hematocrit      35.0 - 47.0 % 39.8     MCV      78 - 100 fL 96     MCH      26.5 - 33.0 pg 31.4     MCHC      31.5 - 36.5 g/dL 32.7     RDW      10.0 - 15.0 % 13.1     Platelet Count      150 - 450 10e3/uL 223     % Neutrophils      % 57     % Lymphocytes      % 30     % Monocytes      % 10     % Eosinophils      % 3     % Basophils      % 0     Absolute Neutrophils      1.6 - 8.3 10e3/uL 3.3     Absolute Lymphocytes      0.8 - 5.3 10e3/uL 1.7     Absolute Monocytes      0.0 - 1.3 10e3/uL 0.6     Absolute Eosinophils      0.0 - 0.7 10e3/uL 0.2     Absolute Basophils      0.0 - 0.2 10e3/uL 0.0     Sodium      133 - 144 mmol/L 139 137    Potassium      3.4 - 5.3 mmol/L 4.3 4.3    Chloride      94 - 109 mmol/L 108 107    Carbon Dioxide      20 - 32 mmol/L 38 (H) 25    Anion Gap      3 - 14 mmol/L <1 (L) 5    Urea Nitrogen      7 - 30 mg/dL 22 27    Creatinine      0.52 - 1.04 mg/dL 0.56 0.59    Calcium      8.5 - 10.1 mg/dL 9.0 9.1    Glucose      70 - 99 mg/dL 99 96    Alkaline Phosphatase      40 - 150 U/L 63 67    AST      0 - 45 U/L 20 30    ALT      0 - 50 U/L 33 44    Protein Total      6.8 - 8.8 g/dL 7.4 7.3    Albumin      3.4 - 5.0 g/dL 3.5 4.0    Bilirubin Total      0.2 - 1.3 mg/dL 0.5 0.4    GFR Estimate      >60 mL/min/1.73m2 >90 >90    Color Urine      Colorless, Straw, Light Yellow, Yellow Yellow     Appearance Urine      Clear Clear     Glucose Urine      Negative mg/dL Negative      Bilirubin Urine      Negative Negative     Ketones Urine      Negative mg/dL Negative     Specific Gravity Urine      1.003 - 1.035 >=1.030     Blood Urine      Negative Negative     pH Urine      5.0 - 7.0 5.0     Protein Albumin Urine      Negative mg/dL Negative     Urobilinogen Urine      0.2, 1.0 E.U./dL 0.2     Nitrite Urine      Negative Negative     Leukocyte Esterase Urine      Negative Negative     TSH      0.40 - 4.00 mU/L 1.87     AcetChol Binding Darlin      0.0 - 0.4 nmol/L   0.0   AcetChol Modul Darlin      <=45 %   4

## 2022-03-25 NOTE — PATIENT INSTRUCTIONS
Prednisone 4tab=20 mg qd x 5 days, 3tab=15 mg qd x 5 days, 2tab=10 mg qd x 5 days, 1 tab=5 mg qd x 5 days then stop    Imuran 50 mg a day    Labs today and imuran monitoring labs in 4 weeks    Gabapentin 100 mg twice a day x 1 week, 100 mg a day x 1 week then stop    Pfizer booster sometime late summer/early fall    Return in person 6/3 at noon add on

## 2022-03-26 LAB
DEPRECATED CALCIDIOL+CALCIFEROL SERPL-MC: 41 UG/L (ref 20–75)
HBV SURFACE AG SERPL QL IA: NONREACTIVE
HCV AB SERPL QL IA: NONREACTIVE

## 2022-03-27 LAB
ALDOLASE SERPL-CCNC: 3.9 U/L
GAMMA INTERFERON BACKGROUND BLD IA-ACNC: 0.03 IU/ML
M TB IFN-G BLD-IMP: NEGATIVE
M TB IFN-G CD4+ BCKGRND COR BLD-ACNC: 9.97 IU/ML
MITOGEN IGNF BCKGRD COR BLD-ACNC: 0 IU/ML
MITOGEN IGNF BCKGRD COR BLD-ACNC: 0.01 IU/ML
QUANTIFERON MITOGEN: 10 IU/ML
QUANTIFERON NIL TUBE: 0.03 IU/ML
QUANTIFERON TB1 TUBE: 0.04 IU/ML
QUANTIFERON TB2 TUBE: 0.03

## 2022-03-28 LAB
ALBUMIN SERPL ELPH-MCNC: 4.2 G/DL (ref 3.7–5.1)
ALPHA1 GLOB SERPL ELPH-MCNC: 0.3 G/DL (ref 0.2–0.4)
ALPHA2 GLOB SERPL ELPH-MCNC: 0.9 G/DL (ref 0.5–0.9)
B-GLOBULIN SERPL ELPH-MCNC: 0.9 G/DL (ref 0.6–1)
C3 SERPL-MCNC: 142 MG/DL (ref 81–157)
C4 SERPL-MCNC: 26 MG/DL (ref 13–39)
CCP AB SER IA-ACNC: 2.1 U/ML
DSDNA AB SER-ACNC: 1.1 IU/ML
GAMMA GLOB SERPL ELPH-MCNC: 0.9 G/DL (ref 0.7–1.6)
HBV CORE AB SERPL QL IA: NONREACTIVE
M PROTEIN SERPL ELPH-MCNC: 0 G/DL
PROT PATTERN SERPL ELPH-IMP: NORMAL
RHEUMATOID FACT SER NEPH-ACNC: 15 IU/ML
THYROGLOB AB SERPL IA-ACNC: <20 IU/ML
THYROPEROXIDASE AB SERPL-ACNC: <10 IU/ML

## 2022-03-31 ENCOUNTER — OFFICE VISIT (OUTPATIENT)
Dept: NEUROLOGY | Facility: CLINIC | Age: 72
End: 2022-03-31
Attending: PSYCHIATRY & NEUROLOGY
Payer: COMMERCIAL

## 2022-03-31 DIAGNOSIS — M62.81 MUSCLE WEAKNESS (GENERALIZED): ICD-10-CM

## 2022-03-31 DIAGNOSIS — G70.00 MYASTHENIA GRAVIS WITHOUT EXACERBATION (H): ICD-10-CM

## 2022-03-31 DIAGNOSIS — G72.9 MYOPATHY: Primary | ICD-10-CM

## 2022-03-31 PROCEDURE — 95911 NRV CNDJ TEST 9-10 STUDIES: CPT | Performed by: PSYCHIATRY & NEUROLOGY

## 2022-03-31 PROCEDURE — 95937 NEUROMUSCULAR JUNCTION TEST: CPT | Performed by: PSYCHIATRY & NEUROLOGY

## 2022-03-31 PROCEDURE — 95885 MUSC TST DONE W/NERV TST LIM: CPT | Mod: 59 | Performed by: PSYCHIATRY & NEUROLOGY

## 2022-03-31 PROCEDURE — 95886 MUSC TEST DONE W/N TEST COMP: CPT | Performed by: PSYCHIATRY & NEUROLOGY

## 2022-03-31 NOTE — PROGRESS NOTES
[facility]  Electrodiagnostic Laboratory                 Department of Neurology                                                                                                         Test Date:  3/31/2022    Patient: Kristina Pedro : 1950 Physician: Rene Manning MD   Sex: Female AGE: 71 year Ref Phys: Rene Manning MD   ID#: [id number]   Technician: Kristy Behling     Clinical Information:    71 year old female with generalized fatigue and right eyelid ptosis. She has not taken her Mestinon in at least the last week due to intolerance with diarhea. Query myopathy vs neuromuscular junction disorder. Brief exam: bilateral eyelid closure weakness and neck flexion weakness. Double vision on upward gaze and lateral gaze; Right Deltoid 5, Biceps 5, Triceps 4 bilaterally; subtle asymmetry of the finger flexors (very mild weakness on the left, normal on the right).     Techniques:    Motor and sensory conduction studies were done with surface recording electrodes. EMG was done with a concentric needle electrode.     Results:    The right peroneal-EDB motor study showed normal onset latency with low amplitude without segmental changes, and normal conduction velocities.The right tibial-AH, peroneal-TA, median-APB and ulnar-ADM motor studies were normal. The right superficial peroneal, sural, median, ulnar, and radial antidromic sensory studies were normal. The right tibial F wave latencies were normal. 3 Hz slow repetitive nerve stimulation at the right ADM and orbicularis oculi muscles did not show any significant decrement either at rest or after 1 min of maximal isometric exercise.     Needle evaluation of the right Triceps Brachii, Pronator Teres, Biceps, Tibialis anterior, Gastrocnemius (Medial Hd) and vastus lateralis showed abnormal spontaneous activity with fibrillation potentials (1+ to 2+) with CRDs present in the vastus lateralis only. The right  Triceps Brachii, Pronator Teres, First Dorsal Interosseous , Biceps, and vastus lateralis showed small amplitude, short duration, polyphasic motor units with early recruitment in the triceps and biceps muscles.  The right tibialis anterior showed normal recruitment of normal appearing motor units. The right Gastrocnemius (Medial Hd) had normal recruitment of large, long duration motor units. The right deltoid muscle was normal.     Interpretation:    Abnormal study. 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 Please note, however, that this could still be part of a myopathic process because some chronic myopathies may show large, long duration motor unit potentials. There was no electrodiagnostic evidence of a neuromuscular junction disorder like myasthenia gravis.     Comment: The results of the study were discussed with the patient and she was referred for muscle biopsy of the left triceps or biceps.   ___________________________  Millicent Diggs MD, Neuromuscular Fellow  Rene Manning MD        Nerve Conduction Studies  Motor Sites      Latency Amplitude Neg. Amp Diff Segment Distance Velocity Neg. Dur Neg Area Diff Temperature Comment   Site (ms) Norm (mV) Norm %  cm m/s Norm ms %  C    Right Dp Branch Fibular (TA) Motor   Fib Head 2.8  < 4.2 4.2 -      8.4  30.7    Pop Fossa 5.1  < 5.7 4.3 - 2.4 Pop Fossa-Fib Head 10 43 - 8.4 0 30.7    Right Fibular (EDB) Motor   Ankle 2.9  < 6.0 1.09  > 2.0  Ankle-EDB 8   6.2  30.5    Bel Fib Head 11.0 - 0.92 - -15.6 Bel Fib Head-Ankle 38 47  > 38 5.2 -25.0 30.5    Pop Fossa 12.4 - 0.91 - -1.09 Pop Fossa-Bel Fib Head 8 57  > 38 5.2 8.3 30.5    Right Median (APB) Motor   Wrist 3.5  < 4.2 6.0  > 5.0  Wrist-APB 8   4.1  30.6    Elbow 7.8 - 5.1 - -15.0 Elbow-Wrist 22 51  > 48 4.6 -11.9 30.7     Right Tibial (AHB) Motor   Ankle 3.6  < 6.5 4.8  > 4.4  Ankle-AHB 8   5.2  30.8    Knee 13.0 - 3.6 - -25.0 Knee-Ankle 42 45  > 38 5.4 -25.2 30.9    Right Ulnar (ADM) Motor   Wrist 2.8  < 3.5 10.2  > 5.0  Wrist-ADM 8   4.1  31.1    Bel Elbow 6.3 - 9.5 - -6.9 Bel Elbow-Wrist 19 54  > 48 3.9 -11.2 31.2    Abv Elbow 7.8 - 9.5 - 0 Abv Elbow-Bel Elbow 8 53  > 48 4.0 1.05 31.2      Sensory Sites      Onset Lat Peak Lat Amp (O-P) Amp (P-P) Segment Distance Velocity Temperature   Site ms ms  V Norm  V  cm m/s Norm  C   Right Median Sensory   Wrist-Dig II 2.2 2.8 29  > 10 42 Wrist-Dig II 14 64  > 48 30.9   Right Radial Sensory   Forearm-Wrist 1.85 2.4 17  > 15 29 Forearm-Wrist 10 54 - 30.4   Right Superficial Fibular Sensory   14 cm-Ankle 2.6 3.3 4  > 3 2 14 cm-Ankle 12.5 48  > 38 29.1   Right Sural Sensory   Calf-Lat Mall 2.7 3.4 5  > 5 3 Calf-Lat Mall 14 52  > 38 30.5   Right Ulnar Sensory   Wrist-Dig V 1.90 2.5 40  > 8 47 Wrist-Dig V 12.5 66  > 48 31     F Wave Studies     Min-F Max-F Dispersion Persistence Mean-F F-Norm L-R Mean-F L-R Mean-F Norm F/M Ratio F-M Lat (ms)   Right Tibial (Abd Hallucis)  30.8  C   52.03 57.19 5.16 100.00 54.22 <61  <5.7 4.07 46.88     RNS     Trial # Label Amp 1 (mV)  O-P Amp 4 (mV)  O-P Amp % Dif Area 1 (mV ms) Area 4 (mV ms) Area % Dif Rep Rate Train Length Pause Time (min:sec) Comments   Right Abductor Digiti Minimi   Tr 1 Baseline 9.16 9.12 -0.4 21.91 20.88 -4.7 3.00 6 00:30    Tr 2 Post Exercise 9.31 8.99 -3.4 23.07 20.97 -9.1 3.00 6 01:00    Tr 3 1 min Post 8.94 9.04 1.2 21.69 20.70 -4.6 3.00 6 01:00    Tr 4 2 min Post 8.78 8.84 0.6 20.65 19.53 -5.4 3.00 6 01:00    Tr 5 3 min Post 8.61 8.42 -2.2 20.18 18.42 -8.7 3.00 6 00:00    Right Orbicularis Oculi   Tr 1 Baseline 1.87 1.76 -6.3 7.63 7.18 -5.8 3.00 6 00:30    Tr 2 Post Exercise 1.50 1.51 1.2 4.82 4.83 0.2 3.00 6 01:00    Tr 3 1 min Post 1.25 1.28 2.4 3.94 4.11 4.4 3.00 6 01:00    Tr 4 2 min Post 1.89 1.89 -0.4 0.00 0.01 110.8 3.00 6  01:00    5 3 min Post       3.00 6 00:00        Electromyography     Side Muscle Ins Act Fibs/PSW Fasc HF Amp Dur Poly Recrt Int Pat   Right Deltoid Nml None Nml 0 Nml Nml 0 Nml Nml   Right Triceps Nml 1+ Nml 0 1- 2- 2+ Early Nml   Right Pronator Teres Nml 2+ Nml 0 1- 1- 2+ Nml Nml   Right FDI Nml None Nml 0 1- 1- 2+ Nml Nml   Right Biceps Nml 2+ Nml 0 2- 2- 2+ Early Nml   Right Tib Anterior Nml 1+ Nml 0 Nml Nml 0 Nml Nml   Right Gastroc MH Nml 2+ Nml 0 1+ 1+ 0 Nml Nml   Right Vastus Lat Nml 1+ Nml CRD 1- 1- 2+ Nml Nml           NCS Waveforms:    Motor                  Sensory

## 2022-03-31 NOTE — LETTER
3/31/2022       RE: Albina Pedro  [id number]  [facility] [place] [address]     Dear Colleague,    Thank you for referring your patient, Albina Pedro, to the [facility] EMG CLINIC [place] at [facility]. Please see a copy of my visit note below.                              [facility]  Electrodiagnostic Laboratory                 Department of Neurology                                                                                                         Test Date:  3/31/2022    Patient: Kristina Pedro : 1950 Physician: Rene Manning MD   Sex: Female AGE: 71 year Ref Phys: Rene Manning MD   ID#: [id number]   Technician: Kristy Behling     Clinical Information:    71 year old female with generalized fatigue and right eyelid ptosis. She has not taken her Mestinon in at least the last week due to intolerance with diarhea. Query myopathy vs neuromuscular junction disorder. Brief exam: bilateral eyelid closure weakness and neck flexion weakness. Double vision on upward gaze and lateral gaze; Right Deltoid 5, Biceps 5, Triceps 4 bilaterally; subtle asymmetry of the finger flexors (very mild weakness on the left, normal on the right).     Techniques:    Motor and sensory conduction studies were done with surface recording electrodes. EMG was done with a concentric needle electrode.     Results:    The right peroneal-EDB motor study showed normal onset latency with low amplitude without segmental changes, and normal conduction velocities.The right tibial-AH, peroneal-TA, median-APB and ulnar-ADM motor studies were normal. The right superficial peroneal, sural, median, ulnar, and radial antidromic sensory studies were normal. The right tibial F wave latencies were normal. 3 Hz slow repetitive nerve stimulation at the right ADM and orbicularis oculi muscles did not show any significant decrement either at  rest or after 1 min of maximal isometric exercise.     Needle evaluation of the right Triceps Brachii, Pronator Teres, Biceps, Tibialis anterior, Gastrocnemius (Medial Hd) and vastus lateralis showed abnormal spontaneous activity with fibrillation potentials (1+ to 2+) with CRDs present in the vastus lateralis only. The right Triceps Brachii, Pronator Teres, First Dorsal Interosseous , Biceps, and vastus lateralis showed small amplitude, short duration, polyphasic motor units with early recruitment in the triceps and biceps muscles.  The right tibialis anterior showed normal recruitment of normal appearing motor units. The right Gastrocnemius (Medial Hd) had normal recruitment of large, long duration motor units. The right deltoid muscle was normal.     Interpretation:    Abnormal study. 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 Please note, however, that this could still be part of a myopathic process because some chronic myopathies may show large, long duration motor unit potentials. There was no electrodiagnostic evidence of a neuromuscular junction disorder like myasthenia gravis.     Comment: The results of the study were discussed with the patient and she was referred for muscle biopsy of the left triceps or biceps.   ___________________________  Millicent Diggs MD, Neuromuscular Fellow  Rene Manning MD        Nerve Conduction Studies  Motor Sites      Latency Amplitude Neg. Amp Diff Segment Distance Velocity Neg. Dur Neg Area Diff Temperature Comment   Site (ms) Norm (mV) Norm %  cm m/s Norm ms %  C    Right Dp Branch Fibular (TA) Motor   Fib Head 2.8  < 4.2 4.2 -      8.4  30.7    Pop Fossa 5.1  < 5.7 4.3 - 2.4 Pop Fossa-Fib Head 10 43 - 8.4 0 30.7    Right Fibular (EDB) Motor   Ankle 2.9  < 6.0 1.09  > 2.0  Ankle-EDB 8    6.2  30.5    Bel Fib Head 11.0 - 0.92 - -15.6 Bel Fib Head-Ankle 38 47  > 38 5.2 -25.0 30.5    Pop Fossa 12.4 - 0.91 - -1.09 Pop Fossa-Bel Fib Head 8 57  > 38 5.2 8.3 30.5    Right Median (APB) Motor   Wrist 3.5  < 4.2 6.0  > 5.0  Wrist-APB 8   4.1  30.6    Elbow 7.8 - 5.1 - -15.0 Elbow-Wrist 22 51  > 48 4.6 -11.9 30.7    Right Tibial (AHB) Motor   Ankle 3.6  < 6.5 4.8  > 4.4  Ankle-AHB 8   5.2  30.8    Knee 13.0 - 3.6 - -25.0 Knee-Ankle 42 45  > 38 5.4 -25.2 30.9    Right Ulnar (ADM) Motor   Wrist 2.8  < 3.5 10.2  > 5.0  Wrist-ADM 8   4.1  31.1    Bel Elbow 6.3 - 9.5 - -6.9 Bel Elbow-Wrist 19 54  > 48 3.9 -11.2 31.2    Abv Elbow 7.8 - 9.5 - 0 Abv Elbow-Bel Elbow 8 53  > 48 4.0 1.05 31.2      Sensory Sites      Onset Lat Peak Lat Amp (O-P) Amp (P-P) Segment Distance Velocity Temperature   Site ms ms  V Norm  V  cm m/s Norm  C   Right Median Sensory   Wrist-Dig II 2.2 2.8 29  > 10 42 Wrist-Dig II 14 64  > 48 30.9   Right Radial Sensory   Forearm-Wrist 1.85 2.4 17  > 15 29 Forearm-Wrist 10 54 - 30.4   Right Superficial Fibular Sensory   14 cm-Ankle 2.6 3.3 4  > 3 2 14 cm-Ankle 12.5 48  > 38 29.1   Right Sural Sensory   Calf-Lat Mall 2.7 3.4 5  > 5 3 Calf-Lat Mall 14 52  > 38 30.5   Right Ulnar Sensory   Wrist-Dig V 1.90 2.5 40  > 8 47 Wrist-Dig V 12.5 66  > 48 31     F Wave Studies     Min-F Max-F Dispersion Persistence Mean-F F-Norm L-R Mean-F L-R Mean-F Norm F/M Ratio F-M Lat (ms)   Right Tibial (Abd Hallucis)  30.8  C   52.03 57.19 5.16 100.00 54.22 <61  <5.7 4.07 46.88     RNS     Trial # Label Amp 1 (mV)  O-P Amp 4 (mV)  O-P Amp % Dif Area 1 (mV ms) Area 4 (mV ms) Area % Dif Rep Rate Train Length Pause Time (min:sec) Comments   Right Abductor Digiti Minimi   Tr 1 Baseline 9.16 9.12 -0.4 21.91 20.88 -4.7 3.00 6 00:30    Tr 2 Post Exercise 9.31 8.99 -3.4 23.07 20.97 -9.1 3.00 6 01:00    Tr 3 1 min Post 8.94 9.04 1.2 21.69 20.70 -4.6 3.00 6 01:00    Tr 4 2 min Post 8.78 8.84 0.6 20.65 19.53 -5.4 3.00 6 01:00    Tr  5 3 min Post 8.61 8.42 -2.2 20.18 18.42 -8.7 3.00 6 00:00    Right Orbicularis Oculi   Tr 1 Baseline 1.87 1.76 -6.3 7.63 7.18 -5.8 3.00 6 00:30    Tr 2 Post Exercise 1.50 1.51 1.2 4.82 4.83 0.2 3.00 6 01:00    Tr 3 1 min Post 1.25 1.28 2.4 3.94 4.11 4.4 3.00 6 01:00    Tr 4 2 min Post 1.89 1.89 -0.4 0.00 0.01 110.8 3.00 6 01:00    5 3 min Post       3.00 6 00:00        Electromyography     Side Muscle Ins Act Fibs/PSW Fasc HF Amp Dur Poly Recrt Int Pat   Right Deltoid Nml None Nml 0 Nml Nml 0 Nml Nml   Right Triceps Nml 1+ Nml 0 1- 2- 2+ Early Nml   Right Pronator Teres Nml 2+ Nml 0 1- 1- 2+ Nml Nml   Right FDI Nml None Nml 0 1- 1- 2+ Nml Nml   Right Biceps Nml 2+ Nml 0 2- 2- 2+ Early Nml   Right Tib Anterior Nml 1+ Nml 0 Nml Nml 0 Nml Nml   Right Gastroc MH Nml 2+ Nml 0 1+ 1+ 0 Nml Nml   Right Vastus Lat Nml 1+ Nml CRD 1- 1- 2+ Nml Nml           NCS Waveforms:    Motor                  Sensory                                       Rene Manning MD

## 2022-04-01 LAB
ANA PAT SER IF-IMP: ABNORMAL
ANA SER QL IF: POSITIVE
ANA TITR SER IF: ABNORMAL {TITER}

## 2022-04-04 LAB — CRYOGLOB SER QL: NEGATIVE

## 2022-04-06 LAB
ANA SER QL: NEGATIVE
ANNOTATION COMMENT IMP: NORMAL
EJ AB SER QL: NEGATIVE
ENA JO1 IGG SER-ACNC: 0 AU/ML
MDA5 AB SER QL LINE BLOT: NEGATIVE
MI2 AB SER QL: NEGATIVE
MJ AB SER QL LINE BLOT: NEGATIVE
OJ AB SER QL: NEGATIVE
PL12 AB SER QL: NEGATIVE
PL7 AB SER QL: NEGATIVE
SAE1 AB SER QL LINE BLOT: NEGATIVE
SRP AB SERPL QL: NEGATIVE
TIF1-GAMMA AB SER QL LINE BLOT: NEGATIVE

## 2022-04-19 DIAGNOSIS — Z11.59 ENCOUNTER FOR SCREENING FOR OTHER VIRAL DISEASES: Primary | ICD-10-CM

## 2022-04-23 ENCOUNTER — HEALTH MAINTENANCE LETTER (OUTPATIENT)
Age: 72
End: 2022-04-23

## 2022-04-25 ENCOUNTER — OFFICE VISIT (OUTPATIENT)
Dept: FAMILY MEDICINE | Facility: CLINIC | Age: 72
End: 2022-04-25
Payer: COMMERCIAL

## 2022-04-25 VITALS
WEIGHT: 188.7 LBS | RESPIRATION RATE: 16 BRPM | BODY MASS INDEX: 30.46 KG/M2 | SYSTOLIC BLOOD PRESSURE: 138 MMHG | DIASTOLIC BLOOD PRESSURE: 70 MMHG | HEART RATE: 80 BPM

## 2022-04-25 DIAGNOSIS — M62.81 GENERALIZED MUSCLE WEAKNESS: Primary | ICD-10-CM

## 2022-04-25 DIAGNOSIS — F33.0 MAJOR DEPRESSIVE DISORDER, RECURRENT, MILD (H): ICD-10-CM

## 2022-04-25 PROCEDURE — 99214 OFFICE O/P EST MOD 30 MIN: CPT | Performed by: FAMILY MEDICINE

## 2022-04-25 RX ORDER — SERTRALINE HYDROCHLORIDE 100 MG/1
150 TABLET, FILM COATED ORAL DAILY
Qty: 135 TABLET | Refills: 1 | Status: SHIPPED | OUTPATIENT
Start: 2022-04-25 | End: 2022-12-26

## 2022-04-25 ASSESSMENT — ANXIETY QUESTIONNAIRES
7. FEELING AFRAID AS IF SOMETHING AWFUL MIGHT HAPPEN: NOT AT ALL
6. BECOMING EASILY ANNOYED OR IRRITABLE: SEVERAL DAYS
5. BEING SO RESTLESS THAT IT IS HARD TO SIT STILL: NOT AT ALL
3. WORRYING TOO MUCH ABOUT DIFFERENT THINGS: NOT AT ALL
1. FEELING NERVOUS, ANXIOUS, OR ON EDGE: MORE THAN HALF THE DAYS
2. NOT BEING ABLE TO STOP OR CONTROL WORRYING: MORE THAN HALF THE DAYS
GAD7 TOTAL SCORE: 5

## 2022-04-25 ASSESSMENT — PAIN SCALES - GENERAL: PAINLEVEL: SEVERE PAIN (7)

## 2022-04-25 ASSESSMENT — PATIENT HEALTH QUESTIONNAIRE - PHQ9: 5. POOR APPETITE OR OVEREATING: NOT AT ALL

## 2022-04-25 NOTE — NURSING NOTE
"Initial /70 (BP Location: Right arm, Patient Position: Chair, Cuff Size: Adult Regular)   Pulse 80   Resp 16   Wt 85.6 kg (188 lb 11.2 oz)   BMI 30.46 kg/m   Estimated body mass index is 30.46 kg/m  as calculated from the following:    Height as of 3/10/22: 1.676 m (5' 6\").    Weight as of this encounter: 85.6 kg (188 lb 11.2 oz). .    Leana Mullen CMA ([facility])    "

## 2022-04-25 NOTE — PROGRESS NOTES
"  Assessment & Plan     Generalized muscle weakness  Over all has been better,  Had steroid burst tand that seamed to helped  Now that has wean off burst  Is gettig  Several spells of \"slumps\"  Suddenly gets generlized weakness with sleepiness and confusion.     (F33.0) Major depressive disorder, recurrent, mild (H)  fairt control she is off and on sad and frustrated.    Gerts frustrated when plann to work on imprvement.  during exam   Plan: sertraline (ZOLOFT) 100 MG tablet      I think she needs to be in couseling and develop her own plan,  She wants to feel less tired but has not been able to go to bed sooner.       Rolo Sandoval MD  [facility] LUIS FERNANDO Acevedo is a 71 year old who presents for the following health issues     HPI     Depression and Anxiety Follow-Up    How are you doing with your depression since your last visit? Worsened, intermittently     How are you doing with your anxiety since your last visit?  Worsened, intermittently    Are you having other symptoms that might be associated with depression or anxiety? No    Have you had a significant life event? No     Do you have any concerns with your use of alcohol or other drugs? No    Social History     Tobacco Use     Smoking status: Never Smoker     Smokeless tobacco: Never Used   Substance Use Topics     Alcohol use: Yes     Alcohol/week: 0.0 standard drinks     Comment: 1 glass of wine every 2 weeks     Drug use: No     PHQ 10/15/2020 5/19/2021 1/20/2022   PHQ-9 Total Score 9 5 16   Q9: Thoughts of better off dead/self-harm past 2 weeks Not at all Not at all Not at all     SEBASTIAN-7 SCORE 4/3/2020 6/4/2020 1/20/2022   Total Score - - -   Total Score 3 5 11     Last PHQ-9 4/25/2022   1.  Little interest or pleasure in doing things 2   2.  Feeling down, depressed, or hopeless -   3.  Trouble falling or staying asleep, or sleeping too much 1   4.  Feeling tired or having little energy 3   5.  Poor appetite or overeating 0   6.  " Feeling bad about yourself 2   7.  Trouble concentrating 3   8.  Moving slowly or restless 3   Q9: Thoughts of better off dead/self-harm past 2 weeks 0   PHQ-9 Total Score -   Difficulty at work, home, or with people -     SEBASTIAN-7  4/25/2022   1. Feeling nervous, anxious, or on edge 2   2. Not being able to stop or control worrying 2   3. Worrying too much about different things 0   4. Trouble relaxing 0   5. Being so restless that it is hard to sit still 0   6. Becoming easily annoyed or irritable 1   7. Feeling afraid, as if something awful might happen 0   SEBASTIAN-7 Total Score 5   If you checked any problems, how difficult have they made it for you to do your work, take care of things at home, or get along with other people? -       Suicide Assessment Five-step Evaluation and Treatment (SAFE-T)      Chronic/Recurring Back Pain Follow Up      Where is your back pain located? (Select all that apply) low back right    How would you describe your back pain?  dull ache and sharp    Where does your back pain spread? the right  Knee, starting to feel pain on top of right foot    Since your last clinic visit for back pain, how has your pain changed? always present, but gets better and worse    Does your back pain interfere with your job? Not applicable    Since your last visit, have you tried any new treatment? No - sees chiropractor, ice/heat      Review of Systems   Constitutional, HEENT, cardiovascular, pulmonary, gi and gu systems are negative, except as otherwise noted.      Objective    /70 (BP Location: Right arm, Patient Position: Chair, Cuff Size: Adult Regular)   Pulse 80   Resp 16   Wt 85.6 kg (188 lb 11.2 oz)   BMI 30.46 kg/m    Body mass index is 30.46 kg/m .     Several time in visit with her and cedric  She would start crying  Wants demarco to reassure her,  Or get angry as we discuss what things she can do to help herself.  Adriana that she wants things written down as to what plan is comng up.  She is  unsure she can do anything.    Physical Exam   GENERAL: healthy, alert and no distress  NECK: no adenopathy, no asymmetry, masses, or scars and thyroid normal to palpation  RESP: lungs clear to auscultation - no rales, rhonchi or wheezes  CV: regular rate and rhythm, normal S1 S2, no S3 or S4, no murmur, click or rub, no peripheral edema and peripheral pulses strong  ABDOMEN: soft, nontender, no hepatosplenomegaly, no masses and bowel sounds normal  MS: no gross musculoskeletal defects noted, no edema

## 2022-04-25 NOTE — PATIENT INSTRUCTIONS
You picked a  bed time of 11pm.  Take 5 mg of melatonin  at 10 pm        Continue all other medications as reviewed per electronic medical record today.   Safety plan reviewed. To the Emergency Department as needed or call after hours crisis line at [phone] or [phone]. [place] Crisis Text Line. Text [place] to 088120 or Suicide LifeLine Chat: suicidepreventionlifeline.org/chat/  To schedule individual or family therapy, call [facility] at [phone].  Schedule an appointment with me in 4 weeks or sooner as needed. Call [facility] at [phone] to schedule.

## 2022-04-26 ASSESSMENT — ANXIETY QUESTIONNAIRES: GAD7 TOTAL SCORE: 5

## 2022-04-29 ENCOUNTER — LAB (OUTPATIENT)
Dept: FAMILY MEDICINE | Facility: CLINIC | Age: 72
End: 2022-04-29
Attending: PSYCHIATRY & NEUROLOGY
Payer: COMMERCIAL

## 2022-04-29 DIAGNOSIS — Z11.59 ENCOUNTER FOR SCREENING FOR OTHER VIRAL DISEASES: ICD-10-CM

## 2022-04-29 PROCEDURE — U0005 INFEC AGEN DETEC AMPLI PROBE: HCPCS

## 2022-04-29 PROCEDURE — U0003 INFECTIOUS AGENT DETECTION BY NUCLEIC ACID (DNA OR RNA); SEVERE ACUTE RESPIRATORY SYNDROME CORONAVIRUS 2 (SARS-COV-2) (CORONAVIRUS DISEASE [COVID-19]), AMPLIFIED PROBE TECHNIQUE, MAKING USE OF HIGH THROUGHPUT TECHNOLOGIES AS DESCRIBED BY CMS-2020-01-R: HCPCS

## 2022-04-30 LAB — SARS-COV-2 RNA RESP QL NAA+PROBE: NEGATIVE

## 2022-05-02 ENCOUNTER — HOSPITAL ENCOUNTER (OUTPATIENT)
Facility: AMBULATORY SURGERY CENTER | Age: 72
Discharge: HOME | End: 2022-05-02
Attending: PSYCHIATRY & NEUROLOGY | Admitting: PSYCHIATRY & NEUROLOGY
Payer: COMMERCIAL

## 2022-05-02 ENCOUNTER — OFFICE VISIT (OUTPATIENT)
Dept: NEUROLOGY | Facility: CLINIC | Age: 72
End: 2022-05-02
Payer: COMMERCIAL

## 2022-05-02 VITALS
WEIGHT: 182 LBS | DIASTOLIC BLOOD PRESSURE: 64 MMHG | BODY MASS INDEX: 29.25 KG/M2 | RESPIRATION RATE: 16 BRPM | SYSTOLIC BLOOD PRESSURE: 122 MMHG | OXYGEN SATURATION: 97 % | TEMPERATURE: 98.3 F | HEIGHT: 66 IN | HEART RATE: 75 BPM

## 2022-05-02 DIAGNOSIS — G72.9 MYOPATHY: Primary | ICD-10-CM

## 2022-05-02 PROCEDURE — 88305 TISSUE EXAM BY PATHOLOGIST: CPT | Mod: 26 | Performed by: PSYCHIATRY & NEUROLOGY

## 2022-05-02 PROCEDURE — 88319 ENZYME HISTOCHEMISTRY: CPT | Mod: 26 | Performed by: PSYCHIATRY & NEUROLOGY

## 2022-05-02 PROCEDURE — 99000 SPECIMEN HANDLING OFFICE-LAB: CPT | Performed by: PATHOLOGY

## 2022-05-02 PROCEDURE — 20205 DEEP MUSCLE BIOPSY: CPT | Performed by: PSYCHIATRY & NEUROLOGY

## 2022-05-02 PROCEDURE — 88314 HISTOCHEMICAL STAINS ADD-ON: CPT | Mod: 26 | Performed by: PSYCHIATRY & NEUROLOGY

## 2022-05-02 NOTE — DISCHARGE INSTRUCTIONS
Muscle Biopsy Discharge Instructions      1.  Wound care:  Remove the gauze dressing 24 hours after procedure but leave the Steri-Strips in place. You may remove the Steri-Strips after one week.   Please wash your hands before touching your incisions or removing dressings.    2.  You may resume all of your home medications after surgery.  Exception: Please do not start Aspirin or blood thinners until the day after surgery.    3.  You may shower 24 hours after surgery.  Do not submerge yourself in water for 7 days (bath, whirlpool, hot tub, pool, lake, etc).      4.  Rest today. Resume normal activity tomorrow.    5.  Pain management:  Apply ice pack to incision area for 24 hours protecting the skin with a cloth. You may use Tylenol (acetaminophen) every 4 to 6 hours, or other pain medicines as directed by your physician.    6.  Watch for signs of infection:  Redness of the wound, drainage, increasing pain, and/or fever/chills (greater than 101 degrees F).       7.  Diet:  You may resume your regular diet.    8. If you have questions or concerns,  please contact our office Monday through Friday during regular office hours at [phone].  If you are calling during nonbusiness hours, the weekend, or holiday;  please call the hospital  at [phone] and ask for the resident on call for Neurology.    For emergency care, call the:  [place] [facility] Department: [phone] (TTY for hearing impaired: [phone]).

## 2022-05-02 NOTE — OP NOTE
PREOPERATIVE DIAGNOSIS: Myopathy    POSTOPERATIVE DIAGNOSIS: Myopathy    OPERATION: Left biceps brachii muscle biopsy    INDICATIONS FOR PROCEDURE: Rule out myopathy    DESCRIPTION OF PROCEDURE:  Informed consent was obtained on the patient to do a deep left biceps brachii muscle biopsy. The left upper anterior arm was prepped with cholorprep and sterilely draped. Lidocaine 1% 13 mL total was injected locally during the biopsy procedure. A 1-inch incision was made over anterior arm and the biceps brachii muscle was exposed. Three small pieces were biopsied for routine histochemistry, electron microscopy, and metabolic studies as needed. The fascia and subcutaneous tissues were closed with 3-0 Vicryl and skin with 4-0 Vicryl. Steri-Strips and pressure dressing were applied over the wound site. There were no complications. Blood loss was minimal. Wound care instructions were given to the patient. She told that results of the biopsy would be available in 2-3 weeks.

## 2022-05-02 NOTE — LETTER
5/2/2022       RE: Albina Pedro  [address] [facility] [place] [address]     Dear Colleague,    Thank you for referring your patient, Albina Pedro, to the [facility] EMG CLINIC [facility]. Please see a copy of my visit note below.      [facility] PHYSICIANS   NEUROMUSCULAR PATHOLOGY REPORT   NEUROMUSCULAR LABORATORY   [phone] / [phone]  [address],  [place] [address]     MUSCLE BIOPSY LIGHT MICROSCOPY REPORT    DATE OF BIOPSY: 05/02/2022  DATE OF REPORT: 05/13/2022  SPECIMEN NO:   SURGEON: Dr. Loo  REFERRING PHYSICIAN: Dr. Manning    CLINICAL INFORMATION:  This 71 year-old woman with weakness, normal CK and with abnormal EMG consisting of irritable myopathy had a muscle biopsy performed to investigate the possibility of his having myopathic disorder.    LEFT BICEP BRACHII MUSCLE BIOPSY:  Two pieces of muscle were quick frozen for light microscopy and histochemistry. Additional pieces were quick frozen for biochemical testing.    LIGHT MICROSCOPY:  Frozen sections stained with H&E, PAS, Oil Red O, Congo red, trichrome. There were occasional atrophic and degenerating fibers. There were no overt ragged red fibers on the trichrome stain. There were no overt necrotic fibers. There was no inflammation. There was at least a single fiber with vacuolar changes. There was no significant fibrosis. Glycogen and lipid content appeared normal. There was no abnormal Congo red staining.    HISTOCHEMISTRY:  Frozen sections stained with ATPase (pH 4.35, 4.5 and 9.4), metachromatic ATPase, NADH, SDH, modified SDH, GONZALEZ, ?-GP, Phosphorylase, myoadenylate deaminase (MAD) acid phosphatase, and nonspecific esterase were available for review. ATPase staining identified fibers of types 1 and 2a and 2b. There was marked fiber type predominance with mainly type 1 fibers present in this muscle sample.  Fiber-type grouping can not be evaluated. Oxidative enzyme stain deposition was irregular in scattered fibers. There were a significant amount of ragged blue fibers on the modified SDH and SDH stain, most of which were GONZALEZ deficient in neighboring sections. Acid phosphatase activity was increased in some degenerating fibers. Esterase staining provided no additional information. Phosphorylase and MAD staining were normal.    DIAGNOSIS:  Chronic myopathy with mitochondrial changes.  Type 1 fiber predominance.    COMMENT:  The presence of ragged blue and GONZALEZ deficient fibers provides evidence of a mitochondriopathy. While these findings can represent primary mitochondrial myopathy other myopathic causes associated with mitochondrial changes should be considered. Clinical and diagnostic significance of type 1 fiber predominance is unclear, but potentially may reflect superimposed significant grouping such as a result of reinnervation or type 2 fiber atrophy. Clinical correlation is recommended.        Jonah Dorsey MD  Neuromuscular medicine    CC  Patient Care Team:  Carolina Burrell MD as PCP - General (Family Practice)  Perlman, David Morris, MD as MD (Internal Medicine)  Driss Carbone MD as MD (Cardiology)  Lavonne Moran [facility] as Pharmacist (Pharmacist)  Driss Carbone MD as Assigned Heart and Vascular Provider  Carolina Burrell MD as Assigned PCP  Logan Kelley, PhD LP as Assigned Behavioral Health Provider  Rene Manning MD as MD (Neurology)  Jr Gómez, STEW CNP as Assigned Sleep Provider  Rene Manning MD as Assigned Neuroscience Provider  Paul Mark MD as Assigned Rheumatology Provider

## 2022-05-13 NOTE — PROGRESS NOTES
[facility] PHYSICIANS   NEUROMUSCULAR PATHOLOGY REPORT   NEUROMUSCULAR LABORATORY   [phone] / [phone]  [address],  [place] [address]     MUSCLE BIOPSY LIGHT MICROSCOPY REPORT    DATE OF BIOPSY: 05/02/2022  DATE OF REPORT: 05/13/2022  SPECIMEN NO:   SURGEON: Dr. Loo  REFERRING PHYSICIAN: Dr. Manning    CLINICAL INFORMATION:  This 71 year-old woman with weakness, normal CK and with abnormal EMG consisting of irritable myopathy had a muscle biopsy performed to investigate the possibility of his having myopathic disorder.    LEFT BICEP BRACHII MUSCLE BIOPSY:  Two pieces of muscle were quick frozen for light microscopy and histochemistry. Additional pieces were quick frozen for biochemical testing.    LIGHT MICROSCOPY:  Frozen sections stained with H&E, PAS, Oil Red O, Congo red, trichrome. There were occasional atrophic and degenerating fibers. There were no overt ragged red fibers on the trichrome stain. There were no overt necrotic fibers. There was no inflammation. There was at least a single fiber with vacuolar changes. There was no significant fibrosis. Glycogen and lipid content appeared normal. There was no abnormal Congo red staining.    HISTOCHEMISTRY:  Frozen sections stained with ATPase (pH 4.35, 4.5 and 9.4), metachromatic ATPase, NADH, SDH, modified SDH, GONZALEZ, ?-GP, Phosphorylase, myoadenylate deaminase (MAD) acid phosphatase, and nonspecific esterase were available for review. ATPase staining identified fibers of types 1 and 2a and 2b. There was marked fiber type predominance with mainly type 1 fibers present in this muscle sample. Fiber-type grouping can not be evaluated. Oxidative enzyme stain deposition was irregular in scattered fibers. There were a significant amount of ragged blue fibers on the modified SDH and SDH stain, most of which were GONZALEZ deficient in neighboring sections. Acid phosphatase activity was increased in some degenerating fibers.  Esterase staining provided no additional information. Phosphorylase and MAD staining were normal.    DIAGNOSIS:  Chronic myopathy with mitochondrial changes.  Type 1 fiber predominance.    COMMENT:  The presence of ragged blue and GONZALEZ deficient fibers provides evidence of a mitochondriopathy. While these findings can represent primary mitochondrial myopathy other myopathic causes associated with mitochondrial changes should be considered. Clinical and diagnostic significance of type 1 fiber predominance is unclear, but potentially may reflect superimposed significant grouping such as a result of reinnervation or type 2 fiber atrophy. Clinical correlation is recommended.        Jonah Dorsey MD  Neuromuscular medicine    CC  Patient Care Team:  Carolina Burrell MD as PCP - General (Family Practice)  Perlman, David Morris, MD as MD (Internal Medicine)  Driss Carbone MD as MD (Cardiology)  Lavonne Moran [facility] as Pharmacist (Pharmacist)  Driss Carbone MD as Assigned Heart and Vascular Provider  Carolina Burrell MD as Assigned PCP  Logan Kelley, PhD LP as Assigned Behavioral Health Provider  Rene Manning MD as MD (Neurology)  Jr Gómez, APRN CNP as Assigned Sleep Provider  Rene Mannnig MD as Assigned Neuroscience Provider  Paul Mark MD as Assigned Rheumatology Provider

## 2022-05-17 DIAGNOSIS — I20.0 INTERMEDIATE CORONARY SYNDROME (H): ICD-10-CM

## 2022-05-17 NOTE — TELEPHONE ENCOUNTER
Routing refill request to provider for review/approval because:  Drug not on the [facility] refill protocol   Thank you.  Suhail Silva RN

## 2022-05-23 RX ORDER — RANOLAZINE 1000 MG/1
TABLET, EXTENDED RELEASE ORAL
Qty: 180 TABLET | Refills: 0 | Status: SHIPPED | OUTPATIENT
Start: 2022-05-23 | End: 2022-08-19

## 2022-06-02 DIAGNOSIS — I25.10 CORONARY ARTERY DISEASE INVOLVING NATIVE CORONARY ARTERY OF NATIVE HEART WITHOUT ANGINA PECTORIS: Chronic | ICD-10-CM

## 2022-06-03 ENCOUNTER — OFFICE VISIT (OUTPATIENT)
Dept: RHEUMATOLOGY | Facility: CLINIC | Age: 72
End: 2022-06-03
Attending: INTERNAL MEDICINE
Payer: COMMERCIAL

## 2022-06-03 ENCOUNTER — LAB (OUTPATIENT)
Dept: LAB | Facility: CLINIC | Age: 72
End: 2022-06-03
Payer: COMMERCIAL

## 2022-06-03 VITALS
WEIGHT: 184.1 LBS | HEART RATE: 72 BPM | DIASTOLIC BLOOD PRESSURE: 71 MMHG | BODY MASS INDEX: 29.71 KG/M2 | SYSTOLIC BLOOD PRESSURE: 120 MMHG | OXYGEN SATURATION: 96 %

## 2022-06-03 DIAGNOSIS — M35.09 SJOGREN'S SYNDROME WITH OTHER ORGAN INVOLVEMENT (H): Primary | ICD-10-CM

## 2022-06-03 DIAGNOSIS — Z79.899 OTHER LONG TERM (CURRENT) DRUG THERAPY: ICD-10-CM

## 2022-06-03 DIAGNOSIS — M35.09 SJOGREN'S SYNDROME WITH OTHER ORGAN INVOLVEMENT (H): ICD-10-CM

## 2022-06-03 LAB
ALBUMIN SERPL-MCNC: 3.5 G/DL (ref 3.4–5)
ALT SERPL W P-5'-P-CCNC: 43 U/L (ref 0–50)
AST SERPL W P-5'-P-CCNC: 29 U/L (ref 0–45)
BASOPHILS # BLD AUTO: 0 10E3/UL (ref 0–0.2)
BASOPHILS NFR BLD AUTO: 0 %
CK SERPL-CCNC: 170 U/L (ref 30–225)
CREAT SERPL-MCNC: 0.72 MG/DL (ref 0.52–1.04)
EOSINOPHIL # BLD AUTO: 0.1 10E3/UL (ref 0–0.7)
EOSINOPHIL NFR BLD AUTO: 2 %
ERYTHROCYTE [DISTWIDTH] IN BLOOD BY AUTOMATED COUNT: 14.1 % (ref 10–15)
GFR SERPL CREATININE-BSD FRML MDRD: 88 ML/MIN/1.73M2
HCT VFR BLD AUTO: 40.7 % (ref 35–47)
HGB BLD-MCNC: 13.7 G/DL (ref 11.7–15.7)
IMM GRANULOCYTES # BLD: 0 10E3/UL
IMM GRANULOCYTES NFR BLD: 0 %
LYMPHOCYTES # BLD AUTO: 1.3 10E3/UL (ref 0.8–5.3)
LYMPHOCYTES NFR BLD AUTO: 25 %
MCH RBC QN AUTO: 31.6 PG (ref 26.5–33)
MCHC RBC AUTO-ENTMCNC: 33.7 G/DL (ref 31.5–36.5)
MCV RBC AUTO: 94 FL (ref 78–100)
MONOCYTES # BLD AUTO: 0.5 10E3/UL (ref 0–1.3)
MONOCYTES NFR BLD AUTO: 9 %
NEUTROPHILS # BLD AUTO: 3.2 10E3/UL (ref 1.6–8.3)
NEUTROPHILS NFR BLD AUTO: 64 %
NRBC # BLD AUTO: 0 10E3/UL
NRBC BLD AUTO-RTO: 0 /100
PLATELET # BLD AUTO: 235 10E3/UL (ref 150–450)
RBC # BLD AUTO: 4.34 10E6/UL (ref 3.8–5.2)
WBC # BLD AUTO: 5.1 10E3/UL (ref 4–11)

## 2022-06-03 PROCEDURE — 85025 COMPLETE CBC W/AUTO DIFF WBC: CPT | Performed by: PATHOLOGY

## 2022-06-03 PROCEDURE — 36415 COLL VENOUS BLD VENIPUNCTURE: CPT | Performed by: PATHOLOGY

## 2022-06-03 PROCEDURE — 99214 OFFICE O/P EST MOD 30 MIN: CPT | Performed by: INTERNAL MEDICINE

## 2022-06-03 PROCEDURE — 84450 TRANSFERASE (AST) (SGOT): CPT | Performed by: PATHOLOGY

## 2022-06-03 PROCEDURE — 82565 ASSAY OF CREATININE: CPT | Performed by: PATHOLOGY

## 2022-06-03 PROCEDURE — G0463 HOSPITAL OUTPT CLINIC VISIT: HCPCS

## 2022-06-03 PROCEDURE — 84460 ALANINE AMINO (ALT) (SGPT): CPT | Performed by: PATHOLOGY

## 2022-06-03 PROCEDURE — 82040 ASSAY OF SERUM ALBUMIN: CPT | Performed by: PATHOLOGY

## 2022-06-03 PROCEDURE — 82550 ASSAY OF CK (CPK): CPT | Performed by: PATHOLOGY

## 2022-06-03 RX ORDER — PRAVASTATIN SODIUM 40 MG
TABLET ORAL
Qty: 90 TABLET | Refills: 0 | Status: SHIPPED | OUTPATIENT
Start: 2022-06-03 | End: 2022-08-19

## 2022-06-03 ASSESSMENT — PAIN SCALES - GENERAL: PAINLEVEL: NO PAIN (0)

## 2022-06-03 NOTE — LETTER
6/3/2022       RE: Albina Pedro  [address] [facility] [place] [address]     Dear Colleague,    Thank you for referring your patient, Albina Pedro, to the [facility] [place] at [facility]. Please see a copy of my visit note below.    Chief Complaint   Patient presents with     RECHECK     Sjogren's syndrom c/up     Inflammatory myositis  ILD  Imuran monitoring    Date of initial visit: 3/25//2022  DOS: 6/3/2022      ASSESSMENT AND PLAN:    Inflammatory myositis. Sjogren's. ILD. Albina Pedro 71 y.o. female with long standing h/o seropositive Sjogren's causing ILD, inflammatory myositis, R thigh panniculitis who presents for worsening joint pain, muscle weakness and deconditioning over past 2 years. Although she has OA and her ILD seems to be stable, it seems like she was doing well in the past while taking imuran (AZA). She was on AZA [id number] and was tapered off slowly as was doing well. Max AZA dose was 100 mg every day and last dose was 50 mg every other day. She tolerated AZA in the past with no SEs. Her ILD has not worsened off AZA. No flare of rash. In 3/2022 (initial visit), I recommended her to resume taking AZA while doing additional work up. Plus, I recommend a course of prednisone taper. We discussed Sjogren's Dx, mx of sicca.     Overall improved muscle weakness since initial visit by resuming imuran 50 mg every day. Reviewed and discussed lab results from initial visit. Follow up with neurology to discuss muscle biopsy result.    AZA monitoring. Labs q3mo    ILD. Follow up with pulmonary.        Plan:    Labs today and every 3 months (standing order)    Follow on muscle biopsy result, consider going up on imuran to 75 mg a day (1.5 tabs a day)    Return in 3-4 months (in person)      ADDENDUM: I messaged Dr. Manning, he would review muscle biopsy slides by himself and would call Kristina with final  result. Kristina was happy with this plan.    Orders Placed This Encounter   Procedures     ALT     Albumin level     AST     Creatinine     CK total     CBC with Platelets & Differential     Paul Mark MD      HISTORY OF PRESENTING ILLNESS:       3/25/2022:    Kristina is a 70 yo female who presents today with her . She has been referred for m/o Sjogren's. She has h/o +SSA/SSB Sjogren's diagnosed in 2004 after having sicca x 20 yr followed by muscle weakness, inflammatory arthritis, L thigh panniculitis, ILD. Was treated with prednisone, did not tolerate MTX. Was on AZA [id number]. Had +BINDU, +RF.    She was last seen by Dr. Moore in 1/2020 and before that, was under care of Dr. Ho who diagnosed and treated her since 2004.    She is here to discuss her joint pain.    She has diffuse arthralgia, no joint swelling, no AM stiffness, reports loss of strength of her hands with poor .    She has arthralgia, it got worse and went downhill for past 2 years.    Sometimes gets muscle weakness, more constant lately over past few months.    Has brain fog, getting worse.    Has progressive hair loss with bald spots.    No photosensitivity or skin rashes.    Uses biotene and water, wakes up thirsty. Had a tooth which broke, sometimes teeth are chipping. Has dry mouth due to Sjogren's.    Has dry eyes, uses OTC eyedrops.    Gets R droopy eyelid, now affected L eyelid.    Eyes look red ad dry.    No parotid gland swelling.    No CP, SOB, cough, fevers or night sweats.    Gets diarrhea from certain foods.    No numbness, tingling.    Has fatigue, difficult time staying awake.    She did well on her cognitive function testing.    Mestinon caused diarrhea and stopped taking it, scheduled to have EMG and do follow up with Dr. Manning.    Updated on cancer screening.    No dysphagia.    Today 6/3/2022:    Kristina was initially seen in 3/2022 with muscle weakness, when she was treated with prednisone taper (4tab=20 mg qd  x 5 days, 3tab=15 mg qd x 5 days, 2tab=10 mg qd x 5 days, 1 tab=5 mg qd x 5 days then stop) and was put back on imuran 50 mg every day. Prednisone helped her.    Overall feeling pretty good.    Had diarrhea but it is better.    Went on 4 almazan past weekend and did well.    She and her  have seen an improvement by resuming imuran.    Easier to get out of chair.    Waiting to hear about muscle biopsy result.    ROS: A comprehensive ROS was done. Positives are per HPI.    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       Past Surgical History:   Procedure Laterality Date     BIOPSY MUSCLE DIAGNOSTIC (LOCATION) Left 2022    Procedure: BIOPSY, MUSCLE LEFT biceps @0900;  Surgeon: Tyrell Loo MD;  Location: [facility] [place]     C/SECTION, LOW TRANSVERSE  10/13/1978    , Low Transverse     COLONOSCOPY       OPEN REDUCTION INTERNAL FIXATION ANKLE Right 2019    Procedure: Open reduction internal fixation right lateral malleolus fracture;  Surgeon: Rolo Oconnor DPM;  Location: [place] OR     SURGICAL HISTORY OF -            SURGICAL HISTORY OF -   00    Colonoscopy       Family History   Problem Relation Age of Onset     Cancer Mother         Breast and liver- age 43     Heart Disease Father         ? chf?h/o CVA     Alzheimer Disease Father      Hypertension Father      Neurologic Disorder Father         CVA     Diabetes Maternal Grandmother      Breast Cancer Maternal Aunt      Breast Cancer Paternal Aunt      Cancer - colorectal No family hx of      Prostate Cancer No family hx of        Social History     Tobacco Use     Smoking status: Never Smoker     Smokeless tobacco: Never Used   Substance Use Topics     Alcohol  use: Yes     Alcohol/week: 0.0 standard drinks     Comment: 1 glass of wine every 2 weeks     Outpatient Encounter Medications as of 6/3/2022   Medication Sig Dispense Refill     azaTHIOprine (IMURAN) 50 MG tablet Take 1 tablet (50 mg) by mouth daily 30 tablet 1     LORazepam (ATIVAN) 0.5 MG tablet TAKE ONE TABLET BY MOUTH EVERY EIGHT HOURS AS NEEDED FOR ANXIETY 20 tablet 0     Multiple Vitamins-Minerals (MULTIVITAMIN & MINERAL PO) Take 1 tablet by mouth daily        pravastatin (PRAVACHOL) 40 MG tablet Take 1 tablet (40 mg) by mouth daily 90 tablet 3     ranolazine (RANEXA) 1000 MG TB12 12 hr tablet TAKE ONE TABLET BY MOUTH TWICE DAILY 180 tablet 0     sertraline (ZOLOFT) 100 MG tablet Take 1.5 tablets (150 mg) by mouth daily 135 tablet 1     COMPRESSION STOCKINGS 2 each daily as needed (edema) (Patient not taking: Reported on 6/3/2022) 2 each 3     VENTOLIN  (90 Base) MCG/ACT inhaler INHALE 2 PUFFS EVERY 4 HOURS AS NEEDED FOR SHORTNESS OF BREATH/DYSPNEA (Patient not taking: No sig reported) 54 g 0     No facility-administered encounter medications on file as of 6/3/2022.       Allergies   Allergen Reactions     Daypro [Oxaprozin] Rash            Lidocaine Other (See Comments)     Rapid heart rate     Nitroglycerin Other (See Comments)     Causes low blood pressure     Penicillins Unknown     Occurred as child.     Sulfa Drugs Rash     Physical exam:    /71   Pulse 72   Wt 83.5 kg (184 lb 1.6 oz)   SpO2 96%   BMI 29.71 kg/m         Constitutional: NAD. pleasant.  present.  HEENT: EOMI, no scleral icterus. Nl conj. No oral ulcers, poor salivary pool, No adenopathy or thyromegaly.   Cardiovascular: RRR, no M/R/G, trace LE edema  Respiratory: CTA b/l  Gastrointestinal:  soft, non-tender to palpation.    Musculoskeletal: No active synovitis. No joint tenderness   Skin: no rash  Neurological system: A&O x 3, proximal muscle power 5/5  Psych: nl affect    Component      Latest Ref Rng & Units  6/17/2019   WBC      4.0 - 11.0 10e9/L 5.6   RBC Count      3.8 - 5.2 10e12/L 4.19   Hemoglobin      11.7 - 15.7 g/dL 13.0   Hematocrit      35.0 - 47.0 % 39.8   MCV      78 - 100 fl 95   MCH      26.5 - 33.0 pg 31.0   MCHC      31.5 - 36.5 g/dL 32.7   RDW      10.0 - 15.0 % 13.1   Platelet Count      150 - 450 10e9/L 230   % Neutrophils      % 56.3   % Lymphocytes      % 31.4   % Monocytes      % 9.6   % Eosinophils      % 2.3   % Basophils      % 0.4   Absolute Neutrophil      1.6 - 8.3 10e9/L 3.2   Absolute Lymphocytes      0.8 - 5.3 10e9/L 1.8   Absolute Monocytes      0.0 - 1.3 10e9/L 0.5   Absolute Eosinophils      0.0 - 0.7 10e9/L 0.1   Absolute Basophils      0.0 - 0.2 10e9/L 0.0   Diff Method       Automated Method   Sodium      133 - 144 mmol/L 140   Potassium      3.4 - 5.3 mmol/L 5.0   Chloride      94 - 109 mmol/L 106   Carbon Dioxide      20 - 32 mmol/L 31   Anion Gap      3 - 14 mmol/L 3   Glucose      70 - 99 mg/dL 96   Urea Nitrogen      7 - 30 mg/dL 17   Creatinine      0.52 - 1.04 mg/dL 0.57   GFR Estimate      >60 mL/min/1.73:m2 >90   GFR Estimate If Black      >60 mL/min/1.73:m2 >90   Calcium      8.5 - 10.1 mg/dL 9.1   Bilirubin Total      0.2 - 1.3 mg/dL 0.5   Albumin      3.4 - 5.0 g/dL 3.8   Protein Total      6.8 - 8.8 g/dL 7.4   Alkaline Phosphatase      40 - 150 U/L 59   ALT      0 - 50 U/L 27   AST      0 - 45 U/L 20   Cholesterol      <200 mg/dL 150   Triglycerides      <150 mg/dL 168 (H)   HDL Cholesterol      >49 mg/dL 70   LDL Cholesterol Calculated      <100 mg/dL 46   Non HDL Cholesterol      <130 mg/dL 80   Rheumatoid Factor      <20 IU/mL <20   CRP Inflammation      0.0 - 8.0 mg/L <2.9   Vitamin D Deficiency screening      20 - 75 ug/L 48   Vitamin B12      193 - 986 pg/mL 705     Component      Latest Ref Rng & Units 1/16/2020   Ruth-1 Autoantibodies      <20 EU 0   Scl-70 Autoantibodies      <20 EU 0   Sm (Vergara) Autoantibodies      <20 EU 1   Sm/RNP Autoantibodies      <20 EU 2    SS-A/Ro Autoantibodies      <20  (H)   SS-B/La Autoantibodies      <20  (H)   Complement C4      19 - 59 mg/dL 27   Complement C3      83 - 177 mg/dL 139   DNA (ds) Antibody      <25 IU 3       Component      Latest Ref Rng & Units 1/20/2022 2/16/2022 2/28/2022   WBC      4.0 - 11.0 10e3/uL 5.7     RBC Count      3.80 - 5.20 10e6/uL 4.14     Hemoglobin      11.7 - 15.7 g/dL 13.0     Hematocrit      35.0 - 47.0 % 39.8     MCV      78 - 100 fL 96     MCH      26.5 - 33.0 pg 31.4     MCHC      31.5 - 36.5 g/dL 32.7     RDW      10.0 - 15.0 % 13.1     Platelet Count      150 - 450 10e3/uL 223     % Neutrophils      % 57     % Lymphocytes      % 30     % Monocytes      % 10     % Eosinophils      % 3     % Basophils      % 0     Absolute Neutrophils      1.6 - 8.3 10e3/uL 3.3     Absolute Lymphocytes      0.8 - 5.3 10e3/uL 1.7     Absolute Monocytes      0.0 - 1.3 10e3/uL 0.6     Absolute Eosinophils      0.0 - 0.7 10e3/uL 0.2     Absolute Basophils      0.0 - 0.2 10e3/uL 0.0     Sodium      133 - 144 mmol/L 139 137    Potassium      3.4 - 5.3 mmol/L 4.3 4.3    Chloride      94 - 109 mmol/L 108 107    Carbon Dioxide      20 - 32 mmol/L 38 (H) 25    Anion Gap      3 - 14 mmol/L <1 (L) 5    Urea Nitrogen      7 - 30 mg/dL 22 27    Creatinine      0.52 - 1.04 mg/dL 0.56 0.59    Calcium      8.5 - 10.1 mg/dL 9.0 9.1    Glucose      70 - 99 mg/dL 99 96    Alkaline Phosphatase      40 - 150 U/L 63 67    AST      0 - 45 U/L 20 30    ALT      0 - 50 U/L 33 44    Protein Total      6.8 - 8.8 g/dL 7.4 7.3    Albumin      3.4 - 5.0 g/dL 3.5 4.0    Bilirubin Total      0.2 - 1.3 mg/dL 0.5 0.4    GFR Estimate      >60 mL/min/1.73m2 >90 >90    Color Urine      Colorless, Straw, Light Yellow, Yellow Yellow     Appearance Urine      Clear Clear     Glucose Urine      Negative mg/dL Negative     Bilirubin Urine      Negative Negative     Ketones Urine      Negative mg/dL Negative     Specific Gravity Urine      1.003 -  1.035 >=1.030     Blood Urine      Negative Negative     pH Urine      5.0 - 7.0 5.0     Protein Albumin Urine      Negative mg/dL Negative     Urobilinogen Urine      0.2, 1.0 E.U./dL 0.2     Nitrite Urine      Negative Negative     Leukocyte Esterase Urine      Negative Negative     TSH      0.40 - 4.00 mU/L 1.87     AcetChol Binding Darlin      0.0 - 0.4 nmol/L   0.0   AcetChol Modul Darlin      <=45 %   4   Clinical Information:     71 year old female with generalized fatigue and right eyelid ptosis. She has not taken her Mestinon in at least the last week due to intolerance with diarhea. Query myopathy vs neuromuscular junction disorder. Brief exam: bilateral eyelid closure weakness and neck flexion weakness. Double vision on upward gaze and lateral gaze; Right Deltoid 5, Biceps 5, Triceps 4 bilaterally; subtle asymmetry of the finger flexors (very mild weakness on the left, normal on the right).      Techniques:     Motor and sensory conduction studies were done with surface recording electrodes. EMG was done with a concentric needle electrode.      Results:     The right peroneal-EDB motor study showed normal onset latency with low amplitude without segmental changes, and normal conduction velocities.The right tibial-AH, peroneal-TA, median-APB and ulnar-ADM motor studies were normal. The right superficial peroneal, sural, median, ulnar, and radial antidromic sensory studies were normal. The right tibial F wave latencies were normal. 3 Hz slow repetitive nerve stimulation at the right ADM and orbicularis oculi muscles did not show any significant decrement either at rest or after 1 min of maximal isometric exercise.      Needle evaluation of the right Triceps Brachii, Pronator Teres, Biceps, Tibialis anterior, Gastrocnemius (Medial Hd) and vastus lateralis showed abnormal spontaneous activity with fibrillation potentials (1+ to 2+) with CRDs present in the vastus lateralis only. The right Triceps Brachii, Pronator  Teres, First Dorsal Interosseous , Biceps, and vastus lateralis showed small amplitude, short duration, polyphasic motor units with early recruitment in the triceps and biceps muscles.  The right tibialis anterior showed normal recruitment of normal appearing motor units. The right Gastrocnemius (Medial Hd) had normal recruitment of large, long duration motor units. The right deltoid muscle was normal.      Interpretation:     Abnormal study. 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 Please note, however, that this could still be part of a myopathic process because some chronic myopathies may show large, long duration motor unit potentials. There was no electrodiagnostic evidence of a neuromuscular junction disorder like myasthenia gravis.      Comment: The results of the study were discussed with the patient and she was referred for muscle biopsy of the left triceps or biceps.   ___________________________  Millicent Diggs MD, Neuromuscular Fellow  Rene Manning MD      Component      Latest Ref Rng & Units 3/25/2022   WBC      4.0 - 11.0 10e3/uL 6.1   RBC Count      3.80 - 5.20 10e6/uL 4.12   Hemoglobin      11.7 - 15.7 g/dL 12.7   Hematocrit      35.0 - 47.0 % 38.7   MCV      78 - 100 fL 94   MCH      26.5 - 33.0 pg 30.8   MCHC      31.5 - 36.5 g/dL 32.8   RDW      10.0 - 15.0 % 13.0   Platelet Count      150 - 450 10e3/uL 236   % Neutrophils      % 59   % Lymphocytes      % 30   % Monocytes      % 7   % Eosinophils      % 2   % Basophils      % 1   % Immature Granulocytes      % 1   NRBCs per 100 WBC      <1 /100 0   Absolute Neutrophils      1.6 - 8.3 10e3/uL 3.6   Absolute Lymphocytes      0.8 - 5.3 10e3/uL 1.8   Absolute Monocytes      0.0 - 1.3 10e3/uL 0.4   Absolute Eosinophils      0.0 - 0.7 10e3/uL 0.1   Absolute  Basophils      0.0 - 0.2 10e3/uL 0.0   Absolute Immature Granulocytes      <=0.4 10e3/uL 0.0   Absolute NRBCs      10e3/uL 0.0   GARETT-1 (Histidyl-tRNA Synthetase) Darlin IgG      0 - 40 AU/mL 0   PL-12 (alanyl-tRNA synthetase) Antibody      Negative Negative   PL-7 (threonyl-tRNA synthetase) Antibody      Negative Negative   EJ (glycyl - tRNA synthetase) Antibody      Negative Negative   OJ (isoleucyl-tRNA synthetase) Antibody      Negative Negative   SRP (Signal Recognition Particle) Antibody      Negative Negative   Mi-2 (nuclear helicase protein) Antibody      Negative Negative   P155/140 (TIF1-gamma) Antibody      Negative Negative   TIF-1 Gamma Antibody      Negative Negative   SAE1 (SUMO activating enzyme) Darlin      Negative Negative   MDA5 (CADM 140) Darlin      Negative Negative   NXP-2 (Nuclear matrix protein 2) Darlin      Negative Negative   Myositis Interp       See Note   Albumin Fraction      3.7 - 5.1 g/dL 4.2   Alpha 1 Fraction      0.2 - 0.4 g/dL 0.3   Alpha 2 Fraction      0.5 - 0.9 g/dL 0.9   Beta Fraction      0.6 - 1.0 g/dL 0.9   Gamma Fraction      0.7 - 1.6 g/dL 0.9   Monoclonal Peak      <=0.0 g/dL 0.0   ELP Interpretation:       Essentially normal electrophoretic pattern. No obvious monoclonal proteins seen. Pathologic significance requires clinical correlation. Carlos Ashford M.D., Ph.D., Pathologist.   Quantiferon-TB Gold Plus Result      Negative Negative   TB1 Ag minus Nil Value      IU/mL 0.01   TB2 Ag minus Nil Value      IU/mL 0.00   Mitogen minus Nil Result      IU/mL 9.97   Nil Result      IU/mL 0.03   BINDU interpretation      Negative Positive (A)   BINDU pattern 1       Speckled   BINDU titer 1       1:160   Iron      35 - 180 ug/dL 69   Iron Binding Cap      240 - 430 ug/dL 344   Iron Saturation Index      15 - 46 % 20   Creatinine      0.52 - 1.04 mg/dL 0.63   GFR Estimate      >60 mL/min/1.73m2 >90   ALT      0 - 50 U/L 31   Albumin      3.4 - 5.0 g/dL 3.6   AST      0 - 45 U/L 18    Thyroglobulin Antibody      <40 IU/mL <20   CK Total      30 - 225 U/L 244 (H)   Aldolase      1.2 - 7.6 U/L 3.9   Complement C3      81 - 157 mg/dL 142   Complement C4      13 - 39 mg/dL 26   CRP Inflammation      0.0 - 8.0 mg/L <2.9   Cryoglobulin Interpretation      Negative Negative   Cyclic Citrullinated Peptide Antibody, IgG      <7.0 U/mL 2.1   DNA-ds      <10.0 IU/mL 1.1   Vitamin D Deficiency screening      20 - 75 ug/L 41   Vitamin B12      193 - 986 pg/mL 603   Thyroid Peroxidase Antibody      <35 IU/mL <10   Rheumatoid Factor      <12 IU/mL 15 (H)   Hepatitis C Antibody      Nonreactive Nonreactive   Hep B Surface Agn      Nonreactive Nonreactive   Sed Rate      0 - 30 mm/hr 14   Ferritin      8 - 252 ng/mL 85   Hepatitis B Core Darlin      Nonreactive Nonreactive   Total Protein Serum for ELP      6.8 - 8.8 g/dL 7.2   Quantiferon Nil Tube      IU/mL 0.03   Quantiferon TB1 Tube      IU/mL 0.04   Quantiferon TB2 Tube       0.03   QUANTIFERON MITOGEN      IU/mL 10.00       Paul Mark MD

## 2022-06-03 NOTE — PROGRESS NOTES
Chief Complaint   Patient presents with     RECHECK     Sjogren's syndrom c/up     Inflammatory myositis  ILD  Imuran monitoring    Date of initial visit: 3/25//2022  DOS: 6/3/2022      ASSESSMENT AND PLAN:    Inflammatory myositis. Sjogren's. ILD. Albina Pedro 71 y.o. female with long standing h/o seropositive Sjogren's causing ILD, inflammatory myositis, R thigh panniculitis who presents for worsening joint pain, muscle weakness and deconditioning over past 2 years. Although she has OA and her ILD seems to be stable, it seems like she was doing well in the past while taking imuran (AZA). She was on AZA [id number] and was tapered off slowly as was doing well. Max AZA dose was 100 mg every day and last dose was 50 mg every other day. She tolerated AZA in the past with no SEs. Her ILD has not worsened off AZA. No flare of rash. In 3/2022 (initial visit), I recommended her to resume taking AZA while doing additional work up. Plus, I recommend a course of prednisone taper. We discussed Sjogren's Dx, mx of sicca.     Overall improved muscle weakness since initial visit by resuming imuran 50 mg every day. Reviewed and discussed lab results from initial visit. Follow up with neurology to discuss muscle biopsy result.    AZA monitoring. Labs q3mo    ILD. Follow up with pulmonary.        Plan:    Labs today and every 3 months (standing order)    Follow on muscle biopsy result, consider going up on imuran to 75 mg a day (1.5 tabs a day)    Return in 3-4 months (in person)      ADDENDUM: I messaged Dr. Manning, he would review muscle biopsy slides by himself and would call Kristina with final result. Kristina was happy with this plan.    Orders Placed This Encounter   Procedures     ALT     Albumin level     AST     Creatinine     CK total     CBC with Platelets & Differential     Paul Mark MD      HISTORY OF PRESENTING ILLNESS:       3/25/2022:    Kristina is a 72 yo female who presents today with her . She has  been referred for m/o Sjogren's. She has h/o +SSA/SSB Sjogren's diagnosed in 2004 after having sicca x 20 yr followed by muscle weakness, inflammatory arthritis, L thigh panniculitis, ILD. Was treated with prednisone, did not tolerate MTX. Was on AZA 9047-0292. Had +BINDU, +RF.    She was last seen by Dr. Moore in 1/2020 and before that, was under care of Dr. Ho who diagnosed and treated her since 2004.    She is here to discuss her joint pain.    She has diffuse arthralgia, no joint swelling, no AM stiffness, reports loss of strength of her hands with poor .    She has arthralgia, it got worse and went downhill for past 2 years.    Sometimes gets muscle weakness, more constant lately over past few months.    Has brain fog, getting worse.    Has progressive hair loss with bald spots.    No photosensitivity or skin rashes.    Uses biotene and water, wakes up thirsty. Had a tooth which broke, sometimes teeth are chipping. Has dry mouth due to Sjogren's.    Has dry eyes, uses OTC eyedrops.    Gets R droopy eyelid, now affected L eyelid.    Eyes look red ad dry.    No parotid gland swelling.    No CP, SOB, cough, fevers or night sweats.    Gets diarrhea from certain foods.    No numbness, tingling.    Has fatigue, difficult time staying awake.    She did well on her cognitive function testing.    Mestinon caused diarrhea and stopped taking it, scheduled to have EMG and do follow up with Dr. Manning.    Updated on cancer screening.    No dysphagia.    Today 6/3/2022:    Kristina was initially seen in 3/2022 with muscle weakness, when she was treated with prednisone taper (4tab=20 mg qd x 5 days, 3tab=15 mg qd x 5 days, 2tab=10 mg qd x 5 days, 1 tab=5 mg qd x 5 days then stop) and was put back on imuran 50 mg every day. Prednisone helped her.    Overall feeling pretty good.    Had diarrhea but it is better.    Went on 4 almazan past weekend and did well.    She and her  have seen an improvement by resuming  imuran.    Easier to get out of chair.    Waiting to hear about muscle biopsy result.    ROS: A comprehensive ROS was done. Positives are per HPI.    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       Past Surgical History:   Procedure Laterality Date     BIOPSY MUSCLE DIAGNOSTIC (LOCATION) Left 2022    Procedure: BIOPSY, MUSCLE LEFT biceps @0900;  Surgeon: Tyrell Loo MD;  Location: [facility] OR     C/SECTION, LOW TRANSVERSE  10/13/1978    , Low Transverse     COLONOSCOPY       OPEN REDUCTION INTERNAL FIXATION ANKLE Right 2019    Procedure: Open reduction internal fixation right lateral malleolus fracture;  Surgeon: Rolo Oconnor DPM;  Location: [facility]     SURGICAL HISTORY OF -            SURGICAL HISTORY OF -   00    Colonoscopy       Family History   Problem Relation Age of Onset     Cancer Mother         Breast and liver- age 43     Heart Disease Father         ? chf?h/o CVA     Alzheimer Disease Father      Hypertension Father      Neurologic Disorder Father         CVA     Diabetes Maternal Grandmother      Breast Cancer Maternal Aunt      Breast Cancer Paternal Aunt      Cancer - colorectal No family hx of      Prostate Cancer No family hx of        Social History     Tobacco Use     Smoking status: Never Smoker     Smokeless tobacco: Never Used   Substance Use Topics     Alcohol use: Yes     Alcohol/week: 0.0 standard drinks     Comment: 1 glass of wine every 2 weeks     Outpatient Encounter Medications as of 6/3/2022   Medication Sig Dispense Refill     azaTHIOprine (IMURAN) 50 MG tablet Take 1 tablet (50 mg) by mouth daily 30 tablet 1     LORazepam (ATIVAN) 0.5 MG tablet TAKE ONE TABLET BY MOUTH EVERY  EIGHT HOURS AS NEEDED FOR ANXIETY 20 tablet 0     Multiple Vitamins-Minerals (MULTIVITAMIN & MINERAL PO) Take 1 tablet by mouth daily        pravastatin (PRAVACHOL) 40 MG tablet Take 1 tablet (40 mg) by mouth daily 90 tablet 3     ranolazine (RANEXA) 1000 MG TB12 12 hr tablet TAKE ONE TABLET BY MOUTH TWICE DAILY 180 tablet 0     sertraline (ZOLOFT) 100 MG tablet Take 1.5 tablets (150 mg) by mouth daily 135 tablet 1     COMPRESSION STOCKINGS 2 each daily as needed (edema) (Patient not taking: Reported on 6/3/2022) 2 each 3     VENTOLIN  (90 Base) MCG/ACT inhaler INHALE 2 PUFFS EVERY 4 HOURS AS NEEDED FOR SHORTNESS OF BREATH/DYSPNEA (Patient not taking: No sig reported) 54 g 0     No facility-administered encounter medications on file as of 6/3/2022.       Allergies   Allergen Reactions     Daypro [Oxaprozin] Rash            Lidocaine Other (See Comments)     Rapid heart rate     Nitroglycerin Other (See Comments)     Causes low blood pressure     Penicillins Unknown     Occurred as child.     Sulfa Drugs Rash     Physical exam:    /71   Pulse 72   Wt 83.5 kg (184 lb 1.6 oz)   SpO2 96%   BMI 29.71 kg/m         Constitutional: NAD. pleasant.  present.  HEENT: EOMI, no scleral icterus. Nl conj. No oral ulcers, poor salivary pool, No adenopathy or thyromegaly.   Cardiovascular: RRR, no M/R/G, trace LE edema  Respiratory: CTA b/l  Gastrointestinal:  soft, non-tender to palpation.    Musculoskeletal: No active synovitis. No joint tenderness   Skin: no rash  Neurological system: A&O x 3, proximal muscle power 5/5  Psych: nl affect    Component      Latest Ref Rng & Units 6/17/2019   WBC      4.0 - 11.0 10e9/L 5.6   RBC Count      3.8 - 5.2 10e12/L 4.19   Hemoglobin      11.7 - 15.7 g/dL 13.0   Hematocrit      35.0 - 47.0 % 39.8   MCV      78 - 100 fl 95   MCH      26.5 - 33.0 pg 31.0   MCHC      31.5 - 36.5 g/dL 32.7   RDW      10.0 - 15.0 % 13.1   Platelet Count      150 - 450 10e9/L 230   %  Neutrophils      % 56.3   % Lymphocytes      % 31.4   % Monocytes      % 9.6   % Eosinophils      % 2.3   % Basophils      % 0.4   Absolute Neutrophil      1.6 - 8.3 10e9/L 3.2   Absolute Lymphocytes      0.8 - 5.3 10e9/L 1.8   Absolute Monocytes      0.0 - 1.3 10e9/L 0.5   Absolute Eosinophils      0.0 - 0.7 10e9/L 0.1   Absolute Basophils      0.0 - 0.2 10e9/L 0.0   Diff Method       Automated Method   Sodium      133 - 144 mmol/L 140   Potassium      3.4 - 5.3 mmol/L 5.0   Chloride      94 - 109 mmol/L 106   Carbon Dioxide      20 - 32 mmol/L 31   Anion Gap      3 - 14 mmol/L 3   Glucose      70 - 99 mg/dL 96   Urea Nitrogen      7 - 30 mg/dL 17   Creatinine      0.52 - 1.04 mg/dL 0.57   GFR Estimate      >60 mL/min/1.73:m2 >90   GFR Estimate If Black      >60 mL/min/1.73:m2 >90   Calcium      8.5 - 10.1 mg/dL 9.1   Bilirubin Total      0.2 - 1.3 mg/dL 0.5   Albumin      3.4 - 5.0 g/dL 3.8   Protein Total      6.8 - 8.8 g/dL 7.4   Alkaline Phosphatase      40 - 150 U/L 59   ALT      0 - 50 U/L 27   AST      0 - 45 U/L 20   Cholesterol      <200 mg/dL 150   Triglycerides      <150 mg/dL 168 (H)   HDL Cholesterol      >49 mg/dL 70   LDL Cholesterol Calculated      <100 mg/dL 46   Non HDL Cholesterol      <130 mg/dL 80   Rheumatoid Factor      <20 IU/mL <20   CRP Inflammation      0.0 - 8.0 mg/L <2.9   Vitamin D Deficiency screening      20 - 75 ug/L 48   Vitamin B12      193 - 986 pg/mL 705     Component      Latest Ref Rng & Units 1/16/2020   Ruth-1 Autoantibodies      <20 EU 0   Scl-70 Autoantibodies      <20 EU 0   Sm (Vergara) Autoantibodies      <20 EU 1   Sm/RNP Autoantibodies      <20 EU 2   SS-A/Ro Autoantibodies      <20  (H)   SS-B/La Autoantibodies      <20  (H)   Complement C4      19 - 59 mg/dL 27   Complement C3      83 - 177 mg/dL 139   DNA (ds) Antibody      <25 IU 3       Component      Latest Ref Rng & Units 1/20/2022 2/16/2022 2/28/2022   WBC      4.0 - 11.0 10e3/uL 5.7     RBC Count       3.80 - 5.20 10e6/uL 4.14     Hemoglobin      11.7 - 15.7 g/dL 13.0     Hematocrit      35.0 - 47.0 % 39.8     MCV      78 - 100 fL 96     MCH      26.5 - 33.0 pg 31.4     MCHC      31.5 - 36.5 g/dL 32.7     RDW      10.0 - 15.0 % 13.1     Platelet Count      150 - 450 10e3/uL 223     % Neutrophils      % 57     % Lymphocytes      % 30     % Monocytes      % 10     % Eosinophils      % 3     % Basophils      % 0     Absolute Neutrophils      1.6 - 8.3 10e3/uL 3.3     Absolute Lymphocytes      0.8 - 5.3 10e3/uL 1.7     Absolute Monocytes      0.0 - 1.3 10e3/uL 0.6     Absolute Eosinophils      0.0 - 0.7 10e3/uL 0.2     Absolute Basophils      0.0 - 0.2 10e3/uL 0.0     Sodium      133 - 144 mmol/L 139 137    Potassium      3.4 - 5.3 mmol/L 4.3 4.3    Chloride      94 - 109 mmol/L 108 107    Carbon Dioxide      20 - 32 mmol/L 38 (H) 25    Anion Gap      3 - 14 mmol/L <1 (L) 5    Urea Nitrogen      7 - 30 mg/dL 22 27    Creatinine      0.52 - 1.04 mg/dL 0.56 0.59    Calcium      8.5 - 10.1 mg/dL 9.0 9.1    Glucose      70 - 99 mg/dL 99 96    Alkaline Phosphatase      40 - 150 U/L 63 67    AST      0 - 45 U/L 20 30    ALT      0 - 50 U/L 33 44    Protein Total      6.8 - 8.8 g/dL 7.4 7.3    Albumin      3.4 - 5.0 g/dL 3.5 4.0    Bilirubin Total      0.2 - 1.3 mg/dL 0.5 0.4    GFR Estimate      >60 mL/min/1.73m2 >90 >90    Color Urine      Colorless, Straw, Light Yellow, Yellow Yellow     Appearance Urine      Clear Clear     Glucose Urine      Negative mg/dL Negative     Bilirubin Urine      Negative Negative     Ketones Urine      Negative mg/dL Negative     Specific Gravity Urine      1.003 - 1.035 >=1.030     Blood Urine      Negative Negative     pH Urine      5.0 - 7.0 5.0     Protein Albumin Urine      Negative mg/dL Negative     Urobilinogen Urine      0.2, 1.0 E.U./dL 0.2     Nitrite Urine      Negative Negative     Leukocyte Esterase Urine      Negative Negative     TSH      0.40 - 4.00 mU/L 1.87     AcetChol  Binding Darlin      0.0 - 0.4 nmol/L   0.0   AcetChol Modul Darlin      <=45 %   4   Clinical Information:     71 year old female with generalized fatigue and right eyelid ptosis. She has not taken her Mestinon in at least the last week due to intolerance with diarhea. Query myopathy vs neuromuscular junction disorder. Brief exam: bilateral eyelid closure weakness and neck flexion weakness. Double vision on upward gaze and lateral gaze; Right Deltoid 5, Biceps 5, Triceps 4 bilaterally; subtle asymmetry of the finger flexors (very mild weakness on the left, normal on the right).      Techniques:     Motor and sensory conduction studies were done with surface recording electrodes. EMG was done with a concentric needle electrode.      Results:     The right peroneal-EDB motor study showed normal onset latency with low amplitude without segmental changes, and normal conduction velocities.The right tibial-AH, peroneal-TA, median-APB and ulnar-ADM motor studies were normal. The right superficial peroneal, sural, median, ulnar, and radial antidromic sensory studies were normal. The right tibial F wave latencies were normal. 3 Hz slow repetitive nerve stimulation at the right ADM and orbicularis oculi muscles did not show any significant decrement either at rest or after 1 min of maximal isometric exercise.      Needle evaluation of the right Triceps Brachii, Pronator Teres, Biceps, Tibialis anterior, Gastrocnemius (Medial Hd) and vastus lateralis showed abnormal spontaneous activity with fibrillation potentials (1+ to 2+) with CRDs present in the vastus lateralis only. The right Triceps Brachii, Pronator Teres, First Dorsal Interosseous , Biceps, and vastus lateralis showed small amplitude, short duration, polyphasic motor units with early recruitment in the triceps and biceps muscles.  The right tibialis anterior showed normal recruitment of normal appearing motor units. The right Gastrocnemius (Medial Hd) had normal  recruitment of large, long duration motor units. The right deltoid muscle was normal.      Interpretation:     Abnormal study. 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 Please note, however, that this could still be part of a myopathic process because some chronic myopathies may show large, long duration motor unit potentials. There was no electrodiagnostic evidence of a neuromuscular junction disorder like myasthenia gravis.      Comment: The results of the study were discussed with the patient and she was referred for muscle biopsy of the left triceps or biceps.   ___________________________  Millicent Diggs MD, Neuromuscular Fellow  Rene Manning MD      Component      Latest Ref Rng & Units 3/25/2022   WBC      4.0 - 11.0 10e3/uL 6.1   RBC Count      3.80 - 5.20 10e6/uL 4.12   Hemoglobin      11.7 - 15.7 g/dL 12.7   Hematocrit      35.0 - 47.0 % 38.7   MCV      78 - 100 fL 94   MCH      26.5 - 33.0 pg 30.8   MCHC      31.5 - 36.5 g/dL 32.8   RDW      10.0 - 15.0 % 13.0   Platelet Count      150 - 450 10e3/uL 236   % Neutrophils      % 59   % Lymphocytes      % 30   % Monocytes      % 7   % Eosinophils      % 2   % Basophils      % 1   % Immature Granulocytes      % 1   NRBCs per 100 WBC      <1 /100 0   Absolute Neutrophils      1.6 - 8.3 10e3/uL 3.6   Absolute Lymphocytes      0.8 - 5.3 10e3/uL 1.8   Absolute Monocytes      0.0 - 1.3 10e3/uL 0.4   Absolute Eosinophils      0.0 - 0.7 10e3/uL 0.1   Absolute Basophils      0.0 - 0.2 10e3/uL 0.0   Absolute Immature Granulocytes      <=0.4 10e3/uL 0.0   Absolute NRBCs      10e3/uL 0.0   GARETT-1 (Histidyl-tRNA Synthetase) Darlin IgG      0 - 40 AU/mL 0   PL-12 (alanyl-tRNA synthetase) Antibody      Negative Negative   PL-7 (threonyl-tRNA synthetase) Antibody      Negative Negative   EJ  (glycyl - tRNA synthetase) Antibody      Negative Negative   OJ (isoleucyl-tRNA synthetase) Antibody      Negative Negative   SRP (Signal Recognition Particle) Antibody      Negative Negative   Mi-2 (nuclear helicase protein) Antibody      Negative Negative   P155/140 (TIF1-gamma) Antibody      Negative Negative   TIF-1 Gamma Antibody      Negative Negative   SAE1 (SUMO activating enzyme) Darlin      Negative Negative   MDA5 (CADM 140) Darlin      Negative Negative   NXP-2 (Nuclear matrix protein 2) Darlin      Negative Negative   Myositis Interp       See Note   Albumin Fraction      3.7 - 5.1 g/dL 4.2   Alpha 1 Fraction      0.2 - 0.4 g/dL 0.3   Alpha 2 Fraction      0.5 - 0.9 g/dL 0.9   Beta Fraction      0.6 - 1.0 g/dL 0.9   Gamma Fraction      0.7 - 1.6 g/dL 0.9   Monoclonal Peak      <=0.0 g/dL 0.0   ELP Interpretation:       Essentially normal electrophoretic pattern. No obvious monoclonal proteins seen. Pathologic significance requires clinical correlation. Carlos Ashford M.D., Ph.D., Pathologist.   Quantiferon-TB Gold Plus Result      Negative Negative   TB1 Ag minus Nil Value      IU/mL 0.01   TB2 Ag minus Nil Value      IU/mL 0.00   Mitogen minus Nil Result      IU/mL 9.97   Nil Result      IU/mL 0.03   BINDU interpretation      Negative Positive (A)   BINDU pattern 1       Speckled   BINDU titer 1       1:160   Iron      35 - 180 ug/dL 69   Iron Binding Cap      240 - 430 ug/dL 344   Iron Saturation Index      15 - 46 % 20   Creatinine      0.52 - 1.04 mg/dL 0.63   GFR Estimate      >60 mL/min/1.73m2 >90   ALT      0 - 50 U/L 31   Albumin      3.4 - 5.0 g/dL 3.6   AST      0 - 45 U/L 18   Thyroglobulin Antibody      <40 IU/mL <20   CK Total      30 - 225 U/L 244 (H)   Aldolase      1.2 - 7.6 U/L 3.9   Complement C3      81 - 157 mg/dL 142   Complement C4      13 - 39 mg/dL 26   CRP Inflammation      0.0 - 8.0 mg/L <2.9   Cryoglobulin Interpretation      Negative Negative   Cyclic Citrullinated Peptide Antibody,  IgG      <7.0 U/mL 2.1   DNA-ds      <10.0 IU/mL 1.1   Vitamin D Deficiency screening      20 - 75 ug/L 41   Vitamin B12      193 - 986 pg/mL 603   Thyroid Peroxidase Antibody      <35 IU/mL <10   Rheumatoid Factor      <12 IU/mL 15 (H)   Hepatitis C Antibody      Nonreactive Nonreactive   Hep B Surface Agn      Nonreactive Nonreactive   Sed Rate      0 - 30 mm/hr 14   Ferritin      8 - 252 ng/mL 85   Hepatitis B Core Darlin      Nonreactive Nonreactive   Total Protein Serum for ELP      6.8 - 8.8 g/dL 7.2   Quantiferon Nil Tube      IU/mL 0.03   Quantiferon TB1 Tube      IU/mL 0.04   Quantiferon TB2 Tube       0.03   QUANTIFERON MITOGEN      IU/mL 10.00

## 2022-06-03 NOTE — NURSING NOTE
Chief Complaint   Patient presents with     RECHECK     Sjogren's syndrom c/up       Blood pressure 120/71, pulse 72, weight 83.5 kg (184 lb 1.6 oz), SpO2 96 %, not currently breastfeeding.    ALEJANDRO CHISHOLM

## 2022-06-03 NOTE — PATIENT INSTRUCTIONS
Labs today and every 3 months (standing order)    Follow on muscle biopsy result, consider going up on imuran to 75 mg a day (1.5 tabs a day)    Return in 3-4 months (in person)

## 2022-06-03 NOTE — TELEPHONE ENCOUNTER
Routing refill request to provider for review/approval because:  Labs not current:  Lipids > year    Thomas Miguel RN

## 2022-06-08 ENCOUNTER — OFFICE VISIT (OUTPATIENT)
Dept: NEUROLOGY | Facility: CLINIC | Age: 72
End: 2022-06-08
Payer: COMMERCIAL

## 2022-06-08 VITALS
WEIGHT: 184 LBS | HEART RATE: 77 BPM | SYSTOLIC BLOOD PRESSURE: 145 MMHG | OXYGEN SATURATION: 97 % | DIASTOLIC BLOOD PRESSURE: 78 MMHG | HEIGHT: 66 IN | BODY MASS INDEX: 29.57 KG/M2

## 2022-06-08 DIAGNOSIS — M35.09 SJOGREN'S SYNDROME WITH OTHER ORGAN INVOLVEMENT (H): ICD-10-CM

## 2022-06-08 DIAGNOSIS — G71.3 MITOCHONDRIAL MYOPATHY: ICD-10-CM

## 2022-06-08 DIAGNOSIS — G31.84 MILD COGNITIVE IMPAIRMENT: Primary | ICD-10-CM

## 2022-06-08 DIAGNOSIS — G70.00 MYASTHENIA GRAVIS WITHOUT EXACERBATION (H): ICD-10-CM

## 2022-06-08 PROCEDURE — 99215 OFFICE O/P EST HI 40 MIN: CPT | Performed by: PSYCHIATRY & NEUROLOGY

## 2022-06-08 RX ORDER — AZATHIOPRINE 50 MG/1
TABLET ORAL
Qty: 90 TABLET | Refills: 0 | Status: SHIPPED | OUTPATIENT
Start: 2022-06-08 | End: 2022-10-03

## 2022-06-08 NOTE — NURSING NOTE
"Albina Pedro is a 71 year old female who presents for:  Chief Complaint   Patient presents with     Follow Up     Myasthenia Gravis        Initial Vitals:  BP (!) 145/78   Pulse 77   Ht 1.676 m (5' 6\")   Wt 83.5 kg (184 lb)   SpO2 97%   BMI 29.70 kg/m   Estimated body mass index is 29.7 kg/m  as calculated from the following:    Height as of this encounter: 1.676 m (5' 6\").    Weight as of this encounter: 83.5 kg (184 lb).. Body surface area is 1.97 meters squared. BP completed using cuff size: regular    Nursing Comments:     Ronaldo Walters    "

## 2022-06-08 NOTE — PROGRESS NOTES
Service Date: 2022    Carolina Burrell MD  [facility]  [address] [place]  [address]    RE:  Albina Pedro  MRN:  [id number]  :  1950    Dear Doctors:    I had the pleasure to see Kristina Pedro in followup at the [facility].  I first saw her in 2022 for chief complaints of fatigue and right eyelid ptosis.  She has severe sicca syndrome and positive SSA, SSB antibodies and she was diagnosed with Sjogren and saw Dr. Mark for this recently.  She also has occasional swollen knuckles but no oral ulcers.  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 Perlman previously.  When she saw me, she reported fatigue but also right more than left eyelid ptosis that worsens at the end of the day.  She was not complaining of double vision.She has some difficulty with dysphagia with solid food that gets stuck in her lower part of the esophagus.  No problems with liquids.  No difficulty chewing.  She does have some dyspnea on exertion and she cannot get up from a chair without using her arms.  She did not report any fatigue of her arms when brushing her teeth or blow drying hair.      Her evaluations including CK levels were basically normal except for a single CK elevation a few months ago that was minor at 244.  The rest were between 130 to 180 over the past 9 years. The most recent aldolase levels, complement C3-C4 level, CRP, cryoglobulin CCP antibodies and DNA double stranded antibodies were all negative or normal.  Vitamin D levels were normal and TSH intact and TPO antibodies negative.      She had an EMG with me on 2022.  My chief concern at the time was to rule out myasthenia gravis Repetitive nerve stimulation done from the right abductor digiti minimi and orbicularis oculi were negative.  Nerve conduction studies were normal except for a small right peroneal from EDB.  Sensory nerve conduction  "studies were low normal in the lower extremities.  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  Acetylcholine receptor binding, modulating and blocking antibodies were twice negative, one in 2022 ordered by me and one in 09/2020 ordered by [facility]. [facility] had also ordered MuSK antibodies that were negative.      On those grounds, I decided to proceed with a muscle biopsy, which was done recently from the biceps (I had requested the triceps). I reviewed the biopsy slides myself. Interestingly it showed some mitochondrial changes in the muscle with ragged blue and GONZALEZ-negative fibers, which was a bit surprising because that is not what I expected. There was no inflammation at all.  No evidence of muscle fiber necrosis.  There was also type 1 fiber predominance.      The patient takes chronically pravastatin and ranolazine for angina.  Those have not been stopped recently.  There is no family history of muscle disease that she is aware of.    BP (!) 145/78   Pulse 77   Ht 1.676 m (5' 6\")   Wt 83.5 kg (184 lb)   SpO2 97%   BMI 29.70 kg/m      On repeat exam today, Kristina is in good spirits.  She does have mild bilateral eyelid ptosis.  Whether it is fatigable is uncertain.  I am not confident based on exam.  Diplopia, however, does occur instantly when gazing upwards or downwards and to the left but not to the right.  Ductions and versions of the eyes are normal.  Diplopia is clearly binocular.  She has at least moderate weakness of orbicularis oculi bilaterally.  She also has lower facial weakness on my exam today with atrophy of mentalis and orbicularis oris, and difficulty blowing her cheeks.  Strength of jaw opening and closure is normal.  Tongue shows no atrophy or fasciculations.  Genioglossus is strong.  Uvula and palate elevate normally.  " There is no dysphonia or dysarthria.  Her neck flexion is mildly weak at 4/5, extension is 5.      Strength of the deltoids is probably 5, but pectoralis are 4 to 4+.  Biceps strength is 4+ on the left, 5 on the right.  Triceps is weak at 4 bilaterally, weaker than the biceps.  Wrist extensors, finger extensors and FDI are bilaterally 5/5.  APB is probably 4.  Hip flexion is bilaterally 4.  Knee extension, knee flexion, foot dorsiflexion, however, are 5.  She cannot get up from the chair with arms crossed on the chest.    In summary, Kristina has fatigue, ptosis, objective diplopia, prominent facial weakness and weakness of the proximal upper more than lower extremities, chiefly affecting the deltoid and the triceps. With this phenotype, I remain concerned about myasthenia despite negative antibodies and negative repetitive nerve stimulation. One reason the repetitive nerve stimulation was negative is that it was not done from muscles that were particularly weak.  I think it is worth, before asking for a single-fiber EMG, to consider another repetitive nerve stimulation trial from the triceps or the anconeus muscle muscle is among her weakest. If that is abnormal, then she likely has myasthenia gravis.  If that is normal, one should consider a single-fiber EMG.      The finding of mitochondrial abnormalities on the muscle biopsy was surprising and I do not think her phenotype is typical of mitochondrial myopathy. While one can definitely see ptosis and facial weakness with mitochondrial myopathy, there is usually severe opthalmoplegia, which I do NOT see, and in addition onset at age 68 would be very unusual with this diagnosis. I think this could be a red herring and the mitochondrial proliferation seen could simply be due to age-related changes. I will order GFD15 and lactic acid levels from plasma. If both are normal, genetic mitochondrial myopathy is very unlikely and we should look into other  diagnoses.  Differential includes also an inflammatory myopathy related to Sjogren's that was missed because of a sampling error. However, facial weakness and ptosis are very unusual in treatable forms of myositis. If repetitive stimulation and biochemical tests are negative, then one should consider a thigh muscle MRI with contrast and if there is edema or enhancement, I would repeat the biopsy  from another muscle such as the vastus lateralis or the triceps.      Of note, she did not tolerate pyridostigmine well because of diarrhea, and Dr. Mark  during her recent visit put her on azathioprine low dose 50 mg, which she still thinks may have helped a bit. She also received a tapering dose of prednisone, but I am not sure how much that helped.      Lastly, Kristina is complaining of forgetfulness. She misplaces her objects.  Her short-term memory is not good, whereas her long term is better preserved.  She does have a family history of Alzheimer disease. Review of the chart indicates she had neuropsych testing three times (2013, 2017, and 2021). I reviewed last year's assessment. Findings suggest mild cognitive impairment but it was felt that the picture was not typical for Alzheimer's as naming and fluency remained preserved and overall there was no major decline of her episodic memory from the first to the last assessment. Her B12 and TSH levels are within normal limits and she had an MRI of the brain a year ago showing some microvascular changes but nothing else major that would explain cognitive issues. I think one should consider a repeat neuropsych assessment here. If the course is felt to be atypical for MCI, it may be useful to get a biomarker of amyloid deposition (blood, CSF test, or PET scan). I will discuss her case with one of our Dementia specialist.      I discussed all those possibilities with Kristina.  I will see her in followup in about 3 months.  Hopefully, the additional tests will clarify her  diagnosis.     Total time spent on this encounter today 43 minutes including 23 face-to-face, 10 on post-visit note dictation, editing and orders and 10 in pre-visit chart review.    I discussed all those possibilities with Kristina.  I will see her in followup in about 3 months.  Hopefully, the additional tests will clarify her diagnosis.     Sincerely,    Rene Manning MD    cc:  Paul Mark MD  [facility]  [address]  [place]  [address]      ADDENDUM (6/14/2022): Repetitive nerve stimulation done today showed a major decrement at the right anconeus/triceps (>50%) with partial repair after exercise. RNS of the right median nerve was normal. 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 I don't see a reason to investigate the patient further for mitochondrial myopathy so I cancelled the GDF15 and lactic acid tests. Discussed treatment of MG with patient. I recommended starting prednisone 10 mg daily. Side effects of steroids were extensively discussed and I will order a baseline bone density scan too. Because of low dose I did not prescribe PUD or PJP prophylaxis. She is also on azathioprine prescribed by Dr Mark for Sjogren's. Ideally, for MG I would like the patient on a bit higher dose, not just 50 mg daily (ideally ~150 mg daily). Will discuss with Rheumatology. I also will prescribe pyridostigmine 60 mg tid for MG again, but this time I will also offer an antispasmodic drug like Anaspaz, to be taken 30' prior to each pyridostigmine dose to prevent diarrhea (which was the reason she previously dropped the pyridostigmine). Kristina has a good understanding of the above. She will be seen in Clinic in 3 months as originally planned.  I did not discuss the cognitive issue today but I had a conversation a few day ago with one of our Dementia experts, Dr Devine. We discussed whether it would be useful to obtain amyloid biomarkers due to her atypical history and  progression. He agreed, and favored getting CSF studies for A beta-42 and p-tau. I will bring this up with patient during next appointment. DAVIS Manning MD        D: 2022   T: 2022   MT: rizwana    Name:     MORENA ELLINGTON  MRN:      [id number]        Account:      [id number]   :      1950           Service Date: 2022       Document: [id number]

## 2022-06-08 NOTE — TELEPHONE ENCOUNTER
Azathioprine 50 mg daily    Last Written Prescription Date:  3/25/2022  Last Fill Quantity: 30 ,   # refills: 1  Last Office Visit: 6/3/2022  Future Office visit:  10/28/2022    CBC RESULTS: Recent Labs   Lab Test 06/03/22  1314   WBC 5.1   RBC 4.34   HGB 13.7   HCT 40.7   MCV 94   MCH 31.6   MCHC 33.7   RDW 14.1          Creatinine   Date Value Ref Range Status   06/03/2022 0.72 0.52 - 1.04 mg/dL Final   06/16/2021 0.70 0.52 - 1.04 mg/dL Final   ]    Liver Function Studies -   Recent Labs   Lab Test 06/03/22  1314 03/25/22  1514 02/16/22  1740   PROTTOTAL  --   --  7.3   ALBUMIN 3.5   < > 4.0   BILITOTAL  --   --  0.4   ALKPHOS  --   --  67   AST 29   < > 30   ALT 43   < > 44    < > = values in this interval not displayed.       Routing refill request to provider for review/approval because:  DMARD

## 2022-06-08 NOTE — PATIENT INSTRUCTIONS
Repeat EMG testing 6/14 at 1 pm to rule out myasthenia (I will do it)  Lab tests- we can schedule for 6/14 too- to rule out mitochondrial disease ( I very much doubt it)  Will schedule repeat cognitive testing as well.     Follow up 3 months

## 2022-06-08 NOTE — LETTER
2022         RE: Albina Pedro  [id number]  [facility] [place] [address]        Dear Colleague,    Thank you for referring your patient, Albina Pedro, to the [facility]. Please see a copy of my visit note below.    Service Date: 2022    Carolina Burrell MD  [facility]  [address]  [address]    RE:  Albina Pedro  MRN:  [id number]  :  1950    Dear Doctors:    I had the pleasure to see Kristina Pedro in followup at the [facility].  I first saw her in 2022 for chief complaints of fatigue and right eyelid ptosis.  She has severe sicca syndrome and positive SSA, SSB antibodies and she was diagnosed with Sjogren and saw Dr. Mark for this recently.  She also has occasional swollen knuckles but no oral ulcers.  She has a history of interstitial lung disease related to Sjogren, followed at [facility] by Dr. Perlman previously.  When she saw me, she reported fatigue but also right more than left eyelid ptosis that worsens at the end of the day.  She was not complaining of double vision.She has some difficulty with dysphagia with solid food that gets stuck in her lower part of the esophagus.  No problems with liquids.  No difficulty chewing.  She does have some dyspnea on exertion and she cannot get up from a chair without using her arms.  She did not report any fatigue of her arms when brushing her teeth or blow drying hair.      Her evaluations including CK levels were basically normal except for a single CK elevation a few months ago that was minor at 244.  The rest were between 130 to 180 over the past 9 years. The most recent aldolase levels, complement C3-C4 level, CRP, cryoglobulin CCP antibodies and DNA double stranded antibodies were all negative or normal.  Vitamin D levels were normal and TSH intact and TPO antibodies negative.      She had an EMG with me on 2022.   "My chief concern at the time was to rule out myasthenia gravis Repetitive nerve stimulation done from the right abductor digiti minimi and orbicularis oculi were negative.  Nerve conduction studies were normal except for a small right peroneal from EDB.  Sensory nerve conduction studies were low normal in the lower extremities.  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  Acetylcholine receptor binding, modulating and blocking antibodies were twice negative, one in 2022 ordered by me and one in 09/2020 ordered by [facility]. [facility] had also ordered MuSK antibodies that were negative.      On those grounds, I decided to proceed with a muscle biopsy, which was done recently from the biceps (I had requested the triceps). I reviewed the biopsy slides myself. Interestingly it showed some mitochondrial changes in the muscle with ragged blue and GONZALEZ-negative fibers, which was a bit surprising because that is not what I expected. There was no inflammation at all.  No evidence of muscle fiber necrosis.  There was also type 1 fiber predominance.      The patient takes chronically pravastatin and ranolazine for angina.  Those have not been stopped recently.  There is no family history of muscle disease that she is aware of.    BP (!) 145/78   Pulse 77   Ht 1.676 m (5' 6\")   Wt 83.5 kg (184 lb)   SpO2 97%   BMI 29.70 kg/m      On repeat exam today, Kristina is in good spirits.  She does have mild bilateral eyelid ptosis.  Whether it is fatigable is uncertain.  I am not confident based on exam.  Diplopia, however, does occur instantly when gazing upwards or downwards and to the left but not to the right.  Ductions and versions of the eyes are normal.  Diplopia is clearly binocular.  She has at least moderate weakness of orbicularis oculi bilaterally.  She also " has lower facial weakness on my exam today with atrophy of mentalis and orbicularis oris, and difficulty blowing her cheeks.  Strength of jaw opening and closure is normal.  Tongue shows no atrophy or fasciculations.  Genioglossus is strong.  Uvula and palate elevate normally.  There is no dysphonia or dysarthria.  Her neck flexion is mildly weak at 4/5, extension is 5.      Strength of the deltoids is probably 5, but pectoralis are 4 to 4+.  Biceps strength is 4+ on the left, 5 on the right.  Triceps is weak at 4 bilaterally, weaker than the biceps.  Wrist extensors, finger extensors and FDI are bilaterally 5/5.  APB is probably 4.  Hip flexion is bilaterally 4.  Knee extension, knee flexion, foot dorsiflexion, however, are 5.  She cannot get up from the chair with arms crossed on the chest.    In summary, Kristina has fatigue, ptosis, objective diplopia, prominent facial weakness and weakness of the proximal upper more than lower extremities, chiefly affecting the deltoid and the triceps. With this phenotype, I remain concerned about myasthenia despite negative antibodies and negative repetitive nerve stimulation. One reason the repetitive nerve stimulation was negative is that it was not done from muscles that were particularly weak.  I think it is worth, before asking for a single-fiber EMG, to consider another repetitive nerve stimulation trial from the triceps or the anconeus muscle muscle is among her weakest. If that is abnormal, then she likely has myasthenia gravis.  If that is normal, one should consider a single-fiber EMG.      The finding of mitochondrial abnormalities on the muscle biopsy was surprising and I do not think her phenotype is typical of mitochondrial myopathy. While one can definitely see ptosis and facial weakness with mitochondrial myopathy, there is usually severe opthalmoplegia, which I do NOT see, and in addition onset at age 68 would be very unusual with this diagnosis. I think this  could be a red herring and the mitochondrial proliferation seen could simply be due to age-related changes. I will order GFD15 and lactic acid levels from plasma. If both are normal, genetic mitochondrial myopathy is very unlikely and we should look into other diagnoses.  Differential includes also an inflammatory myopathy related to Sjogren's that was missed because of a sampling error. However, facial weakness and ptosis are very unusual in treatable forms of myositis. If repetitive stimulation and biochemical tests are negative, then one should consider a thigh muscle MRI with contrast and if there is edema or enhancement, I would repeat the biopsy  from another muscle such as the vastus lateralis or the triceps.      Of note, she did not tolerate pyridostigmine well because of diarrhea, and Dr. Mark  during her recent visit put her on azathioprine low dose 50 mg, which she still thinks may have helped a bit. She also received a tapering dose of prednisone, but I am not sure how much that helped.      Lastly, Kristina is complaining of forgetfulness. She misplaces her objects.  Her short-term memory is not good, whereas her long term is better preserved.  She does have a family history of Alzheimer disease. Review of the chart indicates she had neuropsych testing three times (2013, 2017, and 2021). I reviewed last year's assessment. Findings suggest mild cognitive impairment but it was felt that the picture was not typical for Alzheimer's as naming and fluency remained preserved and overall there was no major decline of her episodic memory from the first to the last assessment. Her B12 and TSH levels are within normal limits and she had an MRI of the brain a year ago showing some microvascular changes but nothing else major that would explain cognitive issues. I think one should consider a repeat neuropsych assessment here. If the course is felt to be atypical for MCI, it may be useful to get a biomarker of  amyloid deposition (blood, CSF test, or PET scan). I will discuss her case with one of our Dementia specialist.      I discussed all those possibilities with Kristina.  I will see her in followup in about 3 months.  Hopefully, the additional tests will clarify her diagnosis.     Total time spent on this encounter today 43 minutes including 23 face-to-face, 10 on post-visit note dictation, editing and orders and 10 in pre-visit chart review.    I discussed all those possibilities with Kristina.  I will see her in followup in about 3 months.  Hopefully, the additional tests will clarify her diagnosis.     Sincerely,    Rene Manning MD    cc:  Paul Mark MD  [address]  [address]      Rene Manning MD        D: 2022   T: 2022   MT: rizwana    Name:     MORENA ELLINGTON  MRN:      [id number]        Account:      [id number]   :      1950           Service Date: 2022       Document: [id number]      Again, thank you for allowing me to participate in the care of your patient.        Sincerely,        Rene Manning MD

## 2022-06-13 ENCOUNTER — THERAPY VISIT (OUTPATIENT)
Dept: SLEEP MEDICINE | Facility: CLINIC | Age: 72
End: 2022-06-13
Attending: NURSE PRACTITIONER
Payer: COMMERCIAL

## 2022-06-13 DIAGNOSIS — M35.09 SJOGREN'S SYNDROME WITH OTHER ORGAN INVOLVEMENT (H): ICD-10-CM

## 2022-06-13 DIAGNOSIS — G47.19 EXCESSIVE DAYTIME SLEEPINESS: ICD-10-CM

## 2022-06-13 DIAGNOSIS — R53.83 FATIGUE, UNSPECIFIED TYPE: ICD-10-CM

## 2022-06-13 DIAGNOSIS — E66.811 OBESITY (BMI 30.0-34.9): ICD-10-CM

## 2022-06-13 DIAGNOSIS — J84.9 ILD (INTERSTITIAL LUNG DISEASE) (H): ICD-10-CM

## 2022-06-13 DIAGNOSIS — R06.83 SNORING: ICD-10-CM

## 2022-06-13 PROCEDURE — 95810 POLYSOM 6/> YRS 4/> PARAM: CPT | Performed by: INTERNAL MEDICINE

## 2022-06-14 ENCOUNTER — OFFICE VISIT (OUTPATIENT)
Dept: NEUROLOGY | Facility: CLINIC | Age: 72
End: 2022-06-14
Attending: PSYCHIATRY & NEUROLOGY
Payer: COMMERCIAL

## 2022-06-14 DIAGNOSIS — K52.1 DRUG-INDUCED DIARRHEA: Primary | ICD-10-CM

## 2022-06-14 DIAGNOSIS — M81.0 AGE-RELATED OSTEOPOROSIS WITHOUT CURRENT PATHOLOGICAL FRACTURE: ICD-10-CM

## 2022-06-14 DIAGNOSIS — G70.00 MYASTHENIA GRAVIS WITHOUT EXACERBATION (H): ICD-10-CM

## 2022-06-14 PROCEDURE — 95937 NEUROMUSCULAR JUNCTION TEST: CPT | Performed by: PSYCHIATRY & NEUROLOGY

## 2022-06-14 RX ORDER — PYRIDOSTIGMINE BROMIDE 60 MG/1
60 TABLET ORAL 3 TIMES DAILY
Qty: 90 TABLET | Refills: 1 | Status: SHIPPED | OUTPATIENT
Start: 2022-06-14 | End: 2022-09-12

## 2022-06-14 RX ORDER — PREDNISONE 10 MG/1
10 TABLET ORAL DAILY
Qty: 30 TABLET | Refills: 1 | Status: SHIPPED | OUTPATIENT
Start: 2022-06-14 | End: 2022-09-12

## 2022-06-14 RX ORDER — HYOSCYAMINE SULFATE 0.125 MG
0.25 TABLET ORAL EVERY 8 HOURS
Qty: 180 TABLET | Refills: 1 | Status: SHIPPED | OUTPATIENT
Start: 2022-06-14 | End: 2022-08-29

## 2022-06-14 NOTE — LETTER
2022       RE: Albina Pedro  [id number]  [facility] [place] [address]     Dear Colleague,    Thank you for referring your patient, Albina Pedro, to the [facility] [place] at [facility]. Please see a copy of my visit note below.                        [facility]  Electrodiagnostic Laboratory                 Department of Neurology                                                                                                         Test Date:  2022    Patient: Kristina Pedro : 1950 Physician: Rene Manning MD   Sex: Female AGE: 71 year Ref Phys:    ID#: [id number]   Technician: Allan Albarran     Clinical Information:    71 year old woman with chief complaint of ptosis, and generalized fatigue/muscle weakness. Examination showed variable right eyelid ptosis, diplopia in all gaze directions, weakness of orbicularis oculi bilaterally, and weakness of triceps and deltoids (mostly triceps) bilaterally. CK is normal. Acetylcholine receptor binding, modulating, and MuSK antibodies were negative. 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 A muscle biopsy from the biceps showed no inflammation, and mitochondrial changes which were not aligning with the history and clinical suspicion. Repeat RNS testing was requested from the anconeus muscle to evaluate for seronegative MG.     Techniques:    RNS studies were done with surface recording electrodes.     Results:    3 Hz RNS of the right median nerve (APB) showed a CMAP amplitude decrement of 6% at rest, with repair to 0.6% after 1 min of maximal isometric exercise, and then gradual worsening again to 6% after 3 minutes. This result is considered normal because the cutoff for abnormal  decrement in our lab is 10%. 3 Hz RNS of the right radial nerve (anconeus) showed a dramatic decrement at rest (56%) with partial repair to 8.6% after 1 min of maximal isometric exercise, and further decline to 15% 2 mins later.     Interpretation:    Abnormal study showing evidence of a postsynaptic disorder of neuromuscular junction like myasthenia gravis. The RNS abnormalities are characteristically more prominent in clinically weak muscles (triceps/anconeus) and not in clinically strong muscles (APB).     ___________________________  Rene Manning MD        RNS     Trial # Label Amp 1 (mV)  O-P Amp 4 (mV)  O-P Amp % Dif Area 1 (mV ms) Area 4 (mV ms) Area % Dif Rep Rate Train Length Pause Time (min:sec) Comments   Right Anconeus   Tr 1 Baseline 2.45 1.07 -56.5 2.71 1.29 -52.5 3.00 6 00:30    Tr 2 Post Exercise 1.63 0.82 -49.6 2.67 1.61 -39.8 3.00 6 01:00    Tr 3 1 min Post 2.08 1.90 -8.6 2.71 2.48 -8.3 3.00 6 01:00    Tr 4 2 min Post 3.10 2.64 -15.1 4.50 3.64 -19.2 3.00 6 01:00    Tr 5 3 min Post       3.00 6 00:00    Tr 6  2.74 2.53 -7.5 3.02 3.45 14.5 3.00 6 00:00    Tr 7 baseline repeat 3.62 3.20 -11.6 5.60 4.92 -12.1 3.00 6 00:00    Right Abductor Pollicis Brevis   Tr 1 Baseline 7.06 6.62 -6.1 17.09 15.73 -8.0 3.00 6 00:30    Tr 2 Post Exercise 8.53 8.47 -0.6 20.53 19.49 -5.1 3.00 6 01:00    Tr 3 1 min Post 7.99 7.64 -4.4 19.20 17.85 -7.0 3.00 6 01:00    Tr 4 2 min Post 8.01 7.59 -5.2 19.24 17.31 -10.0 3.00 6 01:00    Tr 5 3 min Post 8.20 7.71 -6.0 19.22 17.23 -10.4 3.00 6 00:00            NCS Waveforms:              Ultrasound Images:          Sincerely,    Rene Manning MD

## 2022-06-14 NOTE — PATIENT INSTRUCTIONS
Your test today showed evidence of myasthenia gravis. This is an autoimmune disease affecting the connection of the nerve to the muscle called neuromuscular junction disorder.   To treat myasthenia, one should address the immune system. I will ask Dr Mark to increase your azathioprine dose a bit.  I would also recommend taking prednisone 10 mg daily.    Steroids (prednisone) come with a very long list of side effects. Those include:    High blood pressure, weight gain, swelling, increase in blood sugars (diabetes), increased risk of infection, skin bruising, stomach upset, and occasionally bleed,  Osteoporosis (loss of bone density), cataracts, glaucoma (high eye pressure), mild weakness of the core leg muscles (difficulty rising from a chair), and trouble sleeping/mood changes.  To minimize those problems I recommend:    1) Take it all in the morning. Taking it in the evening can cause insomnia. If you still have hard time sleeping, consider taking over the counter melatonin 3 mg 2 hours before going to bed.  2) Check with an eye doctor at least once a year for cataracts and glaucoma.  3) Try to exercise regularly, three times a week.  4) Take calcium and vitamin D daily and I will also order a bone density scan now to look for osteoporosis.   5) Report any signs of infection (fever, chills, malaise, etc) to your primary care doctor promptly.  6) Check your blood pressure once a week  7) Avoid refined sugars in your diet, and we will be periodically checking a blood test called hemoglobin A1c to see if you have developed diabetes.       Lastly, we should try again the medication called pyridostigmine. It should be taken three times a day, and I will also give you another medication to prevent diarrhea, which should be taken 30 minutes before each pyridostigmine dose.

## 2022-06-14 NOTE — PROGRESS NOTES
[facility]  Electrodiagnostic Laboratory                 Department of Neurology                                                                                                         Test Date:  2022    Patient: Kristina Pedro : 1950 Physician: Rene Manning MD   Sex: Female AGE: 71 year Ref Phys:    ID#: [id number]   Technician: Allan Albarran     Clinical Information:    71 year old woman with chief complaint of ptosis, and generalized fatigue/muscle weakness. Examination showed variable right eyelid ptosis, diplopia in all gaze directions, weakness of orbicularis oculi bilaterally, and weakness of triceps and deltoids (mostly triceps) bilaterally. CK is normal. Acetylcholine receptor binding, modulating, and MuSK antibodies were negative. 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 A muscle biopsy from the biceps showed no inflammation, and mitochondrial changes which were not aligning with the history and clinical suspicion. Repeat RNS testing was requested from the anconeus muscle to evaluate for seronegative MG.     Techniques:    RNS studies were done with surface recording electrodes.     Results:    3 Hz RNS of the right median nerve (APB) showed a CMAP amplitude decrement of 6% at rest, with repair to 0.6% after 1 min of maximal isometric exercise, and then gradual worsening again to 6% after 3 minutes. This result is considered normal because the cutoff for abnormal decrement in our lab is 10%. 3 Hz RNS of the right radial nerve (anconeus) showed a dramatic decrement at rest (56%) with partial repair to 8.6% after 1 min of maximal isometric exercise, and further decline to 15% 2 mins later.     Interpretation:    Abnormal study showing evidence of a postsynaptic disorder of neuromuscular  junction like myasthenia gravis. The RNS abnormalities are characteristically more prominent in clinically weak muscles (triceps/anconeus) and not in clinically strong muscles (APB).     ___________________________  Rene Manning MD        RNS     Trial # Label Amp 1 (mV)  O-P Amp 4 (mV)  O-P Amp % Dif Area 1 (mV ms) Area 4 (mV ms) Area % Dif Rep Rate Train Length Pause Time (min:sec) Comments   Right Anconeus   Tr 1 Baseline 2.45 1.07 -56.5 2.71 1.29 -52.5 3.00 6 00:30    Tr 2 Post Exercise 1.63 0.82 -49.6 2.67 1.61 -39.8 3.00 6 01:00    Tr 3 1 min Post 2.08 1.90 -8.6 2.71 2.48 -8.3 3.00 6 01:00    Tr 4 2 min Post 3.10 2.64 -15.1 4.50 3.64 -19.2 3.00 6 01:00    Tr 5 3 min Post       3.00 6 00:00    Tr 6  2.74 2.53 -7.5 3.02 3.45 14.5 3.00 6 00:00    Tr 7 baseline repeat 3.62 3.20 -11.6 5.60 4.92 -12.1 3.00 6 00:00    Right Abductor Pollicis Brevis   Tr 1 Baseline 7.06 6.62 -6.1 17.09 15.73 -8.0 3.00 6 00:30    Tr 2 Post Exercise 8.53 8.47 -0.6 20.53 19.49 -5.1 3.00 6 01:00    Tr 3 1 min Post 7.99 7.64 -4.4 19.20 17.85 -7.0 3.00 6 01:00    Tr 4 2 min Post 8.01 7.59 -5.2 19.24 17.31 -10.0 3.00 6 01:00    Tr 5 3 min Post 8.20 7.71 -6.0 19.22 17.23 -10.4 3.00 6 00:00            NCS Waveforms:              Ultrasound Images:

## 2022-06-14 NOTE — PATIENT INSTRUCTIONS
[place] [facility]    1. Your sleep study will be reviewed by a sleep physician within the next few days.     2. Please follow up in the sleep clinic as scheduled, or, make an appointment with your sleep provider to be seen within two weeks to discuss the results of the sleep study.    3. If you have any questions or problems with your treatment plan, please contact your sleep clinic provider at [phone] to further manage your condition.    4. Please review your attached medication list, and, at your follow-up appointment advise your sleep clinic provider about any changes.    5. Go to [email or web address] for more information about your sleep problems.    Ana Dye, RPSGT  June 14, 2022

## 2022-06-21 ENCOUNTER — TELEPHONE (OUTPATIENT)
Dept: NEUROLOGY | Facility: CLINIC | Age: 72
End: 2022-06-21
Payer: COMMERCIAL

## 2022-06-21 NOTE — TELEPHONE ENCOUNTER
Patient stated that they seeing another provider and did not want to make a appointment with Dr Ku

## 2022-06-22 LAB — SLPCOMP: NORMAL

## 2022-06-27 ENCOUNTER — VIRTUAL VISIT (OUTPATIENT)
Dept: SLEEP MEDICINE | Facility: CLINIC | Age: 72
End: 2022-06-27
Payer: COMMERCIAL

## 2022-06-27 VITALS — BODY MASS INDEX: 28.93 KG/M2 | HEIGHT: 66 IN | WEIGHT: 180 LBS

## 2022-06-27 DIAGNOSIS — G47.33 OSA (OBSTRUCTIVE SLEEP APNEA): Primary | ICD-10-CM

## 2022-06-27 DIAGNOSIS — G47.36 HYPOXEMIA ASSOCIATED WITH SLEEP: ICD-10-CM

## 2022-06-27 PROCEDURE — 99215 OFFICE O/P EST HI 40 MIN: CPT | Mod: 95 | Performed by: NURSE PRACTITIONER

## 2022-06-27 ASSESSMENT — SLEEP AND FATIGUE QUESTIONNAIRES
HOW LIKELY ARE YOU TO NOD OFF OR FALL ASLEEP WHILE SITTING AND READING: WOULD NEVER DOZE
HOW LIKELY ARE YOU TO NOD OFF OR FALL ASLEEP WHILE SITTING QUIETLY AFTER LUNCH WITHOUT ALCOHOL: WOULD NEVER DOZE
HOW LIKELY ARE YOU TO NOD OFF OR FALL ASLEEP WHILE LYING DOWN TO REST IN THE AFTERNOON WHEN CIRCUMSTANCES PERMIT: MODERATE CHANCE OF DOZING
HOW LIKELY ARE YOU TO NOD OFF OR FALL ASLEEP IN A CAR, WHILE STOPPED FOR A FEW MINUTES IN TRAFFIC: WOULD NEVER DOZE
HOW LIKELY ARE YOU TO NOD OFF OR FALL ASLEEP WHEN YOU ARE A PASSENGER IN A CAR FOR AN HOUR WITHOUT A BREAK: WOULD NEVER DOZE
HOW LIKELY ARE YOU TO NOD OFF OR FALL ASLEEP WHILE SITTING INACTIVE IN A PUBLIC PLACE: WOULD NEVER DOZE
HOW LIKELY ARE YOU TO NOD OFF OR FALL ASLEEP WHILE SITTING AND TALKING TO SOMEONE: WOULD NEVER DOZE
HOW LIKELY ARE YOU TO NOD OFF OR FALL ASLEEP WHILE WATCHING TV: WOULD NEVER DOZE

## 2022-06-27 NOTE — PATIENT INSTRUCTIONS
"MY INFORMATION ON SLEEP APNEA-  Albina Pedro    DOCTOR : STEW Villavicencio CNP  SLEEP CENTER :      MY CONTACT NUMBER:   [facility]  [phone]  [facility]   [phone]  [facility]   [phone]      [facility]  [phone]  [facility]   [phone]    [facility] [place] [address], [address]  [place] [address]  Phone: [phone]    Hours:  Mon - Fri: 8:00 a.m. - 4:30 p.m.  Sat: Closed  Sun: Closed      Key Points:  1. What is Obstructive Sleep Apnea (JUAN)? JUAN is the most common type of sleep apnea. Apnea literally means, \"without breath.\" It is characterized by repetitive pauses in breathing, despite continued effort to breathe, and is usually associated with a reduction in blood oxygen saturation. Apneas can last 10 to over 60 seconds. It is caused by narrowing or collapse of the upper airway as muscles relax during sleep.   2. What are the consequences of JUAN? Symptoms include: daytime sleepiness- possibly increasing the risk of falling asleep while driving, unrefreshing/restless sleep, snoring, insomnia, waking frequently to urinate, waking with heartburn or reflux, reduced concentration and memory, and morning headaches. Other health consequences may include development of high blood pressure. Untreated JUAN also can contribute to heart disease, stroke and diabetes.   3. What are the treatment options? In most situations, sleep apnea is a lifelong disease that must be managed with daily therapy. Continuous Positive Airway (CPAP) is the most reliable treatment. A mouthguard to hold your jaw forward is usually the next most reliable option. Other options include postioning devices (to keep you off your back), nasal valves, tongue retaining device, weight loss, surgery. There is more detail about these options toward the end of this " document.  4. What are the most important things to remember about using CPAP?     WHERE CAN I FIND MORE INFORMATION?    American Academy of Sleep Medicine Patient information on sleep disorders:  http://yoursleep.aasmnet.org    CPAP -  WHY AND HOW?                 Continuous positive airway pressure, or CPAP, is the most effective treatment for obstructive sleep apnea. It works by blowing room air, through a mask, to hold your throat open. A decision to use CPAP is a major step forward in the pursuit of a healthier life. The successful use of CPAP will help you breathe easier, sleep better and live healthier. Using CPAP can be a positive experience if you keep these bettencourt points in mind:  Commitment  CPAP is not a quick fix for your problem. It involves a long-term commitment to improve your sleep and your health.    Communication  Stay in close communication with both your sleep doctor and your CPAP supplier. Ask lots of questions and seek help when you need it.    Consistency  Use CPAP all night, every night and for every nap. You will receive the maximum health benefits from CPAP when you use it every time that you sleep. This will also make it easier for your body to adjust to the treatment.    Correction  The first machine and mask that you try may not be the best ones for you. Work with your sleep doctor and your CPAP supplier to make corrections to your equipment selection. Ask about trying a different type of machine or mask if you have ongoing problems. Make sure that your mask is a good fit and learn to use your equipment properly.    Challenge  Tell a family member or close friend to ask you each morning if you used your CPAP the previous night. Have someone to challenge you to give it your best effort.    Connection   Your adjustment to CPAP will be easier if you are able to connect with others who use the same treatment. Ask your sleep doctor if there is a support group in your area for people who have  "sleep apnea, or look for one on the Internet.  Comfort   Increase your level of comfort by using a saline spray, decongestant or heated humidifier if CPAP irritates your nose, mouth or throat. Use your unit's \"ramp\" setting to slowly get used to the air pressure level. There may be soft pads you can buy that will fit over your mask straps. Look on www.CPAP.com for accessories that can help make CPAP use more comfortable.  Cleaning   Clean your mask, tubing and headgear on a regular basis. Put this time in your schedule so that you don't forget to do it. Check and replace the filters for your CPAP unit and humidifier.    Completion   Although you are never finished with CPAP therapy, you should reward yourself by celebrating the completion of your first month of treatment. Expect this first month to be your hardest period of adjustment. It will involve some trial and error as you find the machine, mask and pressure settings that are right for you.    Continuation  After your first month of treatment, continue to make a daily commitment to use your CPAP all night, every night and for every nap.    CPAP-Tips to starting with success:  Begin using your CPAP for short periods of time during the day while you watch TV or read.    Use CPAP every night and for every nap. Using it less often reduces the health benefits and makes it harder for your body to get used to it.    Newer CPAP models are virtually silent; however, if you find the sound of your CPAP machine to be bothersome, place the unit under your bed to dampen the sound.     Make small adjustments to your mask, tubing, straps and headgear until you get the right fit. Tightening the mask may actually worsen the leak.  If it leaves significant marks on your face or irritates the bridge of your nose, it may not be the best mask for you.  Speak with the person who supplied the mask and consider trying other masks. Insurances will allow you to try different masks " during the first month of starting CPAP.  Insurance also covers a new mask, hose and filter about every 6 months.    Use a saline nasal spray to ease mild nasal congestion. Neti-Pot or saline nasal rinses may also help. Nasal gel sprays can help reduce nasal dryness.  Biotene mouthwash can be helpful to protect your teeth if you experience frequent dry mouth.  Dry mouth may be a sign of air escaping out of your mouth or out of the mask in the case of a full face mask.  Speak with your provider if you expect that is the case.     Take a nasal decongestant to relieve more severe nasal or sinus congestion.  Do not use Afrin (oxymetazoline) nasal spray more than 3 days in a row.  Speak with your sleep doctor if your nasal congestion is chronic.    Use a heated humidifier that fits your CPAP model to enhance your breathing comfort. Adjust the heat setting up if you get a dry nose or throat, down if you get condensation in the hose or mask.  Position the CPAP lower than you so that any condensation in the hose drains back into the machine rather than towards the mask.    Try a system that uses nasal pillows if traditional masks give you problems.    Clean your mask, tubing and headgear once a week. Make sure the equipment dries fully.    Regularly check and replace the filters for your CPAP unit and humidifier.    Work closely with your sleep provider and your CPAP supplier to make sure that you have the machine, mask and air pressure setting that works best for you. It is better to stop using it and call your provider to solve problems than to lay awake all night frustrated with the device.    Weight Loss:    Weight loss decreases severity of sleep apnea in most people with obesity. For those with mild obesity who have developed snoring with weight gain, even 15-30 pound weight loss can improve and occasionally eliminate sleep apnea.  Structured and life-long dietary and health habits are necessary to lose weight and  keep healthier weight levels.     Though there are significant health benefits from weight loss, long-term weight loss is very difficult to achieve- studies show success with dietary management in less than 10% of people. In addition, substantial weight loss may require years of dietary control and may be difficult if patients have severe obesity. In these cases, surgical management may be considered.    If you are interested in methods for weight loss, you should review the options discussed at the National Institutes of Health patient information sites:     http://win.niddk.nih.gov/publications/index.htm  http:/www.health.nih.gov/topic/WeightLossDieting    Bariatric programs offer counseling in all methods of weight loss:    Http:/www.[facility].org/Specialties/WeightLossSurgeryandMedicalMgmt/htm    Your BMI is Body mass index is 29.05 kg/m .    Body mass index (BMI) is one way to tell whether you are at a healthy weight, overweight, or obese. It measures your weight in relation to your height.  A BMI of 18.5 to 24.9 is in the healthy range. A person with a BMI of 25 to 29.9 is considered overweight, and someone with a BMI of 30 or greater is considered obese.  Another way to find out if you are at risk for health problems caused by overweight and obesity is to measure your waist. If you are a woman and your waist is more than 35 inches, or if you are a man and your waist is more than 40 inches, your risk of disease may be higher.  More than two-thirds of American adults are considered overweight or obese. Being overweight or obese increases the risk for further weight gain.  Excess weight may lead to heart disease and diabetes. Creating and following plans for healthy eating and physical activity may help you improve your health.    Methods for maintaining or losing weight.    Weight control is part of healthy lifestyle and includes exercise, emotional health, and healthy eating habits.  Careful eating  habits lifelong is the mainstay of weight control.  Though there are significant health benefits from weight loss, long-term weight loss with diet alone may be very difficult to achieve- studies show long-term success with dietary management in less than 10% of people. Attaining a healthy weight may be especially difficult to achieve in those with severe obesity. In some cases, medications, devices and surgical management might be considered.    What can you do?    If you are overweight or obese and are interested in methods for weight loss, you should discuss this with your provider. In addition, we recommend that you review healthy life styles and methods for weight loss available through the National Institutes of Health patient information sites:     http://win.niddk.nih.gov/publications/index.htm

## 2022-06-27 NOTE — PROGRESS NOTES
"Albina Pedro is a 71 year old female being evaluated via a billable telephone visit.     \"This telephone visit will be conducted via a call between you and your physician/provider. We have found that certain health care needs can be provided without the need for an in-person visit or physical exam.  This service lets us provide the care you need with a telephone conversation.  If a prescription is necessary we can send it directly to your pharmacy.  If lab work is needed we can place an order for that and you can then stop by our lab to have the test done at a later time.\"    Telephone visits are billed at different rates depending on your insurance coverage.  Please reach out to your insurance provider with any questions.    Patient has given verbal consent for  a Telephone visit? Yes    What telephone number would you like your provider to contact at at:  [phone]    How would you like to obtain your AVS? Mail a copy    Padmini Juan    Telephone Visit Details:     Telephone Visit Start Time: {telephone visit start/end time for provider to select:[id number]}    Telephone Visit End Time:{telephone visit start/end time for provider to select:[id number]}      "

## 2022-06-27 NOTE — PROGRESS NOTES
"Albina Pedro is a 71 year old female being evaluated via a billable telephone visit.     \"This telephone visit will be conducted via a call between you and your physician/provider. We have found that certain health care needs can be provided without the need for an in-person visit or physical exam.  This service lets us provide the care you need with a telephone conversation.  If a prescription is necessary we can send it directly to your pharmacy.  If lab work is needed we can place an order for that and you can then stop by our lab to have the test done at a later time.\"    Telephone visits are billed at different rates depending on your insurance coverage.  Please reach out to your insurance provider with any questions.    Patient has given verbal consent for  a Telephone visit? Yes    What telephone number would you like your provider to contact at at:  [phone]    How would you like to obtain your AVS? Mail a copy    Padmini Martinez    Telephone Visit Details:     Telephone Visit Start Time: 10:03 AM    Telephone Visit End Time:  10:33 AM      Sleep Study Follow-Up Visit:    Date on this visit: 6/27/2022    Albina Pedro is a pleasant 71-year-old female with a PMH pertinent for HLD, CAD, ILD, history of lymphocytic interstitial pneumonia, Sjogren's syndrome, myopathy, major depression, anxiety, and obesity who presents today for follow-up of her sleep study done on 6/13/2022 at the [facility] for possible sleep apnea.    Sleep latency 41.2 minutes without Ambien.  REM achieved.   REM latency 264.0 minutes.  Sleep efficiency 77.8%. Total sleep time 293.5 minutes.    Sleep architecture:  Stage 1, 10.6% (5%), stage 2, 56.2% (45-55%), stage 3, 19.8% (15-20%), stage REM, 13.5% (20-25%).  AHI was 17.2, with desaturations down to 77%. RDI 20.6.  REM RDI 45.6, consistent with severe REM JUAN.  Supine RDI 16.5, consistent with moderate SUPINE JUAN.  Periodic Limb Movement Index " 149.8/hour. The PLM Arousal Index was 6.7 per hour.      6/13/2022 [facility] (187.0 lbs) - AHI 17.2, RDI 20.6, Supine AHI 16.5, REM AHI 45.6, Low O2 77.0%, Time Spent ?88% 10.1 minutes / Time Spent ?89% 29.6 minutes.        These findings were reviewed with patient.     Past medical/surgical history, family history, social history, medications and allergies were reviewed.      Problem List:  Patient Active Problem List    Diagnosis Date Noted     Chronic stable angina (H) 01/18/2019     Priority: Medium     Intermediate coronary syndrome (H) 07/24/2018     Priority: Medium     ILD (interstitial lung disease) (H) 07/09/2018     Priority: Medium     Needs follow up ct dec 2019       Hyperlipidemia LDL goal <70 05/31/2018     Priority: Medium     Major depressive disorder, recurrent, mild (H) 02/01/2017     Priority: Medium     Acute chest pain 12/13/2016     Priority: Medium     Status post coronary angiogram 02/18/2016     Priority: Medium     Adverse effect of anesthetic 02/11/2016     Priority: Medium     Patient is very sensitive to anesthetics. Needs lower dosing.        Sjogren's syndrome (H) 01/15/2014     Priority: Medium     CAD (coronary artery disease) 09/18/2013     Priority: Medium     Mild ischemia on stress test       Advanced directives, counseling/discussion 01/12/2012     Priority: Medium     Advance Directive Problem List Overview:   Name Relationship Phone    Primary Health Care Agent            Alternative Health Care Agent          Discussed advance care planning with patient; information given to patient to review.     Daija Black CMA, HC Facilitator    1/12/2012          Panniculitis 11/10/2011     Priority: Medium     Health Care Home 06/15/2011     Priority: Medium     X  DX V65.8 REPLACED WITH 61496 HEALTH CARE HOME (04/08/2013)       Episodic mood disorder (H) 11/26/2007     Priority: Medium     November 26, 2007 - Prozac and will look into light box.   April 15,  "2008 - Will stop for summer. Light box rx.  Problem list name updated by automated process. Provider to review       Dermatophytosis of body 09/12/2007     Priority: Medium     September 12, 2007 - Classic appearance and worsening in areas's not using Nystatin.  Will use everywhere or change to clotrimazole.   April 15, 2008 - Change to powder.   Problem list name updated by automated process. Provider to review       DIZZINESS - LIKELY HYPOGLYCEMIA 07/27/2006     Priority: Medium     July 24, 2008- negative Event monitor and GTT showed some elevation.  Dietary changes.           Current Outpatient Medications   Medication     azaTHIOprine (IMURAN) 50 MG tablet     COMPRESSION STOCKINGS     hyoscyamine (LEVSIN) 0.125 MG tablet     LORazepam (ATIVAN) 0.5 MG tablet     Multiple Vitamins-Minerals (MULTIVITAMIN & MINERAL PO)     pravastatin (PRAVACHOL) 40 MG tablet     pravastatin (PRAVACHOL) 40 MG tablet     predniSONE (DELTASONE) 10 MG tablet     pyridostigmine (MESTINON) 60 MG tablet     ranolazine (RANEXA) 1000 MG TB12 12 hr tablet     sertraline (ZOLOFT) 100 MG tablet     VENTOLIN  (90 Base) MCG/ACT inhaler     No current facility-administered medications for this visit.     Ht 1.676 m (5' 6\")   Wt 81.6 kg (180 lb)   BMI 29.05 kg/m      Impression/Plan:  Moderate Obstructive Sleep Apnea.   Sleep associated hypoxemia was present.  - Comprehensive DME    Treatment options discussed today including  auto-CPAP at 5-15 cmH2O, oral appliance therapy or surgical options.    Elected treatment with auto-CPAP at 5-15 cmH2O.  A comprehensive DME order was placed for new APAP device, nasal pillow mask and supplies sent to [facility].  We also discussed usual use/compliance requirements associated with new PAP device therapy.  In addition, the patient was notified that there may be a delay in obtaining her new APAP device due to the ongoing nationwide CPAP supply shortage.    She will follow up with me " in about 2 month(s) after obtaining new APAP device to review download data and use/compliance.     Forty minutes spent with patient, all of which were spent face-to-face counseling, consulting, chart review/documentation, and coordinating plan of care on the date of the encounter.      STEW Villavicencio CNP  Sleep Medicine      CC: Carolina Burrell       This note was written with the assistance of the Dragon voice-dictation technology software. The final document, although reviewed, may contain errors. For corrections, please contact the office.

## 2022-07-13 DIAGNOSIS — F43.0 ACUTE REACTION TO STRESS: ICD-10-CM

## 2022-07-14 NOTE — TELEPHONE ENCOUNTER
Routing refill request to provider for review/approval because:  Drug not on the [facility] refill protocol     Thomas Miguel RN

## 2022-07-15 RX ORDER — LORAZEPAM 0.5 MG/1
TABLET ORAL
Qty: 20 TABLET | Refills: 0 | Status: SHIPPED | OUTPATIENT
Start: 2022-07-15 | End: 2022-09-28

## 2022-07-25 ENCOUNTER — OFFICE VISIT (OUTPATIENT)
Dept: FAMILY MEDICINE | Facility: CLINIC | Age: 72
End: 2022-07-25
Payer: COMMERCIAL

## 2022-07-25 VITALS
OXYGEN SATURATION: 99 % | TEMPERATURE: 98 F | HEART RATE: 65 BPM | DIASTOLIC BLOOD PRESSURE: 68 MMHG | WEIGHT: 181.2 LBS | SYSTOLIC BLOOD PRESSURE: 139 MMHG | BODY MASS INDEX: 29.12 KG/M2 | HEIGHT: 66 IN

## 2022-07-25 DIAGNOSIS — R73.09 ELEVATED GLUCOSE: ICD-10-CM

## 2022-07-25 DIAGNOSIS — I20.89 CHRONIC STABLE ANGINA (H): Primary | ICD-10-CM

## 2022-07-25 DIAGNOSIS — E78.5 HYPERLIPIDEMIA LDL GOAL <70: ICD-10-CM

## 2022-07-25 DIAGNOSIS — G70.00 MYASTHENIA GRAVIS (H): ICD-10-CM

## 2022-07-25 DIAGNOSIS — J84.9 ILD (INTERSTITIAL LUNG DISEASE) (H): ICD-10-CM

## 2022-07-25 DIAGNOSIS — K21.00 GASTROESOPHAGEAL REFLUX DISEASE WITH ESOPHAGITIS WITHOUT HEMORRHAGE: ICD-10-CM

## 2022-07-25 DIAGNOSIS — G47.33 OBSTRUCTIVE SLEEP APNEA SYNDROME: ICD-10-CM

## 2022-07-25 PROCEDURE — G0439 PPPS, SUBSEQ VISIT: HCPCS | Performed by: FAMILY MEDICINE

## 2022-07-25 PROCEDURE — 99214 OFFICE O/P EST MOD 30 MIN: CPT | Mod: 25 | Performed by: FAMILY MEDICINE

## 2022-07-25 ASSESSMENT — ANXIETY QUESTIONNAIRES
7. FEELING AFRAID AS IF SOMETHING AWFUL MIGHT HAPPEN: NOT AT ALL
6. BECOMING EASILY ANNOYED OR IRRITABLE: SEVERAL DAYS
GAD7 TOTAL SCORE: 4
3. WORRYING TOO MUCH ABOUT DIFFERENT THINGS: SEVERAL DAYS
GAD7 TOTAL SCORE: 4
1. FEELING NERVOUS, ANXIOUS, OR ON EDGE: MORE THAN HALF THE DAYS
2. NOT BEING ABLE TO STOP OR CONTROL WORRYING: NOT AT ALL
5. BEING SO RESTLESS THAT IT IS HARD TO SIT STILL: NOT AT ALL

## 2022-07-25 ASSESSMENT — PATIENT HEALTH QUESTIONNAIRE - PHQ9
5. POOR APPETITE OR OVEREATING: NOT AT ALL
SUM OF ALL RESPONSES TO PHQ QUESTIONS 1-9: 9

## 2022-07-25 ASSESSMENT — PAIN SCALES - GENERAL: PAINLEVEL: NO PAIN (0)

## 2022-07-25 NOTE — PATIENT INSTRUCTIONS
You picked a  bed time of 11pm.  Take 5 mg of melatonin  20 mns before that.      Okay to have lazy morning on mornings you are feeling confused or sore.  Boise a good time to watch your tv shows lynn are tapped. But the push yourself in afternoon evening on those days.      Take immodium 1 tab a bedtime every day, unlesss you have no bm that day.

## 2022-07-25 NOTE — PROGRESS NOTES
SUBJECTIVE:                                                    Albina Pedro is a 71 year old female who presents to clinic today for the following health issues:    Depression Followup    How are you doing with your depression since your last visit? Some days better and some days worse    Are you having other symptoms that might be associated with depression? Yes:  on days where she does not feel good things trigger her easily.     Have you had a significant life event?  No     Are you feeling anxious or having panic attacks?   Yes:  a little bit    Do you have any concerns with your use of alcohol or other drugs? No      Since starting mestinon she has more energy and muscle control, but having more diarrhea  .      Social History     Tobacco Use     Smoking status: Never Smoker     Smokeless tobacco: Never Used   Substance Use Topics     Alcohol use: Yes     Alcohol/week: 0.0 standard drinks     Comment: 1 glass of wine every 2 weeks     Drug use: No     PHQ 1/20/2022 4/25/2022 7/25/2022   PHQ-9 Total Score 16 - 9   Q9: Thoughts of better off dead/self-harm past 2 weeks Not at all Not at all Not at all     SEBASTIAN-7 SCORE 1/20/2022 4/25/2022 7/25/2022   Total Score - - -   Total Score 11 5 4     Last PHQ-9 7/25/2022   1.  Little interest or pleasure in doing things 1   2.  Feeling down, depressed, or hopeless 2   3.  Trouble falling or staying asleep, or sleeping too much 1   4.  Feeling tired or having little energy 1   5.  Poor appetite or overeating 0   6.  Feeling bad about yourself 1   7.  Trouble concentrating 1   8.  Moving slowly or restless 2   Q9: Thoughts of better off dead/self-harm past 2 weeks 0   PHQ-9 Total Score 9   Difficulty at work, home, or with people -     SEBASTIAN-7  7/25/2022   1. Feeling nervous, anxious, or on edge 2   2. Not being able to stop or control worrying 0   3. Worrying too much about different things 1   4. Trouble relaxing 0   5. Being so restless that it is hard to sit still 0    6. Becoming easily annoyed or irritable 1   7. Feeling afraid, as if something awful might happen 0   SEBASTIAN-7 Total Score 4   If you checked any problems, how difficult have they made it for you to do your work, take care of things at home, or get along with other people? -       Suicide Assessment Five-step Evaluation and Treatment (SAFE-T)        How many servings of fruits and vegetables do you eat daily?  0-1    On average, how many sweetened beverages do you drink each day (Examples: soda, juice, sweet tea, etc.  Do NOT count diet or artificially sweetened beverages)?   0-1    How many days per week do you exercise enough to make your heart beat faster? 3 or less    How many minutes a day do you exercise enough to make your heart beat faster? 30 - 60    How many days per week do you miss taking your medication? 0      Problem list and histories reviewed & adjusted, as indicated.  Additional history:     Patient Active Problem List   Diagnosis     DIZZINESS - LIKELY HYPOGLYCEMIA     Dermatophytosis of body     Episodic mood disorder (H)     Health Care Home     Panniculitis     Advanced directives, counseling/discussion     CAD (coronary artery disease)     Sjogren's syndrome (H)     Adverse effect of anesthetic     Status post coronary angiogram     Acute chest pain     Major depressive disorder, recurrent, mild (H)     Hyperlipidemia LDL goal <70     ILD (interstitial lung disease) (H)     Intermediate coronary syndrome (H)     Chronic stable angina (H)     Past Surgical History:   Procedure Laterality Date     BIOPSY MUSCLE DIAGNOSTIC (LOCATION) Left 2022    Procedure: BIOPSY, MUSCLE LEFT biceps @0900;  Surgeon: Tyrell Loo MD;  Location: [facility] OR     C/SECTION, LOW TRANSVERSE  10/13/1978    , Low Transverse     COLONOSCOPY       OPEN REDUCTION INTERNAL FIXATION ANKLE Right 2019    Procedure: Open reduction internal fixation right lateral malleolus fracture;  Surgeon: Rolo Oconnor  HARSHAD Ruiz;  Location: [facility]     SURGICAL HISTORY OF -            SURGICAL HISTORY OF -   00    Colonoscopy       Social History     Tobacco Use     Smoking status: Never Smoker     Smokeless tobacco: Never Used   Substance Use Topics     Alcohol use: Yes     Alcohol/week: 0.0 standard drinks     Comment: 1 glass of wine every 2 weeks     Family History   Problem Relation Age of Onset     Cancer Mother         Breast and liver- age 43     Heart Disease Father         ? chf?h/o CVA     Alzheimer Disease Father      Hypertension Father      Neurologic Disorder Father         CVA     Diabetes Maternal Grandmother      Breast Cancer Maternal Aunt      Breast Cancer Paternal Aunt      Cancer - colorectal No family hx of      Prostate Cancer No family hx of          Current Outpatient Medications   Medication Sig Dispense Refill     azaTHIOprine (IMURAN) 50 MG tablet TAKE ONE TABLET BY MOUTH ONE TIME DAILY 90 tablet 0     hyoscyamine (LEVSIN) 0.125 MG tablet Take 2 tablets (250 mcg) by mouth every 8 hours Take it 30 minutes before each Mestinon dose. 180 tablet 1     LORazepam (ATIVAN) 0.5 MG tablet TAKE ONE TABLET BY MOUTH EVERY EIGHT HOURS AS NEEDED FOR ANXIETY 20 tablet 0     Multiple Vitamins-Minerals (MULTIVITAMIN & MINERAL PO) Take 1 tablet by mouth daily        omeprazole (PRILOSEC) 20 MG DR capsule Take 1 capsule (20 mg) by mouth daily 90 capsule 1     pravastatin (PRAVACHOL) 40 MG tablet TAKE ONE TABLET BY MOUTH ONE TIME DAILY 90 tablet 0     predniSONE (DELTASONE) 10 MG tablet Take 1 tablet (10 mg) by mouth daily 30 tablet 1     pyridostigmine (MESTINON) 60 MG tablet Take 1 tablet (60 mg) by mouth 3 times daily 90 tablet 1     ranolazine (RANEXA) 1000 MG TB12 12 hr tablet TAKE ONE TABLET BY MOUTH TWICE DAILY 180 tablet 0     sertraline (ZOLOFT) 100 MG tablet Take 1.5 tablets (150 mg) by mouth daily 135 tablet 1     VENTOLIN  (90 Base) MCG/ACT inhaler INHALE 2 PUFFS EVERY 4 HOURS  "AS NEEDED FOR SHORTNESS OF BREATH/DYSPNEA 54 g 0     COMPRESSION STOCKINGS 2 each daily as needed (edema) (Patient not taking: No sig reported) 2 each 3     Allergies   Allergen Reactions     Daypro [Oxaprozin] Rash            Lidocaine Other (See Comments)     Rapid heart rate     Nitroglycerin Other (See Comments)     Causes low blood pressure     Penicillins Unknown     Occurred as child.     Sulfa Drugs Rash     ROS:  Constitutional, HEENT, cardiovascular, pulmonary, gi and gu systems are negative, except as otherwise noted.    OBJECTIVE:                                                    /68   Pulse 65   Temp 98  F (36.7  C) (Tympanic)   Ht 1.676 m (5' 6\")   Wt 82.2 kg (181 lb 3.2 oz)   SpO2 99%   BMI 29.25 kg/m   Body mass index is 29.25 kg/m .   GENERAL: healthy, alert, well nourished, well hydrated, no distress  HENT: ear canals- normal; TMs- normal; Nose- normal; Mouth- no ulcers, no lesions  NECK: no tenderness, no adenopathy, no asymmetry, no masses, no stiffness; thyroid- normal to palpation  RESP: lungs clear to auscultation - no rales, no rhonchi, no wheezes  CV: regular rates and rhythm, normal S1 S2, no S3 or S4 and no murmur, no click or rub -  ABDOMEN: soft, no tenderness, no  hepatosplenomegaly, no masses, normal bowel sounds       ASSESSMENT/PLAN:                                                        (E78.5) Hyperlipidemia LDL goal <70      (G70.00) Myasthenia gravis (H)  Improved she likely needs higher dose of mestonone but will need to get he rdiarrhea under control before that increase.,   Plan: Comprehensive metabolic panel (BMP + Alb, Alk         Phos, ALT, AST, Total. Bili, TP)        =    (47.33) Obstructive sleep apnea syndrome  Commenthas not gotten her cpap yet.  I reorder it.  She needs to answer her phone.   Plan: CPAP Order for DME - ONLY FOR DME        =  (K21.00) Gastroesophageal reflux disease with esophagitis without hemorrhage  Plan: omeprazole (PRILOSEC) 20 MG " capsule    (R73.09) Elevated glucose  Plan: Hemoglobin A1c        reports that she has never smoked. She has never used smokeless tobacco.      Weight management plan: Discussed healthy diet and exercise guidelines      Anxiety/argoraphobia - improving.      [facility]

## 2022-07-25 NOTE — COMMUNITY RESOURCES LIST (ENGLISH)
07/25/2022   [facility] - Outpatient Clinics  N/A  For questions about this resource list or additional care needs, please contact your primary care clinic or care manager.  Phone: [phone]   Email: N/A   Address: [facility] [place] [address]   Hours: N/A        Hotlines and Helplines       Hotline - Crisis help  1  [facility] Crisis Line Distance: 18.19 miles      COVID-19 Status: Phone/Virtual   [address] [place] [address]  Language: English, Marshallese  Hours: Mon - Sun Open 24 Hours   Phone: (278) 465-5432 Email: info@[employer] Website: http://www.Personalis     2  [employer] - 24-Hour Helpline Distance: 21.94 miles      COVID-19 Status: Regular Operations   [address] [address] [place] [address]  Language: Bulgarian, English, Hmong, Qatari, Marshallese  Hours: Mon - Sun Open 24 Hours   Phone: (223) [phone] Email: vtosxthm4s@[employer] Website: http://[employer]          Mental Health       Individual counseling  3  [facility] Distance: 3.96 miles      COVID-19 Status: Regular Operations, COVID-19 Status: Phone/Virtual   [address] [place] [address]  Language: English  Hours: Mon - Thu 9:00 AM - 8:00 PM , Sat 9:00 AM - 6:00 PM  Fees: Insurance, Self Pay   Phone: (988) 972-5508 Email: info@[employer] Website: http://www.[employer]     4  [facility] Distance: 7.94 miles      COVID-19 Status: Regular Operations   [address] [place] [address]  Language: English  Hours: Mon - Tue 8:00 AM - 6:00 PM , Wed - Thu 7:00 AM - 5:00 PM , Fri 8:00 AM - 4:30 PM  Fees: Insurance, Self Pay   Phone: (401) 381-6216 Email: info@[employer] Website: https://account.[employer].org/locations/[email or web address]     Mental health crisis care  5  [facility] - Crisis Assessment and Stabilization Services Distance: 20.87 miles      COVID-19 Status: Regular Operations, COVID-19 Status: Phone/Virtual    [address] [facility] [address] [place] [address]  Language: English  Hours: Mon - Sun Open 24 Hours  Fees: Insurance, Self Pay, Sliding Fee   Phone: (923) 129-2986 Email: info@[email or web address] Website: https://www.TranSwitchorg/location/[email or web address]/     Mental health support group  6  [facility] - Caregiver Support Groups Distance: 13.32 miles      COVID-19 Status: Service Unavailable   [address] [place] [address]  Language: English  Hours: Wed 1:00 PM - 3:00 PM  Fees: Insurance, Self Pay   Phone: (945) 770-7903 Email: familymeans@[employer] Website: https://www.[employer]/     7  [facility] Distance: 22.48 miles      COVID-19 Status: Phone/Virtual   [address] [place] [address]  Language: English  Hours: Mon - Thu 7:00 AM - 8:00 PM , Fri 7:00 AM - 5:00 PM  Fees: Insurance, Self Pay   Phone: (225) 784-4021 Email: contactus@[employer] Website: [email or web address]://[email or web address].com/our-locations/[email or web address]/[facility]/          Important Numbers & Websites       Emergency Services   911  [facility] Services   311  Poison Control   (416) 410-7724  Suicide Prevention Lifeline   (893) 369-2899 (TALK)  Child Abuse Hotline   (215) 394-1470 (4-A-Child)  Sexual Assault Hotline   (509) 812-7560 (HOPE)  National Runaway Safeline   (775) 287-8117 (RUNAWAY)  All-Options Talkline   (511) 300-4021  Substance Abuse Referral   (464) 258-9783 (HELP)

## 2022-08-05 ENCOUNTER — TELEPHONE (OUTPATIENT)
Dept: FAMILY MEDICINE | Facility: CLINIC | Age: 72
End: 2022-08-05

## 2022-08-05 NOTE — TELEPHONE ENCOUNTER
" called to say that pt gets confused and agitated within 1/2 hour of taking levsin,  mestinon and prednisone.  says pt is doing better muscle wise but not emotionally and mentally, her thoughts are not organized. \"It is total chaos\" Pt wants to do things with her , [place] feel [place] could make it [place] to the lake. Pt is doing OK with bowel movements, diarrhea is much better.  would like pt to be calmer and be functioning better. Message forwarded to Dr Sandoval and Dr Manning for any recommendation. Please call  cell [phone] as pt doesn't always have her phone turned on. Albina Keenan RN     "

## 2022-08-05 NOTE — TELEPHONE ENCOUNTER
was called back and given information, he will reduce prednisone to 5 mg, will call back with update after 1-2 weeks or with any questions or problems. Albina Keenan RN

## 2022-08-05 NOTE — TELEPHONE ENCOUNTER
Yes, I agree. Could somebody contact patient and let her know that she should reduce her prednisone dose to 5 mg and see how she does on the lower dose after 1-2 weeks? Thank you

## 2022-08-18 ENCOUNTER — DOCUMENTATION ONLY (OUTPATIENT)
Dept: SLEEP MEDICINE | Facility: CLINIC | Age: 72
End: 2022-08-18

## 2022-08-18 NOTE — PROGRESS NOTES
8/18/2022- JL- PTS  CALLED  AND SPOKE WITH SHOWROOM TO CANCEL SETUP FOR TODAY, SHE IS UNABLE TO MAKE IT  TODAY.

## 2022-08-19 ENCOUNTER — OFFICE VISIT (OUTPATIENT)
Dept: CARDIOLOGY | Facility: CLINIC | Age: 72
End: 2022-08-19
Payer: COMMERCIAL

## 2022-08-19 VITALS
HEART RATE: 74 BPM | WEIGHT: 178.9 LBS | DIASTOLIC BLOOD PRESSURE: 65 MMHG | BODY MASS INDEX: 28.88 KG/M2 | SYSTOLIC BLOOD PRESSURE: 120 MMHG | OXYGEN SATURATION: 100 %

## 2022-08-19 DIAGNOSIS — I25.10 CORONARY ARTERY DISEASE INVOLVING NATIVE CORONARY ARTERY OF NATIVE HEART WITHOUT ANGINA PECTORIS: Primary | ICD-10-CM

## 2022-08-19 DIAGNOSIS — I20.0 INTERMEDIATE CORONARY SYNDROME (H): ICD-10-CM

## 2022-08-19 LAB
CHOLEST SERPL-MCNC: 169 MG/DL
FASTING STATUS PATIENT QL REPORTED: NO
HDLC SERPL-MCNC: 53 MG/DL
LDLC SERPL CALC-MCNC: 72 MG/DL
NONHDLC SERPL-MCNC: 116 MG/DL
TRIGL SERPL-MCNC: 221 MG/DL

## 2022-08-19 PROCEDURE — 99214 OFFICE O/P EST MOD 30 MIN: CPT | Performed by: INTERNAL MEDICINE

## 2022-08-19 PROCEDURE — 36415 COLL VENOUS BLD VENIPUNCTURE: CPT | Performed by: INTERNAL MEDICINE

## 2022-08-19 PROCEDURE — 80061 LIPID PANEL: CPT | Performed by: INTERNAL MEDICINE

## 2022-08-19 RX ORDER — PRAVASTATIN SODIUM 40 MG
40 TABLET ORAL DAILY
Qty: 90 TABLET | Refills: 3 | Status: SHIPPED | OUTPATIENT
Start: 2022-08-19 | End: 2023-09-07

## 2022-08-19 RX ORDER — RANOLAZINE 1000 MG/1
1000 TABLET, EXTENDED RELEASE ORAL 2 TIMES DAILY
Qty: 180 TABLET | Refills: 3 | Status: SHIPPED | OUTPATIENT
Start: 2022-08-19 | End: 2023-02-23

## 2022-08-19 NOTE — PROGRESS NOTES
Albina Pedro's goals for this visit include:     She requests these members of her care team be copied on today's visit information: PCP    PCP: Rolo Sandoval    Referring Provider:  No referring provider defined for this encounter.    /65 (BP Location: Left arm, Patient Position: Sitting, Cuff Size: Adult Regular)   Pulse 74   Wt 81.1 kg (178 lb 14.4 oz)   SpO2 100%   BMI 28.88 kg/m      Do you need any medication refills at today's visit? No.    Ari Bustos, EMT  Clinic Support  [facility]    [phone]    Employed by [facility] [employer]

## 2022-08-19 NOTE — PROGRESS NOTES
[facility] Cardiology Consultation:    Assessment and Plan:     1. CAD with angina (Cath 2016 - moderate mLAD/D1 stenosis, small vessels not ideal for PCI, lexiscan - small ant ischemia, normal LV fxn): continue ranolazine 1000 mg BID for angina. Intolerant of other anti-anginals.  Continue Pravastatin 40 mg and resume ASA 81 mg for secondary prevention.  Check lipid profile today  2. Syncope related to medications: Continue with regular electrolyte drinks.   3. ILD  4. Sjogren's syndrome  5. Myasthenia gravis    Annual follow up.     Driss Carbone MD    Cardiac Imaging and Prevention  [facility]  [email or web address] Pager: [phone]    HPI: CAD routine follow-up. She has been doing well, without any intercurrent cardiac illness or hospitalization. She is tolerating all her medications without any issues, including pravastatin.  She stopped taking aspirin at some point, she is not clear why.  She continues to have occasional postural dizziness, fortunately without any syncopal episodes.   Chest pain is overall stable. She occasionally gets chest pain just lasting for a few seconds that does not bother her.  Since her last visit she was diagnosed with myasthenia gravis is now is on treatment for it.  Basic labs are normal.  There is no recent lipid profile.      EXAM:  /65 (BP Location: Left arm, Patient Position: Sitting, Cuff Size: Adult Regular)   Pulse 74   Wt 81.1 kg (178 lb 14.4 oz)   SpO2 100%   BMI 28.88 kg/m    GEN/CONSTITUIONAL: Appears comfortable, in no apparent distress   EYES: No icterus  ENT/MOUTH: Normal  JVP:  Not visible  RESPIRATORY: Clear to auscultation bilaterally   CARDIOVASCULAR: Regular S1 and S2, no murmurs, rubs, or gallops.   ABDOMEN: Soft, non-tender, positive bowel sounds   NEUROLOGIC: Grossly non-focal   PSYCHIATRIC: Normal affect  EXT: No cyanosis, clubbing, edema. Normal pedal pulses.  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       CURRENT MEDICATIONS:  Current Outpatient Medications   Medication     azaTHIOprine (IMURAN) 50 MG tablet     COMPRESSION STOCKINGS     hyoscyamine (LEVSIN) 0.125 MG tablet     LORazepam (ATIVAN) 0.5 MG tablet     Multiple Vitamins-Minerals (MULTIVITAMIN & MINERAL PO)     omeprazole (PRILOSEC) 20 MG DR capsule     pravastatin (PRAVACHOL) 40 MG tablet     predniSONE (DELTASONE) 10 MG tablet     pyridostigmine (MESTINON) 60 MG tablet     ranolazine (RANEXA) 1000 MG TB12 12 hr tablet     sertraline (ZOLOFT) 100 MG tablet     VENTOLIN  (90 Base) MCG/ACT inhaler     No current facility-administered medications for this visit.       PAST SURGICAL HISTORY:  Past Surgical History:   Procedure Laterality Date     BIOPSY MUSCLE DIAGNOSTIC (LOCATION) Left 2022    Procedure: BIOPSY, MUSCLE LEFT biceps @0900;  Surgeon: Tyrell Loo MD;  Location: [facility] OR     C/SECTION, LOW TRANSVERSE  10/13/1978    , Low Transverse     COLONOSCOPY       OPEN REDUCTION INTERNAL FIXATION ANKLE Right 2019    Procedure: Open reduction internal fixation right lateral malleolus fracture;  Surgeon: Rolo Oconnor DPM;  Location: [facility] OR     SURGICAL HISTORY OF -            SURGICAL HISTORY OF -   00    Colonoscopy       ALLERGIES     Allergies   Allergen Reactions     Daypro [Oxaprozin] Rash            Lidocaine Other (See Comments)     Rapid heart rate     Nitroglycerin Other (See Comments)     Causes low blood pressure     Penicillins Unknown     Occurred as child.     Sulfa Drugs Rash       FAMILY HISTORY:  Family History   Problem Relation Age of Onset     Cancer  Mother         Breast and liver- age 43     Heart Disease Father         ? chf?h/o CVA     Alzheimer Disease Father      Hypertension Father      Neurologic Disorder Father         CVA     Diabetes Maternal Grandmother      Breast Cancer Maternal Aunt      Breast Cancer Paternal Aunt      Cancer - colorectal No family hx of      Prostate Cancer No family hx of        SOCIAL HISTORY:  Social History     Socioeconomic History     Marital status:      Spouse name: Not on file     Number of children: Not on file     Years of education: Not on file     Highest education level: Not on file   Occupational History     Not on file   Tobacco Use     Smoking status: Never Smoker     Smokeless tobacco: Never Used   Substance and Sexual Activity     Alcohol use: Yes     Alcohol/week: 0.0 standard drinks     Comment: 1 glass of wine every 2 weeks     Drug use: No     Sexual activity: Yes     Partners: Male   Other Topics Concern     Parent/sibling w/ CABG, MI or angioplasty before 65F 55M? No   Social History Narrative     Not on file     Social Determinants of Health     Financial Resource Strain: Not on file   Food Insecurity: Not on file   Transportation Needs: Not on file   Physical Activity: Not on file   Stress: Not on file   Social Connections: Not on file   Intimate Partner Violence: Not on file   Housing Stability: Not on file       ROS:   Constitutional: No fever, chills, or sweats. No weight gain/loss   ENT: No visual disturbance, ear ache, epistaxis, sore throat  Allergies/Immunologic: Negative.   Respiratory: No cough, hemoptysia  Cardiovascular: As per HPI  GI: No nausea, vomiting, hematemesis, melena, or hematochezia  : No urinary frequency, dysuria, or hematuria  Integument: Negative  Psychiatric: Negative  Neuro: Negative  Endocrinology: Negative   Musculoskeletal: Negative    ADDITIONAL COMMENTS:     I reviewed the patient's medications:     I reviewed the patient's pertinent clinical laboratory  studies:     I reviewed the patient's pertinent imaging studies:   I reviewed the patient's ECG:

## 2022-08-22 ENCOUNTER — OFFICE VISIT (OUTPATIENT)
Dept: FAMILY MEDICINE | Facility: CLINIC | Age: 72
End: 2022-08-22
Payer: COMMERCIAL

## 2022-08-22 ENCOUNTER — TELEPHONE (OUTPATIENT)
Dept: FAMILY MEDICINE | Facility: CLINIC | Age: 72
End: 2022-08-22

## 2022-08-22 ENCOUNTER — MYC MEDICAL ADVICE (OUTPATIENT)
Dept: CARDIOLOGY | Facility: CLINIC | Age: 72
End: 2022-08-22

## 2022-08-22 ENCOUNTER — ALLIED HEALTH/NURSE VISIT (OUTPATIENT)
Dept: FAMILY MEDICINE | Facility: CLINIC | Age: 72
End: 2022-08-22

## 2022-08-22 VITALS
TEMPERATURE: 98.1 F | SYSTOLIC BLOOD PRESSURE: 121 MMHG | HEART RATE: 73 BPM | BODY MASS INDEX: 28.69 KG/M2 | DIASTOLIC BLOOD PRESSURE: 58 MMHG | OXYGEN SATURATION: 98 % | HEIGHT: 66 IN | RESPIRATION RATE: 18 BRPM | WEIGHT: 178.5 LBS

## 2022-08-22 DIAGNOSIS — G70.01 MYASTHENIA GRAVIS WITH EXACERBATION (H): Primary | ICD-10-CM

## 2022-08-22 PROCEDURE — 99214 OFFICE O/P EST MOD 30 MIN: CPT | Mod: 25 | Performed by: FAMILY MEDICINE

## 2022-08-22 PROCEDURE — 90471 IMMUNIZATION ADMIN: CPT | Performed by: FAMILY MEDICINE

## 2022-08-22 PROCEDURE — 90714 TD VACC NO PRESV 7 YRS+ IM: CPT | Performed by: FAMILY MEDICINE

## 2022-08-22 PROCEDURE — 99207 PR NO CHARGE NURSE ONLY: CPT

## 2022-08-22 RX ORDER — PYRIDOSTIGMINE BROMIDE 60 MG/1
TABLET ORAL
Qty: 30 TABLET | Refills: 1 | Status: SHIPPED | OUTPATIENT
Start: 2022-08-22 | End: 2022-09-12

## 2022-08-22 ASSESSMENT — PATIENT HEALTH QUESTIONNAIRE - PHQ9
5. POOR APPETITE OR OVEREATING: NOT AT ALL
SUM OF ALL RESPONSES TO PHQ QUESTIONS 1-9: 5

## 2022-08-22 ASSESSMENT — ANXIETY QUESTIONNAIRES
7. FEELING AFRAID AS IF SOMETHING AWFUL MIGHT HAPPEN: SEVERAL DAYS
3. WORRYING TOO MUCH ABOUT DIFFERENT THINGS: NOT AT ALL
5. BEING SO RESTLESS THAT IT IS HARD TO SIT STILL: NOT AT ALL
6. BECOMING EASILY ANNOYED OR IRRITABLE: SEVERAL DAYS
2. NOT BEING ABLE TO STOP OR CONTROL WORRYING: SEVERAL DAYS
1. FEELING NERVOUS, ANXIOUS, OR ON EDGE: SEVERAL DAYS
GAD7 TOTAL SCORE: 4
GAD7 TOTAL SCORE: 4

## 2022-08-22 ASSESSMENT — PAIN SCALES - GENERAL: PAINLEVEL: NO PAIN (0)

## 2022-08-22 NOTE — PROGRESS NOTES
"Patient was in for an appointment with Dr. Sandoval discussing Myasthenia Gravis symptoms      Patient was sitting out in the lobby before leaving the clinic after appointment   Patient started to become symptomatic in the lobby   Author found patient sitting in a chair - alert, oriented x4, and following commands appropriately   Patient was able to transfer from chair to wheelchair with minimal assistance from author      Patient states this episode is very typical of the episodes that brought her to see Dr. Sandoval      Patient states her muscles become very weak and and feel like \"jello\"  Patient has full control of muscle movement when author assessed patient   No tremors, shaking, or spasms noted during assessment   Patient was able to follow commands appropriately      BP: 99/57 (R) Arm)  HR: 66 (Apical: Reg rate and rhythm)     BP: 101/59 (L) Arm)     Patient states when this occurs at home she naps and wakes feeling per norm      Reviewed with Dr. Sandoval  Advised patient follow new medication adjustment and to follow-up   No further recommendations      Relayed to patient   Patient verbalized understanding  No further questions/concerns     Wheeled patient to car and patient was able to  transfer to passenger seat with very minimal assistance      Thomas Miguel RN        "

## 2022-08-22 NOTE — PROGRESS NOTES
SUBJECTIVE:                                                    Albina Pedro is a 71 year old female who presents to clinic today for the following health issues:    Chief Complaint   Patient presents with     Recheck Medication     -Here to discuss and follow up on her medications.     -She said last night she had a big slump at [employer].  Answers for HPI/ROS submitted by the patient on 2022  What is the reason for your visit today? : Med checks & follow up  How many servings of fruits and vegetables do you eat daily?: 2-3  On average, how many sweetened beverages do you drink each day (Examples: soda, juice, sweet tea, etc.  Do NOT count diet or artificially sweetened beverages)?: 1  How many minutes a day do you exercise enough to make your heart beat faster?: 9 or less  How many days a week do you exercise enough to make your heart beat faster?: 3 or less  How many days per week do you miss taking your medication?: 0      Problem list and histories reviewed & adjusted, as indicated.  Additional history:     Patient Active Problem List   Diagnosis     DIZZINESS - LIKELY HYPOGLYCEMIA     Dermatophytosis of body     Episodic mood disorder (H)     Health Care Home     Panniculitis     Advanced directives, counseling/discussion     CAD (coronary artery disease)     Sjogren's syndrome (H)     Adverse effect of anesthetic     Status post coronary angiogram     Acute chest pain     Major depressive disorder, recurrent, mild (H)     Hyperlipidemia LDL goal <70     ILD (interstitial lung disease) (H)     Intermediate coronary syndrome (H)     Chronic stable angina (H)     Past Surgical History:   Procedure Laterality Date     BIOPSY MUSCLE DIAGNOSTIC (LOCATION) Left 2022    Procedure: BIOPSY, MUSCLE LEFT biceps @0900;  Surgeon: Tyrell Loo MD;  Location: [facility] OR     C/SECTION, LOW TRANSVERSE  10/13/1978    , Low Transverse     COLONOSCOPY       OPEN REDUCTION INTERNAL FIXATION ANKLE Right  2019    Procedure: Open reduction internal fixation right lateral malleolus fracture;  Surgeon: Rolo Oconnor DPM;  Location: [facility] OR     SURGICAL HISTORY OF -            SURGICAL HISTORY OF -   00    Colonoscopy       Social History     Tobacco Use     Smoking status: Never Smoker     Smokeless tobacco: Never Used   Substance Use Topics     Alcohol use: Yes     Alcohol/week: 0.0 standard drinks     Comment: 1 glass of wine every 2 weeks     Family History   Problem Relation Age of Onset     Cancer Mother         Breast and liver- age 43     Heart Disease Father         ? chf?h/o CVA     Alzheimer Disease Father      Hypertension Father      Neurologic Disorder Father         CVA     Diabetes Maternal Grandmother      Breast Cancer Maternal Aunt      Breast Cancer Paternal Aunt      Cancer - colorectal No family hx of      Prostate Cancer No family hx of          Current Outpatient Medications   Medication Sig Dispense Refill     aspirin (ASA) 81 MG EC tablet Take 1 tablet (81 mg) by mouth daily 90 tablet 3     azaTHIOprine (IMURAN) 50 MG tablet TAKE ONE TABLET BY MOUTH ONE TIME DAILY 90 tablet 0     hyoscyamine (LEVSIN) 0.125 MG tablet Take 2 tablets (250 mcg) by mouth every 8 hours Take it 30 minutes before each Mestinon dose. 180 tablet 1     LORazepam (ATIVAN) 0.5 MG tablet TAKE ONE TABLET BY MOUTH EVERY EIGHT HOURS AS NEEDED FOR ANXIETY 20 tablet 0     Multiple Vitamins-Minerals (MULTIVITAMIN & MINERAL PO) Take 1 tablet by mouth daily        pravastatin (PRAVACHOL) 40 MG tablet Take 1 tablet (40 mg) by mouth daily 90 tablet 3     predniSONE (DELTASONE) 10 MG tablet Take 1 tablet (10 mg) by mouth daily 30 tablet 1     pyridostigmine (MESTINON) 60 MG tablet Take 1 tablet (60 mg) by mouth 3 times daily 90 tablet 1     ranolazine (RANEXA) 1000 MG TB12 12 hr tablet Take 1 tablet (1,000 mg) by mouth 2 times daily 180 tablet 3     sertraline (ZOLOFT) 100 MG tablet Take 1.5 tablets  "(150 mg) by mouth daily 135 tablet 1     VENTOLIN  (90 Base) MCG/ACT inhaler INHALE 2 PUFFS EVERY 4 HOURS AS NEEDED FOR SHORTNESS OF BREATH/DYSPNEA 54 g 0     COMPRESSION STOCKINGS 2 each daily as needed (edema) (Patient not taking: Reported on 8/22/2022) 2 each 3     omeprazole (PRILOSEC) 20 MG DR capsule Take 1 capsule (20 mg) by mouth daily (Patient not taking: Reported on 8/22/2022) 90 capsule 1     Allergies   Allergen Reactions     Daypro [Oxaprozin] Rash            Lidocaine Other (See Comments)     Rapid heart rate     Nitroglycerin Other (See Comments)     Causes low blood pressure     Penicillins Unknown     Occurred as child.     Sulfa Drugs Rash     Recent Labs   Lab Test 08/19/22  1444 06/03/22  1314 03/25/22  1514 02/16/22  1740 01/20/22  1026 01/20/22  1026 06/16/21  1544 04/15/21  1528 10/15/20  1501 06/17/19  0955 10/20/17  2313 03/22/17  1526   LDL 72  --   --   --   --   --   --   --  35 46   < >  --    HDL 53  --   --   --   --   --   --   --  64 70  --   --    TRIG 221*  --   --   --   --   --   --   --  286* 168*  --   --    ALT  --  43 31 44   < > 33 34 30  --  27   < >  --    CR  --  0.72 0.63 0.59   < > 0.56 0.70 0.61  --  0.57   < >  --    GFRESTIMATED  --  88 >90 >90   < > >90 86 >90  --  >90   < >  --    GFRESTBLACK  --   --   --   --   --   --  >90 >90  --  >90   < >  --    POTASSIUM  --   --   --  4.3  --  4.3 4.7 4.5  --  5.0   < >  --    TSH  --   --   --   --   --  1.87  --   --   --   --   --  2.41    < > = values in this interval not displayed.        ROS:  Constitutional, HEENT, cardiovascular, pulmonary, gi and gu systems are negative, except as otherwise noted.    OBJECTIVE:                                                    /58   Pulse 73   Temp 98.1  F (36.7  C) (Tympanic)   Resp 18   Ht 1.676 m (5' 6\")   Wt 81 kg (178 lb 8 oz)   SpO2 98%   BMI 28.81 kg/m   Body mass index is 28.81 kg/m .   GENERAL: healthy, alert, well nourished, well hydrated, no " distress  HENT: ear canals- normal; TMs- normal; Nose- normal; Mouth- no ulcers, no lesions  NECK: no tenderness, no adenopathy, no asymmetry, no masses, no stiffness; thyroid- normal to palpation  RESP: lungs clear to auscultation - no rales, no rhonchi, no wheezes  CV: regular rates and rhythm, normal S1 S2, no S3 or S4 and no murmur, no click or rub -  ABDOMEN: soft, no tenderness, no  hepatosplenomegaly, no masses, normal bowel sounds       ASSESSMENT/PLAN:                                                      (G70.01) Myasthenia gravis with exacerbation (H)  (primary encounter diagnosis)  Comment: with frequent spells of weakness particularly if she has been active for more than 1 and 1/2 hours.   Plan: pyridostigmine (MESTINON) 60 MG tablet    Agitation  improved since decreasing steroid.  preednispone from 10 mg to 5 mg.  Likely this decrease is contributing to spells of weakness.  Will have her folow up virtualhy in2 weeks if spells are not mathieu will consider increase to 7.5 mg od prednisone.      reports that she has never smoked. She has never used smokeless tobacco.      Weight management plan: Discussed healthy diet and exercise guidelines      [facility]

## 2022-08-29 DIAGNOSIS — K52.1 DRUG-INDUCED DIARRHEA: ICD-10-CM

## 2022-08-29 RX ORDER — HYOSCYAMINE SULFATE 0.125 MG
TABLET ORAL
Qty: 180 TABLET | Refills: 0 | Status: SHIPPED | OUTPATIENT
Start: 2022-08-29 | End: 2022-10-07

## 2022-09-07 ENCOUNTER — OFFICE VISIT (OUTPATIENT)
Dept: NEUROLOGY | Facility: CLINIC | Age: 72
End: 2022-09-07
Payer: COMMERCIAL

## 2022-09-07 DIAGNOSIS — G70.00 MYASTHENIA GRAVIS WITHOUT EXACERBATION (H): Primary | ICD-10-CM

## 2022-09-07 PROCEDURE — 99207 PR NO BILLABLE SERVICE THIS VISIT: CPT | Performed by: PSYCHIATRY & NEUROLOGY

## 2022-09-07 NOTE — LETTER
9/7/2022         RE: Albina Pedro  [id number] [facility] [place] [address]        Dear Colleague,    Thank you for referring your patient, Albina Pedro, to the [facility] NEUROLOGY [facility]. Please see a copy of my visit note below.    Patient arrived 18 minutes late in Clinic for a 30 minute appointment (12.18 pm instead of 12 pm). My clinic was half-day and there was another pt with 12.30 pm appointment (overbooked) on time. I apologized and explained I could not accommodate her today- this is not a short visit as Kristina has many issues to discuss including cognitive. Will reschedule. GM        Again, thank you for allowing me to participate in the care of your patient.        Sincerely,        Rene Manning MD

## 2022-09-07 NOTE — PROGRESS NOTES
Patient arrived 18 minutes late in Clinic for a 30 minute appointment (12.18 pm instead of 12 pm). My clinic was half-day and there was another pt with 12.30 pm appointment (overbooked) on time. I apologized and explained I could not accommodate her today- this is not a short visit as Kristina has many issues to discuss including cognitive. Will reschedule. GM

## 2022-09-09 ENCOUNTER — APPOINTMENT (OUTPATIENT)
Dept: GENERAL RADIOLOGY | Facility: CLINIC | Age: 72
End: 2022-09-09
Attending: PHYSICIAN ASSISTANT
Payer: COMMERCIAL

## 2022-09-09 ENCOUNTER — HOSPITAL ENCOUNTER (EMERGENCY)
Facility: CLINIC | Age: 72
Discharge: HOME | End: 2022-09-09
Attending: PHYSICIAN ASSISTANT | Admitting: PHYSICIAN ASSISTANT
Payer: COMMERCIAL

## 2022-09-09 VITALS
TEMPERATURE: 97.5 F | OXYGEN SATURATION: 95 % | DIASTOLIC BLOOD PRESSURE: 71 MMHG | HEART RATE: 66 BPM | SYSTOLIC BLOOD PRESSURE: 148 MMHG

## 2022-09-09 DIAGNOSIS — M25.551 HIP PAIN, RIGHT: ICD-10-CM

## 2022-09-09 DIAGNOSIS — Z91.81 PERSONAL HISTORY OF FALL: ICD-10-CM

## 2022-09-09 PROCEDURE — G0463 HOSPITAL OUTPT CLINIC VISIT: HCPCS | Performed by: PHYSICIAN ASSISTANT

## 2022-09-09 PROCEDURE — 73502 X-RAY EXAM HIP UNI 2-3 VIEWS: CPT

## 2022-09-09 PROCEDURE — 99213 OFFICE O/P EST LOW 20 MIN: CPT | Performed by: PHYSICIAN ASSISTANT

## 2022-09-09 PROCEDURE — 250N000013 HC RX MED GY IP 250 OP 250 PS 637: Performed by: PHYSICIAN ASSISTANT

## 2022-09-09 RX ORDER — LIDOCAINE 4 G/G
1 PATCH TOPICAL ONCE
Status: DISCONTINUED | OUTPATIENT
Start: 2022-09-09 | End: 2022-09-09 | Stop reason: HOSPADM

## 2022-09-09 RX ADMIN — LIDOCAINE 1 PATCH: 560 PATCH PERCUTANEOUS; TOPICAL; TRANSDERMAL at 17:15

## 2022-09-09 ASSESSMENT — ENCOUNTER SYMPTOMS
ARTHRALGIAS: 1
NEUROLOGICAL NEGATIVE: 1
CONSTITUTIONAL NEGATIVE: 1

## 2022-09-09 ASSESSMENT — ACTIVITIES OF DAILY LIVING (ADL): ADLS_ACUITY_SCORE: 35

## 2022-09-09 NOTE — ED PROVIDER NOTES
History     Chief Complaint   Patient presents with     Fall     Patient presents today with right hip pain. Symptoms started after a fall on Sunday. Pt reports no loc. Arrived to urgent care ambulatory.       HPI  Albina Pedro is a 71 year old female with a past medical history of coronary artery disease, Sjogren's syndrome, myasthenia gravis, depression, hyperlipidemia, interstitial lung disease, and chronic stable angina who presents for evaluation of right hip pain secondary to a fall which occurred on September 4, 2022.  States that she tripped and fell on a tile floor, falling on her right side.  She denies any trauma to the her head or neck.  No pain in her right upper extremity or her right side.  No other trauma to her right lower extremity, no knee pain or ankle pain.  No abdominal pain.  No concerns with bowel movements or bladder function currently.  She has intermittent twinges of pain in her right hip and would like the reassurance that nothing is fractured.  Would like an x-ray today.  She has been able to ambulate without difficulty.  Normal strength and sensation in the right lower extremity.  No back pain.    Allergies:  Allergies   Allergen Reactions     Daypro [Oxaprozin] Rash            Lidocaine Other (See Comments)     Rapid heart rate     Nitroglycerin Other (See Comments)     Causes low blood pressure     Penicillins Unknown     Occurred as child.     Sulfa Drugs Rash       Problem List:    Patient Active Problem List    Diagnosis Date Noted     Chronic stable angina (H) 01/18/2019     Priority: Medium     Intermediate coronary syndrome (H) 07/24/2018     Priority: Medium     ILD (interstitial lung disease) (H) 07/09/2018     Priority: Medium     Needs follow up ct dec 2019       Hyperlipidemia LDL goal <70 05/31/2018     Priority: Medium     Major depressive disorder, recurrent, mild (H) 02/01/2017     Priority: Medium     Acute chest pain 12/13/2016     Priority: Medium     Status  post coronary angiogram 02/18/2016     Priority: Medium     Adverse effect of anesthetic 02/11/2016     Priority: Medium     Patient is very sensitive to anesthetics. Needs lower dosing.        Sjogren's syndrome (H) 01/15/2014     Priority: Medium     CAD (coronary artery disease) 09/18/2013     Priority: Medium     Mild ischemia on stress test       Advanced directives, counseling/discussion 01/12/2012     Priority: Medium     Advance Directive Problem List Overview:   Name Relationship Phone    Primary Health Care Agent            Alternative Health Care Agent          Discussed advance care planning with patient; information given to patient to review.     Daija Black CMA, HC Facilitator    1/12/2012        Rolo Cuenca PA-C  09/11/22 1207         Panniculitis 11/10/2011     Priority: Medium     Health Care Home 06/15/2011     Priority: Medium     X  DX V65.8 REPLACED WITH [id number] HEALTH CARE HOME (04/08/2013)       Episodic mood disorder (H) 11/26/2007     Priority: Medium     November 26, 2007 - Prozac and will look into light box.   April 15, 2008 - Will stop for summer. Light box rx.  Problem list name updated by automated process. Provider to review       Dermatophytosis of body 09/12/2007     Priority: Medium     September 12, 2007 - Classic appearance and worsening in areas's not using Nystatin.  Will use everywhere or change to clotrimazole.   April 15, 2008 - Change to powder.   Problem list name updated by automated process. Provider to review       DIZZINESS - LIKELY HYPOGLYCEMIA 07/27/2006     Priority: Medium     July 24, 2008- negative Event monitor and GTT showed some elevation.  Dietary changes.             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         Past Surgical History:    Past Surgical History:   Procedure Laterality Date     BIOPSY MUSCLE DIAGNOSTIC (LOCATION) Left 2022    Procedure: BIOPSY, MUSCLE LEFT biceps @0900;  Surgeon: Tyrell Loo MD;  Location: [facility] OR     C/SECTION, LOW TRANSVERSE  10/13/1978    , Low Transverse     COLONOSCOPY       OPEN REDUCTION INTERNAL FIXATION ANKLE Right 2019    Procedure: Open reduction internal fixation right lateral malleolus fracture;  Surgeon: Rolo Oconnor DPM;  Location: [facility] OR     SURGICAL HISTORY OF -            SURGICAL HISTORY OF -   00    Colonoscopy       Family History:    Family History   Problem Relation Age of Onset     Cancer Mother         Breast and liver- age 43     Heart Disease Father         ? chf?h/o CVA     Alzheimer Disease Father      Hypertension Father      Neurologic Disorder Father         CVA     Diabetes Maternal Grandmother      Breast Cancer Maternal Aunt      Breast Cancer Paternal Aunt      Cancer - colorectal No family hx of      Prostate Cancer No family hx of        Social History:  Marital Status:   [2]  Social History     Tobacco Use     Smoking status: Never Smoker     Smokeless tobacco: Never Used   Substance Use Topics     Alcohol use: Yes     Alcohol/week: 0.0 standard drinks     Comment: 1 glass of wine every 2 weeks     Drug use: No        Medications:    aspirin (ASA) 81 MG EC tablet  azaTHIOprine (IMURAN) 50 MG tablet  COMPRESSION STOCKINGS  hyoscyamine (LEVSIN) 0.125 MG tablet  LORazepam (ATIVAN) 0.5 MG tablet  Multiple Vitamins-Minerals (MULTIVITAMIN & MINERAL PO)  omeprazole (PRILOSEC) 20 MG DR capsule  pravastatin (PRAVACHOL) 40 MG tablet  predniSONE (DELTASONE) 10 MG tablet  pyridostigmine (MESTINON) 60 MG tablet  pyridostigmine (MESTINON) 60 MG tablet  ranolazine (RANEXA) 1000 MG TB12 12 hr tablet  sertraline (ZOLOFT) 100 MG tablet  VENTOLIN  (90 Base) MCG/ACT inhaler          Review of Systems    Constitutional: Negative.    Musculoskeletal: Positive for arthralgias.   Skin: Negative.    Neurological: Negative.        Physical Exam   BP: (!) 148/71  Pulse: 66  Temp: 97.5  F (36.4  C)  SpO2: 95 %      Physical Exam  Constitutional:       General: She is not in acute distress.     Appearance: Normal appearance. She is normal weight.   HENT:      Head: Normocephalic and atraumatic.   Cardiovascular:      Pulses: Normal pulses.           Dorsalis pedis pulses are 2+ on the right side.   Musculoskeletal:         General: No swelling.      Cervical back: No rigidity. No muscular tenderness.      Lumbar back: Normal.      Right hip: Tenderness present. No deformity, lacerations, bony tenderness or crepitus. Normal range of motion. Normal strength.      Right upper leg: Normal.      Comments: Mild tenderness to palpation over the greater trochanter on the right side.  No pain is elicited in the right hip with straight leg raise.  Mild pain is elicited in the right hip with SHARIF and FADIR tests.  No pain is elicited with logrolling the right leg.  No pain with abduction/abduction of the right hip or with hip flexion and extension.  Able to ambulate without difficulty.  Strength is 5/5 in the right hip, right knee, ankle and foot.  Normal sensation to light touch in the right lower extremity.   Skin:     Capillary Refill: Capillary refill takes less than 2 seconds.      Coloration: Skin is not jaundiced or pale.      Findings: No bruising, erythema, lesion or rash.   Neurological:      Mental Status: She is alert and oriented to person, place, and time.      Sensory: No sensory deficit.         ED Course                 Procedures                  No results found for this or any previous visit (from the past 24 hour(s)).    Medications - No data to display    Assessments & Plan (with Medical Decision Making)       X-rays of the right hip and pelvis showed no evidence for acute fracture or dislocation.    Recommend  over-the-counter Tylenol/ as needed for pain control.  Recommend applying heat to the affected area at 15-minute intervals for 48 hours post injury.     Recommend urgent medical evaluation if you develop worsening pain, pain out of proportion to injury, numbness or tingling or loss of feeling in the right hip or right lower extremity, or redness/tenderness/swelling in the right lower extremity.          I have reviewed the nursing notes.    I have reviewed the findings, diagnosis, plan and need for follow up with the patient.      Discharge Medication List as of 9/9/2022  5:05 PM          Final diagnoses:   Hip pain, right   Personal history of fall       9/9/2022   [facility] EMERGENCY DEPT

## 2022-09-09 NOTE — DISCHARGE INSTRUCTIONS
Recommend over-the-counter Tylenol/ as needed for pain control.  Recommend applying heat to the affected area at 15-minute intervals for 48 hours post injury.   Recommend urgent medical evaluation if you develop worsening pain, pain out of proportion to injury, numbness or tingling or loss of feeling in the right hip or right lower extremity, or redness/tenderness/swelling in the right lower extremity.

## 2022-09-12 ENCOUNTER — VIRTUAL VISIT (OUTPATIENT)
Dept: FAMILY MEDICINE | Facility: CLINIC | Age: 72
End: 2022-09-12
Payer: COMMERCIAL

## 2022-09-12 DIAGNOSIS — G70.01 MYASTHENIA GRAVIS WITH EXACERBATION (H): ICD-10-CM

## 2022-09-12 DIAGNOSIS — M25.551 HIP PAIN, RIGHT: ICD-10-CM

## 2022-09-12 DIAGNOSIS — F33.0 MAJOR DEPRESSIVE DISORDER, RECURRENT, MILD (H): ICD-10-CM

## 2022-09-12 DIAGNOSIS — R29.6 FREQUENT FALLS: Primary | ICD-10-CM

## 2022-09-12 DIAGNOSIS — I20.89 CHRONIC STABLE ANGINA (H): ICD-10-CM

## 2022-09-12 DIAGNOSIS — G70.00 MYASTHENIA GRAVIS WITHOUT EXACERBATION (H): ICD-10-CM

## 2022-09-12 PROCEDURE — 99214 OFFICE O/P EST MOD 30 MIN: CPT | Mod: 95 | Performed by: FAMILY MEDICINE

## 2022-09-12 RX ORDER — PREDNISONE 10 MG/1
10 TABLET ORAL DAILY
Qty: 30 TABLET | Refills: 1 | Status: SHIPPED | OUTPATIENT
Start: 2022-09-12 | End: 2022-11-14 | Stop reason: DRUGHIGH

## 2022-09-12 RX ORDER — PYRIDOSTIGMINE BROMIDE 60 MG/1
60 TABLET ORAL 3 TIMES DAILY
Qty: 90 TABLET | Refills: 1 | Status: SHIPPED | OUTPATIENT
Start: 2022-09-12 | End: 2022-09-15

## 2022-09-12 NOTE — PROGRESS NOTES
Vicente is a 71 year old who is being evaluated via a billable telephone visit.        Had another fall (/4  Was seen in er fpor hip pain xray negative.    At my last visit I increased her mestinon thinking her MG could be cause of her falls.    She had f/u with neurology which she was late for and did not see.    What phone number would you like to be contacted at? [phone]    How would you like to obtain your AVS? MyChart    Assessment & Plan     Frequent falls  Had another fall 9/4.  Not sure if increased mestinone  has helpped  I don't think this is cardiac but it does happen after she has been more active. Saw cards last week.   Will increase prednisone back up to 10 b mg(she cut back to 5 mg)  Recheck in 2 weeks via phone and in persom in 1 month.     Major depressive disorder, recurrent, mild (H)  Making managing of her deconditioning worse as she is not motovated to be active,     Hip pain, right  After acute fall    Myasthenia gravis with exacerbation (H)  Need neuro folloew up. I will keep at vc dcurrent dose.     Chronic stable angina (H)  Needs cardiology folow up             Rolo Sandoval MD  [facility] LUIS FERNANDO Acevedo is a 71 year old, presenting for the following health issues:  Recheck Medication      HPI      Hyperlipidemia Follow-Up      Are you regularly taking any medication or supplement to lower your cholesterol?   Yes- Pravasatin    Are you having muscle aches or other side effects that you think could be caused by your cholesterol lowering medication?  No    Depression Followup    How are you doing with your depression since your last visit? No change    Are you having other symptoms that might be associated with depression? Yes:  confusion, lack of concentration    Have you had a significant life event?  No     Are you feeling anxious or having panic attacks?   Yes:  sometimes    Do you have any concerns with your use of alcohol or other drugs? No    Social  History     Tobacco Use     Smoking status: Never Smoker     Smokeless tobacco: Never Used   Substance Use Topics     Alcohol use: Yes     Alcohol/week: 0.0 standard drinks     Comment: 1 glass of wine every 2 weeks     Drug use: No     PHQ 4/25/2022 7/25/2022 8/22/2022   PHQ-9 Total Score - 9 5   Q9: Thoughts of better off dead/self-harm past 2 weeks Not at all Not at all Not at all     SEBASTIAN-7 SCORE 4/25/2022 7/25/2022 8/22/2022   Total Score - - -   Total Score 5 4 4     Last PHQ-9 8/22/2022   1.  Little interest or pleasure in doing things 0   2.  Feeling down, depressed, or hopeless 1   3.  Trouble falling or staying asleep, or sleeping too much 0   4.  Feeling tired or having little energy 1   5.  Poor appetite or overeating 0   6.  Feeling bad about yourself 2   7.  Trouble concentrating 0   8.  Moving slowly or restless 1   Q9: Thoughts of better off dead/self-harm past 2 weeks 0   PHQ-9 Total Score 5   Difficulty at work, home, or with people -     SEBASTIAN-7  8/22/2022   1. Feeling nervous, anxious, or on edge 1   2. Not being able to stop or control worrying 1   3. Worrying too much about different things 0   4. Trouble relaxing 0   5. Being so restless that it is hard to sit still 0   6. Becoming easily annoyed or irritable 1   7. Feeling afraid, as if something awful might happen 1   SEBASTIAN-7 Total Score 4   If you checked any problems, how difficult have they made it for you to do your work, take care of things at home, or get along with other people? -       Suicide Assessment Five-step Evaluation and Treatment (SAFE-T)  \      How many servings of fruits and vegetables do you eat daily?  1-3    On average, how many sweetened beverages do you drink each day (Examples: soda, juice, sweet tea, etc.  Do NOT count diet or artificially sweetened beverages)?   1    How many days per week do you exercise enough to make your heart beat faster? 3 or less    How many minutes a day do you exercise enough to make your  heart beat faster? 20 - 29    How many days per week do you miss taking your medication? 0    Review of Systems   Constitutional, HEENT, cardiovascular, pulmonary, gi and gu systems are negative, except as otherwise noted.      Objective           Vitals:  No vitals were obtained today due to virtual visit.    Physical Exam   healthy, alert and no distress  PSYCH: Alert and oriented times 3; coherent speech, normal   rate and volume, able to articulate logical thoughts, able   to abstract reason, no tangential thoughts, no hallucinations   or delusions  Her affect is normal  RESP: No cough, no audible wheezing, able to talk in full sentences  Remainder of exam unable to be completed due to telephone visits                Phone call duration: 12 minutes

## 2022-09-15 DIAGNOSIS — G70.00 MYASTHENIA GRAVIS WITHOUT EXACERBATION (H): ICD-10-CM

## 2022-09-15 RX ORDER — PYRIDOSTIGMINE BROMIDE 60 MG/1
TABLET ORAL
Qty: 90 TABLET | Refills: 0 | Status: SHIPPED | OUTPATIENT
Start: 2022-09-15 | End: 2022-12-22

## 2022-09-19 ENCOUNTER — TELEPHONE (OUTPATIENT)
Dept: FAMILY MEDICINE | Facility: CLINIC | Age: 72
End: 2022-09-19

## 2022-09-27 ENCOUNTER — OFFICE VISIT (OUTPATIENT)
Dept: NEUROLOGY | Facility: CLINIC | Age: 72
End: 2022-09-27
Payer: COMMERCIAL

## 2022-09-27 ENCOUNTER — HOSPITAL ENCOUNTER (OUTPATIENT)
Facility: CLINIC | Age: 72
End: 2022-09-27
Payer: COMMERCIAL

## 2022-09-27 VITALS
DIASTOLIC BLOOD PRESSURE: 66 MMHG | WEIGHT: 172.8 LBS | SYSTOLIC BLOOD PRESSURE: 129 MMHG | HEART RATE: 74 BPM | BODY MASS INDEX: 27.89 KG/M2 | OXYGEN SATURATION: 92 %

## 2022-09-27 DIAGNOSIS — M16.0 BILATERAL HIP JOINT ARTHRITIS: ICD-10-CM

## 2022-09-27 DIAGNOSIS — G70.00 SERONEGATIVE MYASTHENIA GRAVIS (H): Primary | ICD-10-CM

## 2022-09-27 DIAGNOSIS — F09 COGNITIVE DISORDER: ICD-10-CM

## 2022-09-27 DIAGNOSIS — R29.898 PROXIMAL LEG WEAKNESS: ICD-10-CM

## 2022-09-27 PROCEDURE — 99215 OFFICE O/P EST HI 40 MIN: CPT | Performed by: PSYCHIATRY & NEUROLOGY

## 2022-09-27 ASSESSMENT — PAIN SCALES - GENERAL: PAINLEVEL: SEVERE PAIN (6)

## 2022-09-27 NOTE — PATIENT INSTRUCTIONS
Myasthenia gravis: I think the condition is stable for now. I will talk to Dr Sandoval about the prednisone dose and get back to you. Prednisone sometimes can make leg muscle weakness worse.    Falls: There could be several reasons. I think you have bad hip arthritis and you should see an Orthopedic doctor about this (I am referring you). This can affect your balance. You also have some weakness in your hips- could be from myasthenia or the steroids. I will refer you to work with physical therapy to strengthen the muscles    Cognitive issues- there could be many reasons but I do have some concerns about early Alzheimer's in your case and I would like you to get a spinal tap to see if there is amyloid in your brain. If the test is positive for amyloid, Alzheimer's is likely/probable. If it is negative, it is extremely unlikely if not impossible.    Follow up 3 months

## 2022-09-27 NOTE — PROGRESS NOTES
Service Date: 2022    Rolo Sandoval MD  [facility]  [address] [place] [address]    RE:      Albina Pedro  MRN:  [id number]  :   1950    Dear Dr. Sandoval:      I had the pleasure to see Albina Pedro in followup at the [facility].  As you may know from prior correspondence, she is a lady who I first saw in 2022 for fatigue and right eyelid ptosis in the context of severe sicca syndrome/Sjogren and positive SSA and SSB antibodies.  She is following with Dr. Mark.  She had triceps and mild hip flexor weakness on exam as well as facial weakness.  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 Repetitive nerve stimulation (RNS) from the ulnar and facial nerves was negative- no evidence of MG. This led to a muscle biopsy showing nonspecific mitochondrial changes.  However, because mitochondrial myopathy was NOT a good fit for the patient's presentation, I ended up repeating the RNS study from the radial nerve recording from the triceps and anconeus, which showed a marked decrement over 25%, confirming the suspicion of seronegative myasthenia gravis.  She is negative for acetylcholine receptor and MuSK antibodies.     After I got the second EMG test, I placed her on pyridostigmine 60 mg t.i.d.  This had caused substantial diarrhea which was helped by hyoscyamine.  She was also placed on prednisone.  She is now on 10 mg daily.  At some point, the dose was tapered by 5 mg by Dr. Sandoval because she was very agitated and confused on the dose of 10 mg. However, with the 5 mg, she had spells of increasing weakness and falls and then Dr. Sandoval restored the dose to 10 mg.  She still falls occasionally and feels very imbalanced.  She does have bilateral hip pain.  She has noted difficulty getting up from low-seated chairs.  She has to use her arms all the time. Ptosis occurs when she is very fatigued but it is minor.  She does  not experience diplopia, difficulty chewing or swallowing.  Her speech is slurred but she does not really mean dysarthria.  She means difficulty finding the right words which is a chronic cognitive problem that will be discussed below.  Denies shortness of breath on exertion.  Denies fatigue of arms when brushing her teeth or combing her hair.     MG ADL score today: 3 (1 ptosis, 2 leg weakness).    She is also on azathioprine for her Sjogren's which may also work for MG.  I encouraged Dr. Mark to increase her dose to 50 mg bid.     One of the major concerns of Kristina and her  is her cognitive dysfunction. According to her , she is more confused every year.  This has been a longstanding theme. She has a family history of Alzheimer's.  Her father developed similar symptoms in his 70s.  She has significant short-term memory loss, may misplace her things.  She does not generally get lost.  She has never had any seizures or episodes of loss of consciousness.  She is pretty forgetful.  She does not drive.  The family does not own any firearms.  She has had a few neuropsych tests in the past that interestingly showed significant abnormalities but no progression of her cognitive dysfunction between 2014 and 2018.She denies numbness or tingling in her feet.  She does not have any active incontinence of bowel or bladder.     Medications were reviewed and are as per Epic record.      /66 (BP Location: Left arm, Patient Position: Sitting, Cuff Size: Adult Regular)   Pulse 74   Wt 78.4 kg (172 lb 12.8 oz)   SpO2 92%   BMI 27.89 kg/m      Her examination shows that she is awake, alert, oriented x3. She failed the Mini-Cog test.  She was able to copy a clock and put all the numbers orrectly but when she was asked to put the needle at 11:10, she put it at 1:55.  She misplaced the long with the short needle.  She recalls only 1 out of 3 words after 2 minutes spontaneously.  The other 2 she can recall with  clues.  She has mild bilateral eyelid ptosis that is stable and non-fatigable, could be age-related changes.  There is perhaps mild proptosis bilaterally.  She gets double vision after about 15-20 seconds of sustained upward gaze, not with horizontal gaze to the right or left or downgaze.  She has moderate, at least, weakness of orbicularis oculi bilaterally and mild lower facial weakness as well as weakness of cheek puff.  Tongue shows no atrophy or fasciculations and fairly strong lateral tongue movements.  Uvula and palate elevate at midline.  There is no dysphonia or dysarthria.  Her neck flexion and extension strength are normal today.  Her strength was 5/5 for deltoid and biceps but triceps remains weak, 4- bilaterally. Wrist extensors, finger extensors and hand  are full.  FDI is full.  Her hip flexion is at least 4+ bilaterally.  Knee extension, knee flexion are 5.  Foot dorsiflexion 5.  Tone is normal in the arms and legs.  She has a moderate tremor of outstretched hands.  Reflexes are 1+ at the right ankle, 2+ left, 3+ brisk at the knees with crossed adductor response and 3+ in the upper extremities.  She has no Reynaldo or Babinski signs.  Proprioception of the toes is normal.  Vibration is low normal at the toes at about 8 seconds and normal at the index fingers at over 18.  Finger-to-nose is done without dysmetria.  She has difficulty getting up from a chair.  She cannot do it with arms crossed on the chest.  She is a bit slightly wide-based and unsteady when walking.  She walks with an antalgic and possible Trendelenburg gait.  She has equivocal Romberg sign and significant difficulty doing tandem.    In summary, Kristina has a few issues.      1) She has seronegative myasthenia gravis. She is overall stable but I don't think she is in remission. She still has fatigue and triceps, hip flexor, and facial weakness persist on exam. She will continue Mestinon, low-dose prednisone and azathioprine for now.  It may be worth treating her more aggressively with short term IVIG, as she can clearly not tolerate doses of prednisone more than 10 mg daily due to confusion and agitation. I will re-discuss this approach with family in the next days.   2) Her falls are multifactorial.  She has an antalgic gait.  Bedside SHARIF maneuver was strongly positive bilaterally.  I think she likely has bad hips and I would like an orthopedic doctor to evaluate her for this.  Secondly, mild proximal leg weakness is likely playing a role in the falls. I suspect this is MG related and may have to be treated a bit more aggressively (see #1).   3) The most pressing issue at this point, however, is her cognitive dysfunction.  Given her family history of Alzheimer's and the fact that the family has observed some progression in the last couple of years, it is worrisome.  I have discussed this already with dementia specialist, Dr. Devine.  Because her neuropsychological testing progression over the years was a bit atypical for AD, it may be worth documenting the presence of amyloid in the brain.  Dr. Devine recommended an LP to measure CSF amyloid-beta 42/40 ratio, p-tau and total tau.  I explained the procedure to the patient and what a positive and a negative result would mean and she agreed to proceed.  We will schedule it through Interventional Radiology.  Side effects of LP were explained.      I will also refer her to Physical Therapy for core muscle strengthening of the hips.  She will return to follow up with me in 3 months or sooner if needed.TT spent on this encounter today 45 minutes including 30 face to face, 10 in post-visit note dictation, editing, and orders, and 5 in previsit chart review.     Sincerely,    Rene Manning MD        D: 2022   T: 2022   MT: jeffrey    Name:     MORENA ELLINGTON  MRN:      [id number]        Account:      [id number]   :      1950           Service Date: 2022        Document: [id number]

## 2022-09-27 NOTE — LETTER
2022       RE: Albina Pedro  [id number]  [facility] [place] [address]     Dear Colleague,    Thank you for referring your patient, Albina Pedro, to the [facility] [place] at [facility]. Please see a copy of my visit note below.    Service Date: 2022    Rolo Sandoval MD  [facility]  [address] [place] [address]    RE:      Albina Pedro  MRN:  [id number]  :   1950    Dear Dr. Sandoval:      I had the pleasure to see Albina Pedro in followup at the [facility].  As you may know from prior correspondence, she is a lady who I first saw in 2022 for fatigue and right eyelid ptosis in the context of severe sicca syndrome/Sjogren and positive SSA and SSB antibodies.  She is following with Dr. Mark.  She had triceps and mild hip flexor weakness on exam as well as facial weakness.  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 Repetitive nerve stimulation (RNS) from the ulnar and facial nerves was negative- no evidence of MG. This led to a muscle biopsy showing nonspecific mitochondrial changes.  However, because mitochondrial myopathy was NOT a good fit for the patient's presentation, I ended up repeating the RNS study from the radial nerve recording from the triceps and anconeus, which showed a marked decrement over 25%, confirming the suspicion of seronegative myasthenia gravis.  She is negative for acetylcholine receptor and MuSK antibodies.     After I got the second EMG test, I placed her on pyridostigmine 60 mg t.i.d.  This had caused substantial diarrhea which was helped by hyoscyamine.  She was also placed on prednisone.  She is now on 10 mg daily.  At some point, the dose was tapered by 5 mg by Dr. Sandoval because she was very agitated and confused on the dose of 10 mg. However, with the 5 mg, she had spells of  increasing weakness and falls and then Dr. Sandoval restored the dose to 10 mg.  She still falls occasionally and feels very imbalanced.  She does have bilateral hip pain.  She has noted difficulty getting up from low-seated chairs.  She has to use her arms all the time. Ptosis occurs when she is very fatigued but it is minor.  She does not experience diplopia, difficulty chewing or swallowing.  Her speech is slurred but she does not really mean dysarthria.  She means difficulty finding the right words which is a chronic cognitive problem that will be discussed below.  Denies shortness of breath on exertion.  Denies fatigue of arms when brushing her teeth or combing her hair.     MG ADL score today: 3 (1 ptosis, 2 leg weakness).    She is also on azathioprine for her Sjogren's which may also work for MG.  I encouraged Dr. Mark to increase her dose to 50 mg bid.     One of the major concerns of Kristina and her  is her cognitive dysfunction. According to her , she is more confused every year.  This has been a longstanding theme. She has a family history of Alzheimer's.  Her father developed similar symptoms in his 70s.  She has significant short-term memory loss, may misplace her things.  She does not generally get lost.  She has never had any seizures or episodes of loss of consciousness.  She is pretty forgetful.  She does not drive.  The family does not own any firearms.  She has had a few neuropsych tests in the past that interestingly showed significant abnormalities but no progression of her cognitive dysfunction between 2014 and 2018.She denies numbness or tingling in her feet.  She does not have any active incontinence of bowel or bladder.     Medications were reviewed and are as per Epic record.      /66 (BP Location: Left arm, Patient Position: Sitting, Cuff Size: Adult Regular)   Pulse 74   Wt 78.4 kg (172 lb 12.8 oz)   SpO2 92%   BMI 27.89 kg/m      Her examination shows that she  is awake, alert, oriented x3. She failed the Mini-Cog test.  She was able to copy a clock and put all the numbers orrectly but when she was asked to put the needle at 11:10, she put it at 1:55.  She misplaced the long with the short needle.  She recalls only 1 out of 3 words after 2 minutes spontaneously.  The other 2 she can recall with clues.  She has mild bilateral eyelid ptosis that is stable and non-fatigable, could be age-related changes.  There is perhaps mild proptosis bilaterally.  She gets double vision after about 15-20 seconds of sustained upward gaze, not with horizontal gaze to the right or left or downgaze.  She has moderate, at least, weakness of orbicularis oculi bilaterally and mild lower facial weakness as well as weakness of cheek puff.  Tongue shows no atrophy or fasciculations and fairly strong lateral tongue movements.  Uvula and palate elevate at midline.  There is no dysphonia or dysarthria.  Her neck flexion and extension strength are normal today.  Her strength was 5/5 for deltoid and biceps but triceps remains weak, 4- bilaterally. Wrist extensors, finger extensors and hand  are full.  FDI is full.  Her hip flexion is at least 4+ bilaterally.  Knee extension, knee flexion are 5.  Foot dorsiflexion 5.  Tone is normal in the arms and legs.  She has a moderate tremor of outstretched hands.  Reflexes are 1+ at the right ankle, 2+ left, 3+ brisk at the knees with crossed adductor response and 3+ in the upper extremities.  She has no Reynaldo or Babinski signs.  Proprioception of the toes is normal.  Vibration is low normal at the toes at about 8 seconds and normal at the index fingers at over 18.  Finger-to-nose is done without dysmetria.  She has difficulty getting up from a chair.  She cannot do it with arms crossed on the chest.  She is a bit slightly wide-based and unsteady when walking.  She walks with an antalgic and possible Trendelenburg gait.  She has equivocal Romberg sign and  significant difficulty doing tandem.    In summary, Kristina has a few issues.      1) She has seronegative myasthenia gravis. She is overall stable but I don't think she is in remission. She still has fatigue and triceps, hip flexor, and facial weakness persist on exam. She will continue Mestinon, low-dose prednisone and azathioprine for now. It may be worth treating her more aggressively with short term IVIG, as she can clearly not tolerate doses of prednisone more than 10 mg daily due to confusion and agitation. I will re-discuss this approach with family in the next days.   2) Her falls are multifactorial.  She has an antalgic gait.  Bedside SHARIF maneuver was strongly positive bilaterally.  I think she likely has bad hips and I would like an orthopedic doctor to evaluate her for this.  Secondly, mild proximal leg weakness is likely playing a role in the falls. I suspect this is MG related and may have to be treated a bit more aggressively (see #1).   3) The most pressing issue at this point, however, is her cognitive dysfunction.  Given her family history of Alzheimer's and the fact that the family has observed some progression in the last couple of years, it is worrisome.  I have discussed this already with dementia specialist, Dr. Devine.  Because her neuropsychological testing progression over the years was a bit atypical for AD, it may be worth documenting the presence of amyloid in the brain.  Dr. Devine recommended an LP to measure CSF amyloid-beta 42/40 ratio, p-tau and total tau.  I explained the procedure to the patient and what a positive and a negative result would mean and she agreed to proceed.  We will schedule it through Interventional Radiology.  Side effects of LP were explained.      I will also refer her to Physical Therapy for core muscle strengthening of the hips.  She will return to follow up with me in 3 months or sooner if needed.TT spent on this encounter today 45 minutes including 30 face  to face, 10 in post-visit note dictation, editing, and orders, and 5 in previsit chart review.     Sincerely,    Rene Manning MD        D: 2022   T: 2022   MT: jeffrey    Name:     MORENA ELLINGTON  MRN:      [id number]        Account:      [id number]   :      1950           Service Date: 2022       Document: [id number]

## 2022-09-28 DIAGNOSIS — F43.0 ACUTE REACTION TO STRESS: ICD-10-CM

## 2022-09-28 DIAGNOSIS — M35.09 SJOGREN'S SYNDROME WITH OTHER ORGAN INVOLVEMENT (H): ICD-10-CM

## 2022-09-28 RX ORDER — LORAZEPAM 0.5 MG/1
TABLET ORAL
Qty: 20 TABLET | Refills: 0 | Status: SHIPPED | OUTPATIENT
Start: 2022-09-28 | End: 2022-12-30

## 2022-10-01 DIAGNOSIS — J84.9 ILD (INTERSTITIAL LUNG DISEASE) (H): ICD-10-CM

## 2022-10-03 RX ORDER — AZATHIOPRINE 50 MG/1
50 TABLET ORAL DAILY
Qty: 90 TABLET | Refills: 0 | Status: SHIPPED | OUTPATIENT
Start: 2022-10-03 | End: 2022-12-01 | Stop reason: DRUGHIGH

## 2022-10-03 NOTE — TELEPHONE ENCOUNTER
azaTHIOprine Oral Tablet 50 MG  Last Written Prescription Date:  6/8/2022  Last Fill Quantity: 90,   # refills: 0  Last Office Visit:  6/3/2022  Future Office visit:   10/28/2022    CBC RESULTS: Recent Labs   Lab Test 06/03/22  1314   WBC 5.1   RBC 4.34   HGB 13.7   HCT 40.7   MCV 94   MCH 31.6   MCHC 33.7   RDW 14.1          Creatinine   Date Value Ref Range Status   06/03/2022 0.72 0.52 - 1.04 mg/dL Final   06/16/2021 0.70 0.52 - 1.04 mg/dL Final   ]    Liver Function Studies -   Recent Labs   Lab Test 06/03/22  1314 03/25/22  1514 02/16/22  1740   PROTTOTAL  --   --  7.3   ALBUMIN 3.5   < > 4.0   BILITOTAL  --   --  0.4   ALKPHOS  --   --  67   AST 29   < > 30   ALT 43   < > 44    < > = values in this interval not displayed.       Routing refill request to provider for review/approval because:  Drug not on the [facility], [facility] or [facility] refill protocol or controlled substance      Aline Pink RN  Central Triage Red Flags/Med Refills

## 2022-10-04 RX ORDER — ALBUTEROL SULFATE 90 UG/1
AEROSOL, METERED RESPIRATORY (INHALATION)
Qty: 25.5 G | Refills: 0 | Status: SHIPPED | OUTPATIENT
Start: 2022-10-04

## 2022-10-05 ENCOUNTER — LAB (OUTPATIENT)
Dept: FAMILY MEDICINE | Facility: CLINIC | Age: 72
End: 2022-10-05
Payer: COMMERCIAL

## 2022-10-05 DIAGNOSIS — Z20.822 SUSPECTED COVID-19 VIRUS INFECTION: ICD-10-CM

## 2022-10-05 LAB — SARS-COV-2 RNA RESP QL NAA+PROBE: NEGATIVE

## 2022-10-05 PROCEDURE — 99207 PR NO CHARGE LOS: CPT

## 2022-10-05 PROCEDURE — U0005 INFEC AGEN DETEC AMPLI PROBE: HCPCS

## 2022-10-05 PROCEDURE — U0003 INFECTIOUS AGENT DETECTION BY NUCLEIC ACID (DNA OR RNA); SEVERE ACUTE RESPIRATORY SYNDROME CORONAVIRUS 2 (SARS-COV-2) (CORONAVIRUS DISEASE [COVID-19]), AMPLIFIED PROBE TECHNIQUE, MAKING USE OF HIGH THROUGHPUT TECHNOLOGIES AS DESCRIBED BY CMS-2020-01-R: HCPCS

## 2022-10-07 ENCOUNTER — TELEPHONE (OUTPATIENT)
Dept: FAMILY MEDICINE | Facility: CLINIC | Age: 72
End: 2022-10-07

## 2022-10-07 DIAGNOSIS — K52.1 DRUG-INDUCED DIARRHEA: ICD-10-CM

## 2022-10-07 RX ORDER — HYOSCYAMINE SULFATE 0.125 MG
TABLET ORAL
Qty: 180 TABLET | Refills: 0 | Status: SHIPPED | OUTPATIENT
Start: 2022-10-07 | End: 2022-11-28

## 2022-10-07 NOTE — TELEPHONE ENCOUNTER
Received call from patient then stated her  is present for this call.  Patient and Jerrod would like t oupdate Dr Sandoval of patient's symptoms since last visit 9/27/22.  Patient has had increased weakness, fatigue and increased memory loss since July per Jerrod.  Prednisone was increased at last visit and no improvement of symptoms has been noted.  Jerrod reports that Neurology is looking at IV medication and LP.  Will forward to Dr Sandoval.  Evie Arizmendi RN

## 2022-10-28 ENCOUNTER — OFFICE VISIT (OUTPATIENT)
Dept: RHEUMATOLOGY | Facility: CLINIC | Age: 72
End: 2022-10-28
Attending: INTERNAL MEDICINE
Payer: COMMERCIAL

## 2022-10-28 ENCOUNTER — OFFICE VISIT (OUTPATIENT)
Dept: PULMONOLOGY | Facility: CLINIC | Age: 72
End: 2022-10-28
Attending: INTERNAL MEDICINE
Payer: COMMERCIAL

## 2022-10-28 VITALS
DIASTOLIC BLOOD PRESSURE: 72 MMHG | WEIGHT: 173 LBS | HEIGHT: 66 IN | SYSTOLIC BLOOD PRESSURE: 153 MMHG | HEART RATE: 77 BPM | OXYGEN SATURATION: 99 % | BODY MASS INDEX: 27.8 KG/M2

## 2022-10-28 VITALS
OXYGEN SATURATION: 97 % | SYSTOLIC BLOOD PRESSURE: 127 MMHG | HEIGHT: 65 IN | WEIGHT: 173 LBS | HEART RATE: 72 BPM | BODY MASS INDEX: 28.82 KG/M2 | DIASTOLIC BLOOD PRESSURE: 65 MMHG

## 2022-10-28 DIAGNOSIS — J84.9 ILD (INTERSTITIAL LUNG DISEASE) (H): ICD-10-CM

## 2022-10-28 DIAGNOSIS — Z51.81 MEDICATION MONITORING ENCOUNTER: ICD-10-CM

## 2022-10-28 DIAGNOSIS — Z79.52 CURRENT CHRONIC USE OF SYSTEMIC STEROIDS: ICD-10-CM

## 2022-10-28 DIAGNOSIS — Z23 NEED FOR VACCINATION: Primary | ICD-10-CM

## 2022-10-28 DIAGNOSIS — M35.09 SJOGREN'S SYNDROME WITH OTHER ORGAN INVOLVEMENT (H): ICD-10-CM

## 2022-10-28 DIAGNOSIS — Z79.899 OTHER LONG TERM (CURRENT) DRUG THERAPY: Primary | ICD-10-CM

## 2022-10-28 DIAGNOSIS — Z78.0 POST-MENOPAUSE: ICD-10-CM

## 2022-10-28 PROCEDURE — G0463 HOSPITAL OUTPT CLINIC VISIT: HCPCS

## 2022-10-28 PROCEDURE — 99214 OFFICE O/P EST MOD 30 MIN: CPT | Mod: 25 | Performed by: INTERNAL MEDICINE

## 2022-10-28 PROCEDURE — 99214 OFFICE O/P EST MOD 30 MIN: CPT | Performed by: INTERNAL MEDICINE

## 2022-10-28 PROCEDURE — 94729 DIFFUSING CAPACITY: CPT | Performed by: INTERNAL MEDICINE

## 2022-10-28 PROCEDURE — 94375 RESPIRATORY FLOW VOLUME LOOP: CPT | Performed by: INTERNAL MEDICINE

## 2022-10-28 PROCEDURE — 250N000011 HC RX IP 250 OP 636: Performed by: INTERNAL MEDICINE

## 2022-10-28 PROCEDURE — 90662 IIV NO PRSV INCREASED AG IM: CPT | Performed by: INTERNAL MEDICINE

## 2022-10-28 PROCEDURE — G0008 ADMIN INFLUENZA VIRUS VAC: HCPCS | Performed by: INTERNAL MEDICINE

## 2022-10-28 PROCEDURE — G0463 HOSPITAL OUTPT CLINIC VISIT: HCPCS | Mod: 25

## 2022-10-28 RX ORDER — PREDNISONE 5 MG/1
TABLET ORAL
Qty: 90 TABLET | Refills: 1 | Status: SHIPPED | OUTPATIENT
Start: 2022-10-28 | End: 2023-01-04

## 2022-10-28 RX ORDER — PREDNISONE 1 MG/1
TABLET ORAL
Qty: 240 TABLET | Refills: 1 | Status: SHIPPED | OUTPATIENT
Start: 2022-10-28 | End: 2023-01-04

## 2022-10-28 RX ADMIN — INFLUENZA A VIRUS A/VICTORIA/2570/2019 IVR-215 (H1N1) ANTIGEN (FORMALDEHYDE INACTIVATED), INFLUENZA A VIRUS A/DARWIN/9/2021 SAN-010 (H3N2) ANTIGEN (FORMALDEHYDE INACTIVATED), INFLUENZA B VIRUS B/PHUKET/3073/2013 ANTIGEN (FORMALDEHYDE INACTIVATED), AND INFLUENZA B VIRUS B/MICHIGAN/01/2021 ANTIGEN (FORMALDEHYDE INACTIVATED) 0.7 ML: 60; 60; 60; 60 INJECTION, SUSPENSION INTRAMUSCULAR at 09:52

## 2022-10-28 ASSESSMENT — PAIN SCALES - GENERAL: PAINLEVEL: NO PAIN (0)

## 2022-10-28 NOTE — PATIENT INSTRUCTIONS
Tell Beatriz that you would get IVIG at [place]    Start tapering prednisone: 9-8-7-6 mg a day each course x 1 month then 5 mg a day and stay on 5 mg a day    DEXA bone density    MTM referral    Video visit in about 3 months

## 2022-10-28 NOTE — PROGRESS NOTES
Reason for Visit  Albina Pedro is a 71 year old year old female who is being seen for Interstitial Lung Disease (ILD) (1 year follow up )    ILD HPI    Albina Pedro is a 69-year-old female who follows up today for a lymphocytic interstitial pneumonia in the setting of Sjogren's syndrome.  She has had very stable pulmonary disease and was off immunosuppression for a period of time.  She developed a lot of neuromuscular symptoms and has been following with rheumatology and neurology and recently has been diagnosed with a seronegative myasthenia gravis.  She is on azathioprine and prednisone currently per rheumatology and neurology.  From a respiratory standpoint she is not noticing any changes.  She denies any significant shortness of breath or dyspnea on exertion.      Current Outpatient Medications   Medication     albuterol (PROAIR HFA/PROVENTIL HFA/VENTOLIN HFA) 108 (90 Base) MCG/ACT inhaler     aspirin (ASA) 81 MG EC tablet     azaTHIOprine (IMURAN) 50 MG tablet     hyoscyamine (LEVSIN) 0.125 MG tablet     LORazepam (ATIVAN) 0.5 MG tablet     Multiple Vitamins-Minerals (MULTIVITAMIN & MINERAL PO)     pravastatin (PRAVACHOL) 40 MG tablet     predniSONE (DELTASONE) 1 MG tablet     predniSONE (DELTASONE) 10 MG tablet     predniSONE (DELTASONE) 5 MG tablet     pyridostigmine (MESTINON) 60 MG tablet     ranolazine (RANEXA) 1000 MG TB12 12 hr tablet     sertraline (ZOLOFT) 100 MG tablet     COMPRESSION STOCKINGS     omeprazole (PRILOSEC) 20 MG DR capsule     No current facility-administered medications for this visit.     Allergies   Allergen Reactions     Daypro [Oxaprozin] Rash            Lidocaine Other (See Comments)     Rapid heart rate     Nitroglycerin Other (See Comments)     Causes low blood pressure     Penicillins Unknown     Occurred as child.     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       Past Surgical History:   Procedure Laterality Date     BIOPSY MUSCLE DIAGNOSTIC (LOCATION) Left 2022    Procedure: BIOPSY, MUSCLE LEFT biceps @0900;  Surgeon: Tyrell Loo MD;  Location: [facility] OR     C/SECTION, LOW TRANSVERSE  10/13/1978    , Low Transverse     COLONOSCOPY       OPEN REDUCTION INTERNAL FIXATION ANKLE Right 2019    Procedure: Open reduction internal fixation right lateral malleolus fracture;  Surgeon: Rolo Oconnor DPM;  Location: [facility]     SURGICAL HISTORY OF -            SURGICAL HISTORY OF -   00    Colonoscopy       Social History     Socioeconomic History     Marital status:      Spouse name: Not on file     Number of children: Not on file     Years of education: Not on file     Highest education level: Not on file   Occupational History     Not on file   Tobacco Use     Smoking status: Never     Smokeless tobacco: Never   Substance and Sexual Activity     Alcohol use: Yes     Alcohol/week: 0.0 standard drinks     Comment: 1 glass of wine every 2 weeks     Drug use: No     Sexual activity: Yes     Partners: Male   Other Topics Concern     Parent/sibling w/ CABG, MI or angioplasty before 65F 55M? No   Social History Narrative     Not on file     Social Determinants of Health     Financial Resource Strain: Not on file   Food Insecurity: Not on file   Transportation Needs: Not on file   Physical Activity: Not on file   Stress: Not on file   Social Connections: Not on file   Intimate Partner Violence: Not on file   Housing Stability: Not on file       Family History   Problem Relation Age of Onset     Cancer Mother         Breast and liver- age 43     Heart Disease Father         ? chf?h/o CVA     Alzheimer Disease Father      Hypertension Father      Neurologic  "Disorder Father         CVA     Diabetes Maternal Grandmother      Breast Cancer Maternal Aunt      Breast Cancer Paternal Aunt      Cancer - colorectal No family hx of      Prostate Cancer No family hx of        ROS Pulmonary    A complete ROS was otherwise negative except as noted in the HPI.  Vitals:    10/28/22 0939   BP: 127/65   Pulse: 72   SpO2: 97%   Weight: 78.5 kg (173 lb)   Height: 1.651 m (5' 5\")     Exam:   GENERAL APPEARANCE: Well developed, well nourished, alert, and in no apparent distress.  NECK: supple, no masses, no thyromegaly.  LYMPHATICS: No significant axillary, cervical, or supraclavicular nodes.  RESP: good air flow throughout, - no crackles, rhonchi or wheezes.  CV: Normal S1, S2, regular rhythm, normal rate, no rub, no murmur,  no gallop, no LE edema.   ABDOMEN:  Bowel sounds normal, soft, nontender, no HSM or masses.   MS: extremities normal- no clubbing, no cyanosis.  PSYCH: mentation appears normal. and affect normal/bright  Results: I have reviewed all imaging, PFTs and other relavent tests, please see below for details, PFT and imaging results were reviewed with the patient.  PFTs: Normal spirometry and diffusion.  Stable from previous.    Assessment and plan:    71-year-old female with stable lymphocytic interstitial pneumonia in the setting of Sjogren's.  Certainly her lung disease is stable and has been stable off immunosuppression so I do not think it needs any specific treatment.  She is clearly having other multiple medical issues and I would defer to neurology and rheumatology for the management of these.  I will continue to follow her on an annual basis with pulmonary function test.  I have instructed her and her  to call if she does develop any new respiratory symptoms.    I spent 30 minutes on the date of the encounter doing chart review, history and exam, interpretation of any new PFTs and imaging, documentation and further activities as noted above.        CBC "   Recent Labs   Lab Test 06/03/22  1314 03/25/22  1514   RBC 4.34 4.12   HGB 13.7 12.7   HCT 40.7 38.7    236       Basic Metabolic Panel  Recent Labs   Lab Test 02/16/22  1740 01/20/22  1026    139   POTASSIUM 4.3 4.3   CHLORIDE 107 108   CO2 25 38*   BUN 27 22   GLC 96 99   SARI 9.1 9.0       INR  No results for input(s): INR in the last 89308 hours.    PFT  PFT Latest Ref Rng & Units 10/28/2022   FVC L 2.88   FEV1 L 2.31   FVC% % 101   FEV1% % 105           CC:

## 2022-10-28 NOTE — NURSING NOTE
Chief Complaint   Patient presents with     Interstitial Lung Disease (ILD)     1 year follow up      Vitals were taken and medications were reconciled.     Pattie Walters RMA  9:43 AM

## 2022-10-28 NOTE — LETTER
10/28/2022       RE: Albina Pedro  [id number]  [place] [address]     Dear Colleague,    Thank you for referring your patient, Albina Pedro, to the [facility] RHEUMATOLOGY CLINIC [place] at [facility]. Please see a copy of my visit note below.    Chief Complaint   Patient presents with     RECHECK     Follow-up for Sjogren's   Inflammatory myositis  ILD  Imuran monitoring    Date of initial visit: 3/25//2022  Last seen: 6/3/2022    DOS: 10/28/2022      ASSESSMENT AND PLAN:    Inflammatory myositis. Sjogren's. ILD. Albina Pedro 71 y.o. female with long standing h/o seropositive Sjogren's causing ILD, inflammatory myositis, R thigh panniculitis who presents for worsening joint pain, muscle weakness and deconditioning over past 2 years. Although she has OA and her ILD seems to be stable, it seems like she was doing well in the past while taking imuran (AZA). She was on AZA [id number] and was tapered off slowly as was doing well. Max AZA dose was 100 mg every day and last dose was 50 mg every other day. She tolerated AZA in the past with no SEs. Her ILD has not worsened off AZA. No flare of rash. In 3/2022 (initial visit), I recommended her to resume taking AZA while doing additional work up. Plus, I recommend a course of prednisone taper. We discussed Sjogren's Dx, mx of sicca.     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 IVIG per Dr. Manning. Will have LP for Alzheimer's.    AZA monitoring. Labs q3mo    ILD. Follow up with pulmonary.        Plan:    Tell Beatriz that you would get IVIG at [place]    Continue imuran 50 mg a day with labs q 3months    Start tapering prednisone: 9-8-7-6 mg a day each course x 1 month then 5 mg a day and stay on 5 mg a day    DEXA bone density    [facility] referral     Video visit in about 3 months    Paul Mark MD      HISTORY OF  PRESENTING ILLNESS:       3/25/2022:    Kristina is a 70 yo female who presents today with her . She has been referred for m/o Sjogren's. She has h/o +SSA/SSB Sjogren's diagnosed in 2004 after having sicca x 20 yr followed by muscle weakness, inflammatory arthritis, L thigh panniculitis, ILD. Was treated with prednisone, did not tolerate MTX. Was on AZA [id number]. Had +BINDU, +RF.    She was last seen by Dr. Moore in 1/2020 and before that, was under care of Dr. Ho who diagnosed and treated her since 2004.    She is here to discuss her joint pain.    She has diffuse arthralgia, no joint swelling, no AM stiffness, reports loss of strength of her hands with poor .    She has arthralgia, it got worse and went downhill for past 2 years.    Sometimes gets muscle weakness, more constant lately over past few months.    Has brain fog, getting worse.    Has progressive hair loss with bald spots.    No photosensitivity or skin rashes.    Uses biotene and water, wakes up thirsty. Had a tooth which broke, sometimes teeth are chipping. Has dry mouth due to Sjogren's.    Has dry eyes, uses OTC eyedrops.    Gets R droopy eyelid, now affected L eyelid.    Eyes look red ad dry.    No parotid gland swelling.    No CP, SOB, cough, fevers or night sweats.    Gets diarrhea from certain foods.    No numbness, tingling.    Has fatigue, difficult time staying awake.    She did well on her cognitive function testing.    Mestinon caused diarrhea and stopped taking it, scheduled to have EMG and do follow up with Dr. Manning.    Updated on cancer screening.    No dysphagia.    6/3/2022:    Kristina was initially seen in 3/2022 with muscle weakness, when she was treated with prednisone taper (4tab=20 mg qd x 5 days, 3tab=15 mg qd x 5 days, 2tab=10 mg qd x 5 days, 1 tab=5 mg qd x 5 days then stop) and was put back on imuran 50 mg every day. Prednisone helped her.    Overall feeling pretty good.    Had diarrhea but it is  better.    Went on 4 almazan past weekend and did well.    She and her  have seen an improvement by resuming imuran.    Easier to get out of chair.    Waiting to hear about muscle biopsy result.    Today 10/28/2022:    Kristina presents for follow up. She is here with her . They report that Kristina is getting worse, feels weaker, has memory problem. Dr. Manning has ordered IVIG for myasthenia gravis, I see that the staff tried to reach Kristina to schedule at [place] with no success, Kristina reports that her phone is not working and they should call her . 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    No rash, or oral ulcers.    Has hair loss x past 6 months.    No stomach pain.    Sometimes has diarrhea based on what she eats.    Had a cough, resolved.    No CP.    Has dry mouth, solid dysphagia.    Dryness of eyes is worse. Uses OTC eyedrops.    Mestinon is not helping with MG.    Legs are weak, reports falls.    ROS: A comprehensive ROS was done. Positives are per HPI.    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       Past Surgical History:   Procedure Laterality Date     BIOPSY MUSCLE DIAGNOSTIC (LOCATION) Left 2022    Procedure: BIOPSY, MUSCLE LEFT biceps @0900;  Surgeon: Tyrell Loo MD;  Location: [facility] OR     C/SECTION, LOW TRANSVERSE  10/13/1978    , Low Transverse     COLONOSCOPY       OPEN REDUCTION INTERNAL FIXATION ANKLE Right 2019    Procedure: Open reduction internal fixation right lateral malleolus fracture;  Surgeon: Rolo Oconnor DPM;  Location: [facility] OR     SURGICAL HISTORY OF -             SURGICAL HISTORY OF -   00    Colonoscopy       Family History   Problem Relation Age of Onset     Cancer Mother         Breast and liver- age 43     Heart Disease Father         ? chf?h/o CVA     Alzheimer Disease Father      Hypertension Father      Neurologic Disorder Father         CVA     Diabetes Maternal Grandmother      Breast Cancer Maternal Aunt      Breast Cancer Paternal Aunt      Cancer - colorectal No family hx of      Prostate Cancer No family hx of        Social History     Tobacco Use     Smoking status: Never     Smokeless tobacco: Never   Substance Use Topics     Alcohol use: Yes     Alcohol/week: 0.0 standard drinks     Comment: 1 glass of wine every 2 weeks     Outpatient Encounter Medications as of 10/28/2022   Medication Sig Dispense Refill     albuterol (PROAIR HFA/PROVENTIL HFA/VENTOLIN HFA) 108 (90 Base) MCG/ACT inhaler INHALE TWO PUFFS BY MOUTH EVERY FOUR HOURS AS NEEDED FOR SHORTNESS OF BREATH/DYSPNEA 25.5 g 0     aspirin (ASA) 81 MG EC tablet Take 1 tablet (81 mg) by mouth daily 90 tablet 3     azaTHIOprine (IMURAN) 50 MG tablet Take 1 tablet (50 mg) by mouth daily 90 tablet 0     hyoscyamine (LEVSIN) 0.125 MG tablet TAKE TWO TABLETS BY MOUTH EVERY EIGHT HOURS 30 MINUTES BEFORE EACH MESTINON DOSE 180 tablet 0     LORazepam (ATIVAN) 0.5 MG tablet TAKE ONE TABLET BY MOUTH EVERY EIGHT HOURS AS NEEDED FOR ANXIETY 20 tablet 0     Multiple Vitamins-Minerals (MULTIVITAMIN & MINERAL PO) Take 1 tablet by mouth daily        pravastatin (PRAVACHOL) 40 MG tablet Take 1 tablet (40 mg) by mouth daily 90 tablet 3     predniSONE (DELTASONE) 10 MG tablet Take 1 tablet (10 mg) by mouth daily 30 tablet 1     pyridostigmine (MESTINON) 60 MG tablet TAKE ONE TABLET BY MOUTH THREE TIMES DAILY 90 tablet 0     ranolazine (RANEXA) 1000 MG TB12 12 hr tablet Take 1 tablet (1,000 mg) by mouth 2 times daily 180 tablet 3     sertraline (ZOLOFT) 100 MG tablet Take 1.5 tablets (150 mg) by mouth  "daily 135 tablet 1     COMPRESSION STOCKINGS 2 each daily as needed (edema) (Patient not taking: Reported on 8/22/2022) 2 each 3     omeprazole (PRILOSEC) 20 MG DR capsule Take 1 capsule (20 mg) by mouth daily (Patient not taking: Reported on 8/22/2022) 90 capsule 1     No facility-administered encounter medications on file as of 10/28/2022.       Allergies   Allergen Reactions     Daypro [Oxaprozin] Rash            Lidocaine Other (See Comments)     Rapid heart rate     Nitroglycerin Other (See Comments)     Causes low blood pressure     Penicillins Unknown     Occurred as child.     Sulfa Drugs Rash     Physical exam:    BP (!) 153/72 (BP Location: Right arm, Patient Position: Sitting, Cuff Size: Adult Regular)   Pulse 77   Ht 1.676 m (5' 5.98\")   Wt 78.5 kg (173 lb)   SpO2 99%   BMI 27.94 kg/m         Constitutional: NAD. pleasant.  present.  HEENT: EOMI, no scleral icterus. Nl conj. No oral ulcers, poor salivary pool, No adenopathy or thyromegaly.   Cardiovascular: RRR, no M/R/G, trace LE edema  Respiratory: CTA b/l  Gastrointestinal:  soft, non-tender to palpation.    Musculoskeletal: No active synovitis. No joint tenderness   Skin: no rash  Neurological system: A&O x 3, hip flexors 4/5.  Psych: nl affect    Component      Latest Ref Rng & Units 6/17/2019   WBC      4.0 - 11.0 10e9/L 5.6   RBC Count      3.8 - 5.2 10e12/L 4.19   Hemoglobin      11.7 - 15.7 g/dL 13.0   Hematocrit      35.0 - 47.0 % 39.8   MCV      78 - 100 fl 95   MCH      26.5 - 33.0 pg 31.0   MCHC      31.5 - 36.5 g/dL 32.7   RDW      10.0 - 15.0 % 13.1   Platelet Count      150 - 450 10e9/L 230   % Neutrophils      % 56.3   % Lymphocytes      % 31.4   % Monocytes      % 9.6   % Eosinophils      % 2.3   % Basophils      % 0.4   Absolute Neutrophil      1.6 - 8.3 10e9/L 3.2   Absolute Lymphocytes      0.8 - 5.3 10e9/L 1.8   Absolute Monocytes      0.0 - 1.3 10e9/L 0.5   Absolute Eosinophils      0.0 - 0.7 10e9/L 0.1   Absolute " Basophils      0.0 - 0.2 10e9/L 0.0   Diff Method       Automated Method   Sodium      133 - 144 mmol/L 140   Potassium      3.4 - 5.3 mmol/L 5.0   Chloride      94 - 109 mmol/L 106   Carbon Dioxide      20 - 32 mmol/L 31   Anion Gap      3 - 14 mmol/L 3   Glucose      70 - 99 mg/dL 96   Urea Nitrogen      7 - 30 mg/dL 17   Creatinine      0.52 - 1.04 mg/dL 0.57   GFR Estimate      >60 mL/min/1.73:m2 >90   GFR Estimate If Black      >60 mL/min/1.73:m2 >90   Calcium      8.5 - 10.1 mg/dL 9.1   Bilirubin Total      0.2 - 1.3 mg/dL 0.5   Albumin      3.4 - 5.0 g/dL 3.8   Protein Total      6.8 - 8.8 g/dL 7.4   Alkaline Phosphatase      40 - 150 U/L 59   ALT      0 - 50 U/L 27   AST      0 - 45 U/L 20   Cholesterol      <200 mg/dL 150   Triglycerides      <150 mg/dL 168 (H)   HDL Cholesterol      >49 mg/dL 70   LDL Cholesterol Calculated      <100 mg/dL 46   Non HDL Cholesterol      <130 mg/dL 80   Rheumatoid Factor      <20 IU/mL <20   CRP Inflammation      0.0 - 8.0 mg/L <2.9   Vitamin D Deficiency screening      20 - 75 ug/L 48   Vitamin B12      193 - 986 pg/mL 705     Component      Latest Ref Rng & Units 1/16/2020   Ruth-1 Autoantibodies      <20 EU 0   Scl-70 Autoantibodies      <20 EU 0   Sm (Vergara) Autoantibodies      <20 EU 1   Sm/RNP Autoantibodies      <20 EU 2   SS-A/Ro Autoantibodies      <20  (H)   SS-B/La Autoantibodies      <20  (H)   Complement C4      19 - 59 mg/dL 27   Complement C3      83 - 177 mg/dL 139   DNA (ds) Antibody      <25 IU 3       Component      Latest Ref Rng & Units 1/20/2022 2/16/2022 2/28/2022   WBC      4.0 - 11.0 10e3/uL 5.7     RBC Count      3.80 - 5.20 10e6/uL 4.14     Hemoglobin      11.7 - 15.7 g/dL 13.0     Hematocrit      35.0 - 47.0 % 39.8     MCV      78 - 100 fL 96     MCH      26.5 - 33.0 pg 31.4     MCHC      31.5 - 36.5 g/dL 32.7     RDW      10.0 - 15.0 % 13.1     Platelet Count      150 - 450 10e3/uL 223     % Neutrophils      % 57     % Lymphocytes       % 30     % Monocytes      % 10     % Eosinophils      % 3     % Basophils      % 0     Absolute Neutrophils      1.6 - 8.3 10e3/uL 3.3     Absolute Lymphocytes      0.8 - 5.3 10e3/uL 1.7     Absolute Monocytes      0.0 - 1.3 10e3/uL 0.6     Absolute Eosinophils      0.0 - 0.7 10e3/uL 0.2     Absolute Basophils      0.0 - 0.2 10e3/uL 0.0     Sodium      133 - 144 mmol/L 139 137    Potassium      3.4 - 5.3 mmol/L 4.3 4.3    Chloride      94 - 109 mmol/L 108 107    Carbon Dioxide      20 - 32 mmol/L 38 (H) 25    Anion Gap      3 - 14 mmol/L <1 (L) 5    Urea Nitrogen      7 - 30 mg/dL 22 27    Creatinine      0.52 - 1.04 mg/dL 0.56 0.59    Calcium      8.5 - 10.1 mg/dL 9.0 9.1    Glucose      70 - 99 mg/dL 99 96    Alkaline Phosphatase      40 - 150 U/L 63 67    AST      0 - 45 U/L 20 30    ALT      0 - 50 U/L 33 44    Protein Total      6.8 - 8.8 g/dL 7.4 7.3    Albumin      3.4 - 5.0 g/dL 3.5 4.0    Bilirubin Total      0.2 - 1.3 mg/dL 0.5 0.4    GFR Estimate      >60 mL/min/1.73m2 >90 >90    Color Urine      Colorless, Straw, Light Yellow, Yellow Yellow     Appearance Urine      Clear Clear     Glucose Urine      Negative mg/dL Negative     Bilirubin Urine      Negative Negative     Ketones Urine      Negative mg/dL Negative     Specific Gravity Urine      1.003 - 1.035 >=1.030     Blood Urine      Negative Negative     pH Urine      5.0 - 7.0 5.0     Protein Albumin Urine      Negative mg/dL Negative     Urobilinogen Urine      0.2, 1.0 E.U./dL 0.2     Nitrite Urine      Negative Negative     Leukocyte Esterase Urine      Negative Negative     TSH      0.40 - 4.00 mU/L 1.87     AcetChol Binding Darlin      0.0 - 0.4 nmol/L   0.0   AcetChol Modul Darlin      <=45 %   4   Clinical Information:     71 year old female with generalized fatigue and right eyelid ptosis. She has not taken her Mestinon in at least the last week due to intolerance with diarhea. Query myopathy vs neuromuscular junction disorder. Brief exam:  bilateral eyelid closure weakness and neck flexion weakness. Double vision on upward gaze and lateral gaze; Right Deltoid 5, Biceps 5, Triceps 4 bilaterally; subtle asymmetry of the finger flexors (very mild weakness on the left, normal on the right).      Techniques:     Motor and sensory conduction studies were done with surface recording electrodes. EMG was done with a concentric needle electrode.      Results:     The right peroneal-EDB motor study showed normal onset latency with low amplitude without segmental changes, and normal conduction velocities.The right tibial-AH, peroneal-TA, median-APB and ulnar-ADM motor studies were normal. The right superficial peroneal, sural, median, ulnar, and radial antidromic sensory studies were normal. The right tibial F wave latencies were normal. 3 Hz slow repetitive nerve stimulation at the right ADM and orbicularis oculi muscles did not show any significant decrement either at rest or after 1 min of maximal isometric exercise.      Needle evaluation of the right Triceps Brachii, Pronator Teres, Biceps, Tibialis anterior, Gastrocnemius (Medial Hd) and vastus lateralis showed abnormal spontaneous activity with fibrillation potentials (1+ to 2+) with CRDs present in the vastus lateralis only. The right Triceps Brachii, Pronator Teres, First Dorsal Interosseous , Biceps, and vastus lateralis showed small amplitude, short duration, polyphasic motor units with early recruitment in the triceps and biceps muscles.  The right tibialis anterior showed normal recruitment of normal appearing motor units. The right Gastrocnemius (Medial Hd) had normal recruitment of large, long duration motor units. The right deltoid muscle was normal.      Interpretation:     Abnormal study. There is electrophysiologic evidence of  (1) Irritable generalized myopathy affecting the right upper and lower limbs; (2) neurogenic changes limited to the right medial gastrocnemius, which may occur with S1  radiculopathy. Please note, however, that this could still be part of a myopathic process because some chronic myopathies may show large, long duration motor unit potentials. There was no electrodiagnostic evidence of a neuromuscular junction disorder like myasthenia gravis.      Comment: The results of the study were discussed with the patient and she was referred for muscle biopsy of the left triceps or biceps.   ___________________________  Millicent Diggs MD, Neuromuscular Fellow  Rene Manning MD      Component      Latest Ref Rng & Units 3/25/2022   WBC      4.0 - 11.0 10e3/uL 6.1   RBC Count      3.80 - 5.20 10e6/uL 4.12   Hemoglobin      11.7 - 15.7 g/dL 12.7   Hematocrit      35.0 - 47.0 % 38.7   MCV      78 - 100 fL 94   MCH      26.5 - 33.0 pg 30.8   MCHC      31.5 - 36.5 g/dL 32.8   RDW      10.0 - 15.0 % 13.0   Platelet Count      150 - 450 10e3/uL 236   % Neutrophils      % 59   % Lymphocytes      % 30   % Monocytes      % 7   % Eosinophils      % 2   % Basophils      % 1   % Immature Granulocytes      % 1   NRBCs per 100 WBC      <1 /100 0   Absolute Neutrophils      1.6 - 8.3 10e3/uL 3.6   Absolute Lymphocytes      0.8 - 5.3 10e3/uL 1.8   Absolute Monocytes      0.0 - 1.3 10e3/uL 0.4   Absolute Eosinophils      0.0 - 0.7 10e3/uL 0.1   Absolute Basophils      0.0 - 0.2 10e3/uL 0.0   Absolute Immature Granulocytes      <=0.4 10e3/uL 0.0   Absolute NRBCs      10e3/uL 0.0   GARETT-1 (Histidyl-tRNA Synthetase) Darlin IgG      0 - 40 AU/mL 0   PL-12 (alanyl-tRNA synthetase) Antibody      Negative Negative   PL-7 (threonyl-tRNA synthetase) Antibody      Negative Negative   EJ (glycyl - tRNA synthetase) Antibody      Negative Negative   OJ (isoleucyl-tRNA synthetase) Antibody      Negative Negative   SRP (Signal Recognition Particle) Antibody      Negative Negative   Mi-2 (nuclear helicase protein) Antibody      Negative Negative   P155/140 (TIF1-gamma) Antibody      Negative Negative   TIF-1 Gamma  Antibody      Negative Negative   SAE1 (SUMO activating enzyme) Darlin      Negative Negative   MDA5 (CADM 140) Darlin      Negative Negative   NXP-2 (Nuclear matrix protein 2) Darlin      Negative Negative   Myositis Interp       See Note   Albumin Fraction      3.7 - 5.1 g/dL 4.2   Alpha 1 Fraction      0.2 - 0.4 g/dL 0.3   Alpha 2 Fraction      0.5 - 0.9 g/dL 0.9   Beta Fraction      0.6 - 1.0 g/dL 0.9   Gamma Fraction      0.7 - 1.6 g/dL 0.9   Monoclonal Peak      <=0.0 g/dL 0.0   ELP Interpretation:       Essentially normal electrophoretic pattern. No obvious monoclonal proteins seen. Pathologic significance requires clinical correlation. Carlos Ashford M.D., Ph.D., Pathologist.   Quantiferon-TB Gold Plus Result      Negative Negative   TB1 Ag minus Nil Value      IU/mL 0.01   TB2 Ag minus Nil Value      IU/mL 0.00   Mitogen minus Nil Result      IU/mL 9.97   Nil Result      IU/mL 0.03   BINDU interpretation      Negative Positive (A)   BINDU pattern 1       Speckled   BINDU titer 1       1:160   Iron      35 - 180 ug/dL 69   Iron Binding Cap      240 - 430 ug/dL 344   Iron Saturation Index      15 - 46 % 20   Creatinine      0.52 - 1.04 mg/dL 0.63   GFR Estimate      >60 mL/min/1.73m2 >90   ALT      0 - 50 U/L 31   Albumin      3.4 - 5.0 g/dL 3.6   AST      0 - 45 U/L 18   Thyroglobulin Antibody      <40 IU/mL <20   CK Total      30 - 225 U/L 244 (H)   Aldolase      1.2 - 7.6 U/L 3.9   Complement C3      81 - 157 mg/dL 142   Complement C4      13 - 39 mg/dL 26   CRP Inflammation      0.0 - 8.0 mg/L <2.9   Cryoglobulin Interpretation      Negative Negative   Cyclic Citrullinated Peptide Antibody, IgG      <7.0 U/mL 2.1   DNA-ds      <10.0 IU/mL 1.1   Vitamin D Deficiency screening      20 - 75 ug/L 41   Vitamin B12      193 - 986 pg/mL 603   Thyroid Peroxidase Antibody      <35 IU/mL <10   Rheumatoid Factor      <12 IU/mL 15 (H)   Hepatitis C Antibody      Nonreactive Nonreactive   Hep B Surface Agn      Nonreactive  Nonreactive   Sed Rate      0 - 30 mm/hr 14   Ferritin      8 - 252 ng/mL 85   Hepatitis B Core Darlin      Nonreactive Nonreactive   Total Protein Serum for ELP      6.8 - 8.8 g/dL 7.2   Quantiferon Nil Tube      IU/mL 0.03   Quantiferon TB1 Tube      IU/mL 0.04   Quantiferon TB2 Tube       0.03   QUANTIFERON MITOGEN      IU/mL 10.00       Again, thank you for allowing me to participate in the care of your patient.      Sincerely,    Paul Mark MD

## 2022-10-28 NOTE — LETTER
10/28/2022         RE: Albina Pedro  [id number] [facility] [place] [address]        Dear Colleague,    Thank you for referring your patient, Albina Pedro, to the [facility] FOR LUNG SCIENCE AND [facility] [place]. Please see a copy of my visit note below.    Reason for Visit  Albina Pedro is a 71 year old year old female who is being seen for Interstitial Lung Disease (ILD) (1 year follow up )    ILD HPI    Albina Pedro is a 69-year-old female who follows up today for a lymphocytic interstitial pneumonia in the setting of Sjogren's syndrome.  She has had very stable pulmonary disease and was off immunosuppression for a period of time.  She developed a lot of neuromuscular symptoms and has been following with rheumatology and neurology and recently has been diagnosed with a seronegative myasthenia gravis.  She is on azathioprine and prednisone currently per rheumatology and neurology.  From a respiratory standpoint she is not noticing any changes.  She denies any significant shortness of breath or dyspnea on exertion.      Current Outpatient Medications   Medication     albuterol (PROAIR HFA/PROVENTIL HFA/VENTOLIN HFA) 108 (90 Base) MCG/ACT inhaler     aspirin (ASA) 81 MG EC tablet     azaTHIOprine (IMURAN) 50 MG tablet     hyoscyamine (LEVSIN) 0.125 MG tablet     LORazepam (ATIVAN) 0.5 MG tablet     Multiple Vitamins-Minerals (MULTIVITAMIN & MINERAL PO)     pravastatin (PRAVACHOL) 40 MG tablet     predniSONE (DELTASONE) 1 MG tablet     predniSONE (DELTASONE) 10 MG tablet     predniSONE (DELTASONE) 5 MG tablet     pyridostigmine (MESTINON) 60 MG tablet     ranolazine (RANEXA) 1000 MG TB12 12 hr tablet     sertraline (ZOLOFT) 100 MG tablet     COMPRESSION STOCKINGS     omeprazole (PRILOSEC) 20 MG DR capsule     No current facility-administered medications for this visit.     Allergies   Allergen Reactions     Daypro [Oxaprozin] Rash            Lidocaine Other  (See Comments)     Rapid heart rate     Nitroglycerin Other (See Comments)     Causes low blood pressure     Penicillins Unknown     Occurred as child.     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       Past Surgical History:   Procedure Laterality Date     BIOPSY MUSCLE DIAGNOSTIC (LOCATION) Left 2022    Procedure: BIOPSY, MUSCLE LEFT biceps @0900;  Surgeon: Tyrell Loo MD;  Location: [facility] OR     C/SECTION, LOW TRANSVERSE  10/13/1978    , Low Transverse     COLONOSCOPY       OPEN REDUCTION INTERNAL FIXATION ANKLE Right 2019    Procedure: Open reduction internal fixation right lateral malleolus fracture;  Surgeon: Rolo Oconnor DPM;  Location: [facility] OR     SURGICAL HISTORY OF 1978         SURGICAL HISTORY OF -   00    Colonoscopy       Social History     Socioeconomic History     Marital status:      Spouse name: Not on file     Number of children: Not on file     Years of education: Not on file     Highest education level: Not on file   Occupational History     Not on file   Tobacco Use     Smoking status: Never     Smokeless tobacco: Never   Substance and Sexual Activity     Alcohol use: Yes     Alcohol/week: 0.0 standard drinks     Comment: 1 glass of wine every 2 weeks     Drug use: No     Sexual activity: Yes     Partners: Male   Other Topics Concern     Parent/sibling w/ CABG, MI or angioplasty before 65F 55M? No   Social History Narrative     Not on file     Social Determinants of Health     Financial Resource Strain: Not on file   Food Insecurity: Not on file   Transportation Needs: Not on file   Physical Activity: Not on file   Stress: Not on file   Social Connections: Not on  "file   Intimate Partner Violence: Not on file   Housing Stability: Not on file       Family History   Problem Relation Age of Onset     Cancer Mother         Breast and liver- age 43     Heart Disease Father         ? chf?h/o CVA     Alzheimer Disease Father      Hypertension Father      Neurologic Disorder Father         CVA     Diabetes Maternal Grandmother      Breast Cancer Maternal Aunt      Breast Cancer Paternal Aunt      Cancer - colorectal No family hx of      Prostate Cancer No family hx of        ROS Pulmonary    A complete ROS was otherwise negative except as noted in the HPI.  Vitals:    10/28/22 0939   BP: 127/65   Pulse: 72   SpO2: 97%   Weight: 78.5 kg (173 lb)   Height: 1.651 m (5' 5\")     Exam:   GENERAL APPEARANCE: Well developed, well nourished, alert, and in no apparent distress.  NECK: supple, no masses, no thyromegaly.  LYMPHATICS: No significant axillary, cervical, or supraclavicular nodes.  RESP: good air flow throughout, - no crackles, rhonchi or wheezes.  CV: Normal S1, S2, regular rhythm, normal rate, no rub, no murmur,  no gallop, no LE edema.   ABDOMEN:  Bowel sounds normal, soft, nontender, no HSM or masses.   MS: extremities normal- no clubbing, no cyanosis.  PSYCH: mentation appears normal. and affect normal/bright  Results: I have reviewed all imaging, PFTs and other relavent tests, please see below for details, PFT and imaging results were reviewed with the patient.  PFTs: Normal spirometry and diffusion.  Stable from previous.    Assessment and plan:    71-year-old female with stable lymphocytic interstitial pneumonia in the setting of Sjogren's.  Certainly her lung disease is stable and has been stable off immunosuppression so I do not think it needs any specific treatment.  She is clearly having other multiple medical issues and I would defer to neurology and rheumatology for the management of these.  I will continue to follow her on an annual basis with pulmonary function " test.  I have instructed her and her  to call if she does develop any new respiratory symptoms.    I spent 30 minutes on the date of the encounter doing chart review, history and exam, interpretation of any new PFTs and imaging, documentation and further activities as noted above.        CBC   Recent Labs   Lab Test 06/03/22  1314 03/25/22  1514   RBC 4.34 4.12   HGB 13.7 12.7   HCT 40.7 38.7    236       Basic Metabolic Panel  Recent Labs   Lab Test 02/16/22  1740 01/20/22  1026    139   POTASSIUM 4.3 4.3   CHLORIDE 107 108   CO2 25 38*   BUN 27 22   GLC 96 99   SARI 9.1 9.0       INR  No results for input(s): INR in the last 98363 hours.    PFT  PFT Latest Ref Rng & Units 10/28/2022   FVC L 2.88   FEV1 L 2.31   FVC% % 101   FEV1% % 105         Again, thank you for allowing me to participate in the care of your patient.        Sincerely,        David Morris Perlman, MD

## 2022-10-28 NOTE — NURSING NOTE
"Chief Complaint   Patient presents with     RECHECK     Follow-up for Sjogren's     BP (!) 153/72 (BP Location: Right arm, Patient Position: Sitting, Cuff Size: Adult Regular)   Pulse 77   Ht 1.676 m (5' 5.98\")   Wt 78.5 kg (173 lb)   SpO2 99%   BMI 27.94 kg/m      Padmini Cooley on 10/28/2022 at 8:02 AM    "

## 2022-10-28 NOTE — PROGRESS NOTES
Chief Complaint   Patient presents with     RECHECK     Follow-up for Sjogren's   Inflammatory myositis  ILD  Imuran monitoring    Date of initial visit: 3/25//2022  Last seen: 6/3/2022    DOS: 10/28/2022      ASSESSMENT AND PLAN:    Inflammatory myositis. Sjogren's. ILD. Albina Pedro 71 y.o. female with long standing h/o seropositive Sjogren's causing ILD, inflammatory myositis, R thigh panniculitis who presents for worsening joint pain, muscle weakness and deconditioning over past 2 years. Although she has OA and her ILD seems to be stable, it seems like she was doing well in the past while taking imuran (AZA). She was on AZA 0662-8688 and was tapered off slowly as was doing well. Max AZA dose was 100 mg every day and last dose was 50 mg every other day. She tolerated AZA in the past with no SEs. Her ILD has not worsened off AZA. No flare of rash. In 3/2022 (initial visit), I recommended her to resume taking AZA while doing additional work up. Plus, I recommend a course of prednisone taper. We discussed Sjogren's Dx, mx of sicca.     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 IVIG per Dr. Manning. Will have LP for Alzheimer's.    AZA monitoring. Labs q3mo    ILD. Follow up with pulmonary.        Plan:    Tell Beatriz that you would get IVIG at [place]    Continue imuran 50 mg a day with labs q 3months    Start tapering prednisone: 9-8-7-6 mg a day each course x 1 month then 5 mg a day and stay on 5 mg a day    DEXA bone density    MTM referral     Video visit in about 3 months    Paul Mark MD      HISTORY OF PRESENTING ILLNESS:       3/25/2022:    Kristina is a 72 yo female who presents today with her . She has been referred for m/o Sjogren's. She has h/o +SSA/SSB Sjogren's diagnosed in 2004 after having sicca x 20 yr followed by muscle weakness, inflammatory arthritis, L thigh panniculitis, ILD. Was treated with prednisone, did not  tolerate MTX. Was on AZA 4812-7114. Had +BINDU, +RF.    She was last seen by Dr. Moore in 1/2020 and before that, was under care of Dr. Ho who diagnosed and treated her since 2004.    She is here to discuss her joint pain.    She has diffuse arthralgia, no joint swelling, no AM stiffness, reports loss of strength of her hands with poor .    She has arthralgia, it got worse and went downhill for past 2 years.    Sometimes gets muscle weakness, more constant lately over past few months.    Has brain fog, getting worse.    Has progressive hair loss with bald spots.    No photosensitivity or skin rashes.    Uses biotene and water, wakes up thirsty. Had a tooth which broke, sometimes teeth are chipping. Has dry mouth due to Sjogren's.    Has dry eyes, uses OTC eyedrops.    Gets R droopy eyelid, now affected L eyelid.    Eyes look red ad dry.    No parotid gland swelling.    No CP, SOB, cough, fevers or night sweats.    Gets diarrhea from certain foods.    No numbness, tingling.    Has fatigue, difficult time staying awake.    She did well on her cognitive function testing.    Mestinon caused diarrhea and stopped taking it, scheduled to have EMG and do follow up with Dr. Manning.    Updated on cancer screening.    No dysphagia.    6/3/2022:    Kristina was initially seen in 3/2022 with muscle weakness, when she was treated with prednisone taper (4tab=20 mg qd x 5 days, 3tab=15 mg qd x 5 days, 2tab=10 mg qd x 5 days, 1 tab=5 mg qd x 5 days then stop) and was put back on imuran 50 mg every day. Prednisone helped her.    Overall feeling pretty good.    Had diarrhea but it is better.    Went on 4 almazan past weekend and did well.    She and her  have seen an improvement by resuming imuran.    Easier to get out of chair.    Waiting to hear about muscle biopsy result.    Today 10/28/2022:    Kristina presents for follow up. She is here with her . They report that Kristina is getting worse, feels weaker, has  memory problem. Dr. Manning has ordered IVIG for myasthenia gravis, I see that the staff tried to reach Kristina to schedule at [place] with no success, Kristina reports that her phone is not working and they should call her . 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    No rash, or oral ulcers.    Has hair loss x past 6 months.    No stomach pain.    Sometimes has diarrhea based on what she eats.    Had a cough, resolved.    No CP.    Has dry mouth, solid dysphagia.    Dryness of eyes is worse. Uses OTC eyedrops.    Mestinon is not helping with MG.    Legs are weak, reports falls.    ROS: A comprehensive ROS was done. Positives are per HPI.    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       Past Surgical History:   Procedure Laterality Date     BIOPSY MUSCLE DIAGNOSTIC (LOCATION) Left 2022    Procedure: BIOPSY, MUSCLE LEFT biceps @0900;  Surgeon: Tyrell Loo MD;  [place] [facility] [place]     C/SECTION, LOW TRANSVERSE  10/13/1978    , Low Transverse     COLONOSCOPY       OPEN REDUCTION INTERNAL FIXATION ANKLE Right 2019    Procedure: Open reduction internal fixation right lateral malleolus fracture;  Surgeon: Rolo Oconnor DPM;  Location: [facility] OR     SURGICAL HISTORY OF -            SURGICAL HISTORY OF -   00    Colonoscopy       Family History   Problem Relation Age of Onset     Cancer Mother         Breast and liver- age 43     Heart Disease Father         ? chf?h/o CVA     Alzheimer Disease Father      Hypertension Father      Neurologic Disorder Father         CVA     Diabetes Maternal Grandmother       Breast Cancer Maternal Aunt      Breast Cancer Paternal Aunt      Cancer - colorectal No family hx of      Prostate Cancer No family hx of        Social History     Tobacco Use     Smoking status: Never     Smokeless tobacco: Never   Substance Use Topics     Alcohol use: Yes     Alcohol/week: 0.0 standard drinks     Comment: 1 glass of wine every 2 weeks     Outpatient Encounter Medications as of 10/28/2022   Medication Sig Dispense Refill     albuterol (PROAIR HFA/PROVENTIL HFA/VENTOLIN HFA) 108 (90 Base) MCG/ACT inhaler INHALE TWO PUFFS BY MOUTH EVERY FOUR HOURS AS NEEDED FOR SHORTNESS OF BREATH/DYSPNEA 25.5 g 0     aspirin (ASA) 81 MG EC tablet Take 1 tablet (81 mg) by mouth daily 90 tablet 3     azaTHIOprine (IMURAN) 50 MG tablet Take 1 tablet (50 mg) by mouth daily 90 tablet 0     hyoscyamine (LEVSIN) 0.125 MG tablet TAKE TWO TABLETS BY MOUTH EVERY EIGHT HOURS 30 MINUTES BEFORE EACH MESTINON DOSE 180 tablet 0     LORazepam (ATIVAN) 0.5 MG tablet TAKE ONE TABLET BY MOUTH EVERY EIGHT HOURS AS NEEDED FOR ANXIETY 20 tablet 0     Multiple Vitamins-Minerals (MULTIVITAMIN & MINERAL PO) Take 1 tablet by mouth daily        pravastatin (PRAVACHOL) 40 MG tablet Take 1 tablet (40 mg) by mouth daily 90 tablet 3     predniSONE (DELTASONE) 10 MG tablet Take 1 tablet (10 mg) by mouth daily 30 tablet 1     pyridostigmine (MESTINON) 60 MG tablet TAKE ONE TABLET BY MOUTH THREE TIMES DAILY 90 tablet 0     ranolazine (RANEXA) 1000 MG TB12 12 hr tablet Take 1 tablet (1,000 mg) by mouth 2 times daily 180 tablet 3     sertraline (ZOLOFT) 100 MG tablet Take 1.5 tablets (150 mg) by mouth daily 135 tablet 1     COMPRESSION STOCKINGS 2 each daily as needed (edema) (Patient not taking: Reported on 8/22/2022) 2 each 3     omeprazole (PRILOSEC) 20 MG DR capsule Take 1 capsule (20 mg) by mouth daily (Patient not taking: Reported on 8/22/2022) 90 capsule 1     No facility-administered encounter medications on file as of 10/28/2022.  "      Allergies   Allergen Reactions     Daypro [Oxaprozin] Rash            Lidocaine Other (See Comments)     Rapid heart rate     Nitroglycerin Other (See Comments)     Causes low blood pressure     Penicillins Unknown     Occurred as child.     Sulfa Drugs Rash     Physical exam:    BP (!) 153/72 (BP Location: Right arm, Patient Position: Sitting, Cuff Size: Adult Regular)   Pulse 77   Ht 1.676 m (5' 5.98\")   Wt 78.5 kg (173 lb)   SpO2 99%   BMI 27.94 kg/m         Constitutional: NAD. pleasant.  present.  HEENT: EOMI, no scleral icterus. Nl conj. No oral ulcers, poor salivary pool, No adenopathy or thyromegaly.   Cardiovascular: RRR, no M/R/G, trace LE edema  Respiratory: CTA b/l  Gastrointestinal:  soft, non-tender to palpation.    Musculoskeletal: No active synovitis. No joint tenderness   Skin: no rash  Neurological system: A&O x 3, hip flexors 4/5.  Psych: nl affect    Component      Latest Ref Rng & Units 6/17/2019   WBC      4.0 - 11.0 10e9/L 5.6   RBC Count      3.8 - 5.2 10e12/L 4.19   Hemoglobin      11.7 - 15.7 g/dL 13.0   Hematocrit      35.0 - 47.0 % 39.8   MCV      78 - 100 fl 95   MCH      26.5 - 33.0 pg 31.0   MCHC      31.5 - 36.5 g/dL 32.7   RDW      10.0 - 15.0 % 13.1   Platelet Count      150 - 450 10e9/L 230   % Neutrophils      % 56.3   % Lymphocytes      % 31.4   % Monocytes      % 9.6   % Eosinophils      % 2.3   % Basophils      % 0.4   Absolute Neutrophil      1.6 - 8.3 10e9/L 3.2   Absolute Lymphocytes      0.8 - 5.3 10e9/L 1.8   Absolute Monocytes      0.0 - 1.3 10e9/L 0.5   Absolute Eosinophils      0.0 - 0.7 10e9/L 0.1   Absolute Basophils      0.0 - 0.2 10e9/L 0.0   Diff Method       Automated Method   Sodium      133 - 144 mmol/L 140   Potassium      3.4 - 5.3 mmol/L 5.0   Chloride      94 - 109 mmol/L 106   Carbon Dioxide      20 - 32 mmol/L 31   Anion Gap      3 - 14 mmol/L 3   Glucose      70 - 99 mg/dL 96   Urea Nitrogen      7 - 30 mg/dL 17   Creatinine      0.52 - " 1.04 mg/dL 0.57   GFR Estimate      >60 mL/min/1.73:m2 >90   GFR Estimate If Black      >60 mL/min/1.73:m2 >90   Calcium      8.5 - 10.1 mg/dL 9.1   Bilirubin Total      0.2 - 1.3 mg/dL 0.5   Albumin      3.4 - 5.0 g/dL 3.8   Protein Total      6.8 - 8.8 g/dL 7.4   Alkaline Phosphatase      40 - 150 U/L 59   ALT      0 - 50 U/L 27   AST      0 - 45 U/L 20   Cholesterol      <200 mg/dL 150   Triglycerides      <150 mg/dL 168 (H)   HDL Cholesterol      >49 mg/dL 70   LDL Cholesterol Calculated      <100 mg/dL 46   Non HDL Cholesterol      <130 mg/dL 80   Rheumatoid Factor      <20 IU/mL <20   CRP Inflammation      0.0 - 8.0 mg/L <2.9   Vitamin D Deficiency screening      20 - 75 ug/L 48   Vitamin B12      193 - 986 pg/mL 705     Component      Latest Ref Rng & Units 1/16/2020   Ruth-1 Autoantibodies      <20 EU 0   Scl-70 Autoantibodies      <20 EU 0   Sm (Vergara) Autoantibodies      <20 EU 1   Sm/RNP Autoantibodies      <20 EU 2   SS-A/Ro Autoantibodies      <20  (H)   SS-B/La Autoantibodies      <20  (H)   Complement C4      19 - 59 mg/dL 27   Complement C3      83 - 177 mg/dL 139   DNA (ds) Antibody      <25 IU 3       Component      Latest Ref Rng & Units 1/20/2022 2/16/2022 2/28/2022   WBC      4.0 - 11.0 10e3/uL 5.7     RBC Count      3.80 - 5.20 10e6/uL 4.14     Hemoglobin      11.7 - 15.7 g/dL 13.0     Hematocrit      35.0 - 47.0 % 39.8     MCV      78 - 100 fL 96     MCH      26.5 - 33.0 pg 31.4     MCHC      31.5 - 36.5 g/dL 32.7     RDW      10.0 - 15.0 % 13.1     Platelet Count      150 - 450 10e3/uL 223     % Neutrophils      % 57     % Lymphocytes      % 30     % Monocytes      % 10     % Eosinophils      % 3     % Basophils      % 0     Absolute Neutrophils      1.6 - 8.3 10e3/uL 3.3     Absolute Lymphocytes      0.8 - 5.3 10e3/uL 1.7     Absolute Monocytes      0.0 - 1.3 10e3/uL 0.6     Absolute Eosinophils      0.0 - 0.7 10e3/uL 0.2     Absolute Basophils      0.0 - 0.2 10e3/uL 0.0      Sodium      133 - 144 mmol/L 139 137    Potassium      3.4 - 5.3 mmol/L 4.3 4.3    Chloride      94 - 109 mmol/L 108 107    Carbon Dioxide      20 - 32 mmol/L 38 (H) 25    Anion Gap      3 - 14 mmol/L <1 (L) 5    Urea Nitrogen      7 - 30 mg/dL 22 27    Creatinine      0.52 - 1.04 mg/dL 0.56 0.59    Calcium      8.5 - 10.1 mg/dL 9.0 9.1    Glucose      70 - 99 mg/dL 99 96    Alkaline Phosphatase      40 - 150 U/L 63 67    AST      0 - 45 U/L 20 30    ALT      0 - 50 U/L 33 44    Protein Total      6.8 - 8.8 g/dL 7.4 7.3    Albumin      3.4 - 5.0 g/dL 3.5 4.0    Bilirubin Total      0.2 - 1.3 mg/dL 0.5 0.4    GFR Estimate      >60 mL/min/1.73m2 >90 >90    Color Urine      Colorless, Straw, Light Yellow, Yellow Yellow     Appearance Urine      Clear Clear     Glucose Urine      Negative mg/dL Negative     Bilirubin Urine      Negative Negative     Ketones Urine      Negative mg/dL Negative     Specific Gravity Urine      1.003 - 1.035 >=1.030     Blood Urine      Negative Negative     pH Urine      5.0 - 7.0 5.0     Protein Albumin Urine      Negative mg/dL Negative     Urobilinogen Urine      0.2, 1.0 E.U./dL 0.2     Nitrite Urine      Negative Negative     Leukocyte Esterase Urine      Negative Negative     TSH      0.40 - 4.00 mU/L 1.87     AcetChol Binding Darlin      0.0 - 0.4 nmol/L   0.0   AcetChol Modul Darlin      <=45 %   4   Clinical Information:     71 year old female with generalized fatigue and right eyelid ptosis. She has not taken her Mestinon in at least the last week due to intolerance with diarhea. Query myopathy vs neuromuscular junction disorder. Brief exam: bilateral eyelid closure weakness and neck flexion weakness. Double vision on upward gaze and lateral gaze; Right Deltoid 5, Biceps 5, Triceps 4 bilaterally; subtle asymmetry of the finger flexors (very mild weakness on the left, normal on the right).      Techniques:     Motor and sensory conduction studies were done with surface recording  electrodes. EMG was done with a concentric needle electrode.      Results:     The right peroneal-EDB motor study showed normal onset latency with low amplitude without segmental changes, and normal conduction velocities.The right tibial-AH, peroneal-TA, median-APB and ulnar-ADM motor studies were normal. The right superficial peroneal, sural, median, ulnar, and radial antidromic sensory studies were normal. The right tibial F wave latencies were normal. 3 Hz slow repetitive nerve stimulation at the right ADM and orbicularis oculi muscles did not show any significant decrement either at rest or after 1 min of maximal isometric exercise.      Needle evaluation of the right Triceps Brachii, Pronator Teres, Biceps, Tibialis anterior, Gastrocnemius (Medial Hd) and vastus lateralis showed abnormal spontaneous activity with fibrillation potentials (1+ to 2+) with CRDs present in the vastus lateralis only. The right Triceps Brachii, Pronator Teres, First Dorsal Interosseous , Biceps, and vastus lateralis showed small amplitude, short duration, polyphasic motor units with early recruitment in the triceps and biceps muscles.  The right tibialis anterior showed normal recruitment of normal appearing motor units. The right Gastrocnemius (Medial Hd) had normal recruitment of large, long duration motor units. The right deltoid muscle was normal.      Interpretation:     Abnormal study. 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 Please note, however, that this could still be part of a myopathic process because some chronic myopathies may show large, long duration motor unit potentials. There was no electrodiagnostic evidence of a neuromuscular junction disorder like myasthenia gravis.      Comment: The results of the study were discussed with the  patient and she was referred for muscle biopsy of the left triceps or biceps.   ___________________________  Millicent Diggs MD, Neuromuscular Fellow  Rene Manning MD      Component      Latest Ref Rng & Units 3/25/2022   WBC      4.0 - 11.0 10e3/uL 6.1   RBC Count      3.80 - 5.20 10e6/uL 4.12   Hemoglobin      11.7 - 15.7 g/dL 12.7   Hematocrit      35.0 - 47.0 % 38.7   MCV      78 - 100 fL 94   MCH      26.5 - 33.0 pg 30.8   MCHC      31.5 - 36.5 g/dL 32.8   RDW      10.0 - 15.0 % 13.0   Platelet Count      150 - 450 10e3/uL 236   % Neutrophils      % 59   % Lymphocytes      % 30   % Monocytes      % 7   % Eosinophils      % 2   % Basophils      % 1   % Immature Granulocytes      % 1   NRBCs per 100 WBC      <1 /100 0   Absolute Neutrophils      1.6 - 8.3 10e3/uL 3.6   Absolute Lymphocytes      0.8 - 5.3 10e3/uL 1.8   Absolute Monocytes      0.0 - 1.3 10e3/uL 0.4   Absolute Eosinophils      0.0 - 0.7 10e3/uL 0.1   Absolute Basophils      0.0 - 0.2 10e3/uL 0.0   Absolute Immature Granulocytes      <=0.4 10e3/uL 0.0   Absolute NRBCs      10e3/uL 0.0   GARETT-1 (Histidyl-tRNA Synthetase) Darlin IgG      0 - 40 AU/mL 0   PL-12 (alanyl-tRNA synthetase) Antibody      Negative Negative   PL-7 (threonyl-tRNA synthetase) Antibody      Negative Negative   EJ (glycyl - tRNA synthetase) Antibody      Negative Negative   OJ (isoleucyl-tRNA synthetase) Antibody      Negative Negative   SRP (Signal Recognition Particle) Antibody      Negative Negative   Mi-2 (nuclear helicase protein) Antibody      Negative Negative   P155/140 (TIF1-gamma) Antibody      Negative Negative   TIF-1 Gamma Antibody      Negative Negative   SAE1 (SUMO activating enzyme) Darlin      Negative Negative   MDA5 (CADM 140) Darlin      Negative Negative   NXP-2 (Nuclear matrix protein 2) Darlin      Negative Negative   Myositis Interp       See Note   Albumin Fraction      3.7 - 5.1 g/dL 4.2   Alpha 1 Fraction      0.2 - 0.4 g/dL 0.3   Alpha 2 Fraction      0.5 -  0.9 g/dL 0.9   Beta Fraction      0.6 - 1.0 g/dL 0.9   Gamma Fraction      0.7 - 1.6 g/dL 0.9   Monoclonal Peak      <=0.0 g/dL 0.0   ELP Interpretation:       Essentially normal electrophoretic pattern. No obvious monoclonal proteins seen. Pathologic significance requires clinical correlation. Carlos Ashford M.D., Ph.D., Pathologist.   Quantiferon-TB Gold Plus Result      Negative Negative   TB1 Ag minus Nil Value      IU/mL 0.01   TB2 Ag minus Nil Value      IU/mL 0.00   Mitogen minus Nil Result      IU/mL 9.97   Nil Result      IU/mL 0.03   BINDU interpretation      Negative Positive (A)   BINDU pattern 1       Speckled   BINDU titer 1       1:160   Iron      35 - 180 ug/dL 69   Iron Binding Cap      240 - 430 ug/dL 344   Iron Saturation Index      15 - 46 % 20   Creatinine      0.52 - 1.04 mg/dL 0.63   GFR Estimate      >60 mL/min/1.73m2 >90   ALT      0 - 50 U/L 31   Albumin      3.4 - 5.0 g/dL 3.6   AST      0 - 45 U/L 18   Thyroglobulin Antibody      <40 IU/mL <20   CK Total      30 - 225 U/L 244 (H)   Aldolase      1.2 - 7.6 U/L 3.9   Complement C3      81 - 157 mg/dL 142   Complement C4      13 - 39 mg/dL 26   CRP Inflammation      0.0 - 8.0 mg/L <2.9   Cryoglobulin Interpretation      Negative Negative   Cyclic Citrullinated Peptide Antibody, IgG      <7.0 U/mL 2.1   DNA-ds      <10.0 IU/mL 1.1   Vitamin D Deficiency screening      20 - 75 ug/L 41   Vitamin B12      193 - 986 pg/mL 603   Thyroid Peroxidase Antibody      <35 IU/mL <10   Rheumatoid Factor      <12 IU/mL 15 (H)   Hepatitis C Antibody      Nonreactive Nonreactive   Hep B Surface Agn      Nonreactive Nonreactive   Sed Rate      0 - 30 mm/hr 14   Ferritin      8 - 252 ng/mL 85   Hepatitis B Core Darlin      Nonreactive Nonreactive   Total Protein Serum for ELP      6.8 - 8.8 g/dL 7.2   Quantiferon Nil Tube      IU/mL 0.03   Quantiferon TB1 Tube      IU/mL 0.04   Quantiferon TB2 Tube       0.03   QUANTIFERON MITOGEN      IU/mL 10.00

## 2022-11-01 ENCOUNTER — TELEPHONE (OUTPATIENT)
Dept: RHEUMATOLOGY | Facility: CLINIC | Age: 72
End: 2022-11-01

## 2022-11-01 ENCOUNTER — THERAPY VISIT (OUTPATIENT)
Dept: PHYSICAL THERAPY | Facility: CLINIC | Age: 72
End: 2022-11-01
Attending: PSYCHIATRY & NEUROLOGY
Payer: COMMERCIAL

## 2022-11-01 DIAGNOSIS — R29.898 PROXIMAL LEG WEAKNESS: ICD-10-CM

## 2022-11-01 DIAGNOSIS — R53.81 PHYSICAL DECONDITIONING: ICD-10-CM

## 2022-11-01 PROCEDURE — 97161 PT EVAL LOW COMPLEX 20 MIN: CPT | Mod: GP | Performed by: PHYSICAL THERAPIST

## 2022-11-01 PROCEDURE — 97110 THERAPEUTIC EXERCISES: CPT | Mod: GP | Performed by: PHYSICAL THERAPIST

## 2022-11-01 NOTE — PROGRESS NOTES
Physical Therapy Initial Evaluation  Subjective:  The history is provided by the patient and medical records. No  was used.   Patient Health History  Albina Pedro being seen for Muscle weakness and balance.     Problem began: 9/27/2022 (MD order ).   Problem occurred: age   Pain is reported as 3/10 on pain scale.  General health as reported by patient is fair.  Pertinent medical history includes: depression, fibromyalgia, implanted device, overweight and smoking.      Other medical allergies details: penicillin.   Surgeries include:  Orthopedic surgery.        Current occupation is Retired.                     Therapist Generated HPI Evaluation  Problem details: Per chart review: fatigue and weakness in the context of severe sicca syndrome/Sjogren seronegative myasthenia gravis.   Falls occasionally, feels imbalanced. Difficulty getting up from a chair.   Typically using walking stick outdoors. 4 MATTI home w/ 2 railings. Living areas on 1 level..           This is a chronic condition.                                             Objective:    Gait:  TUG (from standard chair w/ airex cushion) 16.5 seconds  Sit to stand: 0 from standard chair without or with UE support, 0 from standard chair w/ airex foam w/o UE support.   5 time sit to stand (from standard chair, w/ airex foam and UE support) 21 seconds    Deviations:  General Deviations:  Base of support incr    Flexibility/Screens:   Positive screens:  Hip (hip flexion strength MMT 4/5) and Knee (knee extension strength 4+/5. dorsiflexion 5/5. )              Physical Exam    General     ROS    Assessment/Plan:    Patient is a 71 year old female with deconditioning and balance complaints.    Patient has the following significant findings with corresponding treatment plan.                Diagnosis 1:  Deconditioning  Decreased strength - therapeutic exercise, therapeutic activities and home program  Impaired balance - neuro re-education,  therapeutic activities and home program  Impaired gait - gait training and home program  Impaired muscle performance - neuro re-education and home program    Therapy Evaluation Codes:   1) History comprised of:   Personal factors that impact the plan of care:      Cognition and Time since onset of symptoms.    Comorbidity factors that impact the plan of care are:      Depression, Fibromyalgia, Implanted device, Overweight, Smoking and Weakness.     Medications impacting care: None.  2) Examination of Body Systems comprised of:   Body structures and functions that impact the plan of care:      Ankle, Hip, Knee and Balance.   Activity limitations that impact the plan of care are:      Bathing, Bending, Dressing, Lifting, Sitting, Squatting/kneeling, Stairs, Standing and Walking.  3) Clinical presentation characteristics are:   Stable/Uncomplicated.  4) Decision-Making    Low complexity using standardized patient assessment instrument and/or measureable assessment of functional outcome.  Cumulative Therapy Evaluation is: Low complexity.    Previous and current functional limitations:  (See Goal Flow Sheet for this information)    Short term and Long term goals: (See Goal Flow Sheet for this information)     Communication ability:  Patient appears to be able to clearly communicate and understand verbal and written communication and follow directions correctly.  Treatment Explanation - The following has been discussed with the patient:   RX ordered/plan of care  Anticipated outcomes  Possible risks and side effects  This patient would benefit from PT intervention to resume normal activities.   Rehab potential is fair.    Frequency:  1 X week, once daily  Duration:  for 8 weeks  Discharge Plan:  Achieve all LTG.  Independent in home treatment program.  Reach maximal therapeutic benefit.    Please refer to the daily flowsheet for treatment today, total treatment time and time spent performing 1:1 timed codes.

## 2022-11-01 NOTE — TELEPHONE ENCOUNTER
MTM referral from: [facility] visit (referral by provider)    MTM referral outreach attempt #2 on November 1, 2022 at 11:22 AM      Outcome: Patient not reachable after several attempts, will route to MTM Pharmacist/Provider as an FYI.  MT scheduling number is [phone].  Thank you for the referral.    Carl Doss, MTM coordinator

## 2022-11-01 NOTE — PROGRESS NOTES
[facility]    OUTPATIENT Physical Therapy ORTHOPEDIC EVALUATION  PLAN OF TREATMENT FOR OUTPATIENT REHABILITATION  (COMPLETE FOR INITIAL CLAIMS ONLY)  Patient's Last Name, First Name, M.I.  YOB: 1950  WillisAlbina  JILL    Provider s Name:  [facility]   Medical Record No.  [id number]   Start of Care Date:  11/01/22   Onset Date:   09/27/22 (MD order)   Treatment Diagnosis:  Deconditioning Medical Diagnosis:  Proximal leg weakness       Goals:     11/01/22 0500   Body Part   Goals listed below are for Strength/Conditioning   Goal #1   Goal #1 balance   Current Functional Level Timed Up and Go test score   Performance level 16.5 - airex foam in chair   STG [employer] Performance Timed Up and Go test score to   Performance level <14.5 with airex foam   Rationale for safe household ambulation;for safe community ambulation;for safe showering;for safe dressing;for safe standing;for safe homemaking tasks   Due date 11/22/22   LTG [employer] Performance Timed Up and Go test score to   Performance Level <13.5   Rationale for safe household ambulation;for safe community ambulation;for safe showering;for safe dressing;for safe standing;for safe homemaking tasks  (reduce risk of falls)       Therapy Frequency:  1x/week  Predicted Duration of Therapy Intervention:  8 weeks    Lela Boswell, PT                 I CERTIFY THE NEED FOR THESE SERVICES FURNISHED UNDER        THIS PLAN OF TREATMENT AND WHILE UNDER MY CARE     (Physician co-signature of this document indicates review and certification of the therapy plan).                     Certification Date From:  11/01/22   Certification Date To:  12/27/22    Referring Provider:  Rene Betts*    Initial Assessment        See Epic Evaluation SOC Date: 11/01/22

## 2022-11-02 ENCOUNTER — TELEPHONE (OUTPATIENT)
Dept: FAMILY MEDICINE | Facility: CLINIC | Age: 72
End: 2022-11-02

## 2022-11-07 LAB
DLCOUNC-%PRED-PRE: 91 %
DLCOUNC-PRE: 18.21 ML/MIN/MMHG
DLCOUNC-PRED: 19.91 ML/MIN/MMHG
ERV-%PRED-PRE: 114 %
ERV-PRE: 0.63 L
ERV-PRED: 0.55 L
EXPTIME-PRE: 6.71 SEC
FEF2575-%PRED-PRE: 124 %
FEF2575-PRE: 2.26 L/SEC
FEF2575-PRED: 1.81 L/SEC
FEFMAX-%PRED-PRE: 103 %
FEFMAX-PRE: 5.79 L/SEC
FEFMAX-PRED: 5.57 L/SEC
FEV1-%PRED-PRE: 105 %
FEV1-PRE: 2.31 L
FEV1FEV6-PRE: 80 %
FEV1FEV6-PRED: 79 %
FEV1FVC-PRE: 80 %
FEV1FVC-PRED: 78 %
FEV1SVC-PRE: 82 %
FEV1SVC-PRED: 70 %
FIFMAX-PRE: 5.58 L/SEC
FVC-%PRED-PRE: 101 %
FVC-PRE: 2.88 L
FVC-PRED: 2.85 L
IC-%PRED-PRE: 84 %
IC-PRE: 2.19 L
IC-PRED: 2.58 L
VA-%PRED-PRE: 86 %
VA-PRE: 4.23 L
VC-%PRED-PRE: 89 %
VC-PRE: 2.81 L
VC-PRED: 3.12 L

## 2022-11-09 ENCOUNTER — THERAPY VISIT (OUTPATIENT)
Dept: PHYSICAL THERAPY | Facility: CLINIC | Age: 72
End: 2022-11-09
Payer: COMMERCIAL

## 2022-11-09 DIAGNOSIS — R53.81 PHYSICAL DECONDITIONING: Primary | ICD-10-CM

## 2022-11-09 PROCEDURE — 97112 NEUROMUSCULAR REEDUCATION: CPT | Mod: GP | Performed by: PHYSICAL THERAPIST

## 2022-11-09 PROCEDURE — 97110 THERAPEUTIC EXERCISES: CPT | Mod: GP | Performed by: PHYSICAL THERAPIST

## 2022-11-10 ENCOUNTER — LAB (OUTPATIENT)
Dept: LAB | Facility: CLINIC | Age: 72
End: 2022-11-10
Payer: COMMERCIAL

## 2022-11-10 ENCOUNTER — TELEPHONE (OUTPATIENT)
Dept: INTERVENTIONAL RADIOLOGY/VASCULAR | Facility: CLINIC | Age: 72
End: 2022-11-10

## 2022-11-10 DIAGNOSIS — Z51.81 MEDICATION MONITORING ENCOUNTER: ICD-10-CM

## 2022-11-10 DIAGNOSIS — M35.09 SJOGREN'S SYNDROME WITH OTHER ORGAN INVOLVEMENT (H): ICD-10-CM

## 2022-11-10 DIAGNOSIS — Z79.899 OTHER LONG TERM (CURRENT) DRUG THERAPY: ICD-10-CM

## 2022-11-10 LAB
ALBUMIN SERPL BCG-MCNC: 4 G/DL (ref 3.5–5.2)
ALT SERPL W P-5'-P-CCNC: 34 U/L (ref 10–35)
AST SERPL W P-5'-P-CCNC: 38 U/L (ref 10–35)
BASOPHILS # BLD AUTO: 0 10E3/UL (ref 0–0.2)
BASOPHILS NFR BLD AUTO: 0 %
CK SERPL-CCNC: 91 U/L (ref 26–192)
CREAT SERPL-MCNC: 0.75 MG/DL (ref 0.51–0.95)
EOSINOPHIL # BLD AUTO: 0 10E3/UL (ref 0–0.7)
EOSINOPHIL NFR BLD AUTO: 0 %
ERYTHROCYTE [DISTWIDTH] IN BLOOD BY AUTOMATED COUNT: 15.2 % (ref 10–15)
GFR SERPL CREATININE-BSD FRML MDRD: 84 ML/MIN/1.73M2
HCT VFR BLD AUTO: 40.9 % (ref 35–47)
HGB BLD-MCNC: 13 G/DL (ref 11.7–15.7)
LYMPHOCYTES # BLD AUTO: 0.7 10E3/UL (ref 0.8–5.3)
LYMPHOCYTES NFR BLD AUTO: 11 %
MCH RBC QN AUTO: 30.3 PG (ref 26.5–33)
MCHC RBC AUTO-ENTMCNC: 31.8 G/DL (ref 31.5–36.5)
MCV RBC AUTO: 95 FL (ref 78–100)
MONOCYTES # BLD AUTO: 0.3 10E3/UL (ref 0–1.3)
MONOCYTES NFR BLD AUTO: 5 %
NEUTROPHILS # BLD AUTO: 5.1 10E3/UL (ref 1.6–8.3)
NEUTROPHILS NFR BLD AUTO: 83 %
PLATELET # BLD AUTO: 174 10E3/UL (ref 150–450)
RBC # BLD AUTO: 4.29 10E6/UL (ref 3.8–5.2)
WBC # BLD AUTO: 6.2 10E3/UL (ref 4–11)

## 2022-11-10 PROCEDURE — 82784 ASSAY IGA/IGD/IGG/IGM EACH: CPT

## 2022-11-10 PROCEDURE — 82040 ASSAY OF SERUM ALBUMIN: CPT

## 2022-11-10 PROCEDURE — 36415 COLL VENOUS BLD VENIPUNCTURE: CPT

## 2022-11-10 PROCEDURE — 85025 COMPLETE CBC W/AUTO DIFF WBC: CPT

## 2022-11-10 PROCEDURE — 82565 ASSAY OF CREATININE: CPT

## 2022-11-10 PROCEDURE — 84450 TRANSFERASE (AST) (SGOT): CPT

## 2022-11-10 PROCEDURE — 84460 ALANINE AMINO (ALT) (SGPT): CPT

## 2022-11-10 PROCEDURE — 82550 ASSAY OF CK (CPK): CPT

## 2022-11-11 ENCOUNTER — VIRTUAL VISIT (OUTPATIENT)
Dept: PHARMACY | Facility: CLINIC | Age: 72
End: 2022-11-11
Attending: INTERNAL MEDICINE
Payer: COMMERCIAL

## 2022-11-11 DIAGNOSIS — M35.01 SJOGREN'S SYNDROME WITH KERATOCONJUNCTIVITIS SICCA (H): Primary | Chronic | ICD-10-CM

## 2022-11-11 DIAGNOSIS — F03.90 DEMENTIA, UNSPECIFIED DEMENTIA SEVERITY, UNSPECIFIED DEMENTIA TYPE, UNSPECIFIED WHETHER BEHAVIORAL, PSYCHOTIC, OR MOOD DISTURBANCE OR ANXIETY (H): ICD-10-CM

## 2022-11-11 DIAGNOSIS — Z78.9 TAKES DIETARY SUPPLEMENTS: ICD-10-CM

## 2022-11-11 DIAGNOSIS — F41.9 ANXIETY: ICD-10-CM

## 2022-11-11 DIAGNOSIS — F33.0 MAJOR DEPRESSIVE DISORDER, RECURRENT, MILD (H): ICD-10-CM

## 2022-11-11 DIAGNOSIS — I20.89 CHRONIC STABLE ANGINA (H): ICD-10-CM

## 2022-11-11 DIAGNOSIS — R19.7 DIARRHEA, UNSPECIFIED TYPE: ICD-10-CM

## 2022-11-11 DIAGNOSIS — J84.9 ILD (INTERSTITIAL LUNG DISEASE) (H): ICD-10-CM

## 2022-11-11 DIAGNOSIS — G70.01 MYASTHENIA GRAVIS WITH EXACERBATION (H): ICD-10-CM

## 2022-11-11 DIAGNOSIS — R41.89 BRAIN FOG: ICD-10-CM

## 2022-11-11 DIAGNOSIS — K21.00 GASTROESOPHAGEAL REFLUX DISEASE WITH ESOPHAGITIS WITHOUT HEMORRHAGE: ICD-10-CM

## 2022-11-11 DIAGNOSIS — I25.10 CORONARY ARTERY DISEASE INVOLVING NATIVE CORONARY ARTERY, UNSPECIFIED WHETHER ANGINA PRESENT, UNSPECIFIED WHETHER NATIVE OR TRANSPLANTED HEART: Chronic | ICD-10-CM

## 2022-11-11 LAB
IGA SERPL-MCNC: 404 MG/DL (ref 84–499)
IGG SERPL-MCNC: 786 MG/DL (ref 610–1616)
IGM SERPL-MCNC: 17 MG/DL (ref 35–242)

## 2022-11-11 PROCEDURE — 99607 MTMS BY PHARM ADDL 15 MIN: CPT

## 2022-11-11 PROCEDURE — 99605 MTMS BY PHARM NP 15 MIN: CPT

## 2022-11-11 NOTE — PROGRESS NOTES
Medication Therapy Management (MTM) Encounter    ASSESSMENT:                            Medication Adherence/Access: No issues identified    Dementia/Brain Fog: Patient is taking several medications that may contribute to brain fog/confusion including hyoscyamine, lorazepam, pyridostigmine (rare), ranolazine (<4%), sertraline (<2%). See Inflammatory Myositis/Sjogren's Syndrome and Depression/Anxiety for recommendations.     Sjogren's Syndrome: Patient decreasing prednisone dose weekly instead of monthly as prescribed. Plan to verify with rheumatologist if this is appropriate or if dosing should be monthly as prescribed.     ILD: Stable.    Depression/Anxiety: Patient is talking lorazepam occasionally for breakthrough anxiety, but is unsure exactly how often occasionally is. Would benefit from tracking lorazepam use over 1 month to determine how much it is needed. Per 2019 Beers criteria, patients over 65 should avoid benzodiazepines due to increased risk of falls and sedation. If breakthrough symptoms happening 1-2 times weekly or more, could consider increasing sertraline to 200 mg daily to reduce lorazepam use and minimize confusion/risk of falls.     Myasthenia Gravis/Drug induced diarrhea: Current therapy partially improving myasthenia gravis symptoms. Will defer to neurology team for treatment plan to address this. Diarrhea has resolved, possibly due to hyoscyamine. Discussed risk vs benefit of hyoscyamine for managing diarrhea but possibly contributing to brain fog. Could consider decreasing hyoscyamine use to twice daily to determine if diarrhea returns or brain fog improves if dose is reduced.     Angina/CAD: Stable.    GERD: Per 2019 Beers criteria, patients over age 65 should avoid long term PPI therapy due to increased risk of bone fracture. Due to good efficacy and several other medication changes at this time, would benefit from continuing therapy and reassessing if a H2 blocker would be appropriate  therapy at a future visit.    Supplements: Patient receiving adequate calcium supplementation.    PLAN:                            1. Contact neurology about reducing hyoscyamine to twice daily to determine if diarrhea comes back or if brain fog improves.    2. I will verify with Dr. Mark if the prednisone should be reduced weekly or monthly.    3. Write down when you take your lorazepam. This way we can determine if your anxiety symptoms are controlled on your current medications.    Follow-up: Return in about 4 weeks (around 12/9/2022) for MTM Pharmacist Visit.    SUBJECTIVE/OBJECTIVE:                          Albina Pedro is a 71 year old female called for an initial visit. She was referred to me from Paul Mark MD. Patient was accompanied by her spouse Jerrod and daughter Td today.     Reason for visit: Concerned medications may be making memory and balance worse, unsure if prednisone should be decreased weekly or monthly    Allergies/ADRs: Reviewed in chart  Past Medical History: Reviewed in chart  Tobacco: She reports that she has never smoked. She has never used smokeless tobacco.  Alcohol: not currently using    Medication Adherence/Access: Patient uses pill box(es).  helps to fill them.  Medication barriers: none.   The patient fills medications at [place]: NO, fills medications at [facility].    Dementia/Brain Fog: Reports she has been monitored for dementia by her neurologist for some time now, but feels the brain fog has gotten worse recently (since July). Wondering if the medications could be impacting this or making it worse. Notes she is scheduled for a neurocognitive evaluation soon and wants to minimize any impact the medications may be having.    Sjogren's Syndrome: Currently taking azathioprine 50 mg daily, acetaminophen 1000 mg as needed (usually 2-3 times weekly), prednisone 7 mg daily (tapering down by 1 mg every week until 5 mg daily is reached). Prednisone prescription  directions say decrease monthly - thought they were told weekly at last visit. Has been having bone pain and general muscle pains all over her body that come and go. Unsure if these are general pains of getting older or if they are related to the myositis. Sjogren's continues to cause bothersome dry mouth and dry eyes. No medication side effects noted.    CBC RESULTS: Recent Labs   Lab Test 11/10/22  1330   WBC 6.2   RBC 4.29   HGB 13.0   HCT 40.9   MCV 95   MCH 30.3   MCHC 31.8   RDW 15.2*        ILD: Currently taking albuterol inhaler as needed (uses rarely). Feels the ILD is well managed now. No side effects or concerns.    Depression/Anxiety:  Current medications include: sertaline 150 mg daily, lorazepam 0.5 three times daily as needed. Thinks she takes the lorazepam a few times a month but has not tracked how much she takes this so not sure that is correct. Takes lorazepam for situational stress - still feels overwhelming anxiety at times. No side effects noted.    PHQ 4/25/2022 7/25/2022 8/22/2022   PHQ-9 Total Score - 9 5   Q9: Thoughts of better off dead/self-harm past 2 weeks Not at all Not at all Not at all     SEBASTIAN-7 SCORE 4/25/2022 7/25/2022 8/22/2022   Total Score - - -   Total Score 5 4 4     Myasthenia Gravis/Drug induced diarrhea: Currently taking pyridostigmine 60 mg three times daily (9am, 4pm, 11pm). Had significant diarrhea when she started this so was started on hyoscyamine 0.125 mg 2 tabs three times daily prior to pyridostigmine. Has not had much diarrhea lately but did have one episode last weekend however it did not happen immediately after taking pyridostigmine. Feels she is getting stronger (able to get up out of a chair easier) but her balance has not improved. Was planning for IVIG but insurance denied coverage. Feels like she is tilting sideways when she is walking. Wondering if any medications could be impacting her balance.    Angina/CAD: Currently taking aspirin 81 mg daily,  pravastatin 40 mg daily, ranolazine 1000 mg twice daily. No side effects or concerns.    Recent Labs   Lab Test 08/19/22  1444 10/15/20  1501   CHOL 169 156   HDL 53 64   LDL 72 35   TRIG 221* 286*     GERD: Current medications include: Prilosec (omeprazole) 20 mg  once daily. Patient feels that current regimen is effective. No side effects noted.    Supplements: Currently taking multivitamin daily, calcium citrate 500 mg/magnesium/zinc/vitamin D daily. Also has 1-2 glasses of milk, cheese, yogurt daily. No reported issues at this time. .    Today's Vitals: There were no vitals taken for this visit.  ----------------    I spent 60 minutes with this patient today. I offer these suggestions for consideration by Casey Manning MD and Paul Mark MD. A copy of the visit note was provided to the patient's provider(s).    The patient was mailed a summary of these recommendations.     Soco CastellanosD  Medication Therapy Management Pharmacist  [facility]  Phone: [phone]    Telemedicine Visit Details  Type of service:  Telephone visit  Start Time: 10:00 AM  End Time: 11:00 AM  Originating Location (pt. Location): Home  Distant Location (provider location):  On-site  Provider has received verbal consent for a visit from the patient? Yes     Medication Therapy Recommendations  Brain fog    Current Medication: hyoscyamine (LEVSIN) 0.125 MG tablet   Rationale: Dose too high - Dosage too high - Safety   Recommendation: Decrease Frequency - Twice daily   Status: Contact Provider - Awaiting Response         Sjogren's syndrome (H)    Current Medication: predniSONE (DELTASONE) 5 MG tablet   Rationale: Dosage increase/decrease too fast - Adverse medication event - Safety   Recommendation: Continue to Monitor - Continue to monitor for steroid withdrawal side effects when decreasing weekly   Status: Accepted per Provider

## 2022-11-11 NOTE — LETTER
"Recommended To-Do List      Prepared on: Nov 11, 2022       You can get the best results from your medications by completing the items on this \"To-Do List.\"      Bring your To-Do List when you go to your doctor. And, share it with your family or caregivers.    My To-Do List:  What we talked about: What I should do:   Your medication dosage being too high    Call your neurologist office and ask about decreasing hyoscyamine to twice daily           What we talked about: What I should do:   An issue with your medication    Continue to decrease prednisone by 1 mg every week until you get to 5 mg. Then take 5 mg daily until you are seen again.          What we talked about: What I should do:                       "

## 2022-11-11 NOTE — LETTER
_  Medication List        Prepared on: Nov 11, 2022     Bring your Medication List when you go to the doctor, hospital, or   emergency room. And, share it with your family or caregivers.     Note any changes to how you take your medications.  Cross out medications when you no longer use them.    Medication How I take it Why I use it Prescriber   acetaminophen (TYLENOL) 500 MG tablet Take 1,000 mg by mouth every 6 hours as needed for mild pain General pain Patient Reported   albuterol (PROAIR HFA/PROVENTIL HFA/VENTOLIN HFA) 108 (90 Base) MCG/ACT inhaler INHALE TWO PUFFS BY MOUTH EVERY FOUR HOURS AS NEEDED FOR SHORTNESS OF BREATH/DYSPNEA ILD (Interstitial Lung Disease) (H) Carolina Burrell MD   aspirin (ASA) 81 MG EC tablet Take 1 tablet (81 mg) by mouth daily Coronary artery disease involving native coronary artery of native heart without angina pectoris Driss Carbone MD   azaTHIOprine (IMURAN) 50 MG tablet Take 1 tablet (50 mg) by mouth daily Sjogren's syndrome with other organ involvement (H) Paul Mark MD   Calcium-Magnesium-Zinc 500-250-12.5 MG TABS Take 1 tablet by mouth daily General Health Patient Reported   hyoscyamine (LEVSIN) 0.125 MG tablet TAKE TWO TABLETS BY MOUTH EVERY EIGHT HOURS 30 MINUTES BEFORE EACH MESTINON DOSE Drug-Induced Diarrhea Rene Manning MD   LORazepam (ATIVAN) 0.5 MG tablet TAKE ONE TABLET BY MOUTH EVERY EIGHT HOURS AS NEEDED FOR ANXIETY Acute reaction to stress Rolo Sandoval MD   Multiple Vitamins-Minerals (MULTIVITAMIN & MINERAL PO) Take 1 tablet by mouth daily  General Health Patient Reported   omeprazole (PRILOSEC) 20 MG DR capsule Take 1 capsule (20 mg) by mouth daily Gastroesophageal reflux disease with esophagitis without hemorrhage Rolo Sandoval MD   pravastatin (PRAVACHOL) 40 MG tablet Take 1 tablet (40 mg) by mouth daily Coronary artery disease involving native coronary artery of native heart without angina pectoris Driss Carbone MD    predniSONE (DELTASONE) 1 MG tablet 9-8-7-6 mg a day each course x 1 month then 5 mg a day and stay on 5 mg a day, use with 5 mg size tab Other long term (current) drug therapy; Sjogren's syndrome with other organ involvement (H); Medication Monitoring Encounter Paul Mark MD   predniSONE (DELTASONE) 5 MG tablet 9-8-7-6 mg a day each course x 1 month then 5 mg a day and stay on 5 mg a day, use with 1 mg size tab Other long term (current) drug therapy; Sjogren's syndrome with other organ involvement (H); Medication Monitoring Encounter Paul Mark MD   pyridostigmine (MESTINON) 60 MG tablet TAKE ONE TABLET BY MOUTH THREE TIMES DAILY Myasthenia Gravis without Exacerbation (H) Rene Manning MD   ranolazine (RANEXA) 1000 MG TB12 12 hr tablet Take 1 tablet (1,000 mg) by mouth 2 times daily Intermediate Coronary Syndrome (H) Driss Carbone MD   sertraline (ZOLOFT) 100 MG tablet Take 1.5 tablets (150 mg) by mouth daily Major Depressive Disorder, Recurrent, Mild (H) Rolo Sandoval MD         Add new medications, over-the-counter drugs, herbals, vitamins, or  minerals in the blank rows below.    Medication How I take it Why I use it Prescriber                          Allergies:      daypro [oxaprozin]; lidocaine; nitroglycerin; penicillins; sulfa drugs        Side effects I have had:               Other Information:              My notes and questions:

## 2022-11-11 NOTE — LETTER
November 14, 2022  Albina MELCHOR Willis  [id number] [facility] [place] [address]    Dear Ms. Pedro, [facility] RHEUMATOLOG[facility]     Thank you for talking with me on Nov 11, 2022 about your health and medications. As a follow-up to our conversation, I have included two documents:      1. Your Recommended To-Do List has steps you should take to get the best results from your medications.  2. Your Medication List will help you keep track of your medications and how to take them.    If you want to talk about these documents, please call RUSLAN BULLARD RPH at phone: [phone], Monday-Friday 8-4:30pm.    I look forward to working with you and your doctors to make sure your medications work well for you.    Sincerely,  RUSLAN BULLARD RPH  [facility] Pharmacist, [facility]

## 2022-11-13 RX ORDER — ACETAMINOPHEN 500 MG
1000 TABLET ORAL EVERY 6 HOURS PRN
COMMUNITY

## 2022-11-14 ENCOUNTER — TELEPHONE (OUTPATIENT)
Dept: FAMILY MEDICINE | Facility: CLINIC | Age: 72
End: 2022-11-14
Payer: COMMERCIAL

## 2022-11-14 DIAGNOSIS — U07.1 INFECTION DUE TO 2019 NOVEL CORONAVIRUS: Primary | ICD-10-CM

## 2022-11-14 PROCEDURE — 99213 OFFICE O/P EST LOW 20 MIN: CPT | Mod: CS | Performed by: FAMILY MEDICINE

## 2022-11-14 NOTE — PATIENT INSTRUCTIONS
"Recommendations from today's MTM visit:                                                    MTM (medication therapy management) is a service provided by a clinical pharmacist designed to help you get the most of out of your medicines.   Today we reviewed what your medicines are for, how to know if they are working, that your medicines are safe and how to make your medicine regimen as easy as possible.      1. Contact neurology about reducing hyoscyamine to twice daily to determine if diarrhea comes back or if brain fog improves.    2. I will verify with Dr. Mark if the prednisone should be reduced weekly or monthly.    3. Write down when you take your lorazepam. This way we can determine if your anxiety symptoms are controlled on your current medications.    Follow-up: Return in about 4 weeks (around 12/9/2022) for MTM Pharmacist Visit.    It was great speaking with you today.  I value your experience and would be very thankful for your time in providing feedback in our clinic survey. In the next few days, you may receive an email or text message from [email or web address] with a link to a survey related to your  clinical pharmacist.\"     To schedule another MTM appointment, please call the clinic directly or you may call the MTM scheduling line at [phone] or toll-free at [phone].     My Clinical Pharmacist's contact information:                                                      Please feel free to contact me with any questions or concerns you have.      Diana Ochoa, PharmD  Medication Therapy Management Pharmacist  [facility]  Phone: [phone]     "

## 2022-11-14 NOTE — TELEPHONE ENCOUNTER
Received call from Patient's   States that he and his wife both tested positive for covid yesterday  Kristina's symptoms started yesterday   Patient is at the beginning of symptoms  Number given for covid treatment   Advised symptomatic care and to discussed red flag symptoms with Patient and   Patient traveling next week for thanksgiving plans  Advised to wear masks and to distance.  Zach Sams RN

## 2022-11-14 NOTE — TELEPHONE ENCOUNTER
I recommend starting treatment.  paxlovid has too many interactions with your other meds.  But molnupiravir is okay.    I sent that to the [facility] pharmacy.    Rolo

## 2022-11-28 DIAGNOSIS — K52.1 DRUG-INDUCED DIARRHEA: ICD-10-CM

## 2022-11-28 RX ORDER — HYOSCYAMINE SULFATE 0.125 MG
TABLET ORAL
Qty: 180 TABLET | Refills: 0 | Status: SHIPPED | OUTPATIENT
Start: 2022-11-28 | End: 2023-01-04

## 2022-11-28 NOTE — TELEPHONE ENCOUNTER
[facility] Call Center    Phone Message    May a detailed message be left on voicemail: yes     Reason for Call: Medication Refill Request    Has the patient contacted the pharmacy for the refill? Yes   Name of medication being requested: hyoscyamine (LEVSIN) 0.125 MG tablet  Provider who prescribed the medication: Rene Manning  Pharmacy:[address] , [place] [address]  Date medication is needed: ASAP    Patient's  Jerrod is requesting a refill for the medication listed above. Jerrod states she is almost out. Please call Jerrod back to advise at # [phone]      Action Taken: Message routed to:  [facility] neurology     Travel Screening: Not Applicable

## 2022-12-01 ENCOUNTER — VIRTUAL VISIT (OUTPATIENT)
Dept: PHARMACY | Facility: CLINIC | Age: 72
End: 2022-12-01
Payer: COMMERCIAL

## 2022-12-01 DIAGNOSIS — M35.01 SJOGREN'S SYNDROME WITH KERATOCONJUNCTIVITIS SICCA (H): ICD-10-CM

## 2022-12-01 DIAGNOSIS — R41.89 BRAIN FOG: ICD-10-CM

## 2022-12-01 DIAGNOSIS — R19.7 DIARRHEA, UNSPECIFIED TYPE: ICD-10-CM

## 2022-12-01 DIAGNOSIS — G70.01 MYASTHENIA GRAVIS WITH EXACERBATION (H): Primary | ICD-10-CM

## 2022-12-01 DIAGNOSIS — F03.90 DEMENTIA, UNSPECIFIED DEMENTIA SEVERITY, UNSPECIFIED DEMENTIA TYPE, UNSPECIFIED WHETHER BEHAVIORAL, PSYCHOTIC, OR MOOD DISTURBANCE OR ANXIETY (H): ICD-10-CM

## 2022-12-01 PROCEDURE — 99606 MTMS BY PHARM EST 15 MIN: CPT

## 2022-12-01 PROCEDURE — 99607 MTMS BY PHARM ADDL 15 MIN: CPT

## 2022-12-01 RX ORDER — AZATHIOPRINE 50 MG/1
TABLET ORAL
Qty: 120 TABLET | Refills: 5 | Status: SHIPPED | OUTPATIENT
Start: 2022-12-01 | End: 2024-01-25

## 2022-12-01 NOTE — PROGRESS NOTES
Medication Therapy Management (MTM) Encounter    ASSESSMENT:                            Medication Adherence/Access: No issues identified    Myasthenia Gravis/Drug induced diarrhea/Sjogren's Syndrome: Due to continued weakness, would benefit from tapering up dose of azathioprine to 50 mg twice daily x 14 days then 100 mg in the morning and 50 mg in the evening x 14 days, then 100 mg twice daily. Provided education on dosing, general administration, side effects (both common/serious), precautions, monitoring and time to efficacy. Discussed increasing prednisone dose would resolve symptoms faster however azathioprine has a more favorable side effect profile in preventing further brain fog. Also discussed need to complete labs every 2 weeks after dose increases.    Dementia/Brain Fog: Would benefit from attempting twice daily hyoscyamine dosing once trip is completed to determine if three times daily is contributing to brain fog or if it is needed to control diarrhea symptoms.    PLAN:                            1. Increase azathioprine to 50 mg twice daily x 14 days then 100 mg in the morning and 50 mg in the evening x 14 days, then 100 mg twice daily. Take this with food. Please let me know if you notice any new nausea, upset stomach, or vomiting.    2. You will need to get labs done every 14 days. Set up the first lab appointment for 14 days after taking the new dose of 50 mg twice daily.    Follow-up: Return in about 4 weeks (around 12/29/2022) for MTM Pharmacist Visit.    SUBJECTIVE/OBJECTIVE:                          Albina Pedro is a 72 year old female called for a follow-up visit. Patient was accompanied by her  Jerrod mejia. Today's visit is a follow-up MTM visit from 11/11/22.     Reason for visit: New azathioprine instructions for myasthenia gravis    Allergies/ADRs: Reviewed in chart  Past Medical History: Reviewed in chart  Tobacco: She reports that she has never smoked. She has never used  smokeless tobacco.  Alcohol: not currently using    Medication Adherence/Access: Patient uses pill box(es).  helps to fill them.  Medication barriers: none.   The patient fills medications at [place]: NO, fills medications at [facility].    Myasthenia Gravis/Drug induced diarrhea/Sjogren's Syndrome: Currently taking pyridostigmine 60 mg three times daily (9am, 4pm, 11pm), azathioprine 50 mg daily, prednisone 7 mg daily (decreasing dose by 1 mg monthly). Had significant diarrhea when she started this so was started on hyoscyamine 0.125 mg 2 tabs three times daily prior to pyridostigmine. Has not had much diarrhea lately - only occasionally 1-2 times per month. Concerned that she is feeling weaker and is still off balance. Was planning for IVIG but insurance denied coverage. Neurologist managing MG suggests increasing azathioprine as next treatment step. Patient previously declined increased prednisone dose to help with symptoms.    Liver Function Studies - Recent Labs   Lab Test 11/10/22  1330 03/25/22  1514 02/16/22  1740   PROTTOTAL  --   --  7.3   ALBUMIN 4.0   < > 4.0   BILITOTAL  --   --  0.4   ALKPHOS  --   --  67   AST 38*   < > 30   ALT 34   < > 44    < > = values in this interval not displayed.     CBC RESULTS: Recent Labs   Lab Test 11/10/22  1330   WBC 6.2   RBC 4.29   HGB 13.0   HCT 40.9   MCV 95   MCH 30.3   MCHC 31.8   RDW 15.2*        Dementia/Brain Fog: Reports she has been monitored for dementia by her neurologist for some time now, but feels the brain fog has gotten worse recently (since July). Has tried decreasing hyoscyamine to twice daily a couple days and did not see any increase in diarrhea but has gone back to three times daily dosing currently. Reports she is going on a trip out of state tomorrow and will attempt to decrease dose again after returning.    Today's Vitals: There were no vitals taken for this visit.  ----------------    I spent 22 minutes with this patient today. All  changes were made via verbal approval with Paul Mark MD and Rene Manning MD. A copy of the visit note was provided to the patient's provider(s).    A summary of these recommendations was sent via [employer].    Diana Ochoa, PharmD  Medication Therapy Management Pharmacist  [facility]  Phone: [phone]    Telemedicine Visit Details  Type of service:  Telephone visit  Start Time: 10:00 AM  End Time: 10:22 AM  Originating Location (pt. Location): Home  Distant Location (provider location):  On-site  Provider has received verbal consent for a visit from the patient? Yes     Medication Therapy Recommendations  Myasthenia gravis with exacerbation (H)    Current Medication: azaTHIOprine (IMURAN) 50 MG tablet   Rationale: Dose too low - Dosage too low - Effectiveness   Recommendation: Increase Dose - azaTHIOprine 50 MG tablet - 50 mg twice daily x 14 days then 100 mg in the morning and 50 mg in the evening x 14 days, then 100 mg twice daily   Status: Accepted per Provider          Current Medication: azaTHIOprine (IMURAN) 50 MG tablet   Rationale: Medication requires monitoring - Needs additional monitoring - Safety   Recommendation: Order Lab - LFTs and CBC q2 weeks   Status: Accepted per Provider

## 2022-12-05 ENCOUNTER — TELEPHONE (OUTPATIENT)
Dept: NEUROLOGY | Facility: CLINIC | Age: 72
End: 2022-12-05

## 2022-12-05 NOTE — TELEPHONE ENCOUNTER
Patient's  Jerrod called - patient is out of hyoscyamine and was unable to fill it in [place]. Advised Jerrod call [facility] and determine if they have the medication available. If so, provided instructions on how to request a transfer of the medication from [facility] to [facility]. Jerrod will call back if he is unable to get the medication from [facility].    Concerned that her muscle weakness is worsening. Reviewed plan to increase azathioprine - planning to  this prescription today. Also reviewed that this may take some time to work and the short term alternative is increased prednisone again. Kristina and Jerrod both declined adjusting prednisone at this time.    Diana Ochoa, PharmD  Medication Therapy Management Pharmacist  [facility]  Phone: [phone]

## 2022-12-11 NOTE — PATIENT INSTRUCTIONS
"Recommendations from today's MTM visit:                                                       1. Increase azathioprine to 50 mg twice daily x 14 days then 100 mg in the morning and 50 mg in the evening x 14 days, then 100 mg twice daily. Take this with food. Please let me know if you notice any new nausea, upset stomach, or vomiting.    2. You will need to get labs done every 14 days. Set up the first lab appointment for 14 days after taking the new dose of 50 mg twice daily.    Follow-up: Return in about 4 weeks (around 12/29/2022) for MTM Pharmacist Visit.    It was great speaking with you today.  I value your experience and would be very thankful for your time in providing feedback in our clinic survey. In the next few days, you may receive an email or text message from [employer] with a link to a survey related to your  clinical pharmacist.\"     To schedule another MTM appointment, please call the clinic directly or you may call the MTM scheduling line at [phone] or toll-free at [phone].     My Clinical Pharmacist's contact information:                                                      Please feel free to contact me with any questions or concerns you have.      Diana Ochoa, PharmD  Medication Therapy Management Pharmacist  [facility]  Phone: [phone]     "

## 2022-12-16 ENCOUNTER — HOSPITAL ENCOUNTER (OUTPATIENT)
Dept: BONE DENSITY | Facility: CLINIC | Age: 72
Discharge: HOME | End: 2022-12-16
Attending: INTERNAL MEDICINE | Admitting: INTERNAL MEDICINE
Payer: COMMERCIAL

## 2022-12-16 DIAGNOSIS — Z78.0 POST-MENOPAUSE: ICD-10-CM

## 2022-12-16 DIAGNOSIS — Z79.52 CURRENT CHRONIC USE OF SYSTEMIC STEROIDS: ICD-10-CM

## 2022-12-16 PROCEDURE — 77080 DXA BONE DENSITY AXIAL: CPT

## 2022-12-21 DIAGNOSIS — G70.00 MYASTHENIA GRAVIS WITHOUT EXACERBATION (H): ICD-10-CM

## 2022-12-22 RX ORDER — PYRIDOSTIGMINE BROMIDE 60 MG/1
TABLET ORAL
Qty: 90 TABLET | Refills: 0 | Status: SHIPPED | OUTPATIENT
Start: 2022-12-22 | End: 2023-01-04

## 2022-12-26 DIAGNOSIS — F33.0 MAJOR DEPRESSIVE DISORDER, RECURRENT, MILD (H): ICD-10-CM

## 2022-12-26 RX ORDER — SERTRALINE HYDROCHLORIDE 100 MG/1
TABLET, FILM COATED ORAL
Qty: 135 TABLET | Refills: 0 | Status: SHIPPED | OUTPATIENT
Start: 2022-12-26 | End: 2023-01-04

## 2022-12-26 NOTE — TELEPHONE ENCOUNTER
"Requested Prescriptions   Pending Prescriptions Disp Refills     sertraline (ZOLOFT) 100 MG tablet [Pharmacy Med Name: Sertraline HCl Oral Tablet 100 MG] 135 tablet 0     Sig: TAKE ONE AND ONE-HALF TABLETS BY MOUTH DAILY       SSRIs Protocol Failed - 12/26/2022 12:56 PM        Failed - PHQ-9 score less than 5 in past 6 months     Please review last PHQ-9 score.           Passed - Medication is active on med list        Passed - Patient is age 18 or older        Passed - No active pregnancy on record        Passed - No positive pregnancy test in last 12 months        Passed - Recent (6 mo) or future (30 days) visit within the authorizing provider's specialty     Patient had office visit in the last 6 months or has a visit in the next 30 days with authorizing provider or within the authorizing provider's specialty.  See \"Patient Info\" tab in inbasket, or \"Choose Columns\" in Meds & Orders section of the refill encounter.               "

## 2022-12-30 ENCOUNTER — VIRTUAL VISIT (OUTPATIENT)
Dept: PHARMACY | Facility: CLINIC | Age: 72
End: 2022-12-30
Payer: COMMERCIAL

## 2022-12-30 DIAGNOSIS — F43.0 ACUTE REACTION TO STRESS: ICD-10-CM

## 2022-12-30 DIAGNOSIS — G70.01 MYASTHENIA GRAVIS WITH EXACERBATION (H): Primary | ICD-10-CM

## 2022-12-30 DIAGNOSIS — R19.7 DIARRHEA, UNSPECIFIED TYPE: ICD-10-CM

## 2022-12-30 DIAGNOSIS — M35.01 SJOGREN'S SYNDROME WITH KERATOCONJUNCTIVITIS SICCA (H): ICD-10-CM

## 2022-12-30 DIAGNOSIS — F03.90 DEMENTIA, UNSPECIFIED DEMENTIA SEVERITY, UNSPECIFIED DEMENTIA TYPE, UNSPECIFIED WHETHER BEHAVIORAL, PSYCHOTIC, OR MOOD DISTURBANCE OR ANXIETY (H): ICD-10-CM

## 2022-12-30 DIAGNOSIS — R41.89 BRAIN FOG: ICD-10-CM

## 2022-12-30 PROCEDURE — 99606 MTMS BY PHARM EST 15 MIN: CPT

## 2022-12-30 NOTE — PATIENT INSTRUCTIONS
"Recommendations from today's MTM visit:                                                       1. Complete labs on 1/2/23 to monitor azathioprine.    2. Decrease prednisone dose to 5 mg daily as scheduled. This may help reduce the jittery/anxious feeling.    3. Discuss the fatigue and brain fog symptoms with Dr. Sandoval at your appointment next week.    Follow-up: Return in about 2 weeks (around 1/13/2023) for MTM Pharmacist Visit.    It was great speaking with you today.  I value your experience and would be very thankful for your time in providing feedback in our clinic survey. In the next few days, you may receive an email or text message from [facility] with a link to a survey related to your  clinical pharmacist.\"     To schedule another MTM appointment, please call the clinic directly or you may call the MTM scheduling line at [phone] or toll-free at [phone].     My Clinical Pharmacist's contact information:                                                      Please feel free to contact me with any questions or concerns you have.      Diana Ochoa, Latasha  Medication Therapy Management Pharmacist  [facility]  Phone: [phone]     "

## 2022-12-30 NOTE — TELEPHONE ENCOUNTER
Medication Question or Refill        What medication are you calling about (include dose and sig)?: lorazepam (ATIVAN)0.5 MG TABLET    Controlled Substance Agreement on file:   CSA -- Patient Level:    CSA: None found at the patient level.       Who prescribed the medication?: DIVYA    Do you need a refill? Yes:     When did you use the medication last? THIS MORNING    Patient offered an appointment? No    Do you have any questions or concerns?  Yes: NEED A VIRTUAL SCHEDULED  [id number] - [place], [place] - [address]  [address] [address]  Phone: [phone] Fax: [phone]      Could we send this information to you in [employer] or would you prefer to receive a phone call?:   Patient would prefer a phone call   Okay to leave a detailed message?: Yes at Home number on file [phone] (home)

## 2022-12-30 NOTE — PROGRESS NOTES
Medication Therapy Management (MTM) Encounter    ASSESSMENT:                            Medication Adherence/Access: No issues identified    Myasthenia Gravis/Drug induced diarrhea/Sjogren's Syndrome: Patient is having several side effects from prednisone. Would benefit from decreasing dose to 5 mg daily as scheduled and continuing to monitor for side effects on lower dose.    Dementia/Brain Fog: Per prescribing information, azathioprine can cause anemia in rare cases. Could potentially be contributing to new onset fatigue symptoms. Would benefit from completing scheduled labs to determine if hemoglobin is low. Would also benefit from discussing symptoms with Dr. Sandoval at visit next week so other possible non-medication causes can be assessed.    PLAN:                            1. Complete labs on 1/2/23 to monitor azathioprine.    2. Decrease prednisone dose to 5 mg daily as scheduled. This may help reduce the jittery/anxious feeling.    3. Discuss the fatigue and brain fog symptoms with Dr. Sandoval at your appointment next week.    Follow-up: Return in about 2 weeks (around 1/13/2023) for MTM Pharmacist Visit.    SUBJECTIVE/OBJECTIVE:                          Albina Pedro is a 72 year old female called for a follow-up visit.  Today's visit is a follow-up MTM visit from 12/1/22. Patient's  Jerrod joined the visit today.     Reason for visit: Brain fog and fatigue worsening    Allergies/ADRs: Reviewed in chart  Past Medical History: Reviewed in chart  Tobacco: She reports that she has never smoked. She has never used smokeless tobacco.  Alcohol: not currently using    Medication Adherence/Access: Patient uses pill box(es).  helps to fill them.  Medication barriers: none.   The patient fills medications at [facility]: NO, fills medications at [facility].    Myasthenia Gravis/Drug induced diarrhea/Sjogren's Syndrome: Currently taking pyridostigmine 60 mg three times daily (9am, 4pm, 11pm),  azathioprine 50 mg twice daily (labs scheduled for 1/2/23 before increasing to 100 mg AM and 50 mg PM), prednisone 6 mg daily (decreasing to 5 mg daily next week). Feels very jittery and anxious throughout the day,  notes she is having difficulty sleeping at night but this may be due to taking midday naps.  Had significant diarrhea when she started pyridostigmine so was started on hyoscyamine 0.125 mg 2 tabs three times daily prior to pyridostigmine. Has not had much diarrhea lately - only occasionally 1-2 times per month. Has been able to reduce hyoscyamine 0.125 mg 2 tabs twice daily without any increase in diarrhea. No improvement in brain fog yet. Feels muscle strength is somewhat better - noticing fatigue more.    Liver Function Studies - Recent Labs   Lab Test 11/10/22  1330 03/25/22  1514 02/16/22  1740   PROTTOTAL  --   --  7.3   ALBUMIN 4.0   < > 4.0   BILITOTAL  --   --  0.4   ALKPHOS  --   --  67   AST 38*   < > 30   ALT 34   < > 44    < > = values in this interval not displayed.     CBC RESULTS: Recent Labs   Lab Test 11/10/22  1330   WBC 6.2   RBC 4.29   HGB 13.0   HCT 40.9   MCV 95   MCH 30.3   MCHC 31.8   RDW 15.2*        Dementia/Brain Fog: Patient's  reports her brain fog has worsened and now she is also very fatigued. Reports this happens about 1-2 hours after taking morning medications. Typically needs to take an afternoon nap due to fatigue. Reports she doesn't feel like going anywhere or doing anything on a daily basis. Seems to have started after increasing azathioprine.     Today's Vitals: There were no vitals taken for this visit.  ----------------    I spent 15 minutes with this patient today. All changes were made via collaborative practice agreement with Paul Mark MD and verbal approval from Rene Manning MD. A copy of the visit note was provided to the patient's provider(s).    A summary of these recommendations was sent via clinic portal.    Diana  Latasha Ochoa  Medication Therapy Management Pharmacist  [facility]  Phone: [phone]    Telemedicine Visit Details  Type of service:  Telephone visit  Start Time: 3:01 PM  End Time: 3:16 PM  Originating Location (pt. Location): Home  Distant Location (provider location):  On-site  Provider has received verbal consent for a visit from the patient? Yes     Medication Therapy Recommendations  Myasthenia gravis with exacerbation (H)    Current Medication: predniSONE (DELTASONE) 5 MG tablet   Rationale: Dose too high - Dosage too high - Safety   Recommendation: Decrease Dose - 5 mg daily   Status: Accepted per Provider

## 2023-01-02 ENCOUNTER — LAB (OUTPATIENT)
Dept: LAB | Facility: CLINIC | Age: 73
End: 2023-01-02
Payer: COMMERCIAL

## 2023-01-02 DIAGNOSIS — Z79.899 OTHER LONG TERM (CURRENT) DRUG THERAPY: ICD-10-CM

## 2023-01-02 DIAGNOSIS — G70.00 MYASTHENIA GRAVIS (H): ICD-10-CM

## 2023-01-02 DIAGNOSIS — G70.01 MYASTHENIA GRAVIS WITH EXACERBATION (H): ICD-10-CM

## 2023-01-02 DIAGNOSIS — M35.09 SJOGREN'S SYNDROME WITH OTHER ORGAN INVOLVEMENT (H): ICD-10-CM

## 2023-01-02 DIAGNOSIS — R73.09 ELEVATED GLUCOSE: ICD-10-CM

## 2023-01-02 DIAGNOSIS — I25.10 CORONARY ARTERY DISEASE INVOLVING NATIVE CORONARY ARTERY OF NATIVE HEART WITHOUT ANGINA PECTORIS: Chronic | ICD-10-CM

## 2023-01-02 LAB
ALBUMIN SERPL BCG-MCNC: 4 G/DL (ref 3.5–5.2)
ALP SERPL-CCNC: 44 U/L (ref 35–104)
ALT SERPL W P-5'-P-CCNC: 29 U/L (ref 10–35)
ANION GAP SERPL CALCULATED.3IONS-SCNC: 9 MMOL/L (ref 7–15)
AST SERPL W P-5'-P-CCNC: 28 U/L (ref 10–35)
BASOPHILS # BLD AUTO: 0 10E3/UL (ref 0–0.2)
BASOPHILS NFR BLD AUTO: 0 %
BILIRUB SERPL-MCNC: 0.4 MG/DL
BUN SERPL-MCNC: 18.6 MG/DL (ref 8–23)
CALCIUM SERPL-MCNC: 9.3 MG/DL (ref 8.8–10.2)
CHLORIDE SERPL-SCNC: 104 MMOL/L (ref 98–107)
CHOLEST SERPL-MCNC: 181 MG/DL
CK SERPL-CCNC: 77 U/L (ref 26–192)
CREAT SERPL-MCNC: 0.61 MG/DL (ref 0.51–0.95)
DEPRECATED HCO3 PLAS-SCNC: 28 MMOL/L (ref 22–29)
EOSINOPHIL # BLD AUTO: 0.1 10E3/UL (ref 0–0.7)
EOSINOPHIL NFR BLD AUTO: 2 %
ERYTHROCYTE [DISTWIDTH] IN BLOOD BY AUTOMATED COUNT: 15.1 % (ref 10–15)
GFR SERPL CREATININE-BSD FRML MDRD: >90 ML/MIN/1.73M2
GLUCOSE SERPL-MCNC: 115 MG/DL (ref 70–99)
HBA1C MFR BLD: 5.7 % (ref 0–5.6)
HCT VFR BLD AUTO: 39.6 % (ref 35–47)
HDLC SERPL-MCNC: 84 MG/DL
HGB BLD-MCNC: 13 G/DL (ref 11.7–15.7)
LDLC SERPL CALC-MCNC: 74 MG/DL
LYMPHOCYTES # BLD AUTO: 1.2 10E3/UL (ref 0.8–5.3)
LYMPHOCYTES NFR BLD AUTO: 26 %
MCH RBC QN AUTO: 31.1 PG (ref 26.5–33)
MCHC RBC AUTO-ENTMCNC: 32.8 G/DL (ref 31.5–36.5)
MCV RBC AUTO: 95 FL (ref 78–100)
MONOCYTES # BLD AUTO: 0.4 10E3/UL (ref 0–1.3)
MONOCYTES NFR BLD AUTO: 8 %
NEUTROPHILS # BLD AUTO: 2.8 10E3/UL (ref 1.6–8.3)
NEUTROPHILS NFR BLD AUTO: 63 %
NONHDLC SERPL-MCNC: 97 MG/DL
PLATELET # BLD AUTO: 214 10E3/UL (ref 150–450)
POTASSIUM SERPL-SCNC: 4.2 MMOL/L (ref 3.4–5.3)
PROT SERPL-MCNC: 6.7 G/DL (ref 6.4–8.3)
RBC # BLD AUTO: 4.18 10E6/UL (ref 3.8–5.2)
SODIUM SERPL-SCNC: 141 MMOL/L (ref 136–145)
TRIGL SERPL-MCNC: 116 MG/DL
WBC # BLD AUTO: 4.5 10E3/UL (ref 4–11)

## 2023-01-02 PROCEDURE — 82550 ASSAY OF CK (CPK): CPT

## 2023-01-02 PROCEDURE — 83036 HEMOGLOBIN GLYCOSYLATED A1C: CPT

## 2023-01-02 PROCEDURE — 80053 COMPREHEN METABOLIC PANEL: CPT

## 2023-01-02 PROCEDURE — 85025 COMPLETE CBC W/AUTO DIFF WBC: CPT

## 2023-01-02 PROCEDURE — 36415 COLL VENOUS BLD VENIPUNCTURE: CPT

## 2023-01-02 PROCEDURE — 80061 LIPID PANEL: CPT

## 2023-01-02 RX ORDER — LORAZEPAM 0.5 MG/1
TABLET ORAL
Qty: 20 TABLET | Refills: 0 | Status: SHIPPED | OUTPATIENT
Start: 2023-01-02 | End: 2023-01-04

## 2023-01-02 NOTE — TELEPHONE ENCOUNTER
Routing to ordering provider for consideration, not on refill protocol.           Samia Garces     RN MSN

## 2023-01-04 ENCOUNTER — VIRTUAL VISIT (OUTPATIENT)
Dept: FAMILY MEDICINE | Facility: CLINIC | Age: 73
End: 2023-01-04
Payer: COMMERCIAL

## 2023-01-04 DIAGNOSIS — I20.89 CHRONIC STABLE ANGINA (H): ICD-10-CM

## 2023-01-04 DIAGNOSIS — K52.1 DRUG-INDUCED DIARRHEA: ICD-10-CM

## 2023-01-04 DIAGNOSIS — F43.0 ACUTE REACTION TO STRESS: ICD-10-CM

## 2023-01-04 DIAGNOSIS — J84.9 ILD (INTERSTITIAL LUNG DISEASE) (H): ICD-10-CM

## 2023-01-04 DIAGNOSIS — G70.00 MYASTHENIA GRAVIS WITHOUT EXACERBATION (H): ICD-10-CM

## 2023-01-04 DIAGNOSIS — F03.90 DEMENTIA, UNSPECIFIED DEMENTIA SEVERITY, UNSPECIFIED DEMENTIA TYPE, UNSPECIFIED WHETHER BEHAVIORAL, PSYCHOTIC, OR MOOD DISTURBANCE OR ANXIETY (H): Primary | ICD-10-CM

## 2023-01-04 DIAGNOSIS — F33.0 MAJOR DEPRESSIVE DISORDER, RECURRENT, MILD (H): ICD-10-CM

## 2023-01-04 DIAGNOSIS — M35.01 SJOGREN'S SYNDROME WITH KERATOCONJUNCTIVITIS SICCA (H): ICD-10-CM

## 2023-01-04 PROCEDURE — 99214 OFFICE O/P EST MOD 30 MIN: CPT | Mod: 95 | Performed by: FAMILY MEDICINE

## 2023-01-04 RX ORDER — PYRIDOSTIGMINE BROMIDE 60 MG/1
60 TABLET ORAL 3 TIMES DAILY
Qty: 270 TABLET | Refills: 0 | Status: SHIPPED | OUTPATIENT
Start: 2023-01-04 | End: 2023-06-11

## 2023-01-04 RX ORDER — SERTRALINE HYDROCHLORIDE 100 MG/1
200 TABLET, FILM COATED ORAL DAILY
Qty: 180 TABLET | Refills: 0 | Status: SHIPPED | OUTPATIENT
Start: 2023-01-04 | End: 2023-03-16

## 2023-01-04 RX ORDER — HYOSCYAMINE SULFATE 0.125 MG
TABLET ORAL
Qty: 180 TABLET | Refills: 0 | Status: SHIPPED | OUTPATIENT
Start: 2023-01-04 | End: 2023-07-12

## 2023-01-04 RX ORDER — LORAZEPAM 0.5 MG/1
TABLET ORAL
Qty: 20 TABLET | Refills: 0 | Status: SHIPPED | OUTPATIENT
Start: 2023-01-04 | End: 2023-04-28

## 2023-01-04 RX ORDER — PREDNISONE 5 MG/1
5 TABLET ORAL DAILY
COMMUNITY
Start: 2023-01-04 | End: 2023-05-18

## 2023-01-04 RX ORDER — HYOSCYAMINE SULFATE 0.125 MG
TABLET ORAL
Qty: 180 TABLET | Refills: 0 | Status: SHIPPED | OUTPATIENT
Start: 2023-01-04 | End: 2023-01-04

## 2023-01-04 ASSESSMENT — ANXIETY QUESTIONNAIRES
GAD7 TOTAL SCORE: 3
6. BECOMING EASILY ANNOYED OR IRRITABLE: SEVERAL DAYS
3. WORRYING TOO MUCH ABOUT DIFFERENT THINGS: NOT AT ALL
GAD7 TOTAL SCORE: 3
7. FEELING AFRAID AS IF SOMETHING AWFUL MIGHT HAPPEN: NOT AT ALL
IF YOU CHECKED OFF ANY PROBLEMS ON THIS QUESTIONNAIRE, HOW DIFFICULT HAVE THESE PROBLEMS MADE IT FOR YOU TO DO YOUR WORK, TAKE CARE OF THINGS AT HOME, OR GET ALONG WITH OTHER PEOPLE: SOMEWHAT DIFFICULT
2. NOT BEING ABLE TO STOP OR CONTROL WORRYING: NOT AT ALL
5. BEING SO RESTLESS THAT IT IS HARD TO SIT STILL: NOT AT ALL
1. FEELING NERVOUS, ANXIOUS, OR ON EDGE: SEVERAL DAYS

## 2023-01-04 ASSESSMENT — PATIENT HEALTH QUESTIONNAIRE - PHQ9
5. POOR APPETITE OR OVEREATING: SEVERAL DAYS
SUM OF ALL RESPONSES TO PHQ QUESTIONS 1-9: 12

## 2023-01-04 NOTE — PROGRESS NOTES
Kristina is a 72 year old who is being evaluated via a billable telephone visit.      What phone number would you like to be contacted at? [phone]  How would you like to obtain your AVS? Phoebe  Distant Location (provider location):  On-site    Assessment & Plan     Acute reaction to stress  better  - LORazepam (ATIVAN) 0.5 MG tablet; 1/2 -1 TABS UP TO 3-5 TIMES A WEEK AS NEEDED    Major depressive disorder, recurrent, mild (H)  Good control.  Continue currant medications, continue to monitor.    - sertraline (ZOLOFT) 100 MG tablet; Take 2 tablets (200 mg) by mouth daily    Myasthenia gravis without exacerbation (H)  Fair control if didn't hve side efect would consider inv creasing mestanon  - pyridostigmine (MESTINON) 60 MG tablet; Take 1 tablet (60 mg) by mouth 3 times daily    Drug-induced diarrhea  Good control.  Continue currant medications, continue to monitor.    - hyoscyamine (LEVSIN) 0.125 MG tablet; TAKE  1 TABLET BY MOUTH EVERY EIGHT HOURS 30 MINUTES BEFORE EACH MESTINON DOSE Strength: 0.125 mgTAKE    Dementia, unspecified dementia severity, unspecified dementia type, unspecified whether behavioral, psychotic, or mood disturbance or anxiety (H)  Stable     Sjogren's syndrome with keratoconjunctivitis sicca (H)  Good control.  Continue currant medications, continue to monitor.      ILD (interstitial lung disease) (H)  Good control.  Continue currant medications, continue to monitor.      Chronic stable angina (H)  Good control.  Continue currant medications, continue to monitor.             No follow-ups on file.    Rolo Sandoval MD  [facility]    Katheryn Acevedo is a 72 year old accompanied by her spouse, presenting for the following health issues:  Recheck Medication (/)      HPI     Depression Followup    How are you doing with your depression since your last visit? Improved somedays    Are you having other symptoms that might be associated with depression? Yes:  poor  appetite    Have you had a significant life event?  No     Are you feeling anxious or having panic attacks?   Yes:  she is jittery    Do you have any concerns with your use of alcohol or other drugs? No    Social History     Tobacco Use     Smoking status: Never     Smokeless tobacco: Never   Substance Use Topics     Alcohol use: Yes     Alcohol/week: 0.0 standard drinks     Comment: 1 glass of wine every 2 weeks     Drug use: No     PHQ 7/25/2022 8/22/2022 1/4/2023   PHQ-9 Total Score 9 5 12   Q9: Thoughts of better off dead/self-harm past 2 weeks Not at all Not at all Not at all     SEBASTIAN-7 SCORE 7/25/2022 8/22/2022 1/4/2023   Total Score - - -   Total Score 4 4 3     Last PHQ-9 1/4/2023   1.  Little interest or pleasure in doing things 2   2.  Feeling down, depressed, or hopeless 1   3.  Trouble falling or staying asleep, or sleeping too much 0   4.  Feeling tired or having little energy 3   5.  Poor appetite or overeating 2   6.  Feeling bad about yourself 2   7.  Trouble concentrating 1   8.  Moving slowly or restless 1   Q9: Thoughts of better off dead/self-harm past 2 weeks 0   PHQ-9 Total Score 12   Difficulty at work, home, or with people Very difficult     SEBASTIAN-7  1/4/2023   1. Feeling nervous, anxious, or on edge 1   2. Not being able to stop or control worrying 0   3. Worrying too much about different things 0   4. Trouble relaxing 1   5. Being so restless that it is hard to sit still 0   6. Becoming easily annoyed or irritable 1   7. Feeling afraid, as if something awful might happen 0   SEBASTIAN-7 Total Score 3   If you checked any problems, how difficult have they made it for you to do your work, take care of things at home, or get along with other people? Somewhat difficult       Suicide Assessment Five-step Evaluation and Treatment (SAFE-T)        How many servings of fruits and vegetables do you eat daily?  2-3    On average, how many sweetened beverages do you drink each day (Examples: soda, juice, sweet  tea, etc.  Do NOT count diet or artificially sweetened beverages)?   1    How many days per week do you exercise enough to make your heart beat faster? 3 or less    How many minutes a day do you exercise enough to make your heart beat faster? 10 - 19    How many days per week do you miss taking your medication? 0        Review of Systems   Constitutional, HEENT, cardiovascular, pulmonary, gi and gu systems are negative, except as otherwise noted.      Objective           Vitals:  No vitals were obtained today due to virtual visit.    Physical Exam   healthy, alert and no distress  PSYCH: Alert and oriented times 3; coherent speech, normal   rate and volume, able to articulate logical thoughts, able   to abstract reason, no tangential thoughts, no hallucinations   or delusions  Her affect is normal  RESP: No cough, no audible wheezing, able to talk in full sentences  Remainder of exam unable to be completed due to telephone visits                Phone call duration: 9 minutes

## 2023-01-18 PROBLEM — R53.81 PHYSICAL DECONDITIONING: Status: RESOLVED | Noted: 2022-11-01 | Resolved: 2023-01-18

## 2023-01-18 NOTE — PROGRESS NOTES
Discharge Note    Progress reporting period is from initial evaluation date (please see noted date below) to Nov 9, 2022.  Linked Episodes   Type: Episode: Status: Noted: Resolved: Last update: Updated by:   PHYSICAL THERAPY Physical deconditioning 11/1/22 Active 11/1/2022 11/9/2022 10:37 AM Lela Boswell PT      Comments:       Albina failed to follow up and current status is unknown.  Please see information below for last relevant information on current status.  Patient seen for 2 visits.    SUBJECTIVE  Subjective changes noted by patient:  Has been outside working some on leaves outside, this makes her tired so hasnt done too many exercises. Doing well overall..  .  Current pain level is  .     Previous pain level was   .   Changes in function:  Yes (See Goal flowsheet attached for changes in current functional level)  Adverse reaction to treatment or activity: None    OBJECTIVE  Changes noted in objective findings: Cont demo proximal hip weakness and balance impairment. Mod ceuing with all ex     ASSESSMENT/PLAN  Diagnosis: Deconditioning   Updated problem list and treatment plan:   Decreased function - HEP  Decreased strength - HEP  Impaired muscle performance - HEP  Impaired balance - HEP  STG/LTGs have been met or progress has been made towards goals:  Yes, please see goal flowsheet for most current information  Assessment of Progress: current status is unknown.    Last current status: Pt is progressing as expected, Pt has not made progress   Self Management Plans:  HEP  I have re-evaluated this patient and find that the nature, scope, duration and intensity of the therapy is appropriate for the medical condition of the patient.  Albina continues to require the following intervention to meet STG and LTG's:  HEP.    Recommendations:  Discharge with current home program.  Patient to follow up with MD as needed.    Please refer to the daily flowsheet for treatment today, total treatment time and time spent  performing 1:1 timed codes.

## 2023-01-30 ENCOUNTER — OFFICE VISIT (OUTPATIENT)
Dept: NEUROLOGY | Facility: CLINIC | Age: 73
End: 2023-01-30
Payer: COMMERCIAL

## 2023-01-30 VITALS
BODY MASS INDEX: 26.83 KG/M2 | DIASTOLIC BLOOD PRESSURE: 75 MMHG | HEART RATE: 59 BPM | SYSTOLIC BLOOD PRESSURE: 141 MMHG | OXYGEN SATURATION: 99 % | WEIGHT: 161.2 LBS

## 2023-01-30 DIAGNOSIS — R74.8 ABNORMAL LEVELS OF OTHER SERUM ENZYMES: ICD-10-CM

## 2023-01-30 DIAGNOSIS — R27.0 ATAXIA: Primary | ICD-10-CM

## 2023-01-30 DIAGNOSIS — G70.01 MYASTHENIA GRAVIS WITH EXACERBATION (H): ICD-10-CM

## 2023-01-30 DIAGNOSIS — G70.00 SERONEGATIVE MYASTHENIA GRAVIS (H): ICD-10-CM

## 2023-01-30 DIAGNOSIS — G31.84 MILD COGNITIVE IMPAIRMENT: ICD-10-CM

## 2023-01-30 PROCEDURE — 99215 OFFICE O/P EST HI 40 MIN: CPT | Mod: GC | Performed by: PSYCHIATRY & NEUROLOGY

## 2023-01-30 ASSESSMENT — PAIN SCALES - GENERAL: PAINLEVEL: NO PAIN (0)

## 2023-01-30 NOTE — PROGRESS NOTES
DEPARTMENT OF NEUROLOGY    Patient Name:  Albina Pedro  MRN:  [id number]    :  1950  Date of Clinic Visit:  2023  Primary Care Provider:  Rolo Sandoval        FOLLOW UP APPOINTMENT      HPI:   Albina is a 71yo women first seen in clinic 2022 for fatigue and right eyelid ptosis in the context of severe sicca syndrome/Sjogren and positive SSA and SSB antibodies. She had triceps and mild hip flexor weakness on exam as well as facial weakness.  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 Repetitive nerve stimulation (RNS) from the ulnar and facial nerves was negative- no evidence of MG. This led to a muscle biopsy showing nonspecific mitochondrial changes.  However, because mitochondrial myopathy was NOT a good fit for the patient's presentation, RNS study was repeated from the radial nerve recording from the triceps and anconeus, which showed a marked decrement over 25%, confirming the suspicion of seronegative myasthenia gravis.  She is negative for acetylcholine receptor and MuSK antibodies. She is on azathiaprine 100 bid and pyridostigmine.  Monitoring labs completed in January, there was no evidence of neurotropenia.     Interval History    The patients symptoms related to the MG have been stable or improving. She cannot rise from a chair, she has considerable difficulty with balance, but weakness of hip flexors and facial weakness have been stable. she is able to better navigate stairs most recently.  She denies significant worsening of dysarthria, dysphagia, tinnitus significant shortness of breath with exertion.  She is able to complete her ADLs.  MG-ADL score 4.  Double vision has resolved since last visit.    The patient has also lost 30-40 pounds from last summer that is unintentional. This is related to not having a good appetite. She does not have emesis when she eats. She does not recall her last colonoscopy. She does not report any change in the quality  "of her stool, no dark or tarry complexion. No difficulty swallowing. No complaints of fevers, or night sweats. Non-smoker,  though had considerable exposure to secondhand smoke.    Patient is also complaining of worsening ataxia while taking stairs and walking around. Some of this is confounded by weakness, which her daughter notes. While her strength has improved, she notes that she would have difficulty walking in a straight line. While walking, she appears to topple over, possibly because she has a shuffling gait per daughter. There have not been falls recently, but it seems that falling forward is the higher risk.  On exam, she has no capacity to catch herself and pulled backwards, and is totally unable to complete tandem gait.    As it relates to her cognitive problems, reportedly the morning starts well, but after morning medications the patient endorses considerable difficulty remembering items, even with basic things such as how to braid her hair. She also endorses feeling confused. This has been the case for months since October. No new medications were started prior to this.  Neuropsychiatric testing has been completed several times over the last 10 years, with the first cryo 2013.  There have been findings possibly consistent with Alzheimer's disease during these assessments but there has been no progression in the interval period.  Patient was recommended to get lumbar puncture for amyloid testing but missed this appointment due to being COVID-positive.      Vital signs:      BP: (!) 141/75 Pulse: 59     SpO2: 99 %       Weight: 73.1 kg (161 lb 3.2 oz)  Estimated body mass index is 26.83 kg/m  as calculated from the following:    Height as of 10/28/22: 1.651 m (5' 5\").    Weight as of this encounter: 73.1 kg (161 lb 3.2 oz).  BP (!) 141/75 (BP Location: Right arm, Patient Position: Sitting, Cuff Size: Adult Regular)   Pulse 59   Wt 73.1 kg (161 lb 3.2 oz)   SpO2 99%   BMI 26.83 kg/m  "       Examination:     -General: Sitting comfortably on the chair. No acute distress    -HEENT: Normocephalic. PERRL. No skin discolorations noted    -Pulmonary: Symmetric chest rise, no increased work of breathing    -Abdomen: Soft, non-tender    -Extremities: No peripheral edema. No rashes, bruising or wounds    -Neurological:     --MS: Patient is alert, attentive, and oriented. Speech is clear and fluid. Names normally. Registration, recall and remote memory intact. Mental math normal.  Clock drawing intact.  Recalls 1 of 3 words at 2 minutes.    --CNs: Visual fields are full to confrontation. Pupils are briskly reactive to light. Visual fields full. Ocular motility full without nystagmus, facial sensation intact, muscles of mastication and facial expression normal, hearing intact to conversation, palate elevation normal, sternomastoid and trapezius function normal, tongue motions normal. She has mild bilateral eyelid ptosis that is stable and non-fatigable, could be age-related changes.  There is perhaps mild proptosis bilaterally.  No double vision with sustained upward gaze at 20 seconds.She has moderate, at least, weakness of orbicularis oculi bilaterally and mild lower facial weakness as well as weakness of cheek puff.  Tongue shows no atrophy or fasciculations and fairly strong lateral tongue movements.  Uvula and palate elevate at midline.  There is no dysphonia or dysarthria.  Her neck flexion and extension strength are normal today.     --Motor: 5/5 for deltoid and biceps but triceps remains weak, 4- bilaterally. Wrist extensors, finger extensors and hand  are full.  FDI is full.  Her hip flexion is at least 4+ bilaterally.  Knee extension, knee flexion are 5.  Foot dorsiflexion 5.  Tone is normal in the arms and legs.  She has a moderate tremor of outstretched hands.     --Reflexes: Reflexes are 1+ at the right ankle, 2+ left, 3+ brisk at the knees with crossed adductor response and 3+ in the upper  extremities.  She has no Reynaldo or Babinski signs.     --Sensory: Proprioception of the toes is normal.  Vibration is low normal at the toes at about 8 seconds and normal at the index fingers at over 18.  Finger-to-nose is done without dysmetria.     --Coordination: Rapidly alternating movements of fingers intact. Heel-shin and finger-nose-finger is intact.  Highly positive Romberg.  Positive pull test.     --Gait: She has difficulty getting up from a chair.  She cannot do it with arms crossed on the chest.  She is a bit slightly wide-based and unsteady when walking.        INVESTIGATIONS:  All available and relevant labs, imaging, and other procedures were reviewed with the patient at this visit. Those of particular significance are listed below:    IMPRESSION/RECOMMENDATIONS:     Albina is a 71yo women first seen in clinic 02/2022 for fatigue and right eyelid ptosis in the context of severe sicca syndrome/Sjogren and positive SSA and SSB antibodies. She is felt to have a  seronegative myasthenia gravis; this diagnosis is supported by repetitive nerve stimulation testing.  She is negative for acetylcholine receptor and MuSK antibodies. She is on azathiaprine 100mg bid and pyridostigmine. However, her course has also been complicated by cognitive decline, falls, ataxia, and now unexplained weight loss.      The patients myasthenia is suboptimally controlled but therapeutic options are limited as the patient previously had poor toleration of prednisone and IVIG was denied by her insurance, leaving us with her existing mediations of mestinon and azathioprine. Azathioprine dose was recently increased by her rheumatologist and monitoring labs were normal (CBC without neutropenia).     Today the more concerning component of her presentation is continued cognitive decline in the setting of subacute development of ataxia and 37 pounds of unexplained weight loss. Her cognition has been poor for some time without evidence  of progression on neuro psych testing, but she was previous recommended LP for amyloid evaluation which was missed, we will ensure it gets ordered today and follow up the results. Her weight loss raises concern for malignancy, and initial screen is negative for SOB, change in stool quality, dysphagia, etc. However, this should be followed by her PCP. Denies B symptom. Paraneoplastic disease must be considered in the setting of new onset ataxia and this now higher concern for malignancy. Her balance difficulty may also represent a neuropathy that is nutritional in the setting of weight loss, workup sent as below. Notably, examination not c/w a severe neuropathy as she has vibration, proprioception, and light touch sense intact. However, her postural instability is relatively severe and requires additional workup such as repeat brain MRI, to evaluate for new structural pathology, like cerebellar lesions, NPH, etc.       Plan  1. Workup for ataxia: Thiamine, B12, MMA, copper, vitamin E (rule out nutritional deficiencies), tissue transglutaminase antibodies (rule out celiac), serum paraneoplastic panel   2. Brain MRI to rule out new structural lesions causing ataxia  3. Lumbar puncture- evaluate for Abeta-42 and p-tau ratio, may add testing for inflammatory/paraneoplastic causes of ataxia  4. Messaged PCP to discuss weight loss   5. Continue azathiaprine 100mg bid and pyridostigmine 60mg tid  6. RTC in 3 months       Patient has been seen with Dr. Manning who agrees with my assessment and plan    Hannah Curiel MD  [facility]  Department of Neurology PGY3    ATTENDING ADDENDUM: Patient seen and examined with resident Dr Curiel today at the [facility]. Agree with his assessment and plan. This was a long and complex visit today; the patient has multiple neuro issues. Her seronegative MG is not optimally controlled but the treatment options are limited. Will continue current plan. Weight loss  is worrisome- will discuss with PCP. Examination shows positive retropulsion signs and some truncal ataxia. We do not feel that she has sensory neuropathy in her feet sufficient to explain this problem. Her brain MRI from 4/2021 also does not explain this problem. We will repeat the scan and evaluate for paraneoplastic and nutritional causes of ataxia. Re: her cognitive impairment, she should get LP to evaluate for signs of cerebral amyloid deposition; this was planned previously but cancelled due to patient being COVID19 positive the day before the LP. Follow up as above.   TT spent on this encounter today: 40 minutes of which 15 face to face, 15 in post visit note review, editing, and multiple orders, and 10 in pre-visit chart review.     Rene Manning MD       MG Activities of Daily Living (MG-ADL) profile 4    Grade 0 1 2 3   Talking Normal Intermittent slurring/nasal speech Constant slurring/nasal speech, can be understood Difficult to understand   Chewing Normal Fatigue with solid food Fatigue with soft food G tube   Swallowing Normal Rare choking Frequent choking necessitating diet changes G tube   Breathing Normal SOB with exertion SOB at rest Ventilator dependent   Ability to brush teeth/comb hair Normal Extra effort, no rest periods needed Rest periods needed Cannot do one of these   Ability to rise from chair Normal Mild impairment, uses arms sometimes Moderate impairment, always uses arms Severe impairment, requires assistance   Double vision None Present, not daily Daily, not constant Constant   Ptosis None Present, but not daily Daily, not constant Constant

## 2023-01-30 NOTE — LETTER
2023       RE: Albina Pedro  [id number]  [facility] [address] [place] [address]     Dear Colleague,    Thank you for referring your patient, Albina Pedro, to the [facility] [place] at [facility]. Please see a copy of my visit note below.      DEPARTMENT OF NEUROLOGY    Patient Name:  Albina Pedro  MRN:  [id number]    :  1950  Date of Clinic Visit:  2023  Primary Care Provider:  Rolo Sandoval        FOLLOW UP APPOINTMENT      HPI:   Albina is a 73yo women first seen in clinic 2022 for fatigue and right eyelid ptosis in the context of severe sicca syndrome/Sjogren and positive SSA and SSB antibodies. She had triceps and mild hip flexor weakness on exam as well as facial weakness.  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 Repetitive nerve stimulation (RNS) from the ulnar and facial nerves was negative- no evidence of MG. This led to a muscle biopsy showing nonspecific mitochondrial changes.  However, because mitochondrial myopathy was NOT a good fit for the patient's presentation, RNS study was repeated from the radial nerve recording from the triceps and anconeus, which showed a marked decrement over 25%, confirming the suspicion of seronegative myasthenia gravis.  She is negative for acetylcholine receptor and MuSK antibodies. She is on azathiaprine 100 bid and pyridostigmine.  Monitoring labs completed in January, there was no evidence of neurotropenia.     Interval History    The patients symptoms related to the MG have been stable or improving. She cannot rise from a chair, she has considerable difficulty with balance, but weakness of hip flexors and facial weakness have been stable. she is able to better navigate stairs most recently.  She denies significant worsening of dysarthria, dysphagia, tinnitus significant shortness of breath with exertion.  She is able to  complete her ADLs.  MG-ADL score 4.  Double vision has resolved since last visit.    The patient has also lost 30-40 pounds from last summer that is unintentional. This is related to not having a good appetite. She does not have emesis when she eats. She does not recall her last colonoscopy. She does not report any change in the quality of her stool, no dark or tarry complexion. No difficulty swallowing. No complaints of fevers, or night sweats. Non-smoker,  though had considerable exposure to secondhand smoke.    Patient is also complaining of worsening ataxia while taking stairs and walking around. Some of this is confounded by weakness, which her daughter notes. While her strength has improved, she notes that she would have difficulty walking in a straight line. While walking, she appears to topple over, possibly because she has a shuffling gait per daughter. There have not been falls recently, but it seems that falling forward is the higher risk.  On exam, she has no capacity to catch herself and pulled backwards, and is totally unable to complete tandem gait.    As it relates to her cognitive problems, reportedly the morning starts well, but after morning medications the patient endorses considerable difficulty remembering items, even with basic things such as how to braid her hair. She also endorses feeling confused. This has been the case for months since October. No new medications were started prior to this.  Neuropsychiatric testing has been completed several times over the last 10 years, with the first cryo 2013.  There have been findings possibly consistent with Alzheimer's disease during these assessments but there has been no progression in the interval period.  Patient was recommended to get lumbar puncture for amyloid testing but missed this appointment due to being COVID-positive.      Vital signs:      BP: (!) 141/75 Pulse: 59     SpO2: 99 %       Weight: 73.1 kg (161 lb 3.2 oz)  Estimated body  "mass index is 26.83 kg/m  as calculated from the following:    Height as of 10/28/22: 1.651 m (5' 5\").    Weight as of this encounter: 73.1 kg (161 lb 3.2 oz).  BP (!) 141/75 (BP Location: Right arm, Patient Position: Sitting, Cuff Size: Adult Regular)   Pulse 59   Wt 73.1 kg (161 lb 3.2 oz)   SpO2 99%   BMI 26.83 kg/m        Examination:     -General: Sitting comfortably on the chair. No acute distress    -HEENT: Normocephalic. PERRL. No skin discolorations noted    -Pulmonary: Symmetric chest rise, no increased work of breathing    -Abdomen: Soft, non-tender    -Extremities: No peripheral edema. No rashes, bruising or wounds    -Neurological:     --MS: Patient is alert, attentive, and oriented. Speech is clear and fluid. Names normally. Registration, recall and remote memory intact. Mental math normal.  Clock drawing intact.  Recalls 1 of 3 words at 2 minutes.    --CNs: Visual fields are full to confrontation. Pupils are briskly reactive to light. Visual fields full. Ocular motility full without nystagmus, facial sensation intact, muscles of mastication and facial expression normal, hearing intact to conversation, palate elevation normal, sternomastoid and trapezius function normal, tongue motions normal. She has mild bilateral eyelid ptosis that is stable and non-fatigable, could be age-related changes.  There is perhaps mild proptosis bilaterally.  No double vision with sustained upward gaze at 20 seconds.She has moderate, at least, weakness of orbicularis oculi bilaterally and mild lower facial weakness as well as weakness of cheek puff.  Tongue shows no atrophy or fasciculations and fairly strong lateral tongue movements.  Uvula and palate elevate at midline.  There is no dysphonia or dysarthria.  Her neck flexion and extension strength are normal today.     --Motor: 5/5 for deltoid and biceps but triceps remains weak, 4- bilaterally. Wrist extensors, finger extensors and hand  are full.  FDI is full.  " Her hip flexion is at least 4+ bilaterally.  Knee extension, knee flexion are 5.  Foot dorsiflexion 5.  Tone is normal in the arms and legs.  She has a moderate tremor of outstretched hands.     --Reflexes: Reflexes are 1+ at the right ankle, 2+ left, 3+ brisk at the knees with crossed adductor response and 3+ in the upper extremities.  She has no Reynaldo or Babinski signs.     --Sensory: Proprioception of the toes is normal.  Vibration is low normal at the toes at about 8 seconds and normal at the index fingers at over 18.  Finger-to-nose is done without dysmetria.     --Coordination: Rapidly alternating movements of fingers intact. Heel-shin and finger-nose-finger is intact.  Highly positive Romberg.  Positive pull test.     --Gait: She has difficulty getting up from a chair.  She cannot do it with arms crossed on the chest.  She is a bit slightly wide-based and unsteady when walking.        INVESTIGATIONS:  All available and relevant labs, imaging, and other procedures were reviewed with the patient at this visit. Those of particular significance are listed below:    IMPRESSION/RECOMMENDATIONS:     Albina is a 73yo women first seen in clinic 02/2022 for fatigue and right eyelid ptosis in the context of severe sicca syndrome/Sjogren and positive SSA and SSB antibodies. She is felt to have a  seronegative myasthenia gravis; this diagnosis is supported by repetitive nerve stimulation testing.  She is negative for acetylcholine receptor and MuSK antibodies. She is on azathiaprine 100mg bid and pyridostigmine. However, her course has also been complicated by cognitive decline, falls, ataxia, and now unexplained weight loss.      The patients myasthenia is suboptimally controlled but therapeutic options are limited as the patient previously had poor toleration of prednisone and IVIG was denied by her insurance, leaving us with her existing mediations of mestinon and azathioprine. Azathioprine dose was recently  increased by her rheumatologist and monitoring labs were normal (CBC without neutropenia).     Today the more concerning component of her presentation is continued cognitive decline in the setting of subacute development of ataxia and 37 pounds of unexplained weight loss. Her cognition has been poor for some time without evidence of progression on neuro psych testing, but she was previous recommended LP for amyloid evaluation which was missed, we will ensure it gets ordered today and follow up the results. Her weight loss raises concern for malignancy, and initial screen is negative for SOB, change in stool quality, dysphagia, etc. However, this should be followed by her PCP. Denies B symptom. Paraneoplastic disease must be considered in the setting of new onset ataxia and this now higher concern for malignancy. Her balance difficulty may also represent a neuropathy that is nutritional in the setting of weight loss, workup sent as below. Notably, examination not c/w a severe neuropathy as she has vibration, proprioception, and light touch sense intact. However, her postural instability is relatively severe and requires additional workup such as repeat brain MRI, to evaluate for new structural pathology, like cerebellar lesions, NPH, etc.       Plan  1. Workup for ataxia: Thiamine, B12, MMA, copper, vitamin E (rule out nutritional deficiencies), tissue transglutaminase antibodies (rule out celiac), serum paraneoplastic panel   2. Brain MRI to rule out new structural lesions causing ataxia  3. Lumbar puncture- evaluate for Abeta-42 and p-tau ratio, may add testing for inflammatory/paraneoplastic causes of ataxia  4. Messaged PCP to discuss weight loss   5. Continue azathiaprine 100mg bid and pyridostigmine 60mg tid  6. RTC in 3 months       Patient has been seen with Dr. Manning who agrees with my assessment and plan    Hannah Curiel MD  [facility]  Department of Neurology PGY3    ATTENDING  ADDENDUM: Patient seen and examined with resident Dr Curiel today at the [facility]. Agree with his assessment and plan. This was a long and complex visit today; the patient has multiple neuro issues. Her seronegative MG is not optimally controlled but the treatment options are limited. Will continue current plan. Weight loss is worrisome- will discuss with PCP. Examination shows positive retropulsion signs and some truncal ataxia. We do not feel that she has sensory neuropathy in her feet sufficient to explain this problem. Her brain MRI from 4/2021 also does not explain this problem. We will repeat the scan and evaluate for paraneoplastic and nutritional causes of ataxia. Re: her cognitive impairment, she should get LP to evaluate for signs of cerebral amyloid deposition; this was planned previously but cancelled due to patient being COVID19 positive the day before the LP. Follow up as above.   TT spent on this encounter today: 40 minutes of which 15 face to face, 15 in post visit note review, editing, and multiple orders, and 10 in pre-visit chart review.     Rene Manning MD        Activities of Daily Living (MG-ADL) profile 4    Grade 0 1 2 3   Talking Normal Intermittent slurring/nasal speech Constant slurring/nasal speech, can be understood Difficult to understand   Chewing Normal Fatigue with solid food Fatigue with soft food G tube   Swallowing Normal Rare choking Frequent choking necessitating diet changes G tube   Breathing Normal SOB with exertion SOB at rest Ventilator dependent   Ability to brush teeth/comb hair Normal Extra effort, no rest periods needed Rest periods needed Cannot do one of these   Ability to rise from chair Normal Mild impairment, uses arms sometimes Moderate impairment, always uses arms Severe impairment, requires assistance   Double vision None Present, not daily Daily, not constant Constant   Ptosis None Present, but not daily Daily, not constant Constant              Sincerely,    Rene Manning MD

## 2023-01-30 NOTE — PATIENT INSTRUCTIONS
Thanks for visiting the clinic today. We discussed the plan as below. We need to complete workup for your imbalance, weight loss, and cognitive changes.     Workup for nutritional deficiencies: Thiamine, B12, MMA, copper, vitamin E  Tissue transglutaminase  Serum paraneoplastic panel   Brain MRI  Lumbar puncture  RTC in 3 months

## 2023-02-01 ENCOUNTER — LAB (OUTPATIENT)
Dept: LAB | Facility: CLINIC | Age: 73
End: 2023-02-01
Payer: COMMERCIAL

## 2023-02-01 ENCOUNTER — TELEPHONE (OUTPATIENT)
Dept: FAMILY MEDICINE | Facility: CLINIC | Age: 73
End: 2023-02-01

## 2023-02-01 ENCOUNTER — MYC MEDICAL ADVICE (OUTPATIENT)
Dept: GENERAL RADIOLOGY | Facility: CLINIC | Age: 73
End: 2023-02-01

## 2023-02-01 DIAGNOSIS — R27.0 ATAXIA: ICD-10-CM

## 2023-02-01 DIAGNOSIS — R74.8 ABNORMAL LEVELS OF OTHER SERUM ENZYMES: ICD-10-CM

## 2023-02-01 DIAGNOSIS — G70.01 MYASTHENIA GRAVIS WITH EXACERBATION (H): ICD-10-CM

## 2023-02-01 DIAGNOSIS — D35.02 ADENOMA OF LEFT ADRENAL GLAND: ICD-10-CM

## 2023-02-01 DIAGNOSIS — R91.8 PULMONARY NODULES: Primary | ICD-10-CM

## 2023-02-01 DIAGNOSIS — Z12.11 COLON CANCER SCREENING: ICD-10-CM

## 2023-02-01 DIAGNOSIS — G31.84 MILD COGNITIVE IMPAIRMENT: ICD-10-CM

## 2023-02-01 LAB
ALT SERPL W P-5'-P-CCNC: 25 U/L (ref 10–35)
AST SERPL W P-5'-P-CCNC: 24 U/L (ref 10–35)
BASOPHILS # BLD AUTO: 0 10E3/UL (ref 0–0.2)
BASOPHILS NFR BLD AUTO: 0 %
EOSINOPHIL # BLD AUTO: 0.1 10E3/UL (ref 0–0.7)
EOSINOPHIL NFR BLD AUTO: 1 %
ERYTHROCYTE [DISTWIDTH] IN BLOOD BY AUTOMATED COUNT: 14.5 % (ref 10–15)
HCT VFR BLD AUTO: 43.3 % (ref 35–47)
HGB BLD-MCNC: 13.9 G/DL (ref 11.7–15.7)
LYMPHOCYTES # BLD AUTO: 1.1 10E3/UL (ref 0.8–5.3)
LYMPHOCYTES NFR BLD AUTO: 19 %
MCH RBC QN AUTO: 31.7 PG (ref 26.5–33)
MCHC RBC AUTO-ENTMCNC: 32.1 G/DL (ref 31.5–36.5)
MCV RBC AUTO: 99 FL (ref 78–100)
MONOCYTES # BLD AUTO: 0.4 10E3/UL (ref 0–1.3)
MONOCYTES NFR BLD AUTO: 7 %
NEUTROPHILS # BLD AUTO: 4.1 10E3/UL (ref 1.6–8.3)
NEUTROPHILS NFR BLD AUTO: 72 %
PLATELET # BLD AUTO: 210 10E3/UL (ref 150–450)
RBC # BLD AUTO: 4.39 10E6/UL (ref 3.8–5.2)
VIT B12 SERPL-MCNC: 438 PG/ML (ref 232–1245)
WBC # BLD AUTO: 5.7 10E3/UL (ref 4–11)

## 2023-02-01 PROCEDURE — 84450 TRANSFERASE (AST) (SGOT): CPT

## 2023-02-01 PROCEDURE — 82607 VITAMIN B-12: CPT

## 2023-02-01 PROCEDURE — 86364 TISS TRNSGLTMNASE EA IG CLAS: CPT

## 2023-02-01 PROCEDURE — 36415 COLL VENOUS BLD VENIPUNCTURE: CPT

## 2023-02-01 PROCEDURE — 85025 COMPLETE CBC W/AUTO DIFF WBC: CPT

## 2023-02-01 PROCEDURE — 83519 RIA NONANTIBODY: CPT | Mod: 90

## 2023-02-01 PROCEDURE — 99000 SPECIMEN HANDLING OFFICE-LAB: CPT

## 2023-02-01 PROCEDURE — 82525 ASSAY OF COPPER: CPT | Mod: 90

## 2023-02-01 PROCEDURE — 86255 FLUORESCENT ANTIBODY SCREEN: CPT | Mod: 90

## 2023-02-01 PROCEDURE — 83921 ORGANIC ACID SINGLE QUANT: CPT

## 2023-02-01 PROCEDURE — 84446 ASSAY OF VITAMIN E: CPT | Mod: 90

## 2023-02-01 PROCEDURE — 84460 ALANINE AMINO (ALT) (SGPT): CPT

## 2023-02-01 PROCEDURE — 86596 VOLTAGE-GTD CA CHNL ANTB EA: CPT | Mod: 90

## 2023-02-01 PROCEDURE — 84425 ASSAY OF VITAMIN B-1: CPT | Mod: 90

## 2023-02-02 ENCOUNTER — HOSPITAL ENCOUNTER (OUTPATIENT)
Dept: MRI IMAGING | Facility: CLINIC | Age: 73
Discharge: HOME | End: 2023-02-02
Attending: PSYCHIATRY & NEUROLOGY | Admitting: PSYCHIATRY & NEUROLOGY
Payer: COMMERCIAL

## 2023-02-02 DIAGNOSIS — G31.84 MILD COGNITIVE IMPAIRMENT: ICD-10-CM

## 2023-02-02 DIAGNOSIS — R27.0 ATAXIA: ICD-10-CM

## 2023-02-02 DIAGNOSIS — I77.6: ICD-10-CM

## 2023-02-02 DIAGNOSIS — G31.84 MILD COGNITIVE IMPAIRMENT: Primary | ICD-10-CM

## 2023-02-02 DIAGNOSIS — M35.00: ICD-10-CM

## 2023-02-02 PROCEDURE — 70553 MRI BRAIN STEM W/O & W/DYE: CPT

## 2023-02-02 PROCEDURE — A9585 GADOBUTROL INJECTION: HCPCS | Performed by: PSYCHIATRY & NEUROLOGY

## 2023-02-02 PROCEDURE — 255N000002 HC RX 255 OP 636: Performed by: PSYCHIATRY & NEUROLOGY

## 2023-02-02 RX ORDER — GADOBUTROL 604.72 MG/ML
7 INJECTION INTRAVENOUS ONCE
Status: COMPLETED | OUTPATIENT
Start: 2023-02-02 | End: 2023-02-02

## 2023-02-02 RX ADMIN — GADOBUTROL 7 ML: 604.72 INJECTION INTRAVENOUS at 10:08

## 2023-02-04 LAB — COPPER SERPL-MCNC: 88.6 UG/DL

## 2023-02-06 LAB
A-TOCOPHEROL VIT E SERPL-MCNC: 10.7 MG/L
BETA+GAMMA TOCOPHEROL SERPL-MCNC: 1 MG/L

## 2023-02-07 LAB
AMPHIPHYSIN IGG TITR SER IF: NEGATIVE {TITER}
ANNOTATION COMMENT IMP: NORMAL
CV2 IGG TITR SER IF: NEGATIVE {TITER}
GLIAL NUC TYPE 1 IGG TITR SER IF: NEGATIVE {TITER}
HU1 IGG TITR SER IF: NEGATIVE {TITER}
HU2 IGG TITR SER IF: NEGATIVE {TITER}
HU3 IGG TITR SER IF: NEGATIVE {TITER}
PARANEOPLASTIC AB SER-IMP: NORMAL
PCA-1 IGG TITR SER IF: NEGATIVE {TITER}
PCA-2 IGG TITR SER IF: NEGATIVE {TITER}
PCA-TR IGG TITR SER IF: NEGATIVE {TITER}
VGCC-P/Q BIND IGG+IGM SER IA-SCNC: 0 NMOL/L
VGKC AB SER IA-SCNC: 0 NMOL/L

## 2023-02-08 LAB
METHYLMALONATE SERPL-SCNC: 0.18 UMOL/L (ref 0–0.4)
TTG IGA SER-ACNC: 1.1 U/ML
TTG IGG SER-ACNC: <0.6 U/ML
VIT B1 PYROPHOSHATE BLD-SCNC: 96 NMOL/L

## 2023-02-09 ENCOUNTER — ANCILLARY PROCEDURE (OUTPATIENT)
Dept: GENERAL RADIOLOGY | Facility: CLINIC | Age: 73
End: 2023-02-09
Attending: PSYCHIATRY & NEUROLOGY
Payer: COMMERCIAL

## 2023-02-09 ENCOUNTER — LAB (OUTPATIENT)
Dept: LAB | Facility: CLINIC | Age: 73
End: 2023-02-09
Payer: COMMERCIAL

## 2023-02-09 VITALS — OXYGEN SATURATION: 98 % | HEART RATE: 68 BPM | DIASTOLIC BLOOD PRESSURE: 79 MMHG | SYSTOLIC BLOOD PRESSURE: 151 MMHG

## 2023-02-09 DIAGNOSIS — I77.6: ICD-10-CM

## 2023-02-09 DIAGNOSIS — G31.84 MILD COGNITIVE IMPAIRMENT: ICD-10-CM

## 2023-02-09 DIAGNOSIS — M35.00: ICD-10-CM

## 2023-02-09 DIAGNOSIS — R27.0 ATAXIA: ICD-10-CM

## 2023-02-09 LAB
APPEARANCE CSF: ABNORMAL
COLOR CSF: ABNORMAL
GLUCOSE CSF-MCNC: 61 MG/DL (ref 40–70)
INR PPP: 0.96 (ref 0.85–1.15)
LYMPH ABN NFR CSF MANUAL: 17 %
Lab: NORMAL
Lab: NORMAL
MONOS+MACROS NFR CSF MANUAL: 21 %
NEUTROPHILS NFR CSF MANUAL: 62 %
PERFORMING LABORATORY: NORMAL
PERFORMING LABORATORY: NORMAL
PLATELET # BLD AUTO: 199 10E3/UL (ref 150–450)
PROT CSF-MCNC: 79.2 MG/DL (ref 15–45)
RBC # CSF MANUAL: ABNORMAL /UL (ref 0–2)
SPECIMEN STATUS: NORMAL
SPECIMEN STATUS: NORMAL
TEST NAME: NORMAL
TEST NAME: NORMAL
TUBE # CSF: 3
WBC # CSF MANUAL: 14 /UL (ref 0–5)

## 2023-02-09 PROCEDURE — 83520 IMMUNOASSAY QUANT NOS NONAB: CPT | Performed by: PATHOLOGY

## 2023-02-09 PROCEDURE — 84157 ASSAY OF PROTEIN OTHER: CPT | Performed by: PSYCHIATRY & NEUROLOGY

## 2023-02-09 PROCEDURE — 87070 CULTURE OTHR SPECIMN AEROBIC: CPT | Performed by: PSYCHIATRY & NEUROLOGY

## 2023-02-09 PROCEDURE — 88108 CYTOPATH CONCENTRATE TECH: CPT | Mod: TC | Performed by: PSYCHIATRY & NEUROLOGY

## 2023-02-09 PROCEDURE — 83916 OLIGOCLONAL BANDS: CPT | Mod: 90 | Performed by: PATHOLOGY

## 2023-02-09 PROCEDURE — 89051 BODY FLUID CELL COUNT: CPT | Performed by: PSYCHIATRY & NEUROLOGY

## 2023-02-09 PROCEDURE — 62328 DX LMBR SPI PNXR W/FLUOR/CT: CPT | Performed by: PHYSICIAN ASSISTANT

## 2023-02-09 PROCEDURE — 85049 AUTOMATED PLATELET COUNT: CPT | Performed by: PATHOLOGY

## 2023-02-09 PROCEDURE — 86255 FLUORESCENT ANTIBODY SCREEN: CPT | Performed by: PATHOLOGY

## 2023-02-09 PROCEDURE — 36415 COLL VENOUS BLD VENIPUNCTURE: CPT | Performed by: PATHOLOGY

## 2023-02-09 PROCEDURE — 82945 GLUCOSE OTHER FLUID: CPT | Performed by: PSYCHIATRY & NEUROLOGY

## 2023-02-09 PROCEDURE — 82784 ASSAY IGA/IGD/IGG/IGM EACH: CPT | Mod: 90 | Performed by: PATHOLOGY

## 2023-02-09 PROCEDURE — 99000 SPECIMEN HANDLING OFFICE-LAB: CPT | Performed by: PATHOLOGY

## 2023-02-09 PROCEDURE — 82042 OTHER SOURCE ALBUMIN QUAN EA: CPT | Mod: 90 | Performed by: PATHOLOGY

## 2023-02-09 PROCEDURE — 85610 PROTHROMBIN TIME: CPT | Performed by: PATHOLOGY

## 2023-02-09 PROCEDURE — 82040 ASSAY OF SERUM ALBUMIN: CPT | Mod: 90 | Performed by: PATHOLOGY

## 2023-02-09 PROCEDURE — 86341 ISLET CELL ANTIBODY: CPT | Performed by: PATHOLOGY

## 2023-02-09 PROCEDURE — 87015 SPECIMEN INFECT AGNT CONCNTJ: CPT | Performed by: PSYCHIATRY & NEUROLOGY

## 2023-02-09 PROCEDURE — 88108 CYTOPATH CONCENTRATE TECH: CPT | Mod: 26 | Performed by: PATHOLOGY

## 2023-02-09 RX ORDER — CHLOROPROCAINE HYDROCHLORIDE 30 MG/ML
20 INJECTION, SOLUTION EPIDURAL; INFILTRATION; INTRACAUDAL; PERINEURAL ONCE
Status: COMPLETED | OUTPATIENT
Start: 2023-02-09 | End: 2023-02-09

## 2023-02-09 RX ORDER — LIDOCAINE HYDROCHLORIDE 10 MG/ML
30 INJECTION, SOLUTION EPIDURAL; INFILTRATION; INTRACAUDAL; PERINEURAL ONCE
Status: DISCONTINUED | OUTPATIENT
Start: 2023-02-09 | End: 2023-02-09

## 2023-02-09 RX ADMIN — CHLOROPROCAINE HYDROCHLORIDE 5 ML: 30 INJECTION, SOLUTION EPIDURAL; INFILTRATION; INTRACAUDAL; PERINEURAL at 12:07

## 2023-02-09 NOTE — PROGRESS NOTES
Interventional Radiology Brief Post Procedure Note    Procedure: IR Fluoro Guided Lumbar Puncture    Proceduralist: Lisa Mix PA-C and Dr. Vikas Chanel MD.      Time Out: Prior to the start of the procedure and with procedural staff participation, I verbally confirmed the patient s identity using two indicators, relevant allergies, that the procedure was appropriate and matched the consent or emergent situation, and that the correct equipment/implants were available. Immediately prior to starting the procedure I conducted the Time Out with the procedural staff and re-confirmed the patient s name, procedure, and site/side. (The Joint Commission universal protocol was followed.)  Yes    Sedation: None. Local Anesthestic used    Findings: Completed image guided L4-L5 lumbar puncture with removal of 9 ml of serosanguinous fluid.  2 ml sent for Alzheimer's testing as requested.        Estimated Blood Loss: Minimal    SPECIMENS: CSF Fluid for laboratory analysis as ordered by referring provider.      Complications: 1. None     Condition: Stable    Plan: Bed rest for 1 hour.      Comments: See dictated procedure note for full details.    Lisa Mix PA-C

## 2023-02-09 NOTE — DISCHARGE INSTRUCTIONS
Discharge instructions for Lumbar Puncture           AFTER YOU GO HOME      Relax and take it easy for 24 hours  You may resume normal activity after the 24 hour period  You may develop a headache that will normally go away on its own in 1 to 2 days. Lying down should help relieve this pain.  If you develop a headache you can take over the counter medicines and lay down.  You can try drinking caffeine-coffee, soda.   Drink at least 4 glasses of extra fluid today if not on a fluid restriction  Continue to take your medications as ordered by your provider  You may remove the bandage and resume bathing the next day  DO NOT drive or operate machinery at home or at work for at least 24 hours               CALL YOU PRIMARY PHYSICIAN IF:    Severe head or neck pain that doesn't go away or that gets worse  You feel less alert or have trouble waking up  Repeated upset stomach (nausea) or vomiting  Swelling, pain, bruising, or redness at the puncture site  Fever of 100.4 F (38 C) or higher, or as advised by your provider  If you start to leak a large amount of fluid from the puncture site (also, lie down flat on your back)      Date: 2/9/23  Provider: TAYLOR Gonzalez, Interventional Radiology, [facility]    [facility]  [address] [place] [address]

## 2023-02-10 LAB
PATH REPORT.COMMENTS IMP SPEC: NORMAL
PATH REPORT.FINAL DX SPEC: NORMAL
PATH REPORT.GROSS SPEC: NORMAL
PATH REPORT.MICROSCOPIC SPEC OTHER STN: NORMAL
PATH REPORT.RELEVANT HX SPEC: NORMAL

## 2023-02-12 ENCOUNTER — TELEPHONE (OUTPATIENT)
Dept: FAMILY MEDICINE | Facility: CLINIC | Age: 73
End: 2023-02-12
Payer: COMMERCIAL

## 2023-02-12 LAB
ALB CSF/SERPL: 13.6 RATIO
ALBUMIN CSF-MCNC: 50 MG/DL
ALBUMIN SERPL-MCNC: 3681 MG/DL
IGG CSF-MCNC: 6.8 MG/DL
IGG SERPL-MCNC: 693 MG/DL
IGG SYNTH RATE SER+CSF CALC-MRATE: 10.8 MG/D
IGG/ALB CLEAR SER+CSF-RTO: 0.72 RATIO
IGG/ALB CSF: 0.14 RATIO
OLIGOCLONAL BANDS CSF ELPH-IMP: ABNORMAL
OLIGOCLONAL BANDS CSF ELPH-IMP: NEGATIVE
OLIGOCLONAL BANDS CSF IEF: 0 BANDS

## 2023-02-12 NOTE — TELEPHONE ENCOUNTER
S: on call Family Medicine as the speciality ordering had no on call  Critical lab value CSF 2/9/23 isolated in broth Gram positive bacilli resembling diptheroids    B: 71 yo F seen by neurology for increased ataxia for at least 5 months with lumbar puncture findings above, in context of traumatic tap so corrected white blood cells (WBC) in CSF are 2, Total Protein mildly elevated at 79.2, and glucose 61 (normal).  Patient had no fever symptoms or headache symptoms.    A: not likely a bacterial meningitis, likely a contaminant.  R: No need to call patient today  Will route to Neuro for final read tomorrow.    Thank you,  Slade Amador MD  [facility]

## 2023-02-14 LAB
BACTERIA CSF CULT: ABNORMAL
GRAM STAIN RESULT: ABNORMAL
MAYO MISC RESULT: ABNORMAL

## 2023-02-15 ENCOUNTER — TELEPHONE (OUTPATIENT)
Dept: NEUROLOGY | Facility: CLINIC | Age: 73
End: 2023-02-15

## 2023-02-15 NOTE — TELEPHONE ENCOUNTER
Patient requesting call back from  to discuss imaging results. They were told that he was going to call them about the results. Thank you.

## 2023-02-15 NOTE — TELEPHONE ENCOUNTER
Spoke to patient and explained that CSF results are suggestive of Alzheimer's. Plan is to follow with dementia specialist- I have contacted him already. Thanks

## 2023-02-15 NOTE — LETTER
[place], March 21, 2023    LETTER OF APPEAL/MEDICAL NECESSITY    To whom it may concern:    Mrs. Pedro is a patient of mine at the [facility] Neuromuscular clinic.  I am following her for generalized, seronegative myasthenia gravis.  Mrs. Pedro's myasthenia is poorly controlled on her current regimen of oral pyridostigmine, and azathioprine 200 mg daily.  She cannot tolerate oral prednisone more than 10 mg a day due to confusion/delirium.    In patients like her, who have not tolerated or failed more than immunosuppressive medication for generalized myasthenia gravis, efgartigimod IV infusions are a reasonable treatment option, that may put their disease into remission.  This treatment was denied by the patient's insurance based on 2 points, that I will address below.  The first point was that she is negative for acetylcholine receptor antibodies.  While the original ADAPT phase III trial, that led to efgartigimod's FDA approval in 2021, was conducted primarily in patients positive for acetylcholine receptor antibodies, evidence from open label extension and the expanded access program for the same drug (reference 1) as well as evidence from clinical practice (reference 2), clearly indicate that this drug is also effective for patients with seronegative MG, and should be offered as a treatment option in refractory cases, like Mrs. Pedro.    The second reason for denial was her MG ADL score.  This was measured at 4 during her last clinic visit on March 6, 2023.  However, I called the patient today, March 21, 2023, and reobtained the MG ADL score which is now 5, per patient's reports.  Please see table below for details.  According to your note, a score of 5 or higher should be eligible for efgartigimod.         MG Activities of Daily Living (MG-ADL) profile    Grade 0 1 2 3   Talking Normal Intermittent slurring/nasal speech Constant slurring/nasal speech, can be understood Difficult to  understand   Chewing Normal Fatigue with solid food Fatigue with soft food G tube   Swallowing Normal Rare choking Frequent choking necessitating diet changes G tube   Breathing Normal SOB with exertion SOB at rest Ventilator dependent   Ability to brush teeth/comb hair Normal Extra effort, no rest periods needed Rest periods needed Cannot do one of these   Ability to rise from chair Normal Mild impairment, uses arms sometimes Moderate impairment, always uses arms Severe impairment, requires assistance   Double vision None Present, not daily Daily, not constant Constant   Ptosis None Present, but not daily Daily, not constant Constant       Based on the above information, I endorse the medical necessity of efgartigimod for Mrs. Pedro.  If you have any questions about this letter, please do not hesitate to contact me directly.    Sincerely,      Rene Manning MD, FAAN  Clinical  of neurology  [facility]    REFERENCES     1. Keiry HUMMEL, Justus OLSEN, Demetrius E, Ruddy Curtis B. Baseline Characteristics and Demographics of Patients Enrolled in an Expanded Access Program for Efgartigimod in Adult Patients with Generalized Myasthenia Gravis (P3-1.001) AAN annual meeting, 2022.  2. Ari S, Mauri A, Angle H. Efgartigimod for generalized myasthenia gravis with or without anti-acetylcholine receptor antibodies: a worldwide and [place] perspective. Expert Rev Clin Immunol. 2022 Dec;18(12):8766-1408.

## 2023-02-16 NOTE — RESULT ENCOUNTER NOTE
Steven Peña,   We do not have lecanemab (yet). It's got an accelerated FDA approval, but it requires a traditional FDA approval for insurance to reimburse. Additionally, there is additional analyses required on which subgroups benefit: women, younger patients, and carriers of the APOE4 gene tend to not benefit. But it will require more thorough looking at the clinical endpoints during the traditional FDA approval process to really hash out the specifics. Long story short -- it won't be until at earliest July that we'll have it, and given the delays between FDA and insurance approval (and getting it on formulary) it may be even longer. Either way, I will be happy to see her. I agree with you that the positive results are indeed indicative of Alzheimer's disease -- as you mentioned we would expect a false negative with the traumatic tap, whereas here we have a clearly positive result. Lastly, your question about MoCA. I think the main thing to consider is if the patient needs help with basic ADLs, like bathing, putting on clothes, supervision for safety, etc, then it's already too late. Thanks for your message and please let me know if more questions arise.

## 2023-02-17 LAB — MAYO MISC RESULT: NORMAL

## 2023-02-23 ENCOUNTER — OFFICE VISIT (OUTPATIENT)
Dept: FAMILY MEDICINE | Facility: CLINIC | Age: 73
End: 2023-02-23
Payer: COMMERCIAL

## 2023-02-23 VITALS
TEMPERATURE: 97.6 F | WEIGHT: 161.3 LBS | HEART RATE: 75 BPM | DIASTOLIC BLOOD PRESSURE: 58 MMHG | SYSTOLIC BLOOD PRESSURE: 102 MMHG | RESPIRATION RATE: 17 BRPM | OXYGEN SATURATION: 97 % | HEIGHT: 65 IN | BODY MASS INDEX: 26.87 KG/M2

## 2023-02-23 DIAGNOSIS — G70.01 MYASTHENIA GRAVIS WITH EXACERBATION (H): Primary | ICD-10-CM

## 2023-02-23 DIAGNOSIS — F33.0 MAJOR DEPRESSIVE DISORDER, RECURRENT, MILD (H): ICD-10-CM

## 2023-02-23 PROCEDURE — 99213 OFFICE O/P EST LOW 20 MIN: CPT | Performed by: FAMILY MEDICINE

## 2023-02-23 RX ORDER — RANOLAZINE 1000 MG/1
500 TABLET, EXTENDED RELEASE ORAL 2 TIMES DAILY
Qty: 90 TABLET | Refills: 4 | COMMUNITY
Start: 2023-02-23 | End: 2023-05-22

## 2023-02-23 ASSESSMENT — ANXIETY QUESTIONNAIRES: 1. FEELING NERVOUS, ANXIOUS, OR ON EDGE: SEVERAL DAYS

## 2023-02-23 NOTE — PROGRESS NOTES
"  Assessment & Plan     (G70.01) Myasthenia gravis with exacerbation (H)  (primary encounter diagnosis)  Improved.  She has not had any\"collapese\" in several weks.    (F33.0) Major depressive disorder, recurrent, mild (H)  Stable.  Needs to get up and out more often  She is scared of falls.  Discussed chair exexercises like up downs.         BMI:   Estimated body mass index is 26.84 kg/m  as calculated from the following:    Height as of this encounter: 1.651 m (5' 5\").    Weight as of this encounter: 73.2 kg (161 lb 4.8 oz).   Weight management plan: Discussed healthy diet and exercise guidelines        No follow-ups on file.    Rolo Sandoval MD  [facility] LUIS FERNANDO Acevedo is a 72 year old, presenting for the following health issues:  Follow Up      HPI   *  3 week follow up per provider    Depression and Anxiety Follow-Up    How are you doing with your depression since your last visit? No change since increasing medication    How are you doing with your anxiety since your last visit?  Worsened, some days it is worse    Are you having other symptoms that might be associated with depression or anxiety? No    Have you had a significant life event? No     Do you have any concerns with your use of alcohol or other drugs? No    Social History     Tobacco Use     Smoking status: Never     Smokeless tobacco: Never   Substance Use Topics     Alcohol use: Yes     Alcohol/week: 0.0 standard drinks     Comment: 1 glass of wine every 2 weeks     Drug use: No     PHQ 8/22/2022 1/4/2023 2/23/2023   PHQ-9 Total Score 5 12 3   Q9: Thoughts of better off dead/self-harm past 2 weeks Not at all Not at all Not at all     SEBASTIAN-7 SCORE 7/25/2022 8/22/2022 1/4/2023   Total Score - - -   Total Score 4 4 3     Last PHQ-9 2/23/2023   1.  Little interest or pleasure in doing things 1   2.  Feeling down, depressed, or hopeless 2   3.  Trouble falling or staying asleep, or sleeping too much 0   4.  Feeling tired or " "having little energy 0   5.  Poor appetite or overeating 0   6.  Feeling bad about yourself 0   7.  Trouble concentrating 0   8.  Moving slowly or restless 0   Q9: Thoughts of better off dead/self-harm past 2 weeks 0   PHQ-9 Total Score 3   Difficulty at work, home, or with people -     SEBASTIAN-7  2/23/2023   1. Feeling nervous, anxious, or on edge 1   2. Not being able to stop or control worrying -   3. Worrying too much about different things -   4. Trouble relaxing -   5. Being so restless that it is hard to sit still -   6. Becoming easily annoyed or irritable -   7. Feeling afraid, as if something awful might happen -   SEBASTIAN-7 Total Score -   If you checked any problems, how difficult have they made it for you to do your work, take care of things at home, or get along with other people? -       Suicide Assessment Five-step Evaluation and Treatment (SAFE-T)      Review of Systems   Constitutional, HEENT, cardiovascular, pulmonary, gi and gu systems are negative, except as otherwise noted.      Objective    /58   Pulse 75   Temp 97.6  F (36.4  C) (Tympanic)   Resp 17   Ht 1.651 m (5' 5\")   Wt 73.2 kg (161 lb 4.8 oz)   SpO2 97%   BMI 26.84 kg/m    Body mass index is 26.84 kg/m .  Physical Exam   GENERAL: healthy, alert and no distress  NECK: no adenopathy, no asymmetry, masses, or scars and thyroid normal to palpation  RESP: lungs clear to auscultation - no rales, rhonchi or wheezes  CV: regular rate and rhythm, normal S1 S2, no S3 or S4, no murmur, click or rub, no peripheral edema and peripheral pulses strong  ABDOMEN: soft, nontender, no hepatosplenomegaly, no masses and bowel sounds normal  MS: no gross musculoskeletal defects noted, no edema              "

## 2023-02-24 ASSESSMENT — PATIENT HEALTH QUESTIONNAIRE - PHQ9: SUM OF ALL RESPONSES TO PHQ QUESTIONS 1-9: 3

## 2023-03-03 DIAGNOSIS — R27.0 ATAXIA: Primary | ICD-10-CM

## 2023-03-06 ENCOUNTER — LAB (OUTPATIENT)
Dept: LAB | Facility: CLINIC | Age: 73
End: 2023-03-06
Payer: COMMERCIAL

## 2023-03-06 ENCOUNTER — OFFICE VISIT (OUTPATIENT)
Dept: NEUROLOGY | Facility: CLINIC | Age: 73
End: 2023-03-06
Payer: COMMERCIAL

## 2023-03-06 VITALS — HEART RATE: 73 BPM | SYSTOLIC BLOOD PRESSURE: 124 MMHG | DIASTOLIC BLOOD PRESSURE: 74 MMHG | OXYGEN SATURATION: 96 %

## 2023-03-06 DIAGNOSIS — H05.20 PROPTOSIS: Primary | ICD-10-CM

## 2023-03-06 DIAGNOSIS — G31.84 MILD COGNITIVE IMPAIRMENT: ICD-10-CM

## 2023-03-06 DIAGNOSIS — M54.16 RIGHT LUMBAR RADICULOPATHY: ICD-10-CM

## 2023-03-06 DIAGNOSIS — H05.20 PROPTOSIS: ICD-10-CM

## 2023-03-06 DIAGNOSIS — G70.00 MYASTHENIA GRAVIS WITHOUT EXACERBATION (H): ICD-10-CM

## 2023-03-06 DIAGNOSIS — G70.01 MYASTHENIA GRAVIS WITH EXACERBATION (H): ICD-10-CM

## 2023-03-06 DIAGNOSIS — G97.1: ICD-10-CM

## 2023-03-06 LAB
ALT SERPL W P-5'-P-CCNC: 22 U/L (ref 10–35)
AST SERPL W P-5'-P-CCNC: 22 U/L (ref 10–35)
BASOPHILS # BLD AUTO: 0 10E3/UL (ref 0–0.2)
BASOPHILS NFR BLD AUTO: 0 %
EOSINOPHIL # BLD AUTO: 0 10E3/UL (ref 0–0.7)
EOSINOPHIL NFR BLD AUTO: 1 %
ERYTHROCYTE [DISTWIDTH] IN BLOOD BY AUTOMATED COUNT: 14.2 % (ref 10–15)
HCT VFR BLD AUTO: 38.2 % (ref 35–47)
HGB BLD-MCNC: 12.5 G/DL (ref 11.7–15.7)
IMM GRANULOCYTES # BLD: 0 10E3/UL
IMM GRANULOCYTES NFR BLD: 1 %
LYMPHOCYTES # BLD AUTO: 0.8 10E3/UL (ref 0.8–5.3)
LYMPHOCYTES NFR BLD AUTO: 15 %
MCH RBC QN AUTO: 31.6 PG (ref 26.5–33)
MCHC RBC AUTO-ENTMCNC: 32.7 G/DL (ref 31.5–36.5)
MCV RBC AUTO: 97 FL (ref 78–100)
MONOCYTES # BLD AUTO: 0.3 10E3/UL (ref 0–1.3)
MONOCYTES NFR BLD AUTO: 6 %
NEUTROPHILS # BLD AUTO: 4.1 10E3/UL (ref 1.6–8.3)
NEUTROPHILS NFR BLD AUTO: 77 %
NRBC # BLD AUTO: 0 10E3/UL
NRBC BLD AUTO-RTO: 0 /100
PLATELET # BLD AUTO: 182 10E3/UL (ref 150–450)
RBC # BLD AUTO: 3.95 10E6/UL (ref 3.8–5.2)
TSH SERPL DL<=0.005 MIU/L-ACNC: 1.03 UIU/ML (ref 0.3–4.2)
WBC # BLD AUTO: 5.4 10E3/UL (ref 4–11)

## 2023-03-06 PROCEDURE — 84445 ASSAY OF TSI GLOBULIN: CPT | Mod: 90 | Performed by: PATHOLOGY

## 2023-03-06 PROCEDURE — 84460 ALANINE AMINO (ALT) (SGPT): CPT | Performed by: PATHOLOGY

## 2023-03-06 PROCEDURE — 99000 SPECIMEN HANDLING OFFICE-LAB: CPT | Performed by: PATHOLOGY

## 2023-03-06 PROCEDURE — 36415 COLL VENOUS BLD VENIPUNCTURE: CPT | Performed by: PATHOLOGY

## 2023-03-06 PROCEDURE — 85025 COMPLETE CBC W/AUTO DIFF WBC: CPT | Performed by: PATHOLOGY

## 2023-03-06 PROCEDURE — 84443 ASSAY THYROID STIM HORMONE: CPT | Performed by: PATHOLOGY

## 2023-03-06 PROCEDURE — 84450 TRANSFERASE (AST) (SGOT): CPT | Performed by: PATHOLOGY

## 2023-03-06 PROCEDURE — 99215 OFFICE O/P EST HI 40 MIN: CPT | Performed by: PSYCHIATRY & NEUROLOGY

## 2023-03-06 RX ORDER — RIVASTIGMINE 4.6 MG/24H
1 PATCH, EXTENDED RELEASE TRANSDERMAL DAILY
Qty: 30 PATCH | Refills: 1 | Status: SHIPPED | OUTPATIENT
Start: 2023-03-06 | End: 2023-05-22

## 2023-03-06 ASSESSMENT — PAIN SCALES - GENERAL: PAINLEVEL: MODERATE PAIN (5)

## 2023-03-06 NOTE — PATIENT INSTRUCTIONS
"One lab test today to rule out Grave's disease (because your eyes appear to be bulging a bit)    MRI lumbar spine, rule out injury to a nerve root on the right after the spinal tap    Will start you on a patch of rivastigmine for cognitive impairment (it shows up as \"preferred\" for your insurance, so I think they will approve it)    I will work on getting infusions of Vyvgart approved for your myasthenia    Follow up 3 months      "

## 2023-03-06 NOTE — PROGRESS NOTES
Rolo Sandoval should receive a copy of this note (PCP)    [place] March 6, 2023    Dear Dr. Sandoval,    I had the pleasure to see Kristina Pedro at the [facility]/neuromuscular clinic.  Kristina is a quite complicated patient.  She has seronegative myasthenia gravis.  The diagnosis was proven by repetitive nerve stimulation over the anconeus and Triceps muscles which showed a dramatic decrement of over 25% with partial repair after exercise.  She had presented with mild ptosis, proximal arm and leg weakness predominantly.  For the myasthenia she is currently on pyridostigmine 60 mg 3 times a day, prednisone 5 mg daily and azathioprine 100 mg twice daily.  Azathioprine is prescribed by rheumatology Dr. Mark, because she has a concomitant diagnosis of Sjogren syndrome with positive SSA and SSB antibodies.  Despite this regimen however, the control of her myasthenia remains suboptimal.  She mostly has weakness of her proximal legs necessitating the use of the arms to get up from a chair.  She denies arm fatigue when brushing her teeth or combing her hair.  She denies diplopia dysarthria, or difficulty chewing food.  She does have ptosis on some days of the week, but not every day.  She has rare dysphagia where solid food may get stuck in her throat; no choking episodes.  Denies dyspnea on exertion.    Her MG ADL score is 4 today, one-point for ptosis 1 for dysphagia, and 2 for leg weakness.    We tried to get IVIG approved for her but it was denied by her insurance.  In addition to the myasthenia the patient has longstanding cognitive impairment which has been evaluated with neuropsych testing several times in the past.  Because of atypical clinical course and the family history of Alzheimer's disease, we proceeded with further work-up.  Brain MRI was unremarkable overall.  CSF examination showed a traumatic tap and elevations of protein and white cell count could be attributed to the trauma  itself.  Oligoclonal bands and paraneoplastic antibody panel were negative.  Importantly CSF amyloid beta-42 was reduced and the CSF p-tau was increased.  This pattern suggests amyloid deposition in the brain, as can be seen with MCI or early Alzheimer's.      During the patient's last visit, she had reported a dramatic weight loss of over 35 pounds in the last year with poor appetite and I referred her to her primary care provider for additional work-up.  Her weight has stabilized in the last 2 months at 73.6 kg. Several labs I ordered in the last few months were negative including hemoglobin A1c, CK, CBC, ALT, AST, paraneoplastic antibody panel, vitamin B1, copper, vitamin E methylmalonic acid levels, tissue transglutaminase antibodies for celiac disease.    /74 (BP Location: Right arm, Patient Position: Chair, Cuff Size: Adult Regular)   Pulse 73   SpO2 96%     On exam today Kristina is in good spirits, perhaps better than her last visit.  She has a pretty obvious proptosis bilaterally.  There is no clear ptosis with sustained upward gaze for 30 seconds.  Ductions and versions of the eyes are normal.  She does get transient diplopia after sustained upward or lateral gaze for 15 seconds.  There is moderate weakness of orbicularis oculi and mild weakness of orbicularis oris bilaterally.  She does not have any obvious dysphonia or dysarthria.  Neck flexion extension strength are full.  Strength is 5 out of 5 for bilateral deltoids and biceps.  Triceps strength is 4 - to 4 bilaterally, similar to prior.  Wrist extensors  and finger extensors are 5.  Leg strength is 4 out of 5 for bilateral hip flexion.  Knee extension, knee flexion, foot dorsiflexion are bilaterally 5.  Reflexes are 2+ at the ankles and 3+ at the knees and rather symmetric.  There is no obvious leg spasticity.  Finger-to-nose and heel-to-shin are done without dysmetria.  She is able to get up from a chair slowly but she has to use her  arms.    In summary Kristina's myasthenia is possibly slightly better compared to the last visi,  but still not under optimal control.  I proposed her to consider Vyvgart infusions which are done weekly x4, and then the cycle can be repeated with 4-week breaks, or longer breaks depending on response.  The patient would qualify for Vyvgart (efgartigimod), because she has suboptimal response to low-dose prednisone, pyridostigmine, and azathioprine.  She cannot tolerate higher dose of prednisone due to confusion/delirium.    Regarding her mild cognitive impairment, this could be due to early Alzheimer's disease based on the spinal tap results.  Lecanemab was recently approved from the FDA for Alzheimer's but this was an accelerated approval, not a traditional one, and the drug will not be available in the market or reimbursed by insurance agencies yet.  I offered the patient a trial of rivastigmine patch for the cognitive impairment; side effects were explained.  She is willing to try it.    She still is anxious and has poor sleep I would allow Dr. Sandoval to address those with appropriate medications.    She will return to clinic for follow-up in 3 months or sooner if needed.  Total time spent on this encounter today 50 minutes, of which 30 face-to-face, 10 in postvisit dictation, editing and orders, and 10 in previsit chart review.     Sincerely,      Rene Manning MD, FAAN

## 2023-03-06 NOTE — LETTER
3/6/2023       RE: Albina Pedro  [address] [facility] [place] [address]     Dear Colleague,    Thank you for referring your patient, Albina Pedro, to the [facility] [place] [facility]. Please see a copy of my visit note below.    Rolo Jaime should receive a copy of this note (PCP)    [place] March 6, 2023    Dear Dr. Sandoval,    I had the pleasure to see Kristina Pedro at the [facility]/neuromuscular clinic.  Kristina is a quite complicated patient.  She has seronegative myasthenia gravis.  The diagnosis was proven by repetitive nerve stimulation over the anconeus and Triceps muscles which showed a dramatic decrement of over 25% with partial repair after exercise.  She had presented with mild ptosis, proximal arm and leg weakness predominantly.  For the myasthenia she is currently on pyridostigmine 60 mg 3 times a day, prednisone 5 mg daily and azathioprine 100 mg twice daily.  Azathioprine is prescribed by rheumatology Dr. Mark, because she has a concomitant diagnosis of Sjogren syndrome with positive SSA and SSB antibodies.  Despite this regimen however, the control of her myasthenia remains suboptimal.  She mostly has weakness of her proximal legs necessitating the use of the arms to get up from a chair.  She denies arm fatigue when brushing her teeth or combing her hair.  She denies diplopia dysarthria, or difficulty chewing food.  She does have ptosis on some days of the week, but not every day.  She has rare dysphagia where solid food may get stuck in her throat; no choking episodes.  Denies dyspnea on exertion.    Her MG ADL score is 4 today, one-point for ptosis 1 for dysphagia, and 2 for leg weakness.    We tried to get IVIG approved for her but it was denied by her insurance.  In addition to the myasthenia the patient has longstanding cognitive impairment which has been evaluated  with neuropsych testing several times in the past.  Because of atypical clinical course and the family history of Alzheimer's disease, we proceeded with further work-up.  Brain MRI was unremarkable overall.  CSF examination showed a traumatic tap and elevations of protein and white cell count could be attributed to the trauma itself.  Oligoclonal bands and paraneoplastic antibody panel were negative.  Importantly CSF amyloid beta-42 was reduced and the CSF p-tau was increased.  This pattern suggests amyloid deposition in the brain, as can be seen with MCI or early Alzheimer's.      During the patient's last visit, she had reported a dramatic weight loss of over 35 pounds in the last year with poor appetite and I referred her to her primary care provider for additional work-up.  Her weight has stabilized in the last 2 months at 73.6 kg. Several labs I ordered in the last few months were negative including hemoglobin A1c, CK, CBC, ALT, AST, paraneoplastic antibody panel, vitamin B1, copper, vitamin E methylmalonic acid levels, tissue transglutaminase antibodies for celiac disease.    /74 (BP Location: Right arm, Patient Position: Chair, Cuff Size: Adult Regular)   Pulse 73   SpO2 96%     On exam today Kristina is in good spirits, perhaps better than her last visit.  She has a pretty obvious proptosis bilaterally.  There is no clear ptosis with sustained upward gaze for 30 seconds.  Ductions and versions of the eyes are normal.  She does get transient diplopia after sustained upward or lateral gaze for 15 seconds.  There is moderate weakness of orbicularis oculi and mild weakness of orbicularis oris bilaterally.  She does not have any obvious dysphonia or dysarthria.  Neck flexion extension strength are full.  Strength is 5 out of 5 for bilateral deltoids and biceps.  Triceps strength is 4 - to 4 bilaterally, similar to prior.  Wrist extensors  and finger extensors are 5.  Leg strength is 4 out of 5 for  bilateral hip flexion.  Knee extension, knee flexion, foot dorsiflexion are bilaterally 5.  Reflexes are 2+ at the ankles and 3+ at the knees and rather symmetric.  There is no obvious leg spasticity.  Finger-to-nose and heel-to-shin are done without dysmetria.  She is able to get up from a chair slowly but she has to use her arms.    In summary Kristina's myasthenia is possibly slightly better compared to the last visi,  but still not under optimal control.  I proposed her to consider Vyvgart infusions which are done weekly x4, and then the cycle can be repeated with 4-week breaks, or longer breaks depending on response.  The patient would qualify for Vyvgart (efgartigimod), because she has suboptimal response to low-dose prednisone, pyridostigmine, and azathioprine.  She cannot tolerate higher dose of prednisone due to confusion/delirium.    Regarding her mild cognitive impairment, this could be due to early Alzheimer's disease based on the spinal tap results.  Lecanemab was recently approved from the FDA for Alzheimer's but this was an accelerated approval, not a traditional one, and the drug will not be available in the market or reimbursed by insurance agencies yet.  I offered the patient a trial of rivastigmine patch for the cognitive impairment; side effects were explained.  She is willing to try it.    She still is anxious and has poor sleep I would allow Dr. Sandoval to address those with appropriate medications.    She will return to clinic for follow-up in 3 months or sooner if needed.  Total time spent on this encounter today 50 minutes, of which 30 face-to-face, 10 in postvisit dictation, editing and orders, and 10 in previsit chart review.     Sincerely,      Rene Manning MD, FAAN

## 2023-03-09 LAB — TSI SER-ACNC: <1 TSI INDEX

## 2023-03-10 ENCOUNTER — HOSPITAL ENCOUNTER (OUTPATIENT)
Dept: MRI IMAGING | Facility: CLINIC | Age: 73
Discharge: HOME | End: 2023-03-10
Attending: PSYCHIATRY & NEUROLOGY | Admitting: PSYCHIATRY & NEUROLOGY
Payer: COMMERCIAL

## 2023-03-10 DIAGNOSIS — G97.1: ICD-10-CM

## 2023-03-10 DIAGNOSIS — M54.16 RIGHT LUMBAR RADICULOPATHY: ICD-10-CM

## 2023-03-10 PROCEDURE — A9585 GADOBUTROL INJECTION: HCPCS | Performed by: PSYCHIATRY & NEUROLOGY

## 2023-03-10 PROCEDURE — 255N000002 HC RX 255 OP 636: Performed by: PSYCHIATRY & NEUROLOGY

## 2023-03-10 PROCEDURE — 72158 MRI LUMBAR SPINE W/O & W/DYE: CPT

## 2023-03-10 RX ORDER — GADOBUTROL 604.72 MG/ML
7 INJECTION INTRAVENOUS ONCE
Status: COMPLETED | OUTPATIENT
Start: 2023-03-10 | End: 2023-03-10

## 2023-03-10 RX ADMIN — GADOBUTROL 7 ML: 604.72 INJECTION INTRAVENOUS at 16:04

## 2023-03-14 ENCOUNTER — TELEPHONE (OUTPATIENT)
Dept: FAMILY MEDICINE | Facility: CLINIC | Age: 73
End: 2023-03-14
Payer: COMMERCIAL

## 2023-03-14 DIAGNOSIS — F33.0 MAJOR DEPRESSIVE DISORDER, RECURRENT, MILD (H): ICD-10-CM

## 2023-03-14 RX ORDER — ALBUTEROL SULFATE 90 UG/1
1-2 AEROSOL, METERED RESPIRATORY (INHALATION)
Status: CANCELLED
Start: 2023-03-27

## 2023-03-14 RX ORDER — ALBUTEROL SULFATE 0.83 MG/ML
2.5 SOLUTION RESPIRATORY (INHALATION)
Status: CANCELLED | OUTPATIENT
Start: 2023-03-27

## 2023-03-14 RX ORDER — HEPARIN SODIUM,PORCINE 10 UNIT/ML
5 VIAL (ML) INTRAVENOUS
Status: CANCELLED | OUTPATIENT
Start: 2023-03-27

## 2023-03-14 RX ORDER — DIPHENHYDRAMINE HYDROCHLORIDE 50 MG/ML
50 INJECTION INTRAMUSCULAR; INTRAVENOUS
Status: CANCELLED
Start: 2023-03-27

## 2023-03-14 RX ORDER — HEPARIN SODIUM (PORCINE) LOCK FLUSH IV SOLN 100 UNIT/ML 100 UNIT/ML
5 SOLUTION INTRAVENOUS
Status: CANCELLED | OUTPATIENT
Start: 2023-03-27

## 2023-03-14 RX ORDER — EPINEPHRINE 1 MG/ML
0.3 INJECTION, SOLUTION, CONCENTRATE INTRAVENOUS EVERY 5 MIN PRN
Status: CANCELLED | OUTPATIENT
Start: 2023-03-27

## 2023-03-14 RX ORDER — METHYLPREDNISOLONE SODIUM SUCCINATE 125 MG/2ML
125 INJECTION, POWDER, LYOPHILIZED, FOR SOLUTION INTRAMUSCULAR; INTRAVENOUS
Status: CANCELLED
Start: 2023-03-27

## 2023-03-14 RX ORDER — MEPERIDINE HYDROCHLORIDE 25 MG/ML
25 INJECTION INTRAMUSCULAR; INTRAVENOUS; SUBCUTANEOUS EVERY 30 MIN PRN
Status: CANCELLED | OUTPATIENT
Start: 2023-03-27

## 2023-03-14 NOTE — TELEPHONE ENCOUNTER
Routing refill request to provider for review/approval because:  Patient needs to be seen because:  Greater than 1 year since annual physical  Zach Sams RN

## 2023-03-14 NOTE — TELEPHONE ENCOUNTER
Patient is out of her Zoloft and needs it sent today if possible please.        Kierra Hardy, AUGUSTO Matthews

## 2023-03-15 ENCOUNTER — TELEPHONE (OUTPATIENT)
Dept: NEUROLOGY | Facility: CLINIC | Age: 73
End: 2023-03-15
Payer: COMMERCIAL

## 2023-03-15 NOTE — TELEPHONE ENCOUNTER
Kristina will be starting Vyvgart infusions.  Message sent to [facility] scheduling to contact Kristina to get her scheduled.    Beatriz Sy RN

## 2023-03-16 ENCOUNTER — OFFICE VISIT (OUTPATIENT)
Dept: FAMILY MEDICINE | Facility: CLINIC | Age: 73
End: 2023-03-16
Payer: COMMERCIAL

## 2023-03-16 VITALS
TEMPERATURE: 97 F | OXYGEN SATURATION: 98 % | SYSTOLIC BLOOD PRESSURE: 124 MMHG | HEIGHT: 65 IN | WEIGHT: 158.4 LBS | DIASTOLIC BLOOD PRESSURE: 66 MMHG | BODY MASS INDEX: 26.39 KG/M2 | HEART RATE: 70 BPM | RESPIRATION RATE: 14 BRPM

## 2023-03-16 DIAGNOSIS — R53.83 FATIGUE, UNSPECIFIED TYPE: ICD-10-CM

## 2023-03-16 DIAGNOSIS — R73.09 INCREASED GLUCOSE LEVEL: Primary | ICD-10-CM

## 2023-03-16 LAB
BASOPHILS # BLD AUTO: 0 10E3/UL (ref 0–0.2)
BASOPHILS NFR BLD AUTO: 1 %
EOSINOPHIL # BLD AUTO: 0.1 10E3/UL (ref 0–0.7)
EOSINOPHIL NFR BLD AUTO: 1 %
ERYTHROCYTE [DISTWIDTH] IN BLOOD BY AUTOMATED COUNT: 14.8 % (ref 10–15)
GLUCOSE BLD-MCNC: 114 MG/DL (ref 60–99)
HCT VFR BLD AUTO: 41.5 % (ref 35–47)
HGB BLD-MCNC: 13.5 G/DL (ref 11.7–15.7)
LYMPHOCYTES # BLD AUTO: 1.1 10E3/UL (ref 0.8–5.3)
LYMPHOCYTES NFR BLD AUTO: 18 %
MCH RBC QN AUTO: 31.5 PG (ref 26.5–33)
MCHC RBC AUTO-ENTMCNC: 32.5 G/DL (ref 31.5–36.5)
MCV RBC AUTO: 97 FL (ref 78–100)
MONOCYTES # BLD AUTO: 0.4 10E3/UL (ref 0–1.3)
MONOCYTES NFR BLD AUTO: 6 %
NEUTROPHILS # BLD AUTO: 4.7 10E3/UL (ref 1.6–8.3)
NEUTROPHILS NFR BLD AUTO: 75 %
PLATELET # BLD AUTO: 203 10E3/UL (ref 150–450)
RBC # BLD AUTO: 4.29 10E6/UL (ref 3.8–5.2)
WBC # BLD AUTO: 6.3 10E3/UL (ref 4–11)

## 2023-03-16 PROCEDURE — 82947 ASSAY GLUCOSE BLOOD QUANT: CPT | Performed by: FAMILY MEDICINE

## 2023-03-16 PROCEDURE — 36415 COLL VENOUS BLD VENIPUNCTURE: CPT | Performed by: FAMILY MEDICINE

## 2023-03-16 PROCEDURE — 85025 COMPLETE CBC W/AUTO DIFF WBC: CPT | Performed by: FAMILY MEDICINE

## 2023-03-16 PROCEDURE — 99213 OFFICE O/P EST LOW 20 MIN: CPT | Performed by: FAMILY MEDICINE

## 2023-03-16 RX ORDER — SERTRALINE HYDROCHLORIDE 100 MG/1
200 TABLET, FILM COATED ORAL DAILY
Qty: 180 TABLET | Refills: 0 | Status: SHIPPED | OUTPATIENT
Start: 2023-03-16 | End: 2023-06-12

## 2023-03-16 ASSESSMENT — PATIENT HEALTH QUESTIONNAIRE - PHQ9
SUM OF ALL RESPONSES TO PHQ QUESTIONS 1-9: 9
10. IF YOU CHECKED OFF ANY PROBLEMS, HOW DIFFICULT HAVE THESE PROBLEMS MADE IT FOR YOU TO DO YOUR WORK, TAKE CARE OF THINGS AT HOME, OR GET ALONG WITH OTHER PEOPLE: SOMEWHAT DIFFICULT
SUM OF ALL RESPONSES TO PHQ QUESTIONS 1-9: 9

## 2023-03-16 ASSESSMENT — PAIN SCALES - GENERAL: PAINLEVEL: NO PAIN (0)

## 2023-03-16 NOTE — TELEPHONE ENCOUNTER
[facility]    Beatriz Sy RN

## 2023-03-16 NOTE — PROGRESS NOTES
SUBJECTIVE:                                                    Albina Pedro is a 72 year old female who presents to clinic today for the following health issues:    {Superlists:781144}    Problem list and histories reviewed & adjusted, as indicated.  Additional history: ***    {HIST REVIEW/ LINKS 2:331524}    ROS:  {ROS COMP:043842}    OBJECTIVE:                                                    There were no vitals taken for this visit. There is no height or weight on file to calculate BMI.   {EXAM CHOICES:456712}       ASSESSMENT/PLAN:                                                      No diagnosis found.   reports that she has never smoked. She has never used smokeless tobacco.  {Tobacco Cessation needed for ACO -- Delete if patient is a non-smoker:365706}    {Weight Management Plan needed for ACO:250112}    ***  [facility]

## 2023-03-16 NOTE — PROGRESS NOTES
SUBJECTIVE:                                                    Albina Pedro is a 72 year old female who presents to clinic today for the following health issues:      Wt Readings from Last 10 Encounters:   03/16/23 71.8 kg (158 lb 6.4 oz)   02/23/23 73.2 kg (161 lb 4.8 oz)   01/30/23 73.1 kg (161 lb 3.2 oz)   10/28/22 78.5 kg (173 lb)   10/28/22 78.5 kg (173 lb)   09/27/22 78.4 kg (172 lb 12.8 oz)   08/22/22 81 kg (178 lb 8 oz)   08/19/22 81.1 kg (178 lb 14.4 oz)   07/25/22 82.2 kg (181 lb 3.2 oz)   06/27/22 81.6 kg (180 lb)       Depression Followup    How are you doing with your depression since your last visit? No change    Are you having other symptoms that might be associated with depression? Yes:  lack of concentration, poor appetite, poor sleep, rebound irritability.    Have you had a significant life event?  No     Are you feeling anxious or having panic attacks?   Yes:  anxiety    Do you have any concerns with your use of alcohol or other drugs? No    Social History     Tobacco Use     Smoking status: Never     Smokeless tobacco: Never   Substance Use Topics     Alcohol use: Yes     Alcohol/week: 0.0 standard drinks     Comment: 1 glass of wine every 2 weeks     Drug use: No     PHQ 1/4/2023 2/23/2023 3/16/2023   PHQ-9 Total Score 12 3 9   Q9: Thoughts of better off dead/self-harm past 2 weeks Not at all Not at all Several days   F/U: Thoughts of suicide or self-harm - - No   F/U: Safety concerns - - No     SEBASTIAN-7 SCORE 7/25/2022 8/22/2022 1/4/2023   Total Score - - -   Total Score 4 4 3     Last PHQ-9 3/16/2023   1.  Little interest or pleasure in doing things 1   2.  Feeling down, depressed, or hopeless 2   3.  Trouble falling or staying asleep, or sleeping too much 0   4.  Feeling tired or having little energy 1   5.  Poor appetite or overeating 0   6.  Feeling bad about yourself 1   7.  Trouble concentrating 2   8.  Moving slowly or restless 1   Q9: Thoughts of better off dead/self-harm past 2 weeks  1   PHQ-9 Total Score 9   Difficulty at work, home, or with people -   In the past two weeks have you had thoughts of suicide or self harm? No   Do you have concerns about your personal safety or the safety of others? No       Suicide Assessment Five-step Evaluation and Treatment (SAFE-T)      Answers for HPI/ROS submitted by the patient on 3/16/2023  If you checked off any problems, how difficult have these problems made it for you to do your work, take care of things at home, or get along with other people?: Somewhat difficult  PHQ9 TOTAL SCORE: 9  What is the reason for your visit today? : follow up  How many servings of fruits and vegetables do you eat daily?: 2-3  On average, how many sweetened beverages do you drink each day (Examples: soda, juice, sweet tea, etc.  Do NOT count diet or artificially sweetened beverages)?: 1  How many minutes a day do you exercise enough to make your heart beat faster?: 9 or less  How many days a week do you exercise enough to make your heart beat faster?: 3 or less  How many days per week do you miss taking your medication?: 0      Problem list and histories reviewed & adjusted, as indicated.  Additional history:     Patient Active Problem List   Diagnosis     DIZZINESS - LIKELY HYPOGLYCEMIA     Dermatophytosis of body     Episodic mood disorder (H)     Health Care Home     Panniculitis     Advanced directives, counseling/discussion     CAD (coronary artery disease)     Sjogren's syndrome (H)     Adverse effect of anesthetic     Status post coronary angiogram     Acute chest pain     Major depressive disorder, recurrent, mild (H)     Hyperlipidemia LDL goal <70     ILD (interstitial lung disease) (H)     Intermediate coronary syndrome (H)     Chronic stable angina (H)     Myasthenia gravis with exacerbation (H)     Frequent falls     Dementia, unspecified dementia severity, unspecified dementia type, unspecified whether behavioral, psychotic, or mood disturbance or anxiety (H)      Past Surgical History:   Procedure Laterality Date     BIOPSY MUSCLE DIAGNOSTIC (LOCATION) Left 2022    Procedure: BIOPSY, MUSCLE LEFT biceps @0900;  Surgeon: Tyrell Loo MD;  Location: [facility] OR     C/SECTION, LOW TRANSVERSE  10/13/1978    , Low Transverse     COLONOSCOPY       OPEN REDUCTION INTERNAL FIXATION ANKLE Right 2019    Procedure: Open reduction internal fixation right lateral malleolus fracture;  Surgeon: Rolo Oconnor DPM;  Location: [facility] OR     SURGICAL HISTORY OF -            SURGICAL HISTORY OF -   00    Colonoscopy       Social History     Tobacco Use     Smoking status: Never     Smokeless tobacco: Never   Substance Use Topics     Alcohol use: Yes     Alcohol/week: 0.0 standard drinks     Comment: 1 glass of wine every 2 weeks     Family History   Problem Relation Age of Onset     Cancer Mother         Breast and liver- age 43     Heart Disease Father         ? chf?h/o CVA     Alzheimer Disease Father      Hypertension Father      Neurologic Disorder Father         CVA     Diabetes Maternal Grandmother      Breast Cancer Maternal Aunt      Breast Cancer Paternal Aunt      Cancer - colorectal No family hx of      Prostate Cancer No family hx of          Current Outpatient Medications   Medication Sig Dispense Refill     acetaminophen (TYLENOL) 500 MG tablet Take 1,000 mg by mouth every 6 hours as needed for mild pain       aspirin (ASA) 81 MG EC tablet Take 1 tablet (81 mg) by mouth daily 90 tablet 3     azaTHIOprine (IMURAN) 50 MG tablet Take 1 tablet (50 mg) twice daily x 14 days then complete labs, if labs ok then take 2 tablets (100 mg) in the morning and 1 tablet (50 mg) in the evening x 14 days then complete labs, if labs ok then take 2 tablets (100 mg) twice daily 120 tablet 5     Calcium-Magnesium-Zinc 500-250-12.5 MG TABS Take 1 tablet by mouth daily       hyoscyamine (LEVSIN) 0.125 MG tablet TAKE  1 TABLET BY MOUTH EVERY EIGHT HOURS 30  MINUTES BEFORE EACH MESTINON DOSE Strength: 0.125 mgTAKE 180 tablet 0     LORazepam (ATIVAN) 0.5 MG tablet 1/2 -1 TABS UP TO 3-5 TIMES A WEEK AS NEEDED 20 tablet 0     Multiple Vitamins-Minerals (MULTIVITAMIN & MINERAL PO) Take 1 tablet by mouth daily        pravastatin (PRAVACHOL) 40 MG tablet Take 1 tablet (40 mg) by mouth daily 90 tablet 3     predniSONE (DELTASONE) 5 MG tablet Take 1 tablet (5 mg) by mouth daily       pyridostigmine (MESTINON) 60 MG tablet Take 1 tablet (60 mg) by mouth 3 times daily 270 tablet 0     ranolazine (RANEXA) 1000 MG TB12 12 hr tablet Take 0.5 tablets (500 mg) by mouth 2 times daily 90 tablet 4     rivastigmine (EXELON) 4.6 MG/24HR 24 hr patch Place 1 patch onto the skin daily 30 patch 1     sertraline (ZOLOFT) 100 MG tablet Take 2 tablets (200 mg) by mouth daily 180 tablet 0     albuterol (PROAIR HFA/PROVENTIL HFA/VENTOLIN HFA) 108 (90 Base) MCG/ACT inhaler INHALE TWO PUFFS BY MOUTH EVERY FOUR HOURS AS NEEDED FOR SHORTNESS OF BREATH/DYSPNEA (Patient not taking: Reported on 1/4/2023) 25.5 g 0     omeprazole (PRILOSEC) 20 MG DR capsule Take 1 capsule (20 mg) by mouth daily 90 capsule 1     Allergies   Allergen Reactions     Daypro [Oxaprozin] Rash            Lidocaine Other (See Comments)     Rapid heart rate     Nitroglycerin Other (See Comments)     Causes low blood pressure     Penicillins Unknown     Occurred as child.     Sulfa Drugs Rash     Recent Labs   Lab Test 03/06/23  1617 02/01/23  1537 01/02/23  1127 11/10/22  1330 08/19/22  1444 03/25/22  1514 02/16/22  1740 01/20/22  1026 01/20/22  1026 06/16/21  1544 04/15/21  1528 10/15/20  1501   A1C  --   --  5.7*  --   --   --   --   --   --   --   --   --    LDL  --   --  74  --  72  --   --   --   --   --   --  35   HDL  --   --  84  --  53  --   --   --   --   --   --  64   TRIG  --   --  116  --  221*  --   --   --   --   --   --  286*   ALT 22 25 29 34  --    < > 44   < > 33 34 30  --    CR  --   --  0.61 0.75  --    < > 0.59   " < > 0.56 0.70 0.61  --    GFRESTIMATED  --   --  >90 84  --    < > >90   < > >90 86 >90  --    GFRESTBLACK  --   --   --   --   --   --   --   --   --  >90 >90  --    POTASSIUM  --   --  4.2  --   --   --  4.3   < > 4.3 4.7 4.5  --    TSH 1.03  --   --   --   --   --   --   --  1.87  --   --   --     < > = values in this interval not displayed.        ROS:  Constitutional, HEENT, cardiovascular, pulmonary, gi and gu systems are negative, except as otherwise noted.    OBJECTIVE:                                                    /66   Pulse 70   Temp 97  F (36.1  C) (Tympanic)   Resp 14   Ht 1.651 m (5' 5\")   Wt 71.8 kg (158 lb 6.4 oz)   SpO2 98%   BMI 26.36 kg/m   Body mass index is 26.36 kg/m .   GENERAL: healthy, alert, well nourished, well hydrated, no distress  HENT: ear canals- normal; TMs- normal; Nose- normal; Mouth- no ulcers, no lesions  NECK: no tenderness, no adenopathy, no asymmetry, no masses, no stiffness; thyroid- normal to palpation  RESP: lungs clear to auscultation - no rales, no rhonchi, no wheezes  CV: regular rates and rhythm, normal S1 S2, no S3 or S4 and no murmur, no click or rub -  ABDOMEN: soft, no tenderness, no  hepatosplenomegaly, no masses, normal bowel sounds       ASSESSMENT/PLAN:                                                      (R73.09) Increased glucose level  (primary encounter diagnosis)  Plan: Glucose, Glucose whole blood, CANCELED: Glucose        Whole Blood POCT, Enter/Edit Result      (R53.83) Fatigue, unspecified type  Plan: CBC with platelets and differential, Glucose         whole blood,      reports that she has never smoked. She has never used smokeless tobacco.      Weight management plan: Discussed healthy diet and exercise guidelines      [facility]    "

## 2023-03-30 NOTE — TELEPHONE ENCOUNTER
Per infusion finance, Vyv[facility] infusions on hold.  Dr. Manning aware that insurance will not cover infusion despite appeal.    Beatriz Sy RN

## 2023-04-05 LAB — SCANNED LAB RESULT: NORMAL

## 2023-04-07 NOTE — TELEPHONE ENCOUNTER
[facility] Call Center    Phone Message    May a detailed message be left on voicemail: yes     Reason for Call: Other: Pt's spouse Jerrod is calling to speak to a nurse about pt's VYGART. Writer tolder Jerrod that it is approaching the end of business day and that someone would reach out next week. Please contact Jerrod to discuss VYGART     Action Taken: Message routed to:  [facility] Neurology    Travel Screening: Not Applicable

## 2023-04-27 ENCOUNTER — VIRTUAL VISIT (OUTPATIENT)
Dept: RHEUMATOLOGY | Facility: CLINIC | Age: 73
End: 2023-04-27
Attending: INTERNAL MEDICINE
Payer: COMMERCIAL

## 2023-04-27 DIAGNOSIS — J84.9 ILD (INTERSTITIAL LUNG DISEASE) (H): Primary | ICD-10-CM

## 2023-04-27 DIAGNOSIS — M35.01 SJOGREN'S SYNDROME WITH KERATOCONJUNCTIVITIS SICCA (H): Chronic | ICD-10-CM

## 2023-04-27 PROCEDURE — 99442 PR PHYSICIAN TELEPHONE EVALUATION 11-20 MIN: CPT | Mod: VID | Performed by: INTERNAL MEDICINE

## 2023-04-27 NOTE — LETTER
4/27/2023       RE: Albina Pedro  [address]  [place] [address]     Dear Colleague,    Thank you for referring your patient, Albina Pedro, to the [facility] [place] at [facility]. Please see a copy of my visit note below.    No chief complaint on file.  Inflammatory myositis  ILD  Imuran monitoring    Date of initial visit: 3/25//2022  Last seen: 10/28/2022    DOS: 4/27/2023      ASSESSMENT AND PLAN:    Inflammatory myositis. Sjogren's. ILD. Albina Pedro 71 y.o. female with long standing h/o seropositive Sjogren's causing ILD, inflammatory myositis, R thigh panniculitis who presents for worsening joint pain, muscle weakness and deconditioning over past 2 years. Although she has OA and her ILD seems to be stable, it seems like she was doing well in the past while taking imuran (AZA). She was on AZA 2912-6066 and was tapered off slowly as was doing well. Max AZA dose was 100 mg every day and last dose was 50 mg every other day. She tolerated AZA in the past with no SEs. Her ILD has not worsened off AZA. No flare of rash. In 3/2022 (initial visit), I recommended her to resume taking AZA while doing additional work up. Plus, I recommend a course of prednisone taper. We discussed Sjogren's Dx, mx of sicca.     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 IVIG per Dr. Manning. Will have LP for Alzheimer's.    AZA monitoring. Labs q3mo    ILD. Follow up with pulmonary.        Plan:    Tell Beatriz that you would get IVIG at [place]    Continue imuran 50 mg a day with labs q 3months    Start tapering prednisone: 9-8-7-6 mg a day each course x 1 month then 5 mg a day and stay on 5 mg a day    DEXA bone density    MTM referral     Video visit in about 3 months    Paul Mark MD      Today, worsening memory    Return in 6 months (in person)    Tylenol  extra strength 500 mg 1 tab every 6 hours as needed  19 min    HISTORY OF PRESENTING ILLNESS:       3/25/2022:    Kristina is a 72 yo female who presents today with her . She has been referred for m/o Sjogren's. She has h/o +SSA/SSB Sjogren's diagnosed in 2004 after having sicca x 20 yr followed by muscle weakness, inflammatory arthritis, L thigh panniculitis, ILD. Was treated with prednisone, did not tolerate MTX. Was on AZA 8363-8232. Had +BINDU, +RF.    She was last seen by Dr. Moore in 1/2020 and before that, was under care of Dr. Ho who diagnosed and treated her since 2004.    She is here to discuss her joint pain.    She has diffuse arthralgia, no joint swelling, no AM stiffness, reports loss of strength of her hands with poor .    She has arthralgia, it got worse and went downhill for past 2 years.    Sometimes gets muscle weakness, more constant lately over past few months.    Has brain fog, getting worse.    Has progressive hair loss with bald spots.    No photosensitivity or skin rashes.    Uses biotene and water, wakes up thirsty. Had a tooth which broke, sometimes teeth are chipping. Has dry mouth due to Sjogren's.    Has dry eyes, uses OTC eyedrops.    Gets R droopy eyelid, now affected L eyelid.    Eyes look red ad dry.    No parotid gland swelling.    No CP, SOB, cough, fevers or night sweats.    Gets diarrhea from certain foods.    No numbness, tingling.    Has fatigue, difficult time staying awake.    She did well on her cognitive function testing.    Mestinon caused diarrhea and stopped taking it, scheduled to have EMG and do follow up with Dr. Manning.    Updated on cancer screening.    No dysphagia.    6/3/2022:    Kristina was initially seen in 3/2022 with muscle weakness, when she was treated with prednisone taper (4tab=20 mg qd x 5 days, 3tab=15 mg qd x 5 days, 2tab=10 mg qd x 5 days, 1 tab=5 mg qd x 5 days then stop) and was put back on imuran 50 mg every day. Prednisone helped  her.    Overall feeling pretty good.    Had diarrhea but it is better.    Went on 4 almazan past weekend and did well.    She and her  have seen an improvement by resuming imuran.    Easier to get out of chair.    Waiting to hear about muscle biopsy result.     10/28/2022:    Kristina presents for follow up. She is here with her . They report that Kristina is getting worse, feels weaker, has memory problem. Dr. Manning has ordered IVIG for myasthenia gravis, I see that the staff tried to reach Kristina to schedule at [place] with no success, Kristina reports that her phone is not working and they should call her . 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    No rash, or oral ulcers.    Has hair loss x past 6 months.    No stomach pain.    Sometimes has diarrhea based on what she eats.    Had a cough, resolved.    No CP.    Has dry mouth, solid dysphagia.    Dryness of eyes is worse. Uses OTC eyedrops.    Mestinon is not helping with MG.    Legs are weak, reports falls.    ROS: A comprehensive ROS was done. Positives are per HPI.    Past Medical History:   Diagnosis Date    CAD (coronary artery disease)     ILD (interstitial lung disease) (H)     Other and unspecified hyperlipidemia     Sicca syndrome (H)     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       Past Surgical History:   Procedure Laterality Date    BIOPSY MUSCLE DIAGNOSTIC (LOCATION) Left 2022    Procedure: BIOPSY, MUSCLE LEFT biceps @0900;  Surgeon: Tyrell Loo MD;  [place] [facility] [place]    C/SECTION, LOW TRANSVERSE  10/13/1978    , Low Transverse    COLONOSCOPY      OPEN REDUCTION INTERNAL FIXATION ANKLE Right 2019    Procedure: Open reduction internal fixation right lateral malleolus fracture;  Surgeon: Rolo Oconnor DPM;   Location: [facility] OR    SURGICAL HISTORY OF -           SURGICAL HISTORY OF -   00    Colonoscopy       Family History   Problem Relation Age of Onset    Cancer Mother         Breast and liver- age 43    Heart Disease Father         ? chf?h/o CVA    Alzheimer Disease Father     Hypertension Father     Neurologic Disorder Father         CVA    Diabetes Maternal Grandmother     Breast Cancer Maternal Aunt     Breast Cancer Paternal Aunt     Cancer - colorectal No family hx of     Prostate Cancer No family hx of        Social History     Tobacco Use    Smoking status: Never    Smokeless tobacco: Never   Vaping Use    Vaping status: Not on file   Substance Use Topics    Alcohol use: Yes     Alcohol/week: 0.0 standard drinks of alcohol     Comment: 1 glass of wine every 2 weeks     Outpatient Encounter Medications as of 2023   Medication Sig Dispense Refill    acetaminophen (TYLENOL) 500 MG tablet Take 1,000 mg by mouth every 6 hours as needed for mild pain      albuterol (PROAIR HFA/PROVENTIL HFA/VENTOLIN HFA) 108 (90 Base) MCG/ACT inhaler INHALE TWO PUFFS BY MOUTH EVERY FOUR HOURS AS NEEDED FOR SHORTNESS OF BREATH/DYSPNEA (Patient not taking: Reported on 2023) 25.5 g 0    aspirin (ASA) 81 MG EC tablet Take 1 tablet (81 mg) by mouth daily 90 tablet 3    azaTHIOprine (IMURAN) 50 MG tablet Take 1 tablet (50 mg) twice daily x 14 days then complete labs, if labs ok then take 2 tablets (100 mg) in the morning and 1 tablet (50 mg) in the evening x 14 days then complete labs, if labs ok then take 2 tablets (100 mg) twice daily 120 tablet 5    Calcium-Magnesium-Zinc 500-250-12.5 MG TABS Take 1 tablet by mouth daily      hyoscyamine (LEVSIN) 0.125 MG tablet TAKE  1 TABLET BY MOUTH EVERY EIGHT HOURS 30 MINUTES BEFORE EACH MESTINON DOSE Strength: 0.125 mgTAKE 180 tablet 0    LORazepam (ATIVAN) 0.5 MG tablet 1/2 -1 TABS UP TO 3-5 TIMES A WEEK AS NEEDED 20 tablet 0    Multiple Vitamins-Minerals  (MULTIVITAMIN & MINERAL PO) Take 1 tablet by mouth daily       omeprazole (PRILOSEC) 20 MG DR capsule Take 1 capsule (20 mg) by mouth daily 90 capsule 1    pravastatin (PRAVACHOL) 40 MG tablet Take 1 tablet (40 mg) by mouth daily 90 tablet 3    predniSONE (DELTASONE) 5 MG tablet Take 1 tablet (5 mg) by mouth daily      pyridostigmine (MESTINON) 60 MG tablet Take 1 tablet (60 mg) by mouth 3 times daily 270 tablet 0    ranolazine (RANEXA) 1000 MG TB12 12 hr tablet Take 0.5 tablets (500 mg) by mouth 2 times daily 90 tablet 4    rivastigmine (EXELON) 4.6 MG/24HR 24 hr patch Place 1 patch onto the skin daily 30 patch 1    sertraline (ZOLOFT) 100 MG tablet Take 2 tablets (200 mg) by mouth daily 180 tablet 0     No facility-administered encounter medications on file as of 4/27/2023.       Allergies   Allergen Reactions    Daypro [Oxaprozin] Rash           Lidocaine Other (See Comments)     Rapid heart rate    Nitroglycerin Other (See Comments)     Causes low blood pressure    Penicillins Unknown     Occurred as child.    Sulfa Antibiotics Rash     Physical exam:    There were no vitals taken for this visit.       Constitutional: NAD. pleasant.  present.  HEENT: EOMI, no scleral icterus. Nl conj. No oral ulcers, poor salivary pool, No adenopathy or thyromegaly.   Cardiovascular: RRR, no M/R/G, trace LE edema  Respiratory: CTA b/l  Gastrointestinal:  soft, non-tender to palpation.    Musculoskeletal: No active synovitis. No joint tenderness   Skin: no rash  Neurological system: A&O x 3, hip flexors 4/5.  Psych: nl affect    Component      Latest Ref Rng & Units 6/17/2019   WBC      4.0 - 11.0 10e9/L 5.6   RBC Count      3.8 - 5.2 10e12/L 4.19   Hemoglobin      11.7 - 15.7 g/dL 13.0   Hematocrit      35.0 - 47.0 % 39.8   MCV      78 - 100 fl 95   MCH      26.5 - 33.0 pg 31.0   MCHC      31.5 - 36.5 g/dL 32.7   RDW      10.0 - 15.0 % 13.1   Platelet Count      150 - 450 10e9/L 230   % Neutrophils      % 56.3   %  Lymphocytes      % 31.4   % Monocytes      % 9.6   % Eosinophils      % 2.3   % Basophils      % 0.4   Absolute Neutrophil      1.6 - 8.3 10e9/L 3.2   Absolute Lymphocytes      0.8 - 5.3 10e9/L 1.8   Absolute Monocytes      0.0 - 1.3 10e9/L 0.5   Absolute Eosinophils      0.0 - 0.7 10e9/L 0.1   Absolute Basophils      0.0 - 0.2 10e9/L 0.0   Diff Method       Automated Method   Sodium      133 - 144 mmol/L 140   Potassium      3.4 - 5.3 mmol/L 5.0   Chloride      94 - 109 mmol/L 106   Carbon Dioxide      20 - 32 mmol/L 31   Anion Gap      3 - 14 mmol/L 3   Glucose      70 - 99 mg/dL 96   Urea Nitrogen      7 - 30 mg/dL 17   Creatinine      0.52 - 1.04 mg/dL 0.57   GFR Estimate      >60 mL/min/1.73:m2 >90   GFR Estimate If Black      >60 mL/min/1.73:m2 >90   Calcium      8.5 - 10.1 mg/dL 9.1   Bilirubin Total      0.2 - 1.3 mg/dL 0.5   Albumin      3.4 - 5.0 g/dL 3.8   Protein Total      6.8 - 8.8 g/dL 7.4   Alkaline Phosphatase      40 - 150 U/L 59   ALT      0 - 50 U/L 27   AST      0 - 45 U/L 20   Cholesterol      <200 mg/dL 150   Triglycerides      <150 mg/dL 168 (H)   HDL Cholesterol      >49 mg/dL 70   LDL Cholesterol Calculated      <100 mg/dL 46   Non HDL Cholesterol      <130 mg/dL 80   Rheumatoid Factor      <20 IU/mL <20   CRP Inflammation      0.0 - 8.0 mg/L <2.9   Vitamin D Deficiency screening      20 - 75 ug/L 48   Vitamin B12      193 - 986 pg/mL 705     Component      Latest Ref Rng & Units 1/16/2020   Ruth-1 Autoantibodies      <20 EU 0   Scl-70 Autoantibodies      <20 EU 0   Sm (Vergara) Autoantibodies      <20 EU 1   Sm/RNP Autoantibodies      <20 EU 2   SS-A/Ro Autoantibodies      <20  (H)   SS-B/La Autoantibodies      <20  (H)   Complement C4      19 - 59 mg/dL 27   Complement C3      83 - 177 mg/dL 139   DNA (ds) Antibody      <25 IU 3       Component      Latest Ref Rng & Units 1/20/2022 2/16/2022 2/28/2022   WBC      4.0 - 11.0 10e3/uL 5.7     RBC Count      3.80 - 5.20 10e6/uL 4.14      Hemoglobin      11.7 - 15.7 g/dL 13.0     Hematocrit      35.0 - 47.0 % 39.8     MCV      78 - 100 fL 96     MCH      26.5 - 33.0 pg 31.4     MCHC      31.5 - 36.5 g/dL 32.7     RDW      10.0 - 15.0 % 13.1     Platelet Count      150 - 450 10e3/uL 223     % Neutrophils      % 57     % Lymphocytes      % 30     % Monocytes      % 10     % Eosinophils      % 3     % Basophils      % 0     Absolute Neutrophils      1.6 - 8.3 10e3/uL 3.3     Absolute Lymphocytes      0.8 - 5.3 10e3/uL 1.7     Absolute Monocytes      0.0 - 1.3 10e3/uL 0.6     Absolute Eosinophils      0.0 - 0.7 10e3/uL 0.2     Absolute Basophils      0.0 - 0.2 10e3/uL 0.0     Sodium      133 - 144 mmol/L 139 137    Potassium      3.4 - 5.3 mmol/L 4.3 4.3    Chloride      94 - 109 mmol/L 108 107    Carbon Dioxide      20 - 32 mmol/L 38 (H) 25    Anion Gap      3 - 14 mmol/L <1 (L) 5    Urea Nitrogen      7 - 30 mg/dL 22 27    Creatinine      0.52 - 1.04 mg/dL 0.56 0.59    Calcium      8.5 - 10.1 mg/dL 9.0 9.1    Glucose      70 - 99 mg/dL 99 96    Alkaline Phosphatase      40 - 150 U/L 63 67    AST      0 - 45 U/L 20 30    ALT      0 - 50 U/L 33 44    Protein Total      6.8 - 8.8 g/dL 7.4 7.3    Albumin      3.4 - 5.0 g/dL 3.5 4.0    Bilirubin Total      0.2 - 1.3 mg/dL 0.5 0.4    GFR Estimate      >60 mL/min/1.73m2 >90 >90    Color Urine      Colorless, Straw, Light Yellow, Yellow Yellow     Appearance Urine      Clear Clear     Glucose Urine      Negative mg/dL Negative     Bilirubin Urine      Negative Negative     Ketones Urine      Negative mg/dL Negative     Specific Gravity Urine      1.003 - 1.035 >=1.030     Blood Urine      Negative Negative     pH Urine      5.0 - 7.0 5.0     Protein Albumin Urine      Negative mg/dL Negative     Urobilinogen Urine      0.2, 1.0 E.U./dL 0.2     Nitrite Urine      Negative Negative     Leukocyte Esterase Urine      Negative Negative     TSH      0.40 - 4.00 mU/L 1.87     AcetChol Binding Darlin      0.0 - 0.4  nmol/L   0.0   AcetChol Modul Darlin      <=45 %   4   Clinical Information:     71 year old female with generalized fatigue and right eyelid ptosis. She has not taken her Mestinon in at least the last week due to intolerance with diarhea. Query myopathy vs neuromuscular junction disorder. Brief exam: bilateral eyelid closure weakness and neck flexion weakness. Double vision on upward gaze and lateral gaze; Right Deltoid 5, Biceps 5, Triceps 4 bilaterally; subtle asymmetry of the finger flexors (very mild weakness on the left, normal on the right).      Techniques:     Motor and sensory conduction studies were done with surface recording electrodes. EMG was done with a concentric needle electrode.      Results:     The right peroneal-EDB motor study showed normal onset latency with low amplitude without segmental changes, and normal conduction velocities.The right tibial-AH, peroneal-TA, median-APB and ulnar-ADM motor studies were normal. The right superficial peroneal, sural, median, ulnar, and radial antidromic sensory studies were normal. The right tibial F wave latencies were normal. 3 Hz slow repetitive nerve stimulation at the right ADM and orbicularis oculi muscles did not show any significant decrement either at rest or after 1 min of maximal isometric exercise.      Needle evaluation of the right Triceps Brachii, Pronator Teres, Biceps, Tibialis anterior, Gastrocnemius (Medial Hd) and vastus lateralis showed abnormal spontaneous activity with fibrillation potentials (1+ to 2+) with CRDs present in the vastus lateralis only. The right Triceps Brachii, Pronator Teres, First Dorsal Interosseous , Biceps, and vastus lateralis showed small amplitude, short duration, polyphasic motor units with early recruitment in the triceps and biceps muscles.  The right tibialis anterior showed normal recruitment of normal appearing motor units. The right Gastrocnemius (Medial Hd) had normal recruitment of large, long duration  motor units. The right deltoid muscle was normal.      Interpretation:     Abnormal study. 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 Please note, however, that this could still be part of a myopathic process because some chronic myopathies may show large, long duration motor unit potentials. There was no electrodiagnostic evidence of a neuromuscular junction disorder like myasthenia gravis.      Comment: The results of the study were discussed with the patient and she was referred for muscle biopsy of the left triceps or biceps.   ___________________________  Millicent Diggs MD, Neuromuscular Fellow  Rene Manning MD      Component      Latest Ref Rng & Units 3/25/2022   WBC      4.0 - 11.0 10e3/uL 6.1   RBC Count      3.80 - 5.20 10e6/uL 4.12   Hemoglobin      11.7 - 15.7 g/dL 12.7   Hematocrit      35.0 - 47.0 % 38.7   MCV      78 - 100 fL 94   MCH      26.5 - 33.0 pg 30.8   MCHC      31.5 - 36.5 g/dL 32.8   RDW      10.0 - 15.0 % 13.0   Platelet Count      150 - 450 10e3/uL 236   % Neutrophils      % 59   % Lymphocytes      % 30   % Monocytes      % 7   % Eosinophils      % 2   % Basophils      % 1   % Immature Granulocytes      % 1   NRBCs per 100 WBC      <1 /100 0   Absolute Neutrophils      1.6 - 8.3 10e3/uL 3.6   Absolute Lymphocytes      0.8 - 5.3 10e3/uL 1.8   Absolute Monocytes      0.0 - 1.3 10e3/uL 0.4   Absolute Eosinophils      0.0 - 0.7 10e3/uL 0.1   Absolute Basophils      0.0 - 0.2 10e3/uL 0.0   Absolute Immature Granulocytes      <=0.4 10e3/uL 0.0   Absolute NRBCs      10e3/uL 0.0   GARETT-1 (Histidyl-tRNA Synthetase) Darlin IgG      0 - 40 AU/mL 0   PL-12 (alanyl-tRNA synthetase) Antibody      Negative Negative   PL-7 (threonyl-tRNA synthetase) Antibody      Negative Negative   EJ (glycyl - tRNA synthetase)  Antibody      Negative Negative   OJ (isoleucyl-tRNA synthetase) Antibody      Negative Negative   SRP (Signal Recognition Particle) Antibody      Negative Negative   Mi-2 (nuclear helicase protein) Antibody      Negative Negative   P155/140 (TIF1-gamma) Antibody      Negative Negative   TIF-1 Gamma Antibody      Negative Negative   SAE1 (SUMO activating enzyme) Darlin      Negative Negative   MDA5 (CADM 140) Darlin      Negative Negative   NXP-2 (Nuclear matrix protein 2) Darlin      Negative Negative   Myositis Interp       See Note   Albumin Fraction      3.7 - 5.1 g/dL 4.2   Alpha 1 Fraction      0.2 - 0.4 g/dL 0.3   Alpha 2 Fraction      0.5 - 0.9 g/dL 0.9   Beta Fraction      0.6 - 1.0 g/dL 0.9   Gamma Fraction      0.7 - 1.6 g/dL 0.9   Monoclonal Peak      <=0.0 g/dL 0.0   ELP Interpretation:       Essentially normal electrophoretic pattern. No obvious monoclonal proteins seen. Pathologic significance requires clinical correlation. Carlos Ashford M.D., Ph.D., Pathologist.   Quantiferon-TB Gold Plus Result      Negative Negative   TB1 Ag minus Nil Value      IU/mL 0.01   TB2 Ag minus Nil Value      IU/mL 0.00   Mitogen minus Nil Result      IU/mL 9.97   Nil Result      IU/mL 0.03   BINDU interpretation      Negative Positive (A)   BINDU pattern 1       Speckled   BINDU titer 1       1:160   Iron      35 - 180 ug/dL 69   Iron Binding Cap      240 - 430 ug/dL 344   Iron Saturation Index      15 - 46 % 20   Creatinine      0.52 - 1.04 mg/dL 0.63   GFR Estimate      >60 mL/min/1.73m2 >90   ALT      0 - 50 U/L 31   Albumin      3.4 - 5.0 g/dL 3.6   AST      0 - 45 U/L 18   Thyroglobulin Antibody      <40 IU/mL <20   CK Total      30 - 225 U/L 244 (H)   Aldolase      1.2 - 7.6 U/L 3.9   Complement C3      81 - 157 mg/dL 142   Complement C4      13 - 39 mg/dL 26   CRP Inflammation      0.0 - 8.0 mg/L <2.9   Cryoglobulin Interpretation      Negative Negative   Cyclic Citrullinated Peptide Antibody, IgG      <7.0 U/mL 2.1    DNA-ds      <10.0 IU/mL 1.1   Vitamin D Deficiency screening      20 - 75 ug/L 41   Vitamin B12      193 - 986 pg/mL 603   Thyroid Peroxidase Antibody      <35 IU/mL <10   Rheumatoid Factor      <12 IU/mL 15 (H)   Hepatitis C Antibody      Nonreactive Nonreactive   Hep B Surface Agn      Nonreactive Nonreactive   Sed Rate      0 - 30 mm/hr 14   Ferritin      8 - 252 ng/mL 85   Hepatitis B Core Darlin      Nonreactive Nonreactive   Total Protein Serum for ELP      6.8 - 8.8 g/dL 7.2   Quantiferon Nil Tube      IU/mL 0.03   Quantiferon TB1 Tube      IU/mL 0.04   Quantiferon TB2 Tube       0.03   QUANTIFERON MITOGEN      IU/mL 10.00

## 2023-04-28 DIAGNOSIS — F43.0 ACUTE REACTION TO STRESS: ICD-10-CM

## 2023-04-28 RX ORDER — LORAZEPAM 0.5 MG/1
TABLET ORAL
Qty: 20 TABLET | Refills: 0 | Status: SHIPPED | OUTPATIENT
Start: 2023-04-28 | End: 2023-06-12

## 2023-05-15 DIAGNOSIS — M35.01 SJOGREN'S SYNDROME WITH KERATOCONJUNCTIVITIS SICCA (H): Primary | ICD-10-CM

## 2023-05-18 RX ORDER — PREDNISONE 5 MG/1
5 TABLET ORAL DAILY
Qty: 90 TABLET | Refills: 1 | Status: SHIPPED | OUTPATIENT
Start: 2023-05-18 | End: 2023-06-12

## 2023-05-18 NOTE — TELEPHONE ENCOUNTER
Mail box full x1    Rx changed from taper to daily dose, set up. Sched team: please contact pt to sched 6 month follow-up. Thank you all.

## 2023-05-18 NOTE — TELEPHONE ENCOUNTER
Medication/Dose: predniSONE (DELTASONE) 5 MG tablet    Last Written : 10/28/22  Last Quantity: 90, # refills: 1  Last Office Visit :  4/27/23  Pending appointment: None on file    Prescription set up and routed to provider  and the scheduling team asking that they assist pt in scheduling her follow-up.    DEANDRA TylerN, RN  [facility] Refill Team

## 2023-05-22 ENCOUNTER — OFFICE VISIT (OUTPATIENT)
Dept: FAMILY MEDICINE | Facility: CLINIC | Age: 73
End: 2023-05-22
Payer: COMMERCIAL

## 2023-05-22 VITALS
BODY MASS INDEX: 26.61 KG/M2 | RESPIRATION RATE: 14 BRPM | WEIGHT: 159.7 LBS | DIASTOLIC BLOOD PRESSURE: 66 MMHG | HEART RATE: 69 BPM | SYSTOLIC BLOOD PRESSURE: 118 MMHG | TEMPERATURE: 97.6 F | OXYGEN SATURATION: 96 % | HEIGHT: 65 IN

## 2023-05-22 DIAGNOSIS — G31.84 MILD COGNITIVE IMPAIRMENT: ICD-10-CM

## 2023-05-22 PROCEDURE — 99213 OFFICE O/P EST LOW 20 MIN: CPT | Performed by: FAMILY MEDICINE

## 2023-05-22 RX ORDER — RIVASTIGMINE 4.6 MG/24H
1 PATCH, EXTENDED RELEASE TRANSDERMAL DAILY
Qty: 30 PATCH | Refills: 1 | Status: SHIPPED | OUTPATIENT
Start: 2023-05-22 | End: 2023-09-20

## 2023-05-22 RX ORDER — SODIUM FLUORIDE 5 MG/G
GEL, DENTIFRICE DENTAL
Status: ON HOLD | COMMUNITY
Start: 2023-03-17 | End: 2024-06-30

## 2023-05-22 ASSESSMENT — PATIENT HEALTH QUESTIONNAIRE - PHQ9
10. IF YOU CHECKED OFF ANY PROBLEMS, HOW DIFFICULT HAVE THESE PROBLEMS MADE IT FOR YOU TO DO YOUR WORK, TAKE CARE OF THINGS AT HOME, OR GET ALONG WITH OTHER PEOPLE: VERY DIFFICULT
SUM OF ALL RESPONSES TO PHQ QUESTIONS 1-9: 6
SUM OF ALL RESPONSES TO PHQ QUESTIONS 1-9: 6

## 2023-05-22 ASSESSMENT — PAIN SCALES - GENERAL: PAINLEVEL: MILD PAIN (3)

## 2023-05-22 NOTE — PROGRESS NOTES
Wt Readings from Last 10 Encounters:   05/22/23 72.4 kg (159 lb 11.2 oz)   03/16/23 71.8 kg (158 lb 6.4 oz)   02/23/23 73.2 kg (161 lb 4.8 oz)   01/30/23 73.1 kg (161 lb 3.2 oz)   10/28/22 78.5 kg (173 lb)   10/28/22 78.5 kg (173 lb)   09/27/22 78.4 kg (172 lb 12.8 oz)   08/22/22 81 kg (178 lb 8 oz)   08/19/22 81.1 kg (178 lb 14.4 oz)   07/25/22 82.2 kg (181 lb 3.2 oz)       Assessment & Plan     Mild cognitive impairment    - rivastigmine (EXELON) 4.6 MG/24HR 24 hr patch; Place 1 patch onto the skin daily                 Rolo Sandoval MD  [facility] LUIS FERNANDO Acevedo is a 72 year old, presenting for the following health issues:  Recheck Medication         View : No data to display.              History of Present Illness       Back Pain:  She presents for follow up of back pain. Patient's back pain is a recurring problem.  Location of back pain:  Right lower back, left lower back, right middle of back, left middle of back, right upper back, left upper back, left buttock, right hip and other  Description of back pain: sharp and shooting  Back pain spreads: nowhere    Since patient first noticed back pain, pain is: unchanged  Does back pain interfere with her job:  Not applicable      Reason for visit:  Follow up on ?  unanswered health issue    She eats 2-3 servings of fruits and vegetables daily.She consumes 1 sweetened beverage(s) daily.She exercises with enough effort to increase her heart rate 9 or less minutes per day.  She exercises with enough effort to increase her heart rate 3 or less days per week.   She is taking medications regularly.    Today's PHQ-9         PHQ-9 Total Score: 6    PHQ-9 Q9 Thoughts of better off dead/self-harm past 2 weeks :   Not at all    How difficult have these problems made it for you to do your work, take care of things at home, or get along with other people: Very difficult         Review of Systems   Constitutional, HEENT, cardiovascular, pulmonary,  "gi and gu systems are negative, except as otherwise noted.      Objective    /66   Pulse 69   Temp 97.6  F (36.4  C) (Tympanic)   Resp 14   Ht 1.651 m (5' 5\")   Wt 72.4 kg (159 lb 11.2 oz)   SpO2 96%   BMI 26.58 kg/m    Body mass index is 26.58 kg/m .  Physical Exam   GENERAL: healthy, alert and no distress  NECK: no adenopathy, no asymmetry, masses, or scars and thyroid normal to palpation  RESP: lungs clear to auscultation - no rales, rhonchi or wheezes  CV: regular rate and rhythm, normal S1 S2, no S3 or S4, no murmur, click or rub, no peripheral edema and peripheral pulses strong  ABDOMEN: soft, nontender, no hepatosplenomegaly, no masses and bowel sounds normal  MS: no gross musculoskeletal defects noted, no edema                    "

## 2023-06-01 ENCOUNTER — TELEPHONE (OUTPATIENT)
Dept: NEUROLOGY | Facility: CLINIC | Age: 73
End: 2023-06-01
Payer: COMMERCIAL

## 2023-06-01 NOTE — TELEPHONE ENCOUNTER
[facility] Call Center    Phone Message    May a detailed message be left on voicemail: yes     Reason for Call: Other: Pt is requesting a call back to see why she is needing to have another EMG done on 6/13/2023. Please call back to advise at # [phone]     Action Taken: Message routed to:  [facility] ([facility] Neurology     Travel Screening: Not Applicable

## 2023-06-07 ENCOUNTER — PATIENT OUTREACH (OUTPATIENT)
Dept: CARE COORDINATION | Facility: CLINIC | Age: 73
End: 2023-06-07
Payer: COMMERCIAL

## 2023-06-11 DIAGNOSIS — G70.00 MYASTHENIA GRAVIS WITHOUT EXACERBATION (H): ICD-10-CM

## 2023-06-11 RX ORDER — PYRIDOSTIGMINE BROMIDE 60 MG/1
TABLET ORAL
Qty: 270 TABLET | Refills: 0 | Status: SHIPPED | OUTPATIENT
Start: 2023-06-11 | End: 2023-06-12

## 2023-06-12 ENCOUNTER — OFFICE VISIT (OUTPATIENT)
Dept: FAMILY MEDICINE | Facility: CLINIC | Age: 73
End: 2023-06-12
Payer: COMMERCIAL

## 2023-06-12 VITALS
HEART RATE: 70 BPM | TEMPERATURE: 97.2 F | RESPIRATION RATE: 14 BRPM | BODY MASS INDEX: 26.82 KG/M2 | WEIGHT: 161 LBS | DIASTOLIC BLOOD PRESSURE: 68 MMHG | OXYGEN SATURATION: 97 % | HEIGHT: 65 IN | SYSTOLIC BLOOD PRESSURE: 138 MMHG

## 2023-06-12 DIAGNOSIS — F33.0 MAJOR DEPRESSIVE DISORDER, RECURRENT, MILD (H): ICD-10-CM

## 2023-06-12 DIAGNOSIS — R15.9: ICD-10-CM

## 2023-06-12 DIAGNOSIS — Z12.31 VISIT FOR SCREENING MAMMOGRAM: Primary | ICD-10-CM

## 2023-06-12 DIAGNOSIS — F43.0 ACUTE REACTION TO STRESS: ICD-10-CM

## 2023-06-12 DIAGNOSIS — G70.00 MYASTHENIA GRAVIS WITHOUT EXACERBATION (H): ICD-10-CM

## 2023-06-12 DIAGNOSIS — M35.01 SJOGREN'S SYNDROME WITH KERATOCONJUNCTIVITIS SICCA (H): ICD-10-CM

## 2023-06-12 PROCEDURE — 99214 OFFICE O/P EST MOD 30 MIN: CPT | Performed by: FAMILY MEDICINE

## 2023-06-12 RX ORDER — SERTRALINE HYDROCHLORIDE 100 MG/1
200 TABLET, FILM COATED ORAL DAILY
Qty: 180 TABLET | Refills: 0 | Status: SHIPPED | OUTPATIENT
Start: 2023-06-12 | End: 2023-12-26

## 2023-06-12 RX ORDER — LORAZEPAM 0.5 MG/1
TABLET ORAL
Qty: 60 TABLET | Refills: 0 | Status: SHIPPED | OUTPATIENT
Start: 2023-06-12 | End: 2024-06-03

## 2023-06-12 RX ORDER — BUPROPION HYDROCHLORIDE 150 MG/1
150 TABLET ORAL EVERY MORNING
Qty: 90 TABLET | Refills: 0 | Status: SHIPPED | OUTPATIENT
Start: 2023-06-12 | End: 2023-09-17

## 2023-06-12 RX ORDER — PREDNISONE 5 MG/1
10 TABLET ORAL DAILY
Qty: 90 TABLET | Refills: 1 | Status: SHIPPED | OUTPATIENT
Start: 2023-06-12 | End: 2024-02-28

## 2023-06-12 RX ORDER — PYRIDOSTIGMINE BROMIDE 60 MG/1
60 TABLET ORAL 3 TIMES DAILY
Qty: 270 TABLET | Refills: 0 | Status: SHIPPED | OUTPATIENT
Start: 2023-06-12 | End: 2023-10-08

## 2023-06-12 ASSESSMENT — PAIN SCALES - GENERAL: PAINLEVEL: NO PAIN (0)

## 2023-06-12 NOTE — PROGRESS NOTES
Assessment & Plan     Visit for screening mammogram    - MA SCREENING DIGITAL BILAT - Future  (s+30); Future    Myasthenia gravis without exacerbation (H)    - pyridostigmine (MESTINON) 60 MG tablet; Take 1 tablet (60 mg) by mouth 3 times daily    Major depressive disorder, recurrent, mild (H)  Poor control will add welbutryn as a booster  - sertraline (ZOLOFT) 100 MG tablet; Take 2 tablets (200 mg) by mouth daily  - buPROPion (WELLBUTRIN XL) 150 MG 24 hr tablet; Take 1 tablet (150 mg) by mouth every morning    Acute reaction to stress  To be going on long family trip I recommend for the next month being able to increse to bairon john deyanira stovallaon the lorazapam  And themn back to once or twice a week.   - LORazepam (ATIVAN) 0.5 MG tablet; TAKE ONE HALF TO ONE TABLET BY MOUTH twice a day as needed.    Sjogren's syndrome with keratoconjunctivitis sicca (H)    - predniSONE (DELTASONE) 5 MG tablet; Take 2 tablets (10 mg) by mouth daily For 3 weeks. Then back to 5 mg.    Elimination disorder with fecal symptoms    - Ova and Parasite Exam Routine; Future               Rolo Sandoval MD  [facility] LUIS FERNANDO Acevedo is a 72 year old, presenting for the following health issues:  Follow Up         View : No data to display.              History of Present Illness       Reason for visit:  Follow up medication    She eats 2-3 servings of fruits and vegetables daily.She consumes 1 sweetened beverage(s) daily.She exercises with enough effort to increase her heart rate 9 or less minutes per day.    She is taking medications regularly.     -She says she has been feeling crappy.    Depression Followup    How are you doing with your depression since your last visit? Stable for the most part but she has her days.    Are you having other symptoms that might be associated with depression? Yes:  lack of concentration    Have you had a significant life event?  No     Are you feeling anxious or having panic attacks?    "Yes:  she notices it more when she is getting ready to go do anything.    Do you have any concerns with your use of alcohol or other drugs? No    Social History     Tobacco Use     Smoking status: Never     Smokeless tobacco: Never   Substance Use Topics     Alcohol use: Yes     Alcohol/week: 0.0 standard drinks of alcohol     Comment: 1 glass of wine every 2 weeks     Drug use: No         2/23/2023    10:20 AM 3/16/2023    10:13 AM 5/22/2023     7:59 AM   PHQ   PHQ-9 Total Score 3 9 6   Q9: Thoughts of better off dead/self-harm past 2 weeks Not at all Several days Not at all   F/U: Thoughts of suicide or self-harm  No    F/U: Safety concerns  No          7/25/2022     5:28 PM 8/22/2022     9:37 AM 1/4/2023     4:35 PM   SEBASTIAN-7 SCORE   Total Score 4 4 3         5/22/2023     7:59 AM   Last PHQ-9   1.  Little interest or pleasure in doing things 1   2.  Feeling down, depressed, or hopeless 0   3.  Trouble falling or staying asleep, or sleeping too much 0   4.  Feeling tired or having little energy 1   5.  Poor appetite or overeating 1   6.  Feeling bad about yourself 0   7.  Trouble concentrating 2   8.  Moving slowly or restless 1   Q9: Thoughts of better off dead/self-harm past 2 weeks 0   PHQ-9 Total Score 6         2/23/2023    10:20 AM   SEBASTIAN-7    1. Feeling nervous, anxious, or on edge 1       Suicide Assessment Five-step Evaluation and Treatment (SAFE-T)          Review of Systems   Constitutional, HEENT, cardiovascular, pulmonary, gi and gu systems are negative, except as otherwise noted.      Objective    /68   Pulse 70   Temp 97.2  F (36.2  C) (Tympanic)   Resp 14   Ht 1.651 m (5' 5\")   Wt 73 kg (161 lb)   SpO2 97%   BMI 26.79 kg/m    Body mass index is 26.79 kg/m .  Physical Exam   GENERAL: healthy, alert and no distress  NECK: no adenopathy, no asymmetry, masses, or scars and thyroid normal to palpation  RESP: lungs clear to auscultation - no rales, rhonchi or wheezes  CV: regular rate and " rhythm, normal S1 S2, no S3 or S4, no murmur, click or rub, no peripheral edema and peripheral pulses strong  ABDOMEN: soft, nontender, no hepatosplenomegaly, no masses and bowel sounds normal  MS: no gross musculoskeletal defects noted, no edema

## 2023-06-12 NOTE — PATIENT INSTRUCTIONS
Imodium 1/2 tab every evening.  Hold the next day if you do not have a BM in 2 days.    Okay to increase lorazepam to twice a day as needed during your trip.    Take melatonin 1 hour before bedtime as needed.

## 2023-06-13 ENCOUNTER — OFFICE VISIT (OUTPATIENT)
Dept: NEUROLOGY | Facility: CLINIC | Age: 73
End: 2023-06-13
Attending: PSYCHIATRY & NEUROLOGY
Payer: COMMERCIAL

## 2023-06-13 DIAGNOSIS — M54.16 RIGHT LUMBAR RADICULOPATHY: ICD-10-CM

## 2023-06-13 PROCEDURE — 95886 MUSC TEST DONE W/N TEST COMP: CPT | Mod: RT | Performed by: PSYCHIATRY & NEUROLOGY

## 2023-06-13 PROCEDURE — 95908 NRV CNDJ TST 3-4 STUDIES: CPT | Performed by: PSYCHIATRY & NEUROLOGY

## 2023-06-13 NOTE — LETTER
6/13/2023         RE: Albina Pedro  [address] [place] [address]        Dear Colleague,    Thank you for referring your patient, Albina Pedro, to the [facility] [place]. Please see a copy of my visit note below.    See procedure note.       Again, thank you for allowing me to participate in the care of your patient.        Sincerely,        Tobias Saravia MD

## 2023-06-13 NOTE — PROCEDURES
[facility]    Formerly [facility]  [address], [address]  [place] [address]  Tel: [phone]  Fax: [phone]          Full Name: Kristina Pedro Gender: Female  Patient ID: [id number] YOB: 1950      Visit Date: 6/13/2023 07:45  Age: 72 Years 6 Months Old  Interpreted By: Tobias Saravia MD   Ref Dr.: Rene Manning MD  Tech: ST   Height: 5 feet 5 inch  Reason for referral: Evaluate right lower. c/o pain, tingling in right leg > 9 months. h/o right ankle surgery.        Motor NCS      Nerve / Sites Lat Amp Dist Brock    ms mV cm m/s   R Peroneal - EDB      Ankle 4.32 2.1 8       Fib head 10.99 1.8 29 43      Pop fossa 13.54 1.9 12 47   R Tibial - AH      Ankle 5.42 3.4 8       Pop fossa 15.16 3.4 39 40       F  Wave  Nerve Fmin    ms   R Tibial - AH 56.35       Sensory NCS      Nerve / Sites Onset Lat Pk Lat Amp.2-3 Dist Brock    ms ms  V cm m/s   R Sural - Ankle (Calf)      Calf 3.28 3.91 4.7 14 43   R Superficial peroneal - Ankle      Lat leg NR NR NR 12 NR       EMG Summary Table     Spontaneous MUAP Rcmt Note   Muscle Fib PSW Fasc IA # Amp Dur PPP Rate Type   R. Gluteus medius None None None N N N N N N N   R. Gluteus marce None None None N N N N N N N   R. L3 paraspinal None None None N N N N N N N   R. L4 paraspinal None None None N N N N N N N   R. L5 paraspinal None None None N N N N N N N   R. S1 paraspinal None None None N N N N N N N   R. Iliopsoas None None None N N N N N N N   R. Adductor asmita None None None N N N N N N N   R. Quadriceps 1+ 1+ None N Sl Red Incr Incr N N N   R. Peroneus longus 1+ 1+ None N N N N N N N   R. Tibialis anterior 1+ 1+ None N N N N N N N   R. Gastrocnemius (Medial head) 2+ 2+ None N N N N N N N   R. Gastrocnemius (Lateral head) 2+ 2+ None N N N N N N N   R. Tibialis posterior 1+ 1+ None N N N N N N N     SUMMARY  Nerve conduction and EMG study of bilateral lower extremities  shows:    Normal right peroneal distal motor latency with borderline amplitude and normal conduction velocity.  Normal right tibial distal motor latency with low amplitude and normal conduction velocity.  Normal right sural and absent right superficial peroneal sensory SNAP.  Monopolar needle exam as above.    CLINICAL INTERPRETATION:  This is an abnormal nerve conduction and EMG study.  The study is suggestive of mild sensorimotor polyneuropathy in the right leg.  The needle study further shows spontaneous activity in the right L5/S1 myotome distribution.  Recommend clinical correlation for active lumbar radiculopathy.  Further clinical correlation is needed.     Tobias Saravia MD  Neurologist  [facility]  Tel:- [phone]

## 2023-06-26 ENCOUNTER — PATIENT OUTREACH (OUTPATIENT)
Dept: CARE COORDINATION | Facility: CLINIC | Age: 73
End: 2023-06-26
Payer: COMMERCIAL

## 2023-07-10 ENCOUNTER — PATIENT OUTREACH (OUTPATIENT)
Dept: CARE COORDINATION | Facility: CLINIC | Age: 73
End: 2023-07-10

## 2023-07-11 DIAGNOSIS — K52.1 DRUG-INDUCED DIARRHEA: ICD-10-CM

## 2023-07-11 NOTE — TELEPHONE ENCOUNTER
"Routing refill request to provider for review/approval because:  A break in medication        Requested Prescriptions   Pending Prescriptions Disp Refills    hyoscyamine (LEVSIN) 0.125 MG tablet [Pharmacy Med Name: Hyoscyamine Sulfate Oral Tablet 0.125 MG] 180 tablet 0     Sig: TAKE 1 TABLET BY MOUTH EVERY 8 HOURS 30 MINUTES BEFORE EACH MESTINON       Oral Anticholinergic Agents Passed - 7/11/2023 11:05 AM        Passed - Patient is of age 12 or older        Passed - Recent (12 mo) or future (30 days) visit with authorizing provider's specialty     Patient has had an office visit with the authorizing provider or a provider within the authorizing providers department within the previous 12 mos or has a future within next 30 days. See \"Patient Info\" tab in inbasket, or \"Choose Columns\" in Meds & Orders section of the refill encounter.              Passed - Medication is active on med list        Passed - Patient is not pregnant        Passed - No positive pregnancy test on file within past 12 months                 Maria Dolores Velázquez RN 07/11/23 5:22 PM    "

## 2023-07-12 RX ORDER — HYOSCYAMINE SULFATE 0.125 MG
TABLET ORAL
Qty: 180 TABLET | Refills: 0 | Status: SHIPPED | OUTPATIENT
Start: 2023-07-12 | End: 2023-10-08

## 2023-07-17 ENCOUNTER — HOSPITAL ENCOUNTER (OUTPATIENT)
Dept: MAMMOGRAPHY | Facility: CLINIC | Age: 73
Discharge: HOME | End: 2023-07-17
Attending: FAMILY MEDICINE | Admitting: FAMILY MEDICINE
Payer: COMMERCIAL

## 2023-07-17 DIAGNOSIS — Z12.31 VISIT FOR SCREENING MAMMOGRAM: ICD-10-CM

## 2023-07-17 PROCEDURE — 77067 SCR MAMMO BI INCL CAD: CPT

## 2023-08-29 ENCOUNTER — APPOINTMENT (OUTPATIENT)
Dept: LAB | Facility: CLINIC | Age: 73
End: 2023-08-29
Payer: COMMERCIAL

## 2023-09-03 ENCOUNTER — HEALTH MAINTENANCE LETTER (OUTPATIENT)
Age: 73
End: 2023-09-03

## 2023-09-03 DIAGNOSIS — I25.10 CORONARY ARTERY DISEASE INVOLVING NATIVE CORONARY ARTERY OF NATIVE HEART WITHOUT ANGINA PECTORIS: ICD-10-CM

## 2023-09-07 RX ORDER — PRAVASTATIN SODIUM 40 MG
40 TABLET ORAL DAILY
Qty: 90 TABLET | Refills: 0 | Status: SHIPPED | OUTPATIENT
Start: 2023-09-07 | End: 2023-12-15

## 2023-09-07 NOTE — TELEPHONE ENCOUNTER
pravastatin (PRAVACHOL) 40 MG tablet 90 tablet 3 8/19/2022     Last Office Visit: 8/19/22  Future Office visit:   9/8/23    Refill sent  Xin Patel RN
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
## 2023-09-11 ENCOUNTER — OFFICE VISIT (OUTPATIENT)
Dept: NEUROLOGY | Facility: CLINIC | Age: 73
End: 2023-09-11
Payer: COMMERCIAL

## 2023-09-11 VITALS
HEIGHT: 66 IN | WEIGHT: 159.6 LBS | OXYGEN SATURATION: 97 % | HEART RATE: 73 BPM | DIASTOLIC BLOOD PRESSURE: 66 MMHG | SYSTOLIC BLOOD PRESSURE: 131 MMHG | BODY MASS INDEX: 25.65 KG/M2

## 2023-09-11 DIAGNOSIS — F02.80 ALZHEIMER'S DISEASE (H): ICD-10-CM

## 2023-09-11 DIAGNOSIS — G70.00 MYASTHENIA GRAVIS WITHOUT EXACERBATION (H): ICD-10-CM

## 2023-09-11 DIAGNOSIS — M54.17 LUMBOSACRAL RADICULOPATHY AT L5: Primary | ICD-10-CM

## 2023-09-11 DIAGNOSIS — G30.9 ALZHEIMER'S DISEASE (H): ICD-10-CM

## 2023-09-11 PROCEDURE — 99214 OFFICE O/P EST MOD 30 MIN: CPT | Mod: GC | Performed by: PSYCHIATRY & NEUROLOGY

## 2023-09-11 RX ORDER — GABAPENTIN 100 MG/1
CAPSULE ORAL
Qty: 180 CAPSULE | Refills: 0 | Status: ON HOLD | OUTPATIENT
Start: 2023-09-11 | End: 2024-06-30

## 2023-09-11 ASSESSMENT — PAIN SCALES - GENERAL: PAINLEVEL: EXTREME PAIN (8)

## 2023-09-11 NOTE — PROGRESS NOTES
[facility]  Neurology Progress Note - Neuromuscular Division  [facility]    Patient Name:  Albina Pedro    MRN:  [id number]   :  1950  Date of Service:  2023  Primary care provider:  Rolo Sandoval      History of Present Illness:   Albina Pedro is a 72 year old woman with complicated history including cognitive impairment (likely mild) in the setting of elevated amyloid in the CSF complicated by recurrent delirium with steroid use for her MG. She presents to the [facility] neuromuscular clinic for follow-up of seronegative myasthenia gravis.  Diagnosis was proven by repetitive stimulation of the anconeus and triceps muscles with dramatic decrement of greater than 25% with repair after exercise.  Prominent presentation of her MG includes mild ptosis with proximal arm and leg weakness.  She is currently taking Pyridostigmine 60 mg 3 times daily, prednisone 5 mg daily, and azathioprine 100 mg twice daily.  Azathioprine as prescribed through rheumatology due to concomitant diagnosis of Sjogren's syndrome with positive SSA and SSB.  Unfortunately, her control of myasthenia remains suboptimal despite the above therapies.    We have tried multiple times to get IVIG approved but has been denied by her insurance.  This is unfortunately, especially considering that she has longstanding cognitive impairment which has been complicated extensively by recurrent delirium in the setting of steroid use for her myasthenia.  She currently is on Rivastigmine patch for this cognitive impairment.  Further work-up for this cognitive impairment includes brain MRI which is overall unremarkable, CSF showed traumatic tap but elevations of protein and mild white cell count attributable to the trauma itself.  OCBs and paraneoplastic panel were negative.  Notably, CSF amyloid beta 42 was reduced in the CSF p-tau was increased, suggestive of early Alzheimer's disease.  Other  notable work-up done for myasthenia and comorbidities historically include hemoglobin A1c, CK, CBC, ALT, AST, paraneoplastic antibody panel, B1, copper, vitamin E, MMA, TTG antibodies were unremarkable.    MG ADL was 3 today, 2 points for ability to rise from chair as she always uses her arms, and 1 point for occasional ptosis.  She otherwise continues to deny dysarthria, difficulties with chewing, dysphagia (denies any choking episodes in 6 months), shortness of breath, orthopnea, fatigability of the upper arms, or double vision.  She thinks her myasthenia has been relatively stable in the last 6 months.    She does have persistent and occasionally very painful right lower back pain and radicular pain into the knee and occasionally down to the leg.  They temporally associate this with the LP that was performed in February 2023, but admittedly she has had some intermittent back pain before that time.  Since that time EMG has shown active denervation of L5-S1 nerve innervated muscles, and MRI L-spine confirms reactive inflammatory facet arthropathy as well as moderate neuroforaminal stenosis of L5 and S1 nerve roots.  It also shows severe high-grade thecal sac stenosis at L2-L5.  The radicular and back pain have not improved over the last few months, and occasionally can be debilitating with the severe shooting pain.  She denies any specific weakness in that leg at this time, and all of her symptoms are pain and sensory related.      ROS: A 10-point ROS was performed as per HPI.    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     Past Surgical History:   Procedure Laterality Date    BIOPSY MUSCLE DIAGNOSTIC (LOCATION) Left 5/2/2022    Procedure: BIOPSY, MUSCLE  LEFT biceps @0900;  Surgeon: Tyrell Loo MD;  Location: [facility] OR    C/SECTION, LOW TRANSVERSE  10/13/1978    , Low Transverse    COLONOSCOPY      OPEN REDUCTION INTERNAL FIXATION ANKLE Right 2019    Procedure: Open reduction internal fixation right lateral malleolus fracture;  Surgeon: Rolo Oconnor DPM;  Location: [facility] OR    SURGICAL HISTORY OF 1978        SURGICAL HISTORY OF -   00    Colonoscopy       Allergies:  Allergies   Allergen Reactions    Daypro [Oxaprozin] Rash           Lidocaine Other (See Comments)     Rapid heart rate    Nitroglycerin Other (See Comments)     Causes low blood pressure    Penicillins Unknown     Occurred as child.    Sulfa Antibiotics Rash       Medications:      Current Outpatient Medications:     acetaminophen (TYLENOL) 500 MG tablet, Take 1,000 mg by mouth every 6 hours as needed for mild pain, Disp: , Rfl:     albuterol (PROAIR HFA/PROVENTIL HFA/VENTOLIN HFA) 108 (90 Base) MCG/ACT inhaler, INHALE TWO PUFFS BY MOUTH EVERY FOUR HOURS AS NEEDED FOR SHORTNESS OF BREATH/DYSPNEA (Patient not taking: Reported on 2023), Disp: 25.5 g, Rfl: 0    aspirin (ASA) 81 MG EC tablet, Take 1 tablet (81 mg) by mouth daily, Disp: 90 tablet, Rfl: 3    azaTHIOprine (IMURAN) 50 MG tablet, Take 1 tablet (50 mg) twice daily x 14 days then complete labs, if labs ok then take 2 tablets (100 mg) in the morning and 1 tablet (50 mg) in the evening x 14 days then complete labs, if labs ok then take 2 tablets (100 mg) twice daily, Disp: 120 tablet, Rfl: 5    buPROPion (WELLBUTRIN XL) 150 MG 24 hr tablet, Take 1 tablet (150 mg) by mouth every morning, Disp: 90 tablet, Rfl: 0    Calcium-Magnesium-Zinc 500-250-12.5 MG TABS, Take 1 tablet by mouth daily, Disp: , Rfl:     hyoscyamine (LEVSIN) 0.125 MG tablet, TAKE 1 TABLET BY MOUTH EVERY 8 HOURS 30 MINUTES BEFORE EACH MESTINON, Disp: 180 tablet, Rfl: 0    LORazepam (ATIVAN) 0.5 MG tablet, TAKE ONE HALF TO ONE TABLET BY  "MOUTH twice a day as needed., Disp: 60 tablet, Rfl: 0    Multiple Vitamins-Minerals (MULTIVITAMIN & MINERAL PO), Take 1 tablet by mouth daily , Disp: , Rfl:     pravastatin (PRAVACHOL) 40 MG tablet, Take 1 tablet (40 mg) by mouth daily, Disp: 90 tablet, Rfl: 0    predniSONE (DELTASONE) 5 MG tablet, Take 2 tablets (10 mg) by mouth daily For 3 weeks. Then back to 5 mg., Disp: 90 tablet, Rfl: 1    pyridostigmine (MESTINON) 60 MG tablet, Take 1 tablet (60 mg) by mouth 3 times daily, Disp: 270 tablet, Rfl: 0    rivastigmine (EXELON) 4.6 MG/24HR 24 hr patch, Place 1 patch onto the skin daily, Disp: 30 patch, Rfl: 1    sertraline (ZOLOFT) 100 MG tablet, Take 2 tablets (200 mg) by mouth daily, Disp: 180 tablet, Rfl: 0    sodium fluoride dental gel (PREVIDENT) 1.1 % GEL topical gel, USE TO BRUSH TEETH 1-2 TIMES PER DAY BEFORE BEDTIME AND SPIT; DO NOT RINSE WITH WATER, Disp: , Rfl:     Social History:  Social History     Tobacco Use    Smoking status: Never    Smokeless tobacco: Never   Substance Use Topics    Alcohol use: Yes     Alcohol/week: 0.0 standard drinks of alcohol     Comment: 1 glass of wine every 2 weeks       Family History:    Family History   Problem Relation Age of Onset    Cancer Mother         Breast and liver- age 43    Heart Disease Father         ? chf?h/o CVA    Alzheimer Disease Father     Hypertension Father     Neurologic Disorder Father         CVA    Diabetes Maternal Grandmother     Breast Cancer Maternal Aunt     Breast Cancer Paternal Aunt     Cancer - colorectal No family hx of     Prostate Cancer No family hx of          Physical Examination  Vitals: /66   Pulse 73   Ht 1.664 m (5' 5.5\")   Wt 72.4 kg (159 lb 9.6 oz)   SpO2 97%   BMI 26.15 kg/m      General: Alert, interactive; NAD, cooperative  HEENT: Normocephalic, atraumatic, nares patent, no oral lesions  Pulm: No increased work of breathing  Extremities: Warm and well perfused, peripheral pulses present, no " edema  Musculoskeletal: No deformities of feet, hands, or limbs  Skin: Not jaundiced, no rash, no ecchymoses  Psych: Normal mood and affect    Neurologic:   Mental status: Attentive, interactive; recalls remote history with accuracy, recent history lacks quite a bit of detail and needs assistance in getting full history  Speech/Language: Fluent speech without paraphasic errors; No dysarthria; naming, repetition, comprehension intact  Cranial nerves: Visual fields intact to confrontation, Eyes conjugate on neutral gaze, Pupils 5mm and brisk, EOMI w/ normal and smooth pursuit, face expression symmetric, facial sensation intact and symmetric, hearing intact to conversation, shoulder shrug strong, palate rise symmetric, tongue/uvula midline.  She has mild blurriness and diplopia with upgaze at 30 seconds.    Motor:   Bulk: Appropriate, no atrophy or hypertrophy appreciated  Tone: Appropriate, occasional paratonia  Spontaneous movements: No myoclonus, asterixis, or fasciculations  Power: *All strength assessments based on MRC grading   Right Left   Arm abduction 5 5   Elbow Flex 5 5   Elbow Extension 5 5   Wrist Extension 5 5   FDI  5 5   APB 5 5   Fingertip Flexion 5 5     5 5   Hip Flexion 4 4   Knee Flexion 5 5   Knee Extension 5 5   Dorsiflexion  5 5   Plantarflexion 5 5     Reflexes:    Right Left   Triceps 2 2   Biceps 2 2   BR 2 2   Patella 2 2   Achilles  0 2   Plantar down down   No crossed adductors or spread. No clonus    Coordination:   FNF intact bilaterally without dysmetria  Finger tapping with normal amplitude and frequency    Gait/Station:   Unable to rise from a seated position without using arms  Gait shows Antalgic gait, favoring Left hip and right knee, bilateral Trendelenburg gait, slightly wide-based, normal steppage.        IMPRESSION/PLAN:    Albina Pedro is a 72 year old woman with complicated history including cognitive impairment (likely mild) in the setting of elevated p-tau and  decreased A beta 42 amyloid in the CSF complicated by recurrent delirium with steroid use for her MG. She presents to the [facility] neuromuscular clinic for follow-up of seronegative myasthenia gravis.  Diagnosis was proven by repetitive stimulation of the anconeus and triceps muscles with dramatic decrement of greater than 25% with repair after exercise.  Prominent symptoms of her MG includes mild ptosis with proximal arm and leg weakness.  She is currently taking Pyridostigmine 60 mg 3 times daily, prednisone 5 mg daily, and azathioprine 100 mg twice daily.  Azathioprine as prescribed through rheumatology due to concomitant diagnosis of Sjogren's syndrome with positive SSA and SSB.  She has relatively stable myasthenia gravis over the last 6 months, but does have persistent and breakthrough pain of the right hip and knee that radiates from the back into her leg.  EMG proves right L5 and S1 nerve root active denervation, and MRI L-spine shows multiple nerve root impingements in the lumbosacral area.  Exam is most significant for L5 involvement with lesser involvement of S1.    For the memory problem (suspect mild Alzheimer's), we recommend seeing a Memory specialist (Dr Devine). Referral will be made today. The patient was previously interested in lecanemab and may be eligible for this treatment. Our Neuromuscular clinic does not have the expertise required to determine if the patient should get this treatment or not. Unfortunately rivastigmine patch for 3 months did not lead to any improvement of her cognitive symptoms.    For the right lumbar radiculopathy, I am ordering an IR guided Right transforaminal injection by Radiology, and starting the patient on gabapentin 100 mg three times a day. She can increase to 200 mg three times a day from the second week on if needed.    I reminded to patient that she should stop the aspirin 4-5 days prior to the injection    As for the MG, the situation is currently stable, although  not optimally controlled. IVIG and Vyvgart were denied by insurance. We recommend continuing the same medications (low dose pred, Imuran, and pyridostigmine).    The patient's poor balance may be due to weakness of the hip abductor (gluteus medius) muscles as she walks with a trendelenburg gait . This likely has to do with lumbar radiculopathies and deconditioning. Physical therapy to strengthen those muscles would help. We will start this AFTER the pain gets better with the injection and the medication.     Follow up 6 months, sooner if needed    Patient seen and discussed with attending Dr. Nigel Aguilar MD  Neuromuscular Medicine Fellow, PGY-5  [facility]    Attending addendum: Patient seen and examined with Neuromuscular fellow Dr. Aguilar at the [facility] on September 11, 2023.  I agree with his impression and plan, which I personally reviewed and edited.  Billing is MDM level 4 (moderate) based on: #1 problems assessed: Two or more chronic illnesses with progression, exacerbation, or side effects of treatment (myasthenia gravis, mild cognitive impairment/early Alzheimer's disease, and right lumbar radiculopathy), and #2 risk: prescription drug management, see above    Rene Manning MD, FAAN

## 2023-09-11 NOTE — PATIENT INSTRUCTIONS
For the memory problem, I would like you to see a Memory specialist (Dr Devine). Referral will be made today  For the pinched nerve in your back, I am ordering an injection by Radiology, and you should start taking gabapentin 100 mg three times a day. You can increase to 200 mg three times a day from the second week on if needed.    You have to stop the aspirin 4-5 days prior to the injection    Continue the same medications for your myasthenia    Your poor balance may be due to weakness of the hip muscles. This likely has to do with your back and deconditioning. Physical therapy to strengthen those muscles would help. We will start this AFTER the pain gets better with the injection and the medication.       Follow up 6 months

## 2023-09-11 NOTE — LETTER
2023       RE: Albina Pedro  [id number]  [facility] [place] [address]       Dear Colleague,    Thank you for referring your patient, Albina Pedro, to the [facility] NEUROLOGY CLINIC [place] at [facility]. Please see a copy of my visit note below.    [facility]  Neurology Progress Note - Neuromuscular Division  [facility]    Patient Name:  Albina Pedro    MRN:  [id number]   :  1950  Date of Service:  2023  Primary care provider:  Rolo Sandoval      History of Present Illness:   Albina Pedro is a 72 year old woman with complicated history including cognitive impairment (likely mild) in the setting of elevated amyloid in the CSF complicated by recurrent delirium with steroid use for her MG. She presents to the [facility] neuromuscular clinic for follow-up of seronegative myasthenia gravis.  Diagnosis was proven by repetitive stimulation of the anconeus and triceps muscles with dramatic decrement of greater than 25% with repair after exercise.  Prominent presentation of her MG includes mild ptosis with proximal arm and leg weakness.  She is currently taking Pyridostigmine 60 mg 3 times daily, prednisone 5 mg daily, and azathioprine 100 mg twice daily.  Azathioprine as prescribed through rheumatology due to concomitant diagnosis of Sjogren's syndrome with positive SSA and SSB.  Unfortunately, her control of myasthenia remains suboptimal despite the above therapies.    We have tried multiple times to get IVIG approved but has been denied by her insurance.  This is unfortunately, especially considering that she has longstanding cognitive impairment which has been complicated extensively by recurrent delirium in the setting of steroid use for her myasthenia.  She currently is on Rivastigmine patch for this cognitive impairment.  Further work-up for this cognitive  impairment includes brain MRI which is overall unremarkable, CSF showed traumatic tap but elevations of protein and mild white cell count attributable to the trauma itself.  OCBs and paraneoplastic panel were negative.  Notably, CSF amyloid beta 42 was reduced in the CSF p-tau was increased, suggestive of early Alzheimer's disease.  Other notable work-up done for myasthenia and comorbidities historically include hemoglobin A1c, CK, CBC, ALT, AST, paraneoplastic antibody panel, B1, copper, vitamin E, MMA, TTG antibodies were unremarkable.    MG ADL was 3 today, 2 points for ability to rise from chair as she always uses her arms, and 1 point for occasional ptosis.  She otherwise continues to deny dysarthria, difficulties with chewing, dysphagia (denies any choking episodes in 6 months), shortness of breath, orthopnea, fatigability of the upper arms, or double vision.  She thinks her myasthenia has been relatively stable in the last 6 months.    She does have persistent and occasionally very painful right lower back pain and radicular pain into the knee and occasionally down to the leg.  They temporally associate this with the LP that was performed in February 2023, but admittedly she has had some intermittent back pain before that time.  Since that time EMG has shown active denervation of L5-S1 nerve innervated muscles, and MRI L-spine confirms reactive inflammatory facet arthropathy as well as moderate neuroforaminal stenosis of L5 and S1 nerve roots.  It also shows severe high-grade thecal sac stenosis at L2-L5.  The radicular and back pain have not improved over the last few months, and occasionally can be debilitating with the severe shooting pain.  She denies any specific weakness in that leg at this time, and all of her symptoms are pain and sensory related.      ROS: A 10-point ROS was performed as per HPI.    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     Past Surgical History:   Procedure Laterality Date    BIOPSY MUSCLE DIAGNOSTIC (LOCATION) Left 2022    Procedure: BIOPSY, MUSCLE LEFT biceps @0900;  Surgeon: Tyrell Loo MD;  Location: [facility] OR    C/SECTION, LOW TRANSVERSE  10/13/1978    , Low Transverse    COLONOSCOPY      OPEN REDUCTION INTERNAL FIXATION ANKLE Right 2019    Procedure: Open reduction internal fixation right lateral malleolus fracture;  Surgeon: Rolo Oconnor DPM;  Location: [facility] OR    SURGICAL HISTORY OF -           SURGICAL HISTORY OF -   00    Colonoscopy       Allergies:  Allergies   Allergen Reactions    Daypro [Oxaprozin] Rash           Lidocaine Other (See Comments)     Rapid heart rate    Nitroglycerin Other (See Comments)     Causes low blood pressure    Penicillins Unknown     Occurred as child.    Sulfa Antibiotics Rash       Medications:      Current Outpatient Medications:     acetaminophen (TYLENOL) 500 MG tablet, Take 1,000 mg by mouth every 6 hours as needed for mild pain, Disp: , Rfl:     albuterol (PROAIR HFA/PROVENTIL HFA/VENTOLIN HFA) 108 (90 Base) MCG/ACT inhaler, INHALE TWO PUFFS BY MOUTH EVERY FOUR HOURS AS NEEDED FOR SHORTNESS OF BREATH/DYSPNEA (Patient not taking: Reported on 2023), Disp: 25.5 g, Rfl: 0    aspirin (ASA) 81 MG EC tablet, Take 1 tablet (81 mg) by mouth daily, Disp: 90 tablet, Rfl: 3    azaTHIOprine (IMURAN) 50 MG tablet, Take 1 tablet (50 mg) twice daily x 14 days then complete labs, if labs ok then take 2 tablets (100 mg) in the morning and 1 tablet (50 mg) in the evening x 14 days then complete labs, if labs ok then take 2 tablets (100 mg) twice daily, Disp: 120 tablet, Rfl: 5    buPROPion (WELLBUTRIN XL) 150 MG 24 hr tablet, Take 1 tablet (150 mg) by  mouth every morning, Disp: 90 tablet, Rfl: 0    Calcium-Magnesium-Zinc 500-250-12.5 MG TABS, Take 1 tablet by mouth daily, Disp: , Rfl:     hyoscyamine (LEVSIN) 0.125 MG tablet, TAKE 1 TABLET BY MOUTH EVERY 8 HOURS 30 MINUTES BEFORE EACH MESTINON, Disp: 180 tablet, Rfl: 0    LORazepam (ATIVAN) 0.5 MG tablet, TAKE ONE HALF TO ONE TABLET BY MOUTH twice a day as needed., Disp: 60 tablet, Rfl: 0    Multiple Vitamins-Minerals (MULTIVITAMIN & MINERAL PO), Take 1 tablet by mouth daily , Disp: , Rfl:     pravastatin (PRAVACHOL) 40 MG tablet, Take 1 tablet (40 mg) by mouth daily, Disp: 90 tablet, Rfl: 0    predniSONE (DELTASONE) 5 MG tablet, Take 2 tablets (10 mg) by mouth daily For 3 weeks. Then back to 5 mg., Disp: 90 tablet, Rfl: 1    pyridostigmine (MESTINON) 60 MG tablet, Take 1 tablet (60 mg) by mouth 3 times daily, Disp: 270 tablet, Rfl: 0    rivastigmine (EXELON) 4.6 MG/24HR 24 hr patch, Place 1 patch onto the skin daily, Disp: 30 patch, Rfl: 1    sertraline (ZOLOFT) 100 MG tablet, Take 2 tablets (200 mg) by mouth daily, Disp: 180 tablet, Rfl: 0    sodium fluoride dental gel (PREVIDENT) 1.1 % GEL topical gel, USE TO BRUSH TEETH 1-2 TIMES PER DAY BEFORE BEDTIME AND SPIT; DO NOT RINSE WITH WATER, Disp: , Rfl:     Social History:  Social History     Tobacco Use    Smoking status: Never    Smokeless tobacco: Never   Substance Use Topics    Alcohol use: Yes     Alcohol/week: 0.0 standard drinks of alcohol     Comment: 1 glass of wine every 2 weeks       Family History:    Family History   Problem Relation Age of Onset    Cancer Mother         Breast and liver- age 43    Heart Disease Father         ? chf?h/o CVA    Alzheimer Disease Father     Hypertension Father     Neurologic Disorder Father         CVA    Diabetes Maternal Grandmother     Breast Cancer Maternal Aunt     Breast Cancer Paternal Aunt     Cancer - colorectal No family hx of     Prostate Cancer No family hx of          Physical Examination  Vitals: BP  "131/66   Pulse 73   Ht 1.664 m (5' 5.5\")   Wt 72.4 kg (159 lb 9.6 oz)   SpO2 97%   BMI 26.15 kg/m      General: Alert, interactive; NAD, cooperative  HEENT: Normocephalic, atraumatic, nares patent, no oral lesions  Pulm: No increased work of breathing  Extremities: Warm and well perfused, peripheral pulses present, no edema  Musculoskeletal: No deformities of feet, hands, or limbs  Skin: Not jaundiced, no rash, no ecchymoses  Psych: Normal mood and affect    Neurologic:   Mental status: Attentive, interactive; recalls remote history with accuracy, recent history lacks quite a bit of detail and needs assistance in getting full history  Speech/Language: Fluent speech without paraphasic errors; No dysarthria; naming, repetition, comprehension intact  Cranial nerves: Visual fields intact to confrontation, Eyes conjugate on neutral gaze, Pupils 5mm and brisk, EOMI w/ normal and smooth pursuit, face expression symmetric, facial sensation intact and symmetric, hearing intact to conversation, shoulder shrug strong, palate rise symmetric, tongue/uvula midline.  She has mild blurriness and diplopia with upgaze at 30 seconds.    Motor:   Bulk: Appropriate, no atrophy or hypertrophy appreciated  Tone: Appropriate, occasional paratonia  Spontaneous movements: No myoclonus, asterixis, or fasciculations  Power: *All strength assessments based on MRC grading   Right Left   Arm abduction 5 5   Elbow Flex 5 5   Elbow Extension 5 5   Wrist Extension 5 5   FDI  5 5   APB 5 5   Fingertip Flexion 5 5     5 5   Hip Flexion 4 4   Knee Flexion 5 5   Knee Extension 5 5   Dorsiflexion  5 5   Plantarflexion 5 5     Reflexes:    Right Left   Triceps 2 2   Biceps 2 2   BR 2 2   Patella 2 2   Achilles  0 2   Plantar down down   No crossed adductors or spread. No clonus    Coordination:   FNF intact bilaterally without dysmetria  Finger tapping with normal amplitude and frequency    Gait/Station:   Unable to rise from a seated position " without using arms  Gait shows Antalgic gait, favoring Left hip and right knee, bilateral Trendelenburg gait, slightly wide-based, normal steppage.        IMPRESSION/PLAN:    Albina Pedro is a 72 year old woman with complicated history including cognitive impairment (likely mild) in the setting of elevated p-tau and decreased A beta 42 amyloid in the CSF complicated by recurrent delirium with steroid use for her MG. She presents to the [facility] neuromuscular clinic for follow-up of seronegative myasthenia gravis.  Diagnosis was proven by repetitive stimulation of the anconeus and triceps muscles with dramatic decrement of greater than 25% with repair after exercise.  Prominent symptoms of her MG includes mild ptosis with proximal arm and leg weakness.  She is currently taking Pyridostigmine 60 mg 3 times daily, prednisone 5 mg daily, and azathioprine 100 mg twice daily.  Azathioprine as prescribed through rheumatology due to concomitant diagnosis of Sjogren's syndrome with positive SSA and SSB.  She has relatively stable myasthenia gravis over the last 6 months, but does have persistent and breakthrough pain of the right hip and knee that radiates from the back into her leg.  EMG proves right L5 and S1 nerve root active denervation, and MRI L-spine shows multiple nerve root impingements in the lumbosacral area.  Exam is most significant for L5 involvement with lesser involvement of S1.    For the memory problem (suspect mild Alzheimer's), we recommend seeing a Memory specialist (Dr Devine). Referral will be made today. The patient was previously interested in lecanemab and may be eligible for this treatment. Our Neuromuscular clinic does not have the expertise required to determine if the patient should get this treatment or not. Unfortunately rivastigmine patch for 3 months did not lead to any improvement of her cognitive symptoms.    For the right lumbar radiculopathy, I am ordering an IR guided Right  transforaminal injection by Radiology, and starting the patient on gabapentin 100 mg three times a day. She can increase to 200 mg three times a day from the second week on if needed.    I reminded to patient that she should stop the aspirin 4-5 days prior to the injection    As for the MG, the situation is currently stable, although not optimally controlled. IVIG and Vyvgart were denied by insurance. We recommend continuing the same medications (low dose pred, Imuran, and pyridostigmine).    The patient's poor balance may be due to weakness of the hip abductor (gluteus medius) muscles as she walks with a trendelenburg gait . This likely has to do with lumbar radiculopathies and deconditioning. Physical therapy to strengthen those muscles would help. We will start this AFTER the pain gets better with the injection and the medication.     Follow up 6 months, sooner if needed    Patient seen and discussed with attending Dr. Nigel Aguilar MD  Neuromuscular Medicine Fellow, PGY-5  [facility]    Attending addendum: Patient seen and examined with Neuromuscular fellow Dr. Aguilar at the [facility] on September 11, 2023.  I agree with his impression and plan, which I personally reviewed and edited.  Billing is [facility] level 4 (moderate) based on: #1 problems assessed: Two or more chronic illnesses with progression, exacerbation, or side effects of treatment (myasthenia gravis, mild cognitive impairment/early Alzheimer's disease, and right lumbar radiculopathy), and #2 risk: prescription drug management, see above        Again, thank you for allowing me to participate in the care of your patient.      Sincerely,    Rene Manning MD

## 2023-09-15 DIAGNOSIS — F33.0 MAJOR DEPRESSIVE DISORDER, RECURRENT, MILD (H): ICD-10-CM

## 2023-09-17 RX ORDER — BUPROPION HYDROCHLORIDE 150 MG/1
150 TABLET ORAL EVERY MORNING
Qty: 90 TABLET | Refills: 0 | Status: SHIPPED | OUTPATIENT
Start: 2023-09-17 | End: 2023-12-20

## 2023-09-18 DIAGNOSIS — G31.84 MILD COGNITIVE IMPAIRMENT: ICD-10-CM

## 2023-09-19 NOTE — TELEPHONE ENCOUNTER
"Routing refill request to provider for review/approval because:  PCP to determine, break in medication.                Requested Prescriptions   Pending Prescriptions Disp Refills    rivastigmine (EXELON) 4.6 MG/24HR 24 hr patch [Pharmacy Med Name: Rivastigmine Transdermal Patch 24 Hour 4.6 MG/24HR] 30 patch 0     Sig: PLACE 1 PATCH ONTO THE SKIN ONCE DAILY       Miscellaneous Dementia Agents Passed - 9/18/2023  6:28 PM        Passed - Recent (12 mo) or future (30 days) visit within the authorizing provider's specialty     Patient has had an office visit with the authorizing provider or a provider within the authorizing providers department within the previous 12 mos or has a future within next 30 days. See \"Patient Info\" tab in inbasket, or \"Choose Columns\" in Meds & Orders section of the refill encounter.              Passed - Medication is active on med list        Passed - Patient is 18 years of age or older                 Zach Sams RN 09/19/23 11:14 AM   "

## 2023-09-20 RX ORDER — RIVASTIGMINE 4.6 MG/24H
PATCH, EXTENDED RELEASE TRANSDERMAL
Qty: 30 PATCH | Refills: 0 | Status: SHIPPED | OUTPATIENT
Start: 2023-09-20 | End: 2023-11-15

## 2023-10-08 DIAGNOSIS — G70.00 MYASTHENIA GRAVIS WITHOUT EXACERBATION (H): ICD-10-CM

## 2023-10-08 DIAGNOSIS — K52.1 DRUG-INDUCED DIARRHEA: ICD-10-CM

## 2023-10-08 RX ORDER — PYRIDOSTIGMINE BROMIDE 60 MG/1
60 TABLET ORAL 3 TIMES DAILY
Qty: 270 TABLET | Refills: 0 | Status: SHIPPED | OUTPATIENT
Start: 2023-10-08 | End: 2024-02-11

## 2023-10-08 RX ORDER — HYOSCYAMINE SULFATE 0.125 MG
TABLET ORAL
Qty: 180 TABLET | Refills: 0 | Status: ON HOLD | OUTPATIENT
Start: 2023-10-08 | End: 2024-06-30

## 2023-10-23 ENCOUNTER — OFFICE VISIT (OUTPATIENT)
Dept: CARDIOLOGY | Facility: CLINIC | Age: 73
End: 2023-10-23
Payer: COMMERCIAL

## 2023-10-23 VITALS — WEIGHT: 151.8 LBS | SYSTOLIC BLOOD PRESSURE: 122 MMHG | BODY MASS INDEX: 24.88 KG/M2 | DIASTOLIC BLOOD PRESSURE: 58 MMHG

## 2023-10-23 DIAGNOSIS — I20.0 INTERMEDIATE CORONARY SYNDROME (H): ICD-10-CM

## 2023-10-23 DIAGNOSIS — I25.10 CORONARY ARTERY DISEASE INVOLVING NATIVE CORONARY ARTERY OF NATIVE HEART WITHOUT ANGINA PECTORIS: Primary | ICD-10-CM

## 2023-10-23 PROCEDURE — 99214 OFFICE O/P EST MOD 30 MIN: CPT | Performed by: INTERNAL MEDICINE

## 2023-10-23 NOTE — PROGRESS NOTES
[facility] follow up     October 23, 2023      CC:     Follow-up coronary disease, hypertension hyperlipidemia.     The patient is a 72-year-old woman with a history of hyperlipidemia hypertension as well as myasthenia gravis and ILD on chronic immunosuppression who developed angina years ago underwent coronary angiogram-details below but ultimately she was medically managed for her angina.     She reports she has not had angina in years.  She is decently physically active around the yard without any  anginal symptoms or sob.  She is only able to walk a block or 2 due to imbalance and weakness.  She knows she needs to be more active.  She denies PND orthopnea-no syncope or palpitations.     Of note she has lost a substantial amount of weight in the last year without trying.  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       CURRENT MEDICATIONS:    Current Outpatient Medications   Medication Sig Dispense Refill    acetaminophen (TYLENOL) 500 MG tablet Take 1,000 mg by mouth every 6 hours as needed for mild pain      albuterol (PROAIR HFA/PROVENTIL HFA/VENTOLIN HFA) 108 (90 Base) MCG/ACT inhaler INHALE TWO PUFFS BY MOUTH EVERY FOUR HOURS AS NEEDED FOR SHORTNESS OF BREATH/DYSPNEA 25.5 g 0    aspirin (ASA) 81 MG EC tablet Take 1 tablet (81 mg) by mouth daily 90 tablet 3    azaTHIOprine (IMURAN) 50 MG tablet Take 1 tablet (50 mg) twice daily x 14 days then complete labs, if labs ok then take 2 tablets (100 mg) in the morning and 1 tablet (50 mg) in the evening x 14 days then complete labs, if labs ok then take 2 tablets (100 mg) twice daily 120 tablet 5    buPROPion  (WELLBUTRIN XL) 150 MG 24 hr tablet TAKE ONE TABLET BY MOUTH IN THE MORNING 90 tablet 0    Calcium-Magnesium-Zinc 500-250-12.5 MG TABS Take 1 tablet by mouth daily      gabapentin (NEURONTIN) 100 MG capsule 1 capsule three times a day for 1 week, increase to 2 capsules three times a day on week 2 if pain continues 180 capsule 0    hyoscyamine (LEVSIN) 0.125 MG tablet TAKE 1 TABLET BY MOUTH EVERY EIGHT HOURS 30 MINUTES BEFORE EACH MESTINON. 180 tablet 0    LORazepam (ATIVAN) 0.5 MG tablet TAKE ONE HALF TO ONE TABLET BY MOUTH twice a day as needed. 60 tablet 0    Multiple Vitamins-Minerals (MULTIVITAMIN & MINERAL PO) Take 1 tablet by mouth daily       pravastatin (PRAVACHOL) 40 MG tablet Take 1 tablet (40 mg) by mouth daily 90 tablet 0    predniSONE (DELTASONE) 5 MG tablet Take 2 tablets (10 mg) by mouth daily For 3 weeks. Then back to 5 mg. 90 tablet 1    pyRIDostigmine (MESTINON) 60 MG tablet TAKE ONE TABLET BY MOUTH THREE TIMES DAILY 270 tablet 0    rivastigmine (EXELON) 4.6 MG/24HR 24 hr patch PLACE 1 PATCH ONTO THE SKIN ONCE DAILY 30 patch 0    sertraline (ZOLOFT) 100 MG tablet Take 2 tablets (200 mg) by mouth daily 180 tablet 0    sodium fluoride dental gel (PREVIDENT) 1.1 % GEL topical gel USE TO BRUSH TEETH 1-2 TIMES PER DAY BEFORE BEDTIME AND SPIT; DO NOT RINSE WITH WATER         PAST SURGICAL HISTORY:  Past Surgical History:   Procedure Laterality Date    BIOPSY MUSCLE DIAGNOSTIC (LOCATION) Left 2022    Procedure: BIOPSY, MUSCLE LEFT biceps @0900;  Surgeon: Tyrell Loo MD;  Location: [facility] OR    C/SECTION, LOW TRANSVERSE  10/13/1978    , Low Transverse    COLONOSCOPY      OPEN REDUCTION INTERNAL FIXATION ANKLE Right 2019    Procedure: Open reduction internal fixation right lateral malleolus fracture;  Surgeon: Rolo Oconnor DPM;  Location: [facility] OR    SURGICAL HISTORY OF -           SURGICAL HISTORY OF -   00    Colonoscopy       ALLERGIES     Allergies    Allergen Reactions    Daypro [Oxaprozin] Rash           Lidocaine Other (See Comments)     Rapid heart rate    Nitroglycerin Other (See Comments)     Causes low blood pressure    Penicillins Unknown     Occurred as child.    Sulfa Antibiotics Rash       FAMILY HISTORY:  Family History   Problem Relation Age of Onset    Cancer Mother         Breast and liver- age 43    Heart Disease Father         ? chf?h/o CVA    Alzheimer Disease Father     Hypertension Father     Neurologic Disorder Father         CVA    Diabetes Maternal Grandmother     Breast Cancer Maternal Aunt     Breast Cancer Paternal Aunt     Cancer - colorectal No family hx of     Prostate Cancer No family hx of        SOCIAL HISTORY:  Social History     Socioeconomic History    Marital status:      Spouse name: Not on file    Number of children: Not on file    Years of education: Not on file    Highest education level: Not on file   Occupational History    Not on file   Tobacco Use    Smoking status: Never    Smokeless tobacco: Never   Substance and Sexual Activity    Alcohol use: Yes     Alcohol/week: 0.0 standard drinks of alcohol     Comment: 1 glass of wine every 2 weeks    Drug use: No    Sexual activity: Yes     Partners: Male   Other Topics Concern    Parent/sibling w/ CABG, MI or angioplasty before 65F 55M? No   Social History Narrative    Not on file     Social Determinants of Health     Financial Resource Strain: Not on file   Food Insecurity: Not on file   Transportation Needs: Not on file   Physical Activity: Not on file   Stress: Not on file   Social Connections: Not on file   Interpersonal Safety: Not on file   Housing Stability: Not on file         ROS:   Constitutional: No fever, chills, or sweats. + substantial weight loss (unintentional)   ENT: No visual disturbance, ear ache, epistaxis, sore throat- myasthenia controlled   Allergies/Immunologic: Negative.   Respiratory: No cough, hemoptysia  Cardiovascular: As per HPI  GI:  No nausea, vomiting, hematemesis, melena, or hematochezia  : No urinary frequency, dysuria, or hematuria  Integument: Negative  Psychiatric: Negative  Neuro: gait instability   Endocrinology:  cold   Musculoskeletal: weakness     EXAM:  /58 (BP Location: Right arm, Patient Position: Sitting, Cuff Size: Adult Regular)   Wt 68.9 kg (151 lb 12.8 oz)   BMI 24.88 kg/m    General: appears comfortable, alert and articulate  Head: normocephalic, atraumatic  Eyes: anicteric sclera, EOMI  Neck: no adenopathy  Orophyarynx: moist mucosa, no lesions, dentition intact  Heart: PMI focal, normal s1, s2 no s3, s4 JVP <9   Lungs: clear, no rales or wheezing  Abdomen: soft, non-tender, bowel sounds present, no hepatosplenomegaly  Extremities: no clubbing, cyanosis or edema  Neurological: normal speech and affect, no gross motor deficits      Last echo   6/30/21  Interpretation Summary     Left ventricular systolic function is normal.  The visual ejection fraction is 60-65%.  There is mild concentric left ventricular hypertrophy.  The right ventricle is normal in structure, function and size.  No significant valve dysfunction.  The inferior vena cava was normal in size with preserved respiratory  variability.  There is no pericardial effusion.     No change from prior study 4/18/2013.    Last cath and lexiscan reviewed         Assessment and Recommendations:     In summary this is a 72-year-old woman with coronary artery disease and small vessels not ideal for PCI.  She had a Lexiscan with small anterior ischemia in the past with normal LV function.      History of angina - no angina presently her blood pressure is well controlled and she is on aspirin and statin    Hyperlipidemia given she has had substantial weight loss she may need less statin now than she did in the future-  recommend yearly lipids and lowering her statin dose if lipids are over managed     Hypertension controlled off of all antihypertensives with weight  loss     Substantial weight loss-  recommend following up with her primary care physician for further evaluation      Monika Mojica MD

## 2023-10-23 NOTE — NURSING NOTE
Return Appointment: Patient given instructions regarding scheduling next clinic visit. Patient demonstrated understanding of this information and agreed to call with further questions or concerns.    Patient stated she understood all health information given and agreed to call with further questions or concerns.     Pt to return with Dr Carbone in one year    See pcp for weight loss    Maria Dolores Lagos RN

## 2023-10-23 NOTE — PROGRESS NOTES
Albina Pedro's goals for this visit include:     She requests these members of her care team be copied on today's visit information: pcp    PCP: Rolo Sandoval    Referring Provider:  No referring provider defined for this encounter.    /58 (BP Location: Right arm, Patient Position: Sitting, Cuff Size: Adult Regular)   Wt 68.9 kg (151 lb 12.8 oz)   BMI 24.88 kg/m      Do you need any medication refills at today's visit? No.    Ari Bustos, EMT  Clinic Support  [facility]    [phone]    Employed by [employer]

## 2023-10-23 NOTE — PATIENT INSTRUCTIONS
Cardiology Providers you saw during your visit:  Dr. Mojica     Medication changes:  1-  no medication changes        Follow up:  1- Follow up in one year with Dr Carbone  2- See primary care for weight loss       Please call if you have:  1. Weight gain of more than 2 pounds in a day or 5 pounds in a week  2. Increased shortness of breath, swelling or bloating  3. Dizziness, lightheadedness   4. Any questions or concerns.      Heart Failure Support Group  Virtual meetings will continue in 2023 Please reach out if you would like to attend and we can get you the information you need to log in.     2023 dates for Support Group    Monday, September 11th, 1-2pm  Monday, October 2nd, 1-2pm  Monday, November 6th, 1-2pm  Monday, December 4th, 1-2pm    Follow the American Heart Association Diet and Lifestyle recommendations:  Limit saturated fat, trans fat, sodium, red meat, sweets and sugar-sweetened beverages. If you choose to eat red meat, compare labels and select the leanest cuts available.  Aim for at least 150 minutes of moderate physical activity or 75 minutes of vigorous physical activity - or an equal combination of both - each week.     During business hours: [phone], press option # 1 to schedule an appointment or send a message to your care team     After hours, weekends or holidays: On Call Cardiologist- [phone]   option #4 and ask to speak to the on-call Cardiologist. Inform them you are a CORE/heart failure patient at the [facility].     TERRI Whitten  Cardiology Nurse Care Coordinator (Heart Failure / C.O.R.E.)

## 2023-11-09 DIAGNOSIS — G31.84 MILD COGNITIVE IMPAIRMENT: ICD-10-CM

## 2023-11-10 RX ORDER — RIVASTIGMINE 4.6 MG/24H
PATCH, EXTENDED RELEASE TRANSDERMAL
Qty: 30 PATCH | Refills: 0 | OUTPATIENT
Start: 2023-11-10

## 2023-11-14 DIAGNOSIS — G31.84 MILD COGNITIVE IMPAIRMENT: ICD-10-CM

## 2023-11-15 RX ORDER — RIVASTIGMINE 4.6 MG/24H
PATCH, EXTENDED RELEASE TRANSDERMAL
Qty: 30 PATCH | Refills: 0 | Status: SHIPPED | OUTPATIENT
Start: 2023-11-15 | End: 2024-01-15

## 2023-12-04 ENCOUNTER — OFFICE VISIT (OUTPATIENT)
Dept: FAMILY MEDICINE | Facility: CLINIC | Age: 73
End: 2023-12-04
Payer: COMMERCIAL

## 2023-12-04 VITALS
BODY MASS INDEX: 24.12 KG/M2 | HEIGHT: 66 IN | RESPIRATION RATE: 16 BRPM | TEMPERATURE: 97.4 F | OXYGEN SATURATION: 98 % | WEIGHT: 150.1 LBS | SYSTOLIC BLOOD PRESSURE: 118 MMHG | HEART RATE: 71 BPM | DIASTOLIC BLOOD PRESSURE: 68 MMHG

## 2023-12-04 DIAGNOSIS — M19.90 ARTHRITIS: ICD-10-CM

## 2023-12-04 DIAGNOSIS — F33.1 MAJOR DEPRESSIVE DISORDER, RECURRENT EPISODE, MODERATE (H): ICD-10-CM

## 2023-12-04 DIAGNOSIS — R63.4 WEIGHT LOSS: ICD-10-CM

## 2023-12-04 DIAGNOSIS — G70.01 MYASTHENIA GRAVIS WITH EXACERBATION (H): Primary | ICD-10-CM

## 2023-12-04 DIAGNOSIS — F03.90 DEMENTIA, UNSPECIFIED DEMENTIA SEVERITY, UNSPECIFIED DEMENTIA TYPE, UNSPECIFIED WHETHER BEHAVIORAL, PSYCHOTIC, OR MOOD DISTURBANCE OR ANXIETY (H): ICD-10-CM

## 2023-12-04 DIAGNOSIS — E78.5 HYPERLIPIDEMIA LDL GOAL <100: ICD-10-CM

## 2023-12-04 LAB
ALBUMIN SERPL BCG-MCNC: 4.2 G/DL (ref 3.5–5.2)
ALP SERPL-CCNC: 59 U/L (ref 40–150)
ALT SERPL W P-5'-P-CCNC: 23 U/L (ref 0–50)
ANION GAP SERPL CALCULATED.3IONS-SCNC: 11 MMOL/L (ref 7–15)
AST SERPL W P-5'-P-CCNC: 31 U/L (ref 0–45)
BILIRUB SERPL-MCNC: 0.4 MG/DL
BUN SERPL-MCNC: 24.6 MG/DL (ref 8–23)
CALCIUM SERPL-MCNC: 9.2 MG/DL (ref 8.8–10.2)
CHLORIDE SERPL-SCNC: 104 MMOL/L (ref 98–107)
CHOLEST SERPL-MCNC: 191 MG/DL
CREAT SERPL-MCNC: 0.54 MG/DL (ref 0.51–0.95)
DEPRECATED HCO3 PLAS-SCNC: 23 MMOL/L (ref 22–29)
EGFRCR SERPLBLD CKD-EPI 2021: >90 ML/MIN/1.73M2
GLUCOSE SERPL-MCNC: 114 MG/DL (ref 70–99)
HDLC SERPL-MCNC: 90 MG/DL
LDLC SERPL CALC-MCNC: 80 MG/DL
NONHDLC SERPL-MCNC: 101 MG/DL
POTASSIUM SERPL-SCNC: 4.7 MMOL/L (ref 3.4–5.3)
PROT SERPL-MCNC: 7 G/DL (ref 6.4–8.3)
SODIUM SERPL-SCNC: 138 MMOL/L (ref 135–145)
TRIGL SERPL-MCNC: 105 MG/DL

## 2023-12-04 PROCEDURE — 36415 COLL VENOUS BLD VENIPUNCTURE: CPT | Performed by: FAMILY MEDICINE

## 2023-12-04 PROCEDURE — 80061 LIPID PANEL: CPT | Performed by: FAMILY MEDICINE

## 2023-12-04 PROCEDURE — 80053 COMPREHEN METABOLIC PANEL: CPT | Performed by: FAMILY MEDICINE

## 2023-12-04 PROCEDURE — 99214 OFFICE O/P EST MOD 30 MIN: CPT | Mod: 25 | Performed by: FAMILY MEDICINE

## 2023-12-04 PROCEDURE — 90662 IIV NO PRSV INCREASED AG IM: CPT | Performed by: FAMILY MEDICINE

## 2023-12-04 PROCEDURE — 91320 SARSCV2 VAC 30MCG TRS-SUC IM: CPT | Performed by: FAMILY MEDICINE

## 2023-12-04 PROCEDURE — 90480 ADMN SARSCOV2 VAC 1/ONLY CMP: CPT | Performed by: FAMILY MEDICINE

## 2023-12-04 PROCEDURE — G0008 ADMIN INFLUENZA VIRUS VAC: HCPCS | Performed by: FAMILY MEDICINE

## 2023-12-04 ASSESSMENT — PATIENT HEALTH QUESTIONNAIRE - PHQ9
SUM OF ALL RESPONSES TO PHQ QUESTIONS 1-9: 11
10. IF YOU CHECKED OFF ANY PROBLEMS, HOW DIFFICULT HAVE THESE PROBLEMS MADE IT FOR YOU TO DO YOUR WORK, TAKE CARE OF THINGS AT HOME, OR GET ALONG WITH OTHER PEOPLE: EXTREMELY DIFFICULT
SUM OF ALL RESPONSES TO PHQ QUESTIONS 1-9: 11

## 2023-12-04 ASSESSMENT — PAIN SCALES - GENERAL: PAINLEVEL: NO PAIN (0)

## 2023-12-04 NOTE — PROGRESS NOTES
"  Assessment & Plan     (G70.01) Myasthenia gravis with exacerbation (H)  (primary encounter diagnosis)  She is a night owl stays up late and sleps in then get he am mestionoe late and can w fel weak and \"wobbly\" until she takes her medication,    Patient Instructions   Take mestinon at 10 am. 5 pm and 11pm           (F03.90) Dementia, unspecified dementia severity, unspecified dementia type, unspecified whether behavioral, psychotic, or mood disturbance or anxiety (H)  Good control.  Continue currant medications, continue to monitor.  Has intermittant hypoglycemia  wcih makes things worse.  Had more confusion while on gabapentine. Was doing  better on high dosage prednisone.  After getting mestinon on a more regular schedule she may want to take increased prednisone,  Also discussed remeom at bed time to help with sleep and weight loss.     (M19.90) Arthritis  Up and down  gabapentine was not helpful.     (R63.4) Weight loss  Comment: will start  ensure once a day.  Wt Readings from Last 10 Encounters:   12/07/23 69.7 kg (153 lb 11.2 oz)   12/04/23 68.1 kg (150 lb 1.6 oz)   10/23/23 68.9 kg (151 lb 12.8 oz)   09/11/23 72.4 kg (159 lb 9.6 oz)   06/12/23 73 kg (161 lb)   05/22/23 72.4 kg (159 lb 11.2 oz)   03/16/23 71.8 kg (158 lb 6.4 oz)   02/23/23 73.2 kg (161 lb 4.8 oz)   01/30/23 73.1 kg (161 lb 3.2 oz)   10/28/22 78.5 kg (173 lb)       Plan: Comprehensive metabolic panel (BMP + Alb, Alk         Phos, ALT, AST, Total. Bili, TP)     (E78.5) Hyperlipidemia LDL goal <100  Plan: Lipid panel reflex to direct LDL Non-fasting      CAD (coronary artery disease)    Wt Readings from Last 10 Encounters:   12/07/23 69.7 kg (153 lb 11.2 oz)   12/04/23 68.1 kg (150 lb 1.6 oz)   10/23/23 68.9 kg (151 lb 12.8 oz)   09/11/23 72.4 kg (159 lb 9.6 oz)   06/12/23 73 kg (161 lb)   05/22/23 72.4 kg (159 lb 11.2 oz)   03/16/23 71.8 kg (158 lb 6.4 oz)   02/23/23 73.2 kg (161 lb 4.8 oz)   01/30/23 73.1 kg (161 lb 3.2 oz)   10/28/22 78.5 " "kg (173 lb)        Rolo Sandoval MD  [facility] LUIS FERNANDO Acevedo is a 73 year old, presenting for the following health issues:  Sleep Problem and Weight Loss        12/4/2023     1:26 PM   Additional Questions   Roomed by Cristal Ryder CMA     Bone pain back pain not sleeping  HPI     -Here to discuss confusion, strength, and most importantly weight.    -Was told that if weight dropped below 150 to come in and be seen. Last week at home it was 147 lbs.      Answers submitted by the patient for this visit:  Patient Health Questionnaire (Submitted on 12/4/2023)  If you checked off any problems, how difficult have these problems made it for you to do your work, take care of things at home, or get along with other people?: Extremely difficult  PHQ9 TOTAL SCORE: 11    Review of Systems   Constitutional, HEENT, cardiovascular, pulmonary, gi and gu systems are negative, except as otherwise noted.      Objective    /68   Pulse 71   Temp 97.4  F (36.3  C) (Tympanic)   Resp 16   Ht 1.664 m (5' 5.5\")   Wt 68.1 kg (150 lb 1.6 oz)   SpO2 98%   BMI 24.60 kg/m    Body mass index is 24.6 kg/m .  Physical Exam   GENERAL: healthy, alert and no distress  NECK: no adenopathy, no asymmetry, masses, or scars and thyroid normal to palpation  RESP: lungs clear to auscultation - no rales, rhonchi or wheezes  CV: regular rate and rhythm, normal S1 S2, no S3 or S4, no murmur, click or rub, no peripheral edema and peripheral pulses strong  ABDOMEN: soft, nontender, no hepatosplenomegaly, no masses and bowel sounds normal  MS: no gross musculoskeletal defects noted, no edema        Rolo Sandoval MD              "

## 2023-12-07 ENCOUNTER — OFFICE VISIT (OUTPATIENT)
Dept: RHEUMATOLOGY | Facility: CLINIC | Age: 73
End: 2023-12-07
Attending: INTERNAL MEDICINE
Payer: COMMERCIAL

## 2023-12-07 ENCOUNTER — OFFICE VISIT (OUTPATIENT)
Dept: PULMONOLOGY | Facility: CLINIC | Age: 73
End: 2023-12-07
Attending: INTERNAL MEDICINE
Payer: COMMERCIAL

## 2023-12-07 ENCOUNTER — LAB (OUTPATIENT)
Dept: LAB | Facility: CLINIC | Age: 73
End: 2023-12-07
Payer: COMMERCIAL

## 2023-12-07 VITALS — HEART RATE: 75 BPM | DIASTOLIC BLOOD PRESSURE: 71 MMHG | SYSTOLIC BLOOD PRESSURE: 128 MMHG | OXYGEN SATURATION: 95 %

## 2023-12-07 VITALS
DIASTOLIC BLOOD PRESSURE: 71 MMHG | HEIGHT: 66 IN | SYSTOLIC BLOOD PRESSURE: 128 MMHG | WEIGHT: 153.7 LBS | BODY MASS INDEX: 24.7 KG/M2 | HEART RATE: 75 BPM

## 2023-12-07 DIAGNOSIS — J84.9 ILD (INTERSTITIAL LUNG DISEASE) (H): Primary | ICD-10-CM

## 2023-12-07 DIAGNOSIS — M35.01 SJOGREN'S SYNDROME WITH KERATOCONJUNCTIVITIS SICCA (H): ICD-10-CM

## 2023-12-07 DIAGNOSIS — J84.9 ILD (INTERSTITIAL LUNG DISEASE) (H): ICD-10-CM

## 2023-12-07 DIAGNOSIS — M35.01 SJOGREN'S SYNDROME WITH KERATOCONJUNCTIVITIS SICCA (H): Primary | ICD-10-CM

## 2023-12-07 LAB
ALBUMIN SERPL BCG-MCNC: 4 G/DL (ref 3.5–5.2)
ALT SERPL W P-5'-P-CCNC: 15 U/L (ref 0–50)
AST SERPL W P-5'-P-CCNC: 24 U/L (ref 0–45)
BASOPHILS # BLD AUTO: 0 10E3/UL (ref 0–0.2)
BASOPHILS NFR BLD AUTO: 1 %
CK SERPL-CCNC: 93 U/L (ref 26–192)
CREAT SERPL-MCNC: 0.58 MG/DL (ref 0.51–0.95)
DLCOUNC-%PRED-PRE: 103 %
DLCOUNC-PRE: 19.78 ML/MIN/MMHG
DLCOUNC-PRED: 19.11 ML/MIN/MMHG
EGFRCR SERPLBLD CKD-EPI 2021: >90 ML/MIN/1.73M2
EOSINOPHIL # BLD AUTO: 0.1 10E3/UL (ref 0–0.7)
EOSINOPHIL NFR BLD AUTO: 2 %
ERV-%PRED-PRE: 110 %
ERV-PRE: 1.07 L
ERV-PRED: 0.97 L
ERYTHROCYTE [DISTWIDTH] IN BLOOD BY AUTOMATED COUNT: 14.8 % (ref 10–15)
EXPTIME-PRE: 6.19 SEC
FEF2575-%PRED-PRE: 115 %
FEF2575-PRE: 2.01 L/SEC
FEF2575-PRED: 1.74 L/SEC
FEFMAX-%PRED-PRE: 97 %
FEFMAX-PRE: 5.24 L/SEC
FEFMAX-PRED: 5.4 L/SEC
FEV1-%PRED-PRE: 106 %
FEV1-PRE: 2.27 L
FEV1FEV6-PRE: 79 %
FEV1FEV6-PRED: 78 %
FEV1FVC-PRE: 78 %
FEV1FVC-PRED: 77 %
FEV1SVC-PRE: 77 %
FEV1SVC-PRED: 68 %
FIFMAX-PRE: 5.12 L/SEC
FVC-%PRED-PRE: 104 %
FVC-PRE: 2.92 L
FVC-PRED: 2.79 L
HCT VFR BLD AUTO: 39.4 % (ref 35–47)
HGB BLD-MCNC: 12.9 G/DL (ref 11.7–15.7)
IC-%PRED-PRE: 97 %
IC-PRE: 1.89 L
IC-PRED: 1.94 L
IMM GRANULOCYTES # BLD: 0.1 10E3/UL
IMM GRANULOCYTES NFR BLD: 1 %
LYMPHOCYTES # BLD AUTO: 1.2 10E3/UL (ref 0.8–5.3)
LYMPHOCYTES NFR BLD AUTO: 21 %
MCH RBC QN AUTO: 30.4 PG (ref 26.5–33)
MCHC RBC AUTO-ENTMCNC: 32.7 G/DL (ref 31.5–36.5)
MCV RBC AUTO: 93 FL (ref 78–100)
MONOCYTES # BLD AUTO: 0.4 10E3/UL (ref 0–1.3)
MONOCYTES NFR BLD AUTO: 8 %
NEUTROPHILS # BLD AUTO: 3.9 10E3/UL (ref 1.6–8.3)
NEUTROPHILS NFR BLD AUTO: 67 %
NRBC # BLD AUTO: 0 10E3/UL
NRBC BLD AUTO-RTO: 0 /100
PLATELET # BLD AUTO: 186 10E3/UL (ref 150–450)
RBC # BLD AUTO: 4.24 10E6/UL (ref 3.8–5.2)
VA-%PRED-PRE: 92 %
VA-PRE: 4.32 L
VC-%PRED-PRE: 94 %
VC-PRE: 2.96 L
VC-PRED: 3.14 L
WBC # BLD AUTO: 5.7 10E3/UL (ref 4–11)

## 2023-12-07 PROCEDURE — 85025 COMPLETE CBC W/AUTO DIFF WBC: CPT | Performed by: PATHOLOGY

## 2023-12-07 PROCEDURE — 82040 ASSAY OF SERUM ALBUMIN: CPT | Performed by: PATHOLOGY

## 2023-12-07 PROCEDURE — G0463 HOSPITAL OUTPT CLINIC VISIT: HCPCS | Performed by: INTERNAL MEDICINE

## 2023-12-07 PROCEDURE — 84460 ALANINE AMINO (ALT) (SGPT): CPT | Performed by: PATHOLOGY

## 2023-12-07 PROCEDURE — 82565 ASSAY OF CREATININE: CPT | Performed by: PATHOLOGY

## 2023-12-07 PROCEDURE — 94729 DIFFUSING CAPACITY: CPT | Performed by: INTERNAL MEDICINE

## 2023-12-07 PROCEDURE — 99214 OFFICE O/P EST MOD 30 MIN: CPT | Mod: 25 | Performed by: INTERNAL MEDICINE

## 2023-12-07 PROCEDURE — G0463 HOSPITAL OUTPT CLINIC VISIT: HCPCS | Mod: 27 | Performed by: INTERNAL MEDICINE

## 2023-12-07 PROCEDURE — 99214 OFFICE O/P EST MOD 30 MIN: CPT | Performed by: INTERNAL MEDICINE

## 2023-12-07 PROCEDURE — 94375 RESPIRATORY FLOW VOLUME LOOP: CPT | Performed by: INTERNAL MEDICINE

## 2023-12-07 PROCEDURE — 82550 ASSAY OF CK (CPK): CPT | Performed by: PATHOLOGY

## 2023-12-07 PROCEDURE — 84450 TRANSFERASE (AST) (SGOT): CPT | Performed by: PATHOLOGY

## 2023-12-07 PROCEDURE — 36415 COLL VENOUS BLD VENIPUNCTURE: CPT | Performed by: PATHOLOGY

## 2023-12-07 ASSESSMENT — PAIN SCALES - GENERAL
PAINLEVEL: MODERATE PAIN (5)
PAINLEVEL: NO PAIN (0)

## 2023-12-07 NOTE — PROGRESS NOTES
Chief Complaint   Patient presents with     RECHECK     Follow up with sjogrens   Inflammatory myositis  ILD  Imuran monitoring    Date of initial visit: 3/25//2022  Last seen: 4/27/2023    DOS: 12/7/2023      ASSESSMENT AND PLAN:    Inflammatory myositis. Sjogren's. ILD. Albina Pedro 71 y.o. female with long standing h/o seropositive Sjogren's causing ILD, inflammatory myositis, R thigh panniculitis who presents for worsening joint pain, muscle weakness and deconditioning over past 2 years. Although she has OA and her ILD seems to be stable, it seems like she was doing well in the past while taking imuran (AZA). She was on AZA [id number] and was tapered off slowly as was doing well. Max AZA dose was 100 mg every day and last dose was 50 mg every other day. She tolerated AZA in the past with no SEs. Her ILD has not worsened off AZA. No flare of rash. In 3/2022 (initial visit), I recommended her to resume taking AZA while doing additional work up. Plus, I recommend a course of prednisone taper. We discussed Sjogren's Dx, mx of sicca.     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 IVIG per Dr. Manning. Will have LP for Alzheimer's.    AZA monitoring. Labs q3mo    ILD. Follow up with pulmonary.        Plan 10/28/2022:    Tell Beatriz that you would get IVIG at [place]    Continue imuran 50 mg a day with labs q 3months    Start tapering prednisone: 9-8-7-6 mg a day each course x 1 month then 5 mg a day and stay on 5 mg a day    DEXA bone density    MTM referral     Video visit in about 3 months        Today 12/7/2023: Reports getting weaker, might need to use scooter, has upcoming appointment for Alzheimer's management and follow up for MG. No evidence of ILD/myositis flare ups today. Stable labs. Defer m/o MG to Dr. Manning, currently is taking prednisone 5 mg every day+  mg bid, although not sure about the doses of her meds. NL DEXA 12/2022.  IVIG got denied by insurance.      Plan 12/7/2023:    Labs today and every 3 months    Return in 6 months (in person)      Paul Mark MD    HISTORY OF PRESENTING ILLNESS:       3/25/2022:    Kristina is a 70 yo female who presents today with her . She has been referred for m/o Sjogren's. She has h/o +SSA/SSB Sjogren's diagnosed in 2004 after having sicca x 20 yr followed by muscle weakness, inflammatory arthritis, L thigh panniculitis, ILD. Was treated with prednisone, did not tolerate MTX. Was on AZA 8609-7272. Had +BINDU, +RF.    She was last seen by Dr. Moore in 1/2020 and before that, was under care of Dr. Ho who diagnosed and treated her since 2004.    She is here to discuss her joint pain.    She has diffuse arthralgia, no joint swelling, no AM stiffness, reports loss of strength of her hands with poor .    She has arthralgia, it got worse and went downhill for past 2 years.    Sometimes gets muscle weakness, more constant lately over past few months.    Has brain fog, getting worse.    Has progressive hair loss with bald spots.    No photosensitivity or skin rashes.    Uses biotene and water, wakes up thirsty. Had a tooth which broke, sometimes teeth are chipping. Has dry mouth due to Sjogren's.    Has dry eyes, uses OTC eyedrops.    Gets R droopy eyelid, now affected L eyelid.    Eyes look red ad dry.    No parotid gland swelling.    No CP, SOB, cough, fevers or night sweats.    Gets diarrhea from certain foods.    No numbness, tingling.    Has fatigue, difficult time staying awake.    She did well on her cognitive function testing.    Mestinon caused diarrhea and stopped taking it, scheduled to have EMG and do follow up with Dr. Manning.    Updated on cancer screening.    No dysphagia.    6/3/2022:    Kristina was initially seen in 3/2022 with muscle weakness, when she was treated with prednisone taper (4tab=20 mg qd x 5 days, 3tab=15 mg qd x 5 days, 2tab=10 mg qd x 5 days, 1 tab=5 mg qd x  5 days then stop) and was put back on imuran 50 mg every day. Prednisone helped her.    Overall feeling pretty good.    Had diarrhea but it is better.    Went on 4 almazan past weekend and did well.    She and her  have seen an improvement by resuming imuran.    Easier to get out of chair.    Waiting to hear about muscle biopsy result.     10/28/2022:    Kristina presents for follow up. She is here with her . They report that Kristina is getting worse, feels weaker, has memory problem. Dr. Manning has ordered IVIG for myasthenia gravis, I see that the staff tried to reach Kristina to schedule at [facility] with no success, Kristina reports that her phone is not working and they should call her . 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    No rash, or oral ulcers.    Has hair loss x past 6 months.    No stomach pain.    Sometimes has diarrhea based on what she eats.    Had a cough, resolved.    No CP.    Has dry mouth, solid dysphagia.    Dryness of eyes is worse. Uses OTC eyedrops.    Mestinon is not helping with MG.    Legs are weak, reports falls.      Today 2023:    -feeling weaker, gets dizzy, tends to fall, memory is bad, no SOB or cough, no rash or CP    ROS: A comprehensive ROS was done. Positives are per HPI.    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       Past Surgical History:   Procedure Laterality Date     BIOPSY MUSCLE DIAGNOSTIC (LOCATION) Left 2022    Procedure: BIOPSY, MUSCLE LEFT biceps @0900;  Surgeon: Tyrell Loo MD;  Location: [facility] OR     C/SECTION, LOW TRANSVERSE  10/13/1978    , Low  Transverse     COLONOSCOPY       OPEN REDUCTION INTERNAL FIXATION ANKLE Right 2019    Procedure: Open reduction internal fixation right lateral malleolus fracture;  Surgeon: Rolo Oconnor DPM;  Location: [facility] OR     SURGICAL HISTORY OF -            SURGICAL HISTORY OF -   00    Colonoscopy       Family History   Problem Relation Age of Onset     Cancer Mother         Breast and liver- age 43     Heart Disease Father         ? chf?h/o CVA     Alzheimer Disease Father      Hypertension Father      Neurologic Disorder Father         CVA     Diabetes Maternal Grandmother      Breast Cancer Maternal Aunt      Breast Cancer Paternal Aunt      Cancer - colorectal No family hx of      Prostate Cancer No family hx of        Social History     Tobacco Use     Smoking status: Never     Smokeless tobacco: Never   Substance Use Topics     Alcohol use: Yes     Alcohol/week: 0.0 standard drinks of alcohol     Comment: 1 glass of wine every 2 weeks     Outpatient Encounter Medications as of 2023   Medication Sig Dispense Refill     acetaminophen (TYLENOL) 500 MG tablet Take 1,000 mg by mouth every 6 hours as needed for mild pain       albuterol (PROAIR HFA/PROVENTIL HFA/VENTOLIN HFA) 108 (90 Base) MCG/ACT inhaler INHALE TWO PUFFS BY MOUTH EVERY FOUR HOURS AS NEEDED FOR SHORTNESS OF BREATH/DYSPNEA 25.5 g 0     aspirin (ASA) 81 MG EC tablet Take 1 tablet (81 mg) by mouth daily 90 tablet 3     azaTHIOprine (IMURAN) 50 MG tablet Take 1 tablet (50 mg) twice daily x 14 days then complete labs, if labs ok then take 2 tablets (100 mg) in the morning and 1 tablet (50 mg) in the evening x 14 days then complete labs, if labs ok then take 2 tablets (100 mg) twice daily 120 tablet 5     buPROPion (WELLBUTRIN XL) 150 MG 24 hr tablet TAKE ONE TABLET BY MOUTH IN THE MORNING 90 tablet 0     Calcium-Magnesium-Zinc 500-250-12.5 MG TABS Take 1 tablet by mouth daily       hyoscyamine (LEVSIN) 0.125 MG tablet TAKE  "1 TABLET BY MOUTH EVERY EIGHT HOURS 30 MINUTES BEFORE EACH MESTINON. 180 tablet 0     LORazepam (ATIVAN) 0.5 MG tablet TAKE ONE HALF TO ONE TABLET BY MOUTH twice a day as needed. 60 tablet 0     Multiple Vitamins-Minerals (MULTIVITAMIN & MINERAL PO) Take 1 tablet by mouth daily        pravastatin (PRAVACHOL) 40 MG tablet Take 1 tablet (40 mg) by mouth daily 90 tablet 0     predniSONE (DELTASONE) 5 MG tablet Take 2 tablets (10 mg) by mouth daily For 3 weeks. Then back to 5 mg. 90 tablet 1     pyRIDostigmine (MESTINON) 60 MG tablet TAKE ONE TABLET BY MOUTH THREE TIMES DAILY 270 tablet 0     rivastigmine (EXELON) 4.6 MG/24HR 24 hr patch APPLY ONE PATCH ON SKIN ONE TIME DAILY 30 patch 0     sertraline (ZOLOFT) 100 MG tablet Take 2 tablets (200 mg) by mouth daily 180 tablet 0     gabapentin (NEURONTIN) 100 MG capsule 1 capsule three times a day for 1 week, increase to 2 capsules three times a day on week 2 if pain continues (Patient not taking: Reported on 12/4/2023) 180 capsule 0     sodium fluoride dental gel (PREVIDENT) 1.1 % GEL topical gel USE TO BRUSH TEETH 1-2 TIMES PER DAY BEFORE BEDTIME AND SPIT; DO NOT RINSE WITH WATER (Patient not taking: Reported on 12/4/2023)       No facility-administered encounter medications on file as of 12/7/2023.       Allergies   Allergen Reactions     Daypro [Oxaprozin] Rash            Lidocaine Other (See Comments)     Rapid heart rate     Nitroglycerin Other (See Comments)     Causes low blood pressure     Penicillins Unknown     Occurred as child.     Sulfa Antibiotics Rash     Physical exam:    /71   Pulse 75   Ht 1.676 m (5' 6\")   Wt 69.7 kg (153 lb 11.2 oz)   BMI 24.81 kg/m         Constitutional: NAD. pleasant.  present.  HEENT: EOMI, no scleral icterus. Nl conj. No oral ulcers, poor salivary pool, No adenopathy or thyromegaly.   Cardiovascular: RRR, no M/R/G, trace LE edema  Respiratory: CTA b/l  Gastrointestinal:  soft, non-tender to palpation.  "   Musculoskeletal: No active synovitis. No joint tenderness   Skin: no rash  Neurological system: hip flexors 4/5.  Psych: nl affect    Component      Latest Ref Rng & Units 6/17/2019   WBC      4.0 - 11.0 10e9/L 5.6   RBC Count      3.8 - 5.2 10e12/L 4.19   Hemoglobin      11.7 - 15.7 g/dL 13.0   Hematocrit      35.0 - 47.0 % 39.8   MCV      78 - 100 fl 95   MCH      26.5 - 33.0 pg 31.0   MCHC      31.5 - 36.5 g/dL 32.7   RDW      10.0 - 15.0 % 13.1   Platelet Count      150 - 450 10e9/L 230   % Neutrophils      % 56.3   % Lymphocytes      % 31.4   % Monocytes      % 9.6   % Eosinophils      % 2.3   % Basophils      % 0.4   Absolute Neutrophil      1.6 - 8.3 10e9/L 3.2   Absolute Lymphocytes      0.8 - 5.3 10e9/L 1.8   Absolute Monocytes      0.0 - 1.3 10e9/L 0.5   Absolute Eosinophils      0.0 - 0.7 10e9/L 0.1   Absolute Basophils      0.0 - 0.2 10e9/L 0.0   Diff Method       Automated Method   Sodium      133 - 144 mmol/L 140   Potassium      3.4 - 5.3 mmol/L 5.0   Chloride      94 - 109 mmol/L 106   Carbon Dioxide      20 - 32 mmol/L 31   Anion Gap      3 - 14 mmol/L 3   Glucose      70 - 99 mg/dL 96   Urea Nitrogen      7 - 30 mg/dL 17   Creatinine      0.52 - 1.04 mg/dL 0.57   GFR Estimate      >60 mL/min/1.73:m2 >90   GFR Estimate If Black      >60 mL/min/1.73:m2 >90   Calcium      8.5 - 10.1 mg/dL 9.1   Bilirubin Total      0.2 - 1.3 mg/dL 0.5   Albumin      3.4 - 5.0 g/dL 3.8   Protein Total      6.8 - 8.8 g/dL 7.4   Alkaline Phosphatase      40 - 150 U/L 59   ALT      0 - 50 U/L 27   AST      0 - 45 U/L 20   Cholesterol      <200 mg/dL 150   Triglycerides      <150 mg/dL 168 (H)   HDL Cholesterol      >49 mg/dL 70   LDL Cholesterol Calculated      <100 mg/dL 46   Non HDL Cholesterol      <130 mg/dL 80   Rheumatoid Factor      <20 IU/mL <20   CRP Inflammation      0.0 - 8.0 mg/L <2.9   Vitamin D Deficiency screening      20 - 75 ug/L 48   Vitamin B12      193 - 986 pg/mL 705     Component      Latest Ref  Rng & Units 1/16/2020   Ruth-1 Autoantibodies      <20 EU 0   Scl-70 Autoantibodies      <20 EU 0   Sm (Vergara) Autoantibodies      <20 EU 1   Sm/RNP Autoantibodies      <20 EU 2   SS-A/Ro Autoantibodies      <20  (H)   SS-B/La Autoantibodies      <20  (H)   Complement C4      19 - 59 mg/dL 27   Complement C3      83 - 177 mg/dL 139   DNA (ds) Antibody      <25 IU 3       Component      Latest Ref Rng & Units 1/20/2022 2/16/2022 2/28/2022   WBC      4.0 - 11.0 10e3/uL 5.7     RBC Count      3.80 - 5.20 10e6/uL 4.14     Hemoglobin      11.7 - 15.7 g/dL 13.0     Hematocrit      35.0 - 47.0 % 39.8     MCV      78 - 100 fL 96     MCH      26.5 - 33.0 pg 31.4     MCHC      31.5 - 36.5 g/dL 32.7     RDW      10.0 - 15.0 % 13.1     Platelet Count      150 - 450 10e3/uL 223     % Neutrophils      % 57     % Lymphocytes      % 30     % Monocytes      % 10     % Eosinophils      % 3     % Basophils      % 0     Absolute Neutrophils      1.6 - 8.3 10e3/uL 3.3     Absolute Lymphocytes      0.8 - 5.3 10e3/uL 1.7     Absolute Monocytes      0.0 - 1.3 10e3/uL 0.6     Absolute Eosinophils      0.0 - 0.7 10e3/uL 0.2     Absolute Basophils      0.0 - 0.2 10e3/uL 0.0     Sodium      133 - 144 mmol/L 139 137    Potassium      3.4 - 5.3 mmol/L 4.3 4.3    Chloride      94 - 109 mmol/L 108 107    Carbon Dioxide      20 - 32 mmol/L 38 (H) 25    Anion Gap      3 - 14 mmol/L <1 (L) 5    Urea Nitrogen      7 - 30 mg/dL 22 27    Creatinine      0.52 - 1.04 mg/dL 0.56 0.59    Calcium      8.5 - 10.1 mg/dL 9.0 9.1    Glucose      70 - 99 mg/dL 99 96    Alkaline Phosphatase      40 - 150 U/L 63 67    AST      0 - 45 U/L 20 30    ALT      0 - 50 U/L 33 44    Protein Total      6.8 - 8.8 g/dL 7.4 7.3    Albumin      3.4 - 5.0 g/dL 3.5 4.0    Bilirubin Total      0.2 - 1.3 mg/dL 0.5 0.4    GFR Estimate      >60 mL/min/1.73m2 >90 >90    Color Urine      Colorless, Straw, Light Yellow, Yellow Yellow     Appearance Urine      Clear Clear      Glucose Urine      Negative mg/dL Negative     Bilirubin Urine      Negative Negative     Ketones Urine      Negative mg/dL Negative     Specific Gravity Urine      1.003 - 1.035 >=1.030     Blood Urine      Negative Negative     pH Urine      5.0 - 7.0 5.0     Protein Albumin Urine      Negative mg/dL Negative     Urobilinogen Urine      0.2, 1.0 E.U./dL 0.2     Nitrite Urine      Negative Negative     Leukocyte Esterase Urine      Negative Negative     TSH      0.40 - 4.00 mU/L 1.87     AcetChol Binding Darlin      0.0 - 0.4 nmol/L   0.0   AcetChol Modul Darlin      <=45 %   4   Clinical Information:     71 year old female with generalized fatigue and right eyelid ptosis. She has not taken her Mestinon in at least the last week due to intolerance with diarhea. Query myopathy vs neuromuscular junction disorder. Brief exam: bilateral eyelid closure weakness and neck flexion weakness. Double vision on upward gaze and lateral gaze; Right Deltoid 5, Biceps 5, Triceps 4 bilaterally; subtle asymmetry of the finger flexors (very mild weakness on the left, normal on the right).      Techniques:     Motor and sensory conduction studies were done with surface recording electrodes. EMG was done with a concentric needle electrode.      Results:     The right peroneal-EDB motor study showed normal onset latency with low amplitude without segmental changes, and normal conduction velocities.The right tibial-AH, peroneal-TA, median-APB and ulnar-ADM motor studies were normal. The right superficial peroneal, sural, median, ulnar, and radial antidromic sensory studies were normal. The right tibial F wave latencies were normal. 3 Hz slow repetitive nerve stimulation at the right ADM and orbicularis oculi muscles did not show any significant decrement either at rest or after 1 min of maximal isometric exercise.      Needle evaluation of the right Triceps Brachii, Pronator Teres, Biceps, Tibialis anterior, Gastrocnemius (Medial Hd) and  vastus lateralis showed abnormal spontaneous activity with fibrillation potentials (1+ to 2+) with CRDs present in the vastus lateralis only. The right Triceps Brachii, Pronator Teres, First Dorsal Interosseous , Biceps, and vastus lateralis showed small amplitude, short duration, polyphasic motor units with early recruitment in the triceps and biceps muscles.  The right tibialis anterior showed normal recruitment of normal appearing motor units. The right Gastrocnemius (Medial Hd) had normal recruitment of large, long duration motor units. The right deltoid muscle was normal.      Interpretation:     Abnormal study. 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 Please note, however, that this could still be part of a myopathic process because some chronic myopathies may show large, long duration motor unit potentials. There was no electrodiagnostic evidence of a neuromuscular junction disorder like myasthenia gravis.      Comment: The results of the study were discussed with the patient and she was referred for muscle biopsy of the left triceps or biceps.   ___________________________  Millicent Diggs MD, Neuromuscular Fellow  Rene Manning MD      Component      Latest Ref Rng & Units 3/25/2022   WBC      4.0 - 11.0 10e3/uL 6.1   RBC Count      3.80 - 5.20 10e6/uL 4.12   Hemoglobin      11.7 - 15.7 g/dL 12.7   Hematocrit      35.0 - 47.0 % 38.7   MCV      78 - 100 fL 94   MCH      26.5 - 33.0 pg 30.8   MCHC      31.5 - 36.5 g/dL 32.8   RDW      10.0 - 15.0 % 13.0   Platelet Count      150 - 450 10e3/uL 236   % Neutrophils      % 59   % Lymphocytes      % 30   % Monocytes      % 7   % Eosinophils      % 2   % Basophils      % 1   % Immature Granulocytes      % 1   NRBCs per 100 WBC      <1 /100 0   Absolute Neutrophils      1.6 -  8.3 10e3/uL 3.6   Absolute Lymphocytes      0.8 - 5.3 10e3/uL 1.8   Absolute Monocytes      0.0 - 1.3 10e3/uL 0.4   Absolute Eosinophils      0.0 - 0.7 10e3/uL 0.1   Absolute Basophils      0.0 - 0.2 10e3/uL 0.0   Absolute Immature Granulocytes      <=0.4 10e3/uL 0.0   Absolute NRBCs      10e3/uL 0.0   GARETT-1 (Histidyl-tRNA Synthetase) Darlin IgG      0 - 40 AU/mL 0   PL-12 (alanyl-tRNA synthetase) Antibody      Negative Negative   PL-7 (threonyl-tRNA synthetase) Antibody      Negative Negative   EJ (glycyl - tRNA synthetase) Antibody      Negative Negative   OJ (isoleucyl-tRNA synthetase) Antibody      Negative Negative   SRP (Signal Recognition Particle) Antibody      Negative Negative   Mi-2 (nuclear helicase protein) Antibody      Negative Negative   P155/140 (TIF1-gamma) Antibody      Negative Negative   TIF-1 Gamma Antibody      Negative Negative   SAE1 (SUMO activating enzyme) Darlin      Negative Negative   MDA5 (CADM 140) Darlin      Negative Negative   NXP-2 (Nuclear matrix protein 2) Darlin      Negative Negative   Myositis Interp       See Note   Albumin Fraction      3.7 - 5.1 g/dL 4.2   Alpha 1 Fraction      0.2 - 0.4 g/dL 0.3   Alpha 2 Fraction      0.5 - 0.9 g/dL 0.9   Beta Fraction      0.6 - 1.0 g/dL 0.9   Gamma Fraction      0.7 - 1.6 g/dL 0.9   Monoclonal Peak      <=0.0 g/dL 0.0   ELP Interpretation:       Essentially normal electrophoretic pattern. No obvious monoclonal proteins seen. Pathologic significance requires clinical correlation. Carlos Ashford M.D., Ph.D., Pathologist.   Quantiferon-TB Gold Plus Result      Negative Negative   TB1 Ag minus Nil Value      IU/mL 0.01   TB2 Ag minus Nil Value      IU/mL 0.00   Mitogen minus Nil Result      IU/mL 9.97   Nil Result      IU/mL 0.03   BINDU interpretation      Negative Positive (A)   BINDU pattern 1       Speckled   BINDU titer 1       1:160   Iron      35 - 180 ug/dL 69   Iron Binding Cap      240 - 430 ug/dL 344   Iron Saturation Index      15 - 46 % 20    Creatinine      0.52 - 1.04 mg/dL 0.63   GFR Estimate      >60 mL/min/1.73m2 >90   ALT      0 - 50 U/L 31   Albumin      3.4 - 5.0 g/dL 3.6   AST      0 - 45 U/L 18   Thyroglobulin Antibody      <40 IU/mL <20   CK Total      30 - 225 U/L 244 (H)   Aldolase      1.2 - 7.6 U/L 3.9   Complement C3      81 - 157 mg/dL 142   Complement C4      13 - 39 mg/dL 26   CRP Inflammation      0.0 - 8.0 mg/L <2.9   Cryoglobulin Interpretation      Negative Negative   Cyclic Citrullinated Peptide Antibody, IgG      <7.0 U/mL 2.1   DNA-ds      <10.0 IU/mL 1.1   Vitamin D Deficiency screening      20 - 75 ug/L 41   Vitamin B12      193 - 986 pg/mL 603   Thyroid Peroxidase Antibody      <35 IU/mL <10   Rheumatoid Factor      <12 IU/mL 15 (H)   Hepatitis C Antibody      Nonreactive Nonreactive   Hep B Surface Agn      Nonreactive Nonreactive   Sed Rate      0 - 30 mm/hr 14   Ferritin      8 - 252 ng/mL 85   Hepatitis B Core Darlin      Nonreactive Nonreactive   Total Protein Serum for ELP      6.8 - 8.8 g/dL 7.2   Quantiferon Nil Tube      IU/mL 0.03   Quantiferon TB1 Tube      IU/mL 0.04   Quantiferon TB2 Tube       0.03   QUANTIFERON MITOGEN      IU/mL 10.00

## 2023-12-07 NOTE — PROGRESS NOTES
Reason for Visit  Albina Pedro is a 73 year old year old female who is being seen for Interstitial Lung Disease (ILD) (ILD follow up )    ILD HPI    Albina Pedro is a 73-year-old female who follows up today for ILD.  The patient has a history of Sjogren's syndrome with some mild involvement of her lung is consistent with LIP.  She has had fairly stable disease and we have not targeted any immunosuppression.  Specifically at her lungs.  She also has significant neurologic issues with persistent myasthenia gravis that she follows with neurology for.  She is on immunosuppression managed by rheumatology she is up to 200 mg daily.  She is also on some low-dose prednisone generally ranging between 5 and 10 mg.  Overall in talking the patient her  she feels her respiratory standpoint she is doing well.  She denies any significant shortness of breath or dyspnea on exertion.  She is limited by her neurologic and balance issues.  However progress her standpoint they feel she is doing well.  Otherwise no other new complaints today.      Current Outpatient Medications   Medication    acetaminophen (TYLENOL) 500 MG tablet    albuterol (PROAIR HFA/PROVENTIL HFA/VENTOLIN HFA) 108 (90 Base) MCG/ACT inhaler    aspirin (ASA) 81 MG EC tablet    azaTHIOprine (IMURAN) 50 MG tablet    buPROPion (WELLBUTRIN XL) 150 MG 24 hr tablet    Calcium-Magnesium-Zinc 500-250-12.5 MG TABS    hyoscyamine (LEVSIN) 0.125 MG tablet    LORazepam (ATIVAN) 0.5 MG tablet    Multiple Vitamins-Minerals (MULTIVITAMIN & MINERAL PO)    pravastatin (PRAVACHOL) 40 MG tablet    predniSONE (DELTASONE) 5 MG tablet    pyRIDostigmine (MESTINON) 60 MG tablet    rivastigmine (EXELON) 4.6 MG/24HR 24 hr patch    sertraline (ZOLOFT) 100 MG tablet    gabapentin (NEURONTIN) 100 MG capsule    sodium fluoride dental gel (PREVIDENT) 1.1 % GEL topical gel     No current facility-administered medications for this visit.     Allergies   Allergen Reactions    Daypro  [Oxaprozin] Rash           Lidocaine Other (See Comments)     Rapid heart rate    Nitroglycerin Other (See Comments)     Causes low blood pressure    Penicillins Unknown     Occurred as child.    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       Past Surgical History:   Procedure Laterality Date    BIOPSY MUSCLE DIAGNOSTIC (LOCATION) Left 2022    Procedure: BIOPSY, MUSCLE LEFT biceps @0900;  Surgeon: Tyrell Loo MD;  Location: [facility] OR    C/SECTION, LOW TRANSVERSE  10/13/1978    , Low Transverse    COLONOSCOPY      OPEN REDUCTION INTERNAL FIXATION ANKLE Right 2019    Procedure: Open reduction internal fixation right lateral malleolus fracture;  Surgeon: Rolo Oconnor DPM;  Location: [facility]    SURGICAL HISTORY OF 1978        SURGICAL HISTORY OF -   00    Colonoscopy       Social History     Socioeconomic History    Marital status:      Spouse name: Not on file    Number of children: Not on file    Years of education: Not on file    Highest education level: Not on file   Occupational History    Not on file   Tobacco Use    Smoking status: Never    Smokeless tobacco: Never   Substance and Sexual Activity    Alcohol use: Yes     Alcohol/week: 0.0 standard drinks of alcohol     Comment: 1 glass of wine every 2 weeks    Drug use: No    Sexual activity: Yes     Partners: Male   Other Topics Concern    Parent/sibling w/ CABG, MI or angioplasty before 65F 55M? No   Social History Narrative    Not on file     Social Determinants of Health     Financial Resource Strain: Not on file   Food Insecurity: Not on file   Transportation Needs: Not on file   Physical Activity: Not on file   Stress: Not  on file   Social Connections: Not on file   Interpersonal Safety: Not on file   Housing Stability: Not on file       Family History   Problem Relation Age of Onset    Cancer Mother         Breast and liver- age 43    Heart Disease Father         ? chf?h/o CVA    Alzheimer Disease Father     Hypertension Father     Neurologic Disorder Father         CVA    Diabetes Maternal Grandmother     Breast Cancer Maternal Aunt     Breast Cancer Paternal Aunt     Cancer - colorectal No family hx of     Prostate Cancer No family hx of        ROS Pulmonary    A complete ROS was otherwise negative except as noted in the HPI.  Vitals:    23 0924   BP: 128/71   Pulse: 75   SpO2: 95%     Exam:   GENERAL APPEARANCE: Well developed, well nourished, alert, and in no apparent distress.  NECK: supple, no masses, no thyromegaly.  LYMPHATICS: No significant axillary, cervical, or supraclavicular nodes.  RESP: good air flow throughout, - no crackles, rhonchi or wheezes.  CV: Normal S1, S2, regular rhythm, normal rate, no rub, no murmur,  no gallop, no LE edema.   ABDOMEN:  Bowel sounds normal, soft, nontender, no HSM or masses.   MS: extremities normal- no clubbing, no cyanosis.  PSYCH: mentation appears normal. and affect normal/bright  Results: I have reviewed all imaging, PFTs and other relavent tests, please see below for details, PFT and imaging results were reviewed with the patient.  PFTs: Normal spirometry and diffusion.  Stable from previous.    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  Follows with rheumatology and neurology.  Overall her lungs have been stable there is minimal involvement of the lungs consistent with LI P that has been stable for the 10 years that we have been getting pulmonary function test.  Therefore at this point I do not think we need to do any further evaluation or  treatment of her lungs.  I will continue to follow her on an annual basis with pulmonary function test.  I have stressed the importance with the patient and her  of calling if she does develop any new respiratory symptoms.  Given the stability of her lungs I would defer any immunosuppression management to rheumatology and neurology.  From the standpoint of her lungs I do not think she necessarily needs ongoing immunosuppression so ultimately if they decide to decrease or taper that off I think that would be fine.  I did recommend to the patient that she get the RSV vaccine.    I will see her back in 1 year with pulmonary function test.    I spent 30 minutes on the date of the encounter doing chart review, history and exam, interpretation of any new PFTs and imaging, documentation and further activities as noted above.        CBC   Recent Labs   Lab Test 03/16/23  1102 03/06/23  1617   RBC 4.29 3.95   HGB 13.5 12.5   HCT 41.5 38.2    182       Basic Metabolic Panel  Recent Labs   Lab Test 12/04/23  1458 01/02/23  1127    141   POTASSIUM 4.7 4.2   CHLORIDE 104 104   CO2 23 28   BUN 24.6* 18.6   * 115*   SARI 9.2 9.3       INR  Recent Labs   Lab Test 02/09/23  1102   INR 0.96       PFT      Latest Ref Rng & Units 12/7/2023     6:57 AM   PFT   FVC L 2.92  P   FEV1 L 2.27  P   FVC% % 104  P   FEV1% % 106  P      P Preliminary result           CC:

## 2023-12-07 NOTE — NURSING NOTE
"Chief Complaint   Patient presents with    RECHECK     Follow up with sjogrens     /71   Pulse 75   Ht 1.676 m (5' 6\")   Wt 69.7 kg (153 lb 11.2 oz)   BMI 24.81 kg/m    Yolette Norris, Lead CMA  12/7/2023 11:35 AM    "

## 2023-12-07 NOTE — NURSING NOTE
Chief Complaint   Patient presents with    Interstitial Lung Disease (ILD)     ILD follow up      Vitals were taken and medications were reconciled.     Pattie Walters RMA  9:25 AM

## 2023-12-07 NOTE — LETTER
12/7/2023         RE: Albina Pedro  [id number] [facility] [place] [facility] [place] [address]        Dear Colleague,    Thank you for referring your patient, Albina Pedro, to the [facility] LUNG [facility]ENCE AND [facility] [place]. Please see a copy of my visit note below.    Reason for Visit  Albina Pedro is a 73 year old year old female who is being seen for Interstitial Lung Disease (ILD) (ILD follow up )    ILD HPI    Albina Pedro is a 73-year-old female who follows up today for ILD.  The patient has a history of Sjogren's syndrome with some mild involvement of her lung is consistent with LIP.  She has had fairly stable disease and we have not targeted any immunosuppression.  Specifically at her lungs.  She also has significant neurologic issues with persistent myasthenia gravis that she follows with neurology for.  She is on immunosuppression managed by rheumatology she is up to 200 mg daily.  She is also on some low-dose prednisone generally ranging between 5 and 10 mg.  Overall in talking the patient her  she feels her respiratory standpoint she is doing well.  She denies any significant shortness of breath or dyspnea on exertion.  She is limited by her neurologic and balance issues.  However progress her standpoint they feel she is doing well.  Otherwise no other new complaints today.      Current Outpatient Medications   Medication    acetaminophen (TYLENOL) 500 MG tablet    albuterol (PROAIR HFA/PROVENTIL HFA/VENTOLIN HFA) 108 (90 Base) MCG/ACT inhaler    aspirin (ASA) 81 MG EC tablet    azaTHIOprine (IMURAN) 50 MG tablet    buPROPion (WELLBUTRIN XL) 150 MG 24 hr tablet    Calcium-Magnesium-Zinc 500-250-12.5 MG TABS    hyoscyamine (LEVSIN) 0.125 MG tablet    LORazepam (ATIVAN) 0.5 MG tablet    Multiple Vitamins-Minerals (MULTIVITAMIN & MINERAL PO)    pravastatin (PRAVACHOL) 40 MG tablet    predniSONE (DELTASONE) 5 MG tablet    pyRIDostigmine (MESTINON) 60 MG  tablet    rivastigmine (EXELON) 4.6 MG/24HR 24 hr patch    sertraline (ZOLOFT) 100 MG tablet    gabapentin (NEURONTIN) 100 MG capsule    sodium fluoride dental gel (PREVIDENT) 1.1 % GEL topical gel     No current facility-administered medications for this visit.     Allergies   Allergen Reactions    Daypro [Oxaprozin] Rash           Lidocaine Other (See Comments)     Rapid heart rate    Nitroglycerin Other (See Comments)     Causes low blood pressure    Penicillins Unknown     Occurred as child.    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       Past Surgical History:   Procedure Laterality Date    BIOPSY MUSCLE DIAGNOSTIC (LOCATION) Left 2022    Procedure: BIOPSY, MUSCLE LEFT biceps @0900;  Surgeon: Tyrell Loo MD;  Location: [facility] OR    C/SECTION, LOW TRANSVERSE  10/13/1978    , Low Transverse    COLONOSCOPY      OPEN REDUCTION INTERNAL FIXATION ANKLE Right 2019    Procedure: Open reduction internal fixation right lateral malleolus fracture;  Surgeon: Rolo Oconnor DPM;  Location: [facility]    SURGICAL HISTORY OF -           SURGICAL HISTORY OF -   00    Colonoscopy       Social History     Socioeconomic History    Marital status:      Spouse name: Not on file    Number of children: Not on file    Years of education: Not on file    Highest education level: Not on file   Occupational History    Not on file   Tobacco Use    Smoking status: Never    Smokeless tobacco: Never   Substance and Sexual Activity    Alcohol use: Yes     Alcohol/week: 0.0 standard drinks of alcohol     Comment: 1 glass of wine every 2 weeks    Drug use: No    Sexual activity: Yes     Partners: Male   Other  Topics Concern    Parent/sibling w/ CABG, MI or angioplasty before 65F 55M? No   Social History Narrative    Not on file     Social Determinants of Health     Financial Resource Strain: Not on file   Food Insecurity: Not on file   Transportation Needs: Not on file   Physical Activity: Not on file   Stress: Not on file   Social Connections: Not on file   Interpersonal Safety: Not on file   Housing Stability: Not on file       Family History   Problem Relation Age of Onset    Cancer Mother         Breast and liver- age 43    Heart Disease Father         ? chf?h/o CVA    Alzheimer Disease Father     Hypertension Father     Neurologic Disorder Father         CVA    Diabetes Maternal Grandmother     Breast Cancer Maternal Aunt     Breast Cancer Paternal Aunt     Cancer - colorectal No family hx of     Prostate Cancer No family hx of        ROS Pulmonary    A complete ROS was otherwise negative except as noted in the HPI.  Vitals:    23 0924   BP: 128/71   Pulse: 75   SpO2: 95%     Exam:   GENERAL APPEARANCE: Well developed, well nourished, alert, and in no apparent distress.  NECK: supple, no masses, no thyromegaly.  LYMPHATICS: No significant axillary, cervical, or supraclavicular nodes.  RESP: good air flow throughout, - no crackles, rhonchi or wheezes.  CV: Normal S1, S2, regular rhythm, normal rate, no rub, no murmur,  no gallop, no LE edema.   ABDOMEN:  Bowel sounds normal, soft, nontender, no HSM or masses.   MS: extremities normal- no clubbing, no cyanosis.  PSYCH: mentation appears normal. and affect normal/bright  Results: I have reviewed all imaging, PFTs and other relavent tests, please see below for details, PFT and imaging results were reviewed with the patient.  PFTs: Normal spirometry and diffusion.  Stable from previous.    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   Follows with rheumatology and neurology.  Overall her lungs have been stable there is minimal involvement of the lungs consistent with LI P that has been stable for the 10 years that we have been getting pulmonary function test.  Therefore at this point I do not think we need to do any further evaluation or treatment of her lungs.  I will continue to follow her on an annual basis with pulmonary function test.  I have stressed the importance with the patient and her  of calling if she does develop any new respiratory symptoms.  Given the stability of her lungs I would defer any immunosuppression management to rheumatology and neurology.  From the standpoint of her lungs I do not think she necessarily needs ongoing immunosuppression so ultimately if they decide to decrease or taper that off I think that would be fine.  I did recommend to the patient that she get the RSV vaccine.    I will see her back in 1 year with pulmonary function test.    I spent 30 minutes on the date of the encounter doing chart review, history and exam, interpretation of any new PFTs and imaging, documentation and further activities as noted above.        CBC   Recent Labs   Lab Test 03/16/23  1102 03/06/23  1617   RBC 4.29 3.95   HGB 13.5 12.5   HCT 41.5 38.2    182       Basic Metabolic Panel  Recent Labs   Lab Test 12/04/23  1458 01/02/23  1127    141   POTASSIUM 4.7 4.2   CHLORIDE 104 104   CO2 23 28   BUN 24.6* 18.6   * 115*   SARI 9.2 9.3       INR  Recent Labs   Lab Test 02/09/23  1102   INR 0.96       PFT      Latest Ref Rng & Units 12/7/2023     6:57 AM   PFT   FVC L 2.92  P   FEV1 L 2.27  P   FVC% % 104  P   FEV1% % 106  P      P Preliminary result       David Morris Perlman, MD

## 2023-12-07 NOTE — LETTER
12/7/2023       RE: Albina Pedro  [id number] [facility] [place] [address]     Dear Colleague,    Thank you for referring your patient, Albina Pedro, to the [facility] [place] at [facility]. Please see a copy of my visit note below.    Chief Complaint   Patient presents with    RECHECK     Follow up with sjogrens   Inflammatory myositis  ILD  Imuran monitoring    Date of initial visit: 3/25//2022  Last seen: 4/27/2023    DOS: 12/7/2023      ASSESSMENT AND PLAN:    Inflammatory myositis. Sjogren's. ILD. Albina Pedro 71 y.o. female with long standing h/o seropositive Sjogren's causing ILD, inflammatory myositis, R thigh panniculitis who presents for worsening joint pain, muscle weakness and deconditioning over past 2 years. Although she has OA and her ILD seems to be stable, it seems like she was doing well in the past while taking imuran (AZA). She was on AZA [id number] and was tapered off slowly as was doing well. Max AZA dose was 100 mg every day and last dose was 50 mg every other day. She tolerated AZA in the past with no SEs. Her ILD has not worsened off AZA. No flare of rash. In 3/2022 (initial visit), I recommended her to resume taking AZA while doing additional work up. Plus, I recommend a course of prednisone taper. We discussed Sjogren's Dx, mx of sicca.     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 IVIG per Dr. Manning. Will have LP for Alzheimer's.    AZA monitoring. Labs q3mo    ILD. Follow up with pulmonary.        Plan 10/28/2022:    Tell Beatriz that you would get IVIG at [place]    Continue imuran 50 mg a day with labs q 3months    Start tapering prednisone: 9-8-7-6 mg a day each course x 1 month then 5 mg a day and stay on 5 mg a day    DEXA bone density    MT referral     Video visit in about 3 months        Today 12/7/2023: Reports  getting weaker, might need to use scooter, has upcoming appointment for Alzheimer's management and follow up for MG. No evidence of ILD/myositis flare ups today. Stable labs. Defer m/o MG to Dr. Mnaning, currently is taking prednisone 5 mg every day+  mg bid, although not sure about the doses of her meds. NL DEXA 12/2022. IVIG got denied by insurance.      Plan 12/7/2023:    Labs today and every 3 months    Return in 6 months (in person)      Paul Mark MD    HISTORY OF PRESENTING ILLNESS:       3/25/2022:    Kristina is a 70 yo female who presents today with her . She has been referred for m/o Sjogren's. She has h/o +SSA/SSB Sjogren's diagnosed in 2004 after having sicca x 20 yr followed by muscle weakness, inflammatory arthritis, L thigh panniculitis, ILD. Was treated with prednisone, did not tolerate MTX. Was on AZA [id number]. Had +BINDU, +RF.    She was last seen by Dr. Moore in 1/2020 and before that, was under care of Dr. Ho who diagnosed and treated her since 2004.    She is here to discuss her joint pain.    She has diffuse arthralgia, no joint swelling, no AM stiffness, reports loss of strength of her hands with poor .    She has arthralgia, it got worse and went downhill for past 2 years.    Sometimes gets muscle weakness, more constant lately over past few months.    Has brain fog, getting worse.    Has progressive hair loss with bald spots.    No photosensitivity or skin rashes.    Uses biotene and water, wakes up thirsty. Had a tooth which broke, sometimes teeth are chipping. Has dry mouth due to Sjogren's.    Has dry eyes, uses OTC eyedrops.    Gets R droopy eyelid, now affected L eyelid.    Eyes look red ad dry.    No parotid gland swelling.    No CP, SOB, cough, fevers or night sweats.    Gets diarrhea from certain foods.    No numbness, tingling.    Has fatigue, difficult time staying awake.    She did well on her cognitive function testing.    Mestinon caused diarrhea and  stopped taking it, scheduled to have EMG and do follow up with Dr. Manning.    Updated on cancer screening.    No dysphagia.    6/3/2022:    Kristina was initially seen in 3/2022 with muscle weakness, when she was treated with prednisone taper (4tab=20 mg qd x 5 days, 3tab=15 mg qd x 5 days, 2tab=10 mg qd x 5 days, 1 tab=5 mg qd x 5 days then stop) and was put back on imuran 50 mg every day. Prednisone helped her.    Overall feeling pretty good.    Had diarrhea but it is better.    Went on 4 almazan past weekend and did well.    She and her  have seen an improvement by resuming imuran.    Easier to get out of chair.    Waiting to hear about muscle biopsy result.     10/28/2022:    Kristina presents for follow up. She is here with her . They report that Kristina is getting worse, feels weaker, has memory problem. Dr. Manning has ordered IVIG for myasthenia gravis, I see that the staff tried to reach Kristina to schedule at [place] with no success, Kristina reports that her phone is not working and they should call her . 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    No rash, or oral ulcers.    Has hair loss x past 6 months.    No stomach pain.    Sometimes has diarrhea based on what she eats.    Had a cough, resolved.    No CP.    Has dry mouth, solid dysphagia.    Dryness of eyes is worse. Uses OTC eyedrops.    Mestinon is not helping with MG.    Legs are weak, reports falls.      Today 12/7/2023:    -feeling weaker, gets dizzy, tends to fall, memory is bad, no SOB or cough, no rash or CP    ROS: A comprehensive ROS was done. Positives are per HPI.    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       Past Surgical History:   Procedure Laterality Date    BIOPSY MUSCLE DIAGNOSTIC (LOCATION) Left 2022    Procedure: BIOPSY, MUSCLE LEFT biceps @0900;  Surgeon: Tyrell Loo MD;  Location: [facility] OR    C/SECTION, LOW TRANSVERSE  10/13/1978    , Low Transverse    COLONOSCOPY      OPEN REDUCTION INTERNAL FIXATION ANKLE Right 2019    Procedure: Open reduction internal fixation right lateral malleolus fracture;  Surgeon: Rolo Oconnor DPM;  Location: [facility] OR    SURGICAL HISTORY OF -           SURGICAL HISTORY OF -   00    Colonoscopy       Family History   Problem Relation Age of Onset    Cancer Mother         Breast and liver- age 43    Heart Disease Father         ? chf?h/o CVA    Alzheimer Disease Father     Hypertension Father     Neurologic Disorder Father         CVA    Diabetes Maternal Grandmother     Breast Cancer Maternal Aunt     Breast Cancer Paternal Aunt     Cancer - colorectal No family hx of     Prostate Cancer No family hx of        Social History     Tobacco Use    Smoking status: Never    Smokeless tobacco: Never   Substance Use Topics    Alcohol use: Yes     Alcohol/week: 0.0 standard drinks of alcohol     Comment: 1 glass of wine every 2 weeks     Outpatient Encounter Medications as of 2023   Medication Sig Dispense Refill    acetaminophen (TYLENOL) 500 MG tablet Take 1,000 mg by mouth every 6 hours as needed for mild pain      albuterol (PROAIR HFA/PROVENTIL HFA/VENTOLIN HFA) 108 (90 Base) MCG/ACT inhaler INHALE TWO PUFFS BY MOUTH EVERY FOUR HOURS AS NEEDED FOR SHORTNESS OF BREATH/DYSPNEA 25.5 g 0    aspirin (ASA) 81 MG EC tablet Take 1 tablet (81 mg) by mouth daily 90 tablet 3    azaTHIOprine (IMURAN) 50 MG tablet Take 1 tablet (50 mg) twice daily x 14 days then complete labs, if labs ok then take 2 tablets (100 mg) in the morning and 1 tablet (50 mg) in the evening x 14 days then complete  "labs, if labs ok then take 2 tablets (100 mg) twice daily 120 tablet 5    buPROPion (WELLBUTRIN XL) 150 MG 24 hr tablet TAKE ONE TABLET BY MOUTH IN THE MORNING 90 tablet 0    Calcium-Magnesium-Zinc 500-250-12.5 MG TABS Take 1 tablet by mouth daily      hyoscyamine (LEVSIN) 0.125 MG tablet TAKE 1 TABLET BY MOUTH EVERY EIGHT HOURS 30 MINUTES BEFORE EACH MESTINON. 180 tablet 0    LORazepam (ATIVAN) 0.5 MG tablet TAKE ONE HALF TO ONE TABLET BY MOUTH twice a day as needed. 60 tablet 0    Multiple Vitamins-Minerals (MULTIVITAMIN & MINERAL PO) Take 1 tablet by mouth daily       pravastatin (PRAVACHOL) 40 MG tablet Take 1 tablet (40 mg) by mouth daily 90 tablet 0    predniSONE (DELTASONE) 5 MG tablet Take 2 tablets (10 mg) by mouth daily For 3 weeks. Then back to 5 mg. 90 tablet 1    pyRIDostigmine (MESTINON) 60 MG tablet TAKE ONE TABLET BY MOUTH THREE TIMES DAILY 270 tablet 0    rivastigmine (EXELON) 4.6 MG/24HR 24 hr patch APPLY ONE PATCH ON SKIN ONE TIME DAILY 30 patch 0    sertraline (ZOLOFT) 100 MG tablet Take 2 tablets (200 mg) by mouth daily 180 tablet 0    gabapentin (NEURONTIN) 100 MG capsule 1 capsule three times a day for 1 week, increase to 2 capsules three times a day on week 2 if pain continues (Patient not taking: Reported on 12/4/2023) 180 capsule 0    sodium fluoride dental gel (PREVIDENT) 1.1 % GEL topical gel USE TO BRUSH TEETH 1-2 TIMES PER DAY BEFORE BEDTIME AND SPIT; DO NOT RINSE WITH WATER (Patient not taking: Reported on 12/4/2023)       No facility-administered encounter medications on file as of 12/7/2023.       Allergies   Allergen Reactions    Daypro [Oxaprozin] Rash           Lidocaine Other (See Comments)     Rapid heart rate    Nitroglycerin Other (See Comments)     Causes low blood pressure    Penicillins Unknown     Occurred as child.    Sulfa Antibiotics Rash     Physical exam:    /71   Pulse 75   Ht 1.676 m (5' 6\")   Wt 69.7 kg (153 lb 11.2 oz)   BMI 24.81 kg/m   "       Constitutional: NAD. pleasant.  present.  HEENT: EOMI, no scleral icterus. Nl conj. No oral ulcers, poor salivary pool, No adenopathy or thyromegaly.   Cardiovascular: RRR, no M/R/G, trace LE edema  Respiratory: CTA b/l  Gastrointestinal:  soft, non-tender to palpation.    Musculoskeletal: No active synovitis. No joint tenderness   Skin: no rash  Neurological system: hip flexors 4/5.  Psych: nl affect    Component      Latest Ref Rng & Units 6/17/2019   WBC      4.0 - 11.0 10e9/L 5.6   RBC Count      3.8 - 5.2 10e12/L 4.19   Hemoglobin      11.7 - 15.7 g/dL 13.0   Hematocrit      35.0 - 47.0 % 39.8   MCV      78 - 100 fl 95   MCH      26.5 - 33.0 pg 31.0   MCHC      31.5 - 36.5 g/dL 32.7   RDW      10.0 - 15.0 % 13.1   Platelet Count      150 - 450 10e9/L 230   % Neutrophils      % 56.3   % Lymphocytes      % 31.4   % Monocytes      % 9.6   % Eosinophils      % 2.3   % Basophils      % 0.4   Absolute Neutrophil      1.6 - 8.3 10e9/L 3.2   Absolute Lymphocytes      0.8 - 5.3 10e9/L 1.8   Absolute Monocytes      0.0 - 1.3 10e9/L 0.5   Absolute Eosinophils      0.0 - 0.7 10e9/L 0.1   Absolute Basophils      0.0 - 0.2 10e9/L 0.0   Diff Method       Automated Method   Sodium      133 - 144 mmol/L 140   Potassium      3.4 - 5.3 mmol/L 5.0   Chloride      94 - 109 mmol/L 106   Carbon Dioxide      20 - 32 mmol/L 31   Anion Gap      3 - 14 mmol/L 3   Glucose      70 - 99 mg/dL 96   Urea Nitrogen      7 - 30 mg/dL 17   Creatinine      0.52 - 1.04 mg/dL 0.57   GFR Estimate      >60 mL/min/1.73:m2 >90   GFR Estimate If Black      >60 mL/min/1.73:m2 >90   Calcium      8.5 - 10.1 mg/dL 9.1   Bilirubin Total      0.2 - 1.3 mg/dL 0.5   Albumin      3.4 - 5.0 g/dL 3.8   Protein Total      6.8 - 8.8 g/dL 7.4   Alkaline Phosphatase      40 - 150 U/L 59   ALT      0 - 50 U/L 27   AST      0 - 45 U/L 20   Cholesterol      <200 mg/dL 150   Triglycerides      <150 mg/dL 168 (H)   HDL Cholesterol      >49 mg/dL 70   LDL  Cholesterol Calculated      <100 mg/dL 46   Non HDL Cholesterol      <130 mg/dL 80   Rheumatoid Factor      <20 IU/mL <20   CRP Inflammation      0.0 - 8.0 mg/L <2.9   Vitamin D Deficiency screening      20 - 75 ug/L 48   Vitamin B12      193 - 986 pg/mL 705     Component      Latest Ref Rng & Units 1/16/2020   Ruth-1 Autoantibodies      <20 EU 0   Scl-70 Autoantibodies      <20 EU 0   Sm (Vergara) Autoantibodies      <20 EU 1   Sm/RNP Autoantibodies      <20 EU 2   SS-A/Ro Autoantibodies      <20  (H)   SS-B/La Autoantibodies      <20  (H)   Complement C4      19 - 59 mg/dL 27   Complement C3      83 - 177 mg/dL 139   DNA (ds) Antibody      <25 IU 3       Component      Latest Ref Rng & Units 1/20/2022 2/16/2022 2/28/2022   WBC      4.0 - 11.0 10e3/uL 5.7     RBC Count      3.80 - 5.20 10e6/uL 4.14     Hemoglobin      11.7 - 15.7 g/dL 13.0     Hematocrit      35.0 - 47.0 % 39.8     MCV      78 - 100 fL 96     MCH      26.5 - 33.0 pg 31.4     MCHC      31.5 - 36.5 g/dL 32.7     RDW      10.0 - 15.0 % 13.1     Platelet Count      150 - 450 10e3/uL 223     % Neutrophils      % 57     % Lymphocytes      % 30     % Monocytes      % 10     % Eosinophils      % 3     % Basophils      % 0     Absolute Neutrophils      1.6 - 8.3 10e3/uL 3.3     Absolute Lymphocytes      0.8 - 5.3 10e3/uL 1.7     Absolute Monocytes      0.0 - 1.3 10e3/uL 0.6     Absolute Eosinophils      0.0 - 0.7 10e3/uL 0.2     Absolute Basophils      0.0 - 0.2 10e3/uL 0.0     Sodium      133 - 144 mmol/L 139 137    Potassium      3.4 - 5.3 mmol/L 4.3 4.3    Chloride      94 - 109 mmol/L 108 107    Carbon Dioxide      20 - 32 mmol/L 38 (H) 25    Anion Gap      3 - 14 mmol/L <1 (L) 5    Urea Nitrogen      7 - 30 mg/dL 22 27    Creatinine      0.52 - 1.04 mg/dL 0.56 0.59    Calcium      8.5 - 10.1 mg/dL 9.0 9.1    Glucose      70 - 99 mg/dL 99 96    Alkaline Phosphatase      40 - 150 U/L 63 67    AST      0 - 45 U/L 20 30    ALT      0 - 50 U/L 33 44     Protein Total      6.8 - 8.8 g/dL 7.4 7.3    Albumin      3.4 - 5.0 g/dL 3.5 4.0    Bilirubin Total      0.2 - 1.3 mg/dL 0.5 0.4    GFR Estimate      >60 mL/min/1.73m2 >90 >90    Color Urine      Colorless, Straw, Light Yellow, Yellow Yellow     Appearance Urine      Clear Clear     Glucose Urine      Negative mg/dL Negative     Bilirubin Urine      Negative Negative     Ketones Urine      Negative mg/dL Negative     Specific Gravity Urine      1.003 - 1.035 >=1.030     Blood Urine      Negative Negative     pH Urine      5.0 - 7.0 5.0     Protein Albumin Urine      Negative mg/dL Negative     Urobilinogen Urine      0.2, 1.0 E.U./dL 0.2     Nitrite Urine      Negative Negative     Leukocyte Esterase Urine      Negative Negative     TSH      0.40 - 4.00 mU/L 1.87     AcetChol Binding Darlin      0.0 - 0.4 nmol/L   0.0   AcetChol Modul Darlin      <=45 %   4   Clinical Information:     71 year old female with generalized fatigue and right eyelid ptosis. She has not taken her Mestinon in at least the last week due to intolerance with diarhea. Query myopathy vs neuromuscular junction disorder. Brief exam: bilateral eyelid closure weakness and neck flexion weakness. Double vision on upward gaze and lateral gaze; Right Deltoid 5, Biceps 5, Triceps 4 bilaterally; subtle asymmetry of the finger flexors (very mild weakness on the left, normal on the right).      Techniques:     Motor and sensory conduction studies were done with surface recording electrodes. EMG was done with a concentric needle electrode.      Results:     The right peroneal-EDB motor study showed normal onset latency with low amplitude without segmental changes, and normal conduction velocities.The right tibial-AH, peroneal-TA, median-APB and ulnar-ADM motor studies were normal. The right superficial peroneal, sural, median, ulnar, and radial antidromic sensory studies were normal. The right tibial F wave latencies were normal. 3 Hz slow repetitive nerve  stimulation at the right ADM and orbicularis oculi muscles did not show any significant decrement either at rest or after 1 min of maximal isometric exercise.      Needle evaluation of the right Triceps Brachii, Pronator Teres, Biceps, Tibialis anterior, Gastrocnemius (Medial Hd) and vastus lateralis showed abnormal spontaneous activity with fibrillation potentials (1+ to 2+) with CRDs present in the vastus lateralis only. The right Triceps Brachii, Pronator Teres, First Dorsal Interosseous , Biceps, and vastus lateralis showed small amplitude, short duration, polyphasic motor units with early recruitment in the triceps and biceps muscles.  The right tibialis anterior showed normal recruitment of normal appearing motor units. The right Gastrocnemius (Medial Hd) had normal recruitment of large, long duration motor units. The right deltoid muscle was normal.      Interpretation:     Abnormal study. 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 Please note, however, that this could still be part of a myopathic process because some chronic myopathies may show large, long duration motor unit potentials. There was no electrodiagnostic evidence of a neuromuscular junction disorder like myasthenia gravis.      Comment: The results of the study were discussed with the patient and she was referred for muscle biopsy of the left triceps or biceps.   ___________________________  Millicent Diggs MD, Neuromuscular Fellow  Rene Manning MD      Component      Latest Ref Rng & Units 3/25/2022   WBC      4.0 - 11.0 10e3/uL 6.1   RBC Count      3.80 - 5.20 10e6/uL 4.12   Hemoglobin      11.7 - 15.7 g/dL 12.7   Hematocrit      35.0 - 47.0 % 38.7   MCV      78 - 100 fL 94   MCH      26.5 - 33.0 pg 30.8   MCHC      31.5 - 36.5 g/dL 32.8   RDW      10.0 -  15.0 % 13.0   Platelet Count      150 - 450 10e3/uL 236   % Neutrophils      % 59   % Lymphocytes      % 30   % Monocytes      % 7   % Eosinophils      % 2   % Basophils      % 1   % Immature Granulocytes      % 1   NRBCs per 100 WBC      <1 /100 0   Absolute Neutrophils      1.6 - 8.3 10e3/uL 3.6   Absolute Lymphocytes      0.8 - 5.3 10e3/uL 1.8   Absolute Monocytes      0.0 - 1.3 10e3/uL 0.4   Absolute Eosinophils      0.0 - 0.7 10e3/uL 0.1   Absolute Basophils      0.0 - 0.2 10e3/uL 0.0   Absolute Immature Granulocytes      <=0.4 10e3/uL 0.0   Absolute NRBCs      10e3/uL 0.0   GARETT-1 (Histidyl-tRNA Synthetase) Darlin IgG      0 - 40 AU/mL 0   PL-12 (alanyl-tRNA synthetase) Antibody      Negative Negative   PL-7 (threonyl-tRNA synthetase) Antibody      Negative Negative   EJ (glycyl - tRNA synthetase) Antibody      Negative Negative   OJ (isoleucyl-tRNA synthetase) Antibody      Negative Negative   SRP (Signal Recognition Particle) Antibody      Negative Negative   Mi-2 (nuclear helicase protein) Antibody      Negative Negative   P155/140 (TIF1-gamma) Antibody      Negative Negative   TIF-1 Gamma Antibody      Negative Negative   SAE1 (SUMO activating enzyme) Darlin      Negative Negative   MDA5 (CADM 140) Darlin      Negative Negative   NXP-2 (Nuclear matrix protein 2) Darlin      Negative Negative   Myositis Interp       See Note   Albumin Fraction      3.7 - 5.1 g/dL 4.2   Alpha 1 Fraction      0.2 - 0.4 g/dL 0.3   Alpha 2 Fraction      0.5 - 0.9 g/dL 0.9   Beta Fraction      0.6 - 1.0 g/dL 0.9   Gamma Fraction      0.7 - 1.6 g/dL 0.9   Monoclonal Peak      <=0.0 g/dL 0.0   ELP Interpretation:       Essentially normal electrophoretic pattern. No obvious monoclonal proteins seen. Pathologic significance requires clinical correlation. Carlos Ashford M.D., Ph.D., Pathologist.   Quantiferon-TB Gold Plus Result      Negative Negative   TB1 Ag minus Nil Value      IU/mL 0.01   TB2 Ag minus Nil Value      IU/mL 0.00   Mitogen  minus Nil Result      IU/mL 9.97   Nil Result      IU/mL 0.03   BINDU interpretation      Negative Positive (A)   BINDU pattern 1       Speckled   BINDU titer 1       1:160   Iron      35 - 180 ug/dL 69   Iron Binding Cap      240 - 430 ug/dL 344   Iron Saturation Index      15 - 46 % 20   Creatinine      0.52 - 1.04 mg/dL 0.63   GFR Estimate      >60 mL/min/1.73m2 >90   ALT      0 - 50 U/L 31   Albumin      3.4 - 5.0 g/dL 3.6   AST      0 - 45 U/L 18   Thyroglobulin Antibody      <40 IU/mL <20   CK Total      30 - 225 U/L 244 (H)   Aldolase      1.2 - 7.6 U/L 3.9   Complement C3      81 - 157 mg/dL 142   Complement C4      13 - 39 mg/dL 26   CRP Inflammation      0.0 - 8.0 mg/L <2.9   Cryoglobulin Interpretation      Negative Negative   Cyclic Citrullinated Peptide Antibody, IgG      <7.0 U/mL 2.1   DNA-ds      <10.0 IU/mL 1.1   Vitamin D Deficiency screening      20 - 75 ug/L 41   Vitamin B12      193 - 986 pg/mL 603   Thyroid Peroxidase Antibody      <35 IU/mL <10   Rheumatoid Factor      <12 IU/mL 15 (H)   Hepatitis C Antibody      Nonreactive Nonreactive   Hep B Surface Agn      Nonreactive Nonreactive   Sed Rate      0 - 30 mm/hr 14   Ferritin      8 - 252 ng/mL 85   Hepatitis B Core Darlin      Nonreactive Nonreactive   Total Protein Serum for ELP      6.8 - 8.8 g/dL 7.2   Quantiferon Nil Tube      IU/mL 0.03   Quantiferon TB1 Tube      IU/mL 0.04   Quantiferon TB2 Tube       0.03   QUANTIFERON MITOGEN      IU/mL 10.00       Paul Mark MD

## 2023-12-10 PROBLEM — F33.1 MAJOR DEPRESSIVE DISORDER, RECURRENT EPISODE, MODERATE (H): Status: ACTIVE | Noted: 2023-12-10

## 2023-12-12 DIAGNOSIS — I25.10 CORONARY ARTERY DISEASE INVOLVING NATIVE CORONARY ARTERY OF NATIVE HEART WITHOUT ANGINA PECTORIS: ICD-10-CM

## 2023-12-15 RX ORDER — PRAVASTATIN SODIUM 40 MG
40 TABLET ORAL DAILY
Qty: 90 TABLET | Refills: 2 | Status: SHIPPED | OUTPATIENT
Start: 2023-12-15 | End: 2024-10-01

## 2023-12-20 DIAGNOSIS — F33.0 MAJOR DEPRESSIVE DISORDER, RECURRENT, MILD (H): ICD-10-CM

## 2023-12-20 RX ORDER — BUPROPION HYDROCHLORIDE 150 MG/1
150 TABLET ORAL EVERY MORNING
Qty: 90 TABLET | Refills: 0 | Status: SHIPPED | OUTPATIENT
Start: 2023-12-20 | End: 2024-03-25

## 2023-12-21 NOTE — PROGRESS NOTES
[facility] New Visit Note    Chief Complaint: memory problems    History of Present Illness:  Ms. Pedro is a 73 year old right-handed female presenting for evaluation of memory problems. She is accompanied to today's visit by her  Jerrod and her daughter Td who assisted in providing the history. Kristina has been going through a lot of neurological evaluations in past 2-3 years with ongoing muscle weakness and generalized fatigue, as well as cognitive impairment and depression/anxiety symptoms.     Kristina and her family endorsed some cognitive issues dated at least 2-3 years or so, more noticeable in past one year when she was diagnosed with Myasthenia Gravis and started treatments including prednisone (currently at 5mg daily), imuran and mestinon. Most noticeable cognitive issues she struggled with are troubles organizing her daily activities with increased forgetfulness about what she was supposed to do, and struggles with attention and focus on tasks to do. For example, she recently baked cookies with her  and forgot how many scoops of flour she put in already. Family also noticed that she lost interests in doing fun things and hobbies she used to enjoy like quilting and painting. She used to be a talented artist and worked with pre-schoolers. She has not been able to do painting or quilting for the past 2-3 years. Trouble with managing her own medications, missing appointments, sleeping for most of the days and getting into bed later of the night. She was asked to stop driving about 2 years ago (concerns about her muscle weakness and coordination). Some troubles with navigating directions. Daughter also endorsed some noted word-finding difficulties with Kristina. Patient was started on rivastigmine patch about 1 year ago and family did not report any noticeable changes while she was on this medication.     Motor: Increased falls and off balance for past 5 years more so in past 2-3  years. Last fall a couple of weeks ago. She seems to fall from side to side (more so to right). Family contributed her increased falls due to increased muscle weakness.  MG related symptoms: ptosis  but no diplopia per report; trouble getting up from sitting with LE weakness; trouble with balance and increased falls. No reported respiratory symptoms or swallowing issues.     Sleep patterns: shifting to night time but able to get into sleep and sleep through the night without any trouble. Average going to bed around midnight-2Am and wakes up around 10:30AM-noon. Sometimes late for her morning medication dosages (steroid and mestinon). Denies any increased daytime sleepiness or acting out dreams.    Mood/behavior: Reported increased anxiety and panic feelings due to her forgetfulness and trouble performing her daily activities. She has been treated for depression for past 5 years and currently taking Wellbutrin and Zoloft for mood. Denies any delusions/paranoia, or visual/auditory hallucinations.    ADLs:  handles house finance, bills, medications and appointments. Drives her around.    ROS:  General: no weight loss or fever  Cardiac: no chest pain or palpitations  Pulmonary: no SOB or cough  GI: no abdominal pain, nausea, vomiting, or constipation  : no urinary urgency, pain or incontinence  Musculoskeletal: no joint pain or stiffness  Skin: no rashes or easy bruising  Neurological: as above  Pain Evaluation: no reported pain today.    Patient Active Problem List   Diagnosis    DIZZINESS - LIKELY HYPOGLYCEMIA    Dermatophytosis of body    Episodic mood disorder (H24)    Health Care Home    Panniculitis    Advanced directives, counseling/discussion    CAD (coronary artery disease)    Sjogren's syndrome (H24)    Adverse effect of anesthetic    Status post coronary angiogram    Acute chest pain    Major depressive disorder, recurrent, mild (H24)    Hyperlipidemia LDL goal <70    ILD (interstitial lung disease)  (H)    Intermediate coronary syndrome (H)    Chronic stable angina    Myasthenia gravis with exacerbation (H)    Frequent falls    Dementia, unspecified dementia severity, unspecified dementia type, unspecified whether behavioral, psychotic, or mood disturbance or anxiety (H)    Major depressive disorder, recurrent episode, moderate (H)       Past Medical History:   Diagnosis Date    CAD (coronary artery disease)     ILD (interstitial lung disease) (H)     Other and unspecified hyperlipidemia     Sicca syndrome (H24)     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   Negative for stroke or head trauma    Past Surgical History:   Procedure Laterality Date    BIOPSY MUSCLE DIAGNOSTIC (LOCATION) Left 2022    Procedure: BIOPSY, MUSCLE LEFT biceps @0900;  Surgeon: Tyrell Loo MD;  Location: [facility] OR    C/SECTION, LOW TRANSVERSE  10/13/1978    , Low Transverse    COLONOSCOPY      OPEN REDUCTION INTERNAL FIXATION ANKLE Right 2019    Procedure: Open reduction internal fixation right lateral malleolus fracture;  Surgeon: Rolo Oconnor DPM;  Location: [facility] OR    SURGICAL HISTORY OF -           SURGICAL HISTORY OF -   00    Colonoscopy     Current Outpatient Medications   Medication Sig Dispense Refill    acetaminophen (TYLENOL) 500 MG tablet Take 1,000 mg by mouth every 6 hours as needed for mild pain      albuterol (PROAIR HFA/PROVENTIL HFA/VENTOLIN HFA) 108 (90 Base) MCG/ACT inhaler INHALE TWO PUFFS BY MOUTH EVERY FOUR HOURS AS NEEDED FOR SHORTNESS OF BREATH/DYSPNEA 25.5 g 0    aspirin (ASA) 81 MG EC tablet Take 1 tablet (81 mg) by mouth daily 90 tablet 3    azaTHIOprine (IMURAN) 50 MG tablet Take 1 tablet (50 mg) twice daily x 14 days then complete labs, if labs ok then take 2 tablets (100 mg) in the morning and 1 tablet (50 mg) in the evening x 14 days then complete labs, if labs ok then take 2 tablets  (100 mg) twice daily 120 tablet 5    buPROPion (WELLBUTRIN XL) 150 MG 24 hr tablet TAKE ONE TABLET BY MOUTH IN THE MORNING 90 tablet 0    Calcium-Magnesium-Zinc 500-250-12.5 MG TABS Take 1 tablet by mouth daily      gabapentin (NEURONTIN) 100 MG capsule 1 capsule three times a day for 1 week, increase to 2 capsules three times a day on week 2 if pain continues (Patient not taking: Reported on 2023) 180 capsule 0    hyoscyamine (LEVSIN) 0.125 MG tablet TAKE 1 TABLET BY MOUTH EVERY EIGHT HOURS 30 MINUTES BEFORE EACH MESTINON. 180 tablet 0    LORazepam (ATIVAN) 0.5 MG tablet TAKE ONE HALF TO ONE TABLET BY MOUTH twice a day as needed. 60 tablet 0    Multiple Vitamins-Minerals (MULTIVITAMIN & MINERAL PO) Take 1 tablet by mouth daily       pravastatin (PRAVACHOL) 40 MG tablet TAKE ONE TABLET BY MOUTH ONE TIME DAILY 90 tablet 2    predniSONE (DELTASONE) 5 MG tablet Take 2 tablets (10 mg) by mouth daily For 3 weeks. Then back to 5 mg. 90 tablet 1    pyRIDostigmine (MESTINON) 60 MG tablet TAKE ONE TABLET BY MOUTH THREE TIMES DAILY 270 tablet 0    rivastigmine (EXELON) 4.6 MG/24HR 24 hr patch APPLY ONE PATCH ON SKIN ONE TIME DAILY 30 patch 0    sertraline (ZOLOFT) 100 MG tablet Take 2 tablets (200 mg) by mouth daily 180 tablet 0    sodium fluoride dental gel (PREVIDENT) 1.1 % GEL topical gel USE TO BRUSH TEETH 1-2 TIMES PER DAY BEFORE BEDTIME AND SPIT; DO NOT RINSE WITH WATER (Patient not taking: Reported on 2023)          Allergies   Allergen Reactions    Daypro [Oxaprozin] Rash           Lidocaine Other (See Comments)     Rapid heart rate    Nitroglycerin Other (See Comments)     Causes low blood pressure    Penicillins Unknown     Occurred as child.    Sulfa Antibiotics Rash       Family History   Problem Relation Age of Onset    Cancer Mother         Breast and liver- age 43    Heart Disease Father         ? chf?h/o CVA    Alzheimer Disease Father     Hypertension Father     Neurologic Disorder Father          CVA    Diabetes Maternal Grandmother     Breast Cancer Maternal Aunt     Breast Cancer Paternal Aunt     Cancer - colorectal No family hx of     Prostate Cancer No family hx of    Father with Alzheimer's disease (at age of 70s).    Social History     Socioeconomic History    Marital status:      Spouse name: Not on file    Number of children: Not on file    Years of education: Not on file    Highest education level: Not on file   Occupational History    Not on file   Tobacco Use    Smoking status: Never    Smokeless tobacco: Never   Substance and Sexual Activity    Alcohol use: Yes     Alcohol/week: 0.0 standard drinks of alcohol     Comment: 1 glass of wine every 2 weeks    Drug use: No    Sexual activity: Yes     Partners: Male   Other Topics Concern    Parent/sibling w/ CABG, MI or angioplasty before 65F 55M? No   Social History Narrative    Not on file     Social Determinants of Health     Financial Resource Strain: Not on file   Food Insecurity: Not on file   Transportation Needs: Not on file   Physical Activity: Not on file   Stress: Not on file   Social Connections: Not on file   Interpersonal Safety: Not on file   Housing Stability: Not on file     General Physical examination:  /70 (BP Location: Right arm, Patient Position: Sitting, Cuff Size: Adult Regular)   Pulse 63   Temp 97.2  F (36.2  C) (Temporal)   Wt 149 lb 3.2 oz (67.7 kg)   BMI 24.08 kg/m     General: Ms. Pedro is well appearing and is appropriately groomed and dressed.    Neurological examination:  Mental status: Ms. Pedro  is awake, alert, and appropriate, with fluent speech. Attention and concentration are reasonably intact today. Her fund of knowledge is fair. No apraxia is noted.  Cranial nerves:  No ptosis noted. Visual fields are full to confrontation with no visual extinction. Extraocular movements are intact. Smooth pursuit is intact. Saccades are normal in initiation, velocity and amplitude both vertically and  "horizontally.  Facial strength is full bilaterally.  Sternocledomastoid and trapezius muscle strength is full bilaterally.  Motor examination: Bulk is normal throughout. Axial and upper and lower extremity tone is normal. No pronator drift is noted. UE Strength is 5/5  and symmetric without fatigueability. LE 4-/5 b/l. Some difficulties in getting up from sitting position with 3 tries. There is no tremor or other involuntary movement. No bradykinesia or cogwheel rigidity.  Sensation: LT intact b/l throughout.  Coordination: Finger-nose-finger is normal with no dysmetria bilaterally.  Rapid finger tapping, opening and closing of fist and pronation-supination are not slowed.  Gait: deferred given her LE weakness and unsteadiness on her feet.     MoCA 22/30 (12 years of education)  4/5 visuospatial/Executive (-1 for clock hands)  3/3 naming  6/6 attention (register: 2/5 1st and 2nd trials)  3/3 language (14 \"F\" words)  2/2 abstract  0/5 delayed recall (+1 with semantic cues; +2 with MC; MIS 5/15)  3/6 orientation (-1 for date, month and day)  +1 for 12 years of education    Neuropsychological evaluation:  A full report of the neuropsychological battery testing is available separately. Findings are summarized here briefly.  6/29/2021 performed by Dr. Logan Kelley  She was mildly disoriented to time, and unable to state the name of the clinic.  She was otherwise oriented to place.  She was oriented to various aspects of personal information.  She obtained passing scores on stand-alone and embedded metrics of cognitive performance validity.  Auditory attention for digits was average.  Mental calculations were average.  Learning of words in a list format was average.  Delayed recall of list words was average.  Percent retention of list words was low average.  Delayed recognition of list words was low average.  Learning of story information was low average.  Delayed recall of this information was impaired.  Delayed " recognition of story information was low average.  Learning of simple geometric shapes and their spatial locations was borderline impaired.  Delayed recall of the shapes and their locations was low average.  Percent retention of the shapes was normal.  Delayed recognition of the shapes was low average.  Visuospatial judgments for variably oriented lines were performed in the low average to average range.  Visual problem-solving with blocks was high average.  Her drawing of a complicated geometric figure was normal.  Comprehension of phrases and short stories was average.  Verbal associative fluency was high average.  Animal fluency was superior.  Naming to confrontation was high average.  Verbal abstract reasoning was average.  Speeded visual sequencing under focused attention was superior.  A similar measure with a divided attention component was performed in the average to high average range.  Speeded word reading was low average.  Speeded color naming was average.  Speeded inhibition of an overlearned response was average.  Speeded visual motor coding was average.  Speeded fine motor dexterity was average for the dominant, right hand, and high average for the left hand.     She endorsed items consistent with moderate symptoms of depression, and mild symptoms of anxiety on self-report measures.     IMPRESSIONS  Ms. 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  This is a pattern that could be seen in the early stages of an Alzheimer's disease syndrome.  The findings are quite similar to those from her 2017 neuropsychology exam, and are of relatively stable severity.  This stability, however, would argue against Alzheimer's disease.  Other potential contributing factors in this case include fatigue, as well as moderate symptoms of depression and mild symptoms of anxiety.  I do suspect  that these non-neurologic factors are playing a role.  In this exam, stable weaknesses and variability were identified in learning and memory for both verbal and nonverbal information.  There is also some degree of variability in her cognitive speed.  The remainder of her cognitive abilities, including naming and semantic verbal fluency, were intact and performed in keeping with her above average range cognitive baseline.  The intact nature of naming and semantic fluency also argues against Alzheimer s disease. As noted, I do suspect that depression and anxiety are playing a role.    Labs:  Lab Results   Component Value Date    WBC 5.7 12/07/2023    RBC 4.24 12/07/2023    HGB 12.9 12/07/2023    HCT 39.4 12/07/2023    MCV 93 12/07/2023    MCH 30.4 12/07/2023    MCHC 32.7 12/07/2023    RDW 14.8 12/07/2023     12/07/2023     12/04/2023    POTASSIUM 4.7 12/04/2023    CHLORIDE 104 12/04/2023    CO2 23 12/04/2023    ANIONGAP 11 12/04/2023     (H) 12/04/2023    BUN 24.6 (H) 12/04/2023    CR 0.58 12/07/2023    SARI 9.2 12/04/2023    TSH 1.03 03/06/2023    T4 0.93 09/15/2015    B12 438 02/01/2023 2/9/2023  [facility] Result SEE NOTE Abnormal    Comment:    Test                             Result          Flag  Unit   RefValue  ----------------------------------------------------------------------  Alzheimer's Disease Evaluation, CSF    p-Tau/Abeta42                  0.036            H    ratio  <=0.023    AD Interpretation              SEE NOTE                                  The elevated p-Tau/Abeta42 ratio is consistent with the      presence of pathological changes associated with      Alzheimer's disease.             The p-Tau/Abeta42 ratio provides better concordance with      amyloid Positron Emission Tomography (PET) imaging when      compared to Abeta42, phospho-Tau and total-Tau      individually.  A cut-off of 0.023 provides optimal balance      between NPA (negative % agreement) and PPA (positive  %      agreement) when compared to amyloid PET results. A      p-Tau/Abeta42 ratio of <=0.023 has a 92% NPA with normal      amyloid PET.  A ratio of >0.023 has a 92% PPA with      abnormal amyloid PET.             The pTau assay measures p-Bwm019 (Tau phosphorylated at      threonine 181), which has been shown to be a marker of AD      pathology.    Abeta42                        921              L    pg/mL  >1026      Total-Tau                      356              H    pg/mL  <=238      Phospho-Tau(181P)     33.2             H    pg/mL  <=21.7       CSF paraneoplastic/encephalitis panel negative    CK is normal.   Acetylcholine receptor binding, modulating, and MuSK antibodies were negative.     2022 EMG performed by Dr. Rene Manning  Results:  3 Hz RNS of the right median nerve (APB) showed a CMAP amplitude decrement of 6% at rest, with repair to 0.6% after 1 min of maximal isometric exercise, and then gradual worsening again to 6% after 3 minutes. This result is considered normal because the cutoff for abnormal decrement in our lab is 10%. 3 Hz RNS of the right radial nerve (anconeus) showed a dramatic decrement at rest (56%) with partial repair to 8.6% after 1 min of maximal isometric exercise, and further decline to 15% 2 mins later.   Interpretation:  Abnormal study showing evidence of a postsynaptic disorder of neuromuscular junction like myasthenia gravis. The RNS abnormalities are characteristically more prominent in clinically weak muscles (triceps/anconeus) and not in clinically strong muscles (APB).      Imagin2023 MRI brain studies  FINDINGS: There is no evidence of acute infarct, hemorrhage, mass, or  herniation. Mild diffuse parenchymal volume loss. Mild patchy deep and  subcortical white matter T2 hyperintensities which are nonspecific,  but likely related to chronic microvascular ischemic disease.  Ventricular size within normal limits without hydrocephalus.   There is no  abnormal intracranial enhancement.   The facial structures appear normal. The major arterial T2 flow voids  at the base of the brain appear patent.                                                                 IMPRESSION:    1. No evidence of acute infarct, mass, hemorrhage, or herniation.  2. Mild diffuse parenchymal volume loss and white matter changes  likely due to chronic microvascular ischemic disease. Appearance is  similar to prior MR 4/20/2021.    3/10/2023 MRI L spine   IMPRESSION:  1.  Periarticular enhancement surrounding multiple facet joints within the paraspinal muscles above. Findings likely reflect reactive inflammatory facet arthropathy. Please correlate clinically to exclude the less likely possibility for infectious septic  facet arthropathy.  2.  Multiple cauda equina nerve roots enhance at the L4-L5 level nonspecific likely reactive.   3.  Otherwise, no intrathecal pathologic enhancement.   4.  Multilevel spondylosis described above.   5.  L4-L5 borderline grade 1/grade 2 anterolisthesis. L5-S1 mild grade 1 anterolisthesis.  6.  L2-L3, L3-L4, L4-L5 severe high-grade thecal sac stenosis. Cauda equina syndrome will need to be excluded clinically.  7.  Multilevel neural foraminal stenosis described above.     Impression:  Ms. Pedro is a 73 year old right-handed female who presents with memory problems for 2-3 years mostly STM and forgetfulness, more noticeably in past one year or so with some impairments in iADLs. Today's exam noted MoCA 22/30 with 0/5 recall and MIS 5/15 (some new information registration as well as delayed recall), in addition to her LE weakness. 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 A lengthy discussion with patient and her family about little added values of further diagnostic testing such as amyloid PET scan. In addition, the eligibility criteria for anti-amyloid antibody therapies indicated for MCI due to Alzheimer's disease and early Alzheimer's disease conditions were reviewed with patient and the family. Concerns of increased risks overweight the benefits given that patient on imuran/prednisone for MG treatment as well as her prior diagnosis of autoimmune disorder Srogren's disease (contraindicated for lecanenmab).    Recommendations:  1. Continue rivastigmine patch.  2. Continue Zoloft and wellbutrin at current doses for now. May adjust in the future if anxiety and depression symptoms not well controlled.  3. Emphasize the importance of lifestyle modifications including physical and mental exercises in slowing down cognitive decline.  4. Encourage to develop strategies such as a board of daily to-do lists to follow up.    Follow-up: Return in about 6 months.    I spent >120 minutes total today for this visit, which includes face-to-face with the patient, reviewing the chart (neuropsychological testing, MRI images, lab reports, clinical notes, medications), counseling, and documentation.

## 2023-12-22 ENCOUNTER — OFFICE VISIT (OUTPATIENT)
Dept: NEUROLOGY | Facility: CLINIC | Age: 73
End: 2023-12-22
Attending: PSYCHIATRY & NEUROLOGY
Payer: COMMERCIAL

## 2023-12-22 VITALS
BODY MASS INDEX: 24.08 KG/M2 | WEIGHT: 149.2 LBS | TEMPERATURE: 97.2 F | DIASTOLIC BLOOD PRESSURE: 70 MMHG | SYSTOLIC BLOOD PRESSURE: 134 MMHG | HEART RATE: 63 BPM

## 2023-12-22 DIAGNOSIS — F02.80 ALZHEIMER'S DISEASE (H): ICD-10-CM

## 2023-12-22 DIAGNOSIS — G30.9 ALZHEIMER'S DISEASE (H): ICD-10-CM

## 2023-12-22 NOTE — LETTER
12/22/2023       RE: Albina Pedro  [id number] [facility] [place] [address]       Dear Colleague,    Thank you for referring your patient, Albina Pedro, to the  [facility] at [facility]. Please see a copy of my visit note below.    [facility] New Visit Note    Chief Complaint: memory problems    History of Present Illness:  Ms. Pedro is a 73 year old right-handed female presenting for evaluation of memory problems. She is accompanied to today's visit by her  Jerrod and her daughter Td who assisted in providing the history. Kristina has been going through a lot of neurological evaluations in past 2-3 years with ongoing muscle weakness and generalized fatigue, as well as cognitive impairment and depression/anxiety symptoms.     Kristina and her family endorsed some cognitive issues dated at least 2-3 years or so, more noticeable in past one year when she was diagnosed with Myasthenia Gravis and started treatments including prednisone (currently at 5mg daily), imuran and mestinon. Most noticeable cognitive issues she struggled with are troubles organizing her daily activities with increased forgetfulness about what she was supposed to do, and struggles with attention and focus on tasks to do. For example, she recently baked cookies with her  and forgot how many scoops of flour she put in already. Family also noticed that she lost interests in doing fun things and hobbies she used to enjoy like quilting and painting. She used to be a talented artist and worked with pre-schoolers. She has not been able to do painting or quilting for the past 2-3 years. Trouble with managing her own medications, missing appointments, sleeping for most of the days and getting into bed later of the night. She was asked to stop driving about 2 years ago (concerns about her muscle weakness and coordination). Some troubles  with navigating directions. Daughter also endorsed some noted word-finding difficulties with Kristina. Patient was started on rivastigmine patch about 1 year ago and family did not report any noticeable changes while she was on this medication.     Motor: Increased falls and off balance for past 5 years more so in past 2-3 years. Last fall a couple of weeks ago. She seems to fall from side to side (more so to right). Family contributed her increased falls due to increased muscle weakness.  MG related symptoms: ptosis  but no diplopia per report; trouble getting up from sitting with LE weakness; trouble with balance and increased falls. No reported respiratory symptoms or swallowing issues.     Sleep patterns: shifting to night time but able to get into sleep and sleep through the night without any trouble. Average going to bed around midnight-2Am and wakes up around 10:30AM-noon. Sometimes late for her morning medication dosages (steroid and mestinon). Denies any increased daytime sleepiness or acting out dreams.    Mood/behavior: Reported increased anxiety and panic feelings due to her forgetfulness and trouble performing her daily activities. She has been treated for depression for past 5 years and currently taking Wellbutrin and Zoloft for mood. Denies any delusions/paranoia, or visual/auditory hallucinations.    ADLs:  handles house finance, bills, medications and appointments. Drives her around.    ROS:  General: no weight loss or fever  Cardiac: no chest pain or palpitations  Pulmonary: no SOB or cough  GI: no abdominal pain, nausea, vomiting, or constipation  : no urinary urgency, pain or incontinence  Musculoskeletal: no joint pain or stiffness  Skin: no rashes or easy bruising  Neurological: as above  Pain Evaluation: no reported pain today.    Patient Active Problem List   Diagnosis    DIZZINESS - LIKELY HYPOGLYCEMIA    Dermatophytosis of body    Episodic mood disorder (H24)    Health Care Home     Panniculitis    Advanced directives, counseling/discussion    CAD (coronary artery disease)    Sjogren's syndrome (H24)    Adverse effect of anesthetic    Status post coronary angiogram    Acute chest pain    Major depressive disorder, recurrent, mild (H24)    Hyperlipidemia LDL goal <70    ILD (interstitial lung disease) (H)    Intermediate coronary syndrome (H)    Chronic stable angina    Myasthenia gravis with exacerbation (H)    Frequent falls    Dementia, unspecified dementia severity, unspecified dementia type, unspecified whether behavioral, psychotic, or mood disturbance or anxiety (H)    Major depressive disorder, recurrent episode, moderate (H)       Past Medical History:   Diagnosis Date    CAD (coronary artery disease)     ILD (interstitial lung disease) (H)     Other and unspecified hyperlipidemia     Sicca syndrome (H24)     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   Negative for stroke or head trauma    Past Surgical History:   Procedure Laterality Date    BIOPSY MUSCLE DIAGNOSTIC (LOCATION) Left 2022    Procedure: BIOPSY, MUSCLE LEFT biceps @0900;  Surgeon: Tyrell Loo MD;  Location: [facility] OR    C/SECTION, LOW TRANSVERSE  10/13/1978    , Low Transverse    COLONOSCOPY      OPEN REDUCTION INTERNAL FIXATION ANKLE Right 2019    Procedure: Open reduction internal fixation right lateral malleolus fracture;  Surgeon: Rolo Oconnor DPM;  Location: [facility] OR    SURGICAL HISTORY OF -           SURGICAL HISTORY OF -   00    Colonoscopy     Current Outpatient Medications   Medication Sig Dispense Refill    acetaminophen (TYLENOL) 500 MG tablet Take 1,000 mg by mouth every 6 hours as needed for mild pain      albuterol (PROAIR HFA/PROVENTIL HFA/VENTOLIN HFA) 108 (90 Base) MCG/ACT inhaler INHALE TWO PUFFS BY MOUTH EVERY FOUR HOURS AS NEEDED FOR SHORTNESS OF BREATH/DYSPNEA 25.5 g 0    aspirin  (ASA) 81 MG EC tablet Take 1 tablet (81 mg) by mouth daily 90 tablet 3    azaTHIOprine (IMURAN) 50 MG tablet Take 1 tablet (50 mg) twice daily x 14 days then complete labs, if labs ok then take 2 tablets (100 mg) in the morning and 1 tablet (50 mg) in the evening x 14 days then complete labs, if labs ok then take 2 tablets (100 mg) twice daily 120 tablet 5    buPROPion (WELLBUTRIN XL) 150 MG 24 hr tablet TAKE ONE TABLET BY MOUTH IN THE MORNING 90 tablet 0    Calcium-Magnesium-Zinc 500-250-12.5 MG TABS Take 1 tablet by mouth daily      gabapentin (NEURONTIN) 100 MG capsule 1 capsule three times a day for 1 week, increase to 2 capsules three times a day on week 2 if pain continues (Patient not taking: Reported on 12/4/2023) 180 capsule 0    hyoscyamine (LEVSIN) 0.125 MG tablet TAKE 1 TABLET BY MOUTH EVERY EIGHT HOURS 30 MINUTES BEFORE EACH MESTINON. 180 tablet 0    LORazepam (ATIVAN) 0.5 MG tablet TAKE ONE HALF TO ONE TABLET BY MOUTH twice a day as needed. 60 tablet 0    Multiple Vitamins-Minerals (MULTIVITAMIN & MINERAL PO) Take 1 tablet by mouth daily       pravastatin (PRAVACHOL) 40 MG tablet TAKE ONE TABLET BY MOUTH ONE TIME DAILY 90 tablet 2    predniSONE (DELTASONE) 5 MG tablet Take 2 tablets (10 mg) by mouth daily For 3 weeks. Then back to 5 mg. 90 tablet 1    pyRIDostigmine (MESTINON) 60 MG tablet TAKE ONE TABLET BY MOUTH THREE TIMES DAILY 270 tablet 0    rivastigmine (EXELON) 4.6 MG/24HR 24 hr patch APPLY ONE PATCH ON SKIN ONE TIME DAILY 30 patch 0    sertraline (ZOLOFT) 100 MG tablet Take 2 tablets (200 mg) by mouth daily 180 tablet 0    sodium fluoride dental gel (PREVIDENT) 1.1 % GEL topical gel USE TO BRUSH TEETH 1-2 TIMES PER DAY BEFORE BEDTIME AND SPIT; DO NOT RINSE WITH WATER (Patient not taking: Reported on 12/4/2023)          Allergies   Allergen Reactions    Daypro [Oxaprozin] Rash           Lidocaine Other (See Comments)     Rapid heart rate    Nitroglycerin Other (See Comments)     Causes low  blood pressure    Penicillins Unknown     Occurred as child.    Sulfa Antibiotics Rash       Family History   Problem Relation Age of Onset    Cancer Mother         Breast and liver- age 43    Heart Disease Father         ? chf?h/o CVA    Alzheimer Disease Father     Hypertension Father     Neurologic Disorder Father         CVA    Diabetes Maternal Grandmother     Breast Cancer Maternal Aunt     Breast Cancer Paternal Aunt     Cancer - colorectal No family hx of     Prostate Cancer No family hx of    Father with Alzheimer's disease (at age of 70s).    Social History     Socioeconomic History    Marital status:      Spouse name: Not on file    Number of children: Not on file    Years of education: Not on file    Highest education level: Not on file   Occupational History    Not on file   Tobacco Use    Smoking status: Never    Smokeless tobacco: Never   Substance and Sexual Activity    Alcohol use: Yes     Alcohol/week: 0.0 standard drinks of alcohol     Comment: 1 glass of wine every 2 weeks    Drug use: No    Sexual activity: Yes     Partners: Male   Other Topics Concern    Parent/sibling w/ CABG, MI or angioplasty before 65F 55M? No   Social History Narrative    Not on file     Social Determinants of Health     Financial Resource Strain: Not on file   Food Insecurity: Not on file   Transportation Needs: Not on file   Physical Activity: Not on file   Stress: Not on file   Social Connections: Not on file   Interpersonal Safety: Not on file   Housing Stability: Not on file     General Physical examination:  /70 (BP Location: Right arm, Patient Position: Sitting, Cuff Size: Adult Regular)   Pulse 63   Temp 97.2  F (36.2  C) (Temporal)   Wt 149 lb 3.2 oz (67.7 kg)   BMI 24.08 kg/m     General: Ms. Pedro is well appearing and is appropriately groomed and dressed.    Neurological examination:  Mental status: Ms. Pedro  is awake, alert, and appropriate, with fluent speech. Attention and  "concentration are reasonably intact today. Her fund of knowledge is fair. No apraxia is noted.  Cranial nerves:  No ptosis noted. Visual fields are full to confrontation with no visual extinction. Extraocular movements are intact. Smooth pursuit is intact. Saccades are normal in initiation, velocity and amplitude both vertically and horizontally.  Facial strength is full bilaterally.  Sternocledomastoid and trapezius muscle strength is full bilaterally.  Motor examination: Bulk is normal throughout. Axial and upper and lower extremity tone is normal. No pronator drift is noted. UE Strength is 5/5  and symmetric without fatigueability. LE 4-/5 b/l. Some difficulties in getting up from sitting position with 3 tries. There is no tremor or other involuntary movement. No bradykinesia or cogwheel rigidity.  Sensation: LT intact b/l throughout.  Coordination: Finger-nose-finger is normal with no dysmetria bilaterally.  Rapid finger tapping, opening and closing of fist and pronation-supination are not slowed.  Gait: deferred given her LE weakness and unsteadiness on her feet.     MoCA 22/30 (12 years of education)  4/5 visuospatial/Executive (-1 for clock hands)  3/3 naming  6/6 attention (register: 2/5 1st and 2nd trials)  3/3 language (14 \"F\" words)  2/2 abstract  0/5 delayed recall (+1 with semantic cues; +2 with MC; MIS 5/15)  3/6 orientation (-1 for date, month and day)  +1 for 12 years of education    Neuropsychological evaluation:  A full report of the neuropsychological battery testing is available separately. Findings are summarized here briefly.  6/29/2021 performed by Dr. Logan Kelley  She was mildly disoriented to time, and unable to state the name of the clinic.  She was otherwise oriented to place.  She was oriented to various aspects of personal information.  She obtained passing scores on stand-alone and embedded metrics of cognitive performance validity.  Auditory attention for digits was average.  Mental " calculations were average.  Learning of words in a list format was average.  Delayed recall of list words was average.  Percent retention of list words was low average.  Delayed recognition of list words was low average.  Learning of story information was low average.  Delayed recall of this information was impaired.  Delayed recognition of story information was low average.  Learning of simple geometric shapes and their spatial locations was borderline impaired.  Delayed recall of the shapes and their locations was low average.  Percent retention of the shapes was normal.  Delayed recognition of the shapes was low average.  Visuospatial judgments for variably oriented lines were performed in the low average to average range.  Visual problem-solving with blocks was high average.  Her drawing of a complicated geometric figure was normal.  Comprehension of phrases and short stories was average.  Verbal associative fluency was high average.  Animal fluency was superior.  Naming to confrontation was high average.  Verbal abstract reasoning was average.  Speeded visual sequencing under focused attention was superior.  A similar measure with a divided attention component was performed in the average to high average range.  Speeded word reading was low average.  Speeded color naming was average.  Speeded inhibition of an overlearned response was average.  Speeded visual motor coding was average.  Speeded fine motor dexterity was average for the dominant, right hand, and high average for the left hand.     She endorsed items consistent with moderate symptoms of depression, and mild symptoms of anxiety on self-report measures.     IMPRESSIONS  Ms. 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  This is a pattern that could be seen in the early stages of an Alzheimer's disease syndrome.  The  findings are quite similar to those from her 2017 neuropsychology exam, and are of relatively stable severity.  This stability, however, would argue against Alzheimer's disease.  Other potential contributing factors in this case include fatigue, as well as moderate symptoms of depression and mild symptoms of anxiety.  I do suspect that these non-neurologic factors are playing a role.  In this exam, stable weaknesses and variability were identified in learning and memory for both verbal and nonverbal information.  There is also some degree of variability in her cognitive speed.  The remainder of her cognitive abilities, including naming and semantic verbal fluency, were intact and performed in keeping with her above average range cognitive baseline.  The intact nature of naming and semantic fluency also argues against Alzheimer s disease. As noted, I do suspect that depression and anxiety are playing a role.    Labs:  Lab Results   Component Value Date    WBC 5.7 12/07/2023    RBC 4.24 12/07/2023    HGB 12.9 12/07/2023    HCT 39.4 12/07/2023    MCV 93 12/07/2023    MCH 30.4 12/07/2023    MCHC 32.7 12/07/2023    RDW 14.8 12/07/2023     12/07/2023     12/04/2023    POTASSIUM 4.7 12/04/2023    CHLORIDE 104 12/04/2023    CO2 23 12/04/2023    ANIONGAP 11 12/04/2023     (H) 12/04/2023    BUN 24.6 (H) 12/04/2023    CR 0.58 12/07/2023    SARI 9.2 12/04/2023    TSH 1.03 03/06/2023    T4 0.93 09/15/2015    B12 438 02/01/2023 2/9/2023  [facility] Result SEE NOTE Abnormal    Comment:    Test                             Result          Flag  Unit   RefValue  ----------------------------------------------------------------------  Alzheimer's Disease Evaluation, CSF    p-Tau/Abeta42                  0.036            H    ratio  <=0.023    AD Interpretation              SEE NOTE                                  The elevated p-Tau/Abeta42 ratio is consistent with the      presence of pathological changes associated  with      Alzheimer's disease.             The p-Tau/Abeta42 ratio provides better concordance with      amyloid Positron Emission Tomography (PET) imaging when      compared to Abeta42, phospho-Tau and total-Tau      individually.  A cut-off of 0.023 provides optimal balance      between NPA (negative % agreement) and PPA (positive %      agreement) when compared to amyloid PET results. A      p-Tau/Abeta42 ratio of <=0.023 has a 92% NPA with normal      amyloid PET.  A ratio of >0.023 has a 92% PPA with      abnormal amyloid PET.             The pTau assay measures p-Dbj479 (Tau phosphorylated at      threonine 181), which has been shown to be a marker of AD      pathology.    Abeta42                        921              L    pg/mL  >1026      Total-Tau                      356              H    pg/mL  <=238      Phospho-Tau(181P)     33.2             H    pg/mL  <=21.7       CSF paraneoplastic/encephalitis panel negative    CK is normal.   Acetylcholine receptor binding, modulating, and MuSK antibodies were negative.     2022 EMG performed by Dr. Rene Manning  Results:  3 Hz RNS of the right median nerve (APB) showed a CMAP amplitude decrement of 6% at rest, with repair to 0.6% after 1 min of maximal isometric exercise, and then gradual worsening again to 6% after 3 minutes. This result is considered normal because the cutoff for abnormal decrement in our lab is 10%. 3 Hz RNS of the right radial nerve (anconeus) showed a dramatic decrement at rest (56%) with partial repair to 8.6% after 1 min of maximal isometric exercise, and further decline to 15% 2 mins later.   Interpretation:  Abnormal study showing evidence of a postsynaptic disorder of neuromuscular junction like myasthenia gravis. The RNS abnormalities are characteristically more prominent in clinically weak muscles (triceps/anconeus) and not in clinically strong muscles (APB).      Imagin2023 MRI brain studies  FINDINGS: There is  no evidence of acute infarct, hemorrhage, mass, or  herniation. Mild diffuse parenchymal volume loss. Mild patchy deep and  subcortical white matter T2 hyperintensities which are nonspecific,  but likely related to chronic microvascular ischemic disease.  Ventricular size within normal limits without hydrocephalus.   There is no abnormal intracranial enhancement.   The facial structures appear normal. The major arterial T2 flow voids  at the base of the brain appear patent.                                                                 IMPRESSION:    1. No evidence of acute infarct, mass, hemorrhage, or herniation.  2. Mild diffuse parenchymal volume loss and white matter changes  likely due to chronic microvascular ischemic disease. Appearance is  similar to prior MR 4/20/2021.    3/10/2023 MRI L spine   IMPRESSION:  1.  Periarticular enhancement surrounding multiple facet joints within the paraspinal muscles above. Findings likely reflect reactive inflammatory facet arthropathy. Please correlate clinically to exclude the less likely possibility for infectious septic  facet arthropathy.  2.  Multiple cauda equina nerve roots enhance at the L4-L5 level nonspecific likely reactive.   3.  Otherwise, no intrathecal pathologic enhancement.   4.  Multilevel spondylosis described above.   5.  L4-L5 borderline grade 1/grade 2 anterolisthesis. L5-S1 mild grade 1 anterolisthesis.  6.  L2-L3, L3-L4, L4-L5 severe high-grade thecal sac stenosis. Cauda equina syndrome will need to be excluded clinically.  7.  Multilevel neural foraminal stenosis described above.     Impression:  Ms. Pedro is a 73 year old right-handed female who presents with memory problems for 2-3 years mostly STM and forgetfulness, more noticeably in past one year or so with some impairments in iADLs. Today's exam noted MoCA 22/30 with 0/5 recall and MIS 5/15 (some new information registration as well as delayed recall), in addition to her LE weakness.  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 A lengthy discussion with patient and her family about little added values of further diagnostic testing such as amyloid PET scan. In addition, the eligibility criteria for anti-amyloid antibody therapies indicated for MCI due to Alzheimer's disease and early Alzheimer's disease conditions were reviewed with patient and the family. Concerns of increased risks overweight the benefits given that patient on imuran/prednisone for MG treatment as well as her prior diagnosis of autoimmune disorder Srogren's disease (contraindicated for lecanenmab).    Recommendations:  1. Continue rivastigmine patch.  2. Continue Zoloft and wellbutrin at current doses for now. May adjust in the future if anxiety and depression symptoms not well controlled.  3. Emphasize the importance of lifestyle modifications including physical and mental exercises in slowing down cognitive decline.  4. Encourage to develop strategies such as a board of daily to-do lists to follow up.    Follow-up: Return in about 6 months.    I spent >120 minutes total today for this visit, which includes face-to-face with the patient, reviewing the chart (neuropsychological testing, MRI images, lab reports, clinical notes, medications), counseling, and documentation.           Again, thank you for allowing me to participate in the care of your patient.      Sincerely,    Obdulia Macias MD

## 2023-12-23 DIAGNOSIS — F33.0 MAJOR DEPRESSIVE DISORDER, RECURRENT, MILD (H): ICD-10-CM

## 2023-12-26 RX ORDER — SERTRALINE HYDROCHLORIDE 100 MG/1
200 TABLET, FILM COATED ORAL DAILY
Qty: 180 TABLET | Refills: 0 | Status: SHIPPED | OUTPATIENT
Start: 2023-12-26 | End: 2024-03-28

## 2023-12-26 NOTE — TELEPHONE ENCOUNTER
"Requested Prescriptions   Pending Prescriptions Disp Refills    sertraline (ZOLOFT) 100 MG tablet [Pharmacy Med Name: Sertraline HCl Oral Tablet 100 MG] 180 tablet 0     Sig: TAKE TWO TABLETS BY MOUTH DAILY       SSRIs Protocol Failed - 12/23/2023 11:55 AM        Failed - PHQ-9 score less than 5 in past 6 months     Please review last PHQ-9 score.           Passed - Medication is active on med list        Passed - Patient is age 18 or older        Passed - No active pregnancy on record        Passed - No positive pregnancy test in last 12 months        Passed - Recent (6 mo) or future (30 days) visit within the authorizing provider's specialty     Patient had office visit in the last 6 months or has a visit in the next 30 days with authorizing provider or within the authorizing provider's specialty.  See \"Patient Info\" tab in inbasket, or \"Choose Columns\" in Meds & Orders section of the refill encounter.                 " Duration Of Freeze Thaw-Cycle (Seconds): 5 Number Of Freeze-Thaw Cycles: 2 freeze-thaw cycles Render Post-Care Instructions In Note?: no Detail Level: Detailed Consent: The patient's consent was obtained including but not limited to risks of crusting, scabbing, blistering, scarring, darker or lighter pigmentary change, recurrence, incomplete removal and infection. Post-Care Instructions: I reviewed with the patient in detail post-care instructions. Patient is to wear sunprotection, and avoid picking at any of the treated lesions. Pt may apply Vaseline to crusted or scabbing areas.

## 2024-01-14 DIAGNOSIS — G31.84 MILD COGNITIVE IMPAIRMENT: ICD-10-CM

## 2024-01-15 RX ORDER — RIVASTIGMINE 4.6 MG/24H
PATCH, EXTENDED RELEASE TRANSDERMAL
Qty: 30 PATCH | Refills: 0 | Status: SHIPPED | OUTPATIENT
Start: 2024-01-15 | End: 2024-03-22

## 2024-01-22 DIAGNOSIS — G70.01 MYASTHENIA GRAVIS WITH EXACERBATION (H): ICD-10-CM

## 2024-01-25 RX ORDER — AZATHIOPRINE 50 MG/1
TABLET ORAL
Qty: 120 TABLET | Refills: 1 | Status: SHIPPED | OUTPATIENT
Start: 2024-01-25 | End: 2024-04-29

## 2024-01-25 NOTE — TELEPHONE ENCOUNTER
azaTHIOprine Oral Tablet 50 MG     Rene Manning MD   to Beatriz yS RN       1/22/24  3:01 PM  Note  No - Dr Dang from Rheumatology used to order this. Thanks

## 2024-01-25 NOTE — TELEPHONE ENCOUNTER
Pt's spouse is calling to follow up on the refill request, Pt is out of this medication.    Please contact the Pt or her spouse, Jerrod, back to let them know if there is an issue sending in refills.

## 2024-02-08 ENCOUNTER — OFFICE VISIT (OUTPATIENT)
Dept: FAMILY MEDICINE | Facility: CLINIC | Age: 74
End: 2024-02-08
Payer: COMMERCIAL

## 2024-02-08 VITALS
SYSTOLIC BLOOD PRESSURE: 108 MMHG | WEIGHT: 150.7 LBS | TEMPERATURE: 97.5 F | HEIGHT: 66 IN | OXYGEN SATURATION: 96 % | RESPIRATION RATE: 16 BRPM | DIASTOLIC BLOOD PRESSURE: 58 MMHG | BODY MASS INDEX: 24.22 KG/M2 | HEART RATE: 72 BPM

## 2024-02-08 DIAGNOSIS — G70.00 MYASTHENIA GRAVIS WITHOUT EXACERBATION (H): ICD-10-CM

## 2024-02-08 DIAGNOSIS — F33.0 MAJOR DEPRESSIVE DISORDER, RECURRENT, MILD (H): Primary | ICD-10-CM

## 2024-02-08 PROCEDURE — 99214 OFFICE O/P EST MOD 30 MIN: CPT | Performed by: FAMILY MEDICINE

## 2024-02-08 ASSESSMENT — ANXIETY QUESTIONNAIRES
2. NOT BEING ABLE TO STOP OR CONTROL WORRYING: MORE THAN HALF THE DAYS
IF YOU CHECKED OFF ANY PROBLEMS ON THIS QUESTIONNAIRE, HOW DIFFICULT HAVE THESE PROBLEMS MADE IT FOR YOU TO DO YOUR WORK, TAKE CARE OF THINGS AT HOME, OR GET ALONG WITH OTHER PEOPLE: SOMEWHAT DIFFICULT
5. BEING SO RESTLESS THAT IT IS HARD TO SIT STILL: NOT AT ALL
6. BECOMING EASILY ANNOYED OR IRRITABLE: MORE THAN HALF THE DAYS
8. IF YOU CHECKED OFF ANY PROBLEMS, HOW DIFFICULT HAVE THESE MADE IT FOR YOU TO DO YOUR WORK, TAKE CARE OF THINGS AT HOME, OR GET ALONG WITH OTHER PEOPLE?: SOMEWHAT DIFFICULT
3. WORRYING TOO MUCH ABOUT DIFFERENT THINGS: MORE THAN HALF THE DAYS
GAD7 TOTAL SCORE: 10
GAD7 TOTAL SCORE: 10
7. FEELING AFRAID AS IF SOMETHING AWFUL MIGHT HAPPEN: SEVERAL DAYS
1. FEELING NERVOUS, ANXIOUS, OR ON EDGE: NEARLY EVERY DAY
GAD7 TOTAL SCORE: 10
4. TROUBLE RELAXING: NOT AT ALL
7. FEELING AFRAID AS IF SOMETHING AWFUL MIGHT HAPPEN: SEVERAL DAYS

## 2024-02-08 ASSESSMENT — PAIN SCALES - GENERAL: PAINLEVEL: NO PAIN (0)

## 2024-02-08 ASSESSMENT — PATIENT HEALTH QUESTIONNAIRE - PHQ9
SUM OF ALL RESPONSES TO PHQ QUESTIONS 1-9: 10
10. IF YOU CHECKED OFF ANY PROBLEMS, HOW DIFFICULT HAVE THESE PROBLEMS MADE IT FOR YOU TO DO YOUR WORK, TAKE CARE OF THINGS AT HOME, OR GET ALONG WITH OTHER PEOPLE: VERY DIFFICULT
SUM OF ALL RESPONSES TO PHQ QUESTIONS 1-9: 10

## 2024-02-08 NOTE — PROGRESS NOTES
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  Had family get together at cabin and slept thru most of the day missing interaction with her children and grandkids.  She is ambivalent about changing.   She reports she goes to be between 11 and 1, but daughter reminds her she c has come over at 6 am and Kristina was still awake.      Major depressive disorder, recurrent, mild (H24)  Patient Instructions   Stop zoloft today.    Start fluoxetine 20 mg a day nakia .  Then in 1 week increase to 40 mg a day    Melatonin 10 mg at 10:30 pm.    Have Bambi turn off light at 11:30.       - FLUoxetine (PROZAC) 20 MG capsule; Take 1 capsule (20 mg) by mouth daily For 1 week then increase to 40mg.  Recheck in 3 weeks.       Subjective   Kristina is a 73 year old, presenting for the following health issues:  Recheck Medication (/)        2/8/2024    10:52 AM   Additional Questions   Roomed by Cristal Ryder CMA     -Discuss possibly adjusting medication.    History of Present Illness       Mental Health Follow-up:  Patient presents to follow-up on Depression & Anxiety.Patient's depression since last visit has been:  Worse  The patient is having other symptoms associated with depression.  Patient's anxiety since last visit has been:  Medium  The patient is having other symptoms associated with anxiety.  Any significant life events: No  Patient is not feeling anxious or having panic attacks.  Patient has no concerns about alcohol or drug use.    She eats 2-3 servings of fruits and vegetables daily.She consumes 1 sweetened beverage(s) daily.She exercises with enough effort to increase her heart rate 9 or less minutes per day.  She exercises with enough effort to increase her heart rate 3 or less days per week.   She is taking medications regularly.     Review of Systems  Constitutional, HEENT, cardiovascular, pulmonary, gi and gu systems are negative, except as  "otherwise noted.      Objective    /58   Pulse 72   Temp 97.5  F (36.4  C) (Tympanic)   Resp 16   Ht 1.676 m (5' 6\")   Wt 68.4 kg (150 lb 11.2 oz)   SpO2 96%   BMI 24.32 kg/m    Body mass index is 24.32 kg/m .  Physical Exam   GENERAL: alert and no distress  NECK: no adenopathy, no asymmetry, masses, or scars  RESP: lungs clear to auscultation - no rales, rhonchi or wheezes  CV: regular rate and rhythm, normal S1 S2, no S3 or S4, no murmur, click or rub, no peripheral edema  ABDOMEN: soft, nontender, no hepatosplenomegaly, no masses and bowel sounds normal  MS: no gross musculoskeletal defects noted, no edema            Signed Electronically by: Rolo Sandoval MD    "

## 2024-02-08 NOTE — PATIENT INSTRUCTIONS
Stop zoloft today.    Start fluoxetine 20 mg a day nakia .  Then in 1 week increase to 40 mg a day    Melatonin 10 mg at 10:30 pm.    Have Bambi turn off light at 11:30.

## 2024-02-11 RX ORDER — PYRIDOSTIGMINE BROMIDE 60 MG/1
60 TABLET ORAL 3 TIMES DAILY
Qty: 270 TABLET | Refills: 0 | Status: SHIPPED | OUTPATIENT
Start: 2024-02-11 | End: 2024-06-03

## 2024-02-28 ENCOUNTER — TELEPHONE (OUTPATIENT)
Dept: FAMILY MEDICINE | Facility: CLINIC | Age: 74
End: 2024-02-28

## 2024-02-28 DIAGNOSIS — M35.01 SJOGREN'S SYNDROME WITH KERATOCONJUNCTIVITIS SICCA (H): ICD-10-CM

## 2024-02-28 RX ORDER — PREDNISONE 5 MG/1
5 TABLET ORAL DAILY
Qty: 60 TABLET | Refills: 0 | Status: SHIPPED | OUTPATIENT
Start: 2024-02-28 | End: 2024-05-02

## 2024-02-28 NOTE — TELEPHONE ENCOUNTER
Patient requesting script to [facility] in [place], pended.    Thank you,    Pat Merrill, KAREN Matthews

## 2024-03-02 DIAGNOSIS — M35.01 SJOGREN'S SYNDROME WITH KERATOCONJUNCTIVITIS SICCA (H): ICD-10-CM

## 2024-03-04 RX ORDER — PREDNISONE 5 MG/1
TABLET ORAL
Qty: 90 TABLET | Refills: 0 | OUTPATIENT
Start: 2024-03-04

## 2024-03-13 NOTE — PATIENT INSTRUCTIONS
You picked a  bed time of 11pm.  Take 5 mg of melatonin  20 mns before that.      Okay to have lazy morning on mornings you are feeling confused or sore.  [place] a good time to watch your tv shows lynn are tapped. But the push yourself in afternoon evening on those days.      Increase zoloft(the blue one) to 100mg a day. Return to clinic in 3 weeks for recheck     In early afternoon have a snack  Yogurt, sting cheese egg nuts...     For afternoon nap limit to 1 hour.  Set an alarm     In the late afternoon and evening time have some days/ time for self creative time, and some [facility]      Increase exercise  25  Up downs a day      [de-identified] : The patient comes in today with increasing complaints of pain to his right knee.  He finished his gel injections in January.

## 2024-03-20 DIAGNOSIS — G31.84 MILD COGNITIVE IMPAIRMENT: ICD-10-CM

## 2024-03-22 RX ORDER — RIVASTIGMINE 4.6 MG/24H
PATCH, EXTENDED RELEASE TRANSDERMAL
Qty: 30 PATCH | Refills: 0 | Status: SHIPPED | OUTPATIENT
Start: 2024-03-22 | End: 2024-06-03

## 2024-03-25 DIAGNOSIS — F33.0 MAJOR DEPRESSIVE DISORDER, RECURRENT, MILD (H): ICD-10-CM

## 2024-03-25 RX ORDER — BUPROPION HYDROCHLORIDE 150 MG/1
150 TABLET ORAL EVERY MORNING
Qty: 90 TABLET | Refills: 0 | Status: SHIPPED | OUTPATIENT
Start: 2024-03-25 | End: 2024-06-14

## 2024-03-28 ENCOUNTER — TELEPHONE (OUTPATIENT)
Dept: FAMILY MEDICINE | Facility: CLINIC | Age: 74
End: 2024-03-28

## 2024-03-28 DIAGNOSIS — F33.0 MAJOR DEPRESSIVE DISORDER, RECURRENT, MILD (H): ICD-10-CM

## 2024-03-28 RX ORDER — FLUOXETINE 40 MG/1
40 CAPSULE ORAL DAILY
Qty: 90 CAPSULE | Refills: 1 | Status: SHIPPED | OUTPATIENT
Start: 2024-03-28 | End: 2024-06-27

## 2024-04-03 ENCOUNTER — OFFICE VISIT (OUTPATIENT)
Dept: FAMILY MEDICINE | Facility: CLINIC | Age: 74
End: 2024-04-03
Payer: COMMERCIAL

## 2024-04-03 VITALS
WEIGHT: 152.4 LBS | SYSTOLIC BLOOD PRESSURE: 128 MMHG | HEART RATE: 72 BPM | HEIGHT: 66 IN | BODY MASS INDEX: 24.49 KG/M2 | RESPIRATION RATE: 16 BRPM | TEMPERATURE: 98 F | OXYGEN SATURATION: 96 % | DIASTOLIC BLOOD PRESSURE: 74 MMHG

## 2024-04-03 DIAGNOSIS — F33.1 MAJOR DEPRESSIVE DISORDER, RECURRENT EPISODE, MODERATE (H): ICD-10-CM

## 2024-04-03 DIAGNOSIS — J84.9 ILD (INTERSTITIAL LUNG DISEASE) (H): ICD-10-CM

## 2024-04-03 DIAGNOSIS — G70.00 MYASTHENIA GRAVIS WITHOUT EXACERBATION (H): ICD-10-CM

## 2024-04-03 DIAGNOSIS — F33.0 MAJOR DEPRESSIVE DISORDER, RECURRENT, MILD (H): Primary | ICD-10-CM

## 2024-04-03 DIAGNOSIS — F03.90 DEMENTIA, UNSPECIFIED DEMENTIA SEVERITY, UNSPECIFIED DEMENTIA TYPE, UNSPECIFIED WHETHER BEHAVIORAL, PSYCHOTIC, OR MOOD DISTURBANCE OR ANXIETY (H): ICD-10-CM

## 2024-04-03 DIAGNOSIS — M35.01 SJOGREN'S SYNDROME WITH KERATOCONJUNCTIVITIS SICCA (H): ICD-10-CM

## 2024-04-03 LAB
ALBUMIN SERPL BCG-MCNC: 4 G/DL (ref 3.5–5.2)
ALT SERPL W P-5'-P-CCNC: 19 U/L (ref 0–50)
AST SERPL W P-5'-P-CCNC: 26 U/L (ref 0–45)
BASOPHILS # BLD AUTO: 0 10E3/UL (ref 0–0.2)
BASOPHILS NFR BLD AUTO: 0 %
CREAT SERPL-MCNC: 0.55 MG/DL (ref 0.51–0.95)
EGFRCR SERPLBLD CKD-EPI 2021: >90 ML/MIN/1.73M2
EOSINOPHIL # BLD AUTO: 0 10E3/UL (ref 0–0.7)
EOSINOPHIL NFR BLD AUTO: 1 %
ERYTHROCYTE [DISTWIDTH] IN BLOOD BY AUTOMATED COUNT: 14.4 % (ref 10–15)
HCT VFR BLD AUTO: 39.9 % (ref 35–47)
HGB BLD-MCNC: 13.2 G/DL (ref 11.7–15.7)
IMM GRANULOCYTES # BLD: 0 10E3/UL
IMM GRANULOCYTES NFR BLD: 0 %
LYMPHOCYTES # BLD AUTO: 1.1 10E3/UL (ref 0.8–5.3)
LYMPHOCYTES NFR BLD AUTO: 23 %
MCH RBC QN AUTO: 30.6 PG (ref 26.5–33)
MCHC RBC AUTO-ENTMCNC: 33.1 G/DL (ref 31.5–36.5)
MCV RBC AUTO: 93 FL (ref 78–100)
MONOCYTES # BLD AUTO: 0.4 10E3/UL (ref 0–1.3)
MONOCYTES NFR BLD AUTO: 8 %
NEUTROPHILS # BLD AUTO: 3.2 10E3/UL (ref 1.6–8.3)
NEUTROPHILS NFR BLD AUTO: 68 %
PLATELET # BLD AUTO: 197 10E3/UL (ref 150–450)
RBC # BLD AUTO: 4.31 10E6/UL (ref 3.8–5.2)
WBC # BLD AUTO: 4.7 10E3/UL (ref 4–11)

## 2024-04-03 PROCEDURE — 84460 ALANINE AMINO (ALT) (SGPT): CPT | Performed by: FAMILY MEDICINE

## 2024-04-03 PROCEDURE — 82565 ASSAY OF CREATININE: CPT | Performed by: FAMILY MEDICINE

## 2024-04-03 PROCEDURE — 36415 COLL VENOUS BLD VENIPUNCTURE: CPT | Performed by: FAMILY MEDICINE

## 2024-04-03 PROCEDURE — 85025 COMPLETE CBC W/AUTO DIFF WBC: CPT | Performed by: FAMILY MEDICINE

## 2024-04-03 PROCEDURE — 84450 TRANSFERASE (AST) (SGOT): CPT | Performed by: FAMILY MEDICINE

## 2024-04-03 PROCEDURE — 99214 OFFICE O/P EST MOD 30 MIN: CPT | Performed by: FAMILY MEDICINE

## 2024-04-03 PROCEDURE — 82040 ASSAY OF SERUM ALBUMIN: CPT | Performed by: FAMILY MEDICINE

## 2024-04-03 ASSESSMENT — PATIENT HEALTH QUESTIONNAIRE - PHQ9: SUM OF ALL RESPONSES TO PHQ QUESTIONS 1-9: 5

## 2024-04-03 ASSESSMENT — PAIN SCALES - GENERAL: PAINLEVEL: MODERATE PAIN (4)

## 2024-04-03 NOTE — PATIENT INSTRUCTIONS
Melatonin 10 mg at 10:30 pm.     Have Bambi turn off light at 11:30.     You enjoy people! You are a people person and so it is good to be awake during the day to be with people.     Have a reminder art book     Repeat visit in 3 months     Labs today every 3 months to check liver and bone marrow function

## 2024-04-03 NOTE — PROGRESS NOTES
Assessment & Plan     (F33.0) Major depressive disorder, recurrent, mild (H24)  (primary encounter diagnosis)  Comment: In February Zoloft was stopped and Prozac started. PHQ9 score 5. Patient is smiling and upbeat in visit. Still has times of feeling down but is more related to when she can't remember things. Awake more during the night as she is a night owl and has been her whole life, she is a people person and likes being with others so is agreeable to being awake more during the day.   Plan: Use Bambi to turn off lights at scheduled time. Melatonin 10 mg at 1030PM. Recheck in 3 months     (G70.00) Myasthenia gravis without exacerbation (H)  Comment: Follows with neurology. No acute weakness. Taking azathioprine, rivastigmine, and pyridostigmine. J5pioox labs  Plan: Will do standing labs today CBC, liver and kidney function.     (F03.90) Dementia, unspecified dementia severity, unspecified dementia type, unspecified whether behavioral, psychotic, or mood disturbance or anxiety (H)  Comment: Follows with neurology next visit in June. Encouraged to use an art journal for reminders.   Plan: Continue to monitor. Continue current medications.       FUTURE APPOINTMENTS:       - Follow-up visit in 3 months    aKtheryn Acevedo is a 73 year old, presenting for the following health issues:  Weight Loss      4/3/2024     3:56 PM   Additional Questions   Roomed by Cristal Hernandez CMA     -Concerns with some sleep.    -Appetite has been seeming to be more normal.    -Still having concerns with memory.    Patient presents with  for follow up after having Zoloft stopped and Prozac started in February. She reports she has difficulty remembering but thinks her moods have been okay. She has been walking the dog and doing a lot of projects. Her  reports she still has times were she is down. She is a night owl while her  is awake during the day. She often goes to bed between 11-1 am and will sleep in. This  puts strain on their relationship as her  would prefer she slept more of his schedule so he can be awake when she is awake in case she needs anything.   She reports she has no problem eating but will sometimes need to be reminded to eat.       Previous visit on 02/08:   Major depressive disorder, recurrent, mild (H24)  Patient Instructions   Stop zoloft today.     Start fluoxetine 20 mg a day nakia .  Then in 1 week increase to 40 mg a day     Melatonin 10 mg at 10:30 pm.     Have Bambi turn off light at 11:30.         - FLUoxetine (PROZAC) 20 MG capsule; Take 1 capsule (20 mg) by mouth daily For 1 week then increase to 40mg.  Recheck in 3 weeks.         History of Present Illness       Back Pain:  She presents for follow up of back pain. Patient's back pain is a recurring problem.  Location of back pain:  Right lower back, left lower back and right hip  Description of back pain: dull ache  Back pain spreads: nowhere    Since patient first noticed back pain, pain is: always present, but gets better and worse  Does back pain interfere with her job:  No       She eats 2-3 servings of fruits and vegetables daily.She consumes 1 sweetened beverage(s) daily.She exercises with enough effort to increase her heart rate 9 or less minutes per day.  She exercises with enough effort to increase her heart rate 3 or less days per week.   She is taking medications regularly.       Review of Systems  Constitutional, HEENT, cardiovascular, pulmonary, gi and gu systems are negative, except as otherwise noted.        Objective    There were no vitals taken for this visit.  There is no height or weight on file to calculate BMI.  Physical Exam  Constitutional:       Appearance: Normal appearance. She is normal weight.   HENT:      Right Ear: External ear normal.      Left Ear: External ear normal.      Nose: Nose normal.      Mouth/Throat:      Mouth: Mucous membranes are moist.      Pharynx: Oropharynx is clear.   Eyes:       Extraocular Movements: Extraocular movements intact.      Conjunctiva/sclera: Conjunctivae normal.      Pupils: Pupils are equal, round, and reactive to light.   Cardiovascular:      Rate and Rhythm: Normal rate and regular rhythm.      Pulses: Normal pulses.      Heart sounds: Normal heart sounds.   Pulmonary:      Effort: Pulmonary effort is normal.      Breath sounds: Normal breath sounds.   Abdominal:      General: Bowel sounds are normal.   Musculoskeletal:         General: Normal range of motion.      Cervical back: Normal range of motion.   Skin:     General: Skin is warm.      Capillary Refill: Capillary refill takes less than 2 seconds.   Neurological:      Mental Status: She is alert. Mental status is at baseline.      Comments: Forgetful   Impaired short term memory    Psychiatric:         Mood and Affect: Mood normal.         Behavior: Behavior normal.         Thought Content: Thought content normal.         STEW Saucedo student   I, Rolo Sandoval MD, was present with the medical/KEV student who participated in the service and in the documentation of the note.  I have verified the history and personally performed the physical exam and medical decision making.  I agree with the assessment and plan of care as documented in the note.      Signed Electronically by: Rolo Sandoval MD

## 2024-05-02 DIAGNOSIS — M35.01 SJOGREN'S SYNDROME WITH KERATOCONJUNCTIVITIS SICCA (H): ICD-10-CM

## 2024-05-02 RX ORDER — PREDNISONE 5 MG/1
TABLET ORAL
Qty: 30 TABLET | Refills: 0 | Status: SHIPPED | OUTPATIENT
Start: 2024-05-02 | End: 2024-05-09

## 2024-05-02 NOTE — TELEPHONE ENCOUNTER
30 tabs filled as it looks like pt has been taking this chronically and I do not want her to stop abruptly.  Dr Sandoval denied this script on 4/29 as it should go thought rheumatology.  Not sure if she is established with rheum?  If so thye should call her rheumatologist for future fills.  If not, they will need to discuss future fills with Dr Sandoval well before she is running out of med    Dr. Anabel Jean-Baptiste, DO

## 2024-05-02 NOTE — TELEPHONE ENCOUNTER
Patients spouse called the clinic today regarding his wifes prednisone refill request.   CTC on file.  Notified  that we received the request today.  Routing to Dr. Sandoval.   Maria Dolores Velázquez RN on 5/2/2024 at 12:39 PM

## 2024-05-03 NOTE — TELEPHONE ENCOUNTER
Call placed to patient's   Relayed Dr. Jean-Baptiste's message     verbalized understanding and will reach out to the specialist for future refills    Thomas Miguel, Clinic RN  [facility]

## 2024-05-06 ENCOUNTER — TELEPHONE (OUTPATIENT)
Dept: FAMILY MEDICINE | Facility: CLINIC | Age: 74
End: 2024-05-06
Payer: COMMERCIAL
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
## 2024-05-08 NOTE — PROGRESS NOTES
If the video visit is dropped, the invitation should be resent by: Text to cell phone: [phone]  Will anyone else be joining your video visit? No      Assessment & Plan     (F03.B11) Moderate dementia with agitation, unspecified dementia type (H)  (primary encounter diagnosis)  escalating behavior issues.     I don't have many answers for that at this time.   Weaning off her prednisone may be helpful, but she may have worsening of myasthenia symptoms.      Atypical antipsychotic may be helpful in the future if things progress, but I think benefit of that is questionable at this stage.       Katheryn Acevedo is a 73 year old, presenting for the following health issues:  Anxiety and Aggressive Behavior      5/8/2024     5:10 PM   Additional Questions   Roomed by Cristal Hernandez CMA

## 2024-05-08 NOTE — TELEPHONE ENCOUNTER
Call placed to David.   Video appointment scheduled for David to discuss patient and options.  Video care conference scheduled with Dr Sandoval for 5/9/24 at 0655.  Zach Sams RN

## 2024-05-09 ENCOUNTER — VIRTUAL VISIT (OUTPATIENT)
Dept: FAMILY MEDICINE | Facility: CLINIC | Age: 74
End: 2024-05-09
Payer: COMMERCIAL

## 2024-05-09 ENCOUNTER — TELEPHONE (OUTPATIENT)
Dept: FAMILY MEDICINE | Facility: CLINIC | Age: 74
End: 2024-05-09

## 2024-05-09 DIAGNOSIS — F03.B11 MODERATE DEMENTIA WITH AGITATION, UNSPECIFIED DEMENTIA TYPE (H): Primary | ICD-10-CM

## 2024-05-09 DIAGNOSIS — F39 EPISODIC MOOD DISORDER (H): ICD-10-CM

## 2024-05-09 PROCEDURE — 99207 PR NO CHARGE LOS: CPT | Mod: 95 | Performed by: FAMILY MEDICINE
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
## 2024-05-17 NOTE — TELEPHONE ENCOUNTER
5/17/2024      Maine Avila  [address] [place] [address]        Dear Maine Avila:        COLONOSCOPY PREPARATION AND INSTRUCTIONS  ([id number])      Please note  It is your responsibility to verify if the procedure is covered by your insurance.  If it is not covered, you will be responsible for payment.  If you need to CANCEL or RESCHEDULE, please call [phone] at least 3 DAYS prior to your scheduled procedure.    IF YOU HAVE A CHANGE IN HEALTH STATUS BETWEEN SCHEULING AND THE DATE OF YOUR PROCEDURE PLEASE LET US KNOW.      INFORM YOUR PHYSICIAN IF YOU TAKE COUMADIN, PLAVIX, OR ANY OTHER BLOOD THINNER  IF YOU TAKE ASPIRIN, IT IS OK TO CONTINUE TAKING  NO NUTS OR CORN THREE DAYS PRIOR TO YOUR PROCEDURE    If you take oral (pill) or injectable Rybelsus, Wygovy, Ozempic, Trulicity, Byetta, Bydureon BCise,Saxenda, Victoza, Mounjaro, Xultophy or Soliquabetic for   weight loss you will need to stop it for 7 days.  If you take one of these medications for diabetes, please speak with the prescribing physician for further instruction.    Items you will need prior to your procedure  You will need to pick-up your bowel cleansing prescription that was sent to your pharmacy.   Purchase one 1 oz bottle of Simethicone drops (infant gas relief drops), available over the counter.  Purchase  4 Dulcolax 10MG tablets (OTC)                                      DAY BEFORE EXAM: 7/1/24                   NO SOLID FOOD THE DAY BEFORE THE EXAM    If you take Insulin: Take half your normal dose of long-acting insulin.  Take your sliding scale insulin as you normally would.  If you take an oral (pill) diabetic medication other than those listed above, take your morning dose only, then discontinue until after the procedure.  Okay to take all other regular medications.    Prepare your solution in the morning. Add water (do not mix with anything but water) to the fill line as indicated on the  LCV:10/23/2023  [facility]       bottle.  Add 2 teaspoons of simethicone drops. Shake well and refrigerate. Mixture may be more palatable if kept refrigerated, but this is not required.  Start a clear liquid diet from the time you wake up until 3 hours before the start time of your procedure.  This means NO SOLID FOOD the day before the exam.      CLEAR LIQUIDS MAY INCLUDE strained broth, fruit flavored drinks (apple juice, white grape juice, etc.)  soda pop including aleksandra, coffee, tea, gelatin, fruit ices, popsicles, and hard candy.  NO MILK PRODUCTS OR SHERBETS, AVOID ANYTHING RED OR PURPLE, NO ALCOHOLTHE DAY BEFORE THE EXAM     3.   At 12 PM, take TWO Dulcolax tablets and again at 5 PM.  4.   At 5:00 pm start to drink the solution. Drink one 8oz glass every 10-30 minutes. Drink           half the solution.     Note:  If at any time while drinking your preparation you experience nausea, slow down the pace, then resume drinking    DAY OF EXAM: 7/2/24           If you take Insulin: Take your sliding scale insulin as you normally would.     Take blood pressure medications or any other essential medication at least 3 hours prior to your exam with a few sips of water.    5 hours/6 AM before the start time of the procedure finish drinking the rest of your solution.       2.  Nothing by mouth, no more clear liquids 3 hours/8 AM before the start time of your          procedure.   3.  [facility] at [facility] in the PATIENT INTAKE registration                area, located on the           main floor of the hospital one hour/10 AM before the start time of your procedure.           4.  Your procedure is scheduled to start at 11 AM. Expect to be at the hospital for         approximately 3 hours from the time you arrive.   5.  Due to sedation you will need to have a  take you home.  GI lab policy states you       may not take a cab/Uber/Lyft home.  6.  If you have HMO insurance, we will generate a referral form for you. You do not have to          have it with you the day of your procedure.  7.  If you should have any questions or difficulties, please call the office and ask to speak         with a clinical associate during office hours 9-5), [phone].  If the office is closed        the answering service will contact the physician on call.    what you need to know when scheduling your Procedure!  Routine screening, Well-visit, Preventative care,   Personal History, Family History      Please contact your insurance carrier BEFORE YOUR EXAM to learn about the possible differences in your benefits.    1.  Our charges are billed according to the American Medical Association's regulations, using the same code books as all insurance carriers.  Although we submit the appropriate codes, including indicating screening procedures, each insurance carrier is unique in how they may process your claim.    2.  The definition of a screening colonoscopy is a colonoscopy performed when the patient has no medical indication for the procedure.  'No medical indication' means you have NOT had rectal bleeding, blood in your stool, change in bowel habits, abdominal pain or any other symptom that would indicate you need a procedure.      3.  A diagnosis of a family or personal history of colon polyps or colon cancer may be considered 'screening' by some insurance carriers while others insurance carriers may consider this a 'diagnostic' procedure; in which case your deductible may apply.  We do not know this in advance.    4.  When you come to the office and tell your doctor you have no symptoms AND have a screening benefit, we schedule your procedure as a screening colonoscopy.  When your claim is submitted to your insurance company, we are required to bill with the finding after your colonoscopy is complete.  This means if you had no symptoms and we scheduled you for screening colonoscopy BUT a condition was found during your procedure (such as a polyp, diverticulosis,  colitis, etc.), we will bill your insurance carrier with the diagnosis of the condition found as well as for screening colonoscopy.  This may mean your claim is processed under your diagnostic or surgical benefits, not routine screening.  We cannot determine how your insurance carrier will process it.    5.  If you have a normal exam with no biopsies taken and no conditions found your insurance carrier may process this as a screening exam.  Every insurance carrier is different, and we cannot determine how your claim will be processed until it has been submitted to them.    6.  Be aware that some policies offer screening benefits with a set monetary limit which can be very low.  If your screening benefit is exhausted, you may be responsible for charges not covered by that benefit.    7.  Some insurance carriers may deny payment of your screening colonoscopy based on your age, although you may have medical indications for the procedure.  Each insurance carrier handles these claims differently and do not tell us in advance how they will process your claim.  They only tell us how we must bill your claim.    8.  Neither the hospital nor our office can change codes after they have been billed in order to obtain claim payment.  This is considered fraud.  Your medical record needs to support such a change.

## 2024-05-28 DIAGNOSIS — G70.01 MYASTHENIA GRAVIS WITH EXACERBATION (H): ICD-10-CM

## 2024-05-31 DIAGNOSIS — Z12.11 COLON CANCER SCREENING: ICD-10-CM

## 2024-06-01 DIAGNOSIS — G31.84 MILD COGNITIVE IMPAIRMENT: ICD-10-CM

## 2024-06-02 NOTE — TELEPHONE ENCOUNTER
azaTHIOprine Oral Tablet 50 MG       Last Written Prescription Date:  4-29-24  Last Fill Quantity: 120t,   # refills: 0  Last Office Visit : 12-7-24  Future Office visit:  6-20-24    CBC RESULTS:   Recent Labs   Lab Test 04/03/24  1718   WBC 4.7   RBC 4.31   HGB 13.2   HCT 39.9   MCV 93   MCH 30.6   MCHC 33.1   RDW 14.4          Creatinine   Date Value Ref Range Status   04/03/2024 0.55 0.51 - 0.95 mg/dL Final   06/16/2021 0.70 0.52 - 1.04 mg/dL Final   ]    Liver Function Studies -   Recent Labs   Lab Test 04/03/24  1718 12/07/23  1219 12/04/23  1458   PROTTOTAL  --   --  7.0   ALBUMIN 4.0   < > 4.2   BILITOTAL  --   --  0.4   ALKPHOS  --   --  59   AST 26   < > 31   ALT 19   < > 23    < > = values in this interval not displayed.       Routing refill request to provider for review/approval because:  Per protocol

## 2024-06-03 DIAGNOSIS — G70.00 MYASTHENIA GRAVIS WITHOUT EXACERBATION (H): ICD-10-CM

## 2024-06-03 DIAGNOSIS — M35.01 SJOGREN'S SYNDROME WITH KERATOCONJUNCTIVITIS SICCA (H): ICD-10-CM

## 2024-06-03 DIAGNOSIS — F43.0 ACUTE REACTION TO STRESS: ICD-10-CM

## 2024-06-03 RX ORDER — LORAZEPAM 0.5 MG/1
TABLET ORAL
Qty: 20 TABLET | Refills: 0 | Status: SHIPPED | OUTPATIENT
Start: 2024-06-03

## 2024-06-03 RX ORDER — PREDNISONE 5 MG/1
TABLET ORAL
Qty: 30 TABLET | Refills: 0 | Status: ON HOLD | OUTPATIENT
Start: 2024-06-03 | End: 2024-06-30

## 2024-06-03 RX ORDER — PYRIDOSTIGMINE BROMIDE 60 MG/1
60 TABLET ORAL 3 TIMES DAILY
Qty: 270 TABLET | Refills: 0 | Status: SHIPPED | OUTPATIENT
Start: 2024-06-03 | End: 2024-08-29

## 2024-06-03 RX ORDER — AZATHIOPRINE 50 MG/1
TABLET ORAL
Qty: 120 TABLET | Refills: 1 | Status: SHIPPED | OUTPATIENT
Start: 2024-06-03

## 2024-06-03 RX ORDER — RIVASTIGMINE 4.6 MG/24H
PATCH, EXTENDED RELEASE TRANSDERMAL
Qty: 30 PATCH | Refills: 0 | Status: SHIPPED | OUTPATIENT
Start: 2024-06-03 | End: 2024-06-14

## 2024-06-13 NOTE — PROGRESS NOTES
"[facility] Followup Note    f/u cognitive decline   =============================================================================  Interim History:  Ms. Pedro is a 73 year old right-handed female who presents with memory problems for 2-3 years mostly STM and forgetfulness, more noticeably in past one year or so with some impairments in iADLs. She was last seen in clinic in 12/2023 when her exam noted MoCA 22/30 with 0/5 recall and MIS 5/15 (some new information registration as well as delayed recall), in addition to her LE weakness. 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 She returns today for follow up and is accompanied with her  Jerrod and daughter Td.     reported increased agitation and frustration episodes when she had trouble recalling where things were placed in the house they lived for years. He also reported frequent emotional \"meltdown\" episodes when she was sad and crying for 4-5 hours (average 3 times per week). Kristina reported off-balance feelings with one recent fall when she was playing with her dog. There were frequent episodes when she would complain of lacking energy. Sleeps a lot more (going to bed around midnight and waking up around 10-11Am in the morning).    Kristina also endorsed auditory hallucination when she hears music playing like symphony and in rare occasions she had visual hallucinations when she saw shadows of people (twice since last visit).     Currently taking wellbutrin 150mg daily and prozac 80mg (per  but script is 40mg) daily for mood. She may take ativan once a week to help with her \"meltdown\". She is on prednisone and mestinon for her MG (but tended to take her noon dose of mestinon then skip the rest of her day and then took her second dose close to nighttime). She was started on " rivastigmine patch for cognitive decline for 1 year but did not observe any obvious improvement while on it.  =============================================================================  ROS:  General: no weight loss or fever  Cardiac: no chest pain or palpitations  Pulmonary: no SOB or cough  GI: no abdominal pain, nausea, vomiting, or constipation  : no urinary urgency, pain or incontinence  Musculoskeletal: no joint pain or stiffness  Skin: no rashes or easy bruising  Neurological: as above  =============================================================================  Medications:  Current Outpatient Medications   Medication Sig Dispense Refill    acetaminophen (TYLENOL) 500 MG tablet Take 1,000 mg by mouth every 6 hours as needed for mild pain      albuterol (PROAIR HFA/PROVENTIL HFA/VENTOLIN HFA) 108 (90 Base) MCG/ACT inhaler INHALE TWO PUFFS BY MOUTH EVERY FOUR HOURS AS NEEDED FOR SHORTNESS OF BREATH/DYSPNEA (Patient not taking: Reported on 2/8/2024) 25.5 g 0    aspirin (ASA) 81 MG EC tablet Take 1 tablet (81 mg) by mouth daily 90 tablet 3    azaTHIOprine (IMURAN) 50 MG tablet TAKE TWO TABLETS BY MOUTH TWICE DAILY 120 tablet 1    buPROPion (WELLBUTRIN XL) 150 MG 24 hr tablet TAKE ONE TABLET BY MOUTH IN THE MORNING 90 tablet 0    Calcium-Magnesium-Zinc 500-250-12.5 MG TABS Take 1 tablet by mouth daily      FLUoxetine (PROZAC) 40 MG capsule Take 1 capsule (40 mg) by mouth daily 90 capsule 1    gabapentin (NEURONTIN) 100 MG capsule 1 capsule three times a day for 1 week, increase to 2 capsules three times a day on week 2 if pain continues (Patient not taking: Reported on 12/4/2023) 180 capsule 0    hyoscyamine (LEVSIN) 0.125 MG tablet TAKE 1 TABLET BY MOUTH EVERY EIGHT HOURS 30 MINUTES BEFORE EACH MESTINON. (Patient not taking: Reported on 2/8/2024) 180 tablet 0    LORazepam (ATIVAN) 0.5 MG tablet TAKE HALF TO ONE TABLET BY MOUTH THREE TO FIVE TIMES A WEEK AS NEEDED. 20 tablet 0    Multiple Vitamins-Minerals  (MULTIVITAMIN & MINERAL PO) Take 1 tablet by mouth daily       pravastatin (PRAVACHOL) 40 MG tablet TAKE ONE TABLET BY MOUTH ONE TIME DAILY 90 tablet 2    predniSONE (DELTASONE) 5 MG tablet TAKE ONE TABLET BY MOUTH ONCE DAILY FOR 3 WEEKS 30 tablet 0    pyRIDostigmine (MESTINON) 60 MG tablet TAKE ONE TABLET BY MOUTH THREE TIMES DAILY 270 tablet 0    rivastigmine (EXELON) 4.6 MG/24HR 24 hr patch APPLY ONE PATCH EXTERNALLY ONE TIME DAILY 30 patch 0    sodium fluoride dental gel (PREVIDENT) 1.1 % GEL topical gel        No current facility-administered medications for this visit.      =============================================================================  Neuro exam:  MS: language intact with fluent speech but emotional labile.    CN: EOMI, VFF, face symmetric, V1-3 sensation intact, SCM/trapezius 5/5, T/U/V midline    Motor: normal bulk and tone, no fasciculation or atrophy  Strength 5/5 with subtle weakness after repetitive exercise    Sensation: intact light touch throughout.    Reflexes: 2+ throughout    Coordination: intact FTN; no nystagmus; Romberg negative    Gait: walks unsteadily and tandem deferred.  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 Prior discussions with patient and her family about little added values of further diagnostic testing such as amyloid PET scan. In addition, the eligibility criteria for anti-amyloid antibody therapies indicated for MCI due to Alzheimer's disease and early Alzheimer's disease conditions were reviewed with patient and the family. Concerns of increased risks overweight the benefits given that patient  on imuran/prednisone for MG treatment as well as her prior diagnosis of autoimmune disorder Srogren's disease (contraindicated for lecanenmab). Today's visit reported functional decline mostly psychiatric symptoms with increased depression/anxiety.    - increase wellbutrin to 300mg daily.  - would recommend PCP to refer patient for psychology/psychotherapy treatment and psychiatry management of anti-depressant medication optimization.  - educate patient about the importance of taking mestinon in a timed interval (every 6 hours during the day for three times a day: 10AM, 4pm then 10pm).  - titrate rivastigmine patch to a higher dosage 9.5mg/24 hours.  - video follow up in 2 months and F2F follow up in 4 months.

## 2024-06-14 ENCOUNTER — ORDERS ONLY (AUTO-RELEASED) (OUTPATIENT)
Dept: ADMISSION | Facility: CLINIC | Age: 74
End: 2024-06-14
Payer: COMMERCIAL

## 2024-06-14 ENCOUNTER — OFFICE VISIT (OUTPATIENT)
Dept: NEUROLOGY | Facility: CLINIC | Age: 74
End: 2024-06-14
Payer: COMMERCIAL

## 2024-06-14 VITALS
TEMPERATURE: 97.9 F | BODY MASS INDEX: 24.46 KG/M2 | DIASTOLIC BLOOD PRESSURE: 71 MMHG | SYSTOLIC BLOOD PRESSURE: 129 MMHG | OXYGEN SATURATION: 98 % | HEIGHT: 66 IN | HEART RATE: 70 BPM | WEIGHT: 152.2 LBS

## 2024-06-14 DIAGNOSIS — F33.0 MAJOR DEPRESSIVE DISORDER, RECURRENT, MILD (H): ICD-10-CM

## 2024-06-14 DIAGNOSIS — G31.84 MILD COGNITIVE IMPAIRMENT: Primary | ICD-10-CM

## 2024-06-14 DIAGNOSIS — Z12.11 COLON CANCER SCREENING: ICD-10-CM

## 2024-06-14 RX ORDER — BUPROPION HYDROCHLORIDE 150 MG/1
300 TABLET ORAL EVERY MORNING
Qty: 90 TABLET | Refills: 1 | Status: SHIPPED | OUTPATIENT
Start: 2024-06-14

## 2024-06-14 RX ORDER — RIVASTIGMINE 9.5 MG/24H
1 PATCH, EXTENDED RELEASE TRANSDERMAL DAILY
Qty: 90 PATCH | Refills: 1 | Status: SHIPPED | OUTPATIENT
Start: 2024-06-14 | End: 2024-08-16

## 2024-06-14 NOTE — LETTER
"6/14/2024       RE: Albina Pedro  [address]  [place] [address]     Dear Colleague,    Thank you for referring your patient, Albina Pedro, to the  [facility] at [facility]. Please see a copy of my visit note below.    Neurology Memory Clinic Followup Note    f/u cognitive decline   =============================================================================  Interim History:  Ms. Pedro is a 73 year old right-handed female who presents with memory problems for 2-3 years mostly STM and forgetfulness, more noticeably in past one year or so with some impairments in iADLs. She was last seen in clinic in 12/2023 when her exam noted MoCA 22/30 with 0/5 recall and MIS 5/15 (some new information registration as well as delayed recall), in addition to her LE weakness. 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 She returns today for follow up and is accompanied with her  Jerrod and daughter Td.     reported increased agitation and frustration episodes when she had trouble recalling where things were placed in the house they lived for years. He also reported frequent emotional \"meltdown\" episodes when she was sad and crying for 4-5 hours (average 3 times per week). Kristina reported off-balance feelings with one recent fall when she was playing with her dog. There were frequent episodes when she would complain of lacking energy. Sleeps a lot more (going to bed around midnight and waking up around 10-11Am in the morning).    Kristina also endorsed auditory hallucination when she hears music playing like symphony and in rare occasions she had visual hallucinations when she saw shadows of people (twice since last visit).     Currently taking wellbutrin 150mg daily and " "prozac 80mg (per  but script is 40mg) daily for mood. She may take ativan once a week to help with her \"meltdown\". She is on prednisone and mestinon for her MG (but tended to take her noon dose of mestinon then skip the rest of her day and then took her second dose close to nighttime). She was started on rivastigmine patch for cognitive decline for 1 year but did not observe any obvious improvement while on it.  =============================================================================  ROS:  General: no weight loss or fever  Cardiac: no chest pain or palpitations  Pulmonary: no SOB or cough  GI: no abdominal pain, nausea, vomiting, or constipation  : no urinary urgency, pain or incontinence  Musculoskeletal: no joint pain or stiffness  Skin: no rashes or easy bruising  Neurological: as above  =============================================================================  Medications:  Current Outpatient Medications   Medication Sig Dispense Refill    acetaminophen (TYLENOL) 500 MG tablet Take 1,000 mg by mouth every 6 hours as needed for mild pain      albuterol (PROAIR HFA/PROVENTIL HFA/VENTOLIN HFA) 108 (90 Base) MCG/ACT inhaler INHALE TWO PUFFS BY MOUTH EVERY FOUR HOURS AS NEEDED FOR SHORTNESS OF BREATH/DYSPNEA (Patient not taking: Reported on 2/8/2024) 25.5 g 0    aspirin (ASA) 81 MG EC tablet Take 1 tablet (81 mg) by mouth daily 90 tablet 3    azaTHIOprine (IMURAN) 50 MG tablet TAKE TWO TABLETS BY MOUTH TWICE DAILY 120 tablet 1    buPROPion (WELLBUTRIN XL) 150 MG 24 hr tablet TAKE ONE TABLET BY MOUTH IN THE MORNING 90 tablet 0    Calcium-Magnesium-Zinc 500-250-12.5 MG TABS Take 1 tablet by mouth daily      FLUoxetine (PROZAC) 40 MG capsule Take 1 capsule (40 mg) by mouth daily 90 capsule 1    gabapentin (NEURONTIN) 100 MG capsule 1 capsule three times a day for 1 week, increase to 2 capsules three times a day on week 2 if pain continues (Patient not taking: Reported on 12/4/2023) 180 capsule 0    " hyoscyamine (LEVSIN) 0.125 MG tablet TAKE 1 TABLET BY MOUTH EVERY EIGHT HOURS 30 MINUTES BEFORE EACH MESTINON. (Patient not taking: Reported on 2/8/2024) 180 tablet 0    LORazepam (ATIVAN) 0.5 MG tablet TAKE HALF TO ONE TABLET BY MOUTH THREE TO FIVE TIMES A WEEK AS NEEDED. 20 tablet 0    Multiple Vitamins-Minerals (MULTIVITAMIN & MINERAL PO) Take 1 tablet by mouth daily       pravastatin (PRAVACHOL) 40 MG tablet TAKE ONE TABLET BY MOUTH ONE TIME DAILY 90 tablet 2    predniSONE (DELTASONE) 5 MG tablet TAKE ONE TABLET BY MOUTH ONCE DAILY FOR 3 WEEKS 30 tablet 0    pyRIDostigmine (MESTINON) 60 MG tablet TAKE ONE TABLET BY MOUTH THREE TIMES DAILY 270 tablet 0    rivastigmine (EXELON) 4.6 MG/24HR 24 hr patch APPLY ONE PATCH EXTERNALLY ONE TIME DAILY 30 patch 0    sodium fluoride dental gel (PREVIDENT) 1.1 % GEL topical gel        No current facility-administered medications for this visit.      =============================================================================  Neuro exam:  MS: language intact with fluent speech but emotional labile.    CN: EOMI, VFF, face symmetric, V1-3 sensation intact, SCM/trapezius 5/5, T/U/V midline    Motor: normal bulk and tone, no fasciculation or atrophy  Strength 5/5 with subtle weakness after repetitive exercise    Sensation: intact light touch throughout.    Reflexes: 2+ throughout    Coordination: intact FTN; no nystagmus; Romberg negative    Gait: walks unsteadily and tandem deferred.  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 Prior discussions with patient and her family about little added values of further  diagnostic testing such as amyloid PET scan. In addition, the eligibility criteria for anti-amyloid antibody therapies indicated for MCI due to Alzheimer's disease and early Alzheimer's disease conditions were reviewed with patient and the family. Concerns of increased risks overweight the benefits given that patient on imuran/prednisone for MG treatment as well as her prior diagnosis of autoimmune disorder Srogren's disease (contraindicated for lecanenmab). Today's visit reported functional decline mostly psychiatric symptoms with increased depression/anxiety.    - increase wellbutrin to 300mg daily.  - would recommend PCP to refer patient for psychology/psychotherapy treatment and psychiatry management of anti-depressant medication optimization.  - educate patient about the importance of taking mestinon in a timed interval (every 6 hours during the day for three times a day: 10AM, 4pm then 10pm).  - titrate rivastigmine patch to a higher dosage 9.5mg/24 hours.  - video follow up in 2 months and F2F follow up in 4 months.      Again, thank you for allowing me to participate in the care of your patient.      Sincerely,    Obdulia Macias MD

## 2024-06-20 ENCOUNTER — VIRTUAL VISIT (OUTPATIENT)
Dept: RHEUMATOLOGY | Facility: CLINIC | Age: 74
End: 2024-06-20
Attending: INTERNAL MEDICINE
Payer: COMMERCIAL

## 2024-06-20 VITALS — BODY MASS INDEX: 23.78 KG/M2 | WEIGHT: 148 LBS | HEIGHT: 66 IN

## 2024-06-20 DIAGNOSIS — M35.01 SJOGREN'S SYNDROME WITH KERATOCONJUNCTIVITIS SICCA (H): ICD-10-CM

## 2024-06-20 DIAGNOSIS — Z79.52 CURRENT CHRONIC USE OF SYSTEMIC STEROIDS: ICD-10-CM

## 2024-06-20 DIAGNOSIS — Z51.81 MEDICATION MONITORING ENCOUNTER: ICD-10-CM

## 2024-06-20 DIAGNOSIS — J84.9 ILD (INTERSTITIAL LUNG DISEASE) (H): ICD-10-CM

## 2024-06-20 DIAGNOSIS — Z78.0 POST-MENOPAUSE: Primary | ICD-10-CM

## 2024-06-20 PROCEDURE — 99214 OFFICE O/P EST MOD 30 MIN: CPT | Mod: 95 | Performed by: INTERNAL MEDICINE

## 2024-06-20 ASSESSMENT — PAIN SCALES - GENERAL: PAINLEVEL: SEVERE PAIN (7)

## 2024-06-20 NOTE — NURSING NOTE
Unable to complete PHQ-2 screening with patient.      Is the patient currently in the state of [place]? YES    Visit mode:VIDEO    If the visit is dropped, the patient can be reconnected by: VIDEO VISIT: Text to cell phone:   Telephone Information:   Mobile [phone]    and VIDEO VISIT: Send to e-mail at: [email or web address]    Will anyone else be joining the visit? [facility]    (If patient encounters technical issues they should call [phone])    How would you like to obtain your AVS? MyChart    Are changes needed to the allergy or medication list? No    Are refills needed on medications prescribed by this physician? NO    Reason for visit: RECHECK (Light headed, off balance, bones hurt-arms and legs, muscles sore, forgetful.)      Selene KELLY

## 2024-06-20 NOTE — LETTER
6/20/2024       RE: Albina Pedro  [id number] [facility] [place] [address]     Dear Colleague,    Thank you for referring your patient, Albina Pedro, to the [facility] [place] at [facility]. Please see a copy of my visit note below.    Virtual Visit Details    Type of service:  Video Visit     Joined the call at 6/20/2024, 9:57:08 am.  Left the call at 6/20/2024, 10:05:46 am.    Originating Location (pt. Location): Home  Distant Location (provider location):  On-site  Platform used for Video Visit: [facility]    Chief Complaint   Patient presents with    RECHECK     Light headed, off balance, bones hurt-arms and legs, muscles sore, forgetful.   Inflammatory myositis  ILD  Imuran monitoring    Date of initial visit: 3/25//2022  Last seen: 12/7/2023  DOS: 6/20/2024      ASSESSMENT AND PLAN:    Inflammatory myositis. Sjogren's. ILD. Albina Pedro 71 y.o. female with long standing h/o seropositive Sjogren's causing ILD, inflammatory myositis, R thigh panniculitis who presents for worsening joint pain, muscle weakness and deconditioning over past 2 years. Although she has OA and her ILD seems to be stable, it seems like she was doing well in the past while taking imuran (AZA). She was on AZA 4992-9059 and was tapered off slowly as was doing well. Max AZA dose was 100 mg every day and last dose was 50 mg every other day. She tolerated AZA in the past with no SEs. Her ILD has not worsened off AZA. No flare of rash. In 3/2022 (initial visit), I recommended her to resume taking AZA while doing additional work up. Plus, I recommend a course of prednisone taper. We discussed Sjogren's Dx, mx of sicca.     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 IVIG per Dr. Manning. Will have LP for Alzheimer's.    AZA monitoring. Labs q3mo    ILD. Follow up with  pulmonary.        Plan 10/28/2022:    Tell Beatriz that you would get IVIG at [place]    Continue imuran 50 mg a day with labs q 3months    Start tapering prednisone: 9-8-7-6 mg a day each course x 1 month then 5 mg a day and stay on 5 mg a day    DEXA bone density    MTM referral     Video visit in about 3 months        12/7/2023: Reports getting weaker, might need to use scooter, has upcoming appointment for Alzheimer's management and follow up for MG. No evidence of ILD/myositis flare ups today. Stable labs. Defer m/o MG to Dr. Manning, currently is taking prednisone 5 mg every day+  mg bid, although not sure about the doses of her meds. NL DEXA 12/2022. IVIG got denied by insurance.      Today 6/20/2024: Stable seropositive Sjogren's/ILD/myositis on current regimen. Labs from 4/2024 were reviewed, no imuran toxicity. Will update DEXA in 12/2024 given chronic steroid use. She had nl DEXA in 12/2022. Recent labs, records were reviewed.      Plan:    Continue  mg bid    Continue prednisone 5 mg every day    DEXA bone density in 12/2024    Labs every 3 months, due in July 2024    Will check Sjogren's labs with next blood work    Follow up with Dr. Perlman for ILD in 12/2024    Follow up with Dr. Manning for MG in 7/2024    Keep 12/13 appointment with me, in person with na option to switch to virtual if difficult to come in      Paul Mark MD      Orders Placed This Encounter   Procedures    DX Bone Density    ALT    Albumin level    AST    Creatinine    CK total    Complement C3    Complement C4    Erythrocyte sedimentation rate auto    CRP inflammation    Cryoglobulin, Quantitative with Reflex to Identification    CBC with Platelets & Differential    Protein electrophoresis        HISTORY OF PRESENTING ILLNESS:       3/25/2022:    Kristina is a 70 yo female who presents today with her . She has been referred for m/o Sjogren's. She has h/o +SSA/SSB Sjogren's diagnosed in 2004 after having  sicca x 20 yr followed by muscle weakness, inflammatory arthritis, L thigh panniculitis, ILD. Was treated with prednisone, did not tolerate MTX. Was on AZA [id number]. Had +BINDU, +RF.    She was last seen by Dr. Moore in 1/2020 and before that, was under care of Dr. Ho who diagnosed and treated her since 2004.    She is here to discuss her joint pain.    She has diffuse arthralgia, no joint swelling, no AM stiffness, reports loss of strength of her hands with poor .    She has arthralgia, it got worse and went downhill for past 2 years.    Sometimes gets muscle weakness, more constant lately over past few months.    Has brain fog, getting worse.    Has progressive hair loss with bald spots.    No photosensitivity or skin rashes.    Uses biotene and water, wakes up thirsty. Had a tooth which broke, sometimes teeth are chipping. Has dry mouth due to Sjogren's.    Has dry eyes, uses OTC eyedrops.    Gets R droopy eyelid, now affected L eyelid.    Eyes look red ad dry.    No parotid gland swelling.    No CP, SOB, cough, fevers or night sweats.    Gets diarrhea from certain foods.    No numbness, tingling.    Has fatigue, difficult time staying awake.    She did well on her cognitive function testing.    Mestinon caused diarrhea and stopped taking it, scheduled to have EMG and do follow up with Dr. Manning.    Updated on cancer screening.    No dysphagia.    6/3/2022:    Kristina was initially seen in 3/2022 with muscle weakness, when she was treated with prednisone taper (4tab=20 mg qd x 5 days, 3tab=15 mg qd x 5 days, 2tab=10 mg qd x 5 days, 1 tab=5 mg qd x 5 days then stop) and was put back on imuran 50 mg every day. Prednisone helped her.    Overall feeling pretty good.    Had diarrhea but it is better.    Went on 4 almazan past weekend and did well.    She and her  have seen an improvement by resuming imuran.    Easier to get out of chair.    Waiting to hear about muscle biopsy result.      10/28/2022:    Kristina presents for follow up. She is here with her . They report that Kristina is getting worse, feels weaker, has memory problem. Dr. Manning has ordered IVIG for myasthenia gravis, I see that the staff tried to reach Kristina to schedule at [place] with no success, Kristina reports that her phone is not working and they should call her . 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    No rash, or oral ulcers.    Has hair loss x past 6 months.    No stomach pain.    Sometimes has diarrhea based on what she eats.    Had a cough, resolved.    No CP.    Has dry mouth, solid dysphagia.    Dryness of eyes is worse. Uses OTC eyedrops.    Mestinon is not helping with MG.    Legs are weak, reports falls.      2023:    -feeling weaker, gets dizzy, tends to fall, memory is bad, no SOB or cough, no rash or CP        Today 2024:    -sometimes gets sharp pain over her arms, legs, bone pain, takes tylenol    -has upcoming appointment with Dr. Swartz in July    -gets dizzy, lightheaded on standing up, not falling    -no SOB    -overall stable, no major changes, no flares    -memory med has been increased        ROS: A comprehensive ROS was done. Positives are per HPI.    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       Past Surgical History:   Procedure Laterality Date    BIOPSY MUSCLE DIAGNOSTIC (LOCATION) Left 2022    Procedure: BIOPSY, MUSCLE LEFT biceps @0900;  Surgeon: Tyrell Loo MD;  Location: [facility] OR    C/SECTION, LOW TRANSVERSE  10/13/1978    , Low Transverse    COLONOSCOPY      OPEN REDUCTION INTERNAL  FIXATION ANKLE Right 2019    Procedure: Open reduction internal fixation right lateral malleolus fracture;  Surgeon: Rolo Oconnor DPM;  Location: [facility] OR    SURGICAL HISTORY OF -           SURGICAL HISTORY OF -   00    Colonoscopy       Family History   Problem Relation Age of Onset    Cancer Mother         Breast and liver- age 43    Heart Disease Father         ? chf?h/o CVA    Alzheimer Disease Father     Hypertension Father     Neurologic Disorder Father         CVA    Diabetes Maternal Grandmother     Breast Cancer Maternal Aunt     Breast Cancer Paternal Aunt     Cancer - colorectal No family hx of     Prostate Cancer No family hx of        Social History     Tobacco Use    Smoking status: Never     Passive exposure: Never    Smokeless tobacco: Never   Substance Use Topics    Alcohol use: Yes     Alcohol/week: 0.0 standard drinks of alcohol     Comment: 1 glass of wine every 2 weeks     Outpatient Encounter Medications as of 2024   Medication Sig Dispense Refill    acetaminophen (TYLENOL) 500 MG tablet Take 1,000 mg by mouth every 6 hours as needed for mild pain      albuterol (PROAIR HFA/PROVENTIL HFA/VENTOLIN HFA) 108 (90 Base) MCG/ACT inhaler INHALE TWO PUFFS BY MOUTH EVERY FOUR HOURS AS NEEDED FOR SHORTNESS OF BREATH/DYSPNEA 25.5 g 0    aspirin (ASA) 81 MG EC tablet Take 1 tablet (81 mg) by mouth daily 90 tablet 3    azaTHIOprine (IMURAN) 50 MG tablet TAKE TWO TABLETS BY MOUTH TWICE DAILY 120 tablet 1    buPROPion (WELLBUTRIN XL) 150 MG 24 hr tablet Take 2 tablets (300 mg) by mouth every morning 90 tablet 1    Calcium-Magnesium-Zinc 500-250-12.5 MG TABS Take 1 tablet by mouth daily      FLUoxetine (PROZAC) 40 MG capsule Take 1 capsule (40 mg) by mouth daily (Patient taking differently: Take 40 mg by mouth daily Pt  reports patient taking two pills) 90 capsule 1    gabapentin (NEURONTIN) 100 MG capsule 1 capsule three times a day for 1 week, increase to 2  "capsules three times a day on week 2 if pain continues 180 capsule 0    hyoscyamine (LEVSIN) 0.125 MG tablet TAKE 1 TABLET BY MOUTH EVERY EIGHT HOURS 30 MINUTES BEFORE EACH MESTINON. 180 tablet 0    LORazepam (ATIVAN) 0.5 MG tablet TAKE HALF TO ONE TABLET BY MOUTH THREE TO FIVE TIMES A WEEK AS NEEDED. 20 tablet 0    Multiple Vitamins-Minerals (MULTIVITAMIN & MINERAL PO) Take 1 tablet by mouth daily       pravastatin (PRAVACHOL) 40 MG tablet TAKE ONE TABLET BY MOUTH ONE TIME DAILY 90 tablet 2    predniSONE (DELTASONE) 5 MG tablet TAKE ONE TABLET BY MOUTH ONCE DAILY FOR 3 WEEKS 30 tablet 0    pyRIDostigmine (MESTINON) 60 MG tablet TAKE ONE TABLET BY MOUTH THREE TIMES DAILY 270 tablet 0    rivastigmine (EXELON) 9.5 MG/24HR 24 hr patch Place 1 patch onto the skin daily 90 patch 1    sodium fluoride dental gel (PREVIDENT) 1.1 % GEL topical gel        No facility-administered encounter medications on file as of 6/20/2024.       Allergies   Allergen Reactions    Daypro [Oxaprozin] Rash           Lidocaine Other (See Comments)     Rapid heart rate    Nitroglycerin Other (See Comments)     Causes low blood pressure    Penicillins Unknown     Occurred as child.    Sulfa Antibiotics Rash     Physical exam:    Ht 1.676 m (5' 6\")   Wt 67.1 kg (148 lb)   BMI 23.89 kg/m         Constitutional: NAD. pleasant.  present.  HEENT: EOMI, no scleral icterus. Nl conj.   Musculoskeletal: No active synovitis.   Skin: no rash  Neurological system: Alert  Psych: nl affect    Component      Latest Ref Rng & Units 6/17/2019   WBC      4.0 - 11.0 10e9/L 5.6   RBC Count      3.8 - 5.2 10e12/L 4.19   Hemoglobin      11.7 - 15.7 g/dL 13.0   Hematocrit      35.0 - 47.0 % 39.8   MCV      78 - 100 fl 95   MCH      26.5 - 33.0 pg 31.0   MCHC      31.5 - 36.5 g/dL 32.7   RDW      10.0 - 15.0 % 13.1   Platelet Count      150 - 450 10e9/L 230   % Neutrophils      % 56.3   % Lymphocytes      % 31.4   % Monocytes      % 9.6   % Eosinophils      % " 2.3   % Basophils      % 0.4   Absolute Neutrophil      1.6 - 8.3 10e9/L 3.2   Absolute Lymphocytes      0.8 - 5.3 10e9/L 1.8   Absolute Monocytes      0.0 - 1.3 10e9/L 0.5   Absolute Eosinophils      0.0 - 0.7 10e9/L 0.1   Absolute Basophils      0.0 - 0.2 10e9/L 0.0   Diff Method       Automated Method   Sodium      133 - 144 mmol/L 140   Potassium      3.4 - 5.3 mmol/L 5.0   Chloride      94 - 109 mmol/L 106   Carbon Dioxide      20 - 32 mmol/L 31   Anion Gap      3 - 14 mmol/L 3   Glucose      70 - 99 mg/dL 96   Urea Nitrogen      7 - 30 mg/dL 17   Creatinine      0.52 - 1.04 mg/dL 0.57   GFR Estimate      >60 mL/min/1.73:m2 >90   GFR Estimate If Black      >60 mL/min/1.73:m2 >90   Calcium      8.5 - 10.1 mg/dL 9.1   Bilirubin Total      0.2 - 1.3 mg/dL 0.5   Albumin      3.4 - 5.0 g/dL 3.8   Protein Total      6.8 - 8.8 g/dL 7.4   Alkaline Phosphatase      40 - 150 U/L 59   ALT      0 - 50 U/L 27   AST      0 - 45 U/L 20   Cholesterol      <200 mg/dL 150   Triglycerides      <150 mg/dL 168 (H)   HDL Cholesterol      >49 mg/dL 70   LDL Cholesterol Calculated      <100 mg/dL 46   Non HDL Cholesterol      <130 mg/dL 80   Rheumatoid Factor      <20 IU/mL <20   CRP Inflammation      0.0 - 8.0 mg/L <2.9   Vitamin D Deficiency screening      20 - 75 ug/L 48   Vitamin B12      193 - 986 pg/mL 705     Component      Latest Ref Rng & Units 1/16/2020   Ruth-1 Autoantibodies      <20 EU 0   Scl-70 Autoantibodies      <20 EU 0   Sm (Vergara) Autoantibodies      <20 EU 1   Sm/RNP Autoantibodies      <20 EU 2   SS-A/Ro Autoantibodies      <20  (H)   SS-B/La Autoantibodies      <20  (H)   Complement C4      19 - 59 mg/dL 27   Complement C3      83 - 177 mg/dL 139   DNA (ds) Antibody      <25 IU 3       Component      Latest Ref Rng & Units 1/20/2022 2/16/2022 2/28/2022   WBC      4.0 - 11.0 10e3/uL 5.7     RBC Count      3.80 - 5.20 10e6/uL 4.14     Hemoglobin      11.7 - 15.7 g/dL 13.0     Hematocrit      35.0 - 47.0  % 39.8     MCV      78 - 100 fL 96     MCH      26.5 - 33.0 pg 31.4     MCHC      31.5 - 36.5 g/dL 32.7     RDW      10.0 - 15.0 % 13.1     Platelet Count      150 - 450 10e3/uL 223     % Neutrophils      % 57     % Lymphocytes      % 30     % Monocytes      % 10     % Eosinophils      % 3     % Basophils      % 0     Absolute Neutrophils      1.6 - 8.3 10e3/uL 3.3     Absolute Lymphocytes      0.8 - 5.3 10e3/uL 1.7     Absolute Monocytes      0.0 - 1.3 10e3/uL 0.6     Absolute Eosinophils      0.0 - 0.7 10e3/uL 0.2     Absolute Basophils      0.0 - 0.2 10e3/uL 0.0     Sodium      133 - 144 mmol/L 139 137    Potassium      3.4 - 5.3 mmol/L 4.3 4.3    Chloride      94 - 109 mmol/L 108 107    Carbon Dioxide      20 - 32 mmol/L 38 (H) 25    Anion Gap      3 - 14 mmol/L <1 (L) 5    Urea Nitrogen      7 - 30 mg/dL 22 27    Creatinine      0.52 - 1.04 mg/dL 0.56 0.59    Calcium      8.5 - 10.1 mg/dL 9.0 9.1    Glucose      70 - 99 mg/dL 99 96    Alkaline Phosphatase      40 - 150 U/L 63 67    AST      0 - 45 U/L 20 30    ALT      0 - 50 U/L 33 44    Protein Total      6.8 - 8.8 g/dL 7.4 7.3    Albumin      3.4 - 5.0 g/dL 3.5 4.0    Bilirubin Total      0.2 - 1.3 mg/dL 0.5 0.4    GFR Estimate      >60 mL/min/1.73m2 >90 >90    Color Urine      Colorless, Straw, Light Yellow, Yellow Yellow     Appearance Urine      Clear Clear     Glucose Urine      Negative mg/dL Negative     Bilirubin Urine      Negative Negative     Ketones Urine      Negative mg/dL Negative     Specific Gravity Urine      1.003 - 1.035 >=1.030     Blood Urine      Negative Negative     pH Urine      5.0 - 7.0 5.0     Protein Albumin Urine      Negative mg/dL Negative     Urobilinogen Urine      0.2, 1.0 E.U./dL 0.2     Nitrite Urine      Negative Negative     Leukocyte Esterase Urine      Negative Negative     TSH      0.40 - 4.00 mU/L 1.87     AcetChol Binding Darlin      0.0 - 0.4 nmol/L   0.0   AcetChol Modul Darlin      <=45 %   4   Clinical Information:      71 year old female with generalized fatigue and right eyelid ptosis. She has not taken her Mestinon in at least the last week due to intolerance with diarhea. Query myopathy vs neuromuscular junction disorder. Brief exam: bilateral eyelid closure weakness and neck flexion weakness. Double vision on upward gaze and lateral gaze; Right Deltoid 5, Biceps 5, Triceps 4 bilaterally; subtle asymmetry of the finger flexors (very mild weakness on the left, normal on the right).      Techniques:     Motor and sensory conduction studies were done with surface recording electrodes. EMG was done with a concentric needle electrode.      Results:     The right peroneal-EDB motor study showed normal onset latency with low amplitude without segmental changes, and normal conduction velocities.The right tibial-AH, peroneal-TA, median-APB and ulnar-ADM motor studies were normal. The right superficial peroneal, sural, median, ulnar, and radial antidromic sensory studies were normal. The right tibial F wave latencies were normal. 3 Hz slow repetitive nerve stimulation at the right ADM and orbicularis oculi muscles did not show any significant decrement either at rest or after 1 min of maximal isometric exercise.      Needle evaluation of the right Triceps Brachii, Pronator Teres, Biceps, Tibialis anterior, Gastrocnemius (Medial Hd) and vastus lateralis showed abnormal spontaneous activity with fibrillation potentials (1+ to 2+) with CRDs present in the vastus lateralis only. The right Triceps Brachii, Pronator Teres, First Dorsal Interosseous , Biceps, and vastus lateralis showed small amplitude, short duration, polyphasic motor units with early recruitment in the triceps and biceps muscles.  The right tibialis anterior showed normal recruitment of normal appearing motor units. The right Gastrocnemius (Medial Hd) had normal recruitment of large, long duration motor units. The right deltoid muscle was normal.      Interpretation:      Abnormal study. 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 Please note, however, that this could still be part of a myopathic process because some chronic myopathies may show large, long duration motor unit potentials. There was no electrodiagnostic evidence of a neuromuscular junction disorder like myasthenia gravis.      Comment: The results of the study were discussed with the patient and she was referred for muscle biopsy of the left triceps or biceps.   ___________________________  Millicent Diggs MD, Neuromuscular Fellow  Rene Manning MD      Component      Latest Ref Rng & Units 3/25/2022   WBC      4.0 - 11.0 10e3/uL 6.1   RBC Count      3.80 - 5.20 10e6/uL 4.12   Hemoglobin      11.7 - 15.7 g/dL 12.7   Hematocrit      35.0 - 47.0 % 38.7   MCV      78 - 100 fL 94   MCH      26.5 - 33.0 pg 30.8   MCHC      31.5 - 36.5 g/dL 32.8   RDW      10.0 - 15.0 % 13.0   Platelet Count      150 - 450 10e3/uL 236   % Neutrophils      % 59   % Lymphocytes      % 30   % Monocytes      % 7   % Eosinophils      % 2   % Basophils      % 1   % Immature Granulocytes      % 1   NRBCs per 100 WBC      <1 /100 0   Absolute Neutrophils      1.6 - 8.3 10e3/uL 3.6   Absolute Lymphocytes      0.8 - 5.3 10e3/uL 1.8   Absolute Monocytes      0.0 - 1.3 10e3/uL 0.4   Absolute Eosinophils      0.0 - 0.7 10e3/uL 0.1   Absolute Basophils      0.0 - 0.2 10e3/uL 0.0   Absolute Immature Granulocytes      <=0.4 10e3/uL 0.0   Absolute NRBCs      10e3/uL 0.0   GARETT-1 (Histidyl-tRNA Synthetase) Darlin IgG      0 - 40 AU/mL 0   PL-12 (alanyl-tRNA synthetase) Antibody      Negative Negative   PL-7 (threonyl-tRNA synthetase) Antibody      Negative Negative   EJ (glycyl - tRNA synthetase) Antibody      Negative Negative   OJ (isoleucyl-tRNA synthetase) Antibody       Negative Negative   SRP (Signal Recognition Particle) Antibody      Negative Negative   Mi-2 (nuclear helicase protein) Antibody      Negative Negative   P155/140 (TIF1-gamma) Antibody      Negative Negative   TIF-1 Gamma Antibody      Negative Negative   SAE1 (SUMO activating enzyme) Darlin      Negative Negative   MDA5 (CADM 140) Darlin      Negative Negative   NXP-2 (Nuclear matrix protein 2) Darlin      Negative Negative   Myositis Interp       See Note   Albumin Fraction      3.7 - 5.1 g/dL 4.2   Alpha 1 Fraction      0.2 - 0.4 g/dL 0.3   Alpha 2 Fraction      0.5 - 0.9 g/dL 0.9   Beta Fraction      0.6 - 1.0 g/dL 0.9   Gamma Fraction      0.7 - 1.6 g/dL 0.9   Monoclonal Peak      <=0.0 g/dL 0.0   ELP Interpretation:       Essentially normal electrophoretic pattern. No obvious monoclonal proteins seen. Pathologic significance requires clinical correlation. Carlos Ashford M.D., Ph.D., Pathologist.   Quantiferon-TB Gold Plus Result      Negative Negative   TB1 Ag minus Nil Value      IU/mL 0.01   TB2 Ag minus Nil Value      IU/mL 0.00   Mitogen minus Nil Result      IU/mL 9.97   Nil Result      IU/mL 0.03   BINDU interpretation      Negative Positive (A)   BINDU pattern 1       Speckled   BINDU titer 1       1:160   Iron      35 - 180 ug/dL 69   Iron Binding Cap      240 - 430 ug/dL 344   Iron Saturation Index      15 - 46 % 20   Creatinine      0.52 - 1.04 mg/dL 0.63   GFR Estimate      >60 mL/min/1.73m2 >90   ALT      0 - 50 U/L 31   Albumin      3.4 - 5.0 g/dL 3.6   AST      0 - 45 U/L 18   Thyroglobulin Antibody      <40 IU/mL <20   CK Total      30 - 225 U/L 244 (H)   Aldolase      1.2 - 7.6 U/L 3.9   Complement C3      81 - 157 mg/dL 142   Complement C4      13 - 39 mg/dL 26   CRP Inflammation      0.0 - 8.0 mg/L <2.9   Cryoglobulin Interpretation      Negative Negative   Cyclic Citrullinated Peptide Antibody, IgG      <7.0 U/mL 2.1   DNA-ds      <10.0 IU/mL 1.1   Vitamin D Deficiency screening      20 - 75 ug/L 41    Vitamin B12      193 - 986 pg/mL 603   Thyroid Peroxidase Antibody      <35 IU/mL <10   Rheumatoid Factor      <12 IU/mL 15 (H)   Hepatitis C Antibody      Nonreactive Nonreactive   Hep B Surface Agn      Nonreactive Nonreactive   Sed Rate      0 - 30 mm/hr 14   Ferritin      8 - 252 ng/mL 85   Hepatitis B Core Darlin      Nonreactive Nonreactive   Total Protein Serum for ELP      6.8 - 8.8 g/dL 7.2   Quantiferon Nil Tube      IU/mL 0.03   Quantiferon TB1 Tube      IU/mL 0.04   Quantiferon TB2 Tube       0.03   QUANTIFERON MITOGEN      IU/mL 10.00     Component      Latest Ref Rng 4/3/2024  5:18 PM   WBC      4.0 - 11.0 10e3/uL 4.7    RBC Count      3.80 - 5.20 10e6/uL 4.31    Hemoglobin      11.7 - 15.7 g/dL 13.2    Hematocrit      35.0 - 47.0 % 39.9    MCV      78 - 100 fL 93    MCH      26.5 - 33.0 pg 30.6    MCHC      31.5 - 36.5 g/dL 33.1    RDW      10.0 - 15.0 % 14.4    Platelet Count      150 - 450 10e3/uL 197    % Neutrophils      % 68    % Lymphocytes      % 23    % Monocytes      % 8    % Eosinophils      % 1    % Basophils      % 0    % Immature Granulocytes      % 0    Absolute Neutrophils      1.6 - 8.3 10e3/uL 3.2    Absolute Lymphocytes      0.8 - 5.3 10e3/uL 1.1    Absolute Monocytes      0.0 - 1.3 10e3/uL 0.4    Absolute Eosinophils      0.0 - 0.7 10e3/uL 0.0    Absolute Basophils      0.0 - 0.2 10e3/uL 0.0    Absolute Immature Granulocytes      <=0.4 10e3/uL 0.0    Creatinine      0.51 - 0.95 mg/dL 0.55    GFR Estimate      >60 mL/min/1.73m2 >90    ALT      0 - 50 U/L 19    Albumin      3.5 - 5.2 g/dL 4.0    AST      0 - 45 U/L 26        Paul Mark MD

## 2024-06-20 NOTE — PROGRESS NOTES
Virtual Visit Details    Type of service:  Video Visit     Joined the call at 6/20/2024, 9:57:08 am.  Left the call at 6/20/2024, 10:05:46 am.    Originating Location (pt. Location): Home  Distant Location (provider location):  On-site  Platform used for Video Visit: Thania    Chief Complaint   Patient presents with    RECHECK     Light headed, off balance, bones hurt-arms and legs, muscles sore, forgetful.   Inflammatory myositis  ILD  Imuran monitoring    Date of initial visit: 3/25//2022  Last seen: 12/7/2023  DOS: 6/20/2024      ASSESSMENT AND PLAN:    Inflammatory myositis. Sjogren's. ILD. Albina Pedro 71 y.o. female with long standing h/o seropositive Sjogren's causing ILD, inflammatory myositis, R thigh panniculitis who presents for worsening joint pain, muscle weakness and deconditioning over past 2 years. Although she has OA and her ILD seems to be stable, it seems like she was doing well in the past while taking imuran (AZA). She was on AZA 2074-9077 and was tapered off slowly as was doing well. Max AZA dose was 100 mg every day and last dose was 50 mg every other day. She tolerated AZA in the past with no SEs. Her ILD has not worsened off AZA. No flare of rash. In 3/2022 (initial visit), I recommended her to resume taking AZA while doing additional work up. Plus, I recommend a course of prednisone taper. We discussed Sjogren's Dx, mx of sicca.     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 IVIG per Dr. Manning. Will have LP for Alzheimer's.    AZA monitoring. Labs q3mo    ILD. Follow up with pulmonary.        Plan 10/28/2022:    Tell Beatriz that you would get IVIG at [place]    Continue imuran 50 mg a day with labs q 3months    Start tapering prednisone: 9-8-7-6 mg a day each course x 1 month then 5 mg a day and stay on 5 mg a day    DEXA bone density    MTM referral     Video visit in about 3 months        12/7/2023: Reports getting  weaker, might need to use scooter, has upcoming appointment for Alzheimer's management and follow up for MG. No evidence of ILD/myositis flare ups today. Stable labs. Defer m/o MG to Dr. Manning, currently is taking prednisone 5 mg every day+  mg bid, although not sure about the doses of her meds. NL DEXA 12/2022. IVIG got denied by insurance.      Today 6/20/2024: Stable seropositive Sjogren's/ILD/myositis on current regimen. Labs from 4/2024 were reviewed, no imuran toxicity. Will update DEXA in 12/2024 given chronic steroid use. She had nl DEXA in 12/2022. Recent labs, records were reviewed.      Plan:    Continue  mg bid    Continue prednisone 5 mg every day    DEXA bone density in 12/2024    Labs every 3 months, due in July 2024    Will check Sjogren's labs with next blood work    Follow up with Dr. Perlman for ILD in 12/2024    Follow up with Dr. Manning for MG in 7/2024    Keep 12/13 appointment with me, in person with na option to switch to virtual if difficult to come in      Paul Mark MD      Orders Placed This Encounter   Procedures    DX Bone Density    ALT    Albumin level    AST    Creatinine    CK total    Complement C3    Complement C4    Erythrocyte sedimentation rate auto    CRP inflammation    Cryoglobulin, Quantitative with Reflex to Identification    CBC with Platelets & Differential    Protein electrophoresis        HISTORY OF PRESENTING ILLNESS:       3/25/2022:    Kristina is a 72 yo female who presents today with her . She has been referred for m/o Sjogren's. She has h/o +SSA/SSB Sjogren's diagnosed in 2004 after having sicca x 20 yr followed by muscle weakness, inflammatory arthritis, L thigh panniculitis, ILD. Was treated with prednisone, did not tolerate MTX. Was on AZA 0435-5651. Had +BINDU, +RF.    She was last seen by Dr. Moore in 1/2020 and before that, was under care of Dr. Ho who diagnosed and treated her since 2004.    She is here to discuss her  joint pain.    She has diffuse arthralgia, no joint swelling, no AM stiffness, reports loss of strength of her hands with poor .    She has arthralgia, it got worse and went downhill for past 2 years.    Sometimes gets muscle weakness, more constant lately over past few months.    Has brain fog, getting worse.    Has progressive hair loss with bald spots.    No photosensitivity or skin rashes.    Uses biotene and water, wakes up thirsty. Had a tooth which broke, sometimes teeth are chipping. Has dry mouth due to Sjogren's.    Has dry eyes, uses OTC eyedrops.    Gets R droopy eyelid, now affected L eyelid.    Eyes look red ad dry.    No parotid gland swelling.    No CP, SOB, cough, fevers or night sweats.    Gets diarrhea from certain foods.    No numbness, tingling.    Has fatigue, difficult time staying awake.    She did well on her cognitive function testing.    Mestinon caused diarrhea and stopped taking it, scheduled to have EMG and do follow up with Dr. Manning.    Updated on cancer screening.    No dysphagia.    6/3/2022:    Kristina was initially seen in 3/2022 with muscle weakness, when she was treated with prednisone taper (4tab=20 mg qd x 5 days, 3tab=15 mg qd x 5 days, 2tab=10 mg qd x 5 days, 1 tab=5 mg qd x 5 days then stop) and was put back on imuran 50 mg every day. Prednisone helped her.    Overall feeling pretty good.    Had diarrhea but it is better.    Went on 4 almazan past weekend and did well.    She and her  have seen an improvement by resuming imuran.    Easier to get out of chair.    Waiting to hear about muscle biopsy result.     10/28/2022:    Kristina presents for follow up. She is here with her . They report that Kristina is getting worse, feels weaker, has memory problem. Dr. Manning has ordered IVIG for myasthenia gravis, I see that the staff tried to reach Kristina to schedule at [place] with no success, Kristina reports that her phone is not working and they should call her  . 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    No rash, or oral ulcers.    Has hair loss x past 6 months.    No stomach pain.    Sometimes has diarrhea based on what she eats.    Had a cough, resolved.    No CP.    Has dry mouth, solid dysphagia.    Dryness of eyes is worse. Uses OTC eyedrops.    Mestinon is not helping with MG.    Legs are weak, reports falls.      2023:    -feeling weaker, gets dizzy, tends to fall, memory is bad, no SOB or cough, no rash or CP        Today 2024:    -sometimes gets sharp pain over her arms, legs, bone pain, takes tylenol    -has upcoming appointment with Dr. Swartz in July    -gets dizzy, lightheaded on standing up, not falling    -no SOB    -overall stable, no major changes, no flares    -memory med has been increased        ROS: A comprehensive ROS was done. Positives are per HPI.    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       Past Surgical History:   Procedure Laterality Date    BIOPSY MUSCLE DIAGNOSTIC (LOCATION) Left 2022    Procedure: BIOPSY, MUSCLE LEFT biceps @0900;  Surgeon: Tyrell Loo MD;  Location: [facility] [place] [facility] OR    C/SECTION, LOW TRANSVERSE  10/13/1978    , Low Transverse    COLONOSCOPY      OPEN REDUCTION INTERNAL FIXATION ANKLE Right 2019    Procedure: Open reduction internal fixation right lateral malleolus fracture;  Surgeon: Rolo Oconnor DPM;  Location: [facility] OR    SURGICAL HISTORY OF -           SURGICAL HISTORY OF -   00    Colonoscopy       Family History   Problem Relation Age of Onset    Cancer Mother         Breast and liver-  age 43    Heart Disease Father         ? chf?h/o CVA    Alzheimer Disease Father     Hypertension Father     Neurologic Disorder Father         CVA    Diabetes Maternal Grandmother     Breast Cancer Maternal Aunt     Breast Cancer Paternal Aunt     Cancer - colorectal No family hx of     Prostate Cancer No family hx of        Social History     Tobacco Use    Smoking status: Never     Passive exposure: Never    Smokeless tobacco: Never   Substance Use Topics    Alcohol use: Yes     Alcohol/week: 0.0 standard drinks of alcohol     Comment: 1 glass of wine every 2 weeks     Outpatient Encounter Medications as of 6/20/2024   Medication Sig Dispense Refill    acetaminophen (TYLENOL) 500 MG tablet Take 1,000 mg by mouth every 6 hours as needed for mild pain      albuterol (PROAIR HFA/PROVENTIL HFA/VENTOLIN HFA) 108 (90 Base) MCG/ACT inhaler INHALE TWO PUFFS BY MOUTH EVERY FOUR HOURS AS NEEDED FOR SHORTNESS OF BREATH/DYSPNEA 25.5 g 0    aspirin (ASA) 81 MG EC tablet Take 1 tablet (81 mg) by mouth daily 90 tablet 3    azaTHIOprine (IMURAN) 50 MG tablet TAKE TWO TABLETS BY MOUTH TWICE DAILY 120 tablet 1    buPROPion (WELLBUTRIN XL) 150 MG 24 hr tablet Take 2 tablets (300 mg) by mouth every morning 90 tablet 1    Calcium-Magnesium-Zinc 500-250-12.5 MG TABS Take 1 tablet by mouth daily      FLUoxetine (PROZAC) 40 MG capsule Take 1 capsule (40 mg) by mouth daily (Patient taking differently: Take 40 mg by mouth daily Pt  reports patient taking two pills) 90 capsule 1    gabapentin (NEURONTIN) 100 MG capsule 1 capsule three times a day for 1 week, increase to 2 capsules three times a day on week 2 if pain continues 180 capsule 0    hyoscyamine (LEVSIN) 0.125 MG tablet TAKE 1 TABLET BY MOUTH EVERY EIGHT HOURS 30 MINUTES BEFORE EACH MESTINON. 180 tablet 0    LORazepam (ATIVAN) 0.5 MG tablet TAKE HALF TO ONE TABLET BY MOUTH THREE TO FIVE TIMES A WEEK AS NEEDED. 20 tablet 0    Multiple Vitamins-Minerals (MULTIVITAMIN &  "MINERAL PO) Take 1 tablet by mouth daily       pravastatin (PRAVACHOL) 40 MG tablet TAKE ONE TABLET BY MOUTH ONE TIME DAILY 90 tablet 2    predniSONE (DELTASONE) 5 MG tablet TAKE ONE TABLET BY MOUTH ONCE DAILY FOR 3 WEEKS 30 tablet 0    pyRIDostigmine (MESTINON) 60 MG tablet TAKE ONE TABLET BY MOUTH THREE TIMES DAILY 270 tablet 0    rivastigmine (EXELON) 9.5 MG/24HR 24 hr patch Place 1 patch onto the skin daily 90 patch 1    sodium fluoride dental gel (PREVIDENT) 1.1 % GEL topical gel        No facility-administered encounter medications on file as of 6/20/2024.       Allergies   Allergen Reactions    Daypro [Oxaprozin] Rash           Lidocaine Other (See Comments)     Rapid heart rate    Nitroglycerin Other (See Comments)     Causes low blood pressure    Penicillins Unknown     Occurred as child.    Sulfa Antibiotics Rash     Physical exam:    Ht 1.676 m (5' 6\")   Wt 67.1 kg (148 lb)   BMI 23.89 kg/m         Constitutional: NAD. pleasant.  present.  HEENT: EOMI, no scleral icterus. Nl conj.   Musculoskeletal: No active synovitis.   Skin: no rash  Neurological system: Alert  Psych: nl affect    Component      Latest Ref Rng & Units 6/17/2019   WBC      4.0 - 11.0 10e9/L 5.6   RBC Count      3.8 - 5.2 10e12/L 4.19   Hemoglobin      11.7 - 15.7 g/dL 13.0   Hematocrit      35.0 - 47.0 % 39.8   MCV      78 - 100 fl 95   MCH      26.5 - 33.0 pg 31.0   MCHC      31.5 - 36.5 g/dL 32.7   RDW      10.0 - 15.0 % 13.1   Platelet Count      150 - 450 10e9/L 230   % Neutrophils      % 56.3   % Lymphocytes      % 31.4   % Monocytes      % 9.6   % Eosinophils      % 2.3   % Basophils      % 0.4   Absolute Neutrophil      1.6 - 8.3 10e9/L 3.2   Absolute Lymphocytes      0.8 - 5.3 10e9/L 1.8   Absolute Monocytes      0.0 - 1.3 10e9/L 0.5   Absolute Eosinophils      0.0 - 0.7 10e9/L 0.1   Absolute Basophils      0.0 - 0.2 10e9/L 0.0   Diff Method       Automated Method   Sodium      133 - 144 mmol/L 140   Potassium      3.4 " - 5.3 mmol/L 5.0   Chloride      94 - 109 mmol/L 106   Carbon Dioxide      20 - 32 mmol/L 31   Anion Gap      3 - 14 mmol/L 3   Glucose      70 - 99 mg/dL 96   Urea Nitrogen      7 - 30 mg/dL 17   Creatinine      0.52 - 1.04 mg/dL 0.57   GFR Estimate      >60 mL/min/1.73:m2 >90   GFR Estimate If Black      >60 mL/min/1.73:m2 >90   Calcium      8.5 - 10.1 mg/dL 9.1   Bilirubin Total      0.2 - 1.3 mg/dL 0.5   Albumin      3.4 - 5.0 g/dL 3.8   Protein Total      6.8 - 8.8 g/dL 7.4   Alkaline Phosphatase      40 - 150 U/L 59   ALT      0 - 50 U/L 27   AST      0 - 45 U/L 20   Cholesterol      <200 mg/dL 150   Triglycerides      <150 mg/dL 168 (H)   HDL Cholesterol      >49 mg/dL 70   LDL Cholesterol Calculated      <100 mg/dL 46   Non HDL Cholesterol      <130 mg/dL 80   Rheumatoid Factor      <20 IU/mL <20   CRP Inflammation      0.0 - 8.0 mg/L <2.9   Vitamin D Deficiency screening      20 - 75 ug/L 48   Vitamin B12      193 - 986 pg/mL 705     Component      Latest Ref Rng & Units 1/16/2020   Ruth-1 Autoantibodies      <20 EU 0   Scl-70 Autoantibodies      <20 EU 0   Sm (Vergara) Autoantibodies      <20 EU 1   Sm/RNP Autoantibodies      <20 EU 2   SS-A/Ro Autoantibodies      <20  (H)   SS-B/La Autoantibodies      <20  (H)   Complement C4      19 - 59 mg/dL 27   Complement C3      83 - 177 mg/dL 139   DNA (ds) Antibody      <25 IU 3       Component      Latest Ref Rng & Units 1/20/2022 2/16/2022 2/28/2022   WBC      4.0 - 11.0 10e3/uL 5.7     RBC Count      3.80 - 5.20 10e6/uL 4.14     Hemoglobin      11.7 - 15.7 g/dL 13.0     Hematocrit      35.0 - 47.0 % 39.8     MCV      78 - 100 fL 96     MCH      26.5 - 33.0 pg 31.4     MCHC      31.5 - 36.5 g/dL 32.7     RDW      10.0 - 15.0 % 13.1     Platelet Count      150 - 450 10e3/uL 223     % Neutrophils      % 57     % Lymphocytes      % 30     % Monocytes      % 10     % Eosinophils      % 3     % Basophils      % 0     Absolute Neutrophils      1.6 - 8.3  10e3/uL 3.3     Absolute Lymphocytes      0.8 - 5.3 10e3/uL 1.7     Absolute Monocytes      0.0 - 1.3 10e3/uL 0.6     Absolute Eosinophils      0.0 - 0.7 10e3/uL 0.2     Absolute Basophils      0.0 - 0.2 10e3/uL 0.0     Sodium      133 - 144 mmol/L 139 137    Potassium      3.4 - 5.3 mmol/L 4.3 4.3    Chloride      94 - 109 mmol/L 108 107    Carbon Dioxide      20 - 32 mmol/L 38 (H) 25    Anion Gap      3 - 14 mmol/L <1 (L) 5    Urea Nitrogen      7 - 30 mg/dL 22 27    Creatinine      0.52 - 1.04 mg/dL 0.56 0.59    Calcium      8.5 - 10.1 mg/dL 9.0 9.1    Glucose      70 - 99 mg/dL 99 96    Alkaline Phosphatase      40 - 150 U/L 63 67    AST      0 - 45 U/L 20 30    ALT      0 - 50 U/L 33 44    Protein Total      6.8 - 8.8 g/dL 7.4 7.3    Albumin      3.4 - 5.0 g/dL 3.5 4.0    Bilirubin Total      0.2 - 1.3 mg/dL 0.5 0.4    GFR Estimate      >60 mL/min/1.73m2 >90 >90    Color Urine      Colorless, Straw, Light Yellow, Yellow Yellow     Appearance Urine      Clear Clear     Glucose Urine      Negative mg/dL Negative     Bilirubin Urine      Negative Negative     Ketones Urine      Negative mg/dL Negative     Specific Gravity Urine      1.003 - 1.035 >=1.030     Blood Urine      Negative Negative     pH Urine      5.0 - 7.0 5.0     Protein Albumin Urine      Negative mg/dL Negative     Urobilinogen Urine      0.2, 1.0 E.U./dL 0.2     Nitrite Urine      Negative Negative     Leukocyte Esterase Urine      Negative Negative     TSH      0.40 - 4.00 mU/L 1.87     AcetChol Binding Darlin      0.0 - 0.4 nmol/L   0.0   AcetChol Modul Darlin      <=45 %   4   Clinical Information:     71 year old female with generalized fatigue and right eyelid ptosis. She has not taken her Mestinon in at least the last week due to intolerance with diarhea. Query myopathy vs neuromuscular junction disorder. Brief exam: bilateral eyelid closure weakness and neck flexion weakness. Double vision on upward gaze and lateral gaze; Right Deltoid 5, Biceps  5, Triceps 4 bilaterally; subtle asymmetry of the finger flexors (very mild weakness on the left, normal on the right).      Techniques:     Motor and sensory conduction studies were done with surface recording electrodes. EMG was done with a concentric needle electrode.      Results:     The right peroneal-EDB motor study showed normal onset latency with low amplitude without segmental changes, and normal conduction velocities.The right tibial-AH, peroneal-TA, median-APB and ulnar-ADM motor studies were normal. The right superficial peroneal, sural, median, ulnar, and radial antidromic sensory studies were normal. The right tibial F wave latencies were normal. 3 Hz slow repetitive nerve stimulation at the right ADM and orbicularis oculi muscles did not show any significant decrement either at rest or after 1 min of maximal isometric exercise.      Needle evaluation of the right Triceps Brachii, Pronator Teres, Biceps, Tibialis anterior, Gastrocnemius (Medial Hd) and vastus lateralis showed abnormal spontaneous activity with fibrillation potentials (1+ to 2+) with CRDs present in the vastus lateralis only. The right Triceps Brachii, Pronator Teres, First Dorsal Interosseous , Biceps, and vastus lateralis showed small amplitude, short duration, polyphasic motor units with early recruitment in the triceps and biceps muscles.  The right tibialis anterior showed normal recruitment of normal appearing motor units. The right Gastrocnemius (Medial Hd) had normal recruitment of large, long duration motor units. The right deltoid muscle was normal.      Interpretation:     Abnormal study. 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 Please note, however, that this could still be part of a myopathic process because some chronic myopathies may show  large, long duration motor unit potentials. There was no electrodiagnostic evidence of a neuromuscular junction disorder like myasthenia gravis.      Comment: The results of the study were discussed with the patient and she was referred for muscle biopsy of the left triceps or biceps.   ___________________________  Millicent Diggs MD, Neuromuscular Fellow  Rene Manning MD      Component      Latest Ref Rng & Units 3/25/2022   WBC      4.0 - 11.0 10e3/uL 6.1   RBC Count      3.80 - 5.20 10e6/uL 4.12   Hemoglobin      11.7 - 15.7 g/dL 12.7   Hematocrit      35.0 - 47.0 % 38.7   MCV      78 - 100 fL 94   MCH      26.5 - 33.0 pg 30.8   MCHC      31.5 - 36.5 g/dL 32.8   RDW      10.0 - 15.0 % 13.0   Platelet Count      150 - 450 10e3/uL 236   % Neutrophils      % 59   % Lymphocytes      % 30   % Monocytes      % 7   % Eosinophils      % 2   % Basophils      % 1   % Immature Granulocytes      % 1   NRBCs per 100 WBC      <1 /100 0   Absolute Neutrophils      1.6 - 8.3 10e3/uL 3.6   Absolute Lymphocytes      0.8 - 5.3 10e3/uL 1.8   Absolute Monocytes      0.0 - 1.3 10e3/uL 0.4   Absolute Eosinophils      0.0 - 0.7 10e3/uL 0.1   Absolute Basophils      0.0 - 0.2 10e3/uL 0.0   Absolute Immature Granulocytes      <=0.4 10e3/uL 0.0   Absolute NRBCs      10e3/uL 0.0   GARETT-1 (Histidyl-tRNA Synthetase) Darlin IgG      0 - 40 AU/mL 0   PL-12 (alanyl-tRNA synthetase) Antibody      Negative Negative   PL-7 (threonyl-tRNA synthetase) Antibody      Negative Negative   EJ (glycyl - tRNA synthetase) Antibody      Negative Negative   OJ (isoleucyl-tRNA synthetase) Antibody      Negative Negative   SRP (Signal Recognition Particle) Antibody      Negative Negative   Mi-2 (nuclear helicase protein) Antibody      Negative Negative   P155/140 (TIF1-gamma) Antibody      Negative Negative   TIF-1 Gamma Antibody      Negative Negative   SAE1 (SUMO activating enzyme) Darlin      Negative Negative   MDA5 (CADM 140) Darlin      Negative Negative    NXP-2 (Nuclear matrix protein 2) Darlin      Negative Negative   Myositis Interp       See Note   Albumin Fraction      3.7 - 5.1 g/dL 4.2   Alpha 1 Fraction      0.2 - 0.4 g/dL 0.3   Alpha 2 Fraction      0.5 - 0.9 g/dL 0.9   Beta Fraction      0.6 - 1.0 g/dL 0.9   Gamma Fraction      0.7 - 1.6 g/dL 0.9   Monoclonal Peak      <=0.0 g/dL 0.0   ELP Interpretation:       Essentially normal electrophoretic pattern. No obvious monoclonal proteins seen. Pathologic significance requires clinical correlation. Carlos Ashford M.D., Ph.D., Pathologist.   Quantiferon-TB Gold Plus Result      Negative Negative   TB1 Ag minus Nil Value      IU/mL 0.01   TB2 Ag minus Nil Value      IU/mL 0.00   Mitogen minus Nil Result      IU/mL 9.97   Nil Result      IU/mL 0.03   BINDU interpretation      Negative Positive (A)   BINDU pattern 1       Speckled   BINDU titer 1       1:160   Iron      35 - 180 ug/dL 69   Iron Binding Cap      240 - 430 ug/dL 344   Iron Saturation Index      15 - 46 % 20   Creatinine      0.52 - 1.04 mg/dL 0.63   GFR Estimate      >60 mL/min/1.73m2 >90   ALT      0 - 50 U/L 31   Albumin      3.4 - 5.0 g/dL 3.6   AST      0 - 45 U/L 18   Thyroglobulin Antibody      <40 IU/mL <20   CK Total      30 - 225 U/L 244 (H)   Aldolase      1.2 - 7.6 U/L 3.9   Complement C3      81 - 157 mg/dL 142   Complement C4      13 - 39 mg/dL 26   CRP Inflammation      0.0 - 8.0 mg/L <2.9   Cryoglobulin Interpretation      Negative Negative   Cyclic Citrullinated Peptide Antibody, IgG      <7.0 U/mL 2.1   DNA-ds      <10.0 IU/mL 1.1   Vitamin D Deficiency screening      20 - 75 ug/L 41   Vitamin B12      193 - 986 pg/mL 603   Thyroid Peroxidase Antibody      <35 IU/mL <10   Rheumatoid Factor      <12 IU/mL 15 (H)   Hepatitis C Antibody      Nonreactive Nonreactive   Hep B Surface Agn      Nonreactive Nonreactive   Sed Rate      0 - 30 mm/hr 14   Ferritin      8 - 252 ng/mL 85   Hepatitis B Core Darlin      Nonreactive Nonreactive   Total  Protein Serum for ELP      6.8 - 8.8 g/dL 7.2   Quantiferon Nil Tube      IU/mL 0.03   Quantiferon TB1 Tube      IU/mL 0.04   Quantiferon TB2 Tube       0.03   QUANTIFERON MITOGEN      IU/mL 10.00     Component      Latest Ref Rng 4/3/2024  5:18 PM   WBC      4.0 - 11.0 10e3/uL 4.7    RBC Count      3.80 - 5.20 10e6/uL 4.31    Hemoglobin      11.7 - 15.7 g/dL 13.2    Hematocrit      35.0 - 47.0 % 39.9    MCV      78 - 100 fL 93    MCH      26.5 - 33.0 pg 30.6    MCHC      31.5 - 36.5 g/dL 33.1    RDW      10.0 - 15.0 % 14.4    Platelet Count      150 - 450 10e3/uL 197    % Neutrophils      % 68    % Lymphocytes      % 23    % Monocytes      % 8    % Eosinophils      % 1    % Basophils      % 0    % Immature Granulocytes      % 0    Absolute Neutrophils      1.6 - 8.3 10e3/uL 3.2    Absolute Lymphocytes      0.8 - 5.3 10e3/uL 1.1    Absolute Monocytes      0.0 - 1.3 10e3/uL 0.4    Absolute Eosinophils      0.0 - 0.7 10e3/uL 0.0    Absolute Basophils      0.0 - 0.2 10e3/uL 0.0    Absolute Immature Granulocytes      <=0.4 10e3/uL 0.0    Creatinine      0.51 - 0.95 mg/dL 0.55    GFR Estimate      >60 mL/min/1.73m2 >90    ALT      0 - 50 U/L 19    Albumin      3.5 - 5.2 g/dL 4.0    AST      0 - 45 U/L 26

## 2024-06-27 ENCOUNTER — TELEPHONE (OUTPATIENT)
Dept: RHEUMATOLOGY | Facility: CLINIC | Age: 74
End: 2024-06-27

## 2024-06-27 ENCOUNTER — OFFICE VISIT (OUTPATIENT)
Dept: FAMILY MEDICINE | Facility: CLINIC | Age: 74
End: 2024-06-27
Payer: COMMERCIAL

## 2024-06-27 VITALS
SYSTOLIC BLOOD PRESSURE: 118 MMHG | OXYGEN SATURATION: 98 % | RESPIRATION RATE: 16 BRPM | BODY MASS INDEX: 24.06 KG/M2 | HEIGHT: 66 IN | WEIGHT: 149.7 LBS | DIASTOLIC BLOOD PRESSURE: 70 MMHG | TEMPERATURE: 97.6 F | HEART RATE: 68 BPM

## 2024-06-27 DIAGNOSIS — G47.20 SLEEP-WAKE DISORDER: ICD-10-CM

## 2024-06-27 DIAGNOSIS — F03.90 DEMENTIA, UNSPECIFIED DEMENTIA SEVERITY, UNSPECIFIED DEMENTIA TYPE, UNSPECIFIED WHETHER BEHAVIORAL, PSYCHOTIC, OR MOOD DISTURBANCE OR ANXIETY (H): Primary | ICD-10-CM

## 2024-06-27 DIAGNOSIS — F33.0 MAJOR DEPRESSIVE DISORDER, RECURRENT, MILD (H): ICD-10-CM

## 2024-06-27 DIAGNOSIS — Z23 NEED FOR SHINGLES VACCINE: ICD-10-CM

## 2024-06-27 DIAGNOSIS — I20.89 CHRONIC STABLE ANGINA (H): ICD-10-CM

## 2024-06-27 DIAGNOSIS — Z29.11 NEED FOR VACCINATION AGAINST RESPIRATORY SYNCYTIAL VIRUS: ICD-10-CM

## 2024-06-27 PROCEDURE — 99214 OFFICE O/P EST MOD 30 MIN: CPT | Performed by: FAMILY MEDICINE

## 2024-06-27 RX ORDER — RESPIRATORY SYNCYTIAL VIRUS VACCINE 120MCG/0.5
0.5 KIT INTRAMUSCULAR ONCE
Qty: 1 EACH | Refills: 0 | Status: CANCELLED | OUTPATIENT
Start: 2024-06-27 | End: 2024-06-27

## 2024-06-27 RX ORDER — FLUOXETINE 40 MG/1
40 CAPSULE ORAL DAILY
Qty: 90 CAPSULE | Refills: 1 | Status: SHIPPED | OUTPATIENT
Start: 2024-06-27

## 2024-06-27 ASSESSMENT — ANXIETY QUESTIONNAIRES
3. WORRYING TOO MUCH ABOUT DIFFERENT THINGS: NOT AT ALL
6. BECOMING EASILY ANNOYED OR IRRITABLE: NOT AT ALL
GAD7 TOTAL SCORE: 1
2. NOT BEING ABLE TO STOP OR CONTROL WORRYING: NOT AT ALL
GAD7 TOTAL SCORE: 1
5. BEING SO RESTLESS THAT IT IS HARD TO SIT STILL: NOT AT ALL
1. FEELING NERVOUS, ANXIOUS, OR ON EDGE: SEVERAL DAYS
7. FEELING AFRAID AS IF SOMETHING AWFUL MIGHT HAPPEN: NOT AT ALL

## 2024-06-27 ASSESSMENT — PAIN SCALES - GENERAL: PAINLEVEL: NO PAIN (0)

## 2024-06-27 ASSESSMENT — PATIENT HEALTH QUESTIONNAIRE - PHQ9
SUM OF ALL RESPONSES TO PHQ QUESTIONS 1-9: 5
5. POOR APPETITE OR OVEREATING: NOT AT ALL

## 2024-06-27 NOTE — TELEPHONE ENCOUNTER
Left Voicemail (1st Attempt) and Sent Mychart (1st Attempt) for the patient to call back and schedule the following:    Appointment type: DEXA Bone Density Scan  Provider: Dr. Mark  Return date: Decembe 2024  Specialty phone number: [phone]  Additional appointment(s) needed: NA  Additonal Notes: NA    Called [facility]'s phone as well and relayed scheduling info. Gave imaging number as they're not able to schedule today

## 2024-06-27 NOTE — PATIENT INSTRUCTIONS
Melatonin 10 mg at 10:30 pm.  Have Bambi turn off light at 11:30.  You enjoy people! You are a people person and so it is good to be awake during  the day to be with people.  Have a reminder art book.  Keep on same medication as you wean down prednisone.    Lamisil cream to feet.   Repeat visit in 3 months

## 2024-06-27 NOTE — PROGRESS NOTES
Assessment & Plan     Sleep-wake disorder  Last visit  recommendations  she forgot this  I printed out and highligted recpomndation  Melatonin 10 mg at 10:30 pm.  Have Bambi turn off light at 11:30.  You enjoy people! You are a people person and so it is good to be awake during  the day to be with people.  Have a reminder art book.  Keep on same medication as you wean down prednisone.    Major depressive disorder, recurrent, mild (H24)  Fair control frustrated with memory loss and losss on independence    Dementia, unspecified dementia severity, unspecified dementia type, uwith behavioral, psychotic, or mood disturbance or anxiety (H)  Slowly declining.  Needs to get out of houese and interactr with others, butr she declines to do so during the day    Getting shaky in middle of afternoon after lunch,  having nhigh proytein and low carb lunches.     Chronic stable angina (H24)  Good control.  Continue currant medications, continue to monitor.      Wt Readings from Last 10 Encounters:   06/27/24 67.9 kg (149 lb 11.2 oz)   06/20/24 67.1 kg (148 lb)   06/14/24 69 kg (152 lb 3.2 oz)   04/03/24 69.1 kg (152 lb 6.4 oz)   02/08/24 68.4 kg (150 lb 11.2 oz)   12/22/23 67.7 kg (149 lb 3.2 oz)   12/07/23 69.7 kg (153 lb 11.2 oz)   12/04/23 68.1 kg (150 lb 1.6 oz)   10/23/23 68.9 kg (151 lb 12.8 oz)   09/11/23 72.4 kg (159 lb 9.6 oz)             Katheryn Acevedo is a 73 year old, presenting for the following health issues:  Weight Loss (/)        6/27/2024     8:58 AM   Additional Questions   Roomed by Cristal Iqbal CMA   Accompanied by  Jerrod     Last visit recommendations  ,  Patient Instructions   Melatonin 10 mg at 10:30 pm.  Have Bambi turn off light at 11:30.  You enjoy people! You are a people person and so it is good to be awake during  the day to be with people.  Have a reminder art book  Repeat visit in 3 months      -She is here to follow up on weigh loss and sleep issues. She says things are about the same  "as last time when she was in.    -Wants to discuss dosage of Prozac and is also needing refills.    History of Present Illness       Reason for visit:  Follow up    She eats 0-1 servings of fruits and vegetables daily.She consumes 0 sweetened beverage(s) daily.She exercises with enough effort to increase her heart rate 9 or less minutes per day.  She exercises with enough effort to increase her heart rate 3 or less days per week.   She is taking medications regularly.       Review of Systems  Constitutional, HEENT, cardiovascular, pulmonary, gi and gu systems are negative, except as otherwise noted.      Objective    /70   Pulse 68   Temp 97.6  F (36.4  C) (Tympanic)   Resp 16   Ht 1.676 m (5' 6\")   Wt 67.9 kg (149 lb 11.2 oz)   SpO2 98%   BMI 24.16 kg/m    Body mass index is 24.16 kg/m .  Physical Exam   GENERAL: alert and no distress  NECK: no adenopathy, no asymmetry, masses, or scars  RESP: lungs clear to auscultation - no rales, rhonchi or wheezes  CV: regular rate and rhythm, normal S1 S2, no S3 or S4, no murmur, click or rub, no peripheral edema  ABDOMEN: soft, nontender, no hepatosplenomegaly, no masses and bowel sounds normal  MS: no gross musculoskeletal defects noted, no edema            Signed Electronically by: Rolo Sandoval MD    "

## 2024-06-29 ENCOUNTER — APPOINTMENT (OUTPATIENT)
Dept: CT IMAGING | Facility: CLINIC | Age: 74
End: 2024-06-29
Attending: EMERGENCY MEDICINE
Payer: COMMERCIAL

## 2024-06-29 ENCOUNTER — HOSPITAL ENCOUNTER (OUTPATIENT)
Facility: CLINIC | Age: 74
Setting detail: OBSERVATION
Discharge: HOME | End: 2024-07-01
Attending: EMERGENCY MEDICINE | Admitting: INTERNAL MEDICINE
Payer: COMMERCIAL

## 2024-06-29 ENCOUNTER — APPOINTMENT (OUTPATIENT)
Dept: GENERAL RADIOLOGY | Facility: CLINIC | Age: 74
End: 2024-06-29
Attending: EMERGENCY MEDICINE
Payer: COMMERCIAL

## 2024-06-29 DIAGNOSIS — R41.89 COGNITIVE IMPAIRMENT: ICD-10-CM
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
## 2024-06-29 DIAGNOSIS — I60.9 SUBARACHNOID HEMORRHAGE (H): Primary | ICD-10-CM

## 2024-06-29 DIAGNOSIS — R26.81 GAIT INSTABILITY: ICD-10-CM

## 2024-06-29 LAB
ANION GAP SERPL CALCULATED.3IONS-SCNC: 10 MMOL/L (ref 7–15)
BASOPHILS # BLD AUTO: 0 10E3/UL (ref 0–0.2)
BASOPHILS NFR BLD AUTO: 0 %
BUN SERPL-MCNC: 21.5 MG/DL (ref 8–23)
CALCIUM SERPL-MCNC: 8.9 MG/DL (ref 8.8–10.2)
CHLORIDE SERPL-SCNC: 103 MMOL/L (ref 98–107)
CREAT SERPL-MCNC: 0.48 MG/DL (ref 0.51–0.95)
DEPRECATED HCO3 PLAS-SCNC: 23 MMOL/L (ref 22–29)
EGFRCR SERPLBLD CKD-EPI 2021: >90 ML/MIN/1.73M2
EOSINOPHIL # BLD AUTO: 0.1 10E3/UL (ref 0–0.7)
EOSINOPHIL NFR BLD AUTO: 1 %
ERYTHROCYTE [DISTWIDTH] IN BLOOD BY AUTOMATED COUNT: 14 % (ref 10–15)
GLUCOSE SERPL-MCNC: 73 MG/DL (ref 70–99)
HCT VFR BLD AUTO: 39.2 % (ref 35–47)
HGB BLD-MCNC: 13.2 G/DL (ref 11.7–15.7)
IMM GRANULOCYTES # BLD: 0 10E3/UL
IMM GRANULOCYTES NFR BLD: 1 %
LYMPHOCYTES # BLD AUTO: 1.4 10E3/UL (ref 0.8–5.3)
LYMPHOCYTES NFR BLD AUTO: 21 %
MCH RBC QN AUTO: 32.4 PG (ref 26.5–33)
MCHC RBC AUTO-ENTMCNC: 33.7 G/DL (ref 31.5–36.5)
MCV RBC AUTO: 96 FL (ref 78–100)
MONOCYTES # BLD AUTO: 0.7 10E3/UL (ref 0–1.3)
MONOCYTES NFR BLD AUTO: 10 %
NEUTROPHILS # BLD AUTO: 4.6 10E3/UL (ref 1.6–8.3)
NEUTROPHILS NFR BLD AUTO: 67 %
NRBC # BLD AUTO: 0 10E3/UL
NRBC BLD AUTO-RTO: 0 /100
PLATELET # BLD AUTO: 228 10E3/UL (ref 150–450)
POTASSIUM SERPL-SCNC: 4.8 MMOL/L (ref 3.4–5.3)
RBC # BLD AUTO: 4.07 10E6/UL (ref 3.8–5.2)
SODIUM SERPL-SCNC: 136 MMOL/L (ref 135–145)
WBC # BLD AUTO: 6.9 10E3/UL (ref 4–11)

## 2024-06-29 PROCEDURE — 72125 CT NECK SPINE W/O DYE: CPT

## 2024-06-29 PROCEDURE — 99285 EMERGENCY DEPT VISIT HI MDM: CPT | Mod: 25 | Performed by: EMERGENCY MEDICINE

## 2024-06-29 PROCEDURE — 70450 CT HEAD/BRAIN W/O DYE: CPT

## 2024-06-29 PROCEDURE — 99285 EMERGENCY DEPT VISIT HI MDM: CPT | Performed by: EMERGENCY MEDICINE

## 2024-06-29 PROCEDURE — 36415 COLL VENOUS BLD VENIPUNCTURE: CPT | Performed by: EMERGENCY MEDICINE

## 2024-06-29 PROCEDURE — 73522 X-RAY EXAM HIPS BI 3-4 VIEWS: CPT

## 2024-06-29 PROCEDURE — 85025 COMPLETE CBC W/AUTO DIFF WBC: CPT | Performed by: EMERGENCY MEDICINE

## 2024-06-29 PROCEDURE — 80048 BASIC METABOLIC PNL TOTAL CA: CPT | Performed by: EMERGENCY MEDICINE

## 2024-06-29 ASSESSMENT — ENCOUNTER SYMPTOMS
SLEEP DISTURBANCE: 1
CHILLS: 0
NECK PAIN: 1
CHEST TIGHTNESS: 0
ABDOMINAL PAIN: 0
NAUSEA: 0
BACK PAIN: 0
DYSPHORIC MOOD: 1
HEADACHES: 0
APPETITE CHANGE: 0
FEVER: 0
COUGH: 0
SEIZURES: 0
SHORTNESS OF BREATH: 0
WOUND: 0
AGITATION: 1
VOMITING: 0
CONFUSION: 1
WEAKNESS: 1
FATIGUE: 0

## 2024-06-29 ASSESSMENT — COLUMBIA-SUICIDE SEVERITY RATING SCALE - C-SSRS
2. HAVE YOU ACTUALLY HAD ANY THOUGHTS OF KILLING YOURSELF IN THE PAST MONTH?: NO
6. HAVE YOU EVER DONE ANYTHING, STARTED TO DO ANYTHING, OR PREPARED TO DO ANYTHING TO END YOUR LIFE?: NO
1. IN THE PAST MONTH, HAVE YOU WISHED YOU WERE DEAD OR WISHED YOU COULD GO TO SLEEP AND NOT WAKE UP?: NO

## 2024-06-29 ASSESSMENT — ACTIVITIES OF DAILY LIVING (ADL): ADLS_ACUITY_SCORE: 34

## 2024-06-30 ENCOUNTER — APPOINTMENT (OUTPATIENT)
Dept: PHYSICAL THERAPY | Facility: CLINIC | Age: 74
End: 2024-06-30
Payer: COMMERCIAL

## 2024-06-30 ENCOUNTER — APPOINTMENT (OUTPATIENT)
Dept: CT IMAGING | Facility: CLINIC | Age: 74
End: 2024-06-30
Attending: EMERGENCY MEDICINE
Payer: COMMERCIAL

## 2024-06-30 PROBLEM — R29.6 FREQUENT FALLS: Status: ACTIVE | Noted: 2022-09-12
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
## 2024-06-30 PROBLEM — I60.9 SUBARACHNOID HEMORRHAGE (H): Status: ACTIVE | Noted: 2024-06-30

## 2024-06-30 LAB
ANION GAP SERPL CALCULATED.3IONS-SCNC: 9 MMOL/L (ref 7–15)
BUN SERPL-MCNC: 15.8 MG/DL (ref 8–23)
CALCIUM SERPL-MCNC: 8.9 MG/DL (ref 8.8–10.2)
CHLORIDE SERPL-SCNC: 104 MMOL/L (ref 98–107)
CREAT SERPL-MCNC: 0.53 MG/DL (ref 0.51–0.95)
DEPRECATED HCO3 PLAS-SCNC: 27 MMOL/L (ref 22–29)
EGFRCR SERPLBLD CKD-EPI 2021: >90 ML/MIN/1.73M2
ERYTHROCYTE [DISTWIDTH] IN BLOOD BY AUTOMATED COUNT: 14 % (ref 10–15)
GLUCOSE SERPL-MCNC: 95 MG/DL (ref 70–99)
HCT VFR BLD AUTO: 41 % (ref 35–47)
HGB BLD-MCNC: 13.8 G/DL (ref 11.7–15.7)
INR PPP: 1.01 (ref 0.85–1.15)
MCH RBC QN AUTO: 32.5 PG (ref 26.5–33)
MCHC RBC AUTO-ENTMCNC: 33.7 G/DL (ref 31.5–36.5)
MCV RBC AUTO: 97 FL (ref 78–100)
PLATELET # BLD AUTO: 242 10E3/UL (ref 150–450)
POTASSIUM SERPL-SCNC: 4 MMOL/L (ref 3.4–5.3)
RBC # BLD AUTO: 4.25 10E6/UL (ref 3.8–5.2)
SODIUM SERPL-SCNC: 140 MMOL/L (ref 135–145)
WBC # BLD AUTO: 6.6 10E3/UL (ref 4–11)

## 2024-06-30 PROCEDURE — 99222 1ST HOSP IP/OBS MODERATE 55: CPT | Performed by: INTERNAL MEDICINE

## 2024-06-30 PROCEDURE — 80048 BASIC METABOLIC PNL TOTAL CA: CPT | Performed by: INTERNAL MEDICINE

## 2024-06-30 PROCEDURE — 250N000013 HC RX MED GY IP 250 OP 250 PS 637: Performed by: INTERNAL MEDICINE

## 2024-06-30 PROCEDURE — G0378 HOSPITAL OBSERVATION PER HR: HCPCS

## 2024-06-30 PROCEDURE — 70450 CT HEAD/BRAIN W/O DYE: CPT

## 2024-06-30 PROCEDURE — 85610 PROTHROMBIN TIME: CPT | Performed by: INTERNAL MEDICINE

## 2024-06-30 PROCEDURE — 85027 COMPLETE CBC AUTOMATED: CPT | Performed by: INTERNAL MEDICINE

## 2024-06-30 PROCEDURE — 250N000012 HC RX MED GY IP 250 OP 636 PS 637: Performed by: INTERNAL MEDICINE

## 2024-06-30 PROCEDURE — 36415 COLL VENOUS BLD VENIPUNCTURE: CPT | Performed by: INTERNAL MEDICINE

## 2024-06-30 PROCEDURE — 99207 PR APP CREDIT; MD BILLING SHARED VISIT: CPT | Performed by: INTERNAL MEDICINE

## 2024-06-30 PROCEDURE — 97116 GAIT TRAINING THERAPY: CPT | Mod: GP | Performed by: PHYSICAL THERAPIST

## 2024-06-30 PROCEDURE — 97161 PT EVAL LOW COMPLEX 20 MIN: CPT | Mod: GP | Performed by: PHYSICAL THERAPIST

## 2024-06-30 RX ORDER — PRAVASTATIN SODIUM 20 MG
40 TABLET ORAL AT BEDTIME
Status: DISCONTINUED | OUTPATIENT
Start: 2024-06-30 | End: 2024-07-01 | Stop reason: HOSPADM

## 2024-06-30 RX ORDER — AMOXICILLIN 250 MG
2 CAPSULE ORAL 2 TIMES DAILY PRN
Status: DISCONTINUED | OUTPATIENT
Start: 2024-06-30 | End: 2024-07-01 | Stop reason: HOSPADM

## 2024-06-30 RX ORDER — HYOSCYAMINE SULFATE 0.125 MG
125 TABLET ORAL DAILY
Status: DISCONTINUED | OUTPATIENT
Start: 2024-06-30 | End: 2024-06-30

## 2024-06-30 RX ORDER — ACETAMINOPHEN 325 MG/1
650 TABLET ORAL EVERY 4 HOURS PRN
Status: DISCONTINUED | OUTPATIENT
Start: 2024-06-30 | End: 2024-07-01 | Stop reason: HOSPADM

## 2024-06-30 RX ORDER — RIVASTIGMINE 9.5 MG/24H
1 PATCH, EXTENDED RELEASE TRANSDERMAL DAILY
Status: DISCONTINUED | OUTPATIENT
Start: 2024-06-30 | End: 2024-07-01 | Stop reason: HOSPADM

## 2024-06-30 RX ORDER — LEVETIRACETAM 500 MG/1
500 TABLET ORAL 2 TIMES DAILY
Status: DISCONTINUED | OUTPATIENT
Start: 2024-06-30 | End: 2024-07-01 | Stop reason: HOSPADM

## 2024-06-30 RX ORDER — ONDANSETRON 4 MG/1
4 TABLET, ORALLY DISINTEGRATING ORAL EVERY 6 HOURS PRN
Status: DISCONTINUED | OUTPATIENT
Start: 2024-06-30 | End: 2024-07-01 | Stop reason: HOSPADM

## 2024-06-30 RX ORDER — PREDNISONE 5 MG/1
5 TABLET ORAL DAILY
Status: DISCONTINUED | OUTPATIENT
Start: 2024-06-30 | End: 2024-07-01 | Stop reason: HOSPADM

## 2024-06-30 RX ORDER — LABETALOL HYDROCHLORIDE 5 MG/ML
20 INJECTION, SOLUTION INTRAVENOUS EVERY 6 HOURS PRN
Status: DISCONTINUED | OUTPATIENT
Start: 2024-06-30 | End: 2024-07-01 | Stop reason: HOSPADM

## 2024-06-30 RX ORDER — AMOXICILLIN 250 MG
1 CAPSULE ORAL 2 TIMES DAILY PRN
Status: DISCONTINUED | OUTPATIENT
Start: 2024-06-30 | End: 2024-07-01 | Stop reason: HOSPADM

## 2024-06-30 RX ORDER — ALBUTEROL SULFATE 90 UG/1
2 AEROSOL, METERED RESPIRATORY (INHALATION) EVERY 6 HOURS PRN
Status: DISCONTINUED | OUTPATIENT
Start: 2024-06-30 | End: 2024-07-01 | Stop reason: HOSPADM

## 2024-06-30 RX ORDER — GUAIFENESIN 600 MG/1
TABLET, EXTENDED RELEASE ORAL DAILY
Status: DISCONTINUED | OUTPATIENT
Start: 2024-06-30 | End: 2024-07-01 | Stop reason: HOSPADM

## 2024-06-30 RX ORDER — PREDNISONE 5 MG/1
1 TABLET ORAL DAILY
COMMUNITY
End: 2024-07-08

## 2024-06-30 RX ORDER — ONDANSETRON 2 MG/ML
4 INJECTION INTRAMUSCULAR; INTRAVENOUS EVERY 6 HOURS PRN
Status: DISCONTINUED | OUTPATIENT
Start: 2024-06-30 | End: 2024-07-01 | Stop reason: HOSPADM

## 2024-06-30 RX ORDER — AZATHIOPRINE 50 MG/1
100 TABLET ORAL 2 TIMES DAILY
Status: DISCONTINUED | OUTPATIENT
Start: 2024-06-30 | End: 2024-07-01 | Stop reason: HOSPADM

## 2024-06-30 RX ORDER — BUPROPION HYDROCHLORIDE 300 MG/1
300 TABLET ORAL EVERY MORNING
Status: DISCONTINUED | OUTPATIENT
Start: 2024-06-30 | End: 2024-07-01 | Stop reason: HOSPADM

## 2024-06-30 RX ORDER — PYRIDOSTIGMINE BROMIDE 60 MG/1
60 TABLET ORAL 3 TIMES DAILY
Status: DISCONTINUED | OUTPATIENT
Start: 2024-06-30 | End: 2024-07-01 | Stop reason: HOSPADM

## 2024-06-30 RX ADMIN — BUPROPION HYDROCHLORIDE 300 MG: 300 TABLET, EXTENDED RELEASE ORAL at 11:10

## 2024-06-30 RX ADMIN — PYRIDOSTIGMINE BROMIDE 60 MG: 60 TABLET ORAL at 11:09

## 2024-06-30 RX ADMIN — RIVASTIGMINE 1 PATCH: 9.5 PATCH, EXTENDED RELEASE TRANSDERMAL at 13:03

## 2024-06-30 RX ADMIN — FLUOXETINE 40 MG: 20 CAPSULE ORAL at 11:10

## 2024-06-30 RX ADMIN — ACETAMINOPHEN 650 MG: 325 TABLET, FILM COATED ORAL at 18:26

## 2024-06-30 RX ADMIN — AZATHIOPRINE 100 MG: 50 TABLET ORAL at 11:10

## 2024-06-30 RX ADMIN — LEVETIRACETAM 500 MG: 500 TABLET, FILM COATED ORAL at 20:01

## 2024-06-30 RX ADMIN — Medication 15 ML: at 11:10

## 2024-06-30 RX ADMIN — LEVETIRACETAM 500 MG: 500 TABLET, FILM COATED ORAL at 08:37

## 2024-06-30 RX ADMIN — ACETAMINOPHEN 650 MG: 325 TABLET, FILM COATED ORAL at 08:40

## 2024-06-30 RX ADMIN — PYRIDOSTIGMINE BROMIDE 60 MG: 60 TABLET ORAL at 13:22

## 2024-06-30 RX ADMIN — AZATHIOPRINE 100 MG: 50 TABLET ORAL at 20:01

## 2024-06-30 RX ADMIN — PYRIDOSTIGMINE BROMIDE 60 MG: 60 TABLET ORAL at 20:01

## 2024-06-30 RX ADMIN — PRAVASTATIN SODIUM 40 MG: 20 TABLET ORAL at 21:54

## 2024-06-30 ASSESSMENT — ACTIVITIES OF DAILY LIVING (ADL)
ADLS_ACUITY_SCORE: 26
ADLS_ACUITY_SCORE: 36
ADLS_ACUITY_SCORE: 26
ADLS_ACUITY_SCORE: 26
ADLS_ACUITY_SCORE: 36
ADLS_ACUITY_SCORE: 26
ADLS_ACUITY_SCORE: 26
ADLS_ACUITY_SCORE: 36
ADLS_ACUITY_SCORE: 26
ADLS_ACUITY_SCORE: 38
ADLS_ACUITY_SCORE: 26
ADLS_ACUITY_SCORE: 26
DEPENDENT_IADLS:: TRANSPORTATION
ADLS_ACUITY_SCORE: 26
ADLS_ACUITY_SCORE: 26
ADLS_ACUITY_SCORE: 38
ADLS_ACUITY_SCORE: 26

## 2024-06-30 NOTE — PLAN OF CARE
Problem: Adult Inpatient Plan of Care  Goal: Plan of Care Review  Description: The Plan of Care Review/Shift note should be completed every shift.  The Outcome Evaluation is a brief statement about your assessment that the patient is improving, declining, or no change.  This information will be displayed automatically on your shift  note.  Flowsheets (Taken 6/30/2024 0654)  Plan of Care Reviewed With: patient  Overall Patient Progress: no change  Goal: Absence of Hospital-Acquired Illness or Injury  Intervention: Identify and Manage Fall Risk  Recent Flowsheet Documentation  Taken 6/30/2024 0324 by Maria Dolores Vasquez RN  Safety Promotion/Fall Prevention:   activity supervised   assistive device/personal items within reach   clutter free environment maintained   increased rounding and observation   increase visualization of patient   lighting adjusted   mobility aid in reach   nonskid shoes/slippers when out of bed   room door open   room near nurse's station  Intervention: Prevent Skin Injury  Recent Flowsheet Documentation  Taken 6/30/2024 0324 by Maria Dolores Vasquez, RN  Device Skin Pressure Protection: absorbent pad utilized/changed  Intervention: Prevent Infection  Recent Flowsheet Documentation  Taken 6/30/2024 0324 by Maria Dolores Vasquez, RN  Infection Prevention:   hand hygiene promoted   rest/sleep promoted   Goal Outcome Evaluation:      Plan of Care Reviewed With: patient    Overall Patient Progress: no changeOverall Patient Progress: no change

## 2024-06-30 NOTE — PLAN OF CARE
"Goal Outcome Evaluation:      Plan of Care Reviewed With: patient, spouse    Overall Patient Progress: no changeOverall Patient Progress: no change       Problem: Adult Inpatient Plan of Care  Goal: Plan of Care Review  Description: The Plan of Care Review/Shift note should be completed every shift.  The Outcome Evaluation is a brief statement about your assessment that the patient is improving, declining, or no change.  This information will be displayed automatically on your shift  note.  Outcome: Progressing  Flowsheets (Taken 6/30/2024 1506)  Plan of Care Reviewed With:   patient   spouse  Overall Patient Progress: no change  Goal: Patient-Specific Goal (Individualized)  Description: You can add care plan individualizations to a care plan. Examples of Individualization might be:  \"Parent requests to be called daily at 9am for status\", \"I have a hard time hearing out of my right ear\", or \"Do not touch me to wake me up as it startles  me\".  Outcome: Progressing  Goal: Absence of Hospital-Acquired Illness or Injury  Outcome: Progressing  Intervention: Identify and Manage Fall Risk  Recent Flowsheet Documentation  Taken 6/30/2024 0837 by Leana Sarkar RN  Safety Promotion/Fall Prevention:   activity supervised   assistive device/personal items within reach   nonskid shoes/slippers when out of bed   room near nurse's station   supervised activity  Intervention: Prevent Skin Injury  Recent Flowsheet Documentation  Taken 6/30/2024 0837 by Leana Sarkar RN  Body Position:   position changed independently   weight shifting  Intervention: Prevent Infection  Recent Flowsheet Documentation  Taken 6/30/2024 0837 by Leana Sarkar RN  Infection Prevention: single patient room provided  Goal: Optimal Comfort and Wellbeing  Outcome: Progressing  Goal: Readiness for Transition of Care  Outcome: Progressing   Patient alert to self and situation only. Ambulated to the bathroom walker gait belt, patient " c/o lightheadedness at times. Up in chair for all meal. Forgetful at times.

## 2024-06-30 NOTE — H&P
[facility]    History and Physical - Hospitalist Service       Date of Admission:  6/29/2024    Assessment & Plan      Albina Pedro is a 73 year old female admitted on 6/29/2024. She presented with frequent falls and she has a small subarachnoid hemorrhage.     Frequent Falls  Subarachnoid Hemorrhage  She has had multiple falls along with gait instability over the last few months. She fell twice yesterday and hit the back of her head. Here she was found to have a small temporal lobe subarachnoid hemorrhage. Neurosurgery was called and recommended a follow up CT head.   - observation status  - f/up CT head  - neuro checks  - PT/OT     Myasthenia Gravis  - c/w rivastigmine once confirmed    CAD  - c/w aspirin and pravastatin once confirmed    Sjogren Syndrome  - c/w prednisone once confirmed  - c/w azathiaprine once confirmed    Depression  Anxiety  - c/w bupropion and fluoxetine once confirmed  - c/w gabapentin once confirmed         Diet: Combination Diet Low Saturated Fat Na <2400mg Diet    DVT Prophylaxis: Pneumatic Compression Devices  Corrigan Catheter: Not present  Lines: None     Code Status: Full Code      Clinically Significant Risk Factors Present on Admission                # Drug Induced Platelet Defect: home medication list includes an antiplatelet medication     # Dementia: noted on problem list                      Disposition Plan      Expected Discharge Date: 07/01/2024                The patient's care was discussed with the Bedside Nurse and Patient.        Prem Lopez MD  [facility]  Securely message with the Vocera Web Console (learn more here)  Text page via [facility] Paging/Directory      Visit/Communication Style   Virtual (Video) communication was used to evaluate Albina.  Albina consented to the use of video communication: yes  Video START time: 0430, 6/30/2024  Video STOP time: 0445, 6/30/2024   Patient's location: [facility]   Provider's location during the visit: remote [facility] site        ______________________________________________________________________    Chief Complaint   Frequent falls    History is obtained from the patient    History of Present Illness   Albina Pedro is a 73 year old female who presented to the ED after she had two falls today. She states that she started to have issues with falling about a year ago. Since then she has episodes lightheadedness with postural change. It is not every time, and she thinks that she can normally walk without any assistance, but once in a while she becomes lightheaded and can fall. Yesterday, she fell twice and one time it happened as she was standing up from her chair and she fell backwards hitting the back of her head. She did not lose consciousness, and she has a headache. She denies any chest pain, palpitations, N/V/D/C, abdominal pain, SOB, cough, fevers or chills.   Her  is concerned for his wife's worsening dementia and weakness/lightheadedness.     Review of Systems    General: negative for fever, chills, sweats, weakness  Eyes: negative for blurred vision, loss of vision  Ear Nose and Throat: negative for pharyngitis, speech or swallowing difficulties  Respiratory:  negative for sputum production, wheezing, CHAVARRIA, pleuritic pain, sob or cough  Cardiology:  negative for chest pain, palpitations, orthopnea, PND, edema, syncope   Gastrointestinal: negative for abdominal pain, nausea, vomiting, diarrhea, constipation, hematemesis, melena or hematochezia  Genitourinary: negative for frequency, urgency, dysuria, hematuria   Neurological: Positive for falls, negative for focal weakness, paresthesia    Past Medical History    I have reviewed this patient's medical history and updated it with pertinent information if needed.   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       Past Surgical History   I have reviewed this patient's surgical history and updated it with pertinent information if needed.  Past Surgical History:   Procedure Laterality Date    BIOPSY MUSCLE DIAGNOSTIC (LOCATION) Left 2022    Procedure: BIOPSY, MUSCLE LEFT biceps @0900;  Surgeon: Tyrell Loo MD;  Location: [facility] OR    C/SECTION, LOW TRANSVERSE  10/13/1978    , Low Transverse    COLONOSCOPY      OPEN REDUCTION INTERNAL FIXATION ANKLE Right 2019    Procedure: Open reduction internal fixation right lateral malleolus fracture;  Surgeon: Rolo Oconnor DPM;  Location: [facility] OR    SURGICAL HISTORY OF -           SURGICAL HISTORY OF -   00    Colonoscopy       Social History   I have reviewed this patient's social history and updated it with pertinent information if needed.  Social History     Tobacco Use    Smoking status: Never     Passive exposure: Never    Smokeless tobacco: Never   Substance Use Topics    Alcohol use: Yes     Alcohol/week: 0.0 standard drinks of alcohol     Comment: 1 glass of wine every 2 weeks    Drug use: No       Family History   I have reviewed this patient's family history and updated it with pertinent information if needed.  Family History   Problem Relation Age of Onset    Breast Cancer Mother     Cancer Mother         Breast and liver- age 43    Cerebrovascular Disease Father     Heart Failure Father     Coronary Artery Disease Father     Alzheimer Disease Father     Hypertension Father     Diabetes Maternal Grandmother     Breast Cancer Maternal Aunt     Breast Cancer Paternal Aunt     Cancer - colorectal No family hx of     Prostate Cancer No family hx of        Prior to  Admission Medications   Prior to Admission Medications   Prescriptions Last Dose Informant Patient Reported? Taking?   Calcium-Magnesium-Zinc 500-250-12.5 MG TABS   Yes No   Sig: Take 1 tablet by mouth daily   FLUoxetine (PROZAC) 40 MG capsule   No No   Sig: Take 1 capsule (40 mg) by mouth daily   LORazepam (ATIVAN) 0.5 MG tablet   No No   Sig: TAKE HALF TO ONE TABLET BY MOUTH THREE TO FIVE TIMES A WEEK AS NEEDED.   Multiple Vitamins-Minerals (MULTIVITAMIN & MINERAL PO)  Self Yes No   Sig: Take 1 tablet by mouth daily    acetaminophen (TYLENOL) 500 MG tablet   Yes No   Sig: Take 1,000 mg by mouth every 6 hours as needed for mild pain   albuterol (PROAIR HFA/PROVENTIL HFA/VENTOLIN HFA) 108 (90 Base) MCG/ACT inhaler   No No   Sig: INHALE TWO PUFFS BY MOUTH EVERY FOUR HOURS AS NEEDED FOR SHORTNESS OF BREATH/DYSPNEA   aspirin (ASA) 81 MG EC tablet   No Yes   Sig: Take 1 tablet (81 mg) by mouth daily   azaTHIOprine (IMURAN) 50 MG tablet   No No   Sig: TAKE TWO TABLETS BY MOUTH TWICE DAILY   buPROPion (WELLBUTRIN XL) 150 MG 24 hr tablet   No No   Sig: Take 2 tablets (300 mg) by mouth every morning   gabapentin (NEURONTIN) 100 MG capsule   No No   Si capsule three times a day for 1 week, increase to 2 capsules three times a day on week 2 if pain continues   Patient not taking: Reported on 2024   hyoscyamine (LEVSIN) 0.125 MG tablet   No No   Sig: TAKE 1 TABLET BY MOUTH EVERY EIGHT HOURS 30 MINUTES BEFORE EACH MESTINON.   pravastatin (PRAVACHOL) 40 MG tablet   No No   Sig: TAKE ONE TABLET BY MOUTH ONE TIME DAILY   predniSONE (DELTASONE) 5 MG tablet   No No   Sig: TAKE ONE TABLET BY MOUTH ONCE DAILY FOR 3 WEEKS   pyRIDostigmine (MESTINON) 60 MG tablet   No No   Sig: TAKE ONE TABLET BY MOUTH THREE TIMES DAILY   rivastigmine (EXELON) 9.5 MG/24HR 24 hr patch   No Yes   Sig: Place 1 patch onto the skin daily   sodium fluoride dental gel (PREVIDENT) 1.1 % GEL topical gel   Yes No   Patient not taking: Reported on  6/27/2024      Facility-Administered Medications: None     Allergies   Allergies   Allergen Reactions    Daypro [Oxaprozin] Rash           Lidocaine Other (See Comments)     Rapid heart rate    Nitroglycerin Other (See Comments)     Causes low blood pressure    Penicillins Unknown     Occurred as child.    Sulfa Antibiotics Rash       Physical Exam   Vital Signs: Temp: 97.7  F (36.5  C) Temp src: Oral BP: (!) 167/60 Pulse: 65   Resp: 20 SpO2: 100 % O2 Device: None (Room air)    Weight: 147 lbs 0 oz    Gen:  Well-developed, well-nourished, in no acute distress, lying semi-supine in hospital stretcher  HEENT: Occipital swelling noted. Anicteric sclera, PER, hearing intact to voice  Resp:  No accessory muscle use, breath sounds clear; no wheezes no rales no rhonchi  Card:  No murmur, normal S1, S2   Abd:  Soft per RN exam, no TTP, non-distended, normoactive bowel sounds are present  Musc:  Normal strength and movement of the major muscle groups without obvious deformity  Psych:  Good insight, oriented to person, place and time, not anxious, not agitated    Data     Recent Labs   Lab 06/29/24  2302   WBC 6.9   HGB 13.2   MCV 96         POTASSIUM 4.8   CHLORIDE 103   CO2 23   BUN 21.5   CR 0.48*   ANIONGAP 10   SARI 8.9   GLC 73         Recent Results (from the past 24 hour(s))   XR Pelvis and Hip Bilateral 2 Views    Narrative    EXAM: XR PELVIS AND HIP BILATERAL 2 VIEWS  LOCATION: [facility]  DATE: 6/30/2024    INDICATION: falls, hip pain  COMPARISON: X-rays 9/9/2022      Impression    IMPRESSION: No acute bony finding such as fracture or dislocation. Advanced degenerative changes most marked in the SI joints and the lower lumbar spine. Pelvic phleboliths.   Head CT w/o contrast    Narrative    EXAM: CT HEAD W/O CONTRAST, CT CERVICAL SPINE W/O CONTRAST  LOCATION: [facility]  DATE: 6/30/2024    INDICATION: fall, head injury and neck injury,  COMPARISON:  2/2/2023  TECHNIQUE:   1) Routine CT Head without IV contrast. Multiplanar reformats. Dose reduction techniques were used.  2) Routine CT Cervical Spine without IV contrast. Multiplanar reformats. Dose reduction techniques were used.    FINDINGS:   HEAD CT:   INTRACRANIAL CONTENTS: Small volume acute subarachnoid hemorrhage in the posterior left temporal lobe. No CT evidence of acute infarct. Mild presumed chronic small vessel ischemic changes. Mild generalized volume loss. No hydrocephalus.     VISUALIZED ORBITS/SINUSES/MASTOIDS: No intraorbital abnormality. No paranasal sinus mucosal disease. No middle ear or mastoid effusion.    BONES/SOFT TISSUES: Left frontal and right parietal scalp hematomas. No fracture.    CERVICAL SPINE CT:   VERTEBRA: Vertebral body height is normal. Slight anterolisthesis of C4. No fracture or posttraumatic subluxation.     CANAL/FORAMINA: No high-grade central canal stenosis. Multilevel neural foraminal narrowing.    PARASPINAL: No extraspinal abnormality. Visualized lung fields are clear.      Impression    IMPRESSION:  HEAD CT:  1.  Small volume acute subarachnoid hemorrhage in the posterior left temporal lobe.  2.  Left frontal and right parietal scalp hematomas. No fracture.    CERVICAL SPINE CT:  No CT evidence for acute fracture or post traumatic subluxation.     CT Cervical Spine w/o Contrast    Narrative    EXAM: CT HEAD W/O CONTRAST, CT CERVICAL SPINE W/O CONTRAST  LOCATION: [facility]  DATE: 6/30/2024    INDICATION: fall, head injury and neck injury,  COMPARISON: 2/2/2023  TECHNIQUE:   1) Routine CT Head without IV contrast. Multiplanar reformats. Dose reduction techniques were used.  2) Routine CT Cervical Spine without IV contrast. Multiplanar reformats. Dose reduction techniques were used.    FINDINGS:   HEAD CT:   INTRACRANIAL CONTENTS: Small volume acute subarachnoid hemorrhage in the posterior left temporal lobe. No CT evidence of acute  infarct. Mild presumed chronic small vessel ischemic changes. Mild generalized volume loss. No hydrocephalus.     VISUALIZED ORBITS/SINUSES/MASTOIDS: No intraorbital abnormality. No paranasal sinus mucosal disease. No middle ear or mastoid effusion.    BONES/SOFT TISSUES: Left frontal and right parietal scalp hematomas. No fracture.    CERVICAL SPINE CT:   VERTEBRA: Vertebral body height is normal. Slight anterolisthesis of C4. No fracture or posttraumatic subluxation.     CANAL/FORAMINA: No high-grade central canal stenosis. Multilevel neural foraminal narrowing.    PARASPINAL: No extraspinal abnormality. Visualized lung fields are clear.      Impression    IMPRESSION:  HEAD CT:  1.  Small volume acute subarachnoid hemorrhage in the posterior left temporal lobe.  2.  Left frontal and right parietal scalp hematomas. No fracture.    CERVICAL SPINE CT:  No CT evidence for acute fracture or post traumatic subluxation.

## 2024-06-30 NOTE — ED NOTES
"[facility]   Admission Handoff    The patient is Albina Pedro, 73 year old who arrived in the ED by CAR from home with a complaint of Fall  . The patient's current symptoms are an exacerbation of a chronic illness and during this time the symptoms have remained the same. In the ED, patient was diagnosed with   Final diagnoses:   Fall, initial encounter   Subarachnoid hemorrhage (H)   Gait instability   Cognitive impairment         Needed?: No    Allergies:    Allergies   Allergen Reactions    Daypro [Oxaprozin] Rash           Lidocaine Other (See Comments)     Rapid heart rate    Nitroglycerin Other (See Comments)     Causes low blood pressure    Penicillins Unknown     Occurred as child.    Sulfa Antibiotics Rash       Past Medical Hx:   Past Medical History:   Diagnosis Date    CAD (coronary artery disease)     ILD (interstitial lung disease) (H)     Other and unspecified hyperlipidemia     Sicca syndrome (H24)     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       Initial vitals were: BP: (!) 144/67  Pulse: 67  Temp: 98.1  F (36.7  C)  Resp: 18  Height: 167.6 cm (5' 6\")  Weight: 66.7 kg (147 lb)  SpO2: 98 %   Recent vital Signs: BP (!) 148/72   Pulse 65   Temp 98.1  F (36.7  C) (Oral)   Resp 18   Ht 1.676 m (5' 6\")   Wt 66.7 kg (147 lb)   SpO2 92%   BMI 23.73 kg/m      Elimination Status: Continent: Yes     Activity Level: SBA w/ walker    Fall Status: Reason for falls risk:  Mobility and Reason for falls risk: Cognition  bed/chair alarm on, nonskid shoes/slippers when out of bed, arm band in place, and patient and family education    Baseline Mental status: mild dementia  Current Mental Status changes: at basesline    Infection present or suspected this encounter: no  Sepsis suspected: No    Isolation type: None    Bariatric equipment needed?: No    In the ED these meds were given: " Medications - No data to display    Drips running?  No    Home pump  No    Current LDAs: None    Results:   Labs/Imaging  Ordered and Resulted from Time of ED Arrival Up to the Time of Departure from the ED  Results for orders placed or performed during the hospital encounter of 06/29/24 (from the past 24 hour(s))   CBC with platelets differential    Narrative    The following orders were created for panel order CBC with platelets differential.  Procedure                               Abnormality         Status                     ---------                               -----------         ------                     CBC with platelets and d...[[id number]]                      Final result                 Please view results for these tests on the individual orders.   Basic metabolic panel   Result Value Ref Range    Sodium 136 135 - 145 mmol/L    Potassium 4.8 3.4 - 5.3 mmol/L    Chloride 103 98 - 107 mmol/L    Carbon Dioxide (CO2) 23 22 - 29 mmol/L    Anion Gap 10 7 - 15 mmol/L    Urea Nitrogen 21.5 8.0 - 23.0 mg/dL    Creatinine 0.48 (L) 0.51 - 0.95 mg/dL    GFR Estimate >90 >60 mL/min/1.73m2    Calcium 8.9 8.8 - 10.2 mg/dL    Glucose 73 70 - 99 mg/dL   CBC with platelets and differential   Result Value Ref Range    WBC Count 6.9 4.0 - 11.0 10e3/uL    RBC Count 4.07 3.80 - 5.20 10e6/uL    Hemoglobin 13.2 11.7 - 15.7 g/dL    Hematocrit 39.2 35.0 - 47.0 %    MCV 96 78 - 100 fL    MCH 32.4 26.5 - 33.0 pg    MCHC 33.7 31.5 - 36.5 g/dL    RDW 14.0 10.0 - 15.0 %    Platelet Count 228 150 - 450 10e3/uL    % Neutrophils 67 %    % Lymphocytes 21 %    % Monocytes 10 %    % Eosinophils 1 %    % Basophils 0 %    % Immature Granulocytes 1 %    NRBCs per 100 WBC 0 <1 /100    Absolute Neutrophils 4.6 1.6 - 8.3 10e3/uL    Absolute Lymphocytes 1.4 0.8 - 5.3 10e3/uL    Absolute Monocytes 0.7 0.0 - 1.3 10e3/uL    Absolute Eosinophils 0.1 0.0 - 0.7 10e3/uL    Absolute Basophils 0.0 0.0 - 0.2 10e3/uL    Absolute Immature Granulocytes  0.0 <=0.4 10e3/uL    Absolute NRBCs 0.0 10e3/uL   XR Pelvis and Hip Bilateral 2 Views    Narrative    EXAM: XR PELVIS AND HIP BILATERAL 2 VIEWS  LOCATION: [facility]  DATE: 6/30/2024    INDICATION: falls, hip pain  COMPARISON: X-rays 9/9/2022      Impression    IMPRESSION: No acute bony finding such as fracture or dislocation. Advanced degenerative changes most marked in the SI joints and the lower lumbar spine. Pelvic phleboliths.   Head CT w/o contrast    Narrative    EXAM: CT HEAD W/O CONTRAST, CT CERVICAL SPINE W/O CONTRAST  LOCATION: [facility]  DATE: 6/30/2024    INDICATION: fall, head injury and neck injury,  COMPARISON: 2/2/2023  TECHNIQUE:   1) Routine CT Head without IV contrast. Multiplanar reformats. Dose reduction techniques were used.  2) Routine CT Cervical Spine without IV contrast. Multiplanar reformats. Dose reduction techniques were used.    FINDINGS:   HEAD CT:   INTRACRANIAL CONTENTS: Small volume acute subarachnoid hemorrhage in the posterior left temporal lobe. No CT evidence of acute infarct. Mild presumed chronic small vessel ischemic changes. Mild generalized volume loss. No hydrocephalus.     VISUALIZED ORBITS/SINUSES/MASTOIDS: No intraorbital abnormality. No paranasal sinus mucosal disease. No middle ear or mastoid effusion.    BONES/SOFT TISSUES: Left frontal and right parietal scalp hematomas. No fracture.    CERVICAL SPINE CT:   VERTEBRA: Vertebral body height is normal. Slight anterolisthesis of C4. No fracture or posttraumatic subluxation.     CANAL/FORAMINA: No high-grade central canal stenosis. Multilevel neural foraminal narrowing.    PARASPINAL: No extraspinal abnormality. Visualized lung fields are clear.      Impression    IMPRESSION:  HEAD CT:  1.  Small volume acute subarachnoid hemorrhage in the posterior left temporal lobe.  2.  Left frontal and right parietal scalp hematomas. No fracture.    CERVICAL SPINE CT:  No CT  evidence for acute fracture or post traumatic subluxation.     CT Cervical Spine w/o Contrast    Narrative    EXAM: CT HEAD W/O CONTRAST, CT CERVICAL SPINE W/O CONTRAST  LOCATION: [facility]  DATE: 6/30/2024    INDICATION: fall, head injury and neck injury,  COMPARISON: 2/2/2023  TECHNIQUE:   1) Routine CT Head without IV contrast. Multiplanar reformats. Dose reduction techniques were used.  2) Routine CT Cervical Spine without IV contrast. Multiplanar reformats. Dose reduction techniques were used.    FINDINGS:   HEAD CT:   INTRACRANIAL CONTENTS: Small volume acute subarachnoid hemorrhage in the posterior left temporal lobe. No CT evidence of acute infarct. Mild presumed chronic small vessel ischemic changes. Mild generalized volume loss. No hydrocephalus.     VISUALIZED ORBITS/SINUSES/MASTOIDS: No intraorbital abnormality. No paranasal sinus mucosal disease. No middle ear or mastoid effusion.    BONES/SOFT TISSUES: Left frontal and right parietal scalp hematomas. No fracture.    CERVICAL SPINE CT:   VERTEBRA: Vertebral body height is normal. Slight anterolisthesis of C4. No fracture or posttraumatic subluxation.     CANAL/FORAMINA: No high-grade central canal stenosis. Multilevel neural foraminal narrowing.    PARASPINAL: No extraspinal abnormality. Visualized lung fields are clear.      Impression    IMPRESSION:  HEAD CT:  1.  Small volume acute subarachnoid hemorrhage in the posterior left temporal lobe.  2.  Left frontal and right parietal scalp hematomas. No fracture.    CERVICAL SPINE CT:  No CT evidence for acute fracture or post traumatic subluxation.       *Note: Due to a large number of results and/or encounters for the requested time period, some results have not been displayed. A complete set of results can be found in Results Review.       For the majority of the shift this patient's behavior was Green     Cardiac Rhythm: N/A  Pt needs tele? No  Skin/wound Issues:  None  identified    Code Status: Full Code    Pain control: good    Nausea control: pt had none    Abnormal labs/tests/findings requiring intervention: See above    Patient tested for COVID 19 prior to admission: NO     OBS brochure/video discussed/provided to patient/family: Yes     Family present during ED course? Yes     Family Comments/Social Situation comments: Lives at home with . Has hx of dementia and myasthenia gravis and has been having increased falls, weakness, confusion, and agitation lately.    Tasks needing completion:  Neuros Q1 hour. Repeat CT at 0500    Ashley Garcia RN

## 2024-06-30 NOTE — PROGRESS NOTES
[facility]    Medicine Progress Note - Hospitalist Service    Date of Admission:  6/29/2024    Assessment & Plan   Albina Pedro is a 73-year-old female with a past medical history of anxiety, depression, ILD, CAD, Sjogren's, myasthenia Gravis, dementia presenting to the ER for evaluation of increasing gait instability and frequent falls.  Workup on admission was notable for acute subarachnoid hemorrhage and scalp hematomas.  Repeat CT imaging shows stable subarachnoid hemorrhage.    # Intracranial hemorrhage  -Likely in the setting of trauma due to fall  -On presentation, noted to be frail, mildly confused but speaking in full sentences.  Appears range of motion were normal.  -Initial CT head obtained shows: Small volume acute subarachnoid hemorrhage in the posterior left temporal lobe & left frontal and right parietal scalp hematomas.  -Repeat CT head 6 hours afterwards shows stable small volume hemorrhage in the left posterior temporal region.  -Case was discussed with neurosurgery recommending repeat CT head 6 hours afterwards.  -On further workup, CBC and CMP were largely unremarkable.  -Strict blood pressure control to keep SBP <140, okay to use IV labetalol as needed to achieve this (will avoid hydralazine)  -Check patient's INR, with goal <1.5  -Start Keppra 500 mg twice daily x 7 days given his traumatic subarachnoid hemorrhage.  -Patient will require follow-up with neurosurgery following discharge  -Hold PTA aspirin x 48 hours  -PT, OT consult, appreciate recs.    # Myasthenia gravis-okay to resume PTA rivastigmine,    # History of coronary disease-holding aspirin given intracranial hemorrhage.  Okay to resume PTA pravastatin.  # Sjogren's disease-continue PTA prednisone and azathioprine  # Anxiety, # depression-continue PTA fluoxetine 40 mg daily, gabapentin and bupropion (though bupropion decreases seizure threshold)         Observation Goals: -diagnostic tests and consults  completed and resulted, -vital signs normal or at patient baseline, -safe disposition plan has been identified, Nurse to notify provider when observation goals have been met and patient is ready for discharge.  Diet: Combination Diet Low Saturated Fat Na <2400mg Diet    DVT Prophylaxis: Pneumatic Compression Devices  Corrigan Catheter: Not present  Lines: None     Cardiac Monitoring: None  Code Status: Full Code      Clinically Significant Risk Factors Present on Admission                # Drug Induced Platelet Defect: home medication list includes an antiplatelet medication     # Dementia: noted on problem list               # Financial/Environmental Concerns: none         Disposition Plan     Medically Ready for Discharge: Anticipated Tomorrow             ALISTAIR KAPLAN MD  Hospitalist Service  [facility]  Securely message with [employer] (more info)  Text page via [employer] Paging/Directory   ______________________________________________________________________    Interval History   -Afebrile and hemodynamically stable  -Denies any acute complaints.  -Spouse at bedside, questions answered  -RNs notes reviewed, no acute issues.    Physical Exam   Vital Signs: Temp: 98.2  F (36.8  C) Temp src: Oral BP: 125/50 Pulse: 69   Resp: 16 SpO2: 98 % O2 Device: None (Room air)    Weight: 147 lbs 0 oz      General appearance: Alert, in no acute distress and Ox3   HEENT: Normocephalic, atraumatic; Pupils are equal, round and react to light; Extraocular   movements intact; Conjuctivae are Normal; Mucous membranes moist and without lesions   Pulm: clear to ausculation bilaterally, no wheeze, rales or rhonchi   CVS: RRR, no m/r/g   GI: Abdomen soft, non-tender throughout, normoactive bowel sounds and No rebound or guarding   Extremities: No cyanosis, clubbing or edema   Neurologic:   - Mental Status: Alert, relaxed, and cooperative. Oriented to person, place, and situation  - Cranial Nerves: Il through Xll  intact.   - Motor: Good muscle bulk and tone.   - Strength 5/5 throughout.       Medical Decision Making       45 MINUTES SPENT BY ME on the date of service doing chart review, history, exam, documentation & further activities per the note.      Data   ------------------------- PAST 24 HR DATA REVIEWED -----------------------------------------------    I have personally reviewed the following data over the past 24 hrs:    6.6  \   13.8   / 242     140 104 15.8 /  95   4.0 27 0.53 \     INR:  1.01 PTT:  N/A   D-dimer:  N/A Fibrinogen:  N/A       Imaging results reviewed over the past 24 hrs:   Recent Results (from the past 24 hour(s))   XR Pelvis and Hip Bilateral 2 Views    Narrative    EXAM: XR PELVIS AND HIP BILATERAL 2 VIEWS  LOCATION: [facility]  DATE: 6/30/2024    INDICATION: falls, hip pain  COMPARISON: X-rays 9/9/2022      Impression    IMPRESSION: No acute bony finding such as fracture or dislocation. Advanced degenerative changes most marked in the SI joints and the lower lumbar spine. Pelvic phleboliths.   Head CT w/o contrast    Narrative    EXAM: CT HEAD W/O CONTRAST, CT CERVICAL SPINE W/O CONTRAST  LOCATION: [facility]  DATE: 6/30/2024    INDICATION: fall, head injury and neck injury,  COMPARISON: 2/2/2023  TECHNIQUE:   1) Routine CT Head without IV contrast. Multiplanar reformats. Dose reduction techniques were used.  2) Routine CT Cervical Spine without IV contrast. Multiplanar reformats. Dose reduction techniques were used.    FINDINGS:   HEAD CT:   INTRACRANIAL CONTENTS: Small volume acute subarachnoid hemorrhage in the posterior left temporal lobe. No CT evidence of acute infarct. Mild presumed chronic small vessel ischemic changes. Mild generalized volume loss. No hydrocephalus.     VISUALIZED ORBITS/SINUSES/MASTOIDS: No intraorbital abnormality. No paranasal sinus mucosal disease. No middle ear or mastoid effusion.    BONES/SOFT TISSUES:  Left frontal and right parietal scalp hematomas. No fracture.    CERVICAL SPINE CT:   VERTEBRA: Vertebral body height is normal. Slight anterolisthesis of C4. No fracture or posttraumatic subluxation.     CANAL/FORAMINA: No high-grade central canal stenosis. Multilevel neural foraminal narrowing.    PARASPINAL: No extraspinal abnormality. Visualized lung fields are clear.      Impression    IMPRESSION:  HEAD CT:  1.  Small volume acute subarachnoid hemorrhage in the posterior left temporal lobe.  2.  Left frontal and right parietal scalp hematomas. No fracture.    CERVICAL SPINE CT:  No CT evidence for acute fracture or post traumatic subluxation.     CT Cervical Spine w/o Contrast    Narrative    EXAM: CT HEAD W/O CONTRAST, CT CERVICAL SPINE W/O CONTRAST  LOCATION: [facility]  DATE: 6/30/2024    INDICATION: fall, head injury and neck injury,  COMPARISON: 2/2/2023  TECHNIQUE:   1) Routine CT Head without IV contrast. Multiplanar reformats. Dose reduction techniques were used.  2) Routine CT Cervical Spine without IV contrast. Multiplanar reformats. Dose reduction techniques were used.    FINDINGS:   HEAD CT:   INTRACRANIAL CONTENTS: Small volume acute subarachnoid hemorrhage in the posterior left temporal lobe. No CT evidence of acute infarct. Mild presumed chronic small vessel ischemic changes. Mild generalized volume loss. No hydrocephalus.     VISUALIZED ORBITS/SINUSES/MASTOIDS: No intraorbital abnormality. No paranasal sinus mucosal disease. No middle ear or mastoid effusion.    BONES/SOFT TISSUES: Left frontal and right parietal scalp hematomas. No fracture.    CERVICAL SPINE CT:   VERTEBRA: Vertebral body height is normal. Slight anterolisthesis of C4. No fracture or posttraumatic subluxation.     CANAL/FORAMINA: No high-grade central canal stenosis. Multilevel neural foraminal narrowing.    PARASPINAL: No extraspinal abnormality. Visualized lung fields are clear.      Impression     IMPRESSION:  HEAD CT:  1.  Small volume acute subarachnoid hemorrhage in the posterior left temporal lobe.  2.  Left frontal and right parietal scalp hematomas. No fracture.    CERVICAL SPINE CT:  No CT evidence for acute fracture or post traumatic subluxation.     Head CT w/o contrast    Narrative    EXAM: CT HEAD W/O CONTRAST  LOCATION: [facility]  DATE: 6/30/2024    INDICATION: re assess small subarachnoid for stability  COMPARISON: 6/29/2024  TECHNIQUE: Routine CT Head without IV contrast. Multiplanar reformats. Dose reduction techniques were used.    FINDINGS:  Stable small volume presumed posttraumatic acute subarachnoid hemorrhage in the posterior left frontal region. Otherwise no change. No evidence of interval acute intracranial hemorrhage, infarct or hydrocephalus.       Impression    IMPRESSION:  1.  Stable small volume presumed posttraumatic subarachnoid hemorrhage in the posterior left temporal region.

## 2024-06-30 NOTE — UTILIZATION REVIEW
"Admission Status; Secondary Review Determination     Admission Date: 6/29/2024 10:23 PM       Under the authority of the Utilization Management Committee, the utilization review process indicated a secondary review on the above patient.  The review outcome is based on review of the medical records, discussions with staff, and applying clinical experience noted on the date of the review.          (x) Observation Status Appropriate - This patient does not meet hospital inpatient criteria and is placed in observation status. If this patient's primary payer is Medicare and was admitted as an inpatient, Condition Code 44 should be used and patient status changed to \"observation\".       RATIONALE FOR DETERMINATION      Brief clinical presentation, information copied from the chart, abbreviated and edited for relevant content:     Stable, anticipate discharge tomorrow. Keep Albina Rodriguez is a 73-year-old female with a past medical history of anxiety, depression, ILD, CAD, Sjogren's, myasthenia Gravis, dementia presenting to the ER for evaluation of increasing gait instability and frequent falls.  Workup on admission was notable for acute subarachnoid hemorrhage and scalp hematomas.  Repeat CT imaging shows stable subarachnoid hemorrhage - Likely in the setting of trauma due to fall. Repeat CT head 6 hours afterwards shows stable small volume hemorrhage in the left posterior temporal region. Strict blood pressure control to keep SBP <140, okay to use IV labetalol as needed to achieve this (will avoid hydralazine) Start Keppra 500 mg twice daily x 7 days given his traumatic subarachnoid hemorrhage.      The severity of illness, intensity of cares provided, risk for adverse outcome, and expected LOS make the care appropriate for observation.       The information on this document is developed by the utilization review team in order for the business office to ensure compliance.  This only denotes the " appropriateness of proper admission status and does not reflect the quality of care rendered.         The definitions of Inpatient Status and Observation Status used in making the determination above are those provided in the CMS Coverage Manual, Chapter 1 and Chapter 6, section 70.4.      Sincerely,     Tiffanie Martinez MD   Utilization Review/ Case Management  [facility].

## 2024-06-30 NOTE — DISCHARGE INSTRUCTIONS
Kristina & Jerrod,    Thank you for speaking with me regarding your health.  As we discussed, I am including some resources for review.    What is a [place]?  [place] is a single, comprehensive assessment and support planning web-based application used by professionals who the [facility] has certified as assessors and support planners for long-term services and supports. It uses a person-centered planning approach to help people make decisions about long-term services and supports.    What does [place] do?  1.  Promotes timely consideration of support options reimbursed through Medical Assistance long-term service programs.    2. Provides greater consistency in how eligibility is determined for publicly funded long-term services and supports across [place].    3. Implements a single comprehensive assessment to determine needs and support planning.    4. Determines eligibility for publicly funded programs and services for all ages and disabilities including:  Alternative Care Program  Brain Injury Waiver  Community Alternative Care Waiver  Community Access for Disability Inclusion Waiver  Consumer Support [place]  Developmental Disability Waiver  Elderly Waiver  Essential Community Supports for Seniors  Moving Home [place]  Personal care assistance  Case management  Semi-independent living services    Who will benefit?  [place]CHOICES benefits people of all ages and disabilities who need long-term services and supports. It also benefits lead agencies (counties, tribes and managed care organizations) responsible for completing assessments and support planning.   It helps the person better understand his or her support needs and how to get services to meet those needs.     These services can help the person stay at home or move home from a hospital, nursing home or other institution.     If a person qualifies for publicly funded programs, it helps them to access programs such as Medical Assistance home and  community-based waiver services, personal care assistance and other long-term services and supports.     If the person does not qualify for publicly funded programs, it helps them learn about and have help accessing other support options.    What are other benefits of a [place]CHOICES assessment?   Supports matching services to an individual's strengths, preferences and needs   Promotes equal access across populations and geographic areas   Replaces multiple assessments for different programs   Reduces paperwork   Helps with support planning   Promotes statewide quality measurement.    How does a person get help?  A person of any age with a disability or in need of long-term services and supports can ask for a [place] assessment by contacting the county or [place] where you reside:  [facility] at [phone].    I have also requested to have the clinic care coordination team follow you for assistance post discharge, so you should receive a phone call within the next several days from a community health worker, nurse or .

## 2024-06-30 NOTE — MEDICATION SCRIBE - ADMISSION MEDICATION HISTORY
Medication Scribe Admission Medication History    Admission medication history is complete. The information provided in this note is only as accurate as the sources available at the time of the update.    Information Source(s): Family member, spouse, via phone    Pertinent Information:     Changes made to PTA medication list:  Added: None  Deleted: gabapentin, hyoscyamine, prevident  Changed: None    Allergies reviewed with patient and updates made in EHR: yes    Medication History Completed By: Korina Maravilla 6/30/2024 10:13 AM    PTA Med List   Medication Sig Last Dose    acetaminophen (TYLENOL) 500 MG tablet Take 1,000 mg by mouth every 6 hours as needed for mild pain 6/29/2024 at pm    albuterol (PROAIR HFA/PROVENTIL HFA/VENTOLIN HFA) 108 (90 Base) MCG/ACT inhaler INHALE TWO PUFFS BY MOUTH EVERY FOUR HOURS AS NEEDED FOR SHORTNESS OF BREATH/DYSPNEA More than a month at unknown    aspirin (ASA) 81 MG EC tablet Take 1 tablet (81 mg) by mouth daily 6/29/2024 at hs    azaTHIOprine (IMURAN) 50 MG tablet TAKE TWO TABLETS BY MOUTH TWICE DAILY 6/29/2024 at hs    buPROPion (WELLBUTRIN XL) 150 MG 24 hr tablet Take 2 tablets (300 mg) by mouth every morning 6/29/2024 at am    Calcium-Magnesium-Zinc 500-250-12.5 MG TABS Take 1 tablet by mouth daily Past Week at unknown    FLUoxetine (PROZAC) 40 MG capsule Take 1 capsule (40 mg) by mouth daily 6/29/2024 at am    LORazepam (ATIVAN) 0.5 MG tablet TAKE HALF TO ONE TABLET BY MOUTH THREE TO FIVE TIMES A WEEK AS NEEDED. 6/29/2024 at pm    Multiple Vitamins-Minerals (MULTIVITAMIN & MINERAL PO) Take 1 tablet by mouth daily  6/29/2024 at am    pravastatin (PRAVACHOL) 40 MG tablet TAKE ONE TABLET BY MOUTH ONE TIME DAILY 6/29/2024 at hs    predniSONE (DELTASONE) 5 MG tablet Take 1 tablet by mouth daily 6/29/2024 at am    pyRIDostigmine (MESTINON) 60 MG tablet TAKE ONE TABLET BY MOUTH THREE TIMES DAILY 6/29/2024 at am    rivastigmine (EXELON) 9.5 MG/24HR 24 hr patch Place 1 patch onto  the skin daily 6/29/2024 at am

## 2024-06-30 NOTE — CONSULTS
Care Management Initial Consult    General Information  Assessment completed with: Patient and Spouse - Jerrod  Type of CM/SW Visit: Initial Assessment    Primary Care Provider verified and updated as needed: Yes   Readmission within the last 30 days: no previous admission in last 30 days      Reason for Consult: discharge planning  Advance Care Planning:    not on EMR     Communication Assessment  Patient's communication style: spoken language (English or Bilingual)    Hearing Difficulty or Deaf: no   Wear Glasses or Blind: no    Cognitive  Cognitive/Neuro/Behavioral: .WDL except, orientation  Level of Consciousness: alert, intermittent confusion  Arousal Level: opens eyes spontaneously  Orientation: disoriented to, place, time  Mood/Behavior: calm, cooperative  Best Language: 0 - No aphasia  Speech: clear, spontaneous, logical    Living Environment:   People in home: spouse  Jerrod  Current living Arrangements: house      Able to return to prior arrangements: yes     Family/Social Support:  Care provided by: self, spouse/significant other  Provides care for: no one  Marital Status:     Jerrod       Description of Support System: Supportive, Involved    Support Assessment: Caregiver experiencing high stress, Caregiver difficulty providing physical care/safety    Current Resources:   Patient receiving home care services: No  Community Resources: None  Equipment currently used at home: none  Supplies currently used at home: Incontinence Supplies    Employment/Financial:  Employment Status: retired        Financial Concerns: none   Referral to Financial Worker: No       Does the patient's insurance plan have a 3 day qualifying hospital stay waiver?  Yes     Which insurance plan 3 day waiver is available? Alternative insurance waiver    Will the waiver be used for post-acute placement? No    Lifestyle & Psychosocial Needs:  Social Determinants of Health     Food Insecurity: Not on file   Depression: At risk  (6/27/2024)    PHQ-2     PHQ-2 Score: 3   Housing Stability: Not on file   Tobacco Use: Low Risk  (6/27/2024)    Patient History     Smoking Tobacco Use: Never     Smokeless Tobacco Use: Never     Passive Exposure: Never   Financial Resource Strain: Not on file   Alcohol Use: Not on file   Transportation Needs: Not on file   Physical Activity: Not on file   Interpersonal Safety: Not on file   Stress: Not on file   Social Connections: Not on file   Health Literacy: Not on file     Functional Status:  Prior to admission patient needed assistance:   Dependent ADLs:: Independent  Dependent IADLs:: Transportation     Mental Health Status:  Mental Health Status: Current Concern  Mental Health Management: Medication    Chemical Dependency Status:  Chemical Dependency Status: No Current Concerns           Values/Beliefs:  Spiritual, Cultural Beliefs, Muslim Practices, Values that affect care: no             Additional Information:  Care Management received referral for discharge planning.    SW reviewed EMR, attended IDT rounds, spoke with medical care team, and then met pt & spouse at bedside to introduce self/role, perform assessment, and discuss resources.    Per discussion with pt & spouse Jerrod, prior to hospitalization pt lives in a private home (with stairs) with Jerrod, ambulates with walking sticks at times, but most often not, and has been mainly independent with her daily ADL cares, but receives help from Jerrod for driving, finances, and medication set up.       PT assessed today & currently recommending Home Care services at discharge with a new order for a walker.  OT to assess on 7/1/24 & SW to follow recommendations, but anticipate home care OT services.    SW discussed discharge planning, and provided education regarding Medicare.gov; how pt's insurance will cover services upon discharge, Medicare's homebound status in relation to Home Care services, and offered choice of agency for cares.     Per  discussion with pt and/or family, a referral was sent to the Home Care hub and pt has been accepted for cares by [facility] (phone: [phone] Fax: [phone]) for RN, PT/OT, HHA & SW services upon discharge.  MD to place orders.    Transportation discussed & Jerrod will transport home upon discharge.    Care Management team to follow for discharge plans.    MARY KATE Garvey

## 2024-06-30 NOTE — ED TRIAGE NOTES
Pt arrives after a fall. Pt reports she stood up and fell backwards hitting her head.  reports she also fell this morning hitting her head. Pt has a hx of myasthenia gravis and has been having recent falls.

## 2024-06-30 NOTE — PROGRESS NOTES
[facility]  OUTPATIENT PHYSICAL THERAPY EVALUATION  PLAN OF TREATMENT FOR OUTPATIENT REHABILITATION  (COMPLETE FOR INITIAL CLAIMS ONLY)  Patient's Last Name, First Name, M.I.  YOB: 1950  Albina Pedro                        Provider's Name  [facility] Medical Record No.  [id number]                             Onset Date:  06/29/24   Start of Care Date:  (P) 06/30/24   Type:     _X_PT   ___OT   ___SLP Medical Diagnosis:  (P) fall, subarachnoid hemorrhage, confusion              PT Diagnosis:  (P) impaired functional mobility Visits from SOC:  1     See note for plan of treatment, functional goals and certification details    I CERTIFY THE NEED FOR THESE SERVICES FURNISHED UNDER        THIS PLAN OF TREATMENT AND WHILE UNDER MY CARE     (Physician co-signature of this document indicates review and certification of the therapy plan).                06/30/24 0917   Appointment Info   Signing Clinician's Name / Credentials (PT) Haylee Mclaughlin, PT   Quick Adds   Quick Adds Certification   Living Environment   People in Home spouse   Current Living Arrangements house   Home Accessibility stairs to enter home;stairs within home   Living Environment Comments pt reports she is indep but also knows she has bad memory so she states she just can't keep track   Self-Care   Current Activity Tolerance moderate   Fall history within last six months yes   Activity/Exercise/Self-Care Comment reports she has walking sticks but no walker at home, has a vanity next to toilet   General Information   Onset of Illness/Injury or Date of Surgery 06/29/24   Referring Physician Prem Lopez MD   Patient/Family Therapy Goals Statement (PT) did not state   Pertinent History of Current Problem (include personal factors and/or comorbidities that impact the POC) multiple falls, hit head, sub arachnoid hem, dementia   Existing Precautions/Restrictions fall   Cognition    Affect/Mental Status (Cognition) confused   Follows Commands (Cognition) follows one-step commands   Pain Assessment   Patient Currently in Pain Yes, see Vital Sign flowsheet  (R hip and leg, back of head)   Strength (Manual Muscle Testing)   Strength (Manual Muscle Testing) Deficits observed during functional mobility   Bed Mobility   Comment, (Bed Mobility) sit to supine HOB flat and no rail SBA   Transfers   Comment, (Transfers) S sit<>stand with FWW, increased time likely weakness in hip extensor muscles   Gait/Stairs (Locomotion)   [place] Level (Gait) contact guard;supervision   Assistive Device (Gait) walker, front-wheeled   Distance in Feet (Gait) 10   Pattern (Gait) step-through   Balance   Balance Comments attempted to walk without walker but LOB and needs B UE support   Clinical Impression   Criteria for Skilled Therapeutic Intervention Yes, treatment indicated   PT Diagnosis (PT) impaired functional mobility   Influenced by the following impairments weakness, impaired balance, confusion   Functional limitations due to impairments impaired gait and transfers   Clinical Presentation (PT Evaluation Complexity) evolving   Clinical Presentation Rationale clinical judgement   Clinical Decision Making (Complexity) low complexity   Planned Therapy Interventions (PT) balance training;gait training;patient/family education;strengthening;stair training;transfer training   PT Total Evaluation Time   PT Eval, Low Complexity Minutes (24331) 10   Therapy Certification   Start of care date 06/30/24   Certification date from 06/30/24   Certification date to 07/07/24   Medical Diagnosis fall, subarachnoid hemorrhage, confusion   Physical Therapy Goals   PT Frequency 4x/week   PT Predicted Duration/Target Date for Goal Attainment 07/07/24   PT Goals Transfers;Gait;Stairs   PT: Transfers Modified independent;Sit to/from stand;Bed to/from chair;Assistive device   PT: Gait Supervision/stand-by assist;Rolling walker;100  feet   PT: Stairs Supervision/stand-by assist;Rail on left  (unsure how many steps or rails she has)   Interventions   Interventions Quick Adds Gait Training   Gait Training   Gait Training Minutes (47922) 8   Treatment Detail/Skilled Intervention direction cues for walker, noted heavy UE WB, cues for less WB and was ok with walker, attempted to walk without walker but unsteady so used walker, edu about walker for home, pt in agreement, cues for staying inside wlaker with turns and keeping walker close, c/o R hip/leg pain and has occ buckling vs LOB to her R side diff to fully tell but needs SBA/CGA for safety with gait, no family present   Distance in Feet 100   [place] Level (Gait Training) contact guard   Physical Assistance Level (Gait Training) verbal cues;supervision   Assistive Device (Gait Training) rolling walker  (FWW)   Pattern Analysis (Gait Training) swing-through gait   Gait Analysis Deviations decreased taran;decreased stride length   Impairments (Gait Analysis/Training) balance impaired;pain;strength decreased   PT Discharge Planning   PT Plan Sun 1/4- needs to do stairs for home (not usre how many) gait with walker could try 4WW vs the FWW (order placed for home walker need to speak with family about where to get 1 as pt with confusion and no family present)   PT Discharge Recommendation (DC Rec) home with assist;home with home care physical therapy   PT Rationale for DC Rec likely home if family can provide supervision and assist, pt/family may need increased resources for home vs looking into placement if fmaily not able to provide supervision/assist (seems to be ongoing issue not new with her confusion and behaviors), rec home PT   PT Brief overview of current status 110' with FWW, order placed for walker for home as pt was unsteady without the walker, supervision for mobility   PT Equipment Needed at Discharge walker, rolling

## 2024-06-30 NOTE — PROGRESS NOTES
"[facility] ADMISSION NOTE    Patient admitted to room 2307 at approximately 0325 via cart from emergency room. Patient was accompanied by nurse.     Verbal SBAR report received from TERRI Ramirez prior to patient arrival.     Patient ambulated to bed with one assist. Patient alert and oriented X 3. The patient is not having any pain.  . Admission vital signs: Blood pressure (!) 167/60, pulse 65, temperature 97.7  F (36.5  C), temperature source Oral, resp. rate 20, height 1.676 m (5' 6\"), weight 66.7 kg (147 lb), SpO2 100%, not currently breastfeeding. Patient was oriented to plan of care, call light, bed controls, tv, telephone, bathroom, and visiting hours.     Risk Assessment    The following safety risks were identified during admission: fall. Yellow risk band applied: YES.     Skin Initial Assessment    This writer admitted this patient and completed a full skin assessment and Martin score in the Adult PCS flowsheet.   Photo documentation of skin problem and/or wound competed via [employer] application (located under Media):  N/A    Appropriate interventions initiated as needed.     Secondary skin check declined.    Martin Risk Assessment  Sensory Perception: 3-->slightly limited  Moisture: 4-->rarely moist  Activity: 3-->walks occasionally  Mobility: 3-->slightly limited  Nutrition: 3-->adequate  Friction and Shear: 3-->no apparent problem  Martin Score: 19  Mattress: Standard gel/foam mattress (IsoFlex, Atmos Air, etc.)  Bed Frame: Standard width and length    Education    Patient has a [employer] to Observation order: Yes  Observation education completed and documented: Yes      Maria Dolores Vasquez RN      "

## 2024-06-30 NOTE — ED PROVIDER NOTES
History     Chief Complaint   Patient presents with    Fall     HPI  Albina Pedro is a 73 year old female with a history of myasthenia gravis, interstitial lung disease, coronary disease, and dementia presenting for evaluation of increasing gait instability and more frequent falls.  Fell twice today, once this morning and once this evening.  Tonight she was getting out of her chair and fell backwards striking her head.  No reported loss of conscious.  No preceding palpitations or lightheadedness.  Patient complains of mild pain in the back of her head and neck.  Denies headache.  Denies vision changes.  Denies any numbness, tingling, or weakness.  Patient mitts that she has been much more unstable over the past several weeks with more frequent falls.   also reports patient has been much more confused and agitated lately.  He notes that she has been much more frustrated and angry recently and he is not certain why.   is concerned just contrast after with patient's increasing confusion, agitation, and increasing gait instability that he is having difficulty keeping her safe at home.  He is wondering whether we need to look at placement options for her.    Allergies:  Allergies   Allergen Reactions    Daypro [Oxaprozin] Rash           Lidocaine Other (See Comments)     Rapid heart rate    Nitroglycerin Other (See Comments)     Causes low blood pressure    Penicillins Unknown     Occurred as child.    Sulfa Antibiotics Rash       Problem List:    Patient Active Problem List    Diagnosis Date Noted    Subarachnoid hemorrhage (H) 06/30/2024     Priority: Medium    Fall, initial encounter 06/30/2024     Priority: Medium    Sleep-wake disorder 06/27/2024     Priority: Medium    Major depressive disorder, recurrent episode, moderate (H) 12/10/2023     Priority: Medium    Dementia, unspecified dementia severity, unspecified dementia type, unspecified whether behavioral, psychotic, or mood disturbance or  anxiety (H) 01/04/2023     Priority: Medium    Myasthenia gravis with exacerbation (H) 09/12/2022     Priority: Medium    Frequent falls 09/12/2022     Priority: Medium    Chronic stable angina (H24) 01/18/2019     Priority: Medium    Intermediate coronary syndrome (H) 07/24/2018     Priority: Medium    ILD (interstitial lung disease) (H) 07/09/2018     Priority: Medium     Needs follow up ct dec 2019      Hyperlipidemia LDL goal <70 05/31/2018     Priority: Medium    Major depressive disorder, recurrent, mild (H24) 02/01/2017     Priority: Medium    Acute chest pain 12/13/2016     Priority: Medium    Status post coronary angiogram 02/18/2016     Priority: Medium    Adverse effect of anesthetic 02/11/2016     Priority: Medium     Patient is very sensitive to anesthetics. Needs lower dosing.       Sjogren's syndrome (H24) 01/15/2014     Priority: Medium    CAD (coronary artery disease) 09/18/2013     Priority: Medium     Mild ischemia on stress test      Panniculitis 11/10/2011     Priority: Medium    Episodic mood disorder (H24) 11/26/2007     Priority: Medium     November 26, 2007 - Prozac and will look into light box.   April 15, 2008 - Will stop for summer. Light box rx.  Problem list name updated by automated process. Provider to review      Dermatophytosis of body 09/12/2007     Priority: Medium     September 12, 2007 - Classic appearance and worsening in areas's not using Nystatin.  Will use everywhere or change to clotrimazole.   April 15, 2008 - Change to powder.   Problem list name updated by automated process. Provider to review      DIZZINESS - LIKELY HYPOGLYCEMIA 07/27/2006     Priority: Medium     July 24, 2008- negative Event monitor and GTT showed some elevation.  Dietary changes.             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        Past Surgical History:    Past Surgical History:   Procedure Laterality Date    BIOPSY MUSCLE DIAGNOSTIC (LOCATION) Left 2022    Procedure: BIOPSY, MUSCLE LEFT biceps @0900;  Surgeon: Tyrell Loo MD;  Location: [facility] OR    C/SECTION, LOW TRANSVERSE  10/13/1978    , Low Transverse    COLONOSCOPY      OPEN REDUCTION INTERNAL FIXATION ANKLE Right 2019    Procedure: Open reduction internal fixation right lateral malleolus fracture;  Surgeon: Rolo Oconnor DPM;  Location: [facility] OR    SURGICAL HISTORY OF -           SURGICAL HISTORY OF -   00    Colonoscopy       Family History:    Family History   Problem Relation Age of Onset    Cancer Mother         Breast and liver- age 43    Heart Disease Father         ? chf?h/o CVA    Alzheimer Disease Father     Hypertension Father     Neurologic Disorder Father         CVA    Diabetes Maternal Grandmother     Breast Cancer Maternal Aunt     Breast Cancer Paternal Aunt     Cancer - colorectal No family hx of     Prostate Cancer No family hx of        Social History:  Marital Status:   [2]  Social History     Tobacco Use    Smoking status: Never     Passive exposure: Never    Smokeless tobacco: Never   Substance Use Topics    Alcohol use: Yes     Alcohol/week: 0.0 standard drinks of alcohol     Comment: 1 glass of wine every 2 weeks    Drug use: No        Medications:    acetaminophen (TYLENOL) 500 MG tablet  albuterol (PROAIR HFA/PROVENTIL HFA/VENTOLIN HFA) 108 (90 Base) MCG/ACT inhaler  aspirin (ASA) 81 MG EC tablet  azaTHIOprine (IMURAN) 50 MG tablet  buPROPion (WELLBUTRIN XL) 150 MG 24 hr tablet  Calcium-Magnesium-Zinc 500-250-12.5 MG TABS  FLUoxetine (PROZAC) 40 MG capsule  gabapentin (NEURONTIN) 100 MG capsule  hyoscyamine (LEVSIN) 0.125 MG tablet  LORazepam (ATIVAN) 0.5 MG tablet  Multiple Vitamins-Minerals (MULTIVITAMIN & MINERAL  "PO)  pravastatin (PRAVACHOL) 40 MG tablet  predniSONE (DELTASONE) 5 MG tablet  pyRIDostigmine (MESTINON) 60 MG tablet  rivastigmine (EXELON) 9.5 MG/24HR 24 hr patch  sodium fluoride dental gel (PREVIDENT) 1.1 % GEL topical gel          Review of Systems   Constitutional:  Negative for appetite change, chills, fatigue and fever.   HENT:  Negative for congestion.    Eyes:  Negative for visual disturbance.   Respiratory:  Negative for cough, chest tightness and shortness of breath.    Cardiovascular:  Negative for chest pain.   Gastrointestinal:  Negative for abdominal pain, nausea and vomiting.   Genitourinary:  Negative for decreased urine volume.   Musculoskeletal:  Positive for gait problem (Increasing difficulty with gait and increasing falls) and neck pain. Negative for back pain.   Skin:  Negative for wound.   Neurological:  Positive for weakness (Generalized). Negative for seizures and headaches (No headache but does report mild pain in the back of the head).   Psychiatric/Behavioral:  Positive for agitation, confusion, dysphoric mood and sleep disturbance (Often awake at night).    All other systems reviewed and are negative.      Physical Exam   BP: (!) 144/67  Pulse: 67  Temp: 98.1  F (36.7  C)  Resp: 18  Height: 167.6 cm (5' 6\")  Weight: 66.7 kg (147 lb)  SpO2: 98 %      Physical Exam  Vitals and nursing note reviewed.   Constitutional:       Appearance: Normal appearance. She is not ill-appearing or diaphoretic.      Comments: Elderly, frail-appearing.  Awake and alert, mildly confused and defers to her  for many history questions.  Able to speak in full sentences.   HENT:      Head:        Nose: Nose normal.      Mouth/Throat:      Mouth: Mucous membranes are moist.   Eyes:      Conjunctiva/sclera: Conjunctivae normal.   Cardiovascular:      Rate and Rhythm: Normal rate.      Pulses: Normal pulses.   Pulmonary:      Effort: Pulmonary effort is normal.      Breath sounds: Normal breath sounds. "   Abdominal:      Palpations: Abdomen is soft.      Tenderness: There is no abdominal tenderness.   Musculoskeletal:         General: Normal range of motion.      Cervical back: Pain with movement, spinous process tenderness (Moderate low midline cervical tenderness) and muscular tenderness present.      Comments: Patient is able to lift both her left and right leg as well as flex at the hip with only modest discomfort   Skin:     General: Skin is warm and dry.      Capillary Refill: Capillary refill takes less than 2 seconds.   Neurological:      Mental Status: She is alert and oriented to person, place, and time.   Psychiatric:         Mood and Affect: Mood normal.         ED Course        Procedures                Results for orders placed or performed during the hospital encounter of 06/29/24 (from the past 24 hour(s))   CBC with platelets differential    Narrative    The following orders were created for panel order CBC with platelets differential.  Procedure                               Abnormality         Status                     ---------                               -----------         ------                     CBC with platelets and d...[[id number]]                      Final result                 Please view results for these tests on the individual orders.   Basic metabolic panel   Result Value Ref Range    Sodium 136 135 - 145 mmol/L    Potassium 4.8 3.4 - 5.3 mmol/L    Chloride 103 98 - 107 mmol/L    Carbon Dioxide (CO2) 23 22 - 29 mmol/L    Anion Gap 10 7 - 15 mmol/L    Urea Nitrogen 21.5 8.0 - 23.0 mg/dL    Creatinine 0.48 (L) 0.51 - 0.95 mg/dL    GFR Estimate >90 >60 mL/min/1.73m2    Calcium 8.9 8.8 - 10.2 mg/dL    Glucose 73 70 - 99 mg/dL   CBC with platelets and differential   Result Value Ref Range    WBC Count 6.9 4.0 - 11.0 10e3/uL    RBC Count 4.07 3.80 - 5.20 10e6/uL    Hemoglobin 13.2 11.7 - 15.7 g/dL    Hematocrit 39.2 35.0 - 47.0 %    MCV 96 78 - 100 fL    MCH 32.4 26.5 - 33.0 pg     MCHC 33.7 31.5 - 36.5 g/dL    RDW 14.0 10.0 - 15.0 %    Platelet Count 228 150 - 450 10e3/uL    % Neutrophils 67 %    % Lymphocytes 21 %    % Monocytes 10 %    % Eosinophils 1 %    % Basophils 0 %    % Immature Granulocytes 1 %    NRBCs per 100 WBC 0 <1 /100    Absolute Neutrophils 4.6 1.6 - 8.3 10e3/uL    Absolute Lymphocytes 1.4 0.8 - 5.3 10e3/uL    Absolute Monocytes 0.7 0.0 - 1.3 10e3/uL    Absolute Eosinophils 0.1 0.0 - 0.7 10e3/uL    Absolute Basophils 0.0 0.0 - 0.2 10e3/uL    Absolute Immature Granulocytes 0.0 <=0.4 10e3/uL    Absolute NRBCs 0.0 10e3/uL   XR Pelvis and Hip Bilateral 2 Views    Narrative    EXAM: XR PELVIS AND HIP BILATERAL 2 VIEWS  LOCATION: [facility]  DATE: 6/30/2024    INDICATION: falls, hip pain  COMPARISON: X-rays 9/9/2022      Impression    IMPRESSION: No acute bony finding such as fracture or dislocation. Advanced degenerative changes most marked in the SI joints and the lower lumbar spine. Pelvic phleboliths.   Head CT w/o contrast    Narrative    EXAM: CT HEAD W/O CONTRAST, CT CERVICAL SPINE W/O CONTRAST  LOCATION: [facility]  DATE: 6/30/2024    INDICATION: fall, head injury and neck injury,  COMPARISON: 2/2/2023  TECHNIQUE:   1) Routine CT Head without IV contrast. Multiplanar reformats. Dose reduction techniques were used.  2) Routine CT Cervical Spine without IV contrast. Multiplanar reformats. Dose reduction techniques were used.    FINDINGS:   HEAD CT:   INTRACRANIAL CONTENTS: Small volume acute subarachnoid hemorrhage in the posterior left temporal lobe. No CT evidence of acute infarct. Mild presumed chronic small vessel ischemic changes. Mild generalized volume loss. No hydrocephalus.     VISUALIZED ORBITS/SINUSES/MASTOIDS: No intraorbital abnormality. No paranasal sinus mucosal disease. No middle ear or mastoid effusion.    BONES/SOFT TISSUES: Left frontal and right parietal scalp hematomas. No fracture.    CERVICAL  SPINE CT:   VERTEBRA: Vertebral body height is normal. Slight anterolisthesis of C4. No fracture or posttraumatic subluxation.     CANAL/FORAMINA: No high-grade central canal stenosis. Multilevel neural foraminal narrowing.    PARASPINAL: No extraspinal abnormality. Visualized lung fields are clear.      Impression    IMPRESSION:  HEAD CT:  1.  Small volume acute subarachnoid hemorrhage in the posterior left temporal lobe.  2.  Left frontal and right parietal scalp hematomas. No fracture.    CERVICAL SPINE CT:  No CT evidence for acute fracture or post traumatic subluxation.     CT Cervical Spine w/o Contrast    Narrative    EXAM: CT HEAD W/O CONTRAST, CT CERVICAL SPINE W/O CONTRAST  LOCATION: [facility]  DATE: 6/30/2024    INDICATION: fall, head injury and neck injury,  COMPARISON: 2/2/2023  TECHNIQUE:   1) Routine CT Head without IV contrast. Multiplanar reformats. Dose reduction techniques were used.  2) Routine CT Cervical Spine without IV contrast. Multiplanar reformats. Dose reduction techniques were used.    FINDINGS:   HEAD CT:   INTRACRANIAL CONTENTS: Small volume acute subarachnoid hemorrhage in the posterior left temporal lobe. No CT evidence of acute infarct. Mild presumed chronic small vessel ischemic changes. Mild generalized volume loss. No hydrocephalus.     VISUALIZED ORBITS/SINUSES/MASTOIDS: No intraorbital abnormality. No paranasal sinus mucosal disease. No middle ear or mastoid effusion.    BONES/SOFT TISSUES: Left frontal and right parietal scalp hematomas. No fracture.    CERVICAL SPINE CT:   VERTEBRA: Vertebral body height is normal. Slight anterolisthesis of C4. No fracture or posttraumatic subluxation.     CANAL/FORAMINA: No high-grade central canal stenosis. Multilevel neural foraminal narrowing.    PARASPINAL: No extraspinal abnormality. Visualized lung fields are clear.      Impression    IMPRESSION:  HEAD CT:  1.  Small volume acute subarachnoid hemorrhage in  the posterior left temporal lobe.  2.  Left frontal and right parietal scalp hematomas. No fracture.    CERVICAL SPINE CT:  No CT evidence for acute fracture or post traumatic subluxation.       *Note: Due to a large number of results and/or encounters for the requested time period, some results have not been displayed. A complete set of results can be found in Results Review.       Medications - No data to display    12:57 AM: I personally reviewed and interpreted the CT.  There is a small area of subarachnoid hemorrhage on the left.  No cervical fracture.  Will discuss with neurosurgery.    1:05 AM: Consulted with Dr Lynn, neurosurgery.  Recommends 6-hour repeat CT to confirm stabl stability.  No need for prophylactic anticonvulsant therapy.  If repeat CT is stable, no need for further interventions.      1:54 AM: Consulted with Dr Lopez, tele-hospitalist. Accepts for admission.  No other further recommendations at this time    Assessments & Plan (with Medical Decision Making)  73-year-old female with myasthenia gravis, dementia, and history of gait instability presenting for evaluation of increasing falls and increasing confusion and agitation.  History largely obtained from  at bedside due to patient's underlying cognitive decline.  She is awake and alert but has trouble with memories.  She fell reportedly once this morning and again this evening.  Does have a small superficial abrasion to her scalp but no laceration needing repair.  CT of the head showed a small acute subarachnoid hemorrhage without any compression.  No evidence of cervical injury.  Consulted with neurosurgery who recommended repeat CT in 6 hours.  Admitted to observation via the hospitalist service.  Due to patient's increasing cognitive decline with increasing behavioral disturbance as well as increasing gait instability,  feels patient has become more unsafe at home and may need additional resources.     I have reviewed the  nursing notes.    I have reviewed the findings, diagnosis, plan and need for follow up with the patient.        New Prescriptions    No medications on file       Final diagnoses:   Fall, initial encounter   Subarachnoid hemorrhage (H)   Gait instability   Cognitive impairment       6/29/2024   [facility] EMERGENCY DEPT       Constantino, Christ Macdonald MD  06/30/24 [id number]

## 2024-07-01 ENCOUNTER — APPOINTMENT (OUTPATIENT)
Dept: OCCUPATIONAL THERAPY | Facility: CLINIC | Age: 74
End: 2024-07-01
Payer: COMMERCIAL

## 2024-07-01 VITALS
HEIGHT: 66 IN | RESPIRATION RATE: 16 BRPM | DIASTOLIC BLOOD PRESSURE: 55 MMHG | WEIGHT: 147 LBS | BODY MASS INDEX: 23.63 KG/M2 | SYSTOLIC BLOOD PRESSURE: 131 MMHG | HEART RATE: 65 BPM | TEMPERATURE: 98.2 F | OXYGEN SATURATION: 99 %

## 2024-07-01 PROCEDURE — 250N000013 HC RX MED GY IP 250 OP 250 PS 637: Performed by: INTERNAL MEDICINE

## 2024-07-01 PROCEDURE — G0378 HOSPITAL OBSERVATION PER HR: HCPCS

## 2024-07-01 PROCEDURE — 97165 OT EVAL LOW COMPLEX 30 MIN: CPT | Mod: GO

## 2024-07-01 PROCEDURE — 97535 SELF CARE MNGMENT TRAINING: CPT | Mod: GO

## 2024-07-01 PROCEDURE — 99239 HOSP IP/OBS DSCHRG MGMT >30: CPT | Performed by: INTERNAL MEDICINE

## 2024-07-01 PROCEDURE — 250N000012 HC RX MED GY IP 250 OP 636 PS 637: Performed by: INTERNAL MEDICINE

## 2024-07-01 RX ORDER — LEVETIRACETAM 500 MG/1
500 TABLET ORAL 2 TIMES DAILY
Qty: 11 TABLET | Refills: 0 | Status: SHIPPED | OUTPATIENT
Start: 2024-07-01 | End: 2024-09-16

## 2024-07-01 RX ADMIN — BUPROPION HYDROCHLORIDE 300 MG: 300 TABLET, EXTENDED RELEASE ORAL at 08:12

## 2024-07-01 RX ADMIN — PREDNISONE 5 MG: 5 TABLET ORAL at 08:12

## 2024-07-01 RX ADMIN — LEVETIRACETAM 500 MG: 500 TABLET, FILM COATED ORAL at 08:12

## 2024-07-01 RX ADMIN — PYRIDOSTIGMINE BROMIDE 60 MG: 60 TABLET ORAL at 08:02

## 2024-07-01 RX ADMIN — Medication 15 ML: at 08:02

## 2024-07-01 RX ADMIN — FLUOXETINE 40 MG: 20 CAPSULE ORAL at 08:12

## 2024-07-01 RX ADMIN — AZATHIOPRINE 100 MG: 50 TABLET ORAL at 08:12

## 2024-07-01 ASSESSMENT — ACTIVITIES OF DAILY LIVING (ADL)
ADLS_ACUITY_SCORE: 26

## 2024-07-01 NOTE — PLAN OF CARE
"  Problem: Adult Inpatient Plan of Care  Goal: Plan of Care Review  Description: The Plan of Care Review/Shift note should be completed every shift.  The Outcome Evaluation is a brief statement about your assessment that the patient is improving, declining, or no change.  This information will be displayed automatically on your shift  note.  Outcome: Progressing  Goal: Patient-Specific Goal (Individualized)  Description: You can add care plan individualizations to a care plan. Examples of Individualization might be:  \"Parent requests to be called daily at 9am for status\", \"I have a hard time hearing out of my right ear\", or \"Do not touch me to wake me up as it startles  me\".  Outcome: Progressing  Goal: Absence of Hospital-Acquired Illness or Injury  Outcome: Progressing  Intervention: Identify and Manage Fall Risk  Recent Flowsheet Documentation  Taken 6/30/2024 1800 by Lara Maravilla, RN  Safety Promotion/Fall Prevention:   activity supervised   clutter free environment maintained   lighting adjusted   nonskid shoes/slippers when out of bed   room near nurse's station  Intervention: Prevent Skin Injury  Recent Flowsheet Documentation  Taken 6/30/2024 1800 by Lara Maravilla, RN  Body Position: position changed independently  Intervention: Prevent Infection  Recent Flowsheet Documentation  Taken 6/30/2024 1800 by Lara Maravilla, RN  Infection Prevention:   rest/sleep promoted   single patient room provided  Goal: Optimal Comfort and Wellbeing  Outcome: Progressing  Goal: Readiness for Transition of Care  Outcome: Progressing   Goal Outcome Evaluation:       Pt is alert to self and place. Pt expressed a headache rating it 3/10 tylenol given and was effective. Makes needs known but is forgetful at times and doesn't always use the call light. SBA with walker and gait belt to the bathroom.                  "

## 2024-07-01 NOTE — PROGRESS NOTES
Occupational Therapy     07/01/24 0835   Appointment Info   Signing Clinician's Name / Credentials (OT) Nataly Maurice, OTR/L   Quick Adds   Quick Adds Certification   Living Environment   People in Home spouse   Current Living Arrangements house   Home Accessibility stairs to enter home;stairs within home   Number of Stairs, Main Entrance 5   Stair Railings, Main Entrance railings safe and in good condition   Number of Stairs, Within Home, Primary other (see comments)  (pt reports stairs to basement but does not need to use)   Living Environment Comments Pt lives with spouse, unclear whether he is home 24/7 to provide assist.   Self-Care   Equipment Currently Used at Home none   Fall history within last six months yes  (pt unsure of number of falls - reports many occur outside and a couple were caused by tripping on items)   Activity/Exercise/Self-Care Comment Pt reports she has been able to complete ADLs but required reminders to complete. pt reports she uses vanity in bathroom to assist with transfers   Instrumental Activities of Daily Living (IADL)   IADL Comments Pt reports she shares IADLs with spouse, does not drive, spouse provides completes med-set-up and ensures she administers correctly   General Information   Onset of Illness/Injury or Date of Surgery 06/29/24   Referring Physician Prem Lopez MD   Patient/Family Therapy Goal Statement (OT) none stated   Additional Occupational Profile Info/Pertinent History of Current Problem Per H&P: Albina Pedro is a 73 year old female who presented to the ED after she had two falls today. She states that she started to have issues with falling about a year ago. Since then she has episodes lightheadedness with postural change. It is not every time, and she thinks that she can normally walk without any assistance, but once in a while she becomes lightheaded and can fall. Yesterday, she fell twice and one time it happened as she was standing up from her chair and  she fell backwards hitting the back of her head. She did not lose consciousness, and she has a headache. She denies any chest pain, palpitations, N/V/D/C, abdominal pain, SOB, cough, fevers or chills.   Her  is concerned for his wife's worsening dementia and weakness/lightheadedness.   Existing Precautions/Restrictions fall   Cognitive Status Examination   Orientation Status person;place   Cognitive Status Comments oriented to year, self, place, situation. Pt states she is aware of cognitive deficits due to progressing dementia. Pt able to follow 1-2 step directions, conversation and communicate needs. Pt does demonstrate some impulsivity with mobility requiring verbal cueing/repetition for safety.   Pain Assessment   Patient Currently in Pain Yes, see Vital Sign flowsheet  (pt reports R hip pain 6/10 at rest)   Range of Motion Comprehensive   General Range of Motion bilateral upper extremity ROM WFL   Strength Comprehensive (MMT)   General Manual Muscle Testing (MMT) Assessment no strength deficits identified   Coordination   Upper Extremity Coordination No deficits were identified   Transfers   Transfers sit-stand transfer   Sit-Stand Transfer   Sit-Stand [place] (Transfers) supervision   Assistive Device (Sit-Stand Transfers) walker, front-wheeled   Sit/Stand Transfer Comments Pt completes sit>stand chair to FWW SBA, sturdy upon standing, denies dizziness.   Balance   Balance Assessment other (see comments)   Balance Comments Pt ambulates chair to BR toilet ~12 ft with FWW SBA demonstrating not remaining in walker at all times.   Clinical Impression   Criteria for Skilled Therapeutic Interventions Met (OT) Yes, treatment indicated   OT Diagnosis decreased ADL indep/safety   Influenced by the following impairments subarchnoid hemorrhage, fall, frequent falls, dementia   OT Problem List-Impairments impacting ADL problems related to;cognition;strength   Assessment of Occupational Performance 1-3  Performance Deficits   Identified Performance Deficits transfers, functional mobility, standing tolerance   Planned Therapy Interventions (OT) ADL retraining;strengthening;progressive activity/exercise   Clinical Decision Making Complexity (OT) problem focused assessment/low complexity   Risk & Benefits of therapy have been explained evaluation/treatment results reviewed;care plan/treatment goals reviewed;risks/benefits reviewed;current/potential barriers reviewed;participants voiced agreement with care plan;participants included;patient   OT Total Evaluation Time   OT Eval, Low Complexity Minutes (85360) 10   Therapy Certification   Medical Diagnosis Subarachnoid hemorrhage, fall   Start of Care Date 07/01/24   Certification date from 07/01/24   Certification date to 07/08/24   OT Goals   Therapy Frequency (OT) 4 times/week   OT Predicted Duration/Target Date for Goal Attainment 07/08/24   OT Goals Hygiene/Grooming;Toilet Transfer/Toileting;Cognition;Lower Body Dressing   OT: Hygiene/Grooming supervision/stand-by assist;while standing   OT: Lower Body Dressing Supervision/stand-by assist   OT: Toilet Transfer/Toileting Supervision/stand-by assist;toilet transfer;cleaning and garment management   OT: Cognitive Patient/caregiver will verbalize understanding of cognitive assessment results/recommendations as needed for safe discharge planning   Self-Care/Home Management   Self-Care/Home Mgmt/ADL, Compensatory, Meal Prep Minutes (82984) 10   Symptoms Noted During/After Treatment (Meal Preparation/Planning Training) dizziness;fatigue   Treatment Detail/Skilled Intervention Education provided on role of OT while in hospital and within discharge planning. Education provided on safe walker usage with remaining in walker at all times, backing up to chair/toilet with while continuing to hold onto walker. Pt completes toilet transfers and pericares SBA, ambulates to sink and washes hands SBA, then ambulates an additional 40  ft w/ FWW CGA noting tendency to bump into items/doorframes with walker and ambulate quickly. When transferring to sit in chair pt reports increased dizziness, states this happens occassionally. Education provided on HC OT recommendation if spouse home with pt to assist as needed with pt in agreement. Pt remains seated in chair with call light and needs in reach, chair alarm in place.   OT Discharge Planning   OT Plan POC Mon 1/4: standing ADL, LB dressing   OT Discharge Recommendation (DC Rec) home with assist;home with home care occupational therapy   OT Rationale for DC Rec Recommend home with supervision from from spouse/family and assist as needed. Pt requiring verbal reminders for safety with ambulating, demonstrating indep with ADLs today.   OT Brief overview of current status SBA sit<>stand, toileting, standing g/h; CGA ambulates 40 ft w/ FWW; demonstrating bumping into objects with walker, requires reminders/demo for safe walker usage   Total Session Time   Timed Code Treatment Minutes 10   Total Session Time (sum of timed and untimed services) 20    [facility]  OUTPATIENT OCCUPATIONAL THERAPY  EVALUATION  PLAN OF TREATMENT FOR OUTPATIENT REHABILITATION  (COMPLETE FOR INITIAL CLAIMS ONLY)  Patient's Last Name, First Name, M.I.  YOB: 1950  Albina Pedro                          Provider's Name  [facility] Medical Record No.  [id number]                             Onset Date:  06/29/24   Start of Care Date:  07/01/24   Type:     ___PT   _X_OT   ___SLP Medical Diagnosis:  Subarachnoid hemorrhage, fall                    OT Diagnosis:  decreased ADL indep/safety Visits from SOC:  1     See note for plan of treatment, functional goals and certification details    I CERTIFY THE NEED FOR THESE SERVICES FURNISHED UNDER        THIS PLAN OF TREATMENT AND WHILE UNDER MY CARE     (Physician co-signature of this document indicates review  and certification of the therapy plan).

## 2024-07-01 NOTE — DISCHARGE SUMMARY
[facility]  Hospitalist Discharge Summary       Date of Admission:  6/29/2024  Date of Discharge:  7/1/2024 11:51 AM  Discharging Provider: Burak Lyman MD      Discharge Diagnoses     Frequent Falls  Subarachnoid Hemorrhage  Myasthenia Gravis  CAD  Sjogren Syndrome  Depression  Anxiety    Follow-ups Needed After Discharge   Follow-up Appointments     Follow-up and recommended labs and tests       Follow up with primary care provider, Rolo Sandoval, within 7 days for   hospital follow- up.  The following labs/tests are recommended: CBC and   BMP.          Discharge Disposition   Discharged to home  Condition at discharge: Stable    Hospital Course   Albina Pedro is a 73 year old female admitted on 6/29/2024. She presented with frequent falls and she has a small subarachnoid hemorrhage.      Frequent Falls  Subarachnoid Hemorrhage  She has had multiple falls along with gait instability over the last few months. She fell twice yesterday and hit the back of her head. Here she was found to have a small temporal lobe subarachnoid hemorrhage. Neurosurgery was called and recommended a follow up CT head.   -Follow-up head CT unchanged from admission  -neuro checks stable  -Home health care ordered at discharge  -Follow-up with neurosurgery in 4 weeks     Myasthenia Gravis  -Resume rivastigmine at discharge     CAD  -Resume aspirin and pravastatin at discharge     Sjogren Syndrome  -Resume azathioprine and prednisone at discharge     Depression  Anxiety  -Resume gabapentin, bupropion, and fluoxetine at discharge    Consultations This Hospital Stay   CARE MANAGEMENT / SOCIAL WORK IP CONSULT  PHYSICAL THERAPY ADULT IP CONSULT  OCCUPATIONAL THERAPY ADULT IP CONSULT    Code Status   Full Code    35 MINUTES SPENT BY ME on the date of service doing chart review, history, exam, documentation & further activities per the note.         Burak Lyman MD  [facility]  Center  ______________________________________________________________________    Physical Exam   Vital Signs: Temp: 98.2  F (36.8  C) Temp src: Oral BP: 131/55 Pulse: 65   Resp: 16 SpO2: 99 % O2 Device: None (Room air)    Weight: 147 lbs 0 oz       Gen: Well nourished, well developed, alert and oriented x 3, no acute distressed  HEENT: Atraumatic, normocephalic; sclera non-injected, anicterric; oral mucosa moist, no lesion, no exudate  Lungs: Clear to ausculation, no wheezes, no rhonchi, no rales  Heart: Regular rate, regular rhythm, no gallops, no rubs, no murmurs  GI: Bowel sound normal, no hepatosplenomegaly, no masses, non-tender, non-distended, no guarding, no rebound tenderness  Lymph: No lymphadenopathy, no edema  Skin: No rashes, no chronic venous stasis     Primary Care Physician   Rolo Sandoval    Discharge Orders      Home Care Referral      Primary Care - Care Coordination Referral      Adult Neurosurgery  Referral      Follow-up and recommended labs and tests     Follow up with primary care provider, Rolo Sandoval, within 7 days for hospital follow- up.  The following labs/tests are recommended: CBC and BMP.     Activity    Your activity upon discharge: activity as tolerated     Reason for your hospital stay    This is a 73-year-old female admitted with a subarachnoid hemorrhage.     Full Code     Walker Order     Diet    Follow this diet upon discharge: Orders Placed This Encounter      Combination Diet Low Saturated Fat Na <2400mg Diet         Significant Results and Procedures   Most Recent 3 CBC's:  Recent Labs   Lab Test 06/30/24  0522 06/29/24  2302 04/03/24  1718   WBC 6.6 6.9 4.7   HGB 13.8 13.2 13.2   MCV 97 96 93    228 197     Most Recent 3 BMP's:  Recent Labs   Lab Test 06/30/24  0522 06/29/24  2302 04/03/24  1718 12/07/23  1219 12/04/23  1458    136  --   --  138   POTASSIUM 4.0 4.8  --   --  4.7   CHLORIDE 104 103  --   --  104   CO2 27 23  --   --  23   BUN 15.8  21.5  --   --  24.6*   CR 0.53 0.48* 0.55   < > 0.54   ANIONGAP 9 10  --   --  11   SARI 8.9 8.9  --   --  9.2   GLC 95 73  --   --  114*    < > = values in this interval not displayed.   ,   Results for orders placed or performed during the hospital encounter of 06/29/24   CT Cervical Spine w/o Contrast    Narrative    EXAM: CT HEAD W/O CONTRAST, CT CERVICAL SPINE W/O CONTRAST  LOCATION: [facility]  DATE: 6/30/2024    INDICATION: fall, head injury and neck injury,  COMPARISON: 2/2/2023  TECHNIQUE:   1) Routine CT Head without IV contrast. Multiplanar reformats. Dose reduction techniques were used.  2) Routine CT Cervical Spine without IV contrast. Multiplanar reformats. Dose reduction techniques were used.    FINDINGS:   HEAD CT:   INTRACRANIAL CONTENTS: Small volume acute subarachnoid hemorrhage in the posterior left temporal lobe. No CT evidence of acute infarct. Mild presumed chronic small vessel ischemic changes. Mild generalized volume loss. No hydrocephalus.     VISUALIZED ORBITS/SINUSES/MASTOIDS: No intraorbital abnormality. No paranasal sinus mucosal disease. No middle ear or mastoid effusion.    BONES/SOFT TISSUES: Left frontal and right parietal scalp hematomas. No fracture.    CERVICAL SPINE CT:   VERTEBRA: Vertebral body height is normal. Slight anterolisthesis of C4. No fracture or posttraumatic subluxation.     CANAL/FORAMINA: No high-grade central canal stenosis. Multilevel neural foraminal narrowing.    PARASPINAL: No extraspinal abnormality. Visualized lung fields are clear.      Impression    IMPRESSION:  HEAD CT:  1.  Small volume acute subarachnoid hemorrhage in the posterior left temporal lobe.  2.  Left frontal and right parietal scalp hematomas. No fracture.    CERVICAL SPINE CT:  No CT evidence for acute fracture or post traumatic subluxation.     Head CT w/o contrast    Narrative    EXAM: CT HEAD W/O CONTRAST, CT CERVICAL SPINE W/O CONTRAST  LOCATION: [facility]  DATE: 6/30/2024    INDICATION: fall, head injury and neck injury,  COMPARISON: 2/2/2023  TECHNIQUE:   1) Routine CT Head without IV contrast. Multiplanar reformats. Dose reduction techniques were used.  2) Routine CT Cervical Spine without IV contrast. Multiplanar reformats. Dose reduction techniques were used.    FINDINGS:   HEAD CT:   INTRACRANIAL CONTENTS: Small volume acute subarachnoid hemorrhage in the posterior left temporal lobe. No CT evidence of acute infarct. Mild presumed chronic small vessel ischemic changes. Mild generalized volume loss. No hydrocephalus.     VISUALIZED ORBITS/SINUSES/MASTOIDS: No intraorbital abnormality. No paranasal sinus mucosal disease. No middle ear or mastoid effusion.    BONES/SOFT TISSUES: Left frontal and right parietal scalp hematomas. No fracture.    CERVICAL SPINE CT:   VERTEBRA: Vertebral body height is normal. Slight anterolisthesis of C4. No fracture or posttraumatic subluxation.     CANAL/FORAMINA: No high-grade central canal stenosis. Multilevel neural foraminal narrowing.    PARASPINAL: No extraspinal abnormality. Visualized lung fields are clear.      Impression    IMPRESSION:  HEAD CT:  1.  Small volume acute subarachnoid hemorrhage in the posterior left temporal lobe.  2.  Left frontal and right parietal scalp hematomas. No fracture.    CERVICAL SPINE CT:  No CT evidence for acute fracture or post traumatic subluxation.     XR Pelvis and Hip Bilateral 2 Views    Narrative    EXAM: XR PELVIS AND HIP BILATERAL 2 VIEWS  LOCATION: [facility]  DATE: 6/30/2024    INDICATION: falls, hip pain  COMPARISON: X-rays 9/9/2022      Impression    IMPRESSION: No acute bony finding such as fracture or dislocation. Advanced degenerative changes most marked in the SI joints and the lower lumbar spine. Pelvic phleboliths.   Head CT w/o contrast    Narrative    EXAM: CT HEAD W/O CONTRAST  LOCATION: [facility]  MEDICAL [facility]  DATE: 6/30/2024    INDICATION: re assess small subarachnoid for stability  COMPARISON: 6/29/2024  TECHNIQUE: Routine CT Head without IV contrast. Multiplanar reformats. Dose reduction techniques were used.    FINDINGS:  Stable small volume presumed posttraumatic acute subarachnoid hemorrhage in the posterior left frontal region. Otherwise no change. No evidence of interval acute intracranial hemorrhage, infarct or hydrocephalus.       Impression    IMPRESSION:  1.  Stable small volume presumed posttraumatic subarachnoid hemorrhage in the posterior left temporal region.     *Note: Due to a large number of results and/or encounters for the requested time period, some results have not been displayed. A complete set of results can be found in Results Review.       Discharge Medications   Current Discharge Medication List        START taking these medications    Details   levETIRAcetam (KEPPRA) 500 MG tablet Take 1 tablet (500 mg) by mouth 2 times daily for 11 doses  Qty: 11 tablet, Refills: 0    Associated Diagnoses: Subarachnoid hemorrhage (H)           CONTINUE these medications which have NOT CHANGED    Details   acetaminophen (TYLENOL) 500 MG tablet Take 1,000 mg by mouth every 6 hours as needed for mild pain      albuterol (PROAIR HFA/PROVENTIL HFA/VENTOLIN HFA) 108 (90 Base) MCG/ACT inhaler INHALE TWO PUFFS BY MOUTH EVERY FOUR HOURS AS NEEDED FOR SHORTNESS OF BREATH/DYSPNEA  Qty: 25.5 g, Refills: 0    Associated Diagnoses: ILD (interstitial lung disease) (H)      azaTHIOprine (IMURAN) 50 MG tablet TAKE TWO TABLETS BY MOUTH TWICE DAILY  Qty: 120 tablet, Refills: 1    Associated Diagnoses: Myasthenia gravis with exacerbation (H)      buPROPion (WELLBUTRIN XL) 150 MG 24 hr tablet Take 2 tablets (300 mg) by mouth every morning  Qty: 90 tablet, Refills: 1    Associated Diagnoses: Major depressive disorder, recurrent, mild (H24)      Calcium-Magnesium-Zinc 500-250-12.5 MG TABS Take 1 tablet by mouth  daily      FLUoxetine (PROZAC) 40 MG capsule Take 1 capsule (40 mg) by mouth daily  Qty: 90 capsule, Refills: 1    Associated Diagnoses: Major depressive disorder, recurrent, mild (H24)      LORazepam (ATIVAN) 0.5 MG tablet TAKE HALF TO ONE TABLET BY MOUTH THREE TO FIVE TIMES A WEEK AS NEEDED.  Qty: 20 tablet, Refills: 0    Associated Diagnoses: Acute reaction to stress      Multiple Vitamins-Minerals (MULTIVITAMIN & MINERAL PO) Take 1 tablet by mouth daily       pravastatin (PRAVACHOL) 40 MG tablet TAKE ONE TABLET BY MOUTH ONE TIME DAILY  Qty: 90 tablet, Refills: 2    Associated Diagnoses: Coronary artery disease involving native coronary artery of native heart without angina pectoris      predniSONE (DELTASONE) 5 MG tablet Take 1 tablet by mouth daily      pyRIDostigmine (MESTINON) 60 MG tablet TAKE ONE TABLET BY MOUTH THREE TIMES DAILY  Qty: 270 tablet, Refills: 0    Associated Diagnoses: Myasthenia gravis without exacerbation (H)      rivastigmine (EXELON) 9.5 MG/24HR 24 hr patch Place 1 patch onto the skin daily  Qty: 90 patch, Refills: 1    Associated Diagnoses: Mild cognitive impairment           STOP taking these medications       aspirin (ASA) 81 MG EC tablet Comments:   Reason for Stopping:             Allergies   Allergies   Allergen Reactions    Daypro [Oxaprozin] Rash           Lidocaine Other (See Comments)     Rapid heart rate    Nitroglycerin Other (See Comments)     Causes low blood pressure    Penicillins Unknown     Occurred as child.    Sulfa Antibiotics Rash

## 2024-07-01 NOTE — PLAN OF CARE
[place] [facility] DISCHARGE NOTE    Patient discharged to home at 11:48 AM via wheel chair. Accompanied by spouse and daughter and staff. Discharge instructions reviewed with patient, spouse, and daughter, opportunity offered to ask questions. Prescriptions sent to patients preferred pharmacy. All belongings sent with patient.    Leana Sarkar, RNGoal Outcome Evaluation:      Plan of Care Reviewed With: patient, spouse    Overall Patient Progress: no changeOverall Patient Progress: no change

## 2024-07-01 NOTE — PROGRESS NOTES
Care Management Discharge Note    Discharge Date: 07/01/2024       Discharge Disposition: Home Care    Discharge Services: Home Care    Discharge DME: Walker    Discharge Transportation: family or friend will provide    Private pay costs discussed: Not applicable    Does the patient's insurance plan have a 3 day qualifying hospital stay waiver?  No    PAS Confirmation Code:    Patient/family educated on Medicare website which has current facility and service quality ratings: yes    Education Provided on the Discharge Plan: Yes  Persons Notified of Discharge Plans: [facility], patient, , lvm for daughter  Patient/Family in Agreement with the Plan: yes    Handoff Referral Completed: Yes    Additional Information:  Per MD at IDT rounds pt is medically stable for discharge today. Pt accepted with homecare services. Per IDT rounds, SLUMS not performed today by OT, to be performed outpatient    CM met bedside with pt, provided brochure for [facility]. CM called pts  to update him on discharge plan,  will be here about 11:30 for transport and accepting of homecare services. CM received a vm message that pts daughter David wanted an update on discharge. CM placed call and lvm to call back regarding discharge.    CM received call back from David and answered her questions, David informed about information regarding homecare and [place] assessment is in discharge instructions. Demarcuselvia thankful and denied further questions at this time.    Plan: [facility] (phone: [phone] Fax: [phone]) for RN, PT/OT, HHA & SW services upon discharge.  MD to place orders.     Transport:  at 11:30am    Roxy Archibald RNCM  Care Transitions Registered Nurse  Tele: [phone]       Cantharidin Pregnancy And Lactation Text: This medication has not been proven safe during pregnancy. It is unknown if this medication is excreted in breast milk.

## 2024-07-01 NOTE — PLAN OF CARE
Physical Therapy Discharge Summary    Reason for therapy discharge:    Discharged to home with home therapy.    Progress towards therapy goal(s). See goals on Care Plan in [facility] electronic health record for goal details.  Goals partially met.  Barriers to achieving goals:   discharge from facility.    Therapy recommendation(s):    Continued therapy is recommended.  Rationale/Recommendations:  Recommend continued home care PT to increase indep and safety in own environment.

## 2024-07-01 NOTE — PLAN OF CARE
Problem: Adult Inpatient Plan of Care  Goal: Plan of Care Review  Description: The Plan of Care Review/Shift note should be completed every shift.  The Outcome Evaluation is a brief statement about your assessment that the patient is improving, declining, or no change.  This information will be displayed automatically on your shift  note.  Flowsheets (Taken 7/1/2024 0311)  Outcome Evaluation: Patient alert and oriented x 4. Assist of 1 with walker and gaitbelt. VSS on room air, afebrile. Pan to return to home with home care when deemed medically stable.  Plan of Care Reviewed With: patient  Overall Patient Progress: no change  Goal: Absence of Hospital-Acquired Illness or Injury  Intervention: Identify and Manage Fall Risk  Recent Flowsheet Documentation  Taken 7/1/2024 0045 by Gabbie Kendrick RN  Safety Promotion/Fall Prevention:   activity supervised   clutter free environment maintained   lighting adjusted   nonskid shoes/slippers when out of bed   room near nurse's station  Intervention: Prevent Skin Injury  Recent Flowsheet Documentation  Taken 7/1/2024 0045 by Gabbie Kendrick RN  Body Position: position changed independently  Intervention: Prevent Infection  Recent Flowsheet Documentation  Taken 7/1/2024 0045 by Gabbie Kendrick, RN  Infection Prevention:   rest/sleep promoted   single patient room provided     Problem: Fall Injury Risk  Goal: Absence of Fall and Fall-Related Injury  Intervention: Promote Injury-Free Environment  Recent Flowsheet Documentation  Taken 7/1/2024 0045 by Gabbie Kendrick RN  Safety Promotion/Fall Prevention:   activity supervised   clutter free environment maintained   lighting adjusted   nonskid shoes/slippers when out of bed   room near nurse's station   Goal Outcome Evaluation:      Plan of Care Reviewed With: patient    Overall Patient Progress: no changeOverall Patient Progress: no change    Outcome Evaluation: Patient alert and oriented x 4. Assist  of 1 with walker and gaitbelt. VSS on room air, afebrile. Pan to return to home with home care when deemed medically stable.

## 2024-07-02 ENCOUNTER — PATIENT OUTREACH (OUTPATIENT)
Dept: CARE COORDINATION | Facility: CLINIC | Age: 74
End: 2024-07-02
Payer: COMMERCIAL

## 2024-07-02 ENCOUNTER — TELEPHONE (OUTPATIENT)
Dept: CARE COORDINATION | Facility: CLINIC | Age: 74
End: 2024-07-02
Payer: COMMERCIAL

## 2024-07-02 ENCOUNTER — TELEPHONE (OUTPATIENT)
Dept: CARDIOLOGY | Facility: CLINIC | Age: 74
End: 2024-07-02
Payer: COMMERCIAL

## 2024-07-02 ENCOUNTER — CARE COORDINATION (OUTPATIENT)
Dept: NEUROSURGERY | Facility: CLINIC | Age: 74
End: 2024-07-02
Payer: COMMERCIAL

## 2024-07-02 DIAGNOSIS — I60.9 SUBARACHNOID HEMORRHAGE (H): Primary | ICD-10-CM

## 2024-07-02 NOTE — PROGRESS NOTES
Clinic Care Coordination Contact  Transitions of Care Outreach  Chief Complaint   Patient presents with    Clinic Care Coordination - Post Hospital       Most Recent Admission Date: 6/29/2024   Most Recent Admission Diagnosis: Subarachnoid hemorrhage (H) - I60.9  Gait instability - R26.81  Cognitive impairment - R41.89  Fall, initial encounter - W19.XXXA     Most Recent Discharge Date: 7/1/2024   Most Recent Discharge Diagnosis: Fall, initial encounter - W19.XXXA  Subarachnoid hemorrhage (H) - I60.9  Gait instability - R26.81  Cognitive impairment - R41.89     Transitions of Care Assessment    Discharge Assessment  How are you doing now that you are home?: Spoke with both Kristina and Jerrod via speaker.  Reviewed AVS in detail.  They were not aware of the neurosurgery appointment referral.  Explained the rationale and that neurosurgereon's are specialists that monitor her injury.  She would not need surgery.  They stated an understanding.  Gave them the phone number to schedule this appointment if they do not hear from scheduling in the next 2 business days. [phone].  See note below regarding PCP follow up and Cardiology.  Patient states she is feeling really good 'aside from the bump on her head'.  They have a VM from home care and plan to call them back later today.  Deny any CC needs.  How are your symptoms? (Red Flag symptoms escalate to triage hotline per guidelines): Improved  Do you know how to contact your clinic care team if you have future questions or changes to your health status? : Yes  Does the patient have their discharge instructions? : Yes  Does the patient have questions regarding their discharge instructions? : No  Were you started on any new medications or were there changes to any of your previous medications? : Yes  Does the patient have all of their medications?: Yes  Do you have questions regarding any of your medications? : No  Do you have all of your needed medical supplies or equipment  (DME)?  (i.e. oxygen tank, CPAP, cane, etc.): Yes    Post-op ([facility] Only)  If the patient had a surgery or procedure, do they have any questions for a nurse?: No    Post-op (Clinicians Only)  Did the patient have surgery or a procedure: No  Fever: No  Chills: No  Eating & Drinking: eating and drinking without complaints/concerns  PO Intake: regular diet  Bowel Function: normal  Date of last BM: 07/02/24  Urinary Status: voiding without complaint/concerns    Discussed Cardiology Mychart note and call back needed to schedule follow up appointment.  They stated they would call.      Per AVS PCP & labs in 7 days are needed.  Jerrod stating they just had an appointment 6/27 and are hoping to just come in for a lab-only appointment as they are seeing the neurology specialists 7/8.  Will send a telephone encounter to Dr. Sandoval regarding this.    Follow up Plan     Discharge Follow-Up  Discharge follow up appointment scheduled in alignment with recommended follow up timeframe or Transitions of Risk Category? (Low = within 30 days; Moderate= within 14 days; High= within 7 days): Yes  Discharge Follow Up Appointment Date: 07/08/24  Discharge Follow Up Appointment Scheduled with?: Specialty Care Provider    Future Appointments   Date Time Provider Department Center   7/8/2024  3:30 PM Rene Manning MD [facility]   8/16/2024  4:00 PM Obdulia Macias MD [facility]   10/11/2024  3:00 PM Obdulia Macias MD [facility]   12/5/2024 10:00 AM  [facility]   12/5/2024 10:30 AM Perlman, David Morris, MD [facility]   12/13/2024 11:30 AM Paul Mark MD [facility]       Outpatient Plan as outlined on AVS reviewed with patient.    For any urgent concerns, please contact our 24 hour nurse triage line: [phone] ([phone])       Wandy Grajeda RN

## 2024-07-02 NOTE — TELEPHONE ENCOUNTER
Clinic Care Coordination Contact    Spoke with  Jerrod & Kristina today for a Post Hospital discharge call.  See Patient Outreach dated 7/2 for additional details.  Hospital paperwork recommending PCP appointment in 7 days along with labs.    However spouse is asking if patient could come in for lab only visit as Kristina just had a PCP office visit on 6/27 and is seeing a specialist 7/8 along with Neurosurgery follow up in 4 weeks.    Could your team please reach out to Jerrod directly to assist with scheduling?

## 2024-07-02 NOTE — TELEPHONE ENCOUNTER
Left Voicemail (1st Attempt) and Sent [employer] (1st Attempt) for the patient to call back and schedule the following:    Appointment type: Return Provider Change  Provider: Dr. Carbone  Return date: next available  Specialty phone number: [phone]  Additional appointment(s) needed:   Additonal Notes:     Attempted to schedule a return provider change appointment with either Dr. Boykin, Dr. Ramos, Dr. Centeno or Dr. Mondragon.    Also sent a [employer] message.    Amanda MELCHOR/Complex Procedure    [facility]   Neurology, NeuroSurgery, NeuroPsychology, Pain Management and Cardiology Specialties  Medical/Surgical Adult Specialties

## 2024-07-03 ENCOUNTER — TELEPHONE (OUTPATIENT)
Dept: FAMILY MEDICINE | Facility: CLINIC | Age: 74
End: 2024-07-03
Payer: COMMERCIAL

## 2024-07-03 NOTE — TELEPHONE ENCOUNTER
Home Care is calling regarding an established patient with [facility].    Requesting orders from: Rolo Sandoval  Provider is following patient: Yes  Is this a 60-day recertification request?  No    Orders Requested  Patient discharged from the hospital on 07/01/24.   Skilled Nursing  Request for delay in care, service is not able to be provided within same scheduled day.   Home care delayed until 07/04/24 secondary to staffing issues.     Physical Therapy  Request for delay in care, service is not able to be provided within same scheduled day.   Home care delayed until 07/04/24 secondary to stafiing issues.     Occupational Therapy  Request for delay in care, service is not able to be provided within same scheduled day.     Home care delayed until 07/04/24 secondary to stafiing issues.   Social Work  Request for delay in care, service is not able to be provided within same scheduled day.  Home care delayed until 07/04/24 secondary to stafiing issues.   HHA (Home Health Aide)  Request for delay in care, service is not able to be provided within same scheduled day  Home care delayed until 07/04/24 secondary to stafiing issues.       Verbal orders given.  Home Care will send orders for provider to sign.    Elizabeth Vogel RN

## 2024-07-04 ENCOUNTER — MEDICAL CORRESPONDENCE (OUTPATIENT)
Dept: HEALTH INFORMATION MANAGEMENT | Facility: CLINIC | Age: 74
End: 2024-07-04

## 2024-07-08 ENCOUNTER — OFFICE VISIT (OUTPATIENT)
Dept: NEUROLOGY | Facility: CLINIC | Age: 74
End: 2024-07-08
Payer: COMMERCIAL

## 2024-07-08 VITALS
OXYGEN SATURATION: 98 % | SYSTOLIC BLOOD PRESSURE: 190 MMHG | DIASTOLIC BLOOD PRESSURE: 68 MMHG | HEART RATE: 72 BPM | RESPIRATION RATE: 16 BRPM

## 2024-07-08 DIAGNOSIS — G72.41 IBM (INCLUSION BODY MYOSITIS) (H): Primary | ICD-10-CM

## 2024-07-08 DIAGNOSIS — M35.01 SJOGREN'S SYNDROME WITH KERATOCONJUNCTIVITIS SICCA (H): Primary | ICD-10-CM

## 2024-07-08 PROCEDURE — 99214 OFFICE O/P EST MOD 30 MIN: CPT | Performed by: PSYCHIATRY & NEUROLOGY

## 2024-07-08 PROCEDURE — G2211 COMPLEX E/M VISIT ADD ON: HCPCS | Performed by: PSYCHIATRY & NEUROLOGY

## 2024-07-08 RX ORDER — PREDNISONE 5 MG/1
5 TABLET ORAL DAILY
Qty: 90 TABLET | Refills: 1 | Status: SHIPPED | OUTPATIENT
Start: 2024-07-08

## 2024-07-08 ASSESSMENT — PAIN SCALES - GENERAL: PAINLEVEL: NO PAIN (0)

## 2024-07-08 NOTE — PATIENT INSTRUCTIONS
I am appreciating some differences in your muscle exam today, compared to 1 or 2 years ago.  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  This is not a rare muscle disease, but unfortunately there is no specific treatment available for it, and the muscle weakness tends to get worse over time.    This diagnosis can occasionally be missed on the first muscle biopsy (if you recall, we did a biopsy on your left arm 2 years ago, and we did not make this diagnosis after the biopsy).  Sometimes a second muscle biopsy may be needed to confirm this, and I think we should proceed.    My nurse Courtney will be calling you with the details about the biopsy scheduling.    Follow-up in 4 months, sooner if needed.

## 2024-07-08 NOTE — LETTER
7/8/2024       RE: Albina Pedro  [id number] [place] [address]     Dear Colleague,    Thank you for referring your patient, Albina Pedro, to the [facility] [place] at [facility]. Please see a copy of my visit note below.    Rolo Sandoval MD (PCP)  [place], July 8, 2024    Dear Dr. Sandoval,    I had the patient see Kristina in follow-up at the [facility]/neuromuscular clinic.  We previously diagnosed her with seronegative generalized myasthenia gravis, based on symptoms of ptosis, diplopia, facial weakness, and triceps weakness, and abnormal repetitive nerve stimulation of the anconeus muscle with a decrement >30%.  She previously had a left biceps muscle biopsy, which showed mitochondrial changes that did not align well with her clinical presentation. There was at least one muscle fiber with vacuoles, but not rimmed vacuoles, and there was no inflammation or active myopathy. Acetylcholine receptor and MuSK antibodies were negative.  She accomplished partial control of her symptoms with oral pyridostigmine, low-dose prednisone, and azathioprine, but she had major side effects including delirium with higher prednisone dose.  Her insurance declined to reimburse IVIG or efgartigimod.      She also has a number of other neurological problems, including mild cognitive impairment, possible early Alzheimer's dementia, for which she is under the care of our memory specialist Dr. Macias.  She is not felt to be a candidate for anti-amyloid therapies.  She is on rivastigmine patch. She also has significant psychiatric issues with anxiety, depression, and hallucinations.  In the last visit,  recommended reestablishing care with psychiatry.      Last year she had problematic right-sided back pain due to lumbar radiculopathy, confirmed on EMG.  This seems to be much better now, without further interventions in  the last 8 to 9 months.  Finally, she has Sjogren syndrome and she is under the care of Dr. Mark, one of our rheumatologist.    Her spouse tells me that recently her balance is getting worse and she had a few serious falls last month (3 within 4 days) resulting in head injury, and a left temporal subarachnoid hemorrhage.  After the last fall, she began using her walker more consistently, especially when going out of her house.  Her  reports that she still has ptosis on one of her eyelids, on some days but not every day.  The patient denies diplopia, dysphagia, difficulty chewing, dysarthria, dyspnea on exertion, arm fatigue when brushing her teeth or combing her hair.  She has to use her arms to get up from a chair at all times.  She denies numbness of her feet.    Her MG ADL score is currently 3 (1 for ptosis, 2 for leg weakness).     We intentionally did not discuss her neuropsychiatric and cognitive issues, because we wanted to focus on the neuromuscular issues today.    BP (!) 190/68   Pulse 72   Resp 16   SpO2 98%     Current Outpatient Medications   Medication Sig Dispense Refill     acetaminophen (TYLENOL) 500 MG tablet Take 1,000 mg by mouth every 6 hours as needed for mild pain       albuterol (PROAIR HFA/PROVENTIL HFA/VENTOLIN HFA) 108 (90 Base) MCG/ACT inhaler INHALE TWO PUFFS BY MOUTH EVERY FOUR HOURS AS NEEDED FOR SHORTNESS OF BREATH/DYSPNEA 25.5 g 0     azaTHIOprine (IMURAN) 50 MG tablet TAKE TWO TABLETS BY MOUTH TWICE DAILY 120 tablet 1     buPROPion (WELLBUTRIN XL) 150 MG 24 hr tablet Take 2 tablets (300 mg) by mouth every morning 90 tablet 1     Calcium-Magnesium-Zinc 500-250-12.5 MG TABS Take 1 tablet by mouth daily       FLUoxetine (PROZAC) 40 MG capsule Take 1 capsule (40 mg) by mouth daily 90 capsule 1     levETIRAcetam (KEPPRA) 500 MG tablet Take 1 tablet (500 mg) by mouth 2 times daily for 11 doses 11 tablet 0     LORazepam (ATIVAN) 0.5 MG tablet TAKE HALF TO ONE TABLET BY MOUTH  THREE TO FIVE TIMES A WEEK AS NEEDED. 20 tablet 0     Multiple Vitamins-Minerals (MULTIVITAMIN & MINERAL PO) Take 1 tablet by mouth daily        pravastatin (PRAVACHOL) 40 MG tablet TAKE ONE TABLET BY MOUTH ONE TIME DAILY 90 tablet 2     predniSONE (DELTASONE) 5 MG tablet TAKE ONE TABLET BY MOUTH ONE TIME DAILY 90 tablet 1     pyRIDostigmine (MESTINON) 60 MG tablet TAKE ONE TABLET BY MOUTH THREE TIMES DAILY 270 tablet 0     rivastigmine (EXELON) 9.5 MG/24HR 24 hr patch Place 1 patch onto the skin daily 90 patch 1     No current facility-administered medications for this visit.       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       EXAM: She has no definite ptosis after 30 seconds of sustained upward gaze, although there may be levator dehiscence on both sides.  There is moderate weakness of the right orbicularis oculi, and none or very mild on the left.  There is mild lower facial weakness (orbicularis oris) on both sides.  Cheek puff is slightly weak.  Tongue shows no atrophy or fasciculations, and strong lateral movements.  Jaw opening and closure is normal.  Neck flexion and extension strength are normal.  At the upper limbs, strength is as follows on MRC scale (right/left): Deltoids 5/5, biceps 5/4+, triceps 4 -/4 -, wrist extensors 5/5, FDI 5/5, APB 5/5.  Long finger flexors are normal on the right, but notably mildly weak on the left with FPL and FDP II-V strength of 4.     At the lower extremities, hip flexion is 5 right and 4 left.  I do appreciate mild weakness of the left knee extension today at 4+, whereas the right is normal.  I had not previously appreciated this  finding.  Knee flexion is strong on both sides.  Foot dorsiflexion is bilaterally 5.  Her right ankle jerk is absent whereas the left is 1+.  She has very mildly reduced vibration at the toes, at least 5 seconds on both sides, which could be considered low normal for a 73-year-old.  Position sensation is preserved at the toes.  There is atrophy of the left quadriceps particularly affecting the vastus medialis and lateralis.  I do not see any fasciculations of the legs.    In summary, Kristina has a diagnosis of seronegative myasthenia gravis, demonstrated on repetitive nerve stimulation studies, and Sjogren syndrome.  Despite appropriate immunotherapy for seronegative myasthenia, her facial weakness has not improved, and today I do appreciate asymmetric weakness of the left long finger flexors and quadriceps, out of proportion to other muscles.  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  This would explain her ongoing gait instability and falls, which should not happen with well-controlled myasthenia gravis.  She did have one muscle biopsy in May 2022, which did not allow for a diagnosis of IBM to be made, but it is not unusual for this diagnosis to be missed on biopsies done in the early stage of the disease.  Therefore, I advocate for a repeat muscle biopsy of the left vastus lateralis.  The patient is in agreement with this plan, and so is her spouse.  We will schedule it, and I will contact the patient with the results.  It is of paramount importance to know if this is IBM or not, because this disease is not treatable, unlike seronegative myasthenia, and the weakness is expected to worsen over time. I am not checking serum CN1A antibody because this can be positive in patients with Sjogren syndrome without IBM, plus a negative result does not rule out IBM (sensitivity is only 50-60%).    She should continue using her walker at all times to reduce the risk of falling, and it is a good idea for her  to see physical therapy as outpatient.    I will defer management of her Sjogren syndrome to rheumatology, and her MCI/early Alzheimer's to the memory specialist.    Follow-up in 4 months, sooner if needed.    Sincerely,      Rene Manning MD, FAAN    The longitudinal plan of care for the diagnosis(es)/condition(s) as documented were addressed during this visit. Due to the added complexity in care, I will continue to support Kristina in the subsequent management and with ongoing continuity of care.    Billing MDM level 4 (moderate) based on 1) Problems assessed: One chronic disorder with progression, exacerbation, or side effects of treatment (muscle weakness, possible IBM) and 2)Risk: prescription drug management, see above.

## 2024-07-08 NOTE — PROGRESS NOTES
Rolo Sandoval MD (PCP)  [place], July 8, 2024    Dear Dr. Sandoval,    I had the patient see Kristina in follow-up at the [facility]/neuromuscular clinic.  We previously diagnosed her with seronegative generalized myasthenia gravis, based on symptoms of ptosis, diplopia, facial weakness, and triceps weakness, and abnormal repetitive nerve stimulation of the anconeus muscle with a decrement >30%.  She previously had a left biceps muscle biopsy, which showed mitochondrial changes that did not align well with her clinical presentation. There was at least one muscle fiber with vacuoles, but not rimmed vacuoles, and there was no inflammation or active myopathy. Acetylcholine receptor and MuSK antibodies were negative.  She accomplished partial control of her symptoms with oral pyridostigmine, low-dose prednisone, and azathioprine, but she had major side effects including delirium with higher prednisone dose.  Her insurance declined to reimburse IVIG or efgartigimod.      She also has a number of other neurological problems, including mild cognitive impairment, possible early Alzheimer's dementia, for which she is under the care of our memory specialist Dr. Macias.  She is not felt to be a candidate for anti-amyloid therapies.  She is on rivastigmine patch. She also has significant psychiatric issues with anxiety, depression, and hallucinations.  In the last visit,  recommended reestablishing care with psychiatry.      Last year she had problematic right-sided back pain due to lumbar radiculopathy, confirmed on EMG.  This seems to be much better now, without further interventions in the last 8 to 9 months.  Finally, she has Sjogren syndrome and she is under the care of Dr. Mark, one of our rheumatologist.    Her spouse tells me that recently her balance is getting worse and she had a few serious falls last month (3 within 4 days) resulting in head injury, and a left temporal subarachnoid hemorrhage.  After  the last fall, she began using her walker more consistently, especially when going out of her house.  Her  reports that she still has ptosis on one of her eyelids, on some days but not every day.  The patient denies diplopia, dysphagia, difficulty chewing, dysarthria, dyspnea on exertion, arm fatigue when brushing her teeth or combing her hair.  She has to use her arms to get up from a chair at all times.  She denies numbness of her feet.    Her MG ADL score is currently 3 (1 for ptosis, 2 for leg weakness).     We intentionally did not discuss her neuropsychiatric and cognitive issues, because we wanted to focus on the neuromuscular issues today.    BP (!) 190/68   Pulse 72   Resp 16   SpO2 98%     Current Outpatient Medications   Medication Sig Dispense Refill    acetaminophen (TYLENOL) 500 MG tablet Take 1,000 mg by mouth every 6 hours as needed for mild pain      albuterol (PROAIR HFA/PROVENTIL HFA/VENTOLIN HFA) 108 (90 Base) MCG/ACT inhaler INHALE TWO PUFFS BY MOUTH EVERY FOUR HOURS AS NEEDED FOR SHORTNESS OF BREATH/DYSPNEA 25.5 g 0    azaTHIOprine (IMURAN) 50 MG tablet TAKE TWO TABLETS BY MOUTH TWICE DAILY 120 tablet 1    buPROPion (WELLBUTRIN XL) 150 MG 24 hr tablet Take 2 tablets (300 mg) by mouth every morning 90 tablet 1    Calcium-Magnesium-Zinc 500-250-12.5 MG TABS Take 1 tablet by mouth daily      FLUoxetine (PROZAC) 40 MG capsule Take 1 capsule (40 mg) by mouth daily 90 capsule 1    levETIRAcetam (KEPPRA) 500 MG tablet Take 1 tablet (500 mg) by mouth 2 times daily for 11 doses 11 tablet 0    LORazepam (ATIVAN) 0.5 MG tablet TAKE HALF TO ONE TABLET BY MOUTH THREE TO FIVE TIMES A WEEK AS NEEDED. 20 tablet 0    Multiple Vitamins-Minerals (MULTIVITAMIN & MINERAL PO) Take 1 tablet by mouth daily       pravastatin (PRAVACHOL) 40 MG tablet TAKE ONE TABLET BY MOUTH ONE TIME DAILY 90 tablet 2    predniSONE (DELTASONE) 5 MG tablet TAKE ONE TABLET BY MOUTH ONE TIME DAILY 90 tablet 1    pyRIDostigmine  (MESTINON) 60 MG tablet TAKE ONE TABLET BY MOUTH THREE TIMES DAILY 270 tablet 0    rivastigmine (EXELON) 9.5 MG/24HR 24 hr patch Place 1 patch onto the skin daily 90 patch 1     No current facility-administered medications for this visit.       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       EXAM: She has no definite ptosis after 30 seconds of sustained upward gaze, although there may be levator dehiscence on both sides.  There is moderate weakness of the right orbicularis oculi, and none or very mild on the left.  There is mild lower facial weakness (orbicularis oris) on both sides.  Cheek puff is slightly weak.  Tongue shows no atrophy or fasciculations, and strong lateral movements.  Jaw opening and closure is normal.  Neck flexion and extension strength are normal.  At the upper limbs, strength is as follows on MRC scale (right/left): Deltoids 5/5, biceps 5/4+, triceps 4 -/4 -, wrist extensors 5/5, FDI 5/5, APB 5/5.  Long finger flexors are normal on the right, but notably mildly weak on the left with FPL and FDP II-V strength of 4.     At the lower extremities, hip flexion is 5 right and 4 left.  I do appreciate mild weakness of the left knee extension today at 4+, whereas the right is normal.  I had not previously appreciated this finding.  Knee flexion is strong on both sides.  Foot dorsiflexion is bilaterally 5.  Her right ankle jerk is absent whereas the left is 1+.  She has very mildly reduced vibration at the toes, at least 5 seconds on both sides, which could be considered low normal for a 73-year-old.  Position sensation is preserved at the toes.  There is atrophy of the left  quadriceps particularly affecting the vastus medialis and lateralis.  I do not see any fasciculations of the legs.    In summary, Kristina has a diagnosis of seronegative myasthenia gravis, demonstrated on repetitive nerve stimulation studies, and Sjogren syndrome.  Despite appropriate immunotherapy for seronegative myasthenia, her facial weakness has not improved, and today I do appreciate asymmetric weakness of the left long finger flexors and quadriceps, out of proportion to other muscles.  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  This would explain her ongoing gait instability and falls, which should not happen with well-controlled myasthenia gravis.  She did have one muscle biopsy in May 2022, which did not allow for a diagnosis of IBM to be made, but it is not unusual for this diagnosis to be missed on biopsies done in the early stage of the disease.  Therefore, I advocate for a repeat muscle biopsy of the left vastus lateralis.  The patient is in agreement with this plan, and so is her spouse.  We will schedule it, and I will contact the patient with the results.  It is of paramount importance to know if this is IBM or not, because this disease is not treatable, unlike seronegative myasthenia, and the weakness is expected to worsen over time. I am not checking serum CN1A antibody because this can be positive in patients with Sjogren syndrome without IBM, plus a negative result does not rule out IBM (sensitivity is only 50-60%).    She should continue using her walker at all times to reduce the risk of falling, and it is a good idea for her to see physical therapy as outpatient.    I will defer management of her Sjogren syndrome to rheumatology, and her MCI/early Alzheimer's to the memory specialist.    Follow-up in 4 months, sooner if needed.    Sincerely,      Rene Manning MD, FAAN    The longitudinal plan of care for the diagnosis(es)/condition(s) as documented were addressed during  this visit. Due to the added complexity in care, I will continue to support Kristina in the subsequent management and with ongoing continuity of care.    Billing MDM level 4 (moderate) based on 1) Problems assessed: One chronic disorder with progression, exacerbation, or side effects of treatment (muscle weakness, possible IBM) and 2)Risk: prescription drug management, see above.

## 2024-07-15 ENCOUNTER — TELEPHONE (OUTPATIENT)
Dept: NEUROLOGY | Facility: CLINIC | Age: 74
End: 2024-07-15
Payer: COMMERCIAL

## 2024-07-15 NOTE — TELEPHONE ENCOUNTER
[facility] Call Center    Phone Message    May a detailed message be left on voicemail: yes     Reason for Call: Pts spouse Jerrod Asking for an order for a Biopsy, Please call jerrod at [phone] to discuss further.    Action Taken: Message routed to:  [facility] ([facility] Neurology    Travel Screening: Not Applicable     Date of Service:

## 2024-08-05 ENCOUNTER — TELEPHONE (OUTPATIENT)
Dept: FAMILY MEDICINE | Facility: CLINIC | Age: 74
End: 2024-08-05
Payer: COMMERCIAL

## 2024-08-05 NOTE — TELEPHONE ENCOUNTER
I would decrease the wellbutrin from 2 tabs to 1 tab a day(300mg to 150 mg a day)while she is on the prednisone.   That should help with the side effects some what.    Rolo

## 2024-08-05 NOTE — TELEPHONE ENCOUNTER
[facility] - Status Update    Who is Calling: Patients  Jerrod     Update: Jerrod calling looking for some direction from Dr. Sandoval regarding patient and prescription for Prednisone. He states they have seen Neurology-see 7/8 visit-and provider is recommending continued Prednisone until follow-up in November. Jerrod states that the Prednisone gives patient side effects (nothing new since seeing provider) and is not good to take on a long term basis. He reports no changes since patient has been on medication. 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     Please advise.       Does caller want a call/response back: Yes     Could we send this information to you in [employer] or would you prefer to receive a phone call?:   Patient would prefer a phone call   Okay to leave a detailed message?: Yes at Home number on file [phone] (home)  KAREN Gautam

## 2024-08-06 NOTE — TELEPHONE ENCOUNTER
Left message for spouse (C2C) in regards to message below.    Heraclio BENSON RN  [facility]

## 2024-08-15 NOTE — PROGRESS NOTES
Neurology Memory Clinic VV Followup Note    f/u cognitive decline  =============================================================================  Interim History:  A 73 year old right-handed female who presents with memory problems for 2-3 years mostly STM and forgetfulness, more noticeably in past one year or so with some impairments in iADLs. She was initially seen in my clinic in 12/22/2023 when her exam noted MoCA 22/30 with 0/5 recall and MIS 5/15 (some new information registration as well as delayed recall), in addition to her LE weakness. She was then seen in clinic in June when she reported worsened depression and anxiety. She returns today for [facility] follow up along with her  Jerrod and son on the video call.    Since her last visit, her wellbutrin was reduced from 300mg to 150mg daily due to increased whole body shakiness. With dosage reduction, it seems better controlled. She sustained a recent fall about one month ago when she fell on wood deck and hit her head. She was found to have SAH with CT head done in 6/29/2024 which reported as follows:  1.  Small volume acute subarachnoid hemorrhage in the posterior left temporal lobe.  2.  Left frontal and right parietal scalp hematomas. No fracture.  Then a repeat CT head done in 6/30/2024 which reported as follows:  1.  Stable small volume presumed posttraumatic subarachnoid hemorrhage in the posterior left temporal region.     She reported some headache after the fall for 2 weeks now resolved. She is getting PT/OT sessions now average 30 minutes of exercise per day. She was also treated with keppra for prophylaxis after her fall and SAH for 1 week. She is off keppra now.    Overall she thinks her memory sometimes is good and sometimes is bad. Average 3 days/week are good days. Jerrod thinks that Kristina still struggles with recipe and other types of short-term memory. Rivastigmine was increased to 9.6mg skin patch since last visit. Denies any side effect.  They can't say if any significant benefits seen either.    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 Currently she still takes prednisone and mestinon.  =============================================================================  ROS:  General: no weight loss or fever  Cardiac: no chest pain or palpitations  Pulmonary: no SOB or cough  GI: no abdominal pain, nausea, vomiting, or constipation  : no urinary urgency, pain or incontinence  Musculoskeletal: no joint pain or stiffness  Skin: no rashes or easy bruising  Neurological: as above  =============================================================================  Medications:  Current Outpatient Medications   Medication Sig Dispense Refill    acetaminophen (TYLENOL) 500 MG tablet Take 1,000 mg by mouth every 6 hours as needed for mild pain      albuterol (PROAIR HFA/PROVENTIL HFA/VENTOLIN HFA) 108 (90 Base) MCG/ACT inhaler INHALE TWO PUFFS BY MOUTH EVERY FOUR HOURS AS NEEDED FOR SHORTNESS OF BREATH/DYSPNEA 25.5 g 0    azaTHIOprine (IMURAN) 50 MG tablet TAKE TWO TABLETS BY MOUTH TWICE DAILY 120 tablet 1    buPROPion (WELLBUTRIN XL) 150 MG 24 hr tablet Take 2 tablets (300 mg) by mouth every morning 90 tablet 1    Calcium-Magnesium-Zinc 500-250-12.5 MG TABS Take 1 tablet by mouth daily      FLUoxetine (PROZAC) 40 MG capsule Take 1 capsule (40 mg) by mouth daily 90 capsule 1    levETIRAcetam (KEPPRA) 500 MG tablet Take 1 tablet (500 mg) by mouth 2 times daily for 11 doses 11 tablet 0    LORazepam (ATIVAN) 0.5 MG tablet TAKE HALF TO ONE TABLET BY MOUTH THREE TO FIVE TIMES A WEEK AS NEEDED. 20 tablet 0    Multiple Vitamins-Minerals (MULTIVITAMIN & MINERAL PO) Take 1 tablet by mouth daily       pravastatin (PRAVACHOL) 40 MG tablet TAKE ONE TABLET BY MOUTH ONE TIME DAILY 90 tablet 2    predniSONE (DELTASONE) 5 MG tablet TAKE ONE TABLET BY MOUTH ONE TIME DAILY 90 tablet 1     pyRIDostigmine (MESTINON) 60 MG tablet TAKE ONE TABLET BY MOUTH THREE TIMES DAILY 270 tablet 0    rivastigmine (EXELON) 9.5 MG/24HR 24 hr patch Place 1 patch onto the skin daily 90 patch 1     No current facility-administered medications for this visit.     ==============================================================================================  A/P: A 73 year old right-handed female who presents with memory problems for 2-3 years mostly STM and forgetfulness, more noticeably in past one year or so with some impairments in iADLs. Clinical presentation is supportive of major neurocognitive disorder, mild dementia stage, possible Alzheimer's disease etiology. Recent fall with SAH in CT head. Prior CSF biomarker studies and neuropsych testing results support possible Alzheimer's disease etiology. Prior discussions about the eligibility criteria for anti-amyloid antibody therapies with concerns of underlying autoimmune diseases. Now further contraindicated with recent SAH due to fall.    - fall risks and safety precautions.  - prosthetic medical necklace order.  - continue PT/OT for gait strengthening.  - continue on rivastigmine patch at 9.5mg/24 hours.  - video follow up in Oct. 11th.

## 2024-08-16 ENCOUNTER — VIRTUAL VISIT (OUTPATIENT)
Dept: NEUROLOGY | Facility: CLINIC | Age: 74
End: 2024-08-16
Payer: COMMERCIAL

## 2024-08-16 DIAGNOSIS — G30.1 MILD LATE ONSET ALZHEIMER'S DEMENTIA WITH MOOD DISTURBANCE (H): Primary | ICD-10-CM

## 2024-08-16 DIAGNOSIS — F02.A3 MILD LATE ONSET ALZHEIMER'S DEMENTIA WITH MOOD DISTURBANCE (H): Primary | ICD-10-CM

## 2024-08-16 DIAGNOSIS — G31.84 MILD COGNITIVE IMPAIRMENT: ICD-10-CM

## 2024-08-16 RX ORDER — RIVASTIGMINE 9.5 MG/24H
1 PATCH, EXTENDED RELEASE TRANSDERMAL DAILY
Qty: 90 PATCH | Refills: 2 | Status: SHIPPED | OUTPATIENT
Start: 2024-08-16 | End: 2025-05-13

## 2024-08-16 ASSESSMENT — PATIENT HEALTH QUESTIONNAIRE - PHQ9: SUM OF ALL RESPONSES TO PHQ QUESTIONS 1-9: 7

## 2024-08-16 NOTE — PROGRESS NOTES
Kristina is a 73 year old who is being evaluated via a billable video visit.    How would you like to obtain your AVS? MyChart  If the video visit is dropped, the invitation should be resent by: Text to cell phone:   Will anyone else be joining your video visit? Yes:  will be joining . How would they like to receive their invitation? Text to cell phone:             Video-Visit Details  Video Start Time: 4:15pm  Type of service:  Video Visit   Video End Time: 4:50PM  Originating Location (pt. Location): Home    Distant Location (provider location):  On-site  Platform used for Video Visit: Thania  Signed Electronically by: Obdulia Macias MD

## 2024-08-16 NOTE — LETTER
8/16/2024       RE: Albina Pedro  [address]  [place] [address]     Dear Colleague,    Thank you for referring your patient, Albina Pedro, to the  [facility]. Please see a copy of my visit note below.    Neurology Memory Clinic VVC Followup Note    f/u cognitive decline  =============================================================================  Interim History:  A 73 year old right-handed female who presents with memory problems for 2-3 years mostly STM and forgetfulness, more noticeably in past one year or so with some impairments in iADLs. She was initially seen in my clinic in 12/22/2023 when her exam noted MoCA 22/30 with 0/5 recall and MIS 5/15 (some new information registration as well as delayed recall), in addition to her LE weakness. She was then seen in clinic in June when she reported worsened depression and anxiety. She returns today for C follow up along with her  Jerrod and son on the video call.    Since her last visit, her wellbutrin was reduced from 300mg to 150mg daily due to increased whole body shakiness. With dosage reduction, it seems better controlled. She sustained a recent fall about one month ago when she fell on wood deck and hit her head. She was found to have SAH with CT head done in 6/29/2024 which reported as follows:  1.  Small volume acute subarachnoid hemorrhage in the posterior left temporal lobe.  2.  Left frontal and right parietal scalp hematomas. No fracture.  Then a repeat CT head done in 6/30/2024 which reported as follows:  1.  Stable small volume presumed posttraumatic subarachnoid hemorrhage in the posterior left temporal region.     She reported some headache after the fall for 2 weeks now resolved. She is getting PT/OT sessions now average 30 minutes of exercise per day. She was also treated with keppra for prophylaxis after her fall and SAH  for 1 week. She is off keppra now.    Overall she thinks her memory sometimes is good and sometimes is bad. Average 3 days/week are good days. Jerrod thinks that Kristina still struggles with recipe and other types of short-term memory. Rivastigmine was increased to 9.6mg skin patch since last visit. Denies any side effect. They can't say if any significant benefits seen either.    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 Currently she still takes prednisone and mestinon.  =============================================================================  ROS:  General: no weight loss or fever  Cardiac: no chest pain or palpitations  Pulmonary: no SOB or cough  GI: no abdominal pain, nausea, vomiting, or constipation  : no urinary urgency, pain or incontinence  Musculoskeletal: no joint pain or stiffness  Skin: no rashes or easy bruising  Neurological: as above  =============================================================================  Medications:  Current Outpatient Medications   Medication Sig Dispense Refill     acetaminophen (TYLENOL) 500 MG tablet Take 1,000 mg by mouth every 6 hours as needed for mild pain       albuterol (PROAIR HFA/PROVENTIL HFA/VENTOLIN HFA) 108 (90 Base) MCG/ACT inhaler INHALE TWO PUFFS BY MOUTH EVERY FOUR HOURS AS NEEDED FOR SHORTNESS OF BREATH/DYSPNEA 25.5 g 0     azaTHIOprine (IMURAN) 50 MG tablet TAKE TWO TABLETS BY MOUTH TWICE DAILY 120 tablet 1     buPROPion (WELLBUTRIN XL) 150 MG 24 hr tablet Take 2 tablets (300 mg) by mouth every morning 90 tablet 1     Calcium-Magnesium-Zinc 500-250-12.5 MG TABS Take 1 tablet by mouth daily       FLUoxetine (PROZAC) 40 MG capsule Take 1 capsule (40 mg) by mouth daily 90 capsule 1     levETIRAcetam (KEPPRA) 500 MG tablet Take 1 tablet (500 mg) by mouth 2 times daily for 11 doses 11 tablet 0     LORazepam (ATIVAN) 0.5 MG tablet TAKE HALF TO ONE TABLET BY MOUTH  THREE TO FIVE TIMES A WEEK AS NEEDED. 20 tablet 0     Multiple Vitamins-Minerals (MULTIVITAMIN & MINERAL PO) Take 1 tablet by mouth daily        pravastatin (PRAVACHOL) 40 MG tablet TAKE ONE TABLET BY MOUTH ONE TIME DAILY 90 tablet 2     predniSONE (DELTASONE) 5 MG tablet TAKE ONE TABLET BY MOUTH ONE TIME DAILY 90 tablet 1     pyRIDostigmine (MESTINON) 60 MG tablet TAKE ONE TABLET BY MOUTH THREE TIMES DAILY 270 tablet 0     rivastigmine (EXELON) 9.5 MG/24HR 24 hr patch Place 1 patch onto the skin daily 90 patch 1     No current facility-administered medications for this visit.     ==============================================================================================  A/P: A 73 year old right-handed female who presents with memory problems for 2-3 years mostly STM and forgetfulness, more noticeably in past one year or so with some impairments in iADLs. Clinical presentation is supportive of major neurocognitive disorder, mild dementia stage, possible Alzheimer's disease etiology. Recent fall with SAH in CT head. Prior CSF biomarker studies and neuropsych testing results support possible Alzheimer's disease etiology. Prior discussions about the eligibility criteria for anti-amyloid antibody therapies with concerns of underlying autoimmune diseases. Now further contraindicated with recent SAH due to fall.    - fall risks and safety precautions.  - prosthetic medical necklace order.  - continue PT/OT for gait strengthening.  - continue on rivastigmine patch at 9.5mg/24 hours.  - video follow up in Oct. 11th.      Kristina is a 73 year old who is being evaluated via a billable video visit.    How would you like to obtain your AVS? MyChart  If the video visit is dropped, the invitation should be resent by: Text to cell phone:   Will anyone else be joining your video visit? Yes:  will be joining . How would they like to receive their invitation? Text to cell phone:             Video-Visit Details  Video Start  Time: 4:15pm  Type of service:  Video Visit   Video End Time: 4:50PM  Originating Location (pt. Location): Home    Distant Location (provider location):  On-site  Platform used for Video Visit: Thania  Signed Electronically by: Obdulia Macias MD         Again, thank you for allowing me to participate in the care of your patient.      Sincerely,    Obdulia Macias MD

## 2024-08-29 DIAGNOSIS — G70.00 MYASTHENIA GRAVIS WITHOUT EXACERBATION (H): ICD-10-CM

## 2024-08-29 RX ORDER — PYRIDOSTIGMINE BROMIDE 60 MG/1
60 TABLET ORAL 3 TIMES DAILY
Qty: 270 TABLET | Refills: 0 | Status: SHIPPED | OUTPATIENT
Start: 2024-08-29

## 2024-09-09 ENCOUNTER — TELEPHONE (OUTPATIENT)
Dept: NEUROLOGY | Facility: CLINIC | Age: 74
End: 2024-09-09
Payer: COMMERCIAL

## 2024-09-09 NOTE — TELEPHONE ENCOUNTER
[facility] Call Center    Phone Message    May a detailed message be left on voicemail: yes     Reason for Call: Other: Patient and spouse were calling requesting to discuss upcoming procedure/biopsy with Dr. Loo on 09/16 and would like to know arrival time and if pre procedure questions regarding medications and etc. Please call to advise.      Action Taken: Message routed to:  Other: Neurology    Travel Screening: Not Applicable     Date of Service:                                                                       Subjective     Patient ID: Raiza Mcpherson is a 36 y.o. female.    The patient location is: home  The chief complaint leading to consultation is: motion sickness    Visit type: audiovisual    Face to Face time with patient: 10  15 minutes of total time spent on the encounter, which includes face to face time and non-face to face time preparing to see the patient (eg, review of tests), Obtaining and/or reviewing separately obtained history, Documenting clinical information in the electronic or other health record, Independently interpreting results (not separately reported) and communicating results to the patient/family/caregiver, or Care coordination (not separately reported).         Each patient to whom he or she provides medical services by telemedicine is:  (1) informed of the relationship between the physician and patient and the respective role of any other health care provider with respect to management of the patient; and (2) notified that he or she may decline to receive medical services by telemedicine and may withdraw from such care at any time.    Notes:  Patient requesting scopolamine patches. She has history of motion sickness and is flying out tomorrow and returning on Monday.     Review of Systems   Constitutional:  Negative for activity change and unexpected weight change.   HENT:  Negative for hearing loss, rhinorrhea and trouble swallowing.    Eyes:  Negative for discharge and visual disturbance.   Respiratory:  Negative for chest tightness and wheezing.    Cardiovascular:  Negative for chest pain and palpitations.   Gastrointestinal:  Negative for blood in stool, constipation, diarrhea and vomiting.   Endocrine: Negative for polydipsia and polyuria.   Genitourinary:  Negative for difficulty urinating, dysuria, hematuria and menstrual problem.   Musculoskeletal:  Negative for arthralgias, joint swelling and neck pain.   Neurological:  Negative for weakness and headaches.    Psychiatric/Behavioral:  Negative for confusion and dysphoric mood.         Objective     Physical Exam       Assessment and Plan     1. Motion sickness, initial encounter  -     scopolamine (TRANSDERM-SCOP) 1.3-1.5 mg (1 mg over 3 days); Place 1 patch onto the skin every 72 hours.  Dispense: 3 patch; Refill: 0             No follow-ups on file.

## 2024-09-16 ENCOUNTER — HOSPITAL ENCOUNTER (OUTPATIENT)
Facility: AMBULATORY SURGERY CENTER | Age: 74
Discharge: HOME | End: 2024-09-16
Attending: PSYCHIATRY & NEUROLOGY
Payer: COMMERCIAL

## 2024-09-16 VITALS
SYSTOLIC BLOOD PRESSURE: 137 MMHG | OXYGEN SATURATION: 96 % | TEMPERATURE: 97.6 F | HEART RATE: 68 BPM | RESPIRATION RATE: 16 BRPM | DIASTOLIC BLOOD PRESSURE: 72 MMHG

## 2024-09-16 RX ORDER — MAGNESIUM HYDROXIDE 1200 MG/15ML
LIQUID ORAL PRN
Status: DISCONTINUED | OUTPATIENT
Start: 2024-09-16 | End: 2024-09-16 | Stop reason: HOSPADM

## 2024-09-16 NOTE — DISCHARGE INSTRUCTIONS
[facility]  Home Care Following Your Procedure  Call a doctor if you have signs of infection (fever, growing tenderness at the surgery site, a large amount of drainage or bleeding, severe pain, foul-smelling drainage, redness, swelling).             Tylenol/Acetaminophen Consumption    If you feel your pain relief is insufficient, you may take Tylenol/Acetaminophen in addition to your narcotic pain medication.   Be careful not to exceed 4,000 mg of Tylenol/Acetaminophen in a 24 hour period from all sources.  If you are taking extra strength Tylenol/acetaminophen (500 mg), the maximum dose is 8 tablets in 24 hours.  If you are taking regular strength acetaminophen (325 mg), the maximum dose is 12 tablets in 24 hours.      Your doctor is:  Dr. Tyrell Loo, General Surgery: [phone]                  Or dial [phone] and ask for the resident on call for:  General Surgery  For emergency care, call the:  [place]:  [phone] (TTY for hearing impaired: [phone])

## 2024-09-16 NOTE — OP NOTE
"PREOPERATIVE DIAGNOSIS: Myopathy    POSTOPERATIVE DIAGNOSIS: Myopathy    OPERATION: Left vastus lateralis muscle biopsy    INDICATIONS FOR PROCEDURE: Rule out Inclusion Body Myositis    DESCRIPTION OF PROCEDURE:  Informed consent was obtained on the patient to do a deep left vastus lateralis muscle biopsy. The left thigh was prepped with cholorprep and sterilely draped. Lidocaine 1% 22 mL total was injected locally during the biopsy procedure. A 1-inch incision was made over lateral thigh and the vastus lateralis muscle was exposed. The muscle had a pale \"fatty\" appearance to it. Several layers of the muscle were explored in an attempt to find more viable piece of muscle, but none were found in the surgical window.  Four small pieces of pale cord like muscle tissue were biopsied for routine histochemistry, electron microscopy, and metabolic studies as needed. The fascia and subcutaneous tissues were closed with 3-0 Vicryl and skin with 4-0 Vicryl. Steri-Strips and pressure dressing were applied over the wound site. There were no complications. Blood loss was minimal. Wound care instructions were given to the patient. She told that results of the biopsy would be available in 2-3 weeks.    Tyrell Loo MD  Department of Neurology      "

## 2024-09-24 DIAGNOSIS — I25.10 CORONARY ARTERY DISEASE INVOLVING NATIVE CORONARY ARTERY OF NATIVE HEART WITHOUT ANGINA PECTORIS: ICD-10-CM

## 2024-10-01 RX ORDER — PRAVASTATIN SODIUM 40 MG
40 TABLET ORAL DAILY
Qty: 90 TABLET | Refills: 0 | Status: SHIPPED | OUTPATIENT
Start: 2024-10-01

## 2024-10-01 NOTE — TELEPHONE ENCOUNTER
LVD:  0/23/2023  [facility] Monika Palomo MD  Cardiovascular Disease     LDL Cholesterol Calculated   Date Value Ref Range Status   12/04/2023 80 <=100 mg/dL Final   10/15/2020 35 <100 mg/dL Final     Comment:     Desirable:       <100 mg/dl       Refilled per protocol.

## 2024-10-04 ENCOUNTER — OFFICE VISIT (OUTPATIENT)
Dept: NEUROLOGY | Facility: CLINIC | Age: 74
End: 2024-10-04
Payer: COMMERCIAL

## 2024-10-04 DIAGNOSIS — G72.41 IBM (INCLUSION BODY MYOSITIS) (H): Primary | ICD-10-CM

## 2024-10-04 PROCEDURE — 88305 TISSUE EXAM BY PATHOLOGIST: CPT | Mod: 26 | Performed by: PSYCHIATRY & NEUROLOGY

## 2024-10-04 PROCEDURE — 88319 ENZYME HISTOCHEMISTRY: CPT | Mod: 26 | Performed by: PSYCHIATRY & NEUROLOGY

## 2024-10-04 PROCEDURE — 88314 HISTOCHEMICAL STAINS ADD-ON: CPT | Mod: 26 | Performed by: PSYCHIATRY & NEUROLOGY

## 2024-10-04 NOTE — PROGRESS NOTES
"[facility] PHYSICIANS   NEUROMUSCULAR PATHOLOGY REPORT     NEUROMUSCULAR LABORATORY [phone] / [phone]  [address] [place] [address],  [place] [address]     MUSCLE BIOPSY LIGHT MICROSCOPY REPORT    NAME: Albina Pedro  : 1950  MR#: [id number]  DATE OF BIOPSY: 2024  DATE OF REPORT: 10/04/2024  SPECIMEN NO:   SURGEON: Eze  REFERRING PHYSICIAN: Nigel    CLINICAL INFORMATION:    This 73 year-old woman had a muscle biopsy performed to investigate the possibility of having a myopathy, and specifically inclusion body myositis. She has a history of muscle fatigue and weakness for several years. Previously she was diagnosed with seronegative myasthenia gravis on the basis of abnormal repetitive nerve stimulation from the triceps and anconeus muscle. She had a rather poor response to pyridostigmine and steroids. In the last year her weakness has progressed leading to multiple falls. Her last clinic exam on 2024 showed selective weakness of the triceps, long finger flexors, and quadriceps, in an asymmetric fashion (left>right) raising concerns for [employer]. She had a previous muscle biopsy from the biceps in  at the same lab, showing a myopathy with mitochondrial changes, but there were no findings strongly suggestive of IBM; inflammation and rimmed vacuoles were lacking.    LEFT VASTUS LATERALIS MUSCLE BIOPSY:    Two pieces of muscle were quick frozen for light microscopy and histochemistry. Additional pieces were quick frozen for biochemical testing.    LIGHT MICROSCOPY:    Frozen sections stained with H&E, trichrome, PAS, Oil Red O, and Congo red, were available for review. There was advanced muscle degeneration with several regions of the sample appearing \"end-stage\", replaced by adipose and connective tissue. Other regions showed somewhat better preserved muscle architecture. There was markedly increased fiber-size variation due to the presence of many extremely " atrophic (often no more than 5 microns in diameter) and occasional hypertrophic fibers. Some of the atrophic fibers appeared markedly basophilic indicating regeneration. There were several fibers with internal nuclei, and numerous splitting fibers. Necrotic fibers were very rare or absent. There was prominent endomysial and perimysial fibrosis in a patchy fashion. Endomysial inflammation was not apparent on H&E; there was only one site identified on Congo Red stain, showing a cluster of mononuclear cells surrounding but not clearly invading an intact muscle fiber. The sample was characterized by severe and uneven freezing artefact, making it extremely difficult to identify vacuoles, but even after taking this artefact into account, there were rare (approximately 3) muscle fibers with rimmed vacuoles. Congo red stain did not reveal congophilic inclusions. Trichrome showed a few extremely atrophic fibers that appeared ragged red; there were no nemaline rods. Glycogen and lipid content were normal.    HISTOCHEMISTRY:    Frozen sections stained with ATPase (pH 4.35, 4.5 and 9.4), metachromatic ATPase, NADH, SDH, modified SDH, GONZALEZ, ?-GP, Fast Blue B, nonspecific esterase, phosphorylase A and MAD were available for review. ATPase staining identified fibers of types 1, 2a, and 2b. There was no evidence of fiber-type grouping. Atrophic fibers of all fiber types were seen. Oxidative enzyme stain deposition was markedly irregular, with several fibers appearing lobulated or moth-eaten. There were many ragged blue fibers on the modified SDH stain, most which were GONZALEZ deficient in neighboring sections. Fast Blue B staining was normal; there were no necrotic fibers, clusters of macrophages, or abnormal perimysial staining. Esterase staining identified endplates and atrophic fibers. Myophosphorylase and MAD stains were normal.    IMMUNOHISTOCHEMISTRY:    Monoclonal antibodies against CD4, CD8, CD20, CD68, CD45RO, beta-crystallin,  MHC I and MHC II ubiquitin were exposed to sections of frozen muscle and localized with peroxidase.  Many fibers showed increased surface expression of MHC I. MHC II stain was negative. No inflammatory cell collections were identified wiht CD4, CD8, CD20, or CD68 stains. Beta-crystallin showed a couple of muscle fibers with sarcoplasmic punctate aggregates.     DIAGNOSIS:    Severe chronic myopathy, with infrequent rimmed vacuoles and prominent mitochondrial abnormalities. 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 See comment.     COMMENT:    Diagnostic evaluation of this sample was challenging due to advanced muscle degeneration with end-stage appearance in a few regions, and severe freezing artefact. The main distinctive features of this biopsy were the (very infrequent) rimmed vacuoles and the striking mitochondrial pathology. Those findings can be seen with inclusion body myositis or other chronic inherited myopathies with rimmed vacuoles. A definitive histopathologic diagnosis of inclusion body myositis cannot be made due to lack of significant inflammation and lack of congophilic (amyloid) deposits. However, immune stains clearly showed MHC I upregulation in non-necrotic muscle fibers, which may support this diagnosis in the appropriate clinical context.      Rene Manning MD, FAAN  Clinical  of Neurology

## 2024-10-04 NOTE — LETTER
10/4/2024       RE: Albina Pedro  [id number]  [facility] [place] [address]     Dear Colleague,    Thank you for referring your patient, Albina Pedro, to the [facility] [place] [facility]. Please see a copy of my visit note below.    [facility] PHYSICIANS   NEUROMUSCULAR PATHOLOGY REPORT     NEUROMUSCULAR LABORATORY [phone] / [phone]  [address],  [place] [address]     MUSCLE BIOPSY LIGHT MICROSCOPY REPORT    NAME: Albina Pedro  : 1950  MR#: [id number]  DATE OF BIOPSY: 2024  DATE OF REPORT: 10/04/2024  SPECIMEN NO:   SURGEON: Eze  REFERRING PHYSICIAN: Nigel    CLINICAL INFORMATION:    This 73 year-old woman had a muscle biopsy performed to investigate the possibility of having a myopathy, and specifically inclusion body myositis. She has a history of muscle fatigue and weakness for several years. Previously she was diagnosed with seronegative myasthenia gravis on the basis of abnormal repetitive nerve stimulation from the triceps and anconeus muscle. She had a rather poor response to pyridostigmine and steroids. In the last year her weakness has progressed leading to multiple falls. Her last clinic exam on 2024 showed selective weakness of the triceps, long finger flexors, and quadriceps, in an asymmetric fashion (left>right) raising concerns for [employer]. She had a previous muscle biopsy from the biceps in  at the same lab, showing a myopathy with mitochondrial changes, but there were no findings strongly suggestive of IBM; inflammation and rimmed vacuoles were lacking.    LEFT VASTUS LATERALIS MUSCLE BIOPSY:    Two pieces of muscle were quick frozen for light microscopy and histochemistry. Additional pieces were quick frozen for biochemical testing.    LIGHT MICROSCOPY:    Frozen sections stained with H&E, trichrome, PAS, Oil Red O, and Congo red,  "were available for review. There was advanced muscle degeneration with several regions of the sample appearing \"end-stage\", replaced by adipose and connective tissue. Other regions showed somewhat better preserved muscle architecture. There was markedly increased fiber-size variation due to the presence of many extremely atrophic (often no more than 5 microns in diameter) and occasional hypertrophic fibers. Some of the atrophic fibers appeared markedly basophilic indicating regeneration. There were several fibers with internal nuclei, and numerous splitting fibers. Necrotic fibers were very rare or absent. There was prominent endomysial and perimysial fibrosis in a patchy fashion. Endomysial inflammation was not apparent on H&E; there was only one site identified on Congo Red stain, showing a cluster of mononuclear cells surrounding but not clearly invading an intact muscle fiber. The sample was characterized by severe and uneven freezing artefact, making it extremely difficult to identify vacuoles, but even after taking this artefact into account, there were rare (approximately 3) muscle fibers with rimmed vacuoles. Congo red stain did not reveal congophilic inclusions. Trichrome showed a few extremely atrophic fibers that appeared ragged red; there were no nemaline rods. Glycogen and lipid content were normal.    HISTOCHEMISTRY:    Frozen sections stained with ATPase (pH 4.35, 4.5 and 9.4), metachromatic ATPase, NADH, SDH, modified SDH, GONZALEZ, a-GP, Fast Blue B, nonspecific esterase, phosphorylase A and MAD were available for review. ATPase staining identified fibers of types 1, 2a, and 2b. There was no evidence of fiber-type grouping. Atrophic fibers of all fiber types were seen. Oxidative enzyme stain deposition was markedly irregular, with several fibers appearing lobulated or moth-eaten. There were many ragged blue fibers on the modified SDH stain, most which were GONZALEZ deficient in neighboring sections. Fast Blue " B staining was normal; there were no necrotic fibers, clusters of macrophages, or abnormal perimysial staining. Esterase staining identified endplates and atrophic fibers. Myophosphorylase and MAD stains were normal.    DIAGNOSIS:    Severe chronic myopathy, with infrequent rimmed vacuoles and prominent mitochondrial abnormalities. See comment.    COMMENT:    Diagnostic evaluation of this sample was challenging due to advanced muscle degeneration with end-stage appearance in a few regions, and severe freezing artefact. The main distinctive features of this biopsy were the (very infrequent) rimmed vacuoles and the striking mitochondrial pathology. Those findings can be seen with inclusion body myositis or other chronic inherited myopathies with rimmed vacuoles, yet a definitive histopathologic diagnosis of inclusion body myositis cannot be made due to lack of significant inflammation and lack of congophilic (amyloid) deposits. Additional immune stains including CD3, CD4, CD8, CD20, CD68, MHC I, MHC II, and beta-crystallin are recommended and they will be reported in addendum.    Rene Manning MD, FAAN  Clinical  of Neurology      Again, thank you for allowing me to participate in the care of your patient.      Sincerely,    Rene Manning MD

## 2024-10-09 NOTE — PROGRESS NOTES
No chief complaint on file.  Inflammatory myositis  ILD  Imuran monitoring    Date of initial visit: 3/25//2022  Last seen: 10/28/2022    DOS: 4/27/2023      ASSESSMENT AND PLAN:    Inflammatory myositis. Sjogren's. ILD. Albina Pedro 71 y.o. female with long standing h/o seropositive Sjogren's causing ILD, inflammatory myositis, R thigh panniculitis who presents for worsening joint pain, muscle weakness and deconditioning over past 2 years. Although she has OA and her ILD seems to be stable, it seems like she was doing well in the past while taking imuran (AZA). She was on AZA 3154-8102 and was tapered off slowly as was doing well. Max AZA dose was 100 mg every day and last dose was 50 mg every other day. She tolerated AZA in the past with no SEs. Her ILD has not worsened off AZA. No flare of rash. In 3/2022 (initial visit), I recommended her to resume taking AZA while doing additional work up. Plus, I recommend a course of prednisone taper. We discussed Sjogren's Dx, mx of sicca.     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 IVIG per Dr. Manning. Will have LP for Alzheimer's.    AZA monitoring. Labs q3mo    ILD. Follow up with pulmonary.        Plan:    Tell Beatriz that you would get IVIG at [place]    Continue imuran 50 mg a day with labs q 3months    Start tapering prednisone: 9-8-7-6 mg a day each course x 1 month then 5 mg a day and stay on 5 mg a day    DEXA bone density    MTM referral     Video visit in about 3 months    Paul Mark MD      Today, worsening memory    Return in 6 months (in person)    Tylenol extra strength 500 mg 1 tab every 6 hours as needed  19 min    HISTORY OF PRESENTING ILLNESS:       3/25/2022:    Kristina is a 72 yo female who presents today with her . She has been referred for m/o Sjogren's. She has h/o +SSA/SSB Sjogren's diagnosed in 2004 after having sicca x 20 yr followed by muscle weakness, inflammatory  arthritis, L thigh panniculitis, ILD. Was treated with prednisone, did not tolerate MTX. Was on AZA 2121-1781. Had +BINDU, +RF.    She was last seen by Dr. Moore in 1/2020 and before that, was under care of Dr. Ho who diagnosed and treated her since 2004.    She is here to discuss her joint pain.    She has diffuse arthralgia, no joint swelling, no AM stiffness, reports loss of strength of her hands with poor .    She has arthralgia, it got worse and went downhill for past 2 years.    Sometimes gets muscle weakness, more constant lately over past few months.    Has brain fog, getting worse.    Has progressive hair loss with bald spots.    No photosensitivity or skin rashes.    Uses biotene and water, wakes up thirsty. Had a tooth which broke, sometimes teeth are chipping. Has dry mouth due to Sjogren's.    Has dry eyes, uses OTC eyedrops.    Gets R droopy eyelid, now affected L eyelid.    Eyes look red ad dry.    No parotid gland swelling.    No CP, SOB, cough, fevers or night sweats.    Gets diarrhea from certain foods.    No numbness, tingling.    Has fatigue, difficult time staying awake.    She did well on her cognitive function testing.    Mestinon caused diarrhea and stopped taking it, scheduled to have EMG and do follow up with Dr. Manning.    Updated on cancer screening.    No dysphagia.    6/3/2022:    Kristina was initially seen in 3/2022 with muscle weakness, when she was treated with prednisone taper (4tab=20 mg qd x 5 days, 3tab=15 mg qd x 5 days, 2tab=10 mg qd x 5 days, 1 tab=5 mg qd x 5 days then stop) and was put back on imuran 50 mg every day. Prednisone helped her.    Overall feeling pretty good.    Had diarrhea but it is better.    Went on 4 almazan past weekend and did well.    She and her  have seen an improvement by resuming imuran.    Easier to get out of chair.    Waiting to hear about muscle biopsy result.     10/28/2022:    Kristina presents for follow up. She is here with her  . They report that Kristina is getting worse, feels weaker, has memory problem. Dr. Manning has ordered IVIG for myasthenia gravis, I see that the staff tried to reach Kristina to schedule at [place] with no success, Kristina reports that her phone is not working and they should call her . 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    No rash, or oral ulcers.    Has hair loss x past 6 months.    No stomach pain.    Sometimes has diarrhea based on what she eats.    Had a cough, resolved.    No CP.    Has dry mouth, solid dysphagia.    Dryness of eyes is worse. Uses OTC eyedrops.    Mestinon is not helping with MG.    Legs are weak, reports falls.    ROS: A comprehensive ROS was done. Positives are per HPI.    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       Past Surgical History:   Procedure Laterality Date     BIOPSY MUSCLE DIAGNOSTIC (LOCATION) Left 2022    Procedure: BIOPSY, MUSCLE LEFT biceps @0900;  Surgeon: Tyrell Loo MD;  Location: [facility] [place]     C/SECTION, LOW TRANSVERSE  10/13/1978    , Low Transverse     COLONOSCOPY       OPEN REDUCTION INTERNAL FIXATION ANKLE Right 2019    Procedure: Open reduction internal fixation right lateral malleolus fracture;  Surgeon: Rolo Oconnor DPM;  Location: [facility] OR     SURGICAL HISTORY OF -            SURGICAL HISTORY OF -   00    Colonoscopy       Family History   Problem Relation Age of Onset     Cancer Mother         Breast and liver- age 43     Heart Disease Father         ? chf?h/o CVA     Alzheimer Disease Father      Hypertension Father       Neurologic Disorder Father         CVA     Diabetes Maternal Grandmother      Breast Cancer Maternal Aunt      Breast Cancer Paternal Aunt      Cancer - colorectal No family hx of      Prostate Cancer No family hx of        Social History     Tobacco Use     Smoking status: Never     Smokeless tobacco: Never   Vaping Use     Vaping status: Not on file   Substance Use Topics     Alcohol use: Yes     Alcohol/week: 0.0 standard drinks of alcohol     Comment: 1 glass of wine every 2 weeks     Outpatient Encounter Medications as of 4/27/2023   Medication Sig Dispense Refill     acetaminophen (TYLENOL) 500 MG tablet Take 1,000 mg by mouth every 6 hours as needed for mild pain       albuterol (PROAIR HFA/PROVENTIL HFA/VENTOLIN HFA) 108 (90 Base) MCG/ACT inhaler INHALE TWO PUFFS BY MOUTH EVERY FOUR HOURS AS NEEDED FOR SHORTNESS OF BREATH/DYSPNEA (Patient not taking: Reported on 1/4/2023) 25.5 g 0     aspirin (ASA) 81 MG EC tablet Take 1 tablet (81 mg) by mouth daily 90 tablet 3     azaTHIOprine (IMURAN) 50 MG tablet Take 1 tablet (50 mg) twice daily x 14 days then complete labs, if labs ok then take 2 tablets (100 mg) in the morning and 1 tablet (50 mg) in the evening x 14 days then complete labs, if labs ok then take 2 tablets (100 mg) twice daily 120 tablet 5     Calcium-Magnesium-Zinc 500-250-12.5 MG TABS Take 1 tablet by mouth daily       hyoscyamine (LEVSIN) 0.125 MG tablet TAKE  1 TABLET BY MOUTH EVERY EIGHT HOURS 30 MINUTES BEFORE EACH MESTINON DOSE Strength: 0.125 mgTAKE 180 tablet 0     LORazepam (ATIVAN) 0.5 MG tablet 1/2 -1 TABS UP TO 3-5 TIMES A WEEK AS NEEDED 20 tablet 0     Multiple Vitamins-Minerals (MULTIVITAMIN & MINERAL PO) Take 1 tablet by mouth daily        omeprazole (PRILOSEC) 20 MG DR capsule Take 1 capsule (20 mg) by mouth daily 90 capsule 1     pravastatin (PRAVACHOL) 40 MG tablet Take 1 tablet (40 mg) by mouth daily 90 tablet 3     predniSONE (DELTASONE) 5 MG tablet Take 1 tablet (5 mg) by mouth  daily       pyridostigmine (MESTINON) 60 MG tablet Take 1 tablet (60 mg) by mouth 3 times daily 270 tablet 0     ranolazine (RANEXA) 1000 MG TB12 12 hr tablet Take 0.5 tablets (500 mg) by mouth 2 times daily 90 tablet 4     rivastigmine (EXELON) 4.6 MG/24HR 24 hr patch Place 1 patch onto the skin daily 30 patch 1     sertraline (ZOLOFT) 100 MG tablet Take 2 tablets (200 mg) by mouth daily 180 tablet 0     No facility-administered encounter medications on file as of 4/27/2023.       Allergies   Allergen Reactions     Daypro [Oxaprozin] Rash            Lidocaine Other (See Comments)     Rapid heart rate     Nitroglycerin Other (See Comments)     Causes low blood pressure     Penicillins Unknown     Occurred as child.     Sulfa Antibiotics Rash     Physical exam:    There were no vitals taken for this visit.       Constitutional: NAD. pleasant.  present.  HEENT: EOMI, no scleral icterus. Nl conj. No oral ulcers, poor salivary pool, No adenopathy or thyromegaly.   Cardiovascular: RRR, no M/R/G, trace LE edema  Respiratory: CTA b/l  Gastrointestinal:  soft, non-tender to palpation.    Musculoskeletal: No active synovitis. No joint tenderness   Skin: no rash  Neurological system: A&O x 3, hip flexors 4/5.  Psych: nl affect    Component      Latest Ref Rng & Units 6/17/2019   WBC      4.0 - 11.0 10e9/L 5.6   RBC Count      3.8 - 5.2 10e12/L 4.19   Hemoglobin      11.7 - 15.7 g/dL 13.0   Hematocrit      35.0 - 47.0 % 39.8   MCV      78 - 100 fl 95   MCH      26.5 - 33.0 pg 31.0   MCHC      31.5 - 36.5 g/dL 32.7   RDW      10.0 - 15.0 % 13.1   Platelet Count      150 - 450 10e9/L 230   % Neutrophils      % 56.3   % Lymphocytes      % 31.4   % Monocytes      % 9.6   % Eosinophils      % 2.3   % Basophils      % 0.4   Absolute Neutrophil      1.6 - 8.3 10e9/L 3.2   Absolute Lymphocytes      0.8 - 5.3 10e9/L 1.8   Absolute Monocytes      0.0 - 1.3 10e9/L 0.5   Absolute Eosinophils      0.0 - 0.7 10e9/L 0.1   Absolute  Basophils      0.0 - 0.2 10e9/L 0.0   Diff Method       Automated Method   Sodium      133 - 144 mmol/L 140   Potassium      3.4 - 5.3 mmol/L 5.0   Chloride      94 - 109 mmol/L 106   Carbon Dioxide      20 - 32 mmol/L 31   Anion Gap      3 - 14 mmol/L 3   Glucose      70 - 99 mg/dL 96   Urea Nitrogen      7 - 30 mg/dL 17   Creatinine      0.52 - 1.04 mg/dL 0.57   GFR Estimate      >60 mL/min/1.73:m2 >90   GFR Estimate If Black      >60 mL/min/1.73:m2 >90   Calcium      8.5 - 10.1 mg/dL 9.1   Bilirubin Total      0.2 - 1.3 mg/dL 0.5   Albumin      3.4 - 5.0 g/dL 3.8   Protein Total      6.8 - 8.8 g/dL 7.4   Alkaline Phosphatase      40 - 150 U/L 59   ALT      0 - 50 U/L 27   AST      0 - 45 U/L 20   Cholesterol      <200 mg/dL 150   Triglycerides      <150 mg/dL 168 (H)   HDL Cholesterol      >49 mg/dL 70   LDL Cholesterol Calculated      <100 mg/dL 46   Non HDL Cholesterol      <130 mg/dL 80   Rheumatoid Factor      <20 IU/mL <20   CRP Inflammation      0.0 - 8.0 mg/L <2.9   Vitamin D Deficiency screening      20 - 75 ug/L 48   Vitamin B12      193 - 986 pg/mL 705     Component      Latest Ref Rng & Units 1/16/2020   Ruth-1 Autoantibodies      <20 EU 0   Scl-70 Autoantibodies      <20 EU 0   Sm (Vergara) Autoantibodies      <20 EU 1   Sm/RNP Autoantibodies      <20 EU 2   SS-A/Ro Autoantibodies      <20  (H)   SS-B/La Autoantibodies      <20  (H)   Complement C4      19 - 59 mg/dL 27   Complement C3      83 - 177 mg/dL 139   DNA (ds) Antibody      <25 IU 3       Component      Latest Ref Rng & Units 1/20/2022 2/16/2022 2/28/2022   WBC      4.0 - 11.0 10e3/uL 5.7     RBC Count      3.80 - 5.20 10e6/uL 4.14     Hemoglobin      11.7 - 15.7 g/dL 13.0     Hematocrit      35.0 - 47.0 % 39.8     MCV      78 - 100 fL 96     MCH      26.5 - 33.0 pg 31.4     MCHC      31.5 - 36.5 g/dL 32.7     RDW      10.0 - 15.0 % 13.1     Platelet Count      150 - 450 10e3/uL 223     % Neutrophils      % 57     % Lymphocytes       % 30     % Monocytes      % 10     % Eosinophils      % 3     % Basophils      % 0     Absolute Neutrophils      1.6 - 8.3 10e3/uL 3.3     Absolute Lymphocytes      0.8 - 5.3 10e3/uL 1.7     Absolute Monocytes      0.0 - 1.3 10e3/uL 0.6     Absolute Eosinophils      0.0 - 0.7 10e3/uL 0.2     Absolute Basophils      0.0 - 0.2 10e3/uL 0.0     Sodium      133 - 144 mmol/L 139 137    Potassium      3.4 - 5.3 mmol/L 4.3 4.3    Chloride      94 - 109 mmol/L 108 107    Carbon Dioxide      20 - 32 mmol/L 38 (H) 25    Anion Gap      3 - 14 mmol/L <1 (L) 5    Urea Nitrogen      7 - 30 mg/dL 22 27    Creatinine      0.52 - 1.04 mg/dL 0.56 0.59    Calcium      8.5 - 10.1 mg/dL 9.0 9.1    Glucose      70 - 99 mg/dL 99 96    Alkaline Phosphatase      40 - 150 U/L 63 67    AST      0 - 45 U/L 20 30    ALT      0 - 50 U/L 33 44    Protein Total      6.8 - 8.8 g/dL 7.4 7.3    Albumin      3.4 - 5.0 g/dL 3.5 4.0    Bilirubin Total      0.2 - 1.3 mg/dL 0.5 0.4    GFR Estimate      >60 mL/min/1.73m2 >90 >90    Color Urine      Colorless, Straw, Light Yellow, Yellow Yellow     Appearance Urine      Clear Clear     Glucose Urine      Negative mg/dL Negative     Bilirubin Urine      Negative Negative     Ketones Urine      Negative mg/dL Negative     Specific Gravity Urine      1.003 - 1.035 >=1.030     Blood Urine      Negative Negative     pH Urine      5.0 - 7.0 5.0     Protein Albumin Urine      Negative mg/dL Negative     Urobilinogen Urine      0.2, 1.0 E.U./dL 0.2     Nitrite Urine      Negative Negative     Leukocyte Esterase Urine      Negative Negative     TSH      0.40 - 4.00 mU/L 1.87     AcetChol Binding Darlin      0.0 - 0.4 nmol/L   0.0   AcetChol Modul Darlin      <=45 %   4   Clinical Information:     71 year old female with generalized fatigue and right eyelid ptosis. She has not taken her Mestinon in at least the last week due to intolerance with diarhea. Query myopathy vs neuromuscular junction disorder. Brief exam:  bilateral eyelid closure weakness and neck flexion weakness. Double vision on upward gaze and lateral gaze; Right Deltoid 5, Biceps 5, Triceps 4 bilaterally; subtle asymmetry of the finger flexors (very mild weakness on the left, normal on the right).      Techniques:     Motor and sensory conduction studies were done with surface recording electrodes. EMG was done with a concentric needle electrode.      Results:     The right peroneal-EDB motor study showed normal onset latency with low amplitude without segmental changes, and normal conduction velocities.The right tibial-AH, peroneal-TA, median-APB and ulnar-ADM motor studies were normal. The right superficial peroneal, sural, median, ulnar, and radial antidromic sensory studies were normal. The right tibial F wave latencies were normal. 3 Hz slow repetitive nerve stimulation at the right ADM and orbicularis oculi muscles did not show any significant decrement either at rest or after 1 min of maximal isometric exercise.      Needle evaluation of the right Triceps Brachii, Pronator Teres, Biceps, Tibialis anterior, Gastrocnemius (Medial Hd) and vastus lateralis showed abnormal spontaneous activity with fibrillation potentials (1+ to 2+) with CRDs present in the vastus lateralis only. The right Triceps Brachii, Pronator Teres, First Dorsal Interosseous , Biceps, and vastus lateralis showed small amplitude, short duration, polyphasic motor units with early recruitment in the triceps and biceps muscles.  The right tibialis anterior showed normal recruitment of normal appearing motor units. The right Gastrocnemius (Medial Hd) had normal recruitment of large, long duration motor units. The right deltoid muscle was normal.      Interpretation:     Abnormal study. There is electrophysiologic evidence of  (1) Irritable generalized myopathy affecting the right upper and lower limbs; (2) neurogenic changes limited to the right medial gastrocnemius, which may occur with S1  radiculopathy. Please note, however, that this could still be part of a myopathic process because some chronic myopathies may show large, long duration motor unit potentials. There was no electrodiagnostic evidence of a neuromuscular junction disorder like myasthenia gravis.      Comment: The results of the study were discussed with the patient and she was referred for muscle biopsy of the left triceps or biceps.   ___________________________  Millicent Diggs MD, Neuromuscular Fellow  Rene Manning MD      Component      Latest Ref Rng & Units 3/25/2022   WBC      4.0 - 11.0 10e3/uL 6.1   RBC Count      3.80 - 5.20 10e6/uL 4.12   Hemoglobin      11.7 - 15.7 g/dL 12.7   Hematocrit      35.0 - 47.0 % 38.7   MCV      78 - 100 fL 94   MCH      26.5 - 33.0 pg 30.8   MCHC      31.5 - 36.5 g/dL 32.8   RDW      10.0 - 15.0 % 13.0   Platelet Count      150 - 450 10e3/uL 236   % Neutrophils      % 59   % Lymphocytes      % 30   % Monocytes      % 7   % Eosinophils      % 2   % Basophils      % 1   % Immature Granulocytes      % 1   NRBCs per 100 WBC      <1 /100 0   Absolute Neutrophils      1.6 - 8.3 10e3/uL 3.6   Absolute Lymphocytes      0.8 - 5.3 10e3/uL 1.8   Absolute Monocytes      0.0 - 1.3 10e3/uL 0.4   Absolute Eosinophils      0.0 - 0.7 10e3/uL 0.1   Absolute Basophils      0.0 - 0.2 10e3/uL 0.0   Absolute Immature Granulocytes      <=0.4 10e3/uL 0.0   Absolute NRBCs      10e3/uL 0.0   GARETT-1 (Histidyl-tRNA Synthetase) Darlin IgG      0 - 40 AU/mL 0   PL-12 (alanyl-tRNA synthetase) Antibody      Negative Negative   PL-7 (threonyl-tRNA synthetase) Antibody      Negative Negative   EJ (glycyl - tRNA synthetase) Antibody      Negative Negative   OJ (isoleucyl-tRNA synthetase) Antibody      Negative Negative   SRP (Signal Recognition Particle) Antibody      Negative Negative   Mi-2 (nuclear helicase protein) Antibody      Negative Negative   P155/140 (TIF1-gamma) Antibody      Negative Negative   TIF-1 Gamma  Antibody      Negative Negative   SAE1 (SUMO activating enzyme) Darlin      Negative Negative   MDA5 (CADM 140) Darlin      Negative Negative   NXP-2 (Nuclear matrix protein 2) Darlin      Negative Negative   Myositis Interp       See Note   Albumin Fraction      3.7 - 5.1 g/dL 4.2   Alpha 1 Fraction      0.2 - 0.4 g/dL 0.3   Alpha 2 Fraction      0.5 - 0.9 g/dL 0.9   Beta Fraction      0.6 - 1.0 g/dL 0.9   Gamma Fraction      0.7 - 1.6 g/dL 0.9   Monoclonal Peak      <=0.0 g/dL 0.0   ELP Interpretation:       Essentially normal electrophoretic pattern. No obvious monoclonal proteins seen. Pathologic significance requires clinical correlation. Carlos Ashford M.D., Ph.D., Pathologist.   Quantiferon-TB Gold Plus Result      Negative Negative   TB1 Ag minus Nil Value      IU/mL 0.01   TB2 Ag minus Nil Value      IU/mL 0.00   Mitogen minus Nil Result      IU/mL 9.97   Nil Result      IU/mL 0.03   BINDU interpretation      Negative Positive (A)   BINDU pattern 1       Speckled   BINDU titer 1       1:160   Iron      35 - 180 ug/dL 69   Iron Binding Cap      240 - 430 ug/dL 344   Iron Saturation Index      15 - 46 % 20   Creatinine      0.52 - 1.04 mg/dL 0.63   GFR Estimate      >60 mL/min/1.73m2 >90   ALT      0 - 50 U/L 31   Albumin      3.4 - 5.0 g/dL 3.6   AST      0 - 45 U/L 18   Thyroglobulin Antibody      <40 IU/mL <20   CK Total      30 - 225 U/L 244 (H)   Aldolase      1.2 - 7.6 U/L 3.9   Complement C3      81 - 157 mg/dL 142   Complement C4      13 - 39 mg/dL 26   CRP Inflammation      0.0 - 8.0 mg/L <2.9   Cryoglobulin Interpretation      Negative Negative   Cyclic Citrullinated Peptide Antibody, IgG      <7.0 U/mL 2.1   DNA-ds      <10.0 IU/mL 1.1   Vitamin D Deficiency screening      20 - 75 ug/L 41   Vitamin B12      193 - 986 pg/mL 603   Thyroid Peroxidase Antibody      <35 IU/mL <10   Rheumatoid Factor      <12 IU/mL 15 (H)   Hepatitis C Antibody      Nonreactive Nonreactive   Hep B Surface Agn      Nonreactive  Nonreactive   Sed Rate      0 - 30 mm/hr 14   Ferritin      8 - 252 ng/mL 85   Hepatitis B Core Darlin      Nonreactive Nonreactive   Total Protein Serum for ELP      6.8 - 8.8 g/dL 7.2   Quantiferon Nil Tube      IU/mL 0.03   Quantiferon TB1 Tube      IU/mL 0.04   Quantiferon TB2 Tube       0.03   QUANTIFERON MITOGEN      IU/mL 10.00      Hypoglycemia

## 2024-10-23 LAB — SCANNED LAB RESULT: NORMAL

## 2024-10-25 ENCOUNTER — TELEPHONE (OUTPATIENT)
Dept: NEUROLOGY | Facility: CLINIC | Age: 74
End: 2024-10-25
Payer: COMMERCIAL

## 2024-10-25 NOTE — TELEPHONE ENCOUNTER
Let patient know Dr Manning' comments and that MxBx results and any follow-up to the biopsy result will be discussed at her 11/25 appt.

## 2024-10-27 ENCOUNTER — HEALTH MAINTENANCE LETTER (OUTPATIENT)
Age: 74
End: 2024-10-27

## 2024-11-07 ENCOUNTER — PATIENT OUTREACH (OUTPATIENT)
Dept: CARE COORDINATION | Facility: CLINIC | Age: 74
End: 2024-11-07
Payer: COMMERCIAL

## 2024-11-11 DIAGNOSIS — F33.0 MAJOR DEPRESSIVE DISORDER, RECURRENT, MILD (H): ICD-10-CM

## 2024-11-12 RX ORDER — BUPROPION HYDROCHLORIDE 150 MG/1
TABLET ORAL
Qty: 90 TABLET | Refills: 0 | Status: SHIPPED | OUTPATIENT
Start: 2024-11-12

## 2024-11-13 ENCOUNTER — VIRTUAL VISIT (OUTPATIENT)
Dept: FAMILY MEDICINE | Facility: CLINIC | Age: 74
End: 2024-11-13
Payer: COMMERCIAL

## 2024-11-13 DIAGNOSIS — R45.1 AGITATION: ICD-10-CM

## 2024-11-13 DIAGNOSIS — F03.90 DEMENTIA, UNSPECIFIED DEMENTIA SEVERITY, UNSPECIFIED DEMENTIA TYPE, UNSPECIFIED WHETHER BEHAVIORAL, PSYCHOTIC, OR MOOD DISTURBANCE OR ANXIETY (H): ICD-10-CM

## 2024-11-13 DIAGNOSIS — G70.01 MYASTHENIA GRAVIS WITH EXACERBATION (H): ICD-10-CM

## 2024-11-13 DIAGNOSIS — F33.0 MAJOR DEPRESSIVE DISORDER, RECURRENT, MILD (H): Primary | ICD-10-CM

## 2024-11-13 PROCEDURE — 99214 OFFICE O/P EST MOD 30 MIN: CPT | Mod: 95 | Performed by: FAMILY MEDICINE

## 2024-11-13 RX ORDER — QUETIAPINE FUMARATE 50 MG/1
TABLET, FILM COATED ORAL
Qty: 30 TABLET | Refills: 0 | Status: SHIPPED | OUTPATIENT
Start: 2024-11-13

## 2024-11-13 RX ORDER — QUETIAPINE FUMARATE 50 MG/1
50 TABLET, FILM COATED ORAL AT BEDTIME
Qty: 30 TABLET | Refills: 0 | Status: SHIPPED | OUTPATIENT
Start: 2024-11-13 | End: 2024-11-13

## 2024-11-13 ASSESSMENT — PATIENT HEALTH QUESTIONNAIRE - PHQ9: SUM OF ALL RESPONSES TO PHQ QUESTIONS 1-9: 14

## 2024-11-13 NOTE — PROGRESS NOTES
Kristina is a 73 year old who is being evaluated via a billable video visit.    How would you like to obtain your AVS? MyChart  If the video visit is dropped, the invitation should be resent by: Text to cell phone: [phone]  Will anyone else be joining your video visit? No      Assessment & Plan     Major depressive disorder, recurrent, mild (H)  She has always been a strong idependent woman, and gets very angry when others tell her what to do.  This is very hard to manage with her dementia and loss of independence.  Likely would benefit for atypical antipsycotic even though there is a black v box warning of death and worse.    She reports she would rather take a medication that is dangerous and be as independent as possible.  May need memory care.  I do not think she would do well in assisted living.    Will try medical marijuana but I did discuss she likely needs seroquel.   - Adult Mental Health  Referral; Future      (R45.1) Agitation  Assciated with s dementia.    Plan: QUEtiapine (SEROQUEL) 50 MG tablet,   as needed hopefully she will not need it.  Discussed calling 911 if things get unsafe.  Or [facility]        (F03.90) Dementia, unspecified dementia severity, unspecified dementia type, unspecified whether behavioral, psychotic, or mood disturbance or anxiety (H)  Poor control.  Advancing.  Poor sleep hygiene is making worse.    (G70.01) Myasthenia gravis with exacerbation (H)  Good control.  Continue currant medications, continue to monitor.        Wants to try medical marijauna     Subjective   Kristina is a 73 year old, presenting for the following health issues:  Recheck Medication (/)      11/13/2024     5:02 PM   Additional Questions   Roomed by Cristal Hernandez CMA   Accompanied by      Video Time: 13 mns    HPI     -Following up on medication.    -Gets agitated and angry very easily and depression. Has a hard time getting out of bed.    - is concerned for her well  being.  His safety and hers.     11/13/2024  5:23 PM   Last PHQ-9    1.  Little interest or pleasure in doing things 1    2.  Feeling down, depressed, or hopeless 2    3.  Trouble falling or staying asleep, or sleeping too much 3    4.  Feeling tired or having little energy 1    5.  Poor appetite or overeating 3    6.  Feeling bad about yourself 2    7.  Trouble concentrating 0    8.  Moving slowly or restless 2    Q9: Thoughts of better off dead/self-harm past 2 weeks 0    PHQ-9 Total Score 14    Difficulty at work, home, or with people Very difficult    In the past two weeks have you had thoughts of suicide or self harm?    Do you have concerns about your personal safety or the safety of others?          Review of Systems  Constitutional, HEENT, cardiovascular, pulmonary, gi and gu systems are negative, except as otherwise noted.      Objective           Vitals:  No vitals were obtained today due to virtual visit.    Physical Exam   GENERAL: alert and no distress  EYES: Eyes grossly normal to inspection.  No discharge or erythema, or obvious scleral/conjunctival abnormalities.  RESP: No audible wheeze, cough, or visible cyanosis.    SKIN: Visible skin clear. No significant rash, abnormal pigmentation or lesions.  NEURO: Cranial nerves grossly intact.  Mentation and speech appropriate for age.  PSYCH: Appropriate affect, tone, and pace of words          Video-Visit Details    Type of service:  Video Visit   Originating Location (pt. Location): Home    Distant Location (provider location):  On-site  Platform used for Video Visit: Thania  Signed Electronically by: Rolo Sandoval MD

## 2024-11-25 ENCOUNTER — OFFICE VISIT (OUTPATIENT)
Dept: NEUROLOGY | Facility: CLINIC | Age: 74
End: 2024-11-25
Payer: COMMERCIAL

## 2024-11-25 ENCOUNTER — THERAPY VISIT (OUTPATIENT)
Dept: PHYSICAL THERAPY | Facility: CLINIC | Age: 74
End: 2024-11-25
Payer: COMMERCIAL

## 2024-11-25 VITALS
DIASTOLIC BLOOD PRESSURE: 70 MMHG | HEIGHT: 65 IN | BODY MASS INDEX: 26.56 KG/M2 | SYSTOLIC BLOOD PRESSURE: 153 MMHG | HEART RATE: 71 BPM | OXYGEN SATURATION: 98 % | WEIGHT: 159.4 LBS

## 2024-11-25 DIAGNOSIS — G72.41 IBM (INCLUSION BODY MYOSITIS) (H): Primary | ICD-10-CM

## 2024-11-25 DIAGNOSIS — G30.9 ALZHEIMER'S DISEASE (H): ICD-10-CM

## 2024-11-25 DIAGNOSIS — F02.80 ALZHEIMER'S DISEASE (H): ICD-10-CM

## 2024-11-25 ASSESSMENT — PAIN SCALES - GENERAL: PAINLEVEL_OUTOF10: SEVERE PAIN (6)

## 2024-11-25 NOTE — NURSING NOTE
Chief Complaint   Patient presents with    Myasthenia Gravis    RECHECK     Vitals were taken and medications were reconciled.    Melvin Crook, EMT  12:32 PM

## 2024-11-25 NOTE — PROGRESS NOTES
PHYSICAL THERAPY EVALUATION  Type of Visit: Evaluation        Fall Risk Screen:  Fall screen completed by: PT  Have you fallen 2 or more times in the past year?: Yes  Have you fallen and had an injury in the past year?: Yes  Is patient a fall risk?: Yes; Department fall risk interventions implemented    Subjective         Presenting condition or subjective complaint:   She had a fall outdoors over the summer, hit her head. She did have some home care following. She was given exercises to continue doing. She reports feeling stronger since having therapy. She does sometimes use a walker in the house when feeling unsteady. Uses a cane when she leaves the house.   Date of onset: 11/25/24    Relevant medical history:   IBM. Dementia  Dates & types of surgery:      Prior diagnostic imaging/testing results:       Prior therapy history for the same diagnosis, illness or injury:        Prior Level of Function  Transfers: Independent  Ambulation: Independent  ADL: Independent  IADL: Assistance with housework, supervision with meal prep/stove use    Living Environment  Social support:   lives with spouse  Type of home:    house  Stairs to enter the home:       yes with rail  Ramp:     Stairs inside the home:       basement stairs--does not use  Help at home:   spouse  Equipment owned:   4 wheeled walker, cane    Employment:      Hobbies/Interests:      Patient goals for therapy:      Pain assessment: Pain denied     Objective    NEURO CLINIC EVALUATION    Others present at visit: Spouse / significant other  Child(birgit)/Daughter    Height/Weight: 5'5 / 159 lbs    Technology used: Not assessed    Evaluation   Interview completed.     Range of motion: LE ROM WFL       Manual muscle testing:  LE strength 5/5, except L hip flexion 4/5; UE strength WFL     Gait:  Patient ambulates with no assistive device with modified independent. Mild unsteadiness and path deviations noted during gait with head turns or head nods.      5 Time Sit  to Stand: Unable without UE support     10 Meter Walk: 6.28 sec     Romber sec EO/EC      Cognition:  Memory impairments reported/observed during evaluation    Recommended Interventions: ADL/Self Care/Home Management    Treatment provided this date:   ADL/Self Care/Home Management: 8 minutes  -Fall prevention education provided, including removing clutter/throw rugs in the home, use of night lights in dimly lit areas, and use of walker for all mobility outside the home. Recommended supervision for getting in/out of the bathtub, and recommended shower chair if this starts to become more challenging.   -Briefly reviewed the home exercise program she received during her home care therapy, and agreed those were good exercises to continue doing 3-4x/week.     Response to treatment/recommendations: Patient and family verbalized understanding of recommendations    Goal attainment:  All goals met    Total Evaluation Time (Minutes): 15  Timed Code Treatment Minutes: 8  Total Treatment Time (sum of timed and untimed services): 23    Assessment & Plan   CLINICAL IMPRESSIONS  Medical Diagnosis: IBM    Treatment Diagnosis: Force Production Deficit   Impression/Assessment: Patient is a 74 year old female with complaints of decreased strength and balance.  The following significant findings have been identified: Decreased strength and Impaired balance. These impairments interfere with their ability to perform recreational activities and community mobility as compared to previous level of function.     Clinical Decision Making (Complexity):  Clinical Presentation: Evolving/Changing  Clinical Presentation Rationale: based on medical and personal factors listed in PT evaluation  Clinical Decision Making (Complexity): Low complexity    PLAN OF CARE  Treatment Interventions:  Interventions: Self-Care/Home Management    Long Term Goals     PT Goal 1  Goal Identifier: Understand evaluation  Goal Description: Patient, family and/or  caregiver will verbalize understanding of evaluation results and implications for functional performance.  Target Date: 11/25/24  Date Met: 11/25/24  PT Goal 2  Goal Identifier: Compensatory methods/equipment  Goal Description: Patient, family and/or caregiver will verbalize/demonstrate understanding of compensatory methods/equipment to enhance functional independence and safety  Target Date: 11/25/24  Date Met: 11/25/24  PT Goal 3  Goal Identifier: Positioning/equipment  Goal Description: Patient, family and/or caregiver will verbalize/demonstrate understanding of positioning techniques/equipment  Target Date: 11/25/24  Date Met: 11/25/24  PT Goal 4  Goal Identifier: HEP  Goal Description: Patient, family and/or caregiver will verbalize/demonstrate understanding of home program  Target Date: 11/25/24  Date Met: 11/25/24  PT Goal 5  Goal Identifier: Energy Management  Goal Description: Patient, family and/or caregiver will verbalize energy management techniques appropriate for satus and setting  Target Date: 11/25/24  Date Met: 11/25/24      Frequency of Treatment: 1 time  Duration of Treatment: 1 day    Recommended Referrals to Other Professionals:   Education Assessment:   Learner/Method: Patient;Caregiver;Listening;Pictures/Video    Risks and benefits of evaluation/treatment have been explained.   Patient/Family/caregiver agrees with Plan of Care.     Evaluation Time:     RETIRED PT Eval, Low Complexity Minutes (74876): 15       Signing Clinician: Meagan Dugan, PT        [facility]                                                                                   OUTPATIENT PHYSICAL THERAPY      PLAN OF TREATMENT FOR OUTPATIENT REHABILITATION   Patient's Last Name, First Name, Albina Layton YOB: 1950   Provider's Name   [facility]   Medical Record No.  [id number]     Onset Date: 11/25/24  Start of Care Date: 11/25/24      Medical Diagnosis:  IBM      PT Treatment Diagnosis:  Force Production Deficit Plan of Treatment  Frequency/Duration: 1 time/ 1 day    Certification date from 11/25/24 to 11/25/24         See note for plan of treatment details and functional goals     Meagan Dugan, PT                         I CERTIFY THE NEED FOR THESE SERVICES FURNISHED UNDER        THIS PLAN OF TREATMENT AND WHILE UNDER MY CARE     (Physician attestation of this document indicates review and certification of the therapy plan).              Referring Provider:  Rene Manning MD    Initial Assessment  See Epic Evaluation- Start of Care Date: 11/25/24

## 2024-11-25 NOTE — PROGRESS NOTES
[facility]: [place]  Neuromuscular Consult    Patient Name:  Albina Pedro  MRN:  [id number]    :  1950  Date of Service:  2024  Primary care provider:  Rolo Sandoval      Reason for Consult: Asked by Dr. 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 In her last visit in neuromuscular clinic 24 she was seen to have mildly weak in long flexors on the L and mild L knee extension weakness that would be seen in IBM. In the interim she had a muscle biopsy of her L vastus lateralis muscle biopsy with severe chronic myopathy, with infrequent rimmed vacuoles and prominent mitochondrial abnormalities. 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 Previously she was diagnosited with seronegative generalized myasthenia gravis, based on symptoms of ptosis, diplopia, facial weakness, and triceps weakness, and abnormal repetitive nerve stimulation of the anconeus muscle with a decrement >30%.  She previously had a left biceps muscle biopsy, which showed mitochondrial changes that did not align well with her clinical presentation. There was at least one muscle fiber with vacuoles, but not rimmed vacuoles, and there was no inflammation or active myopathy. Acetylcholine receptor and MuSK antibodies were negative.  She accomplished partial control of her symptoms with oral pyridostigmine, low-dose prednisone, and azathioprine, but she had major side effects including delirium with higher prednisone dose.     She uses the cane or walker.  After her fall she had 4 weeks of PT & OT at [facility] and would come to her house. She has a few steps going int o the house. She has not  had a fall since 2024.  She is using a wheelchair. She has not noted any focal weakness. No difficulty using utensils for food. No difficulty with buttoning shirts. Her daughter reports that it was hard for her to button the shirt but maybe it was stiff as a new shirt. No dysphagia to solids or liquids, with speech it may sometimes be harder to think of words, but no stuttering. No difficulty chewing food. No diplopia, feels right eye may be 'more saggy' at the end of the day. No SOB. The biggest difference noted has been that it is hard for her to get up from a sitting position. Some days she can stand up easily. She continues to have pain, this morning she was crying because of pain. Her wrists and bones hurt as well. Today she notes more pain in the R hip, since her fall in . She is on prednisone 5 mg once daily, pyridostigmine 40 mg TID, azathioprine 100 mg twice daily.      ROS: A 10-point ROS was performed as per HPI.    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     Past Surgical History:   Procedure Laterality Date    BIOPSY MUSCLE DIAGNOSTIC (LOCATION) Left 2022    Procedure: BIOPSY, MUSCLE LEFT biceps @0900;  Surgeon: Tyrell Loo MD;  Location: [facility] OR    BIOPSY MUSCLE DIAGNOSTIC (LOCATION) Left 2024    Procedure: Biopsy muscle diagnostic vastus lateralis;  Surgeon: Tyrell Loo MD;  [place] [facility] [place]    C/SECTION, LOW TRANSVERSE  10/13/1978    , Low Transverse    COLONOSCOPY      OPEN REDUCTION INTERNAL FIXATION ANKLE Right 2019    Procedure: Open reduction internal fixation right lateral malleolus fracture;  Surgeon: Rolo Oconnor  HARSHAD Ruiz;  Location: [facility] OR    SURGICAL HISTORY OF -           SURGICAL HISTORY OF -   00    Colonoscopy       Allergies:  Allergies   Allergen Reactions    Daypro [Oxaprozin] Rash           Nitroglycerin Other (See Comments)     Causes low blood pressure    Penicillins Unknown     Occurred as child.    Sulfa Antibiotics Rash       Medications:      Current Outpatient Medications:     acetaminophen (TYLENOL) 500 MG tablet, Take 1,000 mg by mouth every 6 hours as needed for mild pain, Disp: , Rfl:     azaTHIOprine (IMURAN) 50 MG tablet, TAKE TWO TABLETS BY MOUTH TWICE DAILY, Disp: 120 tablet, Rfl: 1    buPROPion (WELLBUTRIN XL) 150 MG 24 hr tablet, TAKE TWO TABLETS BY MOUTH IN THE MORNING, Disp: 90 tablet, Rfl: 0    Calcium-Magnesium-Zinc 500-250-12.5 MG TABS, Take 1 tablet by mouth daily, Disp: , Rfl:     LORazepam (ATIVAN) 0.5 MG tablet, TAKE HALF TO ONE TABLET BY MOUTH THREE TO FIVE TIMES A WEEK AS NEEDED., Disp: 20 tablet, Rfl: 0    Multiple Vitamins-Minerals (MULTIVITAMIN & MINERAL PO), Take 1 tablet by mouth daily , Disp: , Rfl:     pravastatin (PRAVACHOL) 40 MG tablet, Take 1 tablet (40 mg) by mouth daily. *PLEASE SCHEDULE APPT. FOR REFILLS [phone]*, Disp: 90 tablet, Rfl: 0    predniSONE (DELTASONE) 5 MG tablet, TAKE ONE TABLET BY MOUTH ONE TIME DAILY, Disp: 90 tablet, Rfl: 1    pyRIDostigmine (MESTINON) 60 MG tablet, TAKE ONE TABLET BY MOUTH THREE TIMES DAILY, Disp: 270 tablet, Rfl: 0    QUEtiapine (SEROQUEL) 50 MG tablet, Take at 9 pm every night, Disp: 30 tablet, Rfl: 0    rivastigmine (EXELON) 9.5 MG/24HR 24 hr patch, Place 1 patch onto the skin daily for 270 days, Disp: 90 patch, Rfl: 2    albuterol (PROAIR HFA/PROVENTIL HFA/VENTOLIN HFA) 108 (90 Base) MCG/ACT inhaler, INHALE TWO PUFFS BY MOUTH EVERY FOUR HOURS AS NEEDED FOR SHORTNESS OF BREATH/DYSPNEA (Patient not taking: No sig reported), Disp: 25.5 g, Rfl: 0    FLUoxetine (PROZAC) 40 MG capsule, Take 1 capsule (40 mg) by  "mouth daily (Patient not taking: Reported on 2024), Disp: 90 capsule, Rfl: 1    Social History:  Social History     Tobacco Use    Smoking status: Never     Passive exposure: Never    Smokeless tobacco: Never   Substance Use Topics    Alcohol use: Yes     Alcohol/week: 0.0 standard drinks of alcohol     Comment: 1 glass of wine every 2 weeks       Family History:    Family History   Problem Relation Age of Onset    Breast Cancer Mother     Cancer Mother         Breast and liver- age 43    Cerebrovascular Disease Father     Heart Failure Father     Coronary Artery Disease Father     Alzheimer Disease Father     Hypertension Father     Diabetes Maternal Grandmother     Breast Cancer Maternal Aunt     Breast Cancer Paternal Aunt     Cancer - colorectal No family hx of     Prostate Cancer No family hx of        Physical Examination:   BP (!) 153/70 (BP Location: Left arm, Patient Position: Sitting, Cuff Size: Adult Regular)   Pulse 71   Ht 1.66 m (5' 5.35\")   Wt 72.3 kg (159 lb 6.4 oz)   SpO2 98%   BMI 26.24 kg/m    General: pt sitting comfortably in chair not in acute distress   HEENT: no icterus   Chest: not in respiratory distress in room air   Heart: rrr  Abdomen: soft, nontender, nondistended  Ext: no edema   Skin: no rashes  Neuro:   -MS: alert, speech fluent and coherent  -CN: visual fields full, pupils equal round reactive to light, extraocular movements intact, facial sensation and movement intact b/l, hearing intact to conversation, palate raises symmetrically, shoulder shrug strong, tongue midline  -Motor: tone and bulk normal    Right Left   Neck flexion 5    Neck extension: 5    Shoulder abduction:  5 5   Elbow Flexion: 5 5   Elbow Extension:  5 5   Wrist Extension:  5 5   Finger Extension:  5 5   Finger flexion 4 4   FDI 4 4   ADM 5 5   Thumb abduction 5 5   Finger Flexion 5 5   Wrist Flexion 5 5   Hip Flexion 4+ 4+   Hip Extension 5 5   Knee Extension 4+ 4+   Knee Flexion 5 5   Dorsiflexion " "5 5   Plantar flexion 5 5   Foot Inversion 5 5   Foot Eversion 5 5        Deep tendon reflexes:   Right Left   Triceps 2 2   Biceps 2 2   Brachioradialis 2 2   Knee jerk 2 2   Ankle jerk 2 2   Plantar responses were flexor bilaterally.  No pond's   -Sensory: intact to light touch  -Coordination: intact to FNF, H2S b/l  -Gait: normal station, arm swing, and stride length.    Investigations:    Muscle biopsy 09/16/2024  LEFT VASTUS LATERALIS MUSCLE BIOPSY:     Two pieces of muscle were quick frozen for light microscopy and histochemistry. Additional pieces were quick frozen for biochemical testing.     LIGHT MICROSCOPY:     Frozen sections stained with H&E, trichrome, PAS, Oil Red O, and Congo red, were available for review. There was advanced muscle degeneration with several regions of the sample appearing \"end-stage\", replaced by adipose and connective tissue. Other regions showed somewhat better preserved muscle architecture. There was markedly increased fiber-size variation due to the presence of many extremely atrophic (often no more than 5 microns in diameter) and occasional hypertrophic fibers. Some of the atrophic fibers appeared markedly basophilic indicating regeneration. There were several fibers with internal nuclei, and numerous splitting fibers. Necrotic fibers were very rare or absent. There was prominent endomysial and perimysial fibrosis in a patchy fashion. Endomysial inflammation was not apparent on H&E; there was only one site identified on Congo Red stain, showing a cluster of mononuclear cells surrounding but not clearly invading an intact muscle fiber. The sample was characterized by severe and uneven freezing artefact, making it extremely difficult to identify vacuoles, but even after taking this artefact into account, there were rare (approximately 3) muscle fibers with rimmed vacuoles. Congo red stain did not reveal congophilic inclusions. Trichrome showed a few extremely atrophic fibers " that appeared ragged red; there were no nemaline rods. Glycogen and lipid content were normal.     HISTOCHEMISTRY:     Frozen sections stained with ATPase (pH 4.35, 4.5 and 9.4), metachromatic ATPase, NADH, SDH, modified SDH, GONZALEZ, ?-GP, Fast Blue B, nonspecific esterase, phosphorylase A and MAD were available for review. ATPase staining identified fibers of types 1, 2a, and 2b. There was no evidence of fiber-type grouping. Atrophic fibers of all fiber types were seen. Oxidative enzyme stain deposition was markedly irregular, with several fibers appearing lobulated or moth-eaten. There were many ragged blue fibers on the modified SDH stain, most which were GONZALEZ deficient in neighboring sections. Fast Blue B staining was normal; there were no necrotic fibers, clusters of macrophages, or abnormal perimysial staining. Esterase staining identified endplates and atrophic fibers. Myophosphorylase and MAD stains were normal.     IMMUNOHISTOCHEMISTRY:     Monoclonal antibodies against CD4, CD8, CD20, CD68, CD45RO, beta-crystallin, MHC I and MHC II ubiquitin were exposed to sections of frozen muscle and localized with peroxidase.  Many fibers showed increased surface expression of MHC I. MHC II stain was negative. No inflammatory cell collections were identified wiht CD4, CD8, CD20, or CD68 stains. Beta-crystallin showed a couple of muscle fibers with sarcoplasmic punctate aggregates.      DIAGNOSIS:     Severe chronic myopathy, with infrequent rimmed vacuoles and prominent mitochondrial abnormalities. 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 See comment.      COMMENT:     Diagnostic evaluation of this sample was challenging due to advanced muscle degeneration with end-stage appearance in a few regions, and severe freezing artefact. The main distinctive features of this biopsy were the (very infrequent) rimmed vacuoles and the striking mitochondrial  pathology. Those findings can be seen with inclusion body myositis or other chronic inherited myopathies with rimmed vacuoles. A definitive histopathologic diagnosis of inclusion body myositis cannot be made due to lack of significant inflammation and lack of congophilic (amyloid) deposits. However, immune stains clearly showed MHC I upregulation in non-necrotic muscle fibers, which may support this diagnosis in the appropriate clinical context.     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 In her last visit in neuromuscular clinic 07/08/24 she was seen to have mildly weak in long flexors on the L and mild L knee extension weakness that would be seen in IBM. In the interim she had a muscle biopsy of her L vastus lateralis muscle biopsy with severe chronic myopathy, with infrequent rimmed vacuoles and prominent mitochondrial abnormalities. 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 Given findings of long flexor weakness in upper extremities, L quadriceps weakness, with biopsy finding of vacoule is more suggestive of IBM. Anti CN1A can be positive in Sjogren's too and was deferred. Previous EMG with RNS in L triceps was likely false positive and suspect antibody negative myasthenia gravis diagnosis was incorrect, overtime her clinical symptoms have progressed, not improved with MG medications, have evolved to be more consistent clinically with IBM as well.     We discussed above impression with her, her daughter and  at bedside who verbalized understanding of the change in diagnosis. Given she does not have myasthenia gravis, we will discontinue pyridostigmine. From IBM standpoint there are not curative treatments, no role of prednisone, will  discontinue if not needed from rheumatology/Sjogren's standpoint.    Plan:   - Discontinue pyridostigmine  - Can discontinue prednisone if okay with Dr. Mark (rheumatology)  - Azathioprine per rheumatology  - Follow up in one year    Patient discussed with attending neurologist, Dr. Manning, who agrees with above.    Brenden Grove MD  PGY-4 Neurology Resident

## 2024-11-25 NOTE — LETTER
2024       RE: Albina Pedro  [id number]  [facility] [place] [address]     Dear Colleague,    Thank you for referring your patient, Albina Pedro, to the [facility] [place] at [facility]. Please see a copy of my visit note below.    x    [facility]: [place]  Neuromuscular Consult    Patient Name:  Albina Pedro  MRN:  [id number]    :  1950  Date of Service:  2024  Primary care provider:  Rolo Sandoval      Reason for Consult: Asked by Dr. 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 In her last visit in neuromuscular clinic 24 she was seen to have mildly weak in long flexors on the L and mild L knee extension weakness that would be seen in IBM. In the interim she had a muscle biopsy of her L vastus lateralis muscle biopsy with severe chronic myopathy, with infrequent rimmed vacuoles and prominent mitochondrial abnormalities. 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 Previously she was diagnosited with seronegative generalized myasthenia gravis, based on symptoms of ptosis, diplopia, facial weakness, and triceps weakness, and abnormal repetitive nerve stimulation of the anconeus muscle with a decrement >30%.  She previously had a left biceps muscle biopsy, which showed mitochondrial changes that did not align well with her clinical presentation. There was at least one muscle fiber with vacuoles, but not rimmed vacuoles, and there was no inflammation or active myopathy. Acetylcholine receptor and MuSK antibodies were negative.  She accomplished partial  control of her symptoms with oral pyridostigmine, low-dose prednisone, and azathioprine, but she had major side effects including delirium with higher prednisone dose.     She uses the cane or walker.  After her fall she had 4 weeks of PT & OT at [facility] and would come to her house. She has a few steps going int o the house. She has not had a fall since July 2024.  She is using a wheelchair. She has not noted any focal weakness. No difficulty using utensils for food. No difficulty with buttoning shirts. Her daughter reports that it was hard for her to button the shirt but maybe it was stiff as a new shirt. No dysphagia to solids or liquids, with speech it may sometimes be harder to think of words, but no stuttering. No difficulty chewing food. No diplopia, feels right eye may be 'more saggy' at the end of the day. No SOB. The biggest difference noted has been that it is hard for her to get up from a sitting position. Some days she can stand up easily. She continues to have pain, this morning she was crying because of pain. Her wrists and bones hurt as well. Today she notes more pain in the R hip, since her fall in July 24. She is on prednisone 5 mg once daily, pyridostigmine 40 mg TID, azathioprine 100 mg twice daily.      ROS: A 10-point ROS was performed as per HPI.    PMH:  Past Medical History:   Diagnosis Date     Anxiety      CAD (coronary artery disease)      Depression      ILD (interstitial lung disease) (H)      Myasthenia gravis without exacerbation (H)      Other and unspecified hyperlipidemia      Sicca syndrome (H)      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     Past Surgical History:   Procedure Laterality Date     BIOPSY MUSCLE DIAGNOSTIC (LOCATION) Left 5/2/2022    Procedure: BIOPSY, MUSCLE LEFT biceps @0900;  Surgeon: Tyrell Loo MD;  Location: [facility] OR     BIOPSY MUSCLE DIAGNOSTIC (LOCATION) Left 9/16/2024     Procedure: Biopsy muscle diagnostic vastus lateralis;  Surgeon: Tyrell Loo MD;  [place] [facility] [place]     C/SECTION, LOW TRANSVERSE  10/13/1978    , Low Transverse     COLONOSCOPY       OPEN REDUCTION INTERNAL FIXATION ANKLE Right 2019    Procedure: Open reduction internal fixation right lateral malleolus fracture;  Surgeon: Rolo Oconnor DPM;  Location: [facility] OR     SURGICAL HISTORY OF -            SURGICAL HISTORY OF -   00    Colonoscopy       Allergies:  Allergies   Allergen Reactions     Daypro [Oxaprozin] Rash            Nitroglycerin Other (See Comments)     Causes low blood pressure     Penicillins Unknown     Occurred as child.     Sulfa Antibiotics Rash       Medications:      Current Outpatient Medications:      acetaminophen (TYLENOL) 500 MG tablet, Take 1,000 mg by mouth every 6 hours as needed for mild pain, Disp: , Rfl:      azaTHIOprine (IMURAN) 50 MG tablet, TAKE TWO TABLETS BY MOUTH TWICE DAILY, Disp: 120 tablet, Rfl: 1     buPROPion (WELLBUTRIN XL) 150 MG 24 hr tablet, TAKE TWO TABLETS BY MOUTH IN THE MORNING, Disp: 90 tablet, Rfl: 0     Calcium-Magnesium-Zinc 500-250-12.5 MG TABS, Take 1 tablet by mouth daily, Disp: , Rfl:      LORazepam (ATIVAN) 0.5 MG tablet, TAKE HALF TO ONE TABLET BY MOUTH THREE TO FIVE TIMES A WEEK AS NEEDED., Disp: 20 tablet, Rfl: 0     Multiple Vitamins-Minerals (MULTIVITAMIN & MINERAL PO), Take 1 tablet by mouth daily , Disp: , Rfl:      pravastatin (PRAVACHOL) 40 MG tablet, Take 1 tablet (40 mg) by mouth daily. *PLEASE SCHEDULE APPT. FOR REFILLS [phone]*, Disp: 90 tablet, Rfl: 0     predniSONE (DELTASONE) 5 MG tablet, TAKE ONE TABLET BY MOUTH ONE TIME DAILY, Disp: 90 tablet, Rfl: 1     pyRIDostigmine (MESTINON) 60 MG tablet, TAKE ONE TABLET BY MOUTH THREE TIMES DAILY, Disp: 270 tablet, Rfl: 0     QUEtiapine (SEROQUEL) 50 MG tablet, Take at 9 pm every night, Disp: 30 tablet, Rfl: 0     rivastigmine (EXELON) 9.5 MG/24HR 24 hr  "patch, Place 1 patch onto the skin daily for 270 days, Disp: 90 patch, Rfl: 2     albuterol (PROAIR HFA/PROVENTIL HFA/VENTOLIN HFA) 108 (90 Base) MCG/ACT inhaler, INHALE TWO PUFFS BY MOUTH EVERY FOUR HOURS AS NEEDED FOR SHORTNESS OF BREATH/DYSPNEA (Patient not taking: No sig reported), Disp: 25.5 g, Rfl: 0     FLUoxetine (PROZAC) 40 MG capsule, Take 1 capsule (40 mg) by mouth daily (Patient not taking: Reported on 2024), Disp: 90 capsule, Rfl: 1    Social History:  Social History     Tobacco Use     Smoking status: Never     Passive exposure: Never     Smokeless tobacco: Never   Substance Use Topics     Alcohol use: Yes     Alcohol/week: 0.0 standard drinks of alcohol     Comment: 1 glass of wine every 2 weeks       Family History:    Family History   Problem Relation Age of Onset     Breast Cancer Mother      Cancer Mother         Breast and liver- age 43     Cerebrovascular Disease Father      Heart Failure Father      Coronary Artery Disease Father      Alzheimer Disease Father      Hypertension Father      Diabetes Maternal Grandmother      Breast Cancer Maternal Aunt      Breast Cancer Paternal Aunt      Cancer - colorectal No family hx of      Prostate Cancer No family hx of        Physical Examination:   BP (!) 153/70 (BP Location: Left arm, Patient Position: Sitting, Cuff Size: Adult Regular)   Pulse 71   Ht 1.66 m (5' 5.35\")   Wt 72.3 kg (159 lb 6.4 oz)   SpO2 98%   BMI 26.24 kg/m    General: pt sitting comfortably in chair not in acute distress   HEENT: no icterus   Chest: not in respiratory distress in room air   Heart: rrr  Abdomen: soft, nontender, nondistended  Ext: no edema   Skin: no rashes  Neuro:   -MS: alert, speech fluent and coherent  -CN: visual fields full, pupils equal round reactive to light, extraocular movements intact, facial sensation and movement intact b/l, hearing intact to conversation, palate raises symmetrically, shoulder shrug strong, tongue midline  -Motor: tone " "and bulk normal    Right Left   Neck flexion 5    Neck extension: 5    Shoulder abduction:  5 5   Elbow Flexion: 5 5   Elbow Extension:  5 5   Wrist Extension:  5 5   Finger Extension:  5 5   Finger flexion 4 4   FDI 4 4   ADM 5 5   Thumb abduction 5 5   Finger Flexion 5 5   Wrist Flexion 5 5   Hip Flexion 4+ 4+   Hip Extension 5 5   Knee Extension 4+ 4+   Knee Flexion 5 5   Dorsiflexion 5 5   Plantar flexion 5 5   Foot Inversion 5 5   Foot Eversion 5 5        Deep tendon reflexes:   Right Left   Triceps 2 2   Biceps 2 2   Brachioradialis 2 2   Knee jerk 2 2   Ankle jerk 2 2   Plantar responses were flexor bilaterally.  No pond's   -Sensory: intact to light touch  -Coordination: intact to FNF, H2S b/l  -Gait: normal station, arm swing, and stride length.    Investigations:    Muscle biopsy 09/16/2024  LEFT VASTUS LATERALIS MUSCLE BIOPSY:     Two pieces of muscle were quick frozen for light microscopy and histochemistry. Additional pieces were quick frozen for biochemical testing.     LIGHT MICROSCOPY:     Frozen sections stained with H&E, trichrome, PAS, Oil Red O, and Congo red, were available for review. There was advanced muscle degeneration with several regions of the sample appearing \"end-stage\", replaced by adipose and connective tissue. Other regions showed somewhat better preserved muscle architecture. There was markedly increased fiber-size variation due to the presence of many extremely atrophic (often no more than 5 microns in diameter) and occasional hypertrophic fibers. Some of the atrophic fibers appeared markedly basophilic indicating regeneration. There were several fibers with internal nuclei, and numerous splitting fibers. Necrotic fibers were very rare or absent. There was prominent endomysial and perimysial fibrosis in a patchy fashion. Endomysial inflammation was not apparent on H&E; there was only one site identified on Congo Red stain, showing a cluster of mononuclear cells surrounding but " not clearly invading an intact muscle fiber. The sample was characterized by severe and uneven freezing artefact, making it extremely difficult to identify vacuoles, but even after taking this artefact into account, there were rare (approximately 3) muscle fibers with rimmed vacuoles. Congo red stain did not reveal congophilic inclusions. Trichrome showed a few extremely atrophic fibers that appeared ragged red; there were no nemaline rods. Glycogen and lipid content were normal.     HISTOCHEMISTRY:     Frozen sections stained with ATPase (pH 4.35, 4.5 and 9.4), metachromatic ATPase, NADH, SDH, modified SDH, GONZALEZ, a-GP, Fast Blue B, nonspecific esterase, phosphorylase A and MAD were available for review. ATPase staining identified fibers of types 1, 2a, and 2b. There was no evidence of fiber-type grouping. Atrophic fibers of all fiber types were seen. Oxidative enzyme stain deposition was markedly irregular, with several fibers appearing lobulated or moth-eaten. There were many ragged blue fibers on the modified SDH stain, most which were GONZALEZ deficient in neighboring sections. Fast Blue B staining was normal; there were no necrotic fibers, clusters of macrophages, or abnormal perimysial staining. Esterase staining identified endplates and atrophic fibers. Myophosphorylase and MAD stains were normal.     IMMUNOHISTOCHEMISTRY:     Monoclonal antibodies against CD4, CD8, CD20, CD68, CD45RO, beta-crystallin, MHC I and MHC II ubiquitin were exposed to sections of frozen muscle and localized with peroxidase.  Many fibers showed increased surface expression of MHC I. MHC II stain was negative. No inflammatory cell collections were identified wiht CD4, CD8, CD20, or CD68 stains. Beta-crystallin showed a couple of muscle fibers with sarcoplasmic punctate aggregates.      DIAGNOSIS:     Severe chronic myopathy, with infrequent rimmed vacuoles and prominent mitochondrial abnormalities. 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 See comment.      COMMENT:     Diagnostic evaluation of this sample was challenging due to advanced muscle degeneration with end-stage appearance in a few regions, and severe freezing artefact. The main distinctive features of this biopsy were the (very infrequent) rimmed vacuoles and the striking mitochondrial pathology. Those findings can be seen with inclusion body myositis or other chronic inherited myopathies with rimmed vacuoles. A definitive histopathologic diagnosis of inclusion body myositis cannot be made due to lack of significant inflammation and lack of congophilic (amyloid) deposits. However, immune stains clearly showed MHC I upregulation in non-necrotic muscle fibers, which may support this diagnosis in the appropriate clinical context.     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 In her last visit in neuromuscular clinic 07/08/24 she was seen to have mildly weak in long flexors on the L and mild L knee extension weakness that would be seen in IBM. In the interim she had a muscle biopsy of her L vastus lateralis muscle biopsy with severe chronic myopathy, with infrequent rimmed vacuoles and prominent mitochondrial abnormalities. 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 Given findings of long flexor weakness in upper extremities, L quadriceps weakness, with biopsy finding of vacoule is more suggestive of IBM. Anti CN1A can be positive in Sjogren's too and was deferred. Previous EMG with RNS in L triceps was likely false positive and suspect antibody negative myasthenia gravis diagnosis was incorrect, overtime her clinical symptoms have progressed, not  improved with MG medications, have evolved to be more consistent clinically with IBM as well.     We discussed above impression with her, her daughter and  at bedside who verbalized understanding of the change in diagnosis. Given she does not have myasthenia gravis, we will discontinue pyridostigmine. From IBM standpoint there are not curative treatments, no role of prednisone, will discontinue if not needed from rheumatology/Sjogren's standpoint.    Plan:   - Discontinue pyridostigmine  - Can discontinue prednisone if okay with Dr. Mark (rheumatology)  - Azathioprine per rheumatology  - Follow up in one year    Patient discussed with attending neurologist, Dr. Manning, who agrees with above.    [facility] felecia Grove MD  PGY-4 Neurology Resident                  Attestation signed by Rene Manning MD at 11/25/2024  8:50 PM:  Patient seen and examined today with resident Dr Grove. Agree with his assessment and plan. I explained to Kristina and her spouse that we revisited her previous diagnosis of seronegative MG which is likely incorrect. 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 I discussed the nature and prognosis of this disease with the family and explained that it is slowly progressive and currently incurable. Immunotherapy is ineffective. We recommend stopping pyridostigmine and I will have a discussion with Dr Mark about the utility of continuing low dose prednisone and azathioprine. If those are necessary to control extramuscular manifestations of her Sjogren syndrome, then they should be continued. If not, we should consider stopping them.   We asked our physical therapist to see her in Clinic and make appropriate recommendations. Kristina is not particularly concerned about hand weakness and denies dysphagia. Will monitor her annually. She is also following with our [facility] for her Alzheimer's disease; she is not  eligible for anti amyloid therapies.     Follow up 1 year.     Rene Manning MD, FAAN    The longitudinal plan of care for the diagnosis(es)/condition(s) as documented were addressed during this visit. Due to the added complexity in care, I will continue to support Kristina in the subsequent management and with ongoing continuity of care.    TT spent on this encounter today 30 minutes of which 15 face to face, 10 in post visit note review, editing, and orders, and 5 in pre-visit chart review.       Again, thank you for allowing me to participate in the care of your patient.      Sincerely,    Rene Manning MD

## 2024-11-25 NOTE — PATIENT INSTRUCTIONS
Physical therapy will see you today.    Your diagnosis likely inclusion body myositis and not myasthenia gravis.  IBM is a slowly progressive muscle disease, with no known cure to date.  You may find a lot of useful information for this disease at the website: [email or web address].[email or web address]    Please stop taking the drug pyridostigmine    I will talk to Dr. Mark and ask her if we should continue prednisone and azathioprine or not.     Follow-up in 1 year

## 2024-12-02 DIAGNOSIS — F43.0 ACUTE REACTION TO STRESS: ICD-10-CM

## 2024-12-02 RX ORDER — LORAZEPAM 0.5 MG/1
TABLET ORAL
Qty: 20 TABLET | Refills: 0 | Status: SHIPPED | OUTPATIENT
Start: 2024-12-02

## 2024-12-13 ENCOUNTER — VIRTUAL VISIT (OUTPATIENT)
Dept: RHEUMATOLOGY | Facility: CLINIC | Age: 74
End: 2024-12-13
Attending: INTERNAL MEDICINE
Payer: COMMERCIAL

## 2024-12-13 VITALS — BODY MASS INDEX: 27.66 KG/M2 | HEIGHT: 65 IN | WEIGHT: 166 LBS

## 2024-12-13 DIAGNOSIS — G70.01 MYASTHENIA GRAVIS WITH EXACERBATION (H): ICD-10-CM

## 2024-12-13 DIAGNOSIS — J84.9 ILD (INTERSTITIAL LUNG DISEASE) (H): Primary | ICD-10-CM

## 2024-12-13 DIAGNOSIS — Z51.81 MEDICATION MONITORING ENCOUNTER: ICD-10-CM

## 2024-12-13 DIAGNOSIS — M35.01 SJOGREN'S SYNDROME WITH KERATOCONJUNCTIVITIS SICCA (H): ICD-10-CM

## 2024-12-13 PROCEDURE — 99214 OFFICE O/P EST MOD 30 MIN: CPT | Mod: 95 | Performed by: INTERNAL MEDICINE

## 2024-12-13 PROCEDURE — G2211 COMPLEX E/M VISIT ADD ON: HCPCS | Performed by: INTERNAL MEDICINE

## 2024-12-13 RX ORDER — PREDNISONE 5 MG/1
5 TABLET ORAL DAILY
Qty: 90 TABLET | Refills: 1 | Status: SHIPPED | OUTPATIENT
Start: 2024-12-13

## 2024-12-13 RX ORDER — AZATHIOPRINE 50 MG/1
TABLET ORAL
Qty: 120 TABLET | Refills: 1 | Status: SHIPPED | OUTPATIENT
Start: 2024-12-13

## 2024-12-13 ASSESSMENT — PAIN SCALES - GENERAL: PAINLEVEL_OUTOF10: MODERATE PAIN (4)

## 2024-12-13 NOTE — NURSING NOTE
Current patient location: [address] [place] [address] [place] [address]    Is the patient currently in the state of [place]? YES    Visit mode:VIDEO    If the visit is dropped, the patient can be reconnected by:VIDEO VISIT: Text to cell phone:   No relevant phone numbers on file.       Will anyone else be joining the visit? Yes, Pt's  is with the ot and will be joining the video visit per pt  (If patient encounters technical issues they should call [phone]    Are changes needed to the allergy or medication list? Pt stated no med changes    Are refills needed on medications prescribed by this physician? Pt will discuss with provider per pt    Rooming Documentation:  Not applicable    Reason for visit: RECHECK    No other vitals to report per pt    Alta SCHULTZF

## 2024-12-13 NOTE — LETTER
12/13/2024       RE: Albina Pedro  [address] [place] [address]     Dear Colleague,    Thank you for referring your patient, Albina Pedro, to the [facility] [place] at [facility]. Please see a copy of my visit note below.    Virtual Visit Details    11 30- 11 45 AM    Type of service:  Video Visit     Originating Location (pt. Location): Home  Distant Location (provider location):  On-site  Platform used for Video Visit: Doximity    Chief Complaint   Patient presents with     RECHECK   Inflammatory myositis  ILD  Imuran monitoring    Date of initial visit: 3/25//2022  Last seen: 6/20/2024  DOS: 12/13/2024      ASSESSMENT AND PLAN:    Inflammatory myositis. Sjogren's. ILD. Albina Pedro 71 y.o. female with long standing h/o seropositive Sjogren's causing ILD, inflammatory myositis, R thigh panniculitis who presents for worsening joint pain, muscle weakness and deconditioning over past 2 years. Although she has OA and her ILD seems to be stable, it seems like she was doing well in the past while taking imuran (AZA). She was on AZA [id number] and was tapered off slowly as was doing well. Max AZA dose was 100 mg every day and last dose was 50 mg every other day. She tolerated AZA in the past with no SEs. Her ILD has not worsened off AZA. No flare of rash. In 3/2022 (initial visit), I recommended her to resume taking AZA while doing additional work up. Plus, I recommend a course of prednisone taper. We discussed Sjogren's Dx, mx of sicca.     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 IVIG per Dr. Manning. Will have LP for Alzheimer's.    AZA monitoring. Labs q3mo    ILD. Follow up with pulmonary.        Plan 10/28/2022:    Tell Beatriz that you would get IVIG at [place]    Continue imuran 50 mg a day with labs q 3months    Start tapering  prednisone: 9-8-7-6 mg a day each course x 1 month then 5 mg a day and stay on 5 mg a day    DEXA bone density    MTM referral     Video visit in about 3 months        12/7/2023: Reports getting weaker, might need to use scooter, has upcoming appointment for Alzheimer's management and follow up for MG. No evidence of ILD/myositis flare ups today. Stable labs. Defer m/o MG to Dr. Manning, currently is taking prednisone 5 mg every day+  mg bid, although not sure about the doses of her meds. NL DEXA 12/2022. IVIG got denied by insurance.      6/20/2024: Stable seropositive Sjogren's/ILD/myositis on current regimen. Labs from 4/2024 were reviewed, no imuran toxicity. Will update DEXA in 12/2024 given chronic steroid use. She had nl DEXA in 12/2022. Recent labs, records were reviewed.      Plan:    Continue  mg bid    Continue prednisone 5 mg every day    DEXA bone density in 12/2024    Labs every 3 months, due in July 2024    Will check Sjogren's labs with next blood work    Follow up with Dr. Perlman for ILD in 12/2024    Follow up with Dr. Manning for MG in 7/2024    Keep 12/13 appointment with me, in person with na option to switch to virtual if difficult to come in      Today 12/13/2024:    Per Dr. Manning her neurologist, muscle disease acts like inclusion body myositis and no longer like MG; therefore AZA is no longer recommended from neurology standpoint. I advised Kristina to follow up with Dr. Perlman to see if AZA could be lowered/tapered if ILD is stable. For now, will continue with current dose of  mg bid+prednisone 5 mg every day till she sees Dr. Perlman. Labs and DEXA are needed, could be done on the same day of pulmonary visit.      Plan:    Follow up with Dr. Perlman ILD clinic    DEXA    Labs    Return to see me in 6 months in person        TT 30 min was spent on date of the encounter doing chart review, history and exam, documentation and further activities as noted above.  Any prior notes, outside records, laboratory results, and imaging studies were reviewed if relevant.      The longitudinal plan of care for the diagnosis(es)/condition(s) as documented were addressed during this visit. Due to the added complexity in care, I will continue to support Kristina in the subsequent management and with ongoing continuity of care.      Paul Mark MD      Orders Placed This Encounter   Procedures     ALT     Albumin level     AST     Creatinine     Complement C4     Complement C3     CRP inflammation     DNA double stranded antibodies     Erythrocyte sedimentation rate auto     UA with Microscopic reflex to Culture     Protein  random urine     Creatinine random urine     Cryoglobulin, Quantitative with Reflex to Identification     CK total     CBC with Platelets & Differential        HISTORY OF PRESENTING ILLNESS:       3/25/2022:    Kristina is a 72 yo female who presents today with her . She has been referred for m/o Sjogren's. She has h/o +SSA/SSB Sjogren's diagnosed in 2004 after having sicca x 20 yr followed by muscle weakness, inflammatory arthritis, L thigh panniculitis, ILD. Was treated with prednisone, did not tolerate MTX. Was on AZA 0927-2193. Had +BINDU, +RF.    She was last seen by Dr. Moore in 1/2020 and before that, was under care of Dr. Ho who diagnosed and treated her since 2004.    She is here to discuss her joint pain.    She has diffuse arthralgia, no joint swelling, no AM stiffness, reports loss of strength of her hands with poor .    She has arthralgia, it got worse and went downhill for past 2 years.    Sometimes gets muscle weakness, more constant lately over past few months.    Has brain fog, getting worse.    Has progressive hair loss with bald spots.    No photosensitivity or skin rashes.    Uses biotene and water, wakes up thirsty. Had a tooth which broke, sometimes teeth are chipping. Has dry mouth due to Sjogren's.    Has dry eyes, uses OTC  eyedrops.    Gets R droopy eyelid, now affected L eyelid.    Eyes look red ad dry.    No parotid gland swelling.    No CP, SOB, cough, fevers or night sweats.    Gets diarrhea from certain foods.    No numbness, tingling.    Has fatigue, difficult time staying awake.    She did well on her cognitive function testing.    Mestinon caused diarrhea and stopped taking it, scheduled to have EMG and do follow up with Dr. Manning.    Updated on cancer screening.    No dysphagia.    6/3/2022:    Kristina was initially seen in 3/2022 with muscle weakness, when she was treated with prednisone taper (4tab=20 mg qd x 5 days, 3tab=15 mg qd x 5 days, 2tab=10 mg qd x 5 days, 1 tab=5 mg qd x 5 days then stop) and was put back on imuran 50 mg every day. Prednisone helped her.    Overall feeling pretty good.    Had diarrhea but it is better.    Went on 4 almazan past weekend and did well.    She and her  have seen an improvement by resuming imuran.    Easier to get out of chair.    Waiting to hear about muscle biopsy result.     10/28/2022:    Kristina presents for follow up. She is here with her . They report that Kristina is getting worse, feels weaker, has memory problem. Dr. Manning has ordered IVIG for myasthenia gravis, I see that the staff tried to reach Kristina to schedule at [place] with no success, Kristina reports that her phone is not working and they should call her . 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    No rash, or oral ulcers.    Has hair loss x past 6 months.    No stomach pain.    Sometimes has diarrhea based on what she eats.    Had a cough, resolved.    No CP.    Has dry mouth, solid dysphagia.    Dryness of eyes is worse. Uses OTC eyedrops.    Mestinon is not helping with MG.    Legs are weak, reports falls.      12/7/2023:    -feeling weaker, gets dizzy, tends to fall, memory is bad,  no SOB or cough, no rash or CP        2024:    -sometimes gets sharp pain over her arms, legs, bone pain, takes tylenol    -has upcoming appointment with Dr. Swartz in July    -gets dizzy, lightheaded on standing up, not falling    -no SOB    -overall stable, no major changes, no flares    -memory med has been increased      Today 2024:    -dealing with low back pain, memory problem, no other major complaints, no resp sx    ROS: A comprehensive ROS was done. Positives are per HPI.    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       Past Surgical History:   Procedure Laterality Date     BIOPSY MUSCLE DIAGNOSTIC (LOCATION) Left 2022    Procedure: BIOPSY, MUSCLE LEFT biceps @0900;  Surgeon: Tyrell Loo MD;  [place] [facility] [place]OPSY MUSCLE DIAGNOSTIC (LOCATION) Left 2024    Procedure: Biopsy muscle diagnostic vastus lateralis;  Surgeon: Tyrell Loo MD;  Location: [facility] OR     C/SECTION, LOW TRANSVERSE  10/13/1978    , Low Transverse     COLONOSCOPY       OPEN REDUCTION INTERNAL FIXATION ANKLE Right 2019    Procedure: Open reduction internal fixation right lateral malleolus fracture;  Surgeon: Rolo Oconnor DPM;  Location: [facility] OR     SURGICAL HISTORY OF -            SURGICAL HISTORY OF -   00    Colonoscopy       Family History   Problem Relation Age of Onset     Breast Cancer Mother      Cancer Mother         Breast and liver- age 43     Cerebrovascular Disease Father      Heart Failure Father      Coronary Artery Disease Father      Alzheimer Disease Father      Hypertension Father      Diabetes Maternal  Grandmother      Breast Cancer Maternal Aunt      Breast Cancer Paternal Aunt      Cancer - colorectal No family hx of      Prostate Cancer No family hx of        Social History     Tobacco Use     Smoking status: Never     Passive exposure: Never     Smokeless tobacco: Never   Substance Use Topics     Alcohol use: Yes     Alcohol/week: 0.0 standard drinks of alcohol     Comment: 1 glass of wine every 2 weeks     Outpatient Encounter Medications as of 12/13/2024   Medication Sig Dispense Refill     acetaminophen (TYLENOL) 500 MG tablet Take 1,000 mg by mouth every 6 hours as needed for mild pain       albuterol (PROAIR HFA/PROVENTIL HFA/VENTOLIN HFA) 108 (90 Base) MCG/ACT inhaler INHALE TWO PUFFS BY MOUTH EVERY FOUR HOURS AS NEEDED FOR SHORTNESS OF BREATH/DYSPNEA (Patient not taking: No sig reported) 25.5 g 0     azaTHIOprine (IMURAN) 50 MG tablet TAKE TWO TABLETS BY MOUTH TWICE DAILY 120 tablet 1     buPROPion (WELLBUTRIN XL) 150 MG 24 hr tablet TAKE TWO TABLETS BY MOUTH IN THE MORNING 90 tablet 0     Calcium-Magnesium-Zinc 500-250-12.5 MG TABS Take 1 tablet by mouth daily       FLUoxetine (PROZAC) 40 MG capsule Take 1 capsule (40 mg) by mouth daily (Patient not taking: Reported on 11/25/2024) 90 capsule 1     LORazepam (ATIVAN) 0.5 MG tablet TAKE ONE-HALF TO ONE TABLET BY MOUTH THREE TO FIVE TIMES A WEEK AS NEEDED. 20 tablet 0     Multiple Vitamins-Minerals (MULTIVITAMIN & MINERAL PO) Take 1 tablet by mouth daily        pravastatin (PRAVACHOL) 40 MG tablet Take 1 tablet (40 mg) by mouth daily. *PLEASE SCHEDULE APPT. FOR REFILLS [phone]* 90 tablet 0     predniSONE (DELTASONE) 5 MG tablet TAKE ONE TABLET BY MOUTH ONE TIME DAILY 90 tablet 1     pyRIDostigmine (MESTINON) 60 MG tablet TAKE ONE TABLET BY MOUTH THREE TIMES DAILY 270 tablet 0     QUEtiapine (SEROQUEL) 50 MG tablet Take at 9 pm every night 30 tablet 0     rivastigmine (EXELON) 9.5 MG/24HR 24 hr patch Place 1 patch onto the skin daily for 270 days 90  "patch 2     No facility-administered encounter medications on file as of 12/13/2024.       Allergies   Allergen Reactions     Daypro [Oxaprozin] Rash            Nitroglycerin Other (See Comments)     Causes low blood pressure     Penicillins Unknown     Occurred as child.     Sulfa Antibiotics Rash     Physical exam:    Ht 1.651 m (5' 5\")   Wt 75.3 kg (166 lb)   BMI 27.62 kg/m         Constitutional: NAD. pleasant.  present.  HEENT: EOMI, no scleral icterus. Nl conj.   Musculoskeletal: No active synovitis.   Skin: no rash  Neurological system: Alert  Psych: nl affect    Component      Latest Ref Rng & Units 6/17/2019   WBC      4.0 - 11.0 10e9/L 5.6   RBC Count      3.8 - 5.2 10e12/L 4.19   Hemoglobin      11.7 - 15.7 g/dL 13.0   Hematocrit      35.0 - 47.0 % 39.8   MCV      78 - 100 fl 95   MCH      26.5 - 33.0 pg 31.0   MCHC      31.5 - 36.5 g/dL 32.7   RDW      10.0 - 15.0 % 13.1   Platelet Count      150 - 450 10e9/L 230   % Neutrophils      % 56.3   % Lymphocytes      % 31.4   % Monocytes      % 9.6   % Eosinophils      % 2.3   % Basophils      % 0.4   Absolute Neutrophil      1.6 - 8.3 10e9/L 3.2   Absolute Lymphocytes      0.8 - 5.3 10e9/L 1.8   Absolute Monocytes      0.0 - 1.3 10e9/L 0.5   Absolute Eosinophils      0.0 - 0.7 10e9/L 0.1   Absolute Basophils      0.0 - 0.2 10e9/L 0.0   Diff Method       Automated Method   Sodium      133 - 144 mmol/L 140   Potassium      3.4 - 5.3 mmol/L 5.0   Chloride      94 - 109 mmol/L 106   Carbon Dioxide      20 - 32 mmol/L 31   Anion Gap      3 - 14 mmol/L 3   Glucose      70 - 99 mg/dL 96   Urea Nitrogen      7 - 30 mg/dL 17   Creatinine      0.52 - 1.04 mg/dL 0.57   GFR Estimate      >60 mL/min/1.73:m2 >90   GFR Estimate If Black      >60 mL/min/1.73:m2 >90   Calcium      8.5 - 10.1 mg/dL 9.1   Bilirubin Total      0.2 - 1.3 mg/dL 0.5   Albumin      3.4 - 5.0 g/dL 3.8   Protein Total      6.8 - 8.8 g/dL 7.4   Alkaline Phosphatase      40 - 150 U/L 59   ALT   "    0 - 50 U/L 27   AST      0 - 45 U/L 20   Cholesterol      <200 mg/dL 150   Triglycerides      <150 mg/dL 168 (H)   HDL Cholesterol      >49 mg/dL 70   LDL Cholesterol Calculated      <100 mg/dL 46   Non HDL Cholesterol      <130 mg/dL 80   Rheumatoid Factor      <20 IU/mL <20   CRP Inflammation      0.0 - 8.0 mg/L <2.9   Vitamin D Deficiency screening      20 - 75 ug/L 48   Vitamin B12      193 - 986 pg/mL 705     Component      Latest Ref Rng & Units 1/16/2020   Ruth-1 Autoantibodies      <20 EU 0   Scl-70 Autoantibodies      <20 EU 0   Sm (Vergara) Autoantibodies      <20 EU 1   Sm/RNP Autoantibodies      <20 EU 2   SS-A/Ro Autoantibodies      <20  (H)   SS-B/La Autoantibodies      <20  (H)   Complement C4      19 - 59 mg/dL 27   Complement C3      83 - 177 mg/dL 139   DNA (ds) Antibody      <25 IU 3       Component      Latest Ref Rng & Units 1/20/2022 2/16/2022 2/28/2022   WBC      4.0 - 11.0 10e3/uL 5.7     RBC Count      3.80 - 5.20 10e6/uL 4.14     Hemoglobin      11.7 - 15.7 g/dL 13.0     Hematocrit      35.0 - 47.0 % 39.8     MCV      78 - 100 fL 96     MCH      26.5 - 33.0 pg 31.4     MCHC      31.5 - 36.5 g/dL 32.7     RDW      10.0 - 15.0 % 13.1     Platelet Count      150 - 450 10e3/uL 223     % Neutrophils      % 57     % Lymphocytes      % 30     % Monocytes      % 10     % Eosinophils      % 3     % Basophils      % 0     Absolute Neutrophils      1.6 - 8.3 10e3/uL 3.3     Absolute Lymphocytes      0.8 - 5.3 10e3/uL 1.7     Absolute Monocytes      0.0 - 1.3 10e3/uL 0.6     Absolute Eosinophils      0.0 - 0.7 10e3/uL 0.2     Absolute Basophils      0.0 - 0.2 10e3/uL 0.0     Sodium      133 - 144 mmol/L 139 137    Potassium      3.4 - 5.3 mmol/L 4.3 4.3    Chloride      94 - 109 mmol/L 108 107    Carbon Dioxide      20 - 32 mmol/L 38 (H) 25    Anion Gap      3 - 14 mmol/L <1 (L) 5    Urea Nitrogen      7 - 30 mg/dL 22 27    Creatinine      0.52 - 1.04 mg/dL 0.56 0.59    Calcium      8.5 -  10.1 mg/dL 9.0 9.1    Glucose      70 - 99 mg/dL 99 96    Alkaline Phosphatase      40 - 150 U/L 63 67    AST      0 - 45 U/L 20 30    ALT      0 - 50 U/L 33 44    Protein Total      6.8 - 8.8 g/dL 7.4 7.3    Albumin      3.4 - 5.0 g/dL 3.5 4.0    Bilirubin Total      0.2 - 1.3 mg/dL 0.5 0.4    GFR Estimate      >60 mL/min/1.73m2 >90 >90    Color Urine      Colorless, Straw, Light Yellow, Yellow Yellow     Appearance Urine      Clear Clear     Glucose Urine      Negative mg/dL Negative     Bilirubin Urine      Negative Negative     Ketones Urine      Negative mg/dL Negative     Specific Gravity Urine      1.003 - 1.035 >=1.030     Blood Urine      Negative Negative     pH Urine      5.0 - 7.0 5.0     Protein Albumin Urine      Negative mg/dL Negative     Urobilinogen Urine      0.2, 1.0 E.U./dL 0.2     Nitrite Urine      Negative Negative     Leukocyte Esterase Urine      Negative Negative     TSH      0.40 - 4.00 mU/L 1.87     AcetChol Binding Darlin      0.0 - 0.4 nmol/L   0.0   AcetChol Modul Darlin      <=45 %   4   Clinical Information:     71 year old female with generalized fatigue and right eyelid ptosis. She has not taken her Mestinon in at least the last week due to intolerance with diarhea. Query myopathy vs neuromuscular junction disorder. Brief exam: bilateral eyelid closure weakness and neck flexion weakness. Double vision on upward gaze and lateral gaze; Right Deltoid 5, Biceps 5, Triceps 4 bilaterally; subtle asymmetry of the finger flexors (very mild weakness on the left, normal on the right).      Techniques:     Motor and sensory conduction studies were done with surface recording electrodes. EMG was done with a concentric needle electrode.      Results:     The right peroneal-EDB motor study showed normal onset latency with low amplitude without segmental changes, and normal conduction velocities.The right tibial-AH, peroneal-TA, median-APB and ulnar-ADM motor studies were normal. The right superficial  peroneal, sural, median, ulnar, and radial antidromic sensory studies were normal. The right tibial F wave latencies were normal. 3 Hz slow repetitive nerve stimulation at the right ADM and orbicularis oculi muscles did not show any significant decrement either at rest or after 1 min of maximal isometric exercise.      Needle evaluation of the right Triceps Brachii, Pronator Teres, Biceps, Tibialis anterior, Gastrocnemius (Medial Hd) and vastus lateralis showed abnormal spontaneous activity with fibrillation potentials (1+ to 2+) with CRDs present in the vastus lateralis only. The right Triceps Brachii, Pronator Teres, First Dorsal Interosseous , Biceps, and vastus lateralis showed small amplitude, short duration, polyphasic motor units with early recruitment in the triceps and biceps muscles.  The right tibialis anterior showed normal recruitment of normal appearing motor units. The right Gastrocnemius (Medial Hd) had normal recruitment of large, long duration motor units. The right deltoid muscle was normal.      Interpretation:     Abnormal study. 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 Please note, however, that this could still be part of a myopathic process because some chronic myopathies may show large, long duration motor unit potentials. There was no electrodiagnostic evidence of a neuromuscular junction disorder like myasthenia gravis.      Comment: The results of the study were discussed with the patient and she was referred for muscle biopsy of the left triceps or biceps.   ___________________________  Millicent Diggs MD, Neuromuscular Fellow  Rene Manning MD      Component      Latest Ref Rng & Units 3/25/2022   WBC      4.0 - 11.0 10e3/uL 6.1   RBC Count      3.80 - 5.20 10e6/uL 4.12   Hemoglobin      11.7 - 15.7  g/dL 12.7   Hematocrit      35.0 - 47.0 % 38.7   MCV      78 - 100 fL 94   MCH      26.5 - 33.0 pg 30.8   MCHC      31.5 - 36.5 g/dL 32.8   RDW      10.0 - 15.0 % 13.0   Platelet Count      150 - 450 10e3/uL 236   % Neutrophils      % 59   % Lymphocytes      % 30   % Monocytes      % 7   % Eosinophils      % 2   % Basophils      % 1   % Immature Granulocytes      % 1   NRBCs per 100 WBC      <1 /100 0   Absolute Neutrophils      1.6 - 8.3 10e3/uL 3.6   Absolute Lymphocytes      0.8 - 5.3 10e3/uL 1.8   Absolute Monocytes      0.0 - 1.3 10e3/uL 0.4   Absolute Eosinophils      0.0 - 0.7 10e3/uL 0.1   Absolute Basophils      0.0 - 0.2 10e3/uL 0.0   Absolute Immature Granulocytes      <=0.4 10e3/uL 0.0   Absolute NRBCs      10e3/uL 0.0   GARETT-1 (Histidyl-tRNA Synthetase) Darlin IgG      0 - 40 AU/mL 0   PL-12 (alanyl-tRNA synthetase) Antibody      Negative Negative   PL-7 (threonyl-tRNA synthetase) Antibody      Negative Negative   EJ (glycyl - tRNA synthetase) Antibody      Negative Negative   OJ (isoleucyl-tRNA synthetase) Antibody      Negative Negative   SRP (Signal Recognition Particle) Antibody      Negative Negative   Mi-2 (nuclear helicase protein) Antibody      Negative Negative   P155/140 (TIF1-gamma) Antibody      Negative Negative   TIF-1 Gamma Antibody      Negative Negative   SAE1 (SUMO activating enzyme) Darlin      Negative Negative   MDA5 (CADM 140) Darlin      Negative Negative   NXP-2 (Nuclear matrix protein 2) Darlin      Negative Negative   Myositis Interp       See Note   Albumin Fraction      3.7 - 5.1 g/dL 4.2   Alpha 1 Fraction      0.2 - 0.4 g/dL 0.3   Alpha 2 Fraction      0.5 - 0.9 g/dL 0.9   Beta Fraction      0.6 - 1.0 g/dL 0.9   Gamma Fraction      0.7 - 1.6 g/dL 0.9   Monoclonal Peak      <=0.0 g/dL 0.0   ELP Interpretation:       Essentially normal electrophoretic pattern. No obvious monoclonal proteins seen. Pathologic significance requires clinical correlation. Carlos Ashford M.D., Ph.D.,  Pathologist.   Quantiferon-TB Gold Plus Result      Negative Negative   TB1 Ag minus Nil Value      IU/mL 0.01   TB2 Ag minus Nil Value      IU/mL 0.00   Mitogen minus Nil Result      IU/mL 9.97   Nil Result      IU/mL 0.03   BINDU interpretation      Negative Positive (A)   BINDU pattern 1       Speckled   BINDU titer 1       1:160   Iron      35 - 180 ug/dL 69   Iron Binding Cap      240 - 430 ug/dL 344   Iron Saturation Index      15 - 46 % 20   Creatinine      0.52 - 1.04 mg/dL 0.63   GFR Estimate      >60 mL/min/1.73m2 >90   ALT      0 - 50 U/L 31   Albumin      3.4 - 5.0 g/dL 3.6   AST      0 - 45 U/L 18   Thyroglobulin Antibody      <40 IU/mL <20   CK Total      30 - 225 U/L 244 (H)   Aldolase      1.2 - 7.6 U/L 3.9   Complement C3      81 - 157 mg/dL 142   Complement C4      13 - 39 mg/dL 26   CRP Inflammation      0.0 - 8.0 mg/L <2.9   Cryoglobulin Interpretation      Negative Negative   Cyclic Citrullinated Peptide Antibody, IgG      <7.0 U/mL 2.1   DNA-ds      <10.0 IU/mL 1.1   Vitamin D Deficiency screening      20 - 75 ug/L 41   Vitamin B12      193 - 986 pg/mL 603   Thyroid Peroxidase Antibody      <35 IU/mL <10   Rheumatoid Factor      <12 IU/mL 15 (H)   Hepatitis C Antibody      Nonreactive Nonreactive   Hep B Surface Agn      Nonreactive Nonreactive   Sed Rate      0 - 30 mm/hr 14   Ferritin      8 - 252 ng/mL 85   Hepatitis B Core Darlin      Nonreactive Nonreactive   Total Protein Serum for ELP      6.8 - 8.8 g/dL 7.2   Quantiferon Nil Tube      IU/mL 0.03   Quantiferon TB1 Tube      IU/mL 0.04   Quantiferon TB2 Tube       0.03   QUANTIFERON MITOGEN      IU/mL 10.00     Component      Latest Ref [facility] 4/3/2024  5:18 PM   WBC      4.0 - 11.0 10e3/uL 4.7    RBC Count      3.80 - 5.20 10e6/uL 4.31    Hemoglobin      11.7 - 15.7 g/dL 13.2    Hematocrit      35.0 - 47.0 % 39.9    MCV      78 - 100 fL 93    MCH      26.5 - 33.0 pg 30.6    MCHC      31.5 - 36.5 g/dL 33.1    RDW      10.0 - 15.0 % 14.4    Platelet  Count      150 - 450 10e3/uL 197    % Neutrophils      % 68    % Lymphocytes      % 23    % Monocytes      % 8    % Eosinophils      % 1    % Basophils      % 0    % Immature Granulocytes      % 0    Absolute Neutrophils      1.6 - 8.3 10e3/uL 3.2    Absolute Lymphocytes      0.8 - 5.3 10e3/uL 1.1    Absolute Monocytes      0.0 - 1.3 10e3/uL 0.4    Absolute Eosinophils      0.0 - 0.7 10e3/uL 0.0    Absolute Basophils      0.0 - 0.2 10e3/uL 0.0    Absolute Immature Granulocytes      <=0.4 10e3/uL 0.0    Creatinine      0.51 - 0.95 mg/dL 0.55    GFR Estimate      >60 mL/min/1.73m2 >90    ALT      0 - 50 U/L 19    Albumin      3.5 - 5.2 g/dL 4.0    AST      0 - 45 U/L 26            Again, thank you for allowing me to participate in the care of your patient.      Sincerely,    Paul Mark MD

## 2024-12-13 NOTE — PROGRESS NOTES
Virtual Visit Details    11 30- 11 45 AM    Type of service:  Video Visit     Originating Location (pt. Location): Home  Distant Location (provider location):  On-site  Platform used for Video Visit: [facility]    Chief Complaint   Patient presents with    RECHECK   Inflammatory myositis  ILD  Imuran monitoring    Date of initial visit: 3/25//2022  Last seen: 6/20/2024  DOS: 12/13/2024      ASSESSMENT AND PLAN:    Inflammatory myositis. Sjogren's. ILD. Albina Pedro 71 y.o. female with long standing h/o seropositive Sjogren's causing ILD, inflammatory myositis, R thigh panniculitis who presents for worsening joint pain, muscle weakness and deconditioning over past 2 years. Although she has OA and her ILD seems to be stable, it seems like she was doing well in the past while taking imuran (AZA). She was on AZA 6458-3901 and was tapered off slowly as was doing well. Max AZA dose was 100 mg every day and last dose was 50 mg every other day. She tolerated AZA in the past with no SEs. Her ILD has not worsened off AZA. No flare of rash. In 3/2022 (initial visit), I recommended her to resume taking AZA while doing additional work up. Plus, I recommend a course of prednisone taper. We discussed Sjogren's Dx, mx of sicca.     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 IVIG per Dr. Manning. Will have LP for Alzheimer's.    AZA monitoring. Labs q3mo    ILD. Follow up with pulmonary.        Plan 10/28/2022:    Tell Beatriz that you would get IVIG at [place]    Continue imuran 50 mg a day with labs q 3months    Start tapering prednisone: 9-8-7-6 mg a day each course x 1 month then 5 mg a day and stay on 5 mg a day    DEXA bone density    MTM referral     Video visit in about 3 months        12/7/2023: Reports getting weaker, might need to use scooter, has upcoming appointment for Alzheimer's management and follow up for MG. No evidence of ILD/myositis flare ups today.  Stable labs. Defer m/o MG to Dr. Manning, currently is taking prednisone 5 mg every day+  mg bid, although not sure about the doses of her meds. NL DEXA 12/2022. IVIG got denied by insurance.      6/20/2024: Stable seropositive Sjogren's/ILD/myositis on current regimen. Labs from 4/2024 were reviewed, no imuran toxicity. Will update DEXA in 12/2024 given chronic steroid use. She had nl DEXA in 12/2022. Recent labs, records were reviewed.      Plan:    Continue  mg bid    Continue prednisone 5 mg every day    DEXA bone density in 12/2024    Labs every 3 months, due in July 2024    Will check Sjogren's labs with next blood work    Follow up with Dr. Perlman for ILD in 12/2024    Follow up with Dr. Manning for MG in 7/2024    Keep 12/13 appointment with me, in person with na option to switch to virtual if difficult to come in      Today 12/13/2024:    Per Dr. Manning her neurologist, muscle disease acts like inclusion body myositis and no longer like MG; therefore AZA is no longer recommended from neurology standpoint. I advised Kristina to follow up with Dr. Perlman to see if AZA could be lowered/tapered if ILD is stable. For now, will continue with current dose of  mg bid+prednisone 5 mg every day till she sees Dr. Perlman. Labs and DEXA are needed, could be done on the same day of pulmonary visit.      Plan:    Follow up with Dr. Perlman ILD clinic    DEXA    Labs    Return to see me in 6 months in person        TT 30 min was spent on date of the encounter doing chart review, history and exam, documentation and further activities as noted above. Any prior notes, outside records, laboratory results, and imaging studies were reviewed if relevant.      The longitudinal plan of care for the diagnosis(es)/condition(s) as documented were addressed during this visit. Due to the added complexity in care, I will continue to support Kristina in the subsequent management and with ongoing continuity of  care.      Paul Mark MD      Orders Placed This Encounter   Procedures    ALT    Albumin level    AST    Creatinine    Complement C4    Complement C3    CRP inflammation    DNA double stranded antibodies    Erythrocyte sedimentation rate auto    UA with Microscopic reflex to Culture    Protein  random urine    Creatinine random urine    Cryoglobulin, Quantitative with Reflex to Identification    CK total    CBC with Platelets & Differential        HISTORY OF PRESENTING ILLNESS:       3/25/2022:    Kristina is a 70 yo female who presents today with her . She has been referred for m/o Sjogren's. She has h/o +SSA/SSB Sjogren's diagnosed in 2004 after having sicca x 20 yr followed by muscle weakness, inflammatory arthritis, L thigh panniculitis, ILD. Was treated with prednisone, did not tolerate MTX. Was on AZA 8794-1042. Had +BINDU, +RF.    She was last seen by Dr. Moore in 1/2020 and before that, was under care of Dr. Ho who diagnosed and treated her since 2004.    She is here to discuss her joint pain.    She has diffuse arthralgia, no joint swelling, no AM stiffness, reports loss of strength of her hands with poor .    She has arthralgia, it got worse and went downhill for past 2 years.    Sometimes gets muscle weakness, more constant lately over past few months.    Has brain fog, getting worse.    Has progressive hair loss with bald spots.    No photosensitivity or skin rashes.    Uses biotene and water, wakes up thirsty. Had a tooth which broke, sometimes teeth are chipping. Has dry mouth due to Sjogren's.    Has dry eyes, uses OTC eyedrops.    Gets R droopy eyelid, now affected L eyelid.    Eyes look red ad dry.    No parotid gland swelling.    No CP, SOB, cough, fevers or night sweats.    Gets diarrhea from certain foods.    No numbness, tingling.    Has fatigue, difficult time staying awake.    She did well on her cognitive function testing.    Mestinon caused diarrhea and stopped taking it,  scheduled to have EMG and do follow up with Dr. Manning.    Updated on cancer screening.    No dysphagia.    6/3/2022:    Kristina was initially seen in 3/2022 with muscle weakness, when she was treated with prednisone taper (4tab=20 mg qd x 5 days, 3tab=15 mg qd x 5 days, 2tab=10 mg qd x 5 days, 1 tab=5 mg qd x 5 days then stop) and was put back on imuran 50 mg every day. Prednisone helped her.    Overall feeling pretty good.    Had diarrhea but it is better.    Went on 4 almazan past weekend and did well.    She and her  have seen an improvement by resuming imuran.    Easier to get out of chair.    Waiting to hear about muscle biopsy result.     10/28/2022:    Kristina presents for follow up. She is here with her . They report that Kristina is getting worse, feels weaker, has memory problem. Dr. Manning has ordered IVIG for myasthenia gravis, I see that the staff tried to reach Kristina to schedule at [place] with no success, Kristina reports that her phone is not working and they should call her . 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    No rash, or oral ulcers.    Has hair loss x past 6 months.    No stomach pain.    Sometimes has diarrhea based on what she eats.    Had a cough, resolved.    No CP.    Has dry mouth, solid dysphagia.    Dryness of eyes is worse. Uses OTC eyedrops.    Mestinon is not helping with MG.    Legs are weak, reports falls.      12/7/2023:    -feeling weaker, gets dizzy, tends to fall, memory is bad, no SOB or cough, no rash or CP        6/20/2024:    -sometimes gets sharp pain over her arms, legs, bone pain, takes tylenol    -has upcoming appointment with Dr. Swartz in July    -gets dizzy, lightheaded on standing up, not falling    -no SOB    -overall stable, no major changes, no flares    -memory med has been increased      Today 12/13/2024:    -dealing with  low back pain, memory problem, no other major complaints, no resp sx    ROS: A comprehensive ROS was done. Positives are per HPI.    Past Medical History:   Diagnosis Date    Anxiety     CAD (coronary artery disease)     Depression     ILD (interstitial lung disease) (H)     Myasthenia gravis without exacerbation (H)     Other and unspecified hyperlipidemia     Sicca syndrome (H)     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       Past Surgical History:   Procedure Laterality Date    BIOPSY MUSCLE DIAGNOSTIC (LOCATION) Left 2022    Procedure: BIOPSY, MUSCLE LEFT biceps @0900;  Surgeon: Tyrell Loo MD;  Location: [facility] OR    BIOPSY MUSCLE DIAGNOSTIC (LOCATION) Left 2024    Procedure: Biopsy muscle diagnostic vastus lateralis;  Surgeon: Tyrell Loo MD;  Location: [facility] OR    C/SECTION, LOW TRANSVERSE  10/13/1978    , Low Transverse    COLONOSCOPY      OPEN REDUCTION INTERNAL FIXATION ANKLE Right 2019    Procedure: Open reduction internal fixation right lateral malleolus fracture;  Surgeon: Rolo Oconnor DPM;  Location: [facility] OR    SURGICAL HISTORY OF -           SURGICAL HISTORY OF -   00    Colonoscopy       Family History   Problem Relation Age of Onset    Breast Cancer Mother     Cancer Mother         Breast and liver- age 43    Cerebrovascular Disease Father     Heart Failure Father     Coronary Artery Disease Father     Alzheimer Disease Father     Hypertension Father     Diabetes Maternal Grandmother     Breast Cancer Maternal Aunt     Breast Cancer Paternal Aunt     Cancer - colorectal No family hx of     Prostate Cancer No family hx of        Social History     Tobacco Use    Smoking status: Never     Passive exposure: Never    Smokeless tobacco: Never   Substance Use Topics    Alcohol use: Yes     Alcohol/week: 0.0 standard drinks of alcohol     Comment: 1 glass of wine  "every 2 weeks     Outpatient Encounter Medications as of 12/13/2024   Medication Sig Dispense Refill    acetaminophen (TYLENOL) 500 MG tablet Take 1,000 mg by mouth every 6 hours as needed for mild pain      albuterol (PROAIR HFA/PROVENTIL HFA/VENTOLIN HFA) 108 (90 Base) MCG/ACT inhaler INHALE TWO PUFFS BY MOUTH EVERY FOUR HOURS AS NEEDED FOR SHORTNESS OF BREATH/DYSPNEA (Patient not taking: No sig reported) 25.5 g 0    azaTHIOprine (IMURAN) 50 MG tablet TAKE TWO TABLETS BY MOUTH TWICE DAILY 120 tablet 1    buPROPion (WELLBUTRIN XL) 150 MG 24 hr tablet TAKE TWO TABLETS BY MOUTH IN THE MORNING 90 tablet 0    Calcium-Magnesium-Zinc 500-250-12.5 MG TABS Take 1 tablet by mouth daily      FLUoxetine (PROZAC) 40 MG capsule Take 1 capsule (40 mg) by mouth daily (Patient not taking: Reported on 11/25/2024) 90 capsule 1    LORazepam (ATIVAN) 0.5 MG tablet TAKE ONE-HALF TO ONE TABLET BY MOUTH THREE TO FIVE TIMES A WEEK AS NEEDED. 20 tablet 0    Multiple Vitamins-Minerals (MULTIVITAMIN & MINERAL PO) Take 1 tablet by mouth daily       pravastatin (PRAVACHOL) 40 MG tablet Take 1 tablet (40 mg) by mouth daily. *PLEASE SCHEDULE APPT. FOR REFILLS [phone]* 90 tablet 0    predniSONE (DELTASONE) 5 MG tablet TAKE ONE TABLET BY MOUTH ONE TIME DAILY 90 tablet 1    pyRIDostigmine (MESTINON) 60 MG tablet TAKE ONE TABLET BY MOUTH THREE TIMES DAILY 270 tablet 0    QUEtiapine (SEROQUEL) 50 MG tablet Take at 9 pm every night 30 tablet 0    rivastigmine (EXELON) 9.5 MG/24HR 24 hr patch Place 1 patch onto the skin daily for 270 days 90 patch 2     No facility-administered encounter medications on file as of 12/13/2024.       Allergies   Allergen Reactions    Daypro [Oxaprozin] Rash           Nitroglycerin Other (See Comments)     Causes low blood pressure    Penicillins Unknown     Occurred as child.    Sulfa Antibiotics Rash     Physical exam:    Ht 1.651 m (5' 5\")   Wt 75.3 kg (166 lb)   BMI 27.62 kg/m         Constitutional: NAD. " pleasant.  present.  HEENT: EOMI, no scleral icterus. Nl conj.   Musculoskeletal: No active synovitis.   Skin: no rash  Neurological system: Alert  Psych: nl affect    Component      Latest Ref Rng & Units 6/17/2019   WBC      4.0 - 11.0 10e9/L 5.6   RBC Count      3.8 - 5.2 10e12/L 4.19   Hemoglobin      11.7 - 15.7 g/dL 13.0   Hematocrit      35.0 - 47.0 % 39.8   MCV      78 - 100 fl 95   MCH      26.5 - 33.0 pg 31.0   MCHC      31.5 - 36.5 g/dL 32.7   RDW      10.0 - 15.0 % 13.1   Platelet Count      150 - 450 10e9/L 230   % Neutrophils      % 56.3   % Lymphocytes      % 31.4   % Monocytes      % 9.6   % Eosinophils      % 2.3   % Basophils      % 0.4   Absolute Neutrophil      1.6 - 8.3 10e9/L 3.2   Absolute Lymphocytes      0.8 - 5.3 10e9/L 1.8   Absolute Monocytes      0.0 - 1.3 10e9/L 0.5   Absolute Eosinophils      0.0 - 0.7 10e9/L 0.1   Absolute Basophils      0.0 - 0.2 10e9/L 0.0   Diff Method       Automated Method   Sodium      133 - 144 mmol/L 140   Potassium      3.4 - 5.3 mmol/L 5.0   Chloride      94 - 109 mmol/L 106   Carbon Dioxide      20 - 32 mmol/L 31   Anion Gap      3 - 14 mmol/L 3   Glucose      70 - 99 mg/dL 96   Urea Nitrogen      7 - 30 mg/dL 17   Creatinine      0.52 - 1.04 mg/dL 0.57   GFR Estimate      >60 mL/min/1.73:m2 >90   GFR Estimate If Black      >60 mL/min/1.73:m2 >90   Calcium      8.5 - 10.1 mg/dL 9.1   Bilirubin Total      0.2 - 1.3 mg/dL 0.5   Albumin      3.4 - 5.0 g/dL 3.8   Protein Total      6.8 - 8.8 g/dL 7.4   Alkaline Phosphatase      40 - 150 U/L 59   ALT      0 - 50 U/L 27   AST      0 - 45 U/L 20   Cholesterol      <200 mg/dL 150   Triglycerides      <150 mg/dL 168 (H)   HDL Cholesterol      >49 mg/dL 70   LDL Cholesterol Calculated      <100 mg/dL 46   Non HDL Cholesterol      <130 mg/dL 80   Rheumatoid Factor      <20 IU/mL <20   CRP Inflammation      0.0 - 8.0 mg/L <2.9   Vitamin D Deficiency screening      20 - 75 ug/L 48   Vitamin B12      193 - 907  pg/mL 705     Component      Latest Ref Rng & Units 1/16/2020   Ruth-1 Autoantibodies      <20 EU 0   Scl-70 Autoantibodies      <20 EU 0   Sm (Vergara) Autoantibodies      <20 EU 1   Sm/RNP Autoantibodies      <20 EU 2   SS-A/Ro Autoantibodies      <20  (H)   SS-B/La Autoantibodies      <20  (H)   Complement C4      19 - 59 mg/dL 27   Complement C3      83 - 177 mg/dL 139   DNA (ds) Antibody      <25 IU 3       Component      Latest Ref Rng & Units 1/20/2022 2/16/2022 2/28/2022   WBC      4.0 - 11.0 10e3/uL 5.7     RBC Count      3.80 - 5.20 10e6/uL 4.14     Hemoglobin      11.7 - 15.7 g/dL 13.0     Hematocrit      35.0 - 47.0 % 39.8     MCV      78 - 100 fL 96     MCH      26.5 - 33.0 pg 31.4     MCHC      31.5 - 36.5 g/dL 32.7     RDW      10.0 - 15.0 % 13.1     Platelet Count      150 - 450 10e3/uL 223     % Neutrophils      % 57     % Lymphocytes      % 30     % Monocytes      % 10     % Eosinophils      % 3     % Basophils      % 0     Absolute Neutrophils      1.6 - 8.3 10e3/uL 3.3     Absolute Lymphocytes      0.8 - 5.3 10e3/uL 1.7     Absolute Monocytes      0.0 - 1.3 10e3/uL 0.6     Absolute Eosinophils      0.0 - 0.7 10e3/uL 0.2     Absolute Basophils      0.0 - 0.2 10e3/uL 0.0     Sodium      133 - 144 mmol/L 139 137    Potassium      3.4 - 5.3 mmol/L 4.3 4.3    Chloride      94 - 109 mmol/L 108 107    Carbon Dioxide      20 - 32 mmol/L 38 (H) 25    Anion Gap      3 - 14 mmol/L <1 (L) 5    Urea Nitrogen      7 - 30 mg/dL 22 27    Creatinine      0.52 - 1.04 mg/dL 0.56 0.59    Calcium      8.5 - 10.1 mg/dL 9.0 9.1    Glucose      70 - 99 mg/dL 99 96    Alkaline Phosphatase      40 - 150 U/L 63 67    AST      0 - 45 U/L 20 30    ALT      0 - 50 U/L 33 44    Protein Total      6.8 - 8.8 g/dL 7.4 7.3    Albumin      3.4 - 5.0 g/dL 3.5 4.0    Bilirubin Total      0.2 - 1.3 mg/dL 0.5 0.4    GFR Estimate      >60 mL/min/1.73m2 >90 >90    Color Urine      Colorless, Straw, Light Yellow, Yellow Yellow      Appearance Urine      Clear Clear     Glucose Urine      Negative mg/dL Negative     Bilirubin Urine      Negative Negative     Ketones Urine      Negative mg/dL Negative     Specific Gravity Urine      1.003 - 1.035 >=1.030     Blood Urine      Negative Negative     pH Urine      5.0 - 7.0 5.0     Protein Albumin Urine      Negative mg/dL Negative     Urobilinogen Urine      0.2, 1.0 E.U./dL 0.2     Nitrite Urine      Negative Negative     Leukocyte Esterase Urine      Negative Negative     TSH      0.40 - 4.00 mU/L 1.87     AcetChol Binding Darlin      0.0 - 0.4 nmol/L   0.0   AcetChol Modul Darlin      <=45 %   4   Clinical Information:     71 year old female with generalized fatigue and right eyelid ptosis. She has not taken her Mestinon in at least the last week due to intolerance with diarhea. Query myopathy vs neuromuscular junction disorder. Brief exam: bilateral eyelid closure weakness and neck flexion weakness. Double vision on upward gaze and lateral gaze; Right Deltoid 5, Biceps 5, Triceps 4 bilaterally; subtle asymmetry of the finger flexors (very mild weakness on the left, normal on the right).      Techniques:     Motor and sensory conduction studies were done with surface recording electrodes. EMG was done with a concentric needle electrode.      Results:     The right peroneal-EDB motor study showed normal onset latency with low amplitude without segmental changes, and normal conduction velocities.The right tibial-AH, peroneal-TA, median-APB and ulnar-ADM motor studies were normal. The right superficial peroneal, sural, median, ulnar, and radial antidromic sensory studies were normal. The right tibial F wave latencies were normal. 3 Hz slow repetitive nerve stimulation at the right ADM and orbicularis oculi muscles did not show any significant decrement either at rest or after 1 min of maximal isometric exercise.      Needle evaluation of the right Triceps Brachii, Pronator Teres, Biceps, Tibialis  anterior, Gastrocnemius (Medial Hd) and vastus lateralis showed abnormal spontaneous activity with fibrillation potentials (1+ to 2+) with CRDs present in the vastus lateralis only. The right Triceps Brachii, Pronator Teres, First Dorsal Interosseous , Biceps, and vastus lateralis showed small amplitude, short duration, polyphasic motor units with early recruitment in the triceps and biceps muscles.  The right tibialis anterior showed normal recruitment of normal appearing motor units. The right Gastrocnemius (Medial Hd) had normal recruitment of large, long duration motor units. The right deltoid muscle was normal.      Interpretation:     Abnormal study. 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 Please note, however, that this could still be part of a myopathic process because some chronic myopathies may show large, long duration motor unit potentials. There was no electrodiagnostic evidence of a neuromuscular junction disorder like myasthenia gravis.      Comment: The results of the study were discussed with the patient and she was referred for muscle biopsy of the left triceps or biceps.   ___________________________  Millicent Diggs MD, Neuromuscular Fellow  Rene Manning MD      Component      Latest Ref Rng & Units 3/25/2022   WBC      4.0 - 11.0 10e3/uL 6.1   RBC Count      3.80 - 5.20 10e6/uL 4.12   Hemoglobin      11.7 - 15.7 g/dL 12.7   Hematocrit      35.0 - 47.0 % 38.7   MCV      78 - 100 fL 94   MCH      26.5 - 33.0 pg 30.8   MCHC      31.5 - 36.5 g/dL 32.8   RDW      10.0 - 15.0 % 13.0   Platelet Count      150 - 450 10e3/uL 236   % Neutrophils      % 59   % Lymphocytes      % 30   % Monocytes      % 7   % Eosinophils      % 2   % Basophils      % 1   % Immature Granulocytes      % 1   NRBCs per 100 WBC      <1  /100 0   Absolute Neutrophils      1.6 - 8.3 10e3/uL 3.6   Absolute Lymphocytes      0.8 - 5.3 10e3/uL 1.8   Absolute Monocytes      0.0 - 1.3 10e3/uL 0.4   Absolute Eosinophils      0.0 - 0.7 10e3/uL 0.1   Absolute Basophils      0.0 - 0.2 10e3/uL 0.0   Absolute Immature Granulocytes      <=0.4 10e3/uL 0.0   Absolute NRBCs      10e3/uL 0.0   GARETT-1 (Histidyl-tRNA Synthetase) Darlin IgG      0 - 40 AU/mL 0   PL-12 (alanyl-tRNA synthetase) Antibody      Negative Negative   PL-7 (threonyl-tRNA synthetase) Antibody      Negative Negative   EJ (glycyl - tRNA synthetase) Antibody      Negative Negative   OJ (isoleucyl-tRNA synthetase) Antibody      Negative Negative   SRP (Signal Recognition Particle) Antibody      Negative Negative   Mi-2 (nuclear helicase protein) Antibody      Negative Negative   P155/140 (TIF1-gamma) Antibody      Negative Negative   TIF-1 Gamma Antibody      Negative Negative   SAE1 (SUMO activating enzyme) Darlin      Negative Negative   MDA5 (CADM 140) Darlin      Negative Negative   NXP-2 (Nuclear matrix protein 2) Darlin      Negative Negative   Myositis Interp       See Note   Albumin Fraction      3.7 - 5.1 g/dL 4.2   Alpha 1 Fraction      0.2 - 0.4 g/dL 0.3   Alpha 2 Fraction      0.5 - 0.9 g/dL 0.9   Beta Fraction      0.6 - 1.0 g/dL 0.9   Gamma Fraction      0.7 - 1.6 g/dL 0.9   Monoclonal Peak      <=0.0 g/dL 0.0   ELP Interpretation:       Essentially normal electrophoretic pattern. No obvious monoclonal proteins seen. Pathologic significance requires clinical correlation. Carlos Ashford M.D., Ph.D., Pathologist.   Quantiferon-TB Gold Plus Result      Negative Negative   TB1 Ag minus Nil Value      IU/mL 0.01   TB2 Ag minus Nil Value      IU/mL 0.00   Mitogen minus Nil Result      IU/mL 9.97   Nil Result      IU/mL 0.03   BINDU interpretation      Negative Positive (A)   BINDU pattern 1       Speckled   BINDU titer 1       1:160   Iron      35 - 180 ug/dL 69   Iron Binding Cap      240 - 430 ug/dL 344    Iron Saturation Index      15 - 46 % 20   Creatinine      0.52 - 1.04 mg/dL 0.63   GFR Estimate      >60 mL/min/1.73m2 >90   ALT      0 - 50 U/L 31   Albumin      3.4 - 5.0 g/dL 3.6   AST      0 - 45 U/L 18   Thyroglobulin Antibody      <40 IU/mL <20   CK Total      30 - 225 U/L 244 (H)   Aldolase      1.2 - 7.6 U/L 3.9   Complement C3      81 - 157 mg/dL 142   Complement C4      13 - 39 mg/dL 26   CRP Inflammation      0.0 - 8.0 mg/L <2.9   Cryoglobulin Interpretation      Negative Negative   Cyclic Citrullinated Peptide Antibody, IgG      <7.0 U/mL 2.1   DNA-ds      <10.0 IU/mL 1.1   Vitamin D Deficiency screening      20 - 75 ug/L 41   Vitamin B12      193 - 986 pg/mL 603   Thyroid Peroxidase Antibody      <35 IU/mL <10   Rheumatoid Factor      <12 IU/mL 15 (H)   Hepatitis C Antibody      Nonreactive Nonreactive   Hep B Surface Agn      Nonreactive Nonreactive   Sed Rate      0 - 30 mm/hr 14   Ferritin      8 - 252 ng/mL 85   Hepatitis B Core Darlin      Nonreactive Nonreactive   Total Protein Serum for ELP      6.8 - 8.8 g/dL 7.2   Quantiferon Nil Tube      IU/mL 0.03   Quantiferon TB1 Tube      IU/mL 0.04   Quantiferon TB2 Tube       0.03   QUANTIFERON MITOGEN      IU/mL 10.00     Component      Latest Ref Rng 4/3/2024  5:18 PM   WBC      4.0 - 11.0 10e3/uL 4.7    RBC Count      3.80 - 5.20 10e6/uL 4.31    Hemoglobin      11.7 - 15.7 g/dL 13.2    Hematocrit      35.0 - 47.0 % 39.9    MCV      78 - 100 fL 93    MCH      26.5 - 33.0 pg 30.6    MCHC      31.5 - 36.5 g/dL 33.1    RDW      10.0 - 15.0 % 14.4    Platelet Count      150 - 450 10e3/uL 197    % Neutrophils      % 68    % Lymphocytes      % 23    % Monocytes      % 8    % Eosinophils      % 1    % Basophils      % 0    % Immature Granulocytes      % 0    Absolute Neutrophils      1.6 - 8.3 10e3/uL 3.2    Absolute Lymphocytes      0.8 - 5.3 10e3/uL 1.1    Absolute Monocytes      0.0 - 1.3 10e3/uL 0.4    Absolute Eosinophils      0.0 - 0.7 10e3/uL 0.0     Absolute Basophils      0.0 - 0.2 10e3/uL 0.0    Absolute Immature Granulocytes      <=0.4 10e3/uL 0.0    Creatinine      0.51 - 0.95 mg/dL 0.55    GFR Estimate      >60 mL/min/1.73m2 >90    ALT      0 - 50 U/L 19    Albumin      3.5 - 5.2 g/dL 4.0    AST      0 - 45 U/L 26

## 2024-12-16 ENCOUNTER — TELEPHONE (OUTPATIENT)
Dept: RHEUMATOLOGY | Facility: CLINIC | Age: 74
End: 2024-12-16
Payer: COMMERCIAL

## 2024-12-16 NOTE — TELEPHONE ENCOUNTER
Left Voicemail (1st Attempt) and Sent Tinkhart (1st Attempt) for the patient to call back and schedule the following:    Appointment type: Return Rheumatology  Provider: Dr. Mark  Return date: June 2025  Specialty phone number: [phone]  Additional appointment(s) needed: Labs and DEXA Bone Density Scan  Additonal Notes: Gave number for imaging as well

## 2024-12-18 NOTE — TELEPHONE ENCOUNTER
Left Voicemail (2nd Attempt) for the patient to call back and schedule the following:    Appointment type: Return Rheumatology  Provider: Dr. Mark  Return date: June 2025  Specialty phone number: [phone]  Additional appointment(s) needed: DEXA Bone Density Scan ordered by Dr. Mark  Additonal Notes: NA

## 2025-01-13 ENCOUNTER — VIRTUAL VISIT (OUTPATIENT)
Dept: FAMILY MEDICINE | Facility: CLINIC | Age: 75
End: 2025-01-13
Payer: COMMERCIAL

## 2025-01-13 DIAGNOSIS — G30.9 ALZHEIMER'S DISEASE (H): ICD-10-CM

## 2025-01-13 DIAGNOSIS — R45.1 AGITATION: ICD-10-CM

## 2025-01-13 DIAGNOSIS — M35.02 SJOGREN'S SYNDROME WITH LUNG INVOLVEMENT (H): ICD-10-CM

## 2025-01-13 DIAGNOSIS — G72.41 IBM (INCLUSION BODY MYOSITIS) (H): Primary | ICD-10-CM

## 2025-01-13 DIAGNOSIS — F02.80 ALZHEIMER'S DISEASE (H): ICD-10-CM

## 2025-01-13 DIAGNOSIS — F33.1 MAJOR DEPRESSIVE DISORDER, RECURRENT EPISODE, MODERATE (H): ICD-10-CM

## 2025-01-13 PROCEDURE — 98013 SYNCH AUDIO-ONLY EST LOW 20: CPT | Performed by: FAMILY MEDICINE

## 2025-01-13 RX ORDER — QUETIAPINE FUMARATE 50 MG/1
TABLET, FILM COATED ORAL
Qty: 30 TABLET | Refills: 0 | Status: SHIPPED | OUTPATIENT
Start: 2025-01-13

## 2025-01-13 ASSESSMENT — PATIENT HEALTH QUESTIONNAIRE - PHQ9: SUM OF ALL RESPONSES TO PHQ QUESTIONS 1-9: 10

## 2025-01-13 NOTE — TELEPHONE ENCOUNTER
Pts and spouse called asking if pt should be still taking Quetiapine? If so please send in refill.    If not please respond back to us so we can let them know.

## 2025-01-13 NOTE — PROGRESS NOTES
Kristina is a 74 year old who is being evaluated via a billable telephone visit.    What phone number would you like to be contacted at? [phone]  How would you like to obtain your AVS? Phoebe  Originating Location (pt. Location): Home    Distant Location (provider location):  On-site  Telephone visit completed due to the patient did not have access to video, while the distant provider did.    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 I discussed the nature and prognosis of this disease with the family and explained that it is slowly progressive and currently incurable. Immunotherapy is ineffective. We recommend stopping pyridostigmine and I will have a discussion with Dr Mark about the utility of continuing low dose prednisone and azathioprine. If those are necessary to control extramuscular manifestations of her Sjogren syndrome, then they should be continued. If not, we should consider stopping them.     I would like her to continue to taper off the prednisone as well as I think it may be contributing to her agitation.    Alzheimer's disease (H)  Stable. Is very frustrated with her lack of independence.  Gets angry agitated at family for trying to help her.  Will push grab pull.  Ativan as needed Seroquel nightly does help.    She likes to stay up at night and is likely to be confused agitated then.  Hopefully weaning down on prednisone helps with this also    Sjogren's syndrome with lung involvement (H)  Good control.  Continue currant medications, continue to monitor. Tapering down prednisone which I am veryhopeful she will have less agitation with that.    Major depressive disorder, recurrent episode,  "moderate (H)  Fair. Has stopped wellbutryn and fluoxetine and is just on the seroquel, ativan.      BMI  Estimated body mass index is 27.62 kg/m  as calculated from the following:    Height as of 12/13/24: 1.651 m (5' 5\").    Weight as of 12/13/24: 75.3 kg (166 lb).   Weight management plan: Discussed healthy diet and exercise guidelines          Subjective   Kristina is a 74 year old, presenting for the following health issues:  Recheck Medication (/)      1/13/2025    11:48 AM   Additional Questions   Roomed by Cristal Hernandez CMA   Accompanied by  and daughter     HPI     -She says that her body has been hurting everyday. Has been ongoing for awhile but getting constant more recently.       Depression   How are you doing with your depression since your last visit? Some days are better some days are worse  Are you having other symptoms that might be associated with depression? Frustration when she cannot remember things.  Have you had a significant life event?  No   Are you feeling anxious or having panic attacks?   No  Do you have any concerns with your use of alcohol or other drugs? No    Social History     Tobacco Use    Smoking status: Never     Passive exposure: Never    Smokeless tobacco: Never   Substance Use Topics    Alcohol use: Yes     Alcohol/week: 0.0 standard drinks of alcohol     Comment: 1 glass of wine every 2 weeks    Drug use: No         6/27/2024    10:04 AM 8/16/2024     3:47 PM 11/13/2024     5:23 PM   PHQ   PHQ-9 Total Score 5 7 14   Q9: Thoughts of better off dead/self-harm past 2 weeks Not at all Not at all Not at all         1/4/2023     4:35 PM 2/8/2024    11:07 AM 6/27/2024    10:04 AM   SEBASTIAN-7 SCORE   Total Score  10 (moderate anxiety)    Total Score 3 10 1         1/13/2025     1:12 PM   Last PHQ-9   1.  Little interest or pleasure in doing things 3   2.  Feeling down, depressed, or hopeless 2   3.  Trouble falling or staying asleep, or sleeping too much 0   4.  Feeling tired or " having little energy 2   5.  Poor appetite or overeating 0   6.  Feeling bad about yourself 3   7.  Trouble concentrating 0   8.  Moving slowly or restless 0   Q9: Thoughts of better off dead/self-harm past 2 weeks 0   PHQ-9 Total Score 10   Difficulty at work, home, or with people Somewhat difficult         6/27/2024    10:04 AM   SEBASTIAN-7    1. Feeling nervous, anxious, or on edge 1   2. Not being able to stop or control worrying 0   3. Worrying too much about different things 0   4. Trouble relaxing 0   5. Being so restless that it is hard to sit still 0   6. Becoming easily annoyed or irritable 0   7. Feeling afraid, as if something awful might happen 0   SEBASTIAN-7 Total Score 1       Suicide Assessment Five-step Evaluation and Treatment (SAFE-T)      How many days per week do you miss taking your medication? 0        Review of Systems  Constitutional, HEENT, cardiovascular, pulmonary, gi and gu systems are negative, except as otherwise noted.      Objective           Vitals:  No vitals were obtained today due to virtual visit.    Physical Exam   General: Alert and no distress //Respiratory: No audible wheeze, cough, or shortness of breath // Psychiatric:  Appropriate affect, tone, and pace of words          Phone call duration: 12 minutes  Signed Electronically by: Rolo Sandoval MD

## 2025-01-15 ENCOUNTER — ANCILLARY PROCEDURE (OUTPATIENT)
Dept: CT IMAGING | Facility: CLINIC | Age: 75
End: 2025-01-15
Attending: INTERNAL MEDICINE
Payer: COMMERCIAL

## 2025-01-15 ENCOUNTER — OFFICE VISIT (OUTPATIENT)
Dept: PULMONOLOGY | Facility: CLINIC | Age: 75
End: 2025-01-15
Payer: COMMERCIAL

## 2025-01-15 DIAGNOSIS — J84.9 ILD (INTERSTITIAL LUNG DISEASE) (H): ICD-10-CM

## 2025-01-15 LAB
DLCOUNC-%PRED-PRE: 88 %
DLCOUNC-PRE: 16.87 ML/MIN/MMHG
DLCOUNC-PRED: 18.98 ML/MIN/MMHG
ERV-%PRED-PRE: 121 %
ERV-PRE: 1.16 L
ERV-PRED: 0.95 L
EXPTIME-PRE: 5.7 SEC
FEF2575-%PRED-PRE: 138 %
FEF2575-PRE: 2.23 L/SEC
FEF2575-PRED: 1.61 L/SEC
FEFMAX-%PRED-PRE: 103 %
FEFMAX-PRE: 5.47 L/SEC
FEFMAX-PRED: 5.29 L/SEC
FEV1-%PRED-PRE: 114 %
FEV1-PRE: 2.24 L
FEV1FEV6-PRE: 81 %
FEV1FEV6-PRED: 78 %
FEV1FVC-PRE: 80 %
FEV1FVC-PRED: 78 %
FEV1SVC-PRE: 80 %
FEV1SVC-PRED: 63 %
FIFMAX-PRE: 5.78 L/SEC
FRCPLETH-%PRED-PRE: 73 %
FRCPLETH-PRE: 2.2 L
FRCPLETH-PRED: 2.99 L
FVC-%PRED-PRE: 110 %
FVC-PRE: 2.78 L
FVC-PRED: 2.52 L
IC-%PRED-PRE: 85 %
IC-PRE: 1.63 L
IC-PRED: 1.9 L
RVPLETH-%PRED-PRE: 47 %
RVPLETH-PRE: 1.04 L
RVPLETH-PRED: 2.2 L
TLCPLETH-%PRED-PRE: 75 %
TLCPLETH-PRE: 3.82 L
TLCPLETH-PRED: 5.09 L
VA-%PRED-PRE: 89 %
VA-PRE: 4.16 L
VC-%PRED-PRE: 89 %
VC-PRE: 2.78 L
VC-PRED: 3.11 L

## 2025-01-15 PROCEDURE — 94729 DIFFUSING CAPACITY: CPT | Performed by: INTERNAL MEDICINE

## 2025-01-15 PROCEDURE — 94150 VITAL CAPACITY TEST: CPT | Performed by: INTERNAL MEDICINE

## 2025-01-15 PROCEDURE — 94726 PLETHYSMOGRAPHY LUNG VOLUMES: CPT | Performed by: INTERNAL MEDICINE

## 2025-01-15 PROCEDURE — 94375 RESPIRATORY FLOW VOLUME LOOP: CPT | Performed by: INTERNAL MEDICINE

## 2025-01-15 NOTE — PROGRESS NOTES
Albina Pedro comes into clinic today at the request of Dr. David Perlman Ordering Provider for PFT      El Wang, RRT

## 2025-01-22 ENCOUNTER — TELEPHONE (OUTPATIENT)
Dept: CARDIOLOGY | Facility: CLINIC | Age: 75
End: 2025-01-22
Payer: COMMERCIAL

## 2025-01-22 DIAGNOSIS — I25.10 CORONARY ARTERY DISEASE INVOLVING NATIVE CORONARY ARTERY OF NATIVE HEART WITHOUT ANGINA PECTORIS: ICD-10-CM

## 2025-01-29 RX ORDER — PRAVASTATIN SODIUM 40 MG
40 TABLET ORAL DAILY
Qty: 30 TABLET | Refills: 0 | Status: SHIPPED | OUTPATIENT
Start: 2025-01-29

## 2025-01-29 NOTE — TELEPHONE ENCOUNTER
Last Written Prescription:  pravastatin (PRAVACHOL) 40 MG tablet 90 tablet 0 10/1/2024     ----------------------  Last Visit Date: 10/23/23  Future Visit Date: 0  ----------------------  Refill pended and routed to the provider for review/determination due to   OVERDUE FOR Follow-up and labs    Refill decision: Medication unable to be refilled by RN due to:       [x]    Pt not seen within past 12 months, No FOV or FOV exceeds timeframe per protocol     []    Compliance - lapse in therapy/gap in refills; No Shows; Cancellations    []    Verification - order discrepancy, clarification needed, Sig modification needed    []    Controlled medication    []    Medication not included in refill protocol policy    []    Abnormal labs/test:    []    Overdue labs/test:      []    Medication not active on Pt's med list    []    Drug interaction Warning    []    Medication - Last script is Reported/Historical/Transitional    []    Advanced refill request    []    Review Needed: New med; Med adjusted within <= 30 days; Safety Alert; Lab monitoring required    []    Other:       Request from pharmacy:  Requested Prescriptions   Pending Prescriptions Disp Refills    pravastatin (PRAVACHOL) 40 MG tablet [Pharmacy Med Name: Pravastatin Sodium Oral Tablet 40 MG] 90 tablet 0     Sig: TAKE ONE TABLET BY MOUTH ONE TIME DAILY       Antihyperlipidemic agents Failed - 1/29/2025  3:03 PM        Failed - LDL on file in the past 12 months        Failed - Recent (12 mo) or future (90 days) visit within the authorizing provider's specialty     The patient must have completed an in-person or virtual visit within the past 12 months or has a future visit scheduled within the next 90 days with the authorizing provider s specialty.  Urgent care and e-visits do not qualify as an office visit for this protocol.          Passed - Medication is active on med list        Passed - Patient is age 18 years or older        Passed - No active pregnancy on  record        Passed - No positive pregnancy test in past 12 mos

## 2025-01-29 NOTE — TELEPHONE ENCOUNTER
Noted patient has been under Dr Carbone's care. Isamar refill sent, Clinic staff notified to contact patient to set-up clinic follow-up.         Requested Prescriptions   Pending Prescriptions Disp Refills    pravastatin (PRAVACHOL) 40 MG tablet [Pharmacy Med Name: Pravastatin Sodium Oral Tablet 40 MG] 90 tablet 0     Sig: TAKE ONE TABLET BY MOUTH ONE TIME DAILY       Antihyperlipidemic agents Failed - 1/29/2025  4:04 PM        Failed - LDL on file in the past 12 months        Failed - Recent (12 mo) or future (90 days) visit within the authorizing provider's specialty     The patient must have completed an in-person or virtual visit within the past 12 months or has a future visit scheduled within the next 90 days with the authorizing provider s specialty.  Urgent care and e-visits do not qualify as an office visit for this protocol.          Passed - Medication is active on med list        Passed - Patient is age 18 years or older        Passed - No active pregnancy on record        Passed - No positive pregnancy test in past 12 mos

## 2025-01-30 ENCOUNTER — TELEPHONE (OUTPATIENT)
Dept: PULMONOLOGY | Facility: CLINIC | Age: 75
End: 2025-01-30
Payer: COMMERCIAL

## 2025-01-30 NOTE — TELEPHONE ENCOUNTER
----- Message from David Morris Perlman sent at 1/30/2025 10:29 AM CST -----  Regarding: RE: AZA  Hi,    Her PFTs and CT are stable, from my standpoint she is ok To taper off the AZA, we should make sure she follows regularly in our clinic with PFTs.    Thanks,    Lui  ----- Message -----  From: Paul Mark MD  Sent: 11/25/2024   3:45 PM CST  To: David Morris Perlman, MD  Subject: AZA                                              Hi,    Dr. Manning thinks Kristina is dealing with inclusion body myositis and AZA is no longer indicated from his standpoint, for myositis. I defer to you to decide if AZA needs to be continued or tapered. It seems like ILD has been stable and no AZA toxicity, so not sure if it's time to taper or not. She is going to see you in Dec. Thank you

## 2025-02-11 DIAGNOSIS — F33.0 MAJOR DEPRESSIVE DISORDER, RECURRENT, MILD: ICD-10-CM

## 2025-02-12 RX ORDER — BUPROPION HYDROCHLORIDE 150 MG/1
TABLET ORAL
Qty: 90 TABLET | Refills: 0 | Status: SHIPPED | OUTPATIENT
Start: 2025-02-12

## 2025-03-03 NOTE — TELEPHONE ENCOUNTER
Call placed to patient.   No answer; unable to leave a voicemail as patient's voicemail box is full and not accepting further messages.     Thomas Miguel RN     Scheduled procedure with Patient at Other: in clinic at office visit     Scheduled Via: Case Request Order for  [facility]    Did patient request a different provider then the referred to:No    Procedure date: 3/6     Procedure time: tbd ; Did patient request this specific time? No    If a MAC patient is scheduled, patient was notified a family member/friend will need to stay with the patient over night  once they return home after their procedure: Yes    Was patient's demographics verified, (address,phone number email)  Yes    Insurance name confirmed as umr, will be the same at time of procedure?: Yes  Prep required? Yes, Pharmacy is   [id number] - [place], [place] - [address]  [phone]  Clear All Orders  Sign        ; was request sent to nurse to send prep to pharmacy? Yes;     Was prep instructions briefly reviewed? Yes;     If there was a med hold in the chart review .was the patient made aware of the med hold?  No    Was patient told to contact us back if prep instructions not received in 7 business days?  Yes    If procedure is scheduled 7 days or less, patient was told to  prep letter?: No

## 2025-03-26 DIAGNOSIS — F33.0 MAJOR DEPRESSIVE DISORDER, RECURRENT, MILD: ICD-10-CM

## 2025-03-26 RX ORDER — BUPROPION HYDROCHLORIDE 150 MG/1
TABLET ORAL
Qty: 90 TABLET | Refills: 0 | Status: SHIPPED | OUTPATIENT
Start: 2025-03-26

## 2025-04-07 ENCOUNTER — HOSPITAL ENCOUNTER (OUTPATIENT)
Dept: GENERAL RADIOLOGY | Facility: HOSPITAL | Age: 75
Discharge: HOME | End: 2025-04-07
Attending: NURSE PRACTITIONER | Admitting: NURSE PRACTITIONER
Payer: COMMERCIAL

## 2025-04-07 ENCOUNTER — OFFICE VISIT (OUTPATIENT)
Dept: PHYSICAL MEDICINE AND REHAB | Facility: CLINIC | Age: 75
End: 2025-04-07
Attending: FAMILY MEDICINE
Payer: COMMERCIAL

## 2025-04-07 VITALS
WEIGHT: 177 LBS | SYSTOLIC BLOOD PRESSURE: 163 MMHG | HEIGHT: 66 IN | DIASTOLIC BLOOD PRESSURE: 72 MMHG | BODY MASS INDEX: 28.45 KG/M2 | HEART RATE: 84 BPM

## 2025-04-07 DIAGNOSIS — M54.16 LUMBAR RADICULOPATHY: Primary | ICD-10-CM

## 2025-04-07 DIAGNOSIS — G89.29 CHRONIC BILATERAL LOW BACK PAIN WITHOUT SCIATICA: ICD-10-CM

## 2025-04-07 DIAGNOSIS — M54.50 CHRONIC BILATERAL LOW BACK PAIN WITHOUT SCIATICA: ICD-10-CM

## 2025-04-07 PROCEDURE — 1125F AMNT PAIN NOTED PAIN PRSNT: CPT | Performed by: NURSE PRACTITIONER

## 2025-04-07 PROCEDURE — 3077F SYST BP >= 140 MM HG: CPT | Performed by: NURSE PRACTITIONER

## 2025-04-07 PROCEDURE — 72100 X-RAY EXAM L-S SPINE 2/3 VWS: CPT

## 2025-04-07 PROCEDURE — 99204 OFFICE O/P NEW MOD 45 MIN: CPT | Performed by: NURSE PRACTITIONER

## 2025-04-07 PROCEDURE — 3078F DIAST BP <80 MM HG: CPT | Performed by: NURSE PRACTITIONER

## 2025-04-07 RX ORDER — IBUPROFEN 600 MG/1
600 TABLET, FILM COATED ORAL EVERY 6 HOURS PRN
Qty: 45 TABLET | Refills: 0 | Status: SHIPPED | OUTPATIENT
Start: 2025-04-07

## 2025-04-07 RX ORDER — IBUPROFEN 600 MG/1
600 TABLET, FILM COATED ORAL EVERY 6 HOURS PRN
Qty: 45 TABLET | Refills: 0 | Status: SHIPPED | OUTPATIENT
Start: 2025-04-07 | End: 2025-04-07

## 2025-04-07 ASSESSMENT — PAIN SCALES - GENERAL: PAINLEVEL_OUTOF10: SEVERE PAIN (7)

## 2025-04-07 NOTE — PROGRESS NOTES
ASSESSMENT: Albina Pedro is a 74 year old female who presents for consultation at the request of PCP Rolo Sandoval, who presents today for new patient evaluation of:    -Chronic bilateral low back pain without sciatica     Patient is neurologically intact on exam. No myelopathic or red flag symptoms. Recommend xr and lumbar MRI for treatment planning. PT referral placed but I expect would not be participatory until after imaging available.        4/9/2025     1:30 PM   OSWESTRY DISABILITY INDEX   Count 10    Sum 4    Oswestry Score (%) 8 %        Patient-reported            Diagnoses and all orders for this visit:  Lumbar radiculopathy  -     Physical Therapy  Referral; Future  Chronic bilateral low back pain without sciatica  -     Spine  Referral  -     XR Lumbar Spine 2/3 Views; Future  -     MR Lumbar Spine w/o Contrast; Future  -     ibuprofen (ADVIL/MOTRIN) 600 MG tablet; Take 1 tablet (600 mg) by mouth every 6 hours as needed for moderate pain. Take with food. Stop if blood in stool.      PLAN:  Reviewed spine anatomy and disease process. Discussed diagnosis and treatment options with the patient today. A shared decision making model was used.  The patient's values and choices were respected. The following represents what was discussed and decided upon by the provider and the patient.      -DIAGNOSTIC TESTS:  Images were personally reviewed and interpreted and explained to patient today using a spine model.   --lumbar XR orders placed  -Lumbar MRI without contrast orders placed     -PHYSICAL THERAPY:    --Referral to PT placed  Discussed the importance of core strengthening, ROM, stretching exercises with the patient and how each of these entities is important in decreasing pain.  Explained to the patient that the purpose of physical therapy is to teach the patient a home exercise program.  These exercises need to be performed every day in order to decrease pain and prevent future  occurrences of pain.                -MEDICATIONS:    --ibuprofen 600mg TID PRN  Discussed multiple medication options today with patient. Discussed risks, side effects, and proper use of medications. Patient verbalized understanding.    -INTERVENTIONS:    -Discussed the role of injections with patient today. Patient would be a good candidate in the future for either epidural steroid injections or medial branch blocks if indicated based on symptoms and supported by imaging results.  -Risks, benefits, and role of injections were discussed with patient today.     -PATIENT EDUCATION:  Total time of 40 minutes, on the day of service, spent with the patient, reviewing the chart, placing orders, and documenting.   -Today we also discussed the pros and cons of the current treatment plan.    -FOLLOW-UP:   we will call with imaging results    Advised patient to call the [facility] if symptoms worsen, new numbness or weakness develops in the legs, or if they develop new or worsening problems controlling bladder or bowel function.   ______________________________________________________________________    SUBJECTIVE:    HPI:  Albina Pedro  Is a 74 year old female hx Sjogren's, Myasthenia gravis, IBS, anxiety, depression interstitial lung disease, coronary artery disease, who presents today for new patient evaluation of Chronic bilateral low back pain without sciatica     Dx MG at first, then IBM based on two biopsies.  Worse back pain since a fall last summer onto her hip. They took xr which looked ok. Has since then been getting progressively worse. Cries about the pain 5/7 days of the week. Festus lower back pain. It went down the back of the R leg a few weeks ago. It is worse at random. Has weakness in her legs. She can't get up out of a chair. When she walks, her legs feel good. She has felt like her legs are wobbly sometimes though, loss of balance. No falls recently. Last summer did fall. A hot bath helps temporarily.  Nothing else helps. Pain is at a 7-8/10 on average. sometimes worse to a 10.     Has had chronic trouble getting up.   Denies changes in bowel or bladder control or numbness.     She has tried tylenol. Has also tried a cream.  These dont help too much     She was on prednisone for MG which did not seem to help with the back pain    She has not had any recent PT. She is not able to participate due to pain severity.     She does walk frequently     -Treatment to Date:     -Medications:    Current Outpatient Medications   Medication Sig Dispense Refill    ibuprofen (ADVIL/MOTRIN) 600 MG tablet Take 1 tablet (600 mg) by mouth every 6 hours as needed for moderate pain. Take with food. Stop if blood in stool. 45 tablet 0    acetaminophen (TYLENOL) 500 MG tablet Take 1,000 mg by mouth every 6 hours as needed for mild pain      albuterol (PROAIR HFA/PROVENTIL HFA/VENTOLIN HFA) 108 (90 Base) MCG/ACT inhaler INHALE TWO PUFFS BY MOUTH EVERY FOUR HOURS AS NEEDED FOR SHORTNESS OF BREATH/DYSPNEA (Patient not taking: No sig reported) 25.5 g 0    buPROPion (WELLBUTRIN XL) 150 MG 24 hr tablet TAKE TWO TABLETS BY MOUTH IN THE MORNING 90 tablet 0    LORazepam (ATIVAN) 0.5 MG tablet TAKE ONE-HALF TO ONE TABLET BY MOUTH THREE TO FIVE TIMES A WEEK AS NEEDED. 20 tablet 0    Multiple Vitamins-Minerals (MULTIVITAMIN & MINERAL PO) Take 1 tablet by mouth daily       pravastatin (PRAVACHOL) 40 MG tablet Take 1 tablet (40 mg) by mouth daily. 90 tablet 0    QUEtiapine (SEROQUEL) 50 MG tablet Take at 9 pm every night 30 tablet 0    rivastigmine (EXELON) 9.5 MG/24HR 24 hr patch Place 1 patch onto the skin daily for 270 days 90 patch 2     No current facility-administered medications for this visit.       Allergies   Allergen Reactions    Daypro [Oxaprozin] Rash           Nitroglycerin Other (See Comments)     Causes low blood pressure    Penicillins Unknown     Occurred as child.    Sulfa Antibiotics Rash       Past Medical History:   Diagnosis  Date    Anxiety     CAD (coronary artery disease)     Depression     ILD (interstitial lung disease) (H)     Myasthenia gravis without exacerbation (H)     Other and unspecified hyperlipidemia     Sicca syndrome     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        Patient Active Problem List   Diagnosis    DIZZINESS - LIKELY HYPOGLYCEMIA    Dermatophytosis of body    Episodic mood disorder    Panniculitis    CAD (coronary artery disease)    Sjogren's syndrome    Adverse effect of anesthetic    Status post coronary angiogram    Acute chest pain    Major depressive disorder, recurrent, mild    Hyperlipidemia LDL goal <70    ILD (interstitial lung disease) (H)    Intermediate coronary syndrome (H)    Chronic stable angina    Myasthenia gravis with exacerbation (H)    Frequent falls    Dementia, unspecified dementia severity, unspecified dementia type, unspecified whether behavioral, psychotic, or mood disturbance or anxiety (H)    Major depressive disorder, recurrent episode, moderate (H)    Sleep-wake disorder    Subarachnoid hemorrhage (H)    Fall, initial encounter    IBM (inclusion body myositis) (H)    Chronic low back pain       Past Surgical History:   Procedure Laterality Date    BIOPSY MUSCLE DIAGNOSTIC (LOCATION) Left 2022    Procedure: BIOPSY, MUSCLE LEFT biceps @0900;  Surgeon: Tyrell Loo MD;  [place] [facility] [place]OPSY MUSCLE DIAGNOSTIC (LOCATION) Left 2024    Procedure: Biopsy muscle diagnostic vastus lateralis;  Surgeon: Tyrell Loo MD;  [place] [facility] [place]    C/SECTION, LOW TRANSVERSE  10/13/1978    , Low Transverse    COLONOSCOPY      OPEN REDUCTION INTERNAL FIXATION ANKLE Right 2019    Procedure: Open reduction internal fixation right lateral malleolus fracture;  Surgeon: Rolo Oconnor DPM;  Location: [place] OR    SURGICAL HISTORY OF -           SURGICAL HISTORY OF -    "00    Colonoscopy       Family History   Problem Relation Age of Onset    Breast Cancer Mother     Cancer Mother         Breast and liver- age 43    Cerebrovascular Disease Father     Heart Failure Father     Coronary Artery Disease Father     Alzheimer Disease Father     Hypertension Father     Diabetes Maternal Grandmother     Breast Cancer Maternal Aunt     Breast Cancer Paternal Aunt     Cancer - colorectal No family hx of     Prostate Cancer No family hx of        Reviewed past medical, surgical, and family history with patient found on new patient intake packet located in EMR Media tab.     SOCIAL HX: alcohol use, nonsmoker. No rec drug use,    ROS: positive for wt gain and loss, joint pain, muscle pain, sciatica, falls, diarrhea and constipation, changes in vision. Specifically negative for bowel/bladder dysfunction, balance changes, headache, dizziness, foot drop, fevers, chills, appetite changes, nausea/vomiting, unexplained weight loss. Otherwise 13 systems reviewed are negative. Please see the patient's intake questionnaire from today for details.    OBJECTIVE:  BP (!) 163/72   Pulse 84   Ht 5' 6\" (1.676 m)   Wt 177 lb (80.3 kg)   BMI 28.57 kg/m      PHYSICAL EXAMINATION:    --CONSTITUTIONAL:  Vital signs as above.  No acute distress.  The patient is well nourished and well groomed.  --PSYCHIATRIC:  Appropriate mood and affect. The patient is awake, alert, oriented to person, place, time and answering questions appropriately with clear speech.    --SKIN:  Skin over the face, bilateral lower extremities, and posterior torso is clean, dry, intact without rashes.    --RESPIRATORY: Normal rhythm and effort. No abnormal accessory muscle breathing patterns noted.   --ABDOMINAL:  Non-distended.    --GROSS MOTOR: Gait: Walks hitched forward. arises with discomfort from a seated position. Toe walking and heel walking are normal without significant difficulty.      --LOWER EXTREMITY MOTOR " TESTING:  Hip flexion: right 5/5, left 5/5  Hip abduction: right 5/5, left 5/5  Hip adduction: right 5/5, left 5/5   Quads: right 5/5, left 5/5  Hamstrings: right 5/5, left 5/5  Dorsiflexion: right 5/5, left 5/5  Plantar flexion: right 5/5, left 5/5    Great toe MTP extension/EHL: right 5/5, left 5/5    --NEUROLOGICAL:  2/4 patellar and achilles reflexes bilaterally. Sensation to light touch is intact throughout both lower extremities. Babinski is negative. No clonus    --MUSCULOSKELETAL: Lumbar spine inspection reveals no evidence of deformity. Range of motion is limited in lumbar extension. Due to pain. Positive wilfrid facet loading maneuvers. No limitations in lumbar flexion or rotation. Positive lower lumbar point tenderness to palpation lumbar spine. No paraspinal musculature tenderness.     Straight leg raising is negative.      --HIPS: Full range of motion bilaterally. Negative SHARIF and Negative FADIR bilaterally. Negative hip joint tenderness to palp.    --SACROILIAC JOINT: Gaenslen's Test was negative. Thigh thrust was negative. One finger point test was negative. Positive SI joint tenderness to palpation bilaterally.    --VASCULAR:  Bilateral lower extremities are warm without any discoloration.  There is no pitting edema of the bilateral lower extremities.    RESULTS:   Prior medical records from [facility]where were reviewed today.    Imaging: Spine imaging was personally reviewed and interpreted today. The images were shown to the patient and the findings were explained using a spine model.      No results found.      This note was dictated using voice recognition software. Any grammatical or context distortions are unintentional and inherent to the software.       Leila Snow FNP-C  [facility]  O. [phone]

## 2025-04-07 NOTE — LETTER
4/7/2025      Albina Pedro  [address] [address] [facility] [place] [address]      Dear Colleague,    Thank you for referring your patient, Albina Pedro, to the [facility]. Please see a copy of my visit note below.    ASSESSMENT: Albina Pedro is a 74 year old female who presents for consultation at the request of PCP Rolo Sandoval, who presents today for new patient evaluation of:    -Chronic bilateral low back pain without sciatica     Patient is neurologically intact on exam. No myelopathic or red flag symptoms. Recommend xr and lumbar MRI for treatment planning. PT referral placed but I expect would not be participatory until after imaging available.        4/9/2025     1:30 PM   OSWESTRY DISABILITY INDEX   Count 10    Sum 4    Oswestry Score (%) 8 %        Patient-reported            Diagnoses and all orders for this visit:  Lumbar radiculopathy  -     Physical Therapy  Referral; Future  Chronic bilateral low back pain without sciatica  -     Spine  Referral  -     XR Lumbar Spine 2/3 Views; Future  -     MR Lumbar Spine w/o Contrast; Future  -     ibuprofen (ADVIL/MOTRIN) 600 MG tablet; Take 1 tablet (600 mg) by mouth every 6 hours as needed for moderate pain. Take with food. Stop if blood in stool.      PLAN:  Reviewed spine anatomy and disease process. Discussed diagnosis and treatment options with the patient today. A shared decision making model was used.  The patient's values and choices were respected. The following represents what was discussed and decided upon by the provider and the patient.      -DIAGNOSTIC TESTS:  Images were personally reviewed and interpreted and explained to patient today using a spine model.   --lumbar XR orders placed  -Lumbar MRI without contrast orders placed     -PHYSICAL THERAPY:    --Referral to PT placed  Discussed the importance of core strengthening, ROM, stretching exercises with the patient and how  each of these entities is important in decreasing pain.  Explained to the patient that the purpose of physical therapy is to teach the patient a home exercise program.  These exercises need to be performed every day in order to decrease pain and prevent future occurrences of pain.                -MEDICATIONS:    --ibuprofen 600mg TID PRN  Discussed multiple medication options today with patient. Discussed risks, side effects, and proper use of medications. Patient verbalized understanding.    -INTERVENTIONS:    -Discussed the role of injections with patient today. Patient would be a good candidate in the future for either epidural steroid injections or medial branch blocks if indicated based on symptoms and supported by imaging results.  -Risks, benefits, and role of injections were discussed with patient today.     -PATIENT EDUCATION:  Total time of 40 minutes, on the day of service, spent with the patient, reviewing the chart, placing orders, and documenting.   -Today we also discussed the pros and cons of the current treatment plan.    -FOLLOW-UP:   we will call with imaging results    Advised patient to call the [facility] Center if symptoms worsen, new numbness or weakness develops in the legs, or if they develop new or worsening problems controlling bladder or bowel function.   ______________________________________________________________________    SUBJECTIVE:    HPI:  Albina Pedro  Is a 74 year old female hx Sjogren's, Myasthenia gravis, IBS, anxiety, depression interstitial lung disease, coronary artery disease, who presents today for new patient evaluation of Chronic bilateral low back pain without sciatica     Dx MG at first, then IBM based on two biopsies.  Worse back pain since a fall last summer onto her hip. They took xr which looked ok. Has since then been getting progressively worse. Cries about the pain 5/7 days of the week. Festus lower back pain. It went down the back of the R leg a few weeks ago.  It is worse at random. Has weakness in her legs. She can't get up out of a chair. When she walks, her legs feel good. She has felt like her legs are wobbly sometimes though, loss of balance. No falls recently. Last summer did fall. A hot bath helps temporarily. Nothing else helps. Pain is at a 7-8/10 on average. sometimes worse to a 10.     Has had chronic trouble getting up.   Denies changes in bowel or bladder control or numbness.     She has tried tylenol. Has also tried a cream.  These dont help too much     She was on prednisone for MG which did not seem to help with the back pain    She has not had any recent PT. She is not able to participate due to pain severity.     She does walk frequently     -Treatment to Date:     -Medications:    Current Outpatient Medications   Medication Sig Dispense Refill     ibuprofen (ADVIL/MOTRIN) 600 MG tablet Take 1 tablet (600 mg) by mouth every 6 hours as needed for moderate pain. Take with food. Stop if blood in stool. 45 tablet 0     acetaminophen (TYLENOL) 500 MG tablet Take 1,000 mg by mouth every 6 hours as needed for mild pain       albuterol (PROAIR HFA/PROVENTIL HFA/VENTOLIN HFA) 108 (90 Base) MCG/ACT inhaler INHALE TWO PUFFS BY MOUTH EVERY FOUR HOURS AS NEEDED FOR SHORTNESS OF BREATH/DYSPNEA (Patient not taking: No sig reported) 25.5 g 0     buPROPion (WELLBUTRIN XL) 150 MG 24 hr tablet TAKE TWO TABLETS BY MOUTH IN THE MORNING 90 tablet 0     LORazepam (ATIVAN) 0.5 MG tablet TAKE ONE-HALF TO ONE TABLET BY MOUTH THREE TO FIVE TIMES A WEEK AS NEEDED. 20 tablet 0     Multiple Vitamins-Minerals (MULTIVITAMIN & MINERAL PO) Take 1 tablet by mouth daily        pravastatin (PRAVACHOL) 40 MG tablet Take 1 tablet (40 mg) by mouth daily. 90 tablet 0     QUEtiapine (SEROQUEL) 50 MG tablet Take at 9 pm every night 30 tablet 0     rivastigmine (EXELON) 9.5 MG/24HR 24 hr patch Place 1 patch onto the skin daily for 270 days 90 patch 2     No current facility-administered  medications for this visit.       Allergies   Allergen Reactions     Daypro [Oxaprozin] Rash            Nitroglycerin Other (See Comments)     Causes low blood pressure     Penicillins Unknown     Occurred as child.     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        Patient Active Problem List   Diagnosis     DIZZINESS - LIKELY HYPOGLYCEMIA     Dermatophytosis of body     Episodic mood disorder     Panniculitis     CAD (coronary artery disease)     Sjogren's syndrome     Adverse effect of anesthetic     Status post coronary angiogram     Acute chest pain     Major depressive disorder, recurrent, mild     Hyperlipidemia LDL goal <70     ILD (interstitial lung disease) (H)     Intermediate coronary syndrome (H)     Chronic stable angina     Myasthenia gravis with exacerbation (H)     Frequent falls     Dementia, unspecified dementia severity, unspecified dementia type, unspecified whether behavioral, psychotic, or mood disturbance or anxiety (H)     Major depressive disorder, recurrent episode, moderate (H)     Sleep-wake disorder     Subarachnoid hemorrhage (H)     Fall, initial encounter     IBM (inclusion body myositis) (H)     Chronic low back pain       Past Surgical History:   Procedure Laterality Date     BIOPSY MUSCLE DIAGNOSTIC (LOCATION) Left 5/2/2022    Procedure: BIOPSY, MUSCLE LEFT biceps @0900;  Surgeon: Tyrell Loo MD;  Location: [facility] OR     BIOPSY MUSCLE DIAGNOSTIC (LOCATION) Left 9/16/2024    Procedure: Biopsy muscle diagnostic vastus lateralis;  Surgeon: Tyrell Loo MD;  [place] [facility] [place]     C/SECTION, LOW TRANSVERSE  10/13/1978  "   , Low Transverse     COLONOSCOPY       OPEN REDUCTION INTERNAL FIXATION ANKLE Right 2019    Procedure: Open reduction internal fixation right lateral malleolus fracture;  Surgeon: Rolo Oconnor DPM;  Location: [facility] OR     SURGICAL HISTORY OF -            SURGICAL HISTORY OF -   00    Colonoscopy       Family History   Problem Relation Age of Onset     Breast Cancer Mother      Cancer Mother         Breast and liver- age 43     Cerebrovascular Disease Father      Heart Failure Father      Coronary Artery Disease Father      Alzheimer Disease Father      Hypertension Father      Diabetes Maternal Grandmother      Breast Cancer Maternal Aunt      Breast Cancer Paternal Aunt      Cancer - colorectal No family hx of      Prostate Cancer No family hx of        Reviewed past medical, surgical, and family history with patient found on new patient intake packet located in EMR Media tab.     SOCIAL HX: alcohol use, nonsmoker. No rec drug use,    ROS: positive for wt gain and loss, joint pain, muscle pain, sciatica, falls, diarrhea and constipation, changes in vision. Specifically negative for bowel/bladder dysfunction, balance changes, headache, dizziness, foot drop, fevers, chills, appetite changes, nausea/vomiting, unexplained weight loss. Otherwise 13 systems reviewed are negative. Please see the patient's intake questionnaire from today for details.    OBJECTIVE:  BP (!) 163/72   Pulse 84   Ht 5' 6\" (1.676 m)   Wt 177 lb (80.3 kg)   BMI 28.57 kg/m      PHYSICAL EXAMINATION:    --CONSTITUTIONAL:  Vital signs as above.  No acute distress.  The patient is well nourished and well groomed.  --PSYCHIATRIC:  Appropriate mood and affect. The patient is awake, alert, oriented to person, place, time and answering questions appropriately with clear speech.    --SKIN:  Skin over the face, bilateral lower extremities, and posterior torso is clean, dry, intact without rashes.  "   --RESPIRATORY: Normal rhythm and effort. No abnormal accessory muscle breathing patterns noted.   --ABDOMINAL:  Non-distended.    --GROSS MOTOR: Gait: Walks hitched forward. arises with discomfort from a seated position. Toe walking and heel walking are normal without significant difficulty.      --LOWER EXTREMITY MOTOR TESTING:  Hip flexion: right 5/5, left 5/5  Hip abduction: right 5/5, left 5/5  Hip adduction: right 5/5, left 5/5   Quads: right 5/5, left 5/5  Hamstrings: right 5/5, left 5/5  Dorsiflexion: right 5/5, left 5/5  Plantar flexion: right 5/5, left 5/5    Great toe MTP extension/EHL: right 5/5, left 5/5    --NEUROLOGICAL:  2/4 patellar and achilles reflexes bilaterally. Sensation to light touch is intact throughout both lower extremities. Babinski is negative. No clonus    --MUSCULOSKELETAL: Lumbar spine inspection reveals no evidence of deformity. Range of motion is limited in lumbar extension. Due to pain. Positive wilfrid facet loading maneuvers. No limitations in lumbar flexion or rotation. Positive lower lumbar point tenderness to palpation lumbar spine. No paraspinal musculature tenderness.     Straight leg raising is negative.      --HIPS: Full range of motion bilaterally. Negative SHARIF and Negative FADIR bilaterally. Negative hip joint tenderness to palp.    --SACROILIAC JOINT: Gaenslen's Test was negative. Thigh thrust was negative. One finger point test was negative. Positive SI joint tenderness to palpation bilaterally.    --VASCULAR:  Bilateral lower extremities are warm without any discoloration.  There is no pitting edema of the bilateral lower extremities.    RESULTS:   Prior medical records from [facility]where were reviewed today.    Imaging: Spine imaging was personally reviewed and interpreted today. The images were shown to the patient and the findings were explained using a spine model.      No results found.      This note was dictated using voice recognition  software. Any grammatical or context distortions are unintentional and inherent to the software.       Leila SHIRLEYP-C  [facility]  O. [phone]             Again, thank you for allowing me to participate in the care of your patient.        Sincerely,        STEW Guillen CNP    Electronically signed

## 2025-04-07 NOTE — PATIENT INSTRUCTIONS
Imaging (Xray and lumbar MRI) has been ordered today.   Radiology will call you to schedule. Please call below if you do not hear from them in the next couple of days.     [facility] Radiology Scheduling:  Please call [phone] to schedule your image(s) (select option #1).    There are 3 different locations you may go as a walk-in to have same-day Xrays done:    [facility]  [address] [address]    [facility] [place]  [address], [address]   [place] [address]    [facility]  [address] [address]       Ibuprofen 600mg three to four times daily as needed      ~You have been referred for Physical Therapy to [facility]. They will call you to schedule an appointment.      Scheduling phone number is [phone] for [facility], or [place] location.  If you have not heard from the scheduling office within 2 business days, please call [phone] for ALL other locations.    Discussed the importance of core strengthening, ROM, stretching exercises and how each of these entities is important in decreasing pain and improving long term spine health.  The purpose of physical therapy is to teach you an individualized home exercise program.  These exercises need to be performed every day in order to decrease pain and prevent future occurrences of pain.           ~Please call our [facility] Nurse Navigation line [phone] with any questions or concerns about your treatment plan, if symptoms worsen and you would like to be seen urgently, or if you have any new or worsening numbness, weakness, or problems controlling bladder and bowel function.  ~You are also welcome to contact Leila Snow via [employer], but please be aware that responses to [employer] message may take 2-3 days due to the high volume of patients seen in clinic.

## 2025-04-09 ENCOUNTER — THERAPY VISIT (OUTPATIENT)
Age: 75
End: 2025-04-09
Payer: COMMERCIAL

## 2025-04-09 DIAGNOSIS — M54.50 CHRONIC LOW BACK PAIN: Primary | ICD-10-CM

## 2025-04-09 DIAGNOSIS — G89.29 CHRONIC LOW BACK PAIN: Primary | ICD-10-CM

## 2025-04-09 PROCEDURE — 97110 THERAPEUTIC EXERCISES: CPT | Mod: GP | Performed by: PHYSICAL THERAPIST

## 2025-04-09 PROCEDURE — 97162 PT EVAL MOD COMPLEX 30 MIN: CPT | Mod: GP | Performed by: PHYSICAL THERAPIST

## 2025-04-09 NOTE — PROGRESS NOTES
PHYSICAL THERAPY EVALUATION  Type of Visit: Evaluation             Subjective         Presenting condition or subjective complaint:   LBP - chronic, R leg muscles hurt, sciatica.   Did see spine, will be scheduling MRI.   Date of onset: 04/07/25 (MD order)    Relevant medical history: Arthritis; Concussions; Depression  Sjogrens, IBM,   Alzheimer's disease (H)   Dates & types of surgery:      Prior diagnostic imaging/testing results: X-ray   see chart - prior MRI dated 2023  Prior therapy history for the same diagnosis, illness or injury: No      Prior Level of Function  Transfers:   Ambulation:   ADL:   IADL:     Living Environment  Social support:     Type of home:     Stairs to enter the home:         Ramp:     Stairs inside the home:       5 MATTI home w/  railings. Living areas on 1 level..         Help at home:    Equipment owned:       Employment:      Hobbies/Interests:      Patient goals for therapy: ride bike walk with my dog    Pain assessment: See objective evaluation for additional pain details     Objective   LUMBAR SPINE EVALUATION  PAIN:   INTEGUMENTARY (edema, incisions):   POSTURE:  mild forward flexed posture  GAIT:   Weightbearing Status:   Assistive Device(s):   Gait Deviations:   BALANCE/PROPRIOCEPTION:   WEIGHTBEARING ALIGNMENT:   NON-WEIGHTBEARING ALIGNMENT:    ROM:   Flexion WFL  Extension mod restriction  SB mild restriction B R> L   Hip ROM WFL  PELVIC/SI SCREEN:   STRENGTH:   Proximal hip weakness     MYOTOMES:  WFL  DTR S:   CORD SIGNS:   DERMATOMES: intact light touch  NEURAL TENSION: WNL  FLEXIBILITY:   LUMBAR/HIP Special Tests:    PELVIS/SI SPECIAL TESTS:   FUNCTIONAL TESTS:   Requires UE assist and multiple attempts for sits to stand from standard height chair  PALPATION: ttp paraspinals L 2-L3, ANd R> L SIJ  SPINAL SEGMENTAL CONCLUSIONS:       Assessment & Plan   CLINICAL IMPRESSIONS  Medical Diagnosis: Lumbar radiculopathy (M54.16)  - Primary    Treatment Diagnosis: Chronic low back  pain, generalized weakness   Impression/Assessment: Patient is a 74 year old female with chronic low back pain and generalized weakness complaints.  The following significant findings have been identified: Pain, Decreased ROM/flexibility, Decreased joint mobility, Decreased strength, Impaired balance, Impaired muscle performance, and Decreased activity tolerance. These impairments interfere with their ability to perform self care tasks, work tasks, recreational activities, household chores, driving , household mobility, and community mobility as compared to previous level of function.     Clinical Decision Making (Complexity):  Clinical Presentation: Evolving/Changing  Clinical Presentation Rationale: based on medical and personal factors listed in PT evaluation  Clinical Decision Making (Complexity): Moderate complexity    PLAN OF CARE  Treatment Interventions:  Interventions: Manual Therapy, Neuromuscular Re-education, Therapeutic Activity, Therapeutic Exercise, Self-Care/Home Management    Long Term Goals     PT Goal 1  Goal Identifier: 1  Goal Description: Pt will be able to complete sit to stand from standard height chair with minimal effort  Rationale: to maximize safety and independence with performance of ADLs and functional tasks;to maximize safety and independence within the home;to maximize safety and independence within the community;to maximize safety and independence with self cares  Target Date: 06/04/25  PT Goal 2  Goal Identifier: 2  Goal Description: Pt will be able to walk 20 minutes without exacerbation of symptoms  Rationale: to maximize safety and independence with performance of ADLs and functional tasks;to maximize safety and independence within the home;to maximize safety and independence within the community  Target Date: 06/04/25      Frequency of Treatment: 1x/week  Duration of Treatment: 8 weeks    Recommended Referrals to Other Professionals:   Education Assessment:        Risks and  benefits of evaluation/treatment have been explained.   Patient/Family/caregiver agrees with Plan of Care.     Evaluation Time:     PT Eval, Moderate Complexity Minutes (99625): 15       Signing Clinician: AIDAN Weiner [facility]                                                                                   OUTPATIENT PHYSICAL THERAPY      PLAN OF TREATMENT FOR OUTPATIENT REHABILITATION   Patient's Last Name, First Name, Albina Layton YOB: 1950   Provider's Name   [facility]   Medical Record No.  [id number]     Onset Date: 04/07/25 (MD order)  Start of Care Date: 04/09/25     Medical Diagnosis:  Lumbar radiculopathy (M54.16)  - Primary      PT Treatment Diagnosis:  Chronic low back pain, generalized weakness Plan of Treatment  Frequency/Duration: 1x/week/ 8 weeks    Certification date from 04/09/25 to 06/04/25         See note for plan of treatment details and functional goals     Lela Boswell, PT                         I CERTIFY THE NEED FOR THESE SERVICES FURNISHED UNDER        THIS PLAN OF TREATMENT AND WHILE UNDER MY CARE     (Physician attestation of this document indicates review and certification of the therapy plan).              Referring Provider:  Leila Snow    Initial Assessment  See Epic Evaluation- Start of Care Date: 04/09/25

## 2025-04-21 ENCOUNTER — HOSPITAL ENCOUNTER (OUTPATIENT)
Dept: MRI IMAGING | Facility: CLINIC | Age: 75
Discharge: HOME | End: 2025-04-21
Attending: NURSE PRACTITIONER | Admitting: NURSE PRACTITIONER
Payer: COMMERCIAL

## 2025-04-21 DIAGNOSIS — M54.50 CHRONIC BILATERAL LOW BACK PAIN WITHOUT SCIATICA: ICD-10-CM

## 2025-04-21 DIAGNOSIS — G89.29 CHRONIC BILATERAL LOW BACK PAIN WITHOUT SCIATICA: ICD-10-CM

## 2025-04-21 PROCEDURE — 72148 MRI LUMBAR SPINE W/O DYE: CPT

## 2025-04-28 ENCOUNTER — THERAPY VISIT (OUTPATIENT)
Age: 75
End: 2025-04-28
Attending: NURSE PRACTITIONER
Payer: COMMERCIAL

## 2025-04-28 DIAGNOSIS — M54.16 LUMBAR RADICULOPATHY: ICD-10-CM

## 2025-04-28 DIAGNOSIS — M54.50 CHRONIC LOW BACK PAIN: Primary | ICD-10-CM

## 2025-04-28 DIAGNOSIS — G89.29 CHRONIC LOW BACK PAIN: Primary | ICD-10-CM

## 2025-04-28 PROCEDURE — 97110 THERAPEUTIC EXERCISES: CPT | Mod: GP | Performed by: PHYSICAL THERAPIST

## 2025-05-04 DIAGNOSIS — F43.0 ACUTE REACTION TO STRESS: ICD-10-CM

## 2025-05-05 DIAGNOSIS — R45.1 AGITATION: ICD-10-CM

## 2025-05-05 RX ORDER — LORAZEPAM 0.5 MG/1
TABLET ORAL
Qty: 20 TABLET | Refills: 0 | Status: SHIPPED | OUTPATIENT
Start: 2025-05-05

## 2025-05-05 RX ORDER — QUETIAPINE FUMARATE 50 MG/1
TABLET, FILM COATED ORAL
Qty: 30 TABLET | Refills: 0 | Status: SHIPPED | OUTPATIENT
Start: 2025-05-05

## 2025-05-07 ENCOUNTER — THERAPY VISIT (OUTPATIENT)
Age: 75
End: 2025-05-07
Payer: COMMERCIAL

## 2025-05-07 DIAGNOSIS — M54.16 LUMBAR RADICULOPATHY: Primary | ICD-10-CM

## 2025-05-21 ENCOUNTER — THERAPY VISIT (OUTPATIENT)
Age: 75
End: 2025-05-21
Payer: COMMERCIAL

## 2025-05-21 DIAGNOSIS — G89.29 CHRONIC LOW BACK PAIN: Primary | ICD-10-CM

## 2025-05-21 DIAGNOSIS — M54.50 CHRONIC LOW BACK PAIN: Primary | ICD-10-CM

## 2025-05-21 DIAGNOSIS — M54.16 LUMBAR RADICULOPATHY: ICD-10-CM

## 2025-05-21 PROCEDURE — 97112 NEUROMUSCULAR REEDUCATION: CPT | Mod: GP

## 2025-05-21 PROCEDURE — 97110 THERAPEUTIC EXERCISES: CPT | Mod: GP

## 2025-05-27 ENCOUNTER — THERAPY VISIT (OUTPATIENT)
Age: 75
End: 2025-05-27
Payer: COMMERCIAL

## 2025-05-27 DIAGNOSIS — G89.29 CHRONIC LOW BACK PAIN: Primary | ICD-10-CM

## 2025-05-27 DIAGNOSIS — M54.50 CHRONIC LOW BACK PAIN: Primary | ICD-10-CM

## 2025-05-27 PROCEDURE — 97112 NEUROMUSCULAR REEDUCATION: CPT | Mod: GP

## 2025-05-27 PROCEDURE — 97110 THERAPEUTIC EXERCISES: CPT | Mod: GP

## 2025-05-27 NOTE — PROGRESS NOTES
PLAN  Continue therapy per current plan of care.    Beginning/End Dates of Progress Note Reporting Period:  04/09/25 to 05/27/2025    Referring Provider:  Leila Snow          05/27/25 0500   Appointment Info   Signing clinician's name / credentials Gretta Figueroa, PT, DPT   Total/Authorized Visits 8   Visits Used 5   Medical Diagnosis Lumbar radiculopathy (M54.16)  - Primary   PT Tx Diagnosis Chronic low back pain, generalized weakness   Other pertinent information Alzheimer's disease (H)   Progress Note/Certification   Start of Care Date 04/09/25   Onset of illness/injury or Date of Surgery 04/07/25  (MD order)   Therapy Frequency 1x/week   Predicted Duration 8 weeks   Certification date from 04/09/25   Certification date to 06/04/25   Progress Note Completed Date 04/09/25   PT Goal 1   Goal Identifier 1   Goal Description Pt will be able to complete sit to stand from standard height chair with minimal effort   Rationale to maximize safety and independence with performance of ADLs and functional tasks;to maximize safety and independence within the home;to maximize safety and independence within the community;to maximize safety and independence with self cares   Goal Progress still using heavy UE use with rising from chair   Target Date 06/04/25   PT Goal 2   Goal Identifier 2   Goal Description Pt will be able to walk 20 minutes without exacerbation of symptoms   Rationale to maximize safety and independence with performance of ADLs and functional tasks;to maximize safety and independence within the home;to maximize safety and independence within the community   Target Date 06/04/25   Subjective Report   Subjective Report Pt reports hips and legs more sore than back today.   Therapeutic Procedure/Exercise   Therapeutic Procedures: strength, endurance, ROM, flexibility minutes (92005) 23   Therapeutic Procedures Ther Proc 2;Ther Proc 3;Ther Proc 4   Ther Proc 1 Step ups   Ther Proc 1 - Details x10 B  alternating legs 6 inch   Ther Proc 2 SLR flexion   Ther Proc 2 - Details x10 B   Ther Proc 4 sit to stand   Ther Proc 4 - Details x3 normal chair height using B UE   Ther Proc 5 nustep   Ther Proc 5 - Details x5 min warmup   Ther Proc 6 leg press   Ther Proc 6 - Details x30 double leg 44#   PTRx Ther Proc 1 Posterior Pelvic Tilt   PTRx Ther Proc 1 - Details HEP   PTRx Ther Proc 2 Single Knee to Chest   PTRx Ther Proc 2 - Details HEP   PTRx Ther Proc 3 Bridging #1   PTRx Ther Proc 3 - Details x20 cues for glute squeeze   PTRx Ther Proc 4 Supine Lumbar Hip Roll   PTRx Ther Proc 4 - Details No Notes   PTRx Ther Proc 5 Supine Abdominal Exercise #4 (Leg Extension)   PTRx Ther Proc 5 - Details x10 each leg   PTRx Ther Proc 6 Supine Abdominal Exercise #3B (Two Leg Marching)   PTRx Ther Proc 6 - Details HEP   PTRx Ther Proc 7 Prone Lumbar Extension Exercise #3 (Leg Extension)   PTRx Ther Proc 7 - Details x10 B   PTRx Ther Proc 8 Knee Bends   PTRx Ther Proc 8 - Details x15    PTRx Ther Proc 9 Standing Hamstring Stretch   PTRx Ther Proc 9 - Details No Notes   PTRx Ther Proc 10 Hip Abduction Straight Leg Raise   PTRx Ther Proc 10 - Details x15 B   Skilled Intervention instruction of exercise and cues for technique. Focus on glute and LE strength and functional movements   Patient Response/Progress pt tolerated well- able to progress reps and difficulty today   Neuromuscular Re-education   Neuromuscular re-ed of mvmt, balance, coord, kinesthetic sense, posture, proprioception minutes (59406) 12   Neuro Re-ed 1 suitcase carry   Neuro Re-ed 1 - Details 2x20 ft B 10#   Neuro Re-ed 2 step over cones   Neuro Re-ed 2 - Details forward 2x5 each leg forward, 2x5 side step   PTRx Neuro Re-ed 1 Pallof Press   PTRx Neuro Re-ed 1 - Details x15 B rtb   Skilled Intervention instruction of exercise and cues for technique. focus on functional leg strength and core activation   Patient Response/Progress pt tolerated well   Manual Therapy    Manual Therapy 1 STM   Manual Therapy 1 - Details to R lateral hip, hamstsring   Skilled Intervention improve mobility, address pain   Patient Response/Progress reports helpful   Plan   Plan for next session follow up as needed her MD   Comments   Comments pt arrived late   Total Session Time   Timed Code Treatment Minutes 35   Total Treatment Time (sum of timed and untimed services) 35

## 2025-05-30 DIAGNOSIS — F33.0 MAJOR DEPRESSIVE DISORDER, RECURRENT, MILD: ICD-10-CM

## 2025-06-01 RX ORDER — BUPROPION HYDROCHLORIDE 150 MG/1
TABLET ORAL
Qty: 90 TABLET | Refills: 0 | Status: SHIPPED | OUTPATIENT
Start: 2025-06-01

## 2025-06-05 ENCOUNTER — OFFICE VISIT (OUTPATIENT)
Dept: PHYSICAL MEDICINE AND REHAB | Facility: CLINIC | Age: 75
End: 2025-06-05
Payer: COMMERCIAL

## 2025-06-05 ENCOUNTER — TELEPHONE (OUTPATIENT)
Dept: PHYSICAL MEDICINE AND REHAB | Facility: CLINIC | Age: 75
End: 2025-06-05

## 2025-06-05 VITALS — HEART RATE: 97 BPM | DIASTOLIC BLOOD PRESSURE: 65 MMHG | SYSTOLIC BLOOD PRESSURE: 138 MMHG

## 2025-06-05 DIAGNOSIS — M54.16 LUMBAR RADICULOPATHY: Primary | ICD-10-CM

## 2025-06-05 PROCEDURE — 3075F SYST BP GE 130 - 139MM HG: CPT | Performed by: NURSE PRACTITIONER

## 2025-06-05 PROCEDURE — 3078F DIAST BP <80 MM HG: CPT | Performed by: NURSE PRACTITIONER

## 2025-06-05 PROCEDURE — 1125F AMNT PAIN NOTED PAIN PRSNT: CPT | Performed by: NURSE PRACTITIONER

## 2025-06-05 PROCEDURE — 99215 OFFICE O/P EST HI 40 MIN: CPT | Performed by: NURSE PRACTITIONER

## 2025-06-05 ASSESSMENT — PAIN SCALES - GENERAL: PAINLEVEL_OUTOF10: SEVERE PAIN (8)

## 2025-06-05 NOTE — PATIENT INSTRUCTIONS
Follow up with PCP if concerned about Lyme    Get your labs done that your rheumatologist and cardiologist and PCP ordered      An injection has been ordered today to potentially help with your pain symptoms. These injections do not fix what is going on in your back, therefore they typically do not take away the pain completely, however they can many times help improve symptoms. Injections should always be completed along with other modalities such as physical therapy for the best long term outcomes. If injections alone are done, then pain will likely return.     [facility] Injection Requirements:    A  is required for all fluoroscopically-guided injections.  Injection appointments may be cancelled if there are signs/symptoms of an active infection or if the patient is being actively treated with antibiotics for a diagnosed infection.  Patients may have their steroid injection cancelled if they have had another steroid injection within 2 weeks.  Diabetic patients will have their blood glucose levels checked the day of their injection and the appointment will be rescheduled if the blood glucose level is 300 or higher.  Patients with allergies to cortisone, local anesthetics, iodine, or contrast dye should contact the Spine Center to further discuss these considerations.  Patients scheduled for medial branch block diagnostic injections should refrain from taking pain medication the day of the procedure.  The medial branch block injection appointment will be rescheduled if the patient's pain rating is not 5/10 or greater at the time of the procedure.  Patients taking warfarin/Coumadin will have their INR checked the day of the procedure and the procedure may be rescheduled if the INR is greater than 3.0.  Please contact the Spine Center (#[phone]) if you are taking any prescription blood-thinning medications (warfarin, Plavix, Lovenox, Eliquis, Brilinta, Effient, etc.) as  special dosing adjustments may need to be made depending on the type of injection you are scheduled to receive.  It is recommended that you delay having your steroid injection if you have received a flu shot or shingles vaccine within 2 weeks.       Importance of specialized Physical Therapy:     Today, we discussed the importance of core strengthening, ROM, and stretching exercises, and how each of these are key in decreasing pain.   The purpose of physical therapy is to teach patients a home exercise program individualized to them and their specific health concerns.  These exercises need to be performed every day in order to decrease pain and prevent future occurrences of pain.        Radicular Pain    Radicular pain in either the arm or leg is usually from a bulging disc in the spine. A portion of the herniated disc may press against the nerves as the nerves exit the spine. This causes pain which is felt down the arm or leg. Other causes of radicular pain may include:  Fractures.  Heart disease.  Cancer.  An abnormal and usually degenerative state of the nervous system or nerves (neuropathy).    In most cases, radicular pain is treated without imaging unless symptoms do not start to improve. If that is the case, your provider may order a CT or MRI scan to determine the cause.     Nerves in the cervical spine (neck) may cause radicular pain into the outer shoulder and down the arm. It can spread down to the thumb and fingers. The symptoms vary depending on which nerve root has been affected. In most cases, radicular pain improves with conservative treatment such as physical therapy, cervical traction, medications, and epidural steroid injections. A program for neck rehabilitation with stretching and strengthening exercises is an important part of management. Treatment may take months, and surgery may be considered as a last resort if the symptoms do not improve.    Nerves in the lumbar spine (lower back) may cause  "radicular pain into the hip and down the leg. The commonly used term for this type of pain is \"sciatica\". Conservative treatment is also recommended for this problem. Most patients feel better after 2 to 4 weeks of rest and other supportive care. You should avoid bending, lifting, and all other activities which can make your pain worse. Physical therapy, traction, medications, and epidural steroid injections can be good options to help with your recovery. A program for back injury rehabilitation with stretching and strengthening exercises is an important part of management. Surgery may be considered as a last resort if symptoms do not improve with conservative treatment.     You may take over-the-counter or prescription medicines for pain, discomfort, or fever as directed by your caregiver. Muscle relaxants may help by relieving more severe pain and spasm. Neuropathic medication (such as gabapentin, lyrica, or cymbalta) can help decrease your symptoms of nerve pain as well. Cold or massage can also give significant relief. Spinal manipulation is not recommended as it can increase the degree of disc protrusion. We do not recommend taking narcotic medication such as percocet, oxycodone, norco, dilaudid, or others unless pain is severe and not controlled with any other oral options.    Epidural steroid injections are often effective treatment for radicular pain. These injections deliver strong anti-inflammatory medicine to the area directly around the nerve root in the space between your back bones (vertebrae). Your provider can give you more information about steroid injections. These injections are most effective when given within two weeks of the onset of acute pain. You should see your provider for follow up care as recommended.     In most cases, radicular pain is treated without imaging unless symptoms do not start to improve. If that is the case, your provider may order a CT or MRI scan to determine the cause. "     SEEK IMMEDIATE MEDICAL CARE IF:  You develop increased pain, weakness, or numbness in your arm or leg.  You develop difficulty with bladder or bowel control.  You develop abdominal pain.    Document Released: 01/25/2006 Document Revised: 03/11/2013 Document Reviewed: 04/12/2010  Patient Information  2013 [employer].         ~Please call our [facility] Nurse Navigation line [phone] with any questions or concerns about your treatment plan, if symptoms worsen and you would like to be seen urgently, or if you have any new or worsening numbness, weakness, or problems controlling bladder and bowel function.  ~You are also welcome to contact Leila Snow via FreakOut, but please be aware that responses to [employer]ak[employer] message may take 2-3 days due to the high volume of patients seen in clinic.

## 2025-06-05 NOTE — LETTER
6/5/2025      Albina Pedro  [id number] [place] [address]      Dear Colleague,    Thank you for referring your patient, Albina Pedro, to the [facility]. Please see a copy of my visit note below.    ASSESSMENT: Albina Pedro is a 74 year old female who presents for consultation at the request of PCP Rolo Sandoval, who presents today for fu patient evaluation of:    -Chronic bilateral low back pain without sciatica     Worsening pain in many joints including neck, lower back, legs. Given her history of Sjogren's, recommended having the labwork done ordered by rheumatology in Dec. She Is also worried about lyme and I encouraged her to follow up with her PCP.  We reviewed her lumbar MRI pictures and CT Cspine discussed results, given her neck and lower back pain into the legs which are described in detail below. Given that symptoms have not yet improved with PT,  recommended starting with wilfrid L2-3 tf red as first step and orders were placed.  Consider cervical MRI as next step if neck pain does not improve.  Follow up 2-4 wks post injection to review response to injection            4/9/2025     1:30 PM   OSWESTRY DISABILITY INDEX   Count 10    Sum 4    Oswestry Score (%) 8 %        Patient-reported            Diagnoses and all orders for this visit:  Lumbar radiculopathy  -     PAIN Transforaminal RED Inj Lumbosacral Bilateral; Future        PLAN:  Reviewed spine anatomy and disease process. Discussed diagnosis and treatment options with the patient today. A shared decision making model was used.  The patient's values and choices were respected. The following represents what was discussed and decided upon by the provider and the patient.      -DIAGNOSTIC TESTS:  Images were personally reviewed and interpreted and explained to patient today using a spine model.   --no new imaging ordered today    -PHYSICAL THERAPY:    --continue PT  Discussed the  importance of core strengthening, ROM, stretching exercises with the patient and how each of these entities is important in decreasing pain.  Explained to the patient that the purpose of physical therapy is to teach the patient a home exercise program.  These exercises need to be performed every day in order to decrease pain and prevent future occurrences of pain.                -MEDICATIONS:    --ibuprofen 600mg TID PRN  -tylenol prn  -consider alternative neuropathic agent, had side effects on gabapentin previously  Discussed multiple medication options today with patient. Discussed risks, side effects, and proper use of medications. Patient verbalized understanding.    -INTERVENTIONS:    -Discussed the role of injections with patient today. Patient would be a good candidate in the future for either epidural steroid injections or medial branch blocks if indicated based on symptoms and supported by imaging results. Festus TF MARIANNE L2-3 orders placed    -PATIENT EDUCATION:  Total time of 40 minutes, on the day of service, spent with the patient, reviewing the chart, placing orders, and documenting.   -Today we also discussed the pros and cons of the current treatment plan.    -FOLLOW-UP:   we will call with imaging results    Advised patient to call the Spine Center if symptoms worsen, new numbness or weakness develops in the legs, or if they develop new or worsening problems controlling bladder or bowel function.   ______________________________________________________________________    SUBJECTIVE:    Albina is here to follow up appt. She has done 4+ PT visits, no relief yet. Significant festus lower back pain with soreness in the legs. 9/10. Also pain in many other joints including hands. Feels stiffness. She has swelling in her ankles. She is taking tylenol. No new symptoms. She reports pain all over her body. 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  Worse back pain since a fall last summer onto her hip. They took xr which looked ok. Has since then been getting progressively worse. Cries about the pain 5/7 days of the week. Festus lower back pain. It went down the back of the R leg a few weeks ago. It is worse at random. Has weakness in her legs. She can't get up out of a chair. When she walks, her legs feel good. She has felt like her legs are wobbly sometimes though, loss of balance. No falls recently. Last summer did fall. A hot bath helps temporarily. Nothing else helps. Pain is at a 7-8/10 on average. sometimes worse to a 10.     Has had chronic trouble getting up.   Denies changes in bowel or bladder control or numbness.     She has tried tylenol. Has also tried a cream.  These dont help too much     She was on prednisone for MG which did not seem to help with the back pain    She has not had any recent PT. She is not able to participate due to pain severity.     She does walk frequently     -Treatment to Date:     -Medications:  Tylenol  Ibuprofen  Gabapentin (side effects)      Current Outpatient Medications   Medication Sig Dispense Refill     acetaminophen (TYLENOL) 500 MG tablet Take 1,000 mg by mouth every 6 hours as needed for mild pain       albuterol (PROAIR HFA/PROVENTIL HFA/VENTOLIN HFA) 108 (90 Base) MCG/ACT inhaler INHALE TWO PUFFS BY MOUTH EVERY FOUR HOURS AS NEEDED FOR SHORTNESS OF BREATH/DYSPNEA 25.5 g 0     buPROPion (WELLBUTRIN XL) 150 MG 24 hr tablet TAKE TWO TABLETS BY MOUTH IN THE MORNING 90 tablet 0     ibuprofen (ADVIL/MOTRIN) 600 MG tablet Take 1 tablet (600 mg) by mouth every 6 hours as needed for moderate pain. Take with  food. Stop if blood in stool. 45 tablet 0     LORazepam (ATIVAN) 0.5 MG tablet TAKE ONE-HALF TO ONE TABLET BY MOUTH THREE TO FIVE TIMES A WEEK AS NEEDED. 20 tablet 0     Multiple Vitamins-Minerals (MULTIVITAMIN & MINERAL PO) Take 1 tablet by mouth daily        pravastatin (PRAVACHOL) 40 MG tablet Take 1 tablet (40 mg) by mouth daily. 90 tablet 0     QUEtiapine (SEROQUEL) 50 MG tablet TAKE 1 TABLET BY MOUTH AT 9PM EVERY NIGHT 30 tablet 0     rivastigmine (EXELON) 9.5 MG/24HR 24 hr patch Place 1 patch onto the skin daily.       torsemide (DEMADEX) 10 MG tablet Take 1 tablet (10 mg) by mouth daily. 30 tablet 3     No current facility-administered medications for this visit.       Allergies   Allergen Reactions     Daypro [Oxaprozin] Rash            Nitroglycerin Other (See Comments)     Causes low blood pressure     Penicillins Unknown     Occurred as child.     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        Patient Active Problem List   Diagnosis     DIZZINESS - LIKELY HYPOGLYCEMIA     Dermatophytosis of body     Episodic mood disorder     Panniculitis     CAD (coronary artery disease)     Sjogren's syndrome     Adverse effect of anesthetic     Status post coronary angiogram     Acute chest pain     Major depressive disorder, recurrent, mild     Hyperlipidemia LDL goal <70     ILD (interstitial lung disease) (H)     Intermediate coronary syndrome (H)     Chronic stable angina     Myasthenia gravis with exacerbation (H)     Frequent falls     Dementia, unspecified dementia severity, unspecified dementia type, unspecified whether behavioral, psychotic, or mood  disturbance or anxiety (H)     Major depressive disorder, recurrent episode, moderate (H)     Sleep-wake disorder     Subarachnoid hemorrhage (H)     Fall, initial encounter     IBM (inclusion body myositis) (H)     Chronic low back pain       Past Surgical History:   Procedure Laterality Date     BIOPSY MUSCLE DIAGNOSTIC (LOCATION) Left 2022    Procedure: BIOPSY, MUSCLE LEFT biceps @0900;  Surgeon: Tyrell Loo MD;  Location: [facility] OR     BIOPSY MUSCLE DIAGNOSTIC (LOCATION) Left 2024    Procedure: Biopsy muscle diagnostic vastus lateralis;  Surgeon: Tyrell Loo MD;  Location: [facility] OR     C/SECTION, LOW TRANSVERSE  10/13/1978    , Low Transverse     COLONOSCOPY       OPEN REDUCTION INTERNAL FIXATION ANKLE Right 2019    Procedure: Open reduction internal fixation right lateral malleolus fracture;  Surgeon: Rolo Oconnor DPM;  Location: [facility] OR     SURGICAL HISTORY OF -            SURGICAL HISTORY OF -   00    Colonoscopy       Family History   Problem Relation Age of Onset     Breast Cancer Mother      Cancer Mother         Breast and liver- age 43     Cerebrovascular Disease Father      Heart Failure Father      Coronary Artery Disease Father      Alzheimer Disease Father      Hypertension Father      Diabetes Maternal Grandmother      Breast Cancer Maternal Aunt      Breast Cancer Paternal Aunt      Cancer - colorectal No family hx of      Prostate Cancer No family hx of        Reviewed past medical, surgical, and family history with patient found on new patient intake packet located in EMR Media tab.     SOCIAL HX: alcohol use, nonsmoker. No rec drug use,    ROS: positive for wt gain and loss, joint pain, muscle pain, sciatica, falls, diarrhea and constipation, changes in vision. Specifically negative for bowel/bladder dysfunction, balance changes, headache, dizziness, foot drop, fevers, chills, appetite changes, nausea/vomiting, unexplained weight  loss. Otherwise 13 systems reviewed are negative. Please see the patient's intake questionnaire from today for details.    OBJECTIVE:  /65   Pulse 97     PHYSICAL EXAMINATION:    --CONSTITUTIONAL:  Vital signs as above.  No acute distress.  The patient is well nourished and well groomed. Patient appears uncomfortable, lying down for most of visit due to pain  --PSYCHIATRIC:  Appropriate mood and affect. The patient is awake, alert, oriented to person, place, time and answering questions appropriately with clear speech.    --SKIN:  Skin over the face is clean, dry, intact without rashes.    --RESPIRATORY: Normal rhythm and effort. No abnormal accessory muscle breathing patterns noted.     Gait was not tested    No cervical or lumbar point or paraspinal muscle tenderness    Full strength wilfrid upper and lower extremities throughout, all planes    2/4 wilfrid upper and lower extremities  Sensation grossly intact throughout both upper and lower ext      RESULTS:   Prior medical records from [facility]where were reviewed today.    Imaging: Spine imaging was personally reviewed and interpreted today. The images were shown to the patient and the findings were explained using a spine model.      EXAM: MR LUMBAR SPINE W/O CONTRAST  LOCATION: [facility]  DATE: 4/21/2025     INDICATION: low back pain into R posterior leg, treatment planning. severe pain  COMPARISON: 4/7/2025 plain film  TECHNIQUE: Routine Lumbar Spine MRI without IV contrast.     FINDINGS:   Grade 1 anterolisthesis of L4 on L5. 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 Conus terminates normally. Marrow signal unremarkable accounting for degenerative endplate changes.     T12-L1: Broad-based disc bulge. No canal stenosis. Facet disease. Mild bilateral foraminal narrowing.     L1-2: Broad-based disc bulge with mild canal stenosis. Facet disease. Moderate bilateral foraminal narrowing.     L2-3:  Broad-based disc bulge with central protrusion and severe canal stenosis. Facet disease. Severe right and mild to moderate left foraminal narrowing.     L3-4: Broad-based disc bulge with severe canal stenosis. Facet disease and ligamentum flavum hypertrophy. Mild right and moderate left foraminal narrowing.     L4-5: Disc uncovering and a broad-based disc bulge with severe canal stenosis. Mild bilateral foraminal narrowing.     L5-S1: Broad-based disc bulge. No canal stenosis. Facet disease. Mild bilateral foraminal narrowing.     Paravertebral soft tissues are notable for muscular atrophy findings.                                                                      IMPRESSION:  1.  Extensive degenerative changes in the lumbar spine with severe canal stenosis spanning L2-3 through L4-5.    EXAM: CT HEAD W/O CONTRAST, CT CERVICAL SPINE W/O CONTRAST  LOCATION: [facility]  DATE: 6/30/2024     INDICATION: fall, head injury and neck injury,  COMPARISON: 2/2/2023  TECHNIQUE:   1) Routine CT Head without IV contrast. Multiplanar reformats. Dose reduction techniques were used.  2) Routine CT Cervical Spine without IV contrast. Multiplanar reformats. Dose reduction techniques were used.     FINDINGS:   HEAD CT:   INTRACRANIAL CONTENTS: Small volume acute subarachnoid hemorrhage in the posterior left temporal lobe. No CT evidence of acute infarct. Mild presumed chronic small vessel ischemic changes. Mild generalized volume loss. No hydrocephalus.      VISUALIZED ORBITS/SINUSES/MASTOIDS: No intraorbital abnormality. No paranasal sinus mucosal disease. No middle ear or mastoid effusion.     BONES/SOFT TISSUES: Left frontal and right parietal scalp hematomas. No fracture.     CERVICAL SPINE CT:   VERTEBRA: Vertebral body height is normal. Slight anterolisthesis of C4. No fracture or posttraumatic subluxation.      CANAL/FORAMINA: No high-grade central canal stenosis. Multilevel neural foraminal  narrowing.     PARASPINAL: No extraspinal abnormality. Visualized lung fields are clear.                                                                      IMPRESSION:  HEAD CT:  1.  Small volume acute subarachnoid hemorrhage in the posterior left temporal lobe.  2.  Left frontal and right parietal scalp hematomas. No fracture.     CERVICAL SPINE CT:  No CT evidence for acute fracture or post traumatic subluxation.       This note was dictated using voice recognition software. Any grammatical or context distortions are unintentional and inherent to the software.       Leila Snow FNP-C  [facility]  O. [phone]             Again, thank you for allowing me to participate in the care of your patient.        Sincerely,        STEW Guillen CNP    Electronically signed

## 2025-06-05 NOTE — TELEPHONE ENCOUNTER
Procedure ordered? Yes     What insurance are we billing for this procedure?  [employer]   IF SCHEDULING AT [place] PLEASE SCHEDULE AT LEAST 7-10 BUSINESS DAYS OUT SO A PA CAN BE OBTAINED    Is a  PAIN  order available to link to injection appointment or does one need to be transcribed?  YES: bilateral lumbar radiculopathy. wilfrid L2-3 TF MARIANNE    If needed, route to CN to assist in doing so.    Is  needed?: No   If YES, please initiate in-person  request.    Will patient have a ?  Yes    All fluoro procedures require a .  These US procedures also require a : piriformis, pudendal, scalene, & carpal tunnel.    Is patient taking any blood thinners (i.e. Plavix, coumadin, jantoven, warfarin, heparin, Xarelto, Pradaxa, Eliquis, Brilinta, or Effient, etc)? No   If YES, schedule out 2 weeks, and route to RN pool to determine if medication hold is needed.    Is patient taking aspirin? No   For CERVICAL or INTERLAMINAR procedures, route message to Care Navigation so they can seek approval from managing provider to hold aspirin for 6 days prior to the procedure.    Does patient have an allergy to contrast dye or iodine?  No  If YES, OK to schedule. Route to RN pool AND add allergy information to appointment notes    Is patient diabetic? No If YES, blood glucose level will be checked on day of procedure and will need to be 300mg/dL or below (for steroid injections).    Does patient have an active infection or treated for one within the past week? No   If YES, do NOT schedule and route to Care Navigation.     Is patient currently taking any antibiotics or have an active infection?  No  For patients on chronic, preventative, or prophylactic antibiotics, procedures may be scheduled.   For patients on antibiotics for active or recent infection: antibiotic course must have been completed and symptom-free of infection to safely proceed with injection.  Send to Care Navigation if  unsure.    Is patient actively being treated for cancer or immunocompromised? No   If YES, do NOT schedule and route to RN pool.     Any chance of pregnancy? Not Applicable   If YES, do NOT schedule and route to RN pool.    Have you had any vaccines in the last 2 weeks? NO  If YES, please route to Care Navigation to discuss before scheduling.     Reminders:  -  If you are started on any steroids or antibiotics between now and your appointment, you must contact us because it may affect our ability to perform your procedure.   reviewed    -  Informed patient that it is OK to take normal medications before the procedure and must hold blood thinners as instructed.  reviewed    -  Patients scheduled for MBBs should not take pain meds prior to injection and pain rating needs to be 5/10 or greater the day of the procedure.    -  Informed cervical injection patients that an IV will be placed as a precautionary measure.  reviewed and not discussed    -  Patients should eat a light meal prior to their procedure appointment.  reviewed    -  All radiofrequency ablations are in a 40 minute time slot and not to be scheduled until in-basket message has been sent by the procedure team that the PA has been  received.  reviewed and not discussed     -  Spinal cord stimulator trial scheduling is coordinated by the procedure team.    -  Care Navigation (#[phone]) is available to assist patients with additional questions.  reviewed    -  Cervical TFESIs with Dr. Whitaker only.    *PLEASE FORWARD TO CARE NAVIGATION IF INDICATED.  PATIENT SHOULD BE INFORMED THAT THEY WILL BE CONTACTED BY CARE NAVIGATION ONLY IF CHANGES TO MEDICATION/CARE PLAN RECOMMENDATIONS ARE NEEDED.  IF THEY DO NOT HEAR BACK FROM CARE NAVIGATION, THEY ARE FINE TO CONTINUE WITH THEIR CURRENT MEDICATIONS/CARE PLAN.*

## 2025-06-05 NOTE — PROGRESS NOTES
ASSESSMENT: Albina Pedro is a 74 year old female who presents for consultation at the request of PCP Rolo Sandoval, who presents today for fu patient evaluation of:    -Chronic bilateral low back pain without sciatica     Worsening pain in many joints including neck, lower back, legs. Given her history of Sjogren's, recommended having the labwork done ordered by rheumatology in Dec. She Is also worried about lyme and I encouraged her to follow up with her PCP.  We reviewed her lumbar MRI pictures and CT Cspine discussed results, given her neck and lower back pain into the legs which are described in detail below. Given that symptoms have not yet improved with PT,  recommended starting with wilfrid L2-3 tf red as first step and orders were placed.  Consider cervical MRI as next step if neck pain does not improve.  Follow up 2-4 wks post injection to review response to injection            4/9/2025     1:30 PM   OSWESTRY DISABILITY INDEX   Count 10    Sum 4    Oswestry Score (%) 8 %        Patient-reported            Diagnoses and all orders for this visit:  Lumbar radiculopathy  -     PAIN Transforaminal RED Inj Lumbosacral Bilateral; Future        PLAN:  Reviewed spine anatomy and disease process. Discussed diagnosis and treatment options with the patient today. A shared decision making model was used.  The patient's values and choices were respected. The following represents what was discussed and decided upon by the provider and the patient.      -DIAGNOSTIC TESTS:  Images were personally reviewed and interpreted and explained to patient today using a spine model.   --no new imaging ordered today    -PHYSICAL THERAPY:    --continue PT  Discussed the importance of core strengthening, ROM, stretching exercises with the patient and how each of these entities is important in decreasing pain.  Explained to the patient that the purpose of physical therapy is to teach the patient a home exercise program.  These  exercises need to be performed every day in order to decrease pain and prevent future occurrences of pain.                -MEDICATIONS:    --ibuprofen 600mg TID PRN  -tylenol prn  -consider alternative neuropathic agent, had side effects on gabapentin previously  Discussed multiple medication options today with patient. Discussed risks, side effects, and proper use of medications. Patient verbalized understanding.    -INTERVENTIONS:    -Discussed the role of injections with patient today. Patient would be a good candidate in the future for either epidural steroid injections or medial branch blocks if indicated based on symptoms and supported by imaging results. Festus TF MARIANNE L2-3 orders placed    -PATIENT EDUCATION:  Total time of 40 minutes, on the day of service, spent with the patient, reviewing the chart, placing orders, and documenting.   -Today we also discussed the pros and cons of the current treatment plan.    -FOLLOW-UP:   we will call with imaging results    Advised patient to call the [facility] if symptoms worsen, new numbness or weakness develops in the legs, or if they develop new or worsening problems controlling bladder or bowel function.   ______________________________________________________________________    SUBJECTIVE:    Albina is here to follow up appt. She has done 4+ PT visits, no relief yet. Significant festus lower back pain with soreness in the legs. 9/10. Also pain in many other joints including hands. Feels stiffness. She has swelling in her ankles. She is taking tylenol. No new symptoms. She reports pain all over her body. 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  Worse back pain since a fall last summer onto her hip. They took xr which looked ok. Has since then been getting progressively worse. Cries about the pain 5/7 days of the week. Festus lower back pain. It went down the back of the R leg a few weeks ago. It is worse at random. Has weakness in her legs. She can't get up out of a chair. When she walks, her legs feel good. She has felt like her legs are wobbly sometimes though, loss of balance. No falls recently. Last summer did fall. A hot bath helps temporarily. Nothing else helps. Pain is at a 7-8/10 on average. sometimes worse to a 10.     Has had chronic trouble getting up.   Denies changes in bowel or bladder control or numbness.     She has tried tylenol. Has also tried a cream.  These dont help too much     She was on prednisone for MG which did not seem to help with the back pain    She has not had any recent PT. She is not able to participate due to pain severity.     She does walk frequently     -Treatment to Date:     -Medications:  Tylenol  Ibuprofen  Gabapentin (side effects)      Current Outpatient Medications   Medication Sig Dispense Refill    acetaminophen (TYLENOL) 500 MG tablet Take 1,000 mg by mouth every 6 hours as needed for mild pain      albuterol (PROAIR HFA/PROVENTIL HFA/VENTOLIN HFA) 108 (90 Base) MCG/ACT inhaler INHALE TWO PUFFS BY MOUTH EVERY FOUR HOURS AS NEEDED FOR SHORTNESS OF BREATH/DYSPNEA 25.5 g 0    buPROPion (WELLBUTRIN XL) 150 MG 24 hr tablet TAKE TWO TABLETS BY MOUTH IN THE MORNING 90 tablet 0    ibuprofen (ADVIL/MOTRIN) 600 MG tablet Take 1 tablet (600 mg) by mouth every 6 hours as needed for moderate pain. Take with food. Stop if blood in stool. 45 tablet 0    LORazepam (ATIVAN) 0.5 MG tablet TAKE ONE-HALF TO ONE TABLET BY MOUTH THREE TO FIVE TIMES A WEEK AS NEEDED. 20 tablet 0    Multiple Vitamins-Minerals (MULTIVITAMIN & MINERAL PO) Take 1 tablet by mouth daily       pravastatin  (PRAVACHOL) 40 MG tablet Take 1 tablet (40 mg) by mouth daily. 90 tablet 0    QUEtiapine (SEROQUEL) 50 MG tablet TAKE 1 TABLET BY MOUTH AT 9PM EVERY NIGHT 30 tablet 0    rivastigmine (EXELON) 9.5 MG/24HR 24 hr patch Place 1 patch onto the skin daily.      torsemide (DEMADEX) 10 MG tablet Take 1 tablet (10 mg) by mouth daily. 30 tablet 3     No current facility-administered medications for this visit.       Allergies   Allergen Reactions    Daypro [Oxaprozin] Rash           Nitroglycerin Other (See Comments)     Causes low blood pressure    Penicillins Unknown     Occurred as child.    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        Patient Active Problem List   Diagnosis    DIZZINESS - LIKELY HYPOGLYCEMIA    Dermatophytosis of body    Episodic mood disorder    Panniculitis    CAD (coronary artery disease)    Sjogren's syndrome    Adverse effect of anesthetic    Status post coronary angiogram    Acute chest pain    Major depressive disorder, recurrent, mild    Hyperlipidemia LDL goal <70    ILD (interstitial lung disease) (H)    Intermediate coronary syndrome (H)    Chronic stable angina    Myasthenia gravis with exacerbation (H)    Frequent falls    Dementia, unspecified dementia severity, unspecified dementia type, unspecified whether behavioral, psychotic, or mood disturbance or anxiety (H)    Major depressive disorder, recurrent episode, moderate (H)    Sleep-wake disorder    Subarachnoid hemorrhage (H)    Fall, initial encounter    IBM (inclusion body myositis) (H)    Chronic low back pain       Past Surgical History:   Procedure Laterality Date    BIOPSY MUSCLE  DIAGNOSTIC (LOCATION) Left 2022    Procedure: BIOPSY, MUSCLE LEFT biceps @0900;  Surgeon: Tyrell Loo MD;  Location: [facility] OR    BIOPSY MUSCLE DIAGNOSTIC (LOCATION) Left 2024    Procedure: Biopsy muscle diagnostic vastus lateralis;  Surgeon: Tyrell Loo MD;  Location: [facility] OR    C/SECTION, LOW TRANSVERSE  10/13/1978    , Low Transverse    COLONOSCOPY      OPEN REDUCTION INTERNAL FIXATION ANKLE Right 2019    Procedure: Open reduction internal fixation right lateral malleolus fracture;  Surgeon: Rolo Oconnor DPM;  Location: [facility] OR    SURGICAL HISTORY OF -           SURGICAL HISTORY OF -   00    Colonoscopy       Family History   Problem Relation Age of Onset    Breast Cancer Mother     Cancer Mother         Breast and liver- age 43    Cerebrovascular Disease Father     Heart Failure Father     Coronary Artery Disease Father     Alzheimer Disease Father     Hypertension Father     Diabetes Maternal Grandmother     Breast Cancer Maternal Aunt     Breast Cancer Paternal Aunt     Cancer - colorectal No family hx of     Prostate Cancer No family hx of        Reviewed past medical, surgical, and family history with patient found on new patient intake packet located in [employer] tab.     SOCIAL HX: alcohol use, nonsmoker. No rec drug use,    ROS: positive for wt gain and loss, joint pain, muscle pain, sciatica, falls, diarrhea and constipation, changes in vision. Specifically negative for bowel/bladder dysfunction, balance changes, headache, dizziness, foot drop, fevers, chills, appetite changes, nausea/vomiting, unexplained weight loss. Otherwise 13 systems reviewed are negative. Please see the patient's intake questionnaire from today for details.    OBJECTIVE:  /65   Pulse 97     PHYSICAL EXAMINATION:    --CONSTITUTIONAL:  Vital signs as above.  No acute distress.  The patient is well nourished and well groomed. Patient appears uncomfortable, lying  down for most of visit due to pain  --PSYCHIATRIC:  Appropriate mood and affect. The patient is awake, alert, oriented to person, place, time and answering questions appropriately with clear speech.    --SKIN:  Skin over the face is clean, dry, intact without rashes.    --RESPIRATORY: Normal rhythm and effort. No abnormal accessory muscle breathing patterns noted.     Gait was not tested    No cervical or lumbar point or paraspinal muscle tenderness    Full strength wilfrid upper and lower extremities throughout, all planes    2/4 wilfrid upper and lower extremities  Sensation grossly intact throughout both upper and lower ext      RESULTS:   Prior medical records from [facility] and Care Everywhere were reviewed today.    Imaging: Spine imaging was personally reviewed and interpreted today. The images were shown to the patient and the findings were explained using a spine model.      EXAM: MR LUMBAR SPINE W/O CONTRAST  LOCATION: [facility]  DATE: 4/21/2025     INDICATION: low back pain into R posterior leg, treatment planning. severe pain  COMPARISON: 4/7/2025 plain film  TECHNIQUE: Routine Lumbar Spine MRI without IV contrast.     FINDINGS:   Grade 1 anterolisthesis of L4 on L5. 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 Conus terminates normally. Marrow signal unremarkable accounting for degenerative endplate changes.     T12-L1: Broad-based disc bulge. No canal stenosis. Facet disease. Mild bilateral foraminal narrowing.     L1-2: Broad-based disc bulge with mild canal stenosis. Facet disease. Moderate bilateral foraminal narrowing.     L2-3: Broad-based disc bulge with central protrusion and severe canal stenosis. Facet disease. Severe right and mild to moderate left foraminal narrowing.     L3-4: Broad-based disc bulge with severe canal stenosis. Facet disease and ligamentum flavum hypertrophy. Mild right and moderate left foraminal narrowing.     L4-5: Disc uncovering  and a broad-based disc bulge with severe canal stenosis. Mild bilateral foraminal narrowing.     L5-S1: Broad-based disc bulge. No canal stenosis. Facet disease. Mild bilateral foraminal narrowing.     Paravertebral soft tissues are notable for muscular atrophy findings.                                                                      IMPRESSION:  1.  Extensive degenerative changes in the lumbar spine with severe canal stenosis spanning L2-3 through L4-5.    EXAM: CT HEAD W/O CONTRAST, CT CERVICAL SPINE W/O CONTRAST  LOCATION: [facility]  DATE: 6/30/2024     INDICATION: fall, head injury and neck injury,  COMPARISON: 2/2/2023  TECHNIQUE:   1) Routine CT Head without IV contrast. Multiplanar reformats. Dose reduction techniques were used.  2) Routine CT Cervical Spine without IV contrast. Multiplanar reformats. Dose reduction techniques were used.     FINDINGS:   HEAD CT:   INTRACRANIAL CONTENTS: Small volume acute subarachnoid hemorrhage in the posterior left temporal lobe. No CT evidence of acute infarct. Mild presumed chronic small vessel ischemic changes. Mild generalized volume loss. No hydrocephalus.      VISUALIZED ORBITS/SINUSES/MASTOIDS: No intraorbital abnormality. No paranasal sinus mucosal disease. No middle ear or mastoid effusion.     BONES/SOFT TISSUES: Left frontal and right parietal scalp hematomas. No fracture.     CERVICAL SPINE CT:   VERTEBRA: Vertebral body height is normal. Slight anterolisthesis of C4. No fracture or posttraumatic subluxation.      CANAL/FORAMINA: No high-grade central canal stenosis. Multilevel neural foraminal narrowing.     PARASPINAL: No extraspinal abnormality. Visualized lung fields are clear.                                                                      IMPRESSION:  HEAD CT:  1.  Small volume acute subarachnoid hemorrhage in the posterior left temporal lobe.  2.  Left frontal and right parietal scalp hematomas. No fracture.      CERVICAL SPINE CT:  No CT evidence for acute fracture or post traumatic subluxation.       This note was dictated using voice recognition software. Any grammatical or context distortions are unintentional and inherent to the software.       Leila HUAC  [facility]  O. [phone]

## 2025-06-09 ENCOUNTER — LAB (OUTPATIENT)
Dept: LAB | Facility: CLINIC | Age: 75
End: 2025-06-09
Payer: COMMERCIAL

## 2025-06-09 DIAGNOSIS — M35.01 SJOGREN'S SYNDROME WITH KERATOCONJUNCTIVITIS SICCA: ICD-10-CM

## 2025-06-09 DIAGNOSIS — J84.9 ILD (INTERSTITIAL LUNG DISEASE) (H): ICD-10-CM

## 2025-06-09 DIAGNOSIS — R60.0 EDEMA LEG: ICD-10-CM

## 2025-06-09 DIAGNOSIS — I60.9 SUBARACHNOID HEMORRHAGE (H): ICD-10-CM

## 2025-06-09 DIAGNOSIS — I25.10 CORONARY ARTERY DISEASE INVOLVING NATIVE CORONARY ARTERY OF NATIVE HEART WITHOUT ANGINA PECTORIS: ICD-10-CM

## 2025-06-09 DIAGNOSIS — Z51.81 MEDICATION MONITORING ENCOUNTER: ICD-10-CM

## 2025-06-09 DIAGNOSIS — I25.10 CAD (CORONARY ARTERY DISEASE): ICD-10-CM

## 2025-06-09 LAB
ALBUMIN MFR UR ELPH: <6 MG/DL
ALBUMIN SERPL BCG-MCNC: 4.1 G/DL (ref 3.5–5.2)
ALBUMIN UR-MCNC: NEGATIVE MG/DL
ALT SERPL W P-5'-P-CCNC: 34 U/L (ref 0–50)
ANION GAP SERPL CALCULATED.3IONS-SCNC: 11 MMOL/L (ref 7–15)
APPEARANCE UR: CLEAR
AST SERPL W P-5'-P-CCNC: 35 U/L (ref 0–45)
BACTERIA #/AREA URNS HPF: ABNORMAL /HPF
BASOPHILS # BLD AUTO: 0 10E3/UL (ref 0–0.2)
BASOPHILS NFR BLD AUTO: 1 %
BILIRUB UR QL STRIP: NEGATIVE
BUN SERPL-MCNC: 21.3 MG/DL (ref 8–23)
CALCIUM SERPL-MCNC: 9.4 MG/DL (ref 8.8–10.4)
CHLORIDE SERPL-SCNC: 101 MMOL/L (ref 98–107)
CHOLEST SERPL-MCNC: 145 MG/DL
CK SERPL-CCNC: 621 U/L (ref 26–192)
COLOR UR AUTO: YELLOW
CREAT SERPL-MCNC: 0.68 MG/DL (ref 0.51–0.95)
CREAT UR-MCNC: 30.6 MG/DL
CRP SERPL-MCNC: <3 MG/L
EGFRCR SERPLBLD CKD-EPI 2021: 90 ML/MIN/1.73M2
EOSINOPHIL # BLD AUTO: 0.2 10E3/UL (ref 0–0.7)
EOSINOPHIL NFR BLD AUTO: 4 %
ERYTHROCYTE [DISTWIDTH] IN BLOOD BY AUTOMATED COUNT: 12.8 % (ref 10–15)
ERYTHROCYTE [SEDIMENTATION RATE] IN BLOOD BY WESTERGREN METHOD: 17 MM/HR (ref 0–30)
FASTING STATUS PATIENT QL REPORTED: NO
GLUCOSE SERPL-MCNC: 111 MG/DL (ref 70–99)
GLUCOSE UR STRIP-MCNC: NEGATIVE MG/DL
HCO3 SERPL-SCNC: 29 MMOL/L (ref 22–29)
HCT VFR BLD AUTO: 40.6 % (ref 35–47)
HDLC SERPL-MCNC: 61 MG/DL
HGB BLD-MCNC: 13.5 G/DL (ref 11.7–15.7)
HGB UR QL STRIP: NEGATIVE
IMM GRANULOCYTES # BLD: 0 10E3/UL
IMM GRANULOCYTES NFR BLD: 1 %
KETONES UR STRIP-MCNC: NEGATIVE MG/DL
LDLC SERPL CALC-MCNC: 58 MG/DL
LEUKOCYTE ESTERASE UR QL STRIP: ABNORMAL
LYMPHOCYTES # BLD AUTO: 1.2 10E3/UL (ref 0.8–5.3)
LYMPHOCYTES NFR BLD AUTO: 29 %
MCH RBC QN AUTO: 30.8 PG (ref 26.5–33)
MCHC RBC AUTO-ENTMCNC: 33.3 G/DL (ref 31.5–36.5)
MCV RBC AUTO: 93 FL (ref 78–100)
MONOCYTES # BLD AUTO: 0.3 10E3/UL (ref 0–1.3)
MONOCYTES NFR BLD AUTO: 8 %
NEUTROPHILS # BLD AUTO: 2.4 10E3/UL (ref 1.6–8.3)
NEUTROPHILS NFR BLD AUTO: 58 %
NITRATE UR QL: POSITIVE
NONHDLC SERPL-MCNC: 84 MG/DL
PH UR STRIP: 7 [PH] (ref 5–7)
PLATELET # BLD AUTO: 243 10E3/UL (ref 150–450)
POTASSIUM SERPL-SCNC: 4.5 MMOL/L (ref 3.4–5.3)
PROT/CREAT 24H UR: NORMAL MG/G{CREAT}
RBC # BLD AUTO: 4.39 10E6/UL (ref 3.8–5.2)
RBC #/AREA URNS AUTO: ABNORMAL /HPF
SODIUM SERPL-SCNC: 141 MMOL/L (ref 135–145)
SP GR UR STRIP: 1.02 (ref 1–1.03)
SQUAMOUS #/AREA URNS AUTO: ABNORMAL /LPF
TRIGL SERPL-MCNC: 132 MG/DL
UROBILINOGEN UR STRIP-ACNC: 0.2 E.U./DL
WBC # BLD AUTO: 4.2 10E3/UL (ref 4–11)
WBC #/AREA URNS AUTO: ABNORMAL /HPF

## 2025-06-09 PROCEDURE — 85652 RBC SED RATE AUTOMATED: CPT

## 2025-06-09 PROCEDURE — 82550 ASSAY OF CK (CPK): CPT

## 2025-06-09 PROCEDURE — 87186 SC STD MICRODIL/AGAR DIL: CPT

## 2025-06-09 PROCEDURE — 86140 C-REACTIVE PROTEIN: CPT

## 2025-06-09 PROCEDURE — 84450 TRANSFERASE (AST) (SGOT): CPT

## 2025-06-09 PROCEDURE — 86225 DNA ANTIBODY NATIVE: CPT

## 2025-06-09 PROCEDURE — 85025 COMPLETE CBC W/AUTO DIFF WBC: CPT

## 2025-06-09 PROCEDURE — 87086 URINE CULTURE/COLONY COUNT: CPT

## 2025-06-09 PROCEDURE — 80048 BASIC METABOLIC PNL TOTAL CA: CPT

## 2025-06-09 PROCEDURE — 84460 ALANINE AMINO (ALT) (SGPT): CPT

## 2025-06-09 PROCEDURE — 36415 COLL VENOUS BLD VENIPUNCTURE: CPT

## 2025-06-09 PROCEDURE — 80061 LIPID PANEL: CPT

## 2025-06-09 PROCEDURE — 82040 ASSAY OF SERUM ALBUMIN: CPT

## 2025-06-09 PROCEDURE — 84156 ASSAY OF PROTEIN URINE: CPT

## 2025-06-09 PROCEDURE — 81001 URINALYSIS AUTO W/SCOPE: CPT

## 2025-06-09 PROCEDURE — 86160 COMPLEMENT ANTIGEN: CPT

## 2025-06-10 LAB
BACTERIA UR CULT: ABNORMAL
C3 SERPL-MCNC: 137 MG/DL (ref 81–157)
C4 SERPL-MCNC: 23 MG/DL (ref 13–39)
DSDNA AB SER-ACNC: 0.8 IU/ML

## 2025-06-11 ENCOUNTER — RESULTS FOLLOW-UP (OUTPATIENT)
Dept: FAMILY MEDICINE | Facility: CLINIC | Age: 75
End: 2025-06-11
Payer: COMMERCIAL

## 2025-06-16 DIAGNOSIS — R82.90 ABNORMAL URINE: Primary | ICD-10-CM

## 2025-06-16 LAB — CRYOGLOB SER QL: NEGATIVE

## 2025-06-16 NOTE — RESULT ENCOUNTER NOTE
Called and spoke with  Jerrod who is patient's main care taker. He reports that Kristina has not had any symptoms of UTI. He also reports her muscle weakness/aches are relatively unchanged. He states she has had weakness for years and this is not acutely worse. She is seeing PM&R for back pain.     Julia HUMMEL RN  [facility]

## 2025-06-18 NOTE — TELEPHONE ENCOUNTER
Placed call to Jerrod, patients  and main caretaker. Voicemail does not identify patient or , left generic message with call back number. Advised call back to discuss current recommendations.       Julia HUMMEL RN  [facility]

## 2025-06-19 ENCOUNTER — TELEPHONE (OUTPATIENT)
Dept: RHEUMATOLOGY | Facility: CLINIC | Age: 75
End: 2025-06-19
Payer: COMMERCIAL

## 2025-06-19 ENCOUNTER — TELEPHONE (OUTPATIENT)
Dept: FAMILY MEDICINE | Facility: CLINIC | Age: 75
End: 2025-06-19
Payer: COMMERCIAL

## 2025-06-19 DIAGNOSIS — L24.89 IRRITANT CONTACT DERMATITIS DUE TO OTHER AGENTS: Primary | ICD-10-CM

## 2025-06-19 RX ORDER — TRIAMCINOLONE ACETONIDE 1 MG/G
CREAM TOPICAL 2 TIMES DAILY PRN
Qty: 15 G | Refills: 1 | Status: SHIPPED | OUTPATIENT
Start: 2025-06-19

## 2025-06-19 NOTE — TELEPHONE ENCOUNTER
Paul Mark MD to Me  Rolo Sandoval MD         6/16/25  4:41 PM  Thank you for calling them.      Since she is not immunocompromised, I will not treat as UTI, but I like her to repeat U/A microscopic with culture, might be when she comes in for 2D-Echo on 6/25. I will order it.     I will notify Dr. Manning about increased CK off imuran. He is closely following Kristina.  ---------------------------------------------  Called and spoke with both Jerrod and Kristina. Relayed Dr. Mark's recommendation to recheck UA. They will get this done next week and are agreeable/understanding.       Julia HUMMEL RN  [facility]

## 2025-06-19 NOTE — TELEPHONE ENCOUNTER
Medication Question or Refill    Contacts       Contact Date/Time Type Contact Phone/Fax    06/19/2025 10:20 AM CDT Phone (Incoming) Jerrod Pedro (Emergency Contact) [phone] (M)            What medication are you calling about (include dose and sig)?: Triamcinolone 1 % Cream  -  uses for insect bites was previously prescribed and only uses rarely     Preferred Pharmacy:    [facility] [address]  [address] [facility] [address]  Phone: [phone] Fax: [phone]      Who prescribed the medication?: Dr. Sandoval     Do you need a refill? Yes - medication is not current on medication list       Could we send this information to you in [facility] or would you prefer to receive a phone call?:   Patient would prefer a phone call   Okay to leave a detailed message?: Yes at Cell number on file:    Telephone Information:   Mobile [phone]     Thomas Miguel, Clinic RN  .[facility]

## 2025-06-25 DIAGNOSIS — I25.10 CORONARY ARTERY DISEASE INVOLVING NATIVE CORONARY ARTERY OF NATIVE HEART WITHOUT ANGINA PECTORIS: ICD-10-CM

## 2025-06-25 RX ORDER — PRAVASTATIN SODIUM 40 MG
40 TABLET ORAL DAILY
Qty: 90 TABLET | Refills: 3 | Status: SHIPPED | OUTPATIENT
Start: 2025-06-25

## 2025-06-25 NOTE — TELEPHONE ENCOUNTER
Health Call Center    Phone Message    May a detailed message be left on voicemail: yes     Reason for Call: Other: Jerrod called to cancel Kristina's US scheduled for today. Jerrod will call back to r/s when ready. Unable to reach imaging to cancel. Please cancel Kristina's US for today. Thank you!     Action Taken: Other: Cardiology    Travel Screening: Not Applicable    Thank you!  [facility]       Date of Service:

## 2025-06-25 NOTE — TELEPHONE ENCOUNTER
[facility] Cardiology Refill Guideline reviewed.  Medication meets criteria for refill. Griselda Elena RN Cardiology June 25, 2025, 10:46 AM

## 2025-06-25 NOTE — TELEPHONE ENCOUNTER
Jerrod called back and reports a refill request for pravastatin should have been sent in to Cardiology on 6/19/25. RN unable to find refill request. Patient is almost out of medication and needs a refill ASAP please.       Disp Refills Start End SHAMIKA   pravastatin (PRAVACHOL) 40 MG tablet 90 tablet 0 3/12/2025 -- No     03/12/25 1257 Verbal Cosign Galina, Monika Moreno MD     Associated Diagnoses    Coronary artery disease involving native coronary artery of native heart without angina pectoris [I25.10]        Pharmacy    [id number] [place], [place] - [facility] [address]     Please call Jerrod  back when refill has been sent.  [phone] (Mobile)           Please advise.     Florencia Dubon, RN on 6/25/2025 at 10:37 AM

## 2025-07-07 ENCOUNTER — RADIOLOGY INJECTION OFFICE VISIT (OUTPATIENT)
Dept: PHYSICAL MEDICINE AND REHAB | Facility: CLINIC | Age: 75
End: 2025-07-07
Attending: NURSE PRACTITIONER
Payer: COMMERCIAL

## 2025-07-07 VITALS
OXYGEN SATURATION: 97 % | TEMPERATURE: 97.8 F | SYSTOLIC BLOOD PRESSURE: 132 MMHG | DIASTOLIC BLOOD PRESSURE: 64 MMHG | HEIGHT: 66 IN | BODY MASS INDEX: 27.64 KG/M2 | WEIGHT: 172 LBS | HEART RATE: 82 BPM | RESPIRATION RATE: 16 BRPM

## 2025-07-07 DIAGNOSIS — M54.16 LUMBAR RADICULOPATHY: ICD-10-CM

## 2025-07-07 RX ORDER — BUPIVACAINE HYDROCHLORIDE 2.5 MG/ML
INJECTION, SOLUTION EPIDURAL; INFILTRATION; INTRACAUDAL; PERINEURAL
Status: COMPLETED | OUTPATIENT
Start: 2025-07-07 | End: 2025-07-07

## 2025-07-07 RX ORDER — DEXAMETHASONE SODIUM PHOSPHATE 10 MG/ML
INJECTION, SOLUTION INTRAMUSCULAR; INTRAVENOUS
Status: COMPLETED | OUTPATIENT
Start: 2025-07-07 | End: 2025-07-07

## 2025-07-07 RX ORDER — LIDOCAINE HYDROCHLORIDE 10 MG/ML
INJECTION, SOLUTION EPIDURAL; INFILTRATION; INTRACAUDAL; PERINEURAL
Status: COMPLETED | OUTPATIENT
Start: 2025-07-07 | End: 2025-07-07

## 2025-07-07 RX ADMIN — BUPIVACAINE HYDROCHLORIDE 2 ML: 2.5 INJECTION, SOLUTION EPIDURAL; INFILTRATION; INTRACAUDAL; PERINEURAL at 11:55

## 2025-07-07 RX ADMIN — DEXAMETHASONE SODIUM PHOSPHATE 10 MG: 10 INJECTION, SOLUTION INTRAMUSCULAR; INTRAVENOUS at 11:55

## 2025-07-07 RX ADMIN — LIDOCAINE HYDROCHLORIDE 2 ML: 10 INJECTION, SOLUTION EPIDURAL; INFILTRATION; INTRACAUDAL; PERINEURAL at 11:55

## 2025-07-07 ASSESSMENT — PAIN SCALES - GENERAL
PAINLEVEL_OUTOF10: MILD PAIN (2)
PAINLEVEL_OUTOF10: SEVERE PAIN (10)

## 2025-07-07 NOTE — PATIENT INSTRUCTIONS
Follow-up visit with Leila Snow CNP in 2 weeks to discuss injection outcome and determine care plan going forward.    Learning About Lumbar Epidural Steroid Injections  What is a lumbar epidural steroid injection?     A lumbar epidural injection is a shot into the epidural space--the area in your back around the spinal cord. The shot may help reduce pain, tingling, or numbness in your back, buttock, or leg. The shot may have a steroid to reduce pain and swelling and a local anesthetic to numb nerves.  How is a lumbar epidural steroid injection done?  The doctor may use an imaging test before or during your injection. This can be an MRI, a CT scan, or an X-ray. These tests can show where your nerve problems are.  After finding the right spot, the doctor may inject a numbing medicine into the skin where you will get the steroid injection. Then the needle for the steroid is put into the numbed area. You may feel some pressure. You could feel some stinging or burning during the injection.  How long does an epidural steroid injection take?  The procedure will take 5 to 15 minutes. You will go home about an hour later.  What can you expect after a lumbar epidural steroid injection?  If your injection had local anesthetic and a steroid, your legs may feel heavy or numb right after. You will probably be able to walk. But you may need to be extra careful. Take care not to lose your balance, and be sure to follow your doctor's instructions.  If your injection contained local anesthetic, you may feel better right away. But this pain relief will last only a few hours. Your pain will probably return. This is because the steroids have not started working yet. Before the steroids start to work, your back may be sore for a few days.  These injections don't always work. When they do, it takes 1 to 5 days. This pain relief can last for several days to a few months or longer.  You may want to do less than normal for a few days.  But you may also be able to return to your daily routine.  Some people are dizzy or feel sick to their stomach after getting this injection. These symptoms usually don't last very long.  If your pain is better, you may be able to keep doing your normal activities or physical therapy. But try not to overdo it, even if your back pain has improved a lot. If your pain is only a little better or if it comes back, your doctor may recommend another injection in a few weeks. If your pain has not changed, talk to your doctor about other treatment choices.  Follow-up care is a key part of your treatment and safety. Be sure to make and go to all appointments, and call your doctor if you are having problems. It's also a good idea to know your test results and keep a list of the medicines you take.       DISCHARGE INSTRUCTIONS    During office hours (8:00 a.m.- 4:00 p.m.) questions or concerns may be answered  by calling [facility] Nurses at  [phone].  Messages received after hours will be returned the following business day.      In the case of an emergency, please dial 911 or seek assistance at the nearest Emergency Room/Urgent Care facility.     All Patients:    You may experience an increase in your symptoms for the first 2 days (It may take anywhere between 2 days - 2 weeks for the steroid to have maximum effect).    You may use ice on the injection site, as frequently as 20 minutes each hour if needed.    You may take your pain medicine.    You may continue taking your regular medication after your injection. If you have had a medial branch block you may resume pain medication once your pain diary is completed.    You may shower. No swimming, tub bath, or hot tub for 48 hours.  You may remove your bandaid/bandage as soon as you are home.    You may resume light activities, as tolerated.    Resume your usual diet as tolerated.    If you were told to hold any blood thinning medications you may resume taking  them 24 hours after your procedure as prescribed.    It is strongly advised that you do not drive for 1-3 hours post injection.    If you have had oral sedation:  Do not drive for 8 hours post injection.        POSSIBLE STEROID SIDE EFFECTS (If steroid/cortisone was used for your procedure    Swelling of the legs              Skin redness (flushing)     Mouth (oral) irritation   Increased blood sugar (glucose) levels            Sweats                    Mood changes  Headache  Sleeplessness  Weakened immune system for up to 14 days, which could increase the risk of dee dee the COVID-19 virus and/or experiencing more severe symptoms of the disease, if exposed.  Decreased effectiveness of vaccines if given within 2 weeks of the steroid.    -If you experience these symptoms, it should only last for a short period         POSSIBLE PROCEDURE SIDE EFFECTS    Increased Pain           Increased numbness/tingling      Nausea/Vomiting          Bruising/bleeding at site      Redness or swelling                                              Difficulty walking      Weakness           Fever greater than 100.5    -Call the [facility] Center if you are concerned    *In the event of a severe headache after an epidural steroid injection that is relieved by lying down, please call the [facility] to speak with a clinical staff member*

## 2025-07-08 ENCOUNTER — LAB (OUTPATIENT)
Dept: LAB | Facility: CLINIC | Age: 75
End: 2025-07-08
Payer: COMMERCIAL

## 2025-07-08 DIAGNOSIS — R82.90 ABNORMAL URINE: ICD-10-CM

## 2025-07-08 LAB
ALBUMIN UR-MCNC: NEGATIVE MG/DL
APPEARANCE UR: ABNORMAL
BACTERIA #/AREA URNS HPF: ABNORMAL /HPF
BILIRUB UR QL STRIP: NEGATIVE
COLOR UR AUTO: YELLOW
GLUCOSE UR STRIP-MCNC: NEGATIVE MG/DL
HGB UR QL STRIP: ABNORMAL
HYALINE CASTS #/AREA URNS LPF: ABNORMAL /LPF
KETONES UR STRIP-MCNC: NEGATIVE MG/DL
LEUKOCYTE ESTERASE UR QL STRIP: ABNORMAL
NITRATE UR QL: POSITIVE
PH UR STRIP: 5.5 [PH] (ref 5–7)
RBC #/AREA URNS AUTO: ABNORMAL /HPF
SP GR UR STRIP: 1.01 (ref 1–1.03)
SQUAMOUS #/AREA URNS AUTO: ABNORMAL /LPF
UROBILINOGEN UR STRIP-ACNC: 0.2 E.U./DL
WBC #/AREA URNS AUTO: ABNORMAL /HPF
WBC CLUMPS #/AREA URNS HPF: PRESENT /HPF

## 2025-07-08 PROCEDURE — 81001 URINALYSIS AUTO W/SCOPE: CPT

## 2025-07-08 PROCEDURE — 87086 URINE CULTURE/COLONY COUNT: CPT

## 2025-07-08 PROCEDURE — 87186 SC STD MICRODIL/AGAR DIL: CPT

## 2025-07-10 ENCOUNTER — RESULTS FOLLOW-UP (OUTPATIENT)
Dept: RHEUMATOLOGY | Facility: CLINIC | Age: 75
End: 2025-07-10

## 2025-07-10 ENCOUNTER — OFFICE VISIT (OUTPATIENT)
Dept: FAMILY MEDICINE | Facility: CLINIC | Age: 75
End: 2025-07-10
Payer: COMMERCIAL

## 2025-07-10 VITALS
RESPIRATION RATE: 16 BRPM | BODY MASS INDEX: 28.77 KG/M2 | HEIGHT: 66 IN | WEIGHT: 179 LBS | TEMPERATURE: 98 F | OXYGEN SATURATION: 98 % | SYSTOLIC BLOOD PRESSURE: 132 MMHG | HEART RATE: 80 BPM | DIASTOLIC BLOOD PRESSURE: 78 MMHG

## 2025-07-10 DIAGNOSIS — R45.1 AGITATION: ICD-10-CM

## 2025-07-10 DIAGNOSIS — N30.00 ACUTE CYSTITIS WITHOUT HEMATURIA: Primary | ICD-10-CM

## 2025-07-10 DIAGNOSIS — M54.41 CHRONIC BILATERAL LOW BACK PAIN WITH RIGHT-SIDED SCIATICA: ICD-10-CM

## 2025-07-10 DIAGNOSIS — M54.41 CHRONIC LOW BACK PAIN WITH RIGHT-SIDED SCIATICA, UNSPECIFIED BACK PAIN LATERALITY: ICD-10-CM

## 2025-07-10 DIAGNOSIS — G89.29 CHRONIC LOW BACK PAIN WITH RIGHT-SIDED SCIATICA, UNSPECIFIED BACK PAIN LATERALITY: ICD-10-CM

## 2025-07-10 DIAGNOSIS — F43.0 ACUTE REACTION TO STRESS: ICD-10-CM

## 2025-07-10 DIAGNOSIS — F03.B11 MODERATE DEMENTIA WITH AGITATION, UNSPECIFIED DEMENTIA TYPE (H): ICD-10-CM

## 2025-07-10 DIAGNOSIS — G70.00 MYASTHENIA GRAVIS WITHOUT EXACERBATION (H): Primary | ICD-10-CM

## 2025-07-10 DIAGNOSIS — F33.0 MAJOR DEPRESSIVE DISORDER, RECURRENT, MILD: ICD-10-CM

## 2025-07-10 DIAGNOSIS — G89.29 CHRONIC BILATERAL LOW BACK PAIN WITH RIGHT-SIDED SCIATICA: ICD-10-CM

## 2025-07-10 LAB
ALBUMIN SERPL BCG-MCNC: 4 G/DL (ref 3.5–5.2)
ALP SERPL-CCNC: 64 U/L (ref 40–150)
ALT SERPL W P-5'-P-CCNC: 31 U/L (ref 0–50)
ANION GAP SERPL CALCULATED.3IONS-SCNC: 12 MMOL/L (ref 7–15)
AST SERPL W P-5'-P-CCNC: 35 U/L (ref 0–45)
BACTERIA UR CULT: ABNORMAL
BILIRUB SERPL-MCNC: 0.5 MG/DL
BUN SERPL-MCNC: 29.1 MG/DL (ref 8–23)
CALCIUM SERPL-MCNC: 9.2 MG/DL (ref 8.8–10.4)
CHLORIDE SERPL-SCNC: 105 MMOL/L (ref 98–107)
CREAT SERPL-MCNC: 0.6 MG/DL (ref 0.51–0.95)
EGFRCR SERPLBLD CKD-EPI 2021: >90 ML/MIN/1.73M2
GLUCOSE SERPL-MCNC: 128 MG/DL (ref 70–99)
HCO3 SERPL-SCNC: 24 MMOL/L (ref 22–29)
POTASSIUM SERPL-SCNC: 4.4 MMOL/L (ref 3.4–5.3)
PROT SERPL-MCNC: 6.7 G/DL (ref 6.4–8.3)
SODIUM SERPL-SCNC: 141 MMOL/L (ref 135–145)

## 2025-07-10 PROCEDURE — 80053 COMPREHEN METABOLIC PANEL: CPT | Performed by: FAMILY MEDICINE

## 2025-07-10 PROCEDURE — 99215 OFFICE O/P EST HI 40 MIN: CPT | Performed by: FAMILY MEDICINE

## 2025-07-10 PROCEDURE — 36415 COLL VENOUS BLD VENIPUNCTURE: CPT | Performed by: FAMILY MEDICINE

## 2025-07-10 PROCEDURE — 3078F DIAST BP <80 MM HG: CPT | Performed by: FAMILY MEDICINE

## 2025-07-10 PROCEDURE — 3075F SYST BP GE 130 - 139MM HG: CPT | Performed by: FAMILY MEDICINE

## 2025-07-10 RX ORDER — LORAZEPAM 0.5 MG/1
TABLET ORAL
Qty: 20 TABLET | Refills: 0 | Status: CANCELLED | OUTPATIENT
Start: 2025-07-10

## 2025-07-10 RX ORDER — BUPROPION HYDROCHLORIDE 150 MG/1
150 TABLET ORAL EVERY MORNING
Qty: 90 TABLET | Refills: 3 | Status: SHIPPED | OUTPATIENT
Start: 2025-07-10

## 2025-07-10 RX ORDER — LORAZEPAM 1 MG/1
1 TABLET ORAL EVERY 6 HOURS PRN
Qty: 60 TABLET | Refills: 0 | Status: SHIPPED | OUTPATIENT
Start: 2025-07-10

## 2025-07-10 RX ORDER — CIPROFLOXACIN 250 MG/1
250 TABLET, FILM COATED ORAL 2 TIMES DAILY
Qty: 6 TABLET | Refills: 0 | Status: SHIPPED | OUTPATIENT
Start: 2025-07-10

## 2025-07-10 RX ORDER — QUETIAPINE FUMARATE 200 MG/1
200 TABLET, FILM COATED ORAL 2 TIMES DAILY
Qty: 180 TABLET | Refills: 1 | Status: SHIPPED | OUTPATIENT
Start: 2025-07-10

## 2025-07-10 RX ORDER — QUETIAPINE FUMARATE 50 MG/1
TABLET, FILM COATED ORAL
Qty: 96 TABLET | Refills: 0 | Status: SHIPPED | OUTPATIENT
Start: 2025-07-10 | End: 2025-07-10

## 2025-07-10 RX ORDER — LORAZEPAM 1 MG/1
1 TABLET ORAL EVERY 6 HOURS PRN
Qty: 1 TABLET | Refills: 0 | Status: SHIPPED | OUTPATIENT
Start: 2025-07-10

## 2025-07-10 ASSESSMENT — ANXIETY QUESTIONNAIRES
3. WORRYING TOO MUCH ABOUT DIFFERENT THINGS: SEVERAL DAYS
7. FEELING AFRAID AS IF SOMETHING AWFUL MIGHT HAPPEN: SEVERAL DAYS
4. TROUBLE RELAXING: NOT AT ALL
GAD7 TOTAL SCORE: 6
1. FEELING NERVOUS, ANXIOUS, OR ON EDGE: SEVERAL DAYS
5. BEING SO RESTLESS THAT IT IS HARD TO SIT STILL: NOT AT ALL
IF YOU CHECKED OFF ANY PROBLEMS ON THIS QUESTIONNAIRE, HOW DIFFICULT HAVE THESE PROBLEMS MADE IT FOR YOU TO DO YOUR WORK, TAKE CARE OF THINGS AT HOME, OR GET ALONG WITH OTHER PEOPLE: SOMEWHAT DIFFICULT
7. FEELING AFRAID AS IF SOMETHING AWFUL MIGHT HAPPEN: MORE THAN HALF THE DAYS
GAD7 TOTAL SCORE: 4
IF YOU CHECKED OFF ANY PROBLEMS ON THIS QUESTIONNAIRE, HOW DIFFICULT HAVE THESE PROBLEMS MADE IT FOR YOU TO DO YOUR WORK, TAKE CARE OF THINGS AT HOME, OR GET ALONG WITH OTHER PEOPLE: SOMEWHAT DIFFICULT
7. FEELING AFRAID AS IF SOMETHING AWFUL MIGHT HAPPEN: SEVERAL DAYS
5. BEING SO RESTLESS THAT IT IS HARD TO SIT STILL: NOT AT ALL
1. FEELING NERVOUS, ANXIOUS, OR ON EDGE: SEVERAL DAYS
3. WORRYING TOO MUCH ABOUT DIFFERENT THINGS: SEVERAL DAYS
2. NOT BEING ABLE TO STOP OR CONTROL WORRYING: NOT AT ALL
8. IF YOU CHECKED OFF ANY PROBLEMS, HOW DIFFICULT HAVE THESE MADE IT FOR YOU TO DO YOUR WORK, TAKE CARE OF THINGS AT HOME, OR GET ALONG WITH OTHER PEOPLE?: SOMEWHAT DIFFICULT
6. BECOMING EASILY ANNOYED OR IRRITABLE: SEVERAL DAYS
2. NOT BEING ABLE TO STOP OR CONTROL WORRYING: NOT AT ALL
GAD7 TOTAL SCORE: 4
GAD7 TOTAL SCORE: 4
6. BECOMING EASILY ANNOYED OR IRRITABLE: MORE THAN HALF THE DAYS

## 2025-07-10 ASSESSMENT — PATIENT HEALTH QUESTIONNAIRE - PHQ9
10. IF YOU CHECKED OFF ANY PROBLEMS, HOW DIFFICULT HAVE THESE PROBLEMS MADE IT FOR YOU TO DO YOUR WORK, TAKE CARE OF THINGS AT HOME, OR GET ALONG WITH OTHER PEOPLE: SOMEWHAT DIFFICULT
SUM OF ALL RESPONSES TO PHQ QUESTIONS 1-9: 4
5. POOR APPETITE OR OVEREATING: NOT AT ALL
SUM OF ALL RESPONSES TO PHQ QUESTIONS 1-9: 4

## 2025-07-10 ASSESSMENT — ENCOUNTER SYMPTOMS: NERVOUS/ANXIOUS: 1

## 2025-07-10 NOTE — TELEPHONE ENCOUNTER
Placed call to patient's  and main caretaker Jerrod. Left generic VM updating that a new medication was sent in and requested they call us back to confirm/discuss.       Julia HUMMEL RN  [facility]

## 2025-07-10 NOTE — PROGRESS NOTES
"  Assessment & Plan     Agitation  Had cortisone shot for back pain.  Has been agitated since.  She does not remember the shot. But is angry in office.  Gets up and slams door, I was  unable to determine what she was angry about., ..  - QUEtiapine (SEROQUEL) 200 MG tablet; Take 1 tablet (200 mg) by mouth 2 times daily.  Will increase seroquel which has worked in the past.  She wants to go swinging to help with her nerves.  Family is reluctant to take her swinning as she is too unpredictable at this time and gets angry at everyone  pulling arm yelling pushing. She is angry as  has erection difficulties.  She does not want to make him cookies anymore.  Discussed calling 911 if family feels unsafe.      Major depressive disorder, recurrent, mild  - buPROPion (WELLBUTRIN XL) 150 MG 24 hr tablet; Take 1 tablet (150 mg) by mouth every morning.    Moderate dementia with agitation, unspecified dementia type (H)    BMI  Estimated body mass index is 28.89 kg/m  as calculated from the following:    Height as of this encounter: 1.676 m (5' 6\").    Weight as of this encounter: 81.2 kg (179 lb).   Weight management plan: Discussed healthy diet and exercise guidelines      Katheryn Acevedo is a 74 year old, presenting for the following health issues:  Depression and Anxiety        7/10/2025     9:56 AM   Additional Questions   Roomed by JOSE Holm   Accompanied by self     Anxiety    History of Present Illness       Reason for visit:  Na    She eats 0-1 servings of fruits and vegetables daily.She consumes 1 sweetened beverage(s) daily.She exercises with enough effort to increase her heart rate 9 or less minutes per day.  She exercises with enough effort to increase her heart rate 3 or less days per week.   She is taking medications regularly.        Depression and Anxiety   How are you doing with your depression since your last visit? No change  How are you doing with your anxiety since your last visit?  No change  Are " you having other symptoms that might be associated with depression or anxiety? NA  Have you had a significant life event? OTHER: Micheal (dog) passed away last month    Do you have any concerns with your use of alcohol or other drugs? No    Social History     Tobacco Use    Smoking status: Never     Passive exposure: Never    Smokeless tobacco: Never   Substance Use Topics    Alcohol use: Yes     Alcohol/week: 0.0 standard drinks of alcohol     Comment: 1 glass of wine every 2 weeks    Drug use: No         11/13/2024     5:23 PM 1/13/2025     1:12 PM 7/10/2025     9:52 AM   PHQ   PHQ-9 Total Score 14 10 4    Q9: Thoughts of better off dead/self-harm past 2 weeks Not at all Not at all Not at all       Patient-reported         2/8/2024    11:07 AM 6/27/2024    10:04 AM 7/10/2025     9:55 AM   SEBASTIAN-7 SCORE   Total Score 10 (moderate anxiety)  4 (minimal anxiety)   Total Score 10 1 4        Patient-reported         7/10/2025     9:52 AM   Last PHQ-9   1.  Little interest or pleasure in doing things 0   2.  Feeling down, depressed, or hopeless 1   3.  Trouble falling or staying asleep, or sleeping too much 0   4.  Feeling tired or having little energy 1   5.  Poor appetite or overeating 0   6.  Feeling bad about yourself 1   7.  Trouble concentrating 1   8.  Moving slowly or restless 0   Q9: Thoughts of better off dead/self-harm past 2 weeks 0   PHQ-9 Total Score 4        Patient-reported         7/10/2025     9:55 AM   SEBASTIAN-7    1. Feeling nervous, anxious, or on edge 1   2. Not being able to stop or control worrying 0   3. Worrying too much about different things 1   4. Trouble relaxing 0   5. Being so restless that it is hard to sit still 0   6. Becoming easily annoyed or irritable 1   7. Feeling afraid, as if something awful might happen 1   SEBASTIAN-7 Total Score 4    If you checked any problems, how difficult have they made it for you to do your work, take care of things at home, or get along with other people? Somewhat  "difficult       Patient-reported     Review of Systems  Constitutional, HEENT, cardiovascular, pulmonary, gi and gu systems are negative, except as otherwise noted.      Objective    /78   Pulse 80   Temp 98  F (36.7  C) (Tympanic)   Resp 16   Ht 1.676 m (5' 6\")   Wt 81.2 kg (179 lb)   SpO2 98%   BMI 28.89 kg/m    Body mass index is 28.89 kg/m .  Physical Exam   GENERAL: angry  frustrated, crying in moderated distress  NECK: no adenopathy, no asymmetry, masses, or scars  RESP: lungs clear to auscultation - no rales, rhonchi or wheezes  CV: regular rate and rhythm, normal S1 S2, no S3 or S4, no murmur, click or rub, no peripheral edema  ABDOMEN: soft, nontender, no hepatosplenomegaly, no masses and bowel sounds normal  MS: no gross musculoskeletal defects noted, no edema          Signed Electronically by: Rolo Sandoval MD    "

## 2025-07-14 ENCOUNTER — TELEPHONE (OUTPATIENT)
Dept: FAMILY MEDICINE | Facility: CLINIC | Age: 75
End: 2025-07-14
Payer: COMMERCIAL

## 2025-07-14 NOTE — TELEPHONE ENCOUNTER
[facility] aware [facility] are going [facility] close home care referral because when they called to schedule the appointment her spouse does stated she does not need home-care and it is just done for money. They do have appointment on 7/23/25 with Ariella Bates as follow up.     Melvina Tolentino, RN on 7/14/2025 at 2:01 PM

## 2025-07-17 ASSESSMENT — ANXIETY QUESTIONNAIRES: GAD7 TOTAL SCORE: 4

## 2025-07-23 ENCOUNTER — OFFICE VISIT (OUTPATIENT)
Dept: PHYSICAL MEDICINE AND REHAB | Facility: CLINIC | Age: 75
End: 2025-07-23
Payer: COMMERCIAL

## 2025-07-23 ENCOUNTER — TELEPHONE (OUTPATIENT)
Dept: PHYSICAL MEDICINE AND REHAB | Facility: CLINIC | Age: 75
End: 2025-07-23

## 2025-07-23 VITALS — SYSTOLIC BLOOD PRESSURE: 149 MMHG | HEART RATE: 85 BPM | DIASTOLIC BLOOD PRESSURE: 69 MMHG

## 2025-07-23 DIAGNOSIS — M54.16 LUMBAR RADICULOPATHY: Primary | ICD-10-CM

## 2025-07-23 RX ORDER — NAPROXEN 250 MG/1
250 TABLET ORAL 2 TIMES DAILY WITH MEALS
Qty: 60 TABLET | Refills: 0 | Status: SHIPPED | OUTPATIENT
Start: 2025-07-23

## 2025-07-23 ASSESSMENT — PAIN SCALES - GENERAL: PAINLEVEL_OUTOF10: SEVERE PAIN (10)

## 2025-07-23 NOTE — PROGRESS NOTES
ASSESSMENT: Albina Pedro is a 74 year old female who presents for consultation at the request of PCP Rolo Sandoval, who presents today for fu patient evaluation of:    -Chronic bilateral low back pain without sciatica     Injection did not help. She had some agitation but does have medication she can take for this per her pcp now if were to happen again. We reviewed her lumbar MRI results once again. Plan to do an injection in a different area - IL MARIANNE L5-S1, below the level of her severe canal stenosis. We will trial rx nsaid. Lower dose dt torsemide. We talked about the option of lyrica - due to potential side effects would prefer to hold off. Follow up 2-4 wks post injection to review response to injection            4/9/2025     1:30 PM   OSWESTRY DISABILITY INDEX   Count 10    Sum 4    Oswestry Score (%) 8 %        Patient-reported            Diagnoses and all orders for this visit:  Lumbar radiculopathy  -     Pain Interlamin Epdrl Strd Inj Lumbar Sacral; Future  -     naproxen (NAPROSYN) 250 MG tablet; Take 1 tablet (250 mg) by mouth 2 times daily (with meals).          PLAN:  Reviewed spine anatomy and disease process. Discussed diagnosis and treatment options with the patient today. A shared decision making model was used.  The patient's values and choices were respected. The following represents what was discussed and decided upon by the provider and the patient.      -DIAGNOSTIC TESTS:  Images were personally reviewed and interpreted and explained to patient today using a spine model.   --no new imaging ordered today    -PHYSICAL THERAPY:    --continue PT exercises  Discussed the importance of core strengthening, ROM, stretching exercises with the patient and how each of these entities is important in decreasing pain.  Explained to the patient that the purpose of physical therapy is to teach the patient a home exercise program.  These exercises need to be performed every day in order to decrease  pain and prevent future occurrences of pain.                -MEDICATIONS:    --ibuprofen 600mg wasn't helpful  -start naproxen 250mg BID PRN  -tylenol prn  -consider alternative neuropathic agent, had side effects on gabapentin previously  Discussed multiple medication options today with patient. Discussed risks, side effects, and proper use of medications. Patient verbalized understanding.    -INTERVENTIONS:    -Discussed the role of injections with patient today. Patient would be a good candidate in the future for either epidural steroid injections or medial branch blocks if indicated based on symptoms and supported by imaging results. IL MARIANNE L5-S1 orders placed    -PATIENT EDUCATION:  Total time of 20 minutes, on the day of service, spent with the patient, reviewing the chart, placing orders, and documenting.   -Today we also discussed the pros and cons of the current treatment plan.    -FOLLOW-UP:  2-4 wks post inj    Advised patient to call the [facility] if symptoms worsen, new numbness or weakness develops in the legs, or if they develop new or worsening problems controlling bladder or bowel function.   ______________________________________________________________________    SUBJECTIVE:    Albina is here to follow up appt. She is sp wilfrid L2-3 TF MARIANNE and reports no relief. She had some agitation following this and reports she saw her pcp and got some medication to take as needed. She continues to experience back pain into both of her legs. Does feel like it goes all the way down and fills out the form to indicate the backs of the legs today. Pain level 8/10 today, 9 at worst, 5 at best. Worse with walking, improves somewhat with taking tylenol. Here with her .      Per original visit with updated meds/inj list:    HPI:  Albina Pedro  Is a 74 year old female hx Sjogren's, Myasthenia gravis, IBS, anxiety, depression interstitial lung disease, coronary artery disease, who presents today for new  patient evaluation of Chronic bilateral low back pain without sciatica     Dx MG at first, then IBM based on two biopsies.  Worse back pain since a fall last summer onto her hip. They took xr which looked ok. Has since then been getting progressively worse. Cries about the pain 5/7 days of the week. Festus lower back pain. It went down the back of the R leg a few weeks ago. It is worse at random. Has weakness in her legs. She can't get up out of a chair. When she walks, her legs feel good. She has felt like her legs are wobbly sometimes though, loss of balance. No falls recently. Last summer did fall. A hot bath helps temporarily. Nothing else helps. Pain is at a 7-8/10 on average. sometimes worse to a 10.     Has had chronic trouble getting up.   Denies changes in bowel or bladder control or numbness.     She has tried tylenol. Has also tried a cream.  These dont help too much     She was on prednisone for MG which did not seem to help with the back pain    She has not had any recent PT. She is not able to participate due to pain severity.     She does walk frequently     -Treatment to Date:     -Medications:  Tylenol  Ibuprofen  Gabapentin (side effects)    Physical therapy:  Completed 2025    Injections:  Festus L2-3 TF MARIANNE 7/7/25 0% relief    Current Outpatient Medications   Medication Sig Dispense Refill    naproxen (NAPROSYN) 250 MG tablet Take 1 tablet (250 mg) by mouth 2 times daily (with meals). 60 tablet 0    acetaminophen (TYLENOL) 500 MG tablet Take 1,000 mg by mouth every 6 hours as needed for mild pain      albuterol (PROAIR HFA/PROVENTIL HFA/VENTOLIN HFA) 108 (90 Base) MCG/ACT inhaler INHALE TWO PUFFS BY MOUTH EVERY FOUR HOURS AS NEEDED FOR SHORTNESS OF BREATH/DYSPNEA 25.5 g 0    buPROPion (WELLBUTRIN XL) 150 MG 24 hr tablet Take 1 tablet (150 mg) by mouth every morning. 90 tablet 3    ciprofloxacin (CIPRO) 250 MG tablet Take 1 tablet (250 mg) by mouth 2 times daily. 6 tablet 0    ibuprofen (ADVIL/MOTRIN)  600 MG tablet Take 1 tablet (600 mg) by mouth every 6 hours as needed for moderate pain. Take with food. Stop if blood in stool. 45 tablet 0    LORazepam (ATIVAN) 1 MG tablet Take 1 tablet (1 mg) by mouth every 6 hours as needed for anxiety. 60 tablet 0    LORazepam (ATIVAN) 1 MG tablet Take 1 tablet (1 mg) by mouth every 6 hours as needed for anxiety. 1 tablet 0    Multiple Vitamins-Minerals (MULTIVITAMIN & MINERAL PO) Take 1 tablet by mouth daily       pravastatin (PRAVACHOL) 40 MG tablet Take 1 tablet (40 mg) by mouth daily. 90 tablet 3    QUEtiapine (SEROQUEL) 200 MG tablet Take 1 tablet (200 mg) by mouth 2 times daily. 180 tablet 1    rivastigmine (EXELON) 9.5 MG/24HR 24 hr patch Place 1 patch onto the skin daily.      torsemide (DEMADEX) 10 MG tablet Take 1 tablet (10 mg) by mouth daily. 30 tablet 3    triamcinolone (KENALOG) 0.1 % external cream Apply topically 2 times daily as needed for irritation. 15 g 1     No current facility-administered medications for this visit.       Allergies   Allergen Reactions    Prednisone Hallucination    Daypro [Oxaprozin] Rash           Nitroglycerin Other (See Comments)     Causes low blood pressure    Penicillins Unknown     Occurred as child.    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        Patient Active Problem List   Diagnosis    DIZZINESS - LIKELY HYPOGLYCEMIA    Dermatophytosis of body    Episodic mood disorder    Panniculitis    CAD (coronary artery disease)    Sjogren's syndrome    Adverse effect of anesthetic    Status post coronary angiogram    Acute chest pain    Major depressive disorder, recurrent, mild     Hyperlipidemia LDL goal <70    ILD (interstitial lung disease) (H)    Intermediate coronary syndrome (H)    Chronic stable angina    Myasthenia gravis with exacerbation (H)    Frequent falls    Moderate dementia with agitation, unspecified dementia type (H)    Major depressive disorder, recurrent episode, moderate (H)    Sleep-wake disorder    Subarachnoid hemorrhage (H)    Fall, initial encounter    IBM (inclusion body myositis) (H)    Chronic low back pain    Myasthenia gravis without exacerbation (H)       Past Surgical History:   Procedure Laterality Date    BIOPSY MUSCLE DIAGNOSTIC (LOCATION) Left 2022    Procedure: BIOPSY, MUSCLE LEFT biceps @0900;  Surgeon: Tyrell Loo MD;  Location: [facility] OR    BIOPSY MUSCLE DIAGNOSTIC (LOCATION) Left 2024    Procedure: Biopsy muscle diagnostic vastus lateralis;  Surgeon: Tyrell Loo MD;  Location: [facility] OR    C/SECTION, LOW TRANSVERSE  10/13/1978    , Low Transverse    COLONOSCOPY      OPEN REDUCTION INTERNAL FIXATION ANKLE Right 2019    Procedure: Open reduction internal fixation right lateral malleolus fracture;  Surgeon: Rolo Oconnor DPM;  Location: [facility] OR    SURGICAL HISTORY OF -           SURGICAL HISTORY OF -   00    Colonoscopy       Family History   Problem Relation Age of Onset    Breast Cancer Mother     Cancer Mother         Breast and liver- age 43    Cerebrovascular Disease Father     Heart Failure Father     Coronary Artery Disease Father     Alzheimer Disease Father     Hypertension Father     Diabetes Maternal Grandmother     Breast Cancer Maternal Aunt     Breast Cancer Paternal Aunt     Cancer - colorectal No family hx of     Prostate Cancer No family hx of        Reviewed past medical, surgical, and family history with patient found on new patient intake packet located in EMR Media tab.     SOCIAL HX: alcohol use, nonsmoker. No rec drug use,    ROS: positive for wt gain and loss, joint pain,  muscle pain, sciatica, falls, diarrhea and constipation, changes in vision. Specifically negative for bowel/bladder dysfunction, balance changes, headache, dizziness, foot drop, fevers, chills, appetite changes, nausea/vomiting, unexplained weight loss. Otherwise 13 systems reviewed are negative. Please see the patient's intake questionnaire from today for details.    OBJECTIVE:  BP (!) 149/69   Pulse 85     PHYSICAL EXAMINATION:    --CONSTITUTIONAL:  Vital signs as above.  No acute distress.  The patient is well nourished and well groomed. Patient appears uncomfortable dt pain  --PSYCHIATRIC:  Appropriate mood and affect. The patient is awake, alert, oriented to person, place, time and answering questions appropriately with clear speech.    --SKIN:  Skin over the face is clean, dry, intact without rashes.    --RESPIRATORY: Normal rhythm and effort. No abnormal accessory muscle breathing patterns noted.     Gait was smooth and coordinated    No lumbar point or paraspinal muscle tenderness    Full strength wilfrid  lower extremities throughout, all planes    Sensation grossly intact throughout both lower ext      RESULTS:   Prior medical records from [facility] Care Everywhere were reviewed today.    Imaging: Spine imaging was personally reviewed and interpreted today. The images were shown to the patient and the findings were explained using a spine model.      EXAM: MR LUMBAR SPINE W/O CONTRAST  LOCATION: [facility]  DATE: 4/21/2025     INDICATION: low back pain into R posterior leg, treatment planning. severe pain  COMPARISON: 4/7/2025 plain film  TECHNIQUE: Routine Lumbar Spine MRI without IV contrast.     FINDINGS:   Grade 1 anterolisthesis of L4 on L5. 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 Conus terminates normally. Marrow signal unremarkable accounting for degenerative endplate changes.     T12-L1: Broad-based disc bulge. No canal stenosis. Facet disease.  Mild bilateral foraminal narrowing.     L1-2: Broad-based disc bulge with mild canal stenosis. Facet disease. Moderate bilateral foraminal narrowing.     L2-3: Broad-based disc bulge with central protrusion and severe canal stenosis. Facet disease. Severe right and mild to moderate left foraminal narrowing.     L3-4: Broad-based disc bulge with severe canal stenosis. Facet disease and ligamentum flavum hypertrophy. Mild right and moderate left foraminal narrowing.     L4-5: Disc uncovering and a broad-based disc bulge with severe canal stenosis. Mild bilateral foraminal narrowing.     L5-S1: Broad-based disc bulge. No canal stenosis. Facet disease. Mild bilateral foraminal narrowing.     Paravertebral soft tissues are notable for muscular atrophy findings.                                                                      IMPRESSION:  1.  Extensive degenerative changes in the lumbar spine with severe canal stenosis spanning L2-3 through L4-5.           This note was dictated using voice recognition software. Any grammatical or context distortions are unintentional and inherent to the software.       Leila Snow FNP-C  [facility]  O. [phone]

## 2025-07-23 NOTE — LETTER
7/23/2025      Albina Pedro  [address] [address] [facility] [place] [address]      Dear Colleague,    Thank you for referring your patient, Albina Pedro, to the [facility]. Please see a copy of my visit note below.    ASSESSMENT: Albina Pedro is a 74 year old female who presents for consultation at the request of PCP Rolo Sandoval, who presents today for fu patient evaluation of:    -Chronic bilateral low back pain without sciatica     Injection did not help. She had some agitation but does have medication she can take for this per her pcp now if were to happen again. We reviewed her lumbar MRI results once again. Plan to do an injection in a different area - IL MARIANNE L5-S1, below the level of her severe canal stenosis. We will trial rx nsaid. Lower dose dt torsemide. We talked about the option of lyrica - due to potential side effects would prefer to hold off. Follow up 2-4 wks post injection to review response to injection            4/9/2025     1:30 PM   OSWESTRY DISABILITY INDEX   Count 10    Sum 4    Oswestry Score (%) 8 %        Patient-reported            Diagnoses and all orders for this visit:  Lumbar radiculopathy  -     Pain Interlamin Epdrl Strd Inj Lumbar Sacral; Future  -     naproxen (NAPROSYN) 250 MG tablet; Take 1 tablet (250 mg) by mouth 2 times daily (with meals).          PLAN:  Reviewed spine anatomy and disease process. Discussed diagnosis and treatment options with the patient today. A shared decision making model was used.  The patient's values and choices were respected. The following represents what was discussed and decided upon by the provider and the patient.      -DIAGNOSTIC TESTS:  Images were personally reviewed and interpreted and explained to patient today using a spine model.   --no new imaging ordered today    -PHYSICAL THERAPY:    --continue PT exercises  Discussed the importance of core strengthening, ROM, stretching exercises  with the patient and how each of these entities is important in decreasing pain.  Explained to the patient that the purpose of physical therapy is to teach the patient a home exercise program.  These exercises need to be performed every day in order to decrease pain and prevent future occurrences of pain.                -MEDICATIONS:    --ibuprofen 600mg wasn't helpful  -start naproxen 250mg BID PRN  -tylenol prn  -consider alternative neuropathic agent, had side effects on gabapentin previously  Discussed multiple medication options today with patient. Discussed risks, side effects, and proper use of medications. Patient verbalized understanding.    -INTERVENTIONS:    -Discussed the role of injections with patient today. Patient would be a good candidate in the future for either epidural steroid injections or medial branch blocks if indicated based on symptoms and supported by imaging results. IL MARIANNE L5-S1 orders placed    -PATIENT EDUCATION:  Total time of 20 minutes, on the day of service, spent with the patient, reviewing the chart, placing orders, and documenting.   -Today we also discussed the pros and cons of the current treatment plan.    -FOLLOW-UP:  2-4 wks post inj    Advised patient to call the Spine Center if symptoms worsen, new numbness or weakness develops in the legs, or if they develop new or worsening problems controlling bladder or bowel function.   ______________________________________________________________________    SUBJECTIVE:    Albina is here to follow up appt. She is sp wilfrid L2-3 TF MARIANNE and reports no relief. She had some agitation following this and reports she saw her pcp and got some medication to take as needed. She continues to experience back pain into both of her legs. Does feel like it goes all the way down and fills out the form to indicate the backs of the legs today. Pain level 8/10 today, 9 at worst, 5 at best. Worse with walking, improves somewhat with taking tylenol. Here  with her .      Per original visit with updated meds/inj list:    HPI:  Albina Pedro  Is a 74 year old female hx Sjogren's, Myasthenia gravis, IBS, anxiety, depression interstitial lung disease, coronary artery disease, who presents today for new patient evaluation of Chronic bilateral low back pain without sciatica     Dx MG at first, then IBM based on two biopsies.  Worse back pain since a fall last summer onto her hip. They took xr which looked ok. Has since then been getting progressively worse. Cries about the pain 5/7 days of the week. Festus lower back pain. It went down the back of the R leg a few weeks ago. It is worse at random. Has weakness in her legs. She can't get up out of a chair. When she walks, her legs feel good. She has felt like her legs are wobbly sometimes though, loss of balance. No falls recently. Last summer did fall. A hot bath helps temporarily. Nothing else helps. Pain is at a 7-8/10 on average. sometimes worse to a 10.     Has had chronic trouble getting up.   Denies changes in bowel or bladder control or numbness.     She has tried tylenol. Has also tried a cream.  These dont help too much     She was on prednisone for MG which did not seem to help with the back pain    She has not had any recent PT. She is not able to participate due to pain severity.     She does walk frequently     -Treatment to Date:     -Medications:  Tylenol  Ibuprofen  Gabapentin (side effects)    Physical therapy:  Completed 2025    Injections:  Festus L2-3 TF MARIANNE 7/7/25 0% relief    Current Outpatient Medications   Medication Sig Dispense Refill     naproxen (NAPROSYN) 250 MG tablet Take 1 tablet (250 mg) by mouth 2 times daily (with meals). 60 tablet 0     acetaminophen (TYLENOL) 500 MG tablet Take 1,000 mg by mouth every 6 hours as needed for mild pain       albuterol (PROAIR HFA/PROVENTIL HFA/VENTOLIN HFA) 108 (90 Base) MCG/ACT inhaler INHALE TWO PUFFS BY MOUTH EVERY FOUR HOURS AS NEEDED FOR SHORTNESS  OF BREATH/DYSPNEA 25.5 g 0     buPROPion (WELLBUTRIN XL) 150 MG 24 hr tablet Take 1 tablet (150 mg) by mouth every morning. 90 tablet 3     ciprofloxacin (CIPRO) 250 MG tablet Take 1 tablet (250 mg) by mouth 2 times daily. 6 tablet 0     ibuprofen (ADVIL/MOTRIN) 600 MG tablet Take 1 tablet (600 mg) by mouth every 6 hours as needed for moderate pain. Take with food. Stop if blood in stool. 45 tablet 0     LORazepam (ATIVAN) 1 MG tablet Take 1 tablet (1 mg) by mouth every 6 hours as needed for anxiety. 60 tablet 0     LORazepam (ATIVAN) 1 MG tablet Take 1 tablet (1 mg) by mouth every 6 hours as needed for anxiety. 1 tablet 0     Multiple Vitamins-Minerals (MULTIVITAMIN & MINERAL PO) Take 1 tablet by mouth daily        pravastatin (PRAVACHOL) 40 MG tablet Take 1 tablet (40 mg) by mouth daily. 90 tablet 3     QUEtiapine (SEROQUEL) 200 MG tablet Take 1 tablet (200 mg) by mouth 2 times daily. 180 tablet 1     rivastigmine (EXELON) 9.5 MG/24HR 24 hr patch Place 1 patch onto the skin daily.       torsemide (DEMADEX) 10 MG tablet Take 1 tablet (10 mg) by mouth daily. 30 tablet 3     triamcinolone (KENALOG) 0.1 % external cream Apply topically 2 times daily as needed for irritation. 15 g 1     No current facility-administered medications for this visit.       Allergies   Allergen Reactions     Prednisone Hallucination     Daypro [Oxaprozin] Rash            Nitroglycerin Other (See Comments)     Causes low blood pressure     Penicillins Unknown     Occurred as child.     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        Patient Active Problem List   Diagnosis      DIZZINESS - LIKELY HYPOGLYCEMIA     Dermatophytosis of body     Episodic mood disorder     Panniculitis     CAD (coronary artery disease)     Sjogren's syndrome     Adverse effect of anesthetic     Status post coronary angiogram     Acute chest pain     Major depressive disorder, recurrent, mild     Hyperlipidemia LDL goal <70     ILD (interstitial lung disease) (H)     Intermediate coronary syndrome (H)     Chronic stable angina     Myasthenia gravis with exacerbation (H)     Frequent falls     Moderate dementia with agitation, unspecified dementia type (H)     Major depressive disorder, recurrent episode, moderate (H)     Sleep-wake disorder     Subarachnoid hemorrhage (H)     Fall, initial encounter     IBM (inclusion body myositis) (H)     Chronic low back pain     Myasthenia gravis without exacerbation (H)       Past Surgical History:   Procedure Laterality Date     BIOPSY MUSCLE DIAGNOSTIC (LOCATION) Left 2022    Procedure: BIOPSY, MUSCLE LEFT biceps @0900;  Surgeon: Tyrell Loo MD;  Location: [facility] OR     BIOPSY MUSCLE DIAGNOSTIC (LOCATION) Left 2024    Procedure: Biopsy muscle diagnostic vastus lateralis;  Surgeon: Tyrell Loo MD;  Location: [facility] OR     C/SECTION, LOW TRANSVERSE  10/13/1978    , Low Transverse     COLONOSCOPY       OPEN REDUCTION INTERNAL FIXATION ANKLE Right 2019    Procedure: Open reduction internal fixation right lateral malleolus fracture;  Surgeon: Rolo Oconnor DPM;  Location: [facility] OR     SURGICAL HISTORY OF -            SURGICAL HISTORY OF -   00    Colonoscopy       Family History   Problem Relation Age of Onset     Breast Cancer Mother      Cancer Mother         Breast and liver- age 43     Cerebrovascular Disease Father      Heart Failure Father      Coronary Artery Disease Father      Alzheimer Disease Father      Hypertension Father      Diabetes Maternal Grandmother      Breast Cancer Maternal Aunt      Breast Cancer  Paternal Aunt      Cancer - colorectal No family hx of      Prostate Cancer No family hx of        Reviewed past medical, surgical, and family history with patient found on new patient intake packet located in EMR Media tab.     SOCIAL HX: alcohol use, nonsmoker. No rec drug use,    ROS: positive for wt gain and loss, joint pain, muscle pain, sciatica, falls, diarrhea and constipation, changes in vision. Specifically negative for bowel/bladder dysfunction, balance changes, headache, dizziness, foot drop, fevers, chills, appetite changes, nausea/vomiting, unexplained weight loss. Otherwise 13 systems reviewed are negative. Please see the patient's intake questionnaire from today for details.    OBJECTIVE:  BP (!) 149/69   Pulse 85     PHYSICAL EXAMINATION:    --CONSTITUTIONAL:  Vital signs as above.  No acute distress.  The patient is well nourished and well groomed. Patient appears uncomfortable dt pain  --PSYCHIATRIC:  Appropriate mood and affect. The patient is awake, alert, oriented to person, place, time and answering questions appropriately with clear speech.    --SKIN:  Skin over the face is clean, dry, intact without rashes.    --RESPIRATORY: Normal rhythm and effort. No abnormal accessory muscle breathing patterns noted.     Gait was smooth and coordinated    No lumbar point or paraspinal muscle tenderness    Full strength wilfrid  lower extremities throughout, all planes    Sensation grossly intact throughout both lower ext      RESULTS:   Prior medical records from [facility] and Care Everywhere were reviewed today.    Imaging: Spine imaging was personally reviewed and interpreted today. The images were shown to the patient and the findings were explained using a spine model.      EXAM: MR LUMBAR SPINE W/O CONTRAST  LOCATION: [facility]  DATE: 4/21/2025     INDICATION: low back pain into R posterior leg, treatment planning. severe pain  COMPARISON: 4/7/2025 plain  film  TECHNIQUE: Routine Lumbar Spine MRI without IV contrast.     FINDINGS:   Grade 1 anterolisthesis of L4 on L5. 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 Conus terminates normally. Marrow signal unremarkable accounting for degenerative endplate changes.     T12-L1: Broad-based disc bulge. No canal stenosis. Facet disease. Mild bilateral foraminal narrowing.     L1-2: Broad-based disc bulge with mild canal stenosis. Facet disease. Moderate bilateral foraminal narrowing.     L2-3: Broad-based disc bulge with central protrusion and severe canal stenosis. Facet disease. Severe right and mild to moderate left foraminal narrowing.     L3-4: Broad-based disc bulge with severe canal stenosis. Facet disease and ligamentum flavum hypertrophy. Mild right and moderate left foraminal narrowing.     L4-5: Disc uncovering and a broad-based disc bulge with severe canal stenosis. Mild bilateral foraminal narrowing.     L5-S1: Broad-based disc bulge. No canal stenosis. Facet disease. Mild bilateral foraminal narrowing.     Paravertebral soft tissues are notable for muscular atrophy findings.                                                                      IMPRESSION:  1.  Extensive degenerative changes in the lumbar spine with severe canal stenosis spanning L2-3 through L4-5.           This note was dictated using voice recognition software. Any grammatical or context distortions are unintentional and inherent to the software.       Leila Snow [facility]  O. [phone]             Again, thank you for allowing me to participate in the care of your patient.        Sincerely,        STEW Guillen CNP    Electronically signed

## 2025-07-23 NOTE — TELEPHONE ENCOUNTER
Procedure ordered? YES    What insurance are we billing for this procedure?  [employer]  IF SCHEDULING AT [facility] PLEASE SCHEDULE AT LEAST 7-10 BUSINESS DAYS OUT SO A PA CAN BE OBTAINED    Is a  PAIN  order available to link to injection appointment or does one need to be transcribed?  YES: IL MRAIANNE L5-S1.    If needed, route to CN to assist in doing so.    Is  needed?: No   If YES, please initiate in-person  request.    Will patient have a ?  Yes    All fluoro procedures require a .  These US procedures also require a : piriformis, pudendal, scalene, & carpal tunnel.    Is patient taking any blood thinners (i.e. Plavix, coumadin, jantoven, warfarin, heparin, Xarelto, Pradaxa, Eliquis, Brilinta, or Effient, etc)? No   If YES, schedule out 2 weeks, and route to RN pool to determine if medication hold is needed.    Is patient taking aspirin? No   For CERVICAL or INTERLAMINAR procedures, route message to Care Navigation so they can seek approval from managing provider to hold aspirin for 6 days prior to the procedure.    Does patient have an allergy to contrast dye or iodine?  NO  If YES, OK to schedule. Route to RN pool AND add allergy information to appointment notes    Is patient diabetic? No If YES, blood glucose level will be checked on day of procedure and will need to be 300mg/dL or below (for steroid injections).    Does patient have an active infection or treated for one within the past week? No   If YES, do NOT schedule and route to Care Navigation.     Is patient currently taking any antibiotics or have an active infection?  No  For patients on chronic, preventative, or prophylactic antibiotics, procedures may be scheduled.   For patients on antibiotics for active or recent infection: antibiotic course must have been completed and symptom-free of infection to safely proceed with injection.  Send to Care Navigation if unsure.    Is patient actively being  treated for cancer or immunocompromised? No  If YES, do NOT schedule and route to RN pool.     Any chance of pregnancy? NO   If YES, do NOT schedule and route to RN pool.    Have you had any vaccines in the last 2 weeks? NO  If YES, please route to Care Navigation to discuss before scheduling.     Reminders:  -  If you are started on any steroids or antibiotics between now and your appointment, you must contact us because it may affect our ability to perform your procedure.   reviewed    -  Informed patient that it is OK to take normal medications before the procedure and must hold blood thinners as instructed.  reviewed    -  Patients scheduled for MBBs should not take pain meds prior to injection and pain rating needs to be 5/10 or greater the day of the procedure.    -  Informed cervical injection patients that an IV will be placed as a precautionary measure.  reviewed    -  Patients should eat a light meal prior to their procedure appointment.  reviewed    -  All radiofrequency ablations are in a 40 minute time slot and not to be scheduled until in-basket message has been sent by the procedure team that the PA has been  received.  reviewed     -  Spinal cord stimulator trial scheduling is coordinated by the procedure team.    -  Care Navigation (#[phone]) is available to assist patients with additional questions.  reviewed    -  Cervical TFESIs with Dr. Whitaker only.    *PLEASE FORWARD TO CARE NAVIGATION IF INDICATED.  PATIENT SHOULD BE INFORMED THAT THEY WILL BE CONTACTED BY CARE NAVIGATION ONLY IF CHANGES TO MEDICATION/CARE PLAN RECOMMENDATIONS ARE NEEDED.  IF THEY DO NOT HEAR BACK FROM CARE NAVIGATION, THEY ARE FINE TO CONTINUE WITH THEIR CURRENT MEDICATIONS/CARE PLAN.*

## 2025-07-23 NOTE — PATIENT INSTRUCTIONS
An injection has been ordered today to potentially help with your pain symptoms. These injections do not fix what is going on in your back, therefore they typically do not take away the pain completely, however they can many times help improve symptoms. Injections should always be completed along with other modalities such as physical therapy for the best long term outcomes. If injections alone are done, then pain will likely return.     [facility] Injection Requirements:    A  is required for all fluoroscopically-guided injections.  Injection appointments may be cancelled if there are signs/symptoms of an active infection or if the patient is being actively treated with antibiotics for a diagnosed infection.  Patients may have their steroid injection cancelled if they have had another steroid injection within 2 weeks.  Diabetic patients will have their blood glucose levels checked the day of their injection and the appointment will be rescheduled if the blood glucose level is 300 or higher.  Patients with allergies to cortisone, local anesthetics, iodine, or contrast dye should contact the Spine Center to further discuss these considerations.  Patients scheduled for medial branch block diagnostic injections should refrain from taking pain medication the day of the procedure.  The medial branch block injection appointment will be rescheduled if the patient's pain rating is not 5/10 or greater at the time of the procedure.  Patients taking warfarin/Coumadin will have their INR checked the day of the procedure and the procedure may be rescheduled if the INR is greater than 3.0.  Please contact the Spine Center (#[phone]) if you are taking any prescription blood-thinning medications (warfarin, Plavix, Lovenox, Eliquis, Brilinta, Effient, etc.) as special dosing adjustments may need to be made depending on the type of injection you are scheduled to receive.  It is recommended  that you delay having your steroid injection if you have received a flu shot or shingles vaccine within 2 weeks.     Prescribed Naproxen 250 today. Please take as prescribed, as needed for pain control as well as to aid in decreasing inflammation.   Take medication with a full glass of water and with food.   *Do not take Advil, Ibuprofen, Aleve, or Naproxen while taking this medication as it can cause organ failure if taken together*  This medication does have risks if taken long-term, these risks include: gastrointestinal irritation, kidney dysfunction, and cardiovascular effects.  We will check your kidney function as needed while you're on this medication.  Stop taking this medication if you have intolerable side effects or blood in the stool.     ~Please call our [facility] Nurse Navigation line [phone] with any questions or concerns about your treatment plan, if symptoms worsen and you would like to be seen urgently, or if you have any new or worsening numbness, weakness, or problems controlling bladder and bowel function.  ~You are also welcome to contact Leila Snow via [employer], but please be aware that responses to [employer] message may take 2-3 days due to the high volume of patients seen in clinic.

## 2025-08-04 PROBLEM — G89.29 CHRONIC LOW BACK PAIN: Status: RESOLVED | Noted: 2025-04-09 | Resolved: 2025-08-04

## 2025-08-04 PROBLEM — M54.50 CHRONIC LOW BACK PAIN: Status: RESOLVED | Noted: 2025-04-09 | Resolved: 2025-08-04

## 2025-08-07 ENCOUNTER — TELEPHONE (OUTPATIENT)
Dept: FAMILY MEDICINE | Facility: CLINIC | Age: 75
End: 2025-08-07

## 2025-08-07 ENCOUNTER — OFFICE VISIT (OUTPATIENT)
Dept: FAMILY MEDICINE | Facility: CLINIC | Age: 75
End: 2025-08-07
Payer: COMMERCIAL

## 2025-08-07 VITALS
SYSTOLIC BLOOD PRESSURE: 108 MMHG | HEART RATE: 91 BPM | TEMPERATURE: 98.5 F | WEIGHT: 179 LBS | OXYGEN SATURATION: 92 % | DIASTOLIC BLOOD PRESSURE: 64 MMHG | HEIGHT: 66 IN | BODY MASS INDEX: 28.77 KG/M2

## 2025-08-07 DIAGNOSIS — R60.0 BILATERAL LOWER EXTREMITY EDEMA: ICD-10-CM

## 2025-08-07 DIAGNOSIS — G30.9 ALZHEIMER'S DISEASE (H): ICD-10-CM

## 2025-08-07 DIAGNOSIS — Z23 NEED FOR VACCINATION: ICD-10-CM

## 2025-08-07 DIAGNOSIS — R60.0 BILATERAL LEG EDEMA: Primary | ICD-10-CM

## 2025-08-07 DIAGNOSIS — I20.0 INTERMEDIATE CORONARY SYNDROME (H): ICD-10-CM

## 2025-08-07 DIAGNOSIS — I25.10 CORONARY ARTERY DISEASE INVOLVING NATIVE CORONARY ARTERY, UNSPECIFIED WHETHER ANGINA PRESENT, UNSPECIFIED WHETHER NATIVE OR TRANSPLANTED HEART: Chronic | ICD-10-CM

## 2025-08-07 DIAGNOSIS — E16.2 HYPOGLYCEMIA: ICD-10-CM

## 2025-08-07 DIAGNOSIS — R45.1 AGITATION: ICD-10-CM

## 2025-08-07 DIAGNOSIS — F02.80 ALZHEIMER'S DISEASE (H): ICD-10-CM

## 2025-08-07 DIAGNOSIS — F03.B11 MODERATE DEMENTIA WITH AGITATION, UNSPECIFIED DEMENTIA TYPE (H): Primary | ICD-10-CM

## 2025-08-07 LAB
ALBUMIN UR-MCNC: NEGATIVE MG/DL
ANION GAP SERPL CALCULATED.3IONS-SCNC: 12 MMOL/L (ref 7–15)
APPEARANCE UR: CLEAR
BACTERIA #/AREA URNS HPF: ABNORMAL /HPF
BILIRUB UR QL STRIP: NEGATIVE
BUN SERPL-MCNC: 26.7 MG/DL (ref 8–23)
CALCIUM SERPL-MCNC: 9 MG/DL (ref 8.8–10.4)
CHLORIDE SERPL-SCNC: 105 MMOL/L (ref 98–107)
COLOR UR AUTO: YELLOW
CREAT SERPL-MCNC: 0.72 MG/DL (ref 0.51–0.95)
EGFRCR SERPLBLD CKD-EPI 2021: 86 ML/MIN/1.73M2
GLUCOSE SERPL-MCNC: 69 MG/DL (ref 70–99)
GLUCOSE UR STRIP-MCNC: NEGATIVE MG/DL
HCO3 SERPL-SCNC: 26 MMOL/L (ref 22–29)
HGB UR QL STRIP: ABNORMAL
HYALINE CASTS #/AREA URNS LPF: ABNORMAL /LPF
KETONES UR STRIP-MCNC: NEGATIVE MG/DL
LEUKOCYTE ESTERASE UR QL STRIP: ABNORMAL
NITRATE UR QL: NEGATIVE
PH UR STRIP: 5.5 [PH] (ref 5–7)
POTASSIUM SERPL-SCNC: 4.4 MMOL/L (ref 3.4–5.3)
RBC #/AREA URNS AUTO: ABNORMAL /HPF
SODIUM SERPL-SCNC: 143 MMOL/L (ref 135–145)
SP GR UR STRIP: 1.01 (ref 1–1.03)
SQUAMOUS #/AREA URNS AUTO: ABNORMAL /LPF
UROBILINOGEN UR STRIP-ACNC: 0.2 E.U./DL
WBC #/AREA URNS AUTO: ABNORMAL /HPF

## 2025-08-07 PROCEDURE — 36415 COLL VENOUS BLD VENIPUNCTURE: CPT

## 2025-08-07 PROCEDURE — 1126F AMNT PAIN NOTED NONE PRSNT: CPT

## 2025-08-07 PROCEDURE — 99214 OFFICE O/P EST MOD 30 MIN: CPT

## 2025-08-07 PROCEDURE — 3078F DIAST BP <80 MM HG: CPT

## 2025-08-07 PROCEDURE — 81001 URINALYSIS AUTO W/SCOPE: CPT

## 2025-08-07 PROCEDURE — 80048 BASIC METABOLIC PNL TOTAL CA: CPT

## 2025-08-07 PROCEDURE — 3074F SYST BP LT 130 MM HG: CPT

## 2025-08-07 PROCEDURE — G2211 COMPLEX E/M VISIT ADD ON: HCPCS

## 2025-08-07 RX ORDER — QUETIAPINE FUMARATE 200 MG/1
100 TABLET, FILM COATED ORAL 2 TIMES DAILY PRN
COMMUNITY
Start: 2025-08-07

## 2025-08-07 RX ORDER — QUETIAPINE FUMARATE 25 MG/1
25 TABLET, FILM COATED ORAL 2 TIMES DAILY
COMMUNITY

## 2025-08-07 RX ORDER — TORSEMIDE 20 MG/1
TABLET ORAL
Qty: 30 TABLET | Refills: 0 | Status: SHIPPED | OUTPATIENT
Start: 2025-08-07 | End: 2025-09-03

## 2025-08-07 ASSESSMENT — ANXIETY QUESTIONNAIRES
GAD7 TOTAL SCORE: 2
GAD7 TOTAL SCORE: 2
IF YOU CHECKED OFF ANY PROBLEMS ON THIS QUESTIONNAIRE, HOW DIFFICULT HAVE THESE PROBLEMS MADE IT FOR YOU TO DO YOUR WORK, TAKE CARE OF THINGS AT HOME, OR GET ALONG WITH OTHER PEOPLE: SOMEWHAT DIFFICULT
GAD7 TOTAL SCORE: 2
7. FEELING AFRAID AS IF SOMETHING AWFUL MIGHT HAPPEN: NOT AT ALL
4. TROUBLE RELAXING: NOT AT ALL
5. BEING SO RESTLESS THAT IT IS HARD TO SIT STILL: NOT AT ALL
7. FEELING AFRAID AS IF SOMETHING AWFUL MIGHT HAPPEN: NOT AT ALL
6. BECOMING EASILY ANNOYED OR IRRITABLE: SEVERAL DAYS
2. NOT BEING ABLE TO STOP OR CONTROL WORRYING: NOT AT ALL
3. WORRYING TOO MUCH ABOUT DIFFERENT THINGS: NOT AT ALL
1. FEELING NERVOUS, ANXIOUS, OR ON EDGE: SEVERAL DAYS
8. IF YOU CHECKED OFF ANY PROBLEMS, HOW DIFFICULT HAVE THESE MADE IT FOR YOU TO DO YOUR WORK, TAKE CARE OF THINGS AT HOME, OR GET ALONG WITH OTHER PEOPLE?: SOMEWHAT DIFFICULT

## 2025-08-07 ASSESSMENT — PAIN SCALES - GENERAL: PAINLEVEL_OUTOF10: NO PAIN (0)

## 2025-08-08 ENCOUNTER — PATIENT OUTREACH (OUTPATIENT)
Dept: CARE COORDINATION | Facility: CLINIC | Age: 75
End: 2025-08-08

## 2025-08-12 ENCOUNTER — PATIENT OUTREACH (OUTPATIENT)
Dept: NURSING | Facility: CLINIC | Age: 75
End: 2025-08-12
Payer: COMMERCIAL

## 2025-08-12 ASSESSMENT — ACTIVITIES OF DAILY LIVING (ADL)
DEPENDENT_IADLS:: TRANSPORTATION;MONEY MANAGEMENT;MEDICATION MANAGEMENT;MEAL PREPARATION;SHOPPING;LAUNDRY;COOKING;CLEANING;INCONTINENCE

## 2025-08-18 ENCOUNTER — LAB (OUTPATIENT)
Dept: LAB | Facility: CLINIC | Age: 75
End: 2025-08-18
Payer: COMMERCIAL

## 2025-08-18 DIAGNOSIS — R60.0 BILATERAL LOWER EXTREMITY EDEMA: ICD-10-CM

## 2025-08-18 DIAGNOSIS — E16.2 HYPOGLYCEMIA: ICD-10-CM

## 2025-08-18 LAB
ANION GAP SERPL CALCULATED.3IONS-SCNC: 13 MMOL/L (ref 7–15)
BUN SERPL-MCNC: 23.1 MG/DL (ref 8–23)
CALCIUM SERPL-MCNC: 8.9 MG/DL (ref 8.8–10.4)
CHLORIDE SERPL-SCNC: 108 MMOL/L (ref 98–107)
CREAT SERPL-MCNC: 0.65 MG/DL (ref 0.51–0.95)
EGFRCR SERPLBLD CKD-EPI 2021: >90 ML/MIN/1.73M2
EST. AVERAGE GLUCOSE BLD GHB EST-MCNC: 117 MG/DL
GLUCOSE SERPL-MCNC: 151 MG/DL (ref 70–99)
HBA1C MFR BLD: 5.7 % (ref 0–5.6)
HCO3 SERPL-SCNC: 22 MMOL/L (ref 22–29)
POTASSIUM SERPL-SCNC: 4.4 MMOL/L (ref 3.4–5.3)
SODIUM SERPL-SCNC: 143 MMOL/L (ref 135–145)

## 2025-08-18 PROCEDURE — 83036 HEMOGLOBIN GLYCOSYLATED A1C: CPT

## 2025-08-18 PROCEDURE — 80048 BASIC METABOLIC PNL TOTAL CA: CPT

## 2025-08-18 PROCEDURE — 36415 COLL VENOUS BLD VENIPUNCTURE: CPT

## 2025-08-20 ENCOUNTER — VIRTUAL VISIT (OUTPATIENT)
Dept: FAMILY MEDICINE | Facility: CLINIC | Age: 75
End: 2025-08-20
Payer: COMMERCIAL

## 2025-08-20 DIAGNOSIS — M54.41 CHRONIC BILATERAL LOW BACK PAIN WITH RIGHT-SIDED SCIATICA: ICD-10-CM

## 2025-08-20 DIAGNOSIS — G89.29 CHRONIC BILATERAL LOW BACK PAIN WITH RIGHT-SIDED SCIATICA: ICD-10-CM

## 2025-08-20 DIAGNOSIS — F03.B11 MODERATE DEMENTIA WITH AGITATION, UNSPECIFIED DEMENTIA TYPE (H): Primary | ICD-10-CM

## 2025-08-20 PROCEDURE — 98014 SYNCH AUDIO-ONLY EST MOD 30: CPT

## 2025-08-21 ENCOUNTER — TELEPHONE (OUTPATIENT)
Dept: FAMILY MEDICINE | Facility: CLINIC | Age: 75
End: 2025-08-21

## 2025-08-21 ENCOUNTER — TELEPHONE (OUTPATIENT)
Dept: NEUROSURGERY | Facility: CLINIC | Age: 75
End: 2025-08-21

## 2025-08-21 ENCOUNTER — PATIENT OUTREACH (OUTPATIENT)
Dept: NURSING | Facility: CLINIC | Age: 75
End: 2025-08-21
Payer: COMMERCIAL

## 2025-08-21 DIAGNOSIS — G30.9 ALZHEIMER'S DISEASE (H): Primary | ICD-10-CM

## 2025-08-21 DIAGNOSIS — F02.80 ALZHEIMER'S DISEASE (H): Primary | ICD-10-CM

## 2025-08-21 RX ORDER — RIVASTIGMINE 9.5 MG/24H
1 PATCH, EXTENDED RELEASE TRANSDERMAL DAILY
Qty: 30 PATCH | Refills: 5 | Status: SHIPPED | OUTPATIENT
Start: 2025-08-21

## 2025-08-27 ENCOUNTER — TELEPHONE (OUTPATIENT)
Dept: NEUROSURGERY | Facility: CLINIC | Age: 75
End: 2025-08-27
Payer: COMMERCIAL

## 2025-09-03 ENCOUNTER — OFFICE VISIT (OUTPATIENT)
Dept: FAMILY MEDICINE | Facility: CLINIC | Age: 75
End: 2025-09-03
Payer: COMMERCIAL

## 2025-09-03 VITALS
RESPIRATION RATE: 16 BRPM | HEIGHT: 66 IN | TEMPERATURE: 98 F | BODY MASS INDEX: 29.12 KG/M2 | DIASTOLIC BLOOD PRESSURE: 77 MMHG | HEART RATE: 89 BPM | OXYGEN SATURATION: 98 % | WEIGHT: 181.2 LBS | SYSTOLIC BLOOD PRESSURE: 135 MMHG

## 2025-09-03 DIAGNOSIS — F33.1 MAJOR DEPRESSIVE DISORDER, RECURRENT EPISODE, MODERATE (H): ICD-10-CM

## 2025-09-03 DIAGNOSIS — F03.B11 MODERATE DEMENTIA WITH AGITATION, UNSPECIFIED DEMENTIA TYPE (H): Primary | ICD-10-CM

## 2025-09-03 PROCEDURE — 3075F SYST BP GE 130 - 139MM HG: CPT | Performed by: NURSE PRACTITIONER

## 2025-09-03 PROCEDURE — 99214 OFFICE O/P EST MOD 30 MIN: CPT | Performed by: NURSE PRACTITIONER

## 2025-09-03 PROCEDURE — 3078F DIAST BP <80 MM HG: CPT | Performed by: NURSE PRACTITIONER

## 2025-09-03 RX ORDER — QUETIAPINE FUMARATE 25 MG/1
TABLET, FILM COATED ORAL
Qty: 90 TABLET | Refills: 1 | Status: SHIPPED | OUTPATIENT
Start: 2025-09-03

## 2025-09-03 ASSESSMENT — ENCOUNTER SYMPTOMS: BACK PAIN: 1

## (undated) DEVICE — PACK MINOR CUSTOM ASC

## (undated) DEVICE — SOL NACL 0.9% IRRIG 500ML BOTTLE 2F7123

## (undated) DEVICE — DRSG GAUZE 4X4" TRAY

## (undated) DEVICE — DRAPE SHEET REV FOLD 3/4 9349

## (undated) DEVICE — SU MONOCRYL 4-0 PS-2 27" UND Y426H

## (undated) DEVICE — SPECIMEN CONTAINER URINE 90ML STERILE 75.1435.002

## (undated) DEVICE — SU ETHILON 4-0 PS-2 18" 1667G

## (undated) DEVICE — DRSG STERI STRIP 1/2X4" R1547

## (undated) DEVICE — CAST PADDING 4" STERILE 9044S

## (undated) DEVICE — PREP CHLORAPREP W/ORANGE TINT 10.5ML 260715

## (undated) DEVICE — TAPE MEDIPORE 4"X2YD 2864

## (undated) DEVICE — TAPE MICROFOAM 4" 1528-4

## (undated) DEVICE — SU VICRYL 3-0 SH 27" UND J416H

## (undated) DEVICE — PREP CHLORAPREP 26ML TINTED ORANGE  260815

## (undated) DEVICE — SU VICRYL+ 3-0 27IN SH UND VCP416H

## (undated) DEVICE — DRAPE EXTREMITY W/ARMBOARD 29405

## (undated) DEVICE — DRSG GAUZE 4X4" TRAY 6939

## (undated) DEVICE — GLOVE PROTEXIS W/NEU-THERA 7.5  2D73TE75

## (undated) DEVICE — PACK EXTREMITY LATEX FREE SOP32HFFCS

## (undated) DEVICE — DRILL BIT ARTHREX 2.5MM AR-8943-42

## (undated) DEVICE — DRSG JUMPSTART ANTIMICROBIAL 4X4" ABS-4004

## (undated) DEVICE — TONGUE DEPRESSOR STERILE 25-705-ALL

## (undated) DEVICE — LIGHT HANDLE X1 31140133

## (undated) DEVICE — DRAPE C-ARM 60X42" 1013

## (undated) DEVICE — GLOVE BIOGEL PI MICRO SZ 7.5 48575

## (undated) DEVICE — GOWN XLG DISP 9545

## (undated) DEVICE — DRILL BIT ARTHREX LPS CAN 3.5MM AR-4160-35

## (undated) DEVICE — LINEN ORTHO PACK 5446

## (undated) DEVICE — DRAPE LAP W/ARMBOARD 29410

## (undated) DEVICE — GEL ULTRASOUND AQUASONIC 20GM 01-01

## (undated) DEVICE — BNDG ELASTIC 4" DBL LENGTH UNSTERILE 6611-14

## (undated) DEVICE — DRILL BIT ARTHREX BB TAK MTP THREADED AR-13226T

## (undated) DEVICE — DRILL BIT ARTHREX 2.0MM AR-8943-16

## (undated) DEVICE — CAST PADDING 6" UNSTERILE 9046

## (undated) DEVICE — CAST PADDING 4" WEBRIL UNSTERILE

## (undated) DEVICE — LINEN TOWEL PACK X5 5464

## (undated) DEVICE — GLOVE PROTEXIS W/NEU-THERA 8.0  2D73TE80

## (undated) DEVICE — GLOVE PROTEXIS BLUE W/NEU-THERA 8.0  2D73EB80

## (undated) RX ORDER — ONDANSETRON 2 MG/ML
INJECTION INTRAMUSCULAR; INTRAVENOUS
Status: DISPENSED
Start: 2019-02-19

## (undated) RX ORDER — ROPIVACAINE HYDROCHLORIDE 5 MG/ML
INJECTION, SOLUTION EPIDURAL; INFILTRATION; PERINEURAL
Status: DISPENSED
Start: 2019-02-19

## (undated) RX ORDER — CLINDAMYCIN PHOSPHATE 900 MG/50ML
INJECTION, SOLUTION INTRAVENOUS
Status: DISPENSED
Start: 2019-02-19

## (undated) RX ORDER — KETOROLAC TROMETHAMINE 30 MG/ML
INJECTION, SOLUTION INTRAMUSCULAR; INTRAVENOUS
Status: DISPENSED
Start: 2019-02-19

## (undated) RX ORDER — PROPOFOL 10 MG/ML
INJECTION, EMULSION INTRAVENOUS
Status: DISPENSED
Start: 2019-02-19

## (undated) RX ORDER — LIDOCAINE HYDROCHLORIDE 10 MG/ML
INJECTION, SOLUTION EPIDURAL; INFILTRATION; INTRACAUDAL; PERINEURAL
Status: DISPENSED
Start: 2023-02-09

## (undated) RX ORDER — PHENYLEPHRINE HCL IN 0.9% NACL 1 MG/10 ML
SYRINGE (ML) INTRAVENOUS
Status: DISPENSED
Start: 2019-02-19

## (undated) RX ORDER — FENTANYL CITRATE 50 UG/ML
INJECTION, SOLUTION INTRAMUSCULAR; INTRAVENOUS
Status: DISPENSED
Start: 2019-02-19

## (undated) RX ORDER — DEXAMETHASONE SODIUM PHOSPHATE 4 MG/ML
INJECTION, SOLUTION INTRA-ARTICULAR; INTRALESIONAL; INTRAMUSCULAR; INTRAVENOUS; SOFT TISSUE
Status: DISPENSED
Start: 2019-02-19

## (undated) RX ORDER — ACETAMINOPHEN 325 MG/1
TABLET ORAL
Status: DISPENSED
Start: 2019-02-19

## (undated) RX ORDER — GABAPENTIN 300 MG/1
CAPSULE ORAL
Status: DISPENSED
Start: 2019-02-19

## (undated) RX ORDER — LIDOCAINE HYDROCHLORIDE 10 MG/ML
INJECTION, SOLUTION EPIDURAL; INFILTRATION; INTRACAUDAL; PERINEURAL
Status: DISPENSED
Start: 2019-02-19

## (undated) RX ORDER — KETAMINE HCL IN 0.9 % NACL 50 MG/5 ML
SYRINGE (ML) INTRAVENOUS
Status: DISPENSED
Start: 2019-02-19